# Patient Record
Sex: MALE | Race: WHITE | NOT HISPANIC OR LATINO | Employment: UNEMPLOYED | URBAN - NONMETROPOLITAN AREA
[De-identification: names, ages, dates, MRNs, and addresses within clinical notes are randomized per-mention and may not be internally consistent; named-entity substitution may affect disease eponyms.]

---

## 2018-11-09 ENCOUNTER — HOSPITAL ENCOUNTER (INPATIENT)
Facility: HOSPITAL | Age: 63
LOS: 3 days | Discharge: HOME | End: 2018-11-12
Attending: HOSPITALIST | Admitting: HOSPITALIST

## 2018-11-09 ENCOUNTER — PREP FOR SURGERY (OUTPATIENT)
Dept: OTHER | Facility: HOSPITAL | Age: 63
End: 2018-11-09

## 2018-11-09 PROBLEM — R00.1 SYMPTOMATIC BRADYCARDIA: Status: ACTIVE | Noted: 2018-11-09

## 2018-11-09 LAB
ALBUMIN SERPL-MCNC: 4.5 G/DL (ref 3.4–4.8)
ALBUMIN/GLOB SERPL: 1.6 G/DL (ref 1.5–2.5)
ALP SERPL-CCNC: 72 U/L (ref 40–129)
ALT SERPL W P-5'-P-CCNC: 27 U/L (ref 10–44)
ANION GAP SERPL CALCULATED.3IONS-SCNC: 4.1 MMOL/L (ref 3.6–11.2)
APTT PPP: 37.4 SECONDS (ref 23.8–36.1)
AST SERPL-CCNC: 23 U/L (ref 10–34)
BASOPHILS # BLD AUTO: 0.05 10*3/MM3 (ref 0–0.3)
BASOPHILS NFR BLD AUTO: 0.6 % (ref 0–2)
BILIRUB SERPL-MCNC: 0.4 MG/DL (ref 0.2–1.8)
BNP SERPL-MCNC: 89 PG/ML (ref 0–100)
BUN BLD-MCNC: 22 MG/DL (ref 7–21)
BUN/CREAT SERPL: 14.7 (ref 7–25)
CALCIUM SPEC-SCNC: 9.4 MG/DL (ref 7.7–10)
CHLORIDE SERPL-SCNC: 101 MMOL/L (ref 99–112)
CK MB SERPL-CCNC: 4.11 NG/ML (ref 0–5)
CK MB SERPL-CCNC: 4.11 NG/ML (ref 0–5)
CK MB SERPL-RTO: 3.8 % (ref 0–3)
CK SERPL-CCNC: 107 U/L (ref 24–204)
CO2 SERPL-SCNC: 25.9 MMOL/L (ref 24.3–31.9)
CREAT BLD-MCNC: 1.5 MG/DL (ref 0.43–1.29)
DEPRECATED RDW RBC AUTO: 45 FL (ref 37–54)
EOSINOPHIL # BLD AUTO: 0.2 10*3/MM3 (ref 0–0.7)
EOSINOPHIL NFR BLD AUTO: 2.4 % (ref 0–5)
ERYTHROCYTE [DISTWIDTH] IN BLOOD BY AUTOMATED COUNT: 13.8 % (ref 11.5–14.5)
GFR SERPL CREATININE-BSD FRML MDRD: 47 ML/MIN/1.73
GLOBULIN UR ELPH-MCNC: 2.8 GM/DL
GLUCOSE BLD-MCNC: 139 MG/DL (ref 70–110)
GLUCOSE BLDC GLUCOMTR-MCNC: 149 MG/DL (ref 70–130)
HBA1C MFR BLD: 7.7 % (ref 4.5–5.7)
HCT VFR BLD AUTO: 41.1 % (ref 42–52)
HGB BLD-MCNC: 13.3 G/DL (ref 14–18)
IMM GRANULOCYTES # BLD: 0.06 10*3/MM3 (ref 0–0.03)
IMM GRANULOCYTES NFR BLD: 0.7 % (ref 0–0.5)
INR PPP: 1.26 (ref 0.9–1.1)
LYMPHOCYTES # BLD AUTO: 2.48 10*3/MM3 (ref 1–3)
LYMPHOCYTES NFR BLD AUTO: 29.4 % (ref 21–51)
MAGNESIUM SERPL-MCNC: 2.7 MG/DL (ref 1.7–2.6)
MCH RBC QN AUTO: 29.4 PG (ref 27–33)
MCHC RBC AUTO-ENTMCNC: 32.4 G/DL (ref 33–37)
MCV RBC AUTO: 90.9 FL (ref 80–94)
MONOCYTES # BLD AUTO: 0.74 10*3/MM3 (ref 0.1–0.9)
MONOCYTES NFR BLD AUTO: 8.8 % (ref 0–10)
MYOGLOBIN SERPL-MCNC: 89 NG/ML (ref 0–109)
NEUTROPHILS # BLD AUTO: 4.91 10*3/MM3 (ref 1.4–6.5)
NEUTROPHILS NFR BLD AUTO: 58.1 % (ref 30–70)
OSMOLALITY SERPL CALC.SUM OF ELEC: 268.2 MOSM/KG (ref 273–305)
PHOSPHATE SERPL-MCNC: 4.3 MG/DL (ref 2.7–4.5)
PLATELET # BLD AUTO: 289 10*3/MM3 (ref 130–400)
PMV BLD AUTO: 9.2 FL (ref 6–10)
POTASSIUM BLD-SCNC: 4.9 MMOL/L (ref 3.5–5.3)
PROT SERPL-MCNC: 7.3 G/DL (ref 6–8)
PROTHROMBIN TIME: 16 SECONDS (ref 11–15.4)
RBC # BLD AUTO: 4.52 10*6/MM3 (ref 4.7–6.1)
SODIUM BLD-SCNC: 131 MMOL/L (ref 135–153)
TROPONIN I SERPL-MCNC: 0.01 NG/ML
TROPONIN I SERPL-MCNC: 0.01 NG/ML
TSH SERPL DL<=0.05 MIU/L-ACNC: 4.72 MIU/ML (ref 0.55–4.78)
WBC NRBC COR # BLD: 8.44 10*3/MM3 (ref 4.5–12.5)

## 2018-11-09 PROCEDURE — 82553 CREATINE MB FRACTION: CPT | Performed by: INTERNAL MEDICINE

## 2018-11-09 PROCEDURE — 82553 CREATINE MB FRACTION: CPT | Performed by: HOSPITALIST

## 2018-11-09 PROCEDURE — 99291 CRITICAL CARE FIRST HOUR: CPT | Performed by: HOSPITALIST

## 2018-11-09 PROCEDURE — 93010 ELECTROCARDIOGRAM REPORT: CPT | Performed by: INTERNAL MEDICINE

## 2018-11-09 PROCEDURE — 94799 UNLISTED PULMONARY SVC/PX: CPT

## 2018-11-09 PROCEDURE — 93005 ELECTROCARDIOGRAM TRACING: CPT | Performed by: INTERNAL MEDICINE

## 2018-11-09 PROCEDURE — 25010000002 MIDAZOLAM PER 1 MG

## 2018-11-09 PROCEDURE — 25010000002 DOPAMINE PER 40 MG: Performed by: HOSPITALIST

## 2018-11-09 PROCEDURE — 84484 ASSAY OF TROPONIN QUANT: CPT | Performed by: HOSPITALIST

## 2018-11-09 PROCEDURE — 83880 ASSAY OF NATRIURETIC PEPTIDE: CPT | Performed by: HOSPITALIST

## 2018-11-09 PROCEDURE — 83735 ASSAY OF MAGNESIUM: CPT | Performed by: HOSPITALIST

## 2018-11-09 PROCEDURE — 80053 COMPREHEN METABOLIC PANEL: CPT | Performed by: HOSPITALIST

## 2018-11-09 PROCEDURE — 85025 COMPLETE CBC W/AUTO DIFF WBC: CPT | Performed by: HOSPITALIST

## 2018-11-09 PROCEDURE — 84100 ASSAY OF PHOSPHORUS: CPT | Performed by: HOSPITALIST

## 2018-11-09 PROCEDURE — 85610 PROTHROMBIN TIME: CPT | Performed by: HOSPITALIST

## 2018-11-09 PROCEDURE — 99222 1ST HOSP IP/OBS MODERATE 55: CPT | Performed by: INTERNAL MEDICINE

## 2018-11-09 PROCEDURE — 94640 AIRWAY INHALATION TREATMENT: CPT

## 2018-11-09 PROCEDURE — 84443 ASSAY THYROID STIM HORMONE: CPT | Performed by: HOSPITALIST

## 2018-11-09 PROCEDURE — 82550 ASSAY OF CK (CPK): CPT | Performed by: HOSPITALIST

## 2018-11-09 PROCEDURE — 83036 HEMOGLOBIN GLYCOSYLATED A1C: CPT | Performed by: HOSPITALIST

## 2018-11-09 PROCEDURE — 82962 GLUCOSE BLOOD TEST: CPT

## 2018-11-09 PROCEDURE — 85730 THROMBOPLASTIN TIME PARTIAL: CPT | Performed by: HOSPITALIST

## 2018-11-09 PROCEDURE — 93005 ELECTROCARDIOGRAM TRACING: CPT | Performed by: HOSPITALIST

## 2018-11-09 PROCEDURE — 83874 ASSAY OF MYOGLOBIN: CPT | Performed by: INTERNAL MEDICINE

## 2018-11-09 RX ORDER — MIDAZOLAM HYDROCHLORIDE 1 MG/ML
INJECTION INTRAMUSCULAR; INTRAVENOUS
Status: COMPLETED
Start: 2018-11-09 | End: 2018-11-09

## 2018-11-09 RX ORDER — SODIUM CHLORIDE 0.9 % (FLUSH) 0.9 %
3-10 SYRINGE (ML) INJECTION AS NEEDED
Status: DISCONTINUED | OUTPATIENT
Start: 2018-11-09 | End: 2018-11-12 | Stop reason: HOSPADM

## 2018-11-09 RX ORDER — NICOTINE POLACRILEX 4 MG
15 LOZENGE BUCCAL
Status: DISCONTINUED | OUTPATIENT
Start: 2018-11-09 | End: 2018-11-12 | Stop reason: HOSPADM

## 2018-11-09 RX ORDER — SODIUM CHLORIDE 0.9 % (FLUSH) 0.9 %
3 SYRINGE (ML) INJECTION EVERY 12 HOURS SCHEDULED
Status: DISCONTINUED | OUTPATIENT
Start: 2018-11-09 | End: 2018-11-12 | Stop reason: HOSPADM

## 2018-11-09 RX ORDER — MIDAZOLAM HYDROCHLORIDE 1 MG/ML
1 INJECTION INTRAMUSCULAR; INTRAVENOUS ONCE
Status: COMPLETED | OUTPATIENT
Start: 2018-11-09 | End: 2018-11-09

## 2018-11-09 RX ORDER — SODIUM CHLORIDE 9 MG/ML
50 INJECTION, SOLUTION INTRAVENOUS CONTINUOUS
Status: DISCONTINUED | OUTPATIENT
Start: 2018-11-09 | End: 2018-11-11

## 2018-11-09 RX ORDER — DOPAMINE HYDROCHLORIDE 160 MG/100ML
INJECTION, SOLUTION INTRAVENOUS
Status: DISPENSED
Start: 2018-11-09 | End: 2018-11-10

## 2018-11-09 RX ORDER — HEPARIN SODIUM 5000 [USP'U]/ML
5000 INJECTION, SOLUTION INTRAVENOUS; SUBCUTANEOUS EVERY 8 HOURS SCHEDULED
Status: DISCONTINUED | OUTPATIENT
Start: 2018-11-09 | End: 2018-11-09

## 2018-11-09 RX ORDER — HEPARIN SODIUM 5000 [USP'U]/ML
5000 INJECTION, SOLUTION INTRAVENOUS; SUBCUTANEOUS EVERY 8 HOURS SCHEDULED
Status: CANCELLED | OUTPATIENT
Start: 2018-11-09

## 2018-11-09 RX ORDER — SODIUM CHLORIDE 0.9 % (FLUSH) 0.9 %
3 SYRINGE (ML) INJECTION EVERY 12 HOURS SCHEDULED
Status: CANCELLED | OUTPATIENT
Start: 2018-11-09

## 2018-11-09 RX ORDER — SODIUM CHLORIDE 0.9 % (FLUSH) 0.9 %
3-10 SYRINGE (ML) INJECTION AS NEEDED
Status: CANCELLED | OUTPATIENT
Start: 2018-11-09

## 2018-11-09 RX ORDER — HEPARIN SODIUM 5000 [USP'U]/ML
5000 INJECTION, SOLUTION INTRAVENOUS; SUBCUTANEOUS EVERY 8 HOURS SCHEDULED
Status: DISCONTINUED | OUTPATIENT
Start: 2018-11-10 | End: 2018-11-09

## 2018-11-09 RX ORDER — DOPAMINE HYDROCHLORIDE 160 MG/100ML
2-20 INJECTION, SOLUTION INTRAVENOUS
Status: DISCONTINUED | OUTPATIENT
Start: 2018-11-09 | End: 2018-11-11

## 2018-11-09 RX ORDER — SODIUM CHLORIDE 9 MG/ML
50 INJECTION, SOLUTION INTRAVENOUS CONTINUOUS
Status: CANCELLED | OUTPATIENT
Start: 2018-11-09

## 2018-11-09 RX ORDER — IPRATROPIUM BROMIDE AND ALBUTEROL SULFATE 2.5; .5 MG/3ML; MG/3ML
3 SOLUTION RESPIRATORY (INHALATION)
Status: DISCONTINUED | OUTPATIENT
Start: 2018-11-09 | End: 2018-11-12 | Stop reason: HOSPADM

## 2018-11-09 RX ORDER — DEXTROSE MONOHYDRATE 25 G/50ML
25 INJECTION, SOLUTION INTRAVENOUS
Status: DISCONTINUED | OUTPATIENT
Start: 2018-11-09 | End: 2018-11-12 | Stop reason: HOSPADM

## 2018-11-09 RX ADMIN — IPRATROPIUM BROMIDE AND ALBUTEROL SULFATE 3 ML: .5; 3 SOLUTION RESPIRATORY (INHALATION) at 19:18

## 2018-11-09 RX ADMIN — DOPAMINE HYDROCHLORIDE IN DEXTROSE 14 MCG/KG/MIN: 1.6 INJECTION, SOLUTION INTRAVENOUS at 23:46

## 2018-11-09 RX ADMIN — MIDAZOLAM HYDROCHLORIDE 1 MG: 1 INJECTION INTRAMUSCULAR; INTRAVENOUS at 18:43

## 2018-11-09 RX ADMIN — Medication 3 ML: at 21:18

## 2018-11-09 RX ADMIN — MIDAZOLAM HYDROCHLORIDE 1 MG: 1 INJECTION, SOLUTION INTRAMUSCULAR; INTRAVENOUS at 18:43

## 2018-11-09 RX ADMIN — SODIUM CHLORIDE 50 ML/HR: 9 INJECTION, SOLUTION INTRAVENOUS at 19:13

## 2018-11-09 RX ADMIN — DOPAMINE HYDROCHLORIDE IN DEXTROSE 10 MCG/KG/MIN: 1.6 INJECTION, SOLUTION INTRAVENOUS at 18:46

## 2018-11-09 NOTE — H&P
[facility] HOSPITALIST HISTORY AND PHYSICAL    Patient Identification:  Name:  Иван Miller  Age:  63 y.o.  Sex:  male  :  1955  MRN:  [id number]   Visit Number:  [id number]  Room number:  CC07/1C  Primary Care Physician:  Provider, No Known     Chief complaint:  Transfer from [facility] for severe symptomatic bradycardia.    History of presenting illness:    Mr. Miller is a 63-year-old gentleman with medical history significant for CAD status post PCI, 10 years ago, congestive heart failure, paroxysmal atrial fibrillation on amiodarone and anticoagulant with Eliquis status post cardioversion,  CKD stage 3, diabetes mellitus type 2, hypertension, dyslipidemia, COPD presented to [facility] with weakness.  Patient was noted to have symptomatic severe bradycardia with heart rate in 20s.  Patient was started on transcutaneous pacing with heart rate set at 60.  Initially patient was noted to be hypotensive with systolic in 80s but after improvement in heart red, hypotension resolved.  Patient did not receive any IV fluids and did receive aspirin 81 mg and Lovenox 100 mg subcutaneous 1 dose.  Patient felt better symptomatically after improvement in bradycardia.  Patient was transferred to [facility] for higher level of care.    Currently during my examination, patient is alert awake oriented and answering appropriately.  Transcutaneous pacing lead is not capturing.  Currently bradycardic and heart rate 40s to 50s.  Blood pressure is stable.  Patient has been experiencing fatigability, tiredness and dizziness for the last two weeks.  Patient did sustain fall twice 1 last night with trauma to head.  Currently denies any chest pain, shortness of breath, pedal edema, nausea, vomiting or abdominal pain.  Currently complaining of severe pain from the pacing pad.  Denies any recent fever.  Has chronic cough and shortness of breath from COPD without any recent worsening.  Denies any dysuria  hematuria.  At home patient takes amiodarone, clonidine and Coreg which can cause bradycardia.    Labs from the [facility]: WBC 8.5 hemoglobin 13.9 hematocrit 43 platelets 354 sodium 1:30 potassium 5.3 chloride 98 CO2 21 glucose 280 4B UN 23 creatinine 1.4 calcium 9 total bili 0.3 AST 16 AST 20 AKP 73  CK-MB 4.1 troponin less than 0.3  12-lead EKG with junctional rhythm and heart rate 26.    ---------------------------------------------------------------------------------------------------------------------   Review of Systems:  Constitution: No chills, no rigors, no unexplained weight loss or weight gain, fatigability +ve.  Eyes:  No diplopia, no blurred vision, no loss of vision, conjunctiva is pink and sclera is anicteric  ENT:  No tinnitus, no otorrhea, no epistaxis, no sore throat   Respiratory: chronic dry cough, no hemoptysis  Cardiovascular: No chest pain, sob positive, dizziness +, no palpitations.  Gastrointestinal: No abdominal pain, No nausea, no vomiting, no hematemesis, no diarrhea or constipation, no melena  Genitourinary: No frequency of dysuria no hematuria  Integument: No pruritis and  no skin rash  Hematologic / Lymphatic: No excessive bleeding, easy bruising, fatigue, lymphadenopathy and petechiae  Musculoskeletal: No joint pain, joint stiffness, joint swelling, muscle pain, muscle weakness and neck pain  Neurological:+ dizziness, no headaches, light headedness +, no seizures and vertigo. No blurring of vision.  Endocrine: No frequent urination and nocturia, temperature intolerance.   ---------------------------------------------------------------------------------------------------------------------   Past Medical History:  CAD status post PCI, 10 years ago, congestive heart failure, paroxysmal atrial fibrillation on amiodarone and anticoagulant with Eliquis status post cardioversion,  CKD stage 3, diabetes mellitus type 2, hypertension, dyslipidemia, COPD.    Past Surgical  History:  Hernia surgery    Family History:  Mother and father with H/O HTN. Sister decreased from the gastric cancer. No family history of DM or heart disease.     Social History:  No history of drinking and drug abuse. Former smoker.     Social History     Socioeconomic History   • Marital status:      Spouse name: Not on file   • Number of children: Not on file   • Years of education: Not on file   • Highest education level: Not on file     ---------------------------------------------------------------------------------------------------------------------   Allergies:  Codeine and Ciprofloxacin  ---------------------------------------------------------------------------------------------------------------------   Medications below are reported home medications pulling from within the system; at this time, these medications have not been reconciled unless otherwise specified and are in the verification process for further verifcation as current home medications.  Prior to Admission Medications     None        ---------------------------------------------------------------------------------------------------------------------   Vital Signs:  Temp:  [97.9 °F (36.6 °C)-98.7 °F (37.1 °C)] 98.7 °F (37.1 °C)  Heart Rate:  [46-65] 61  Resp:  [15-21] 15  BP: ()/(56-93) 128/93    Mean Arterial Pressure (Non-Invasive) for the past 24 hrs (Last 3 readings):   Noninvasive MAP (mmHg)   11/10/18 1005 107   11/10/18 0905 88   11/10/18 0805 104     SpO2:  [89 %-100 %] 98 %  on  Flow (L/min):  [2] 2;   Device (Oxygen Therapy): nasal cannula  Body mass index is 41.05 kg/m².    Wt Readings from Last 3 Encounters:   11/10/18 126 kg (278 lb)               ---------------------------------------------------------------------------------------------------------------------   Physical Exam:  General: Comfortable, Not in distress.  Well-developed and well-nourished. Obese  HENT:  Head:  Normocephalic and atraumatic.  Mouth:   Moist mucous membranes.    Eyes:  Conjunctivae and EOM are normal.  Pupils are equal, round, and reactive to light.  No scleral icterus.    Neck:  Neck supple.  No JVD present.    Cardiovascular:  bradycardia, regular rhythm with no murmur.  Pulmonary/Chest:  No respiratory distress, no wheezes, no crackles, with normal breath sounds and good air movement.  GI:  Soft.  Bowel sounds are normal.  No distension and no tenderness. No organomegaly. Midline scar noted.  Musculoskeletal:  No edema, no tenderness, and no deformity.  No red or swollen joints anywhere.    Neurological:  Alert and oriented to person, place, and time.  No cranial nerve deficit. No focal deficits. No facial droop.  No slurred speech.   Skin:  Skin is warm and dry. No rash noted. No pallor.   Peripheral vascular:  pulses in all 4 extremities with no clubbing, no cyanosis, no edema.  Genitourinary: no lopez  ---------------------------------------------------------------------------------------------------------------------  EKG:  Sinus Bradycardia with 1st degree AV block. No acute ST-T changes.    --------------------------------------------------------------------------------------------------------------------  Results from last 7 days   Lab Units  11/10/18   0538  11/09/18   2253  11/09/18   0991   CK TOTAL U/L   --    --   107   CKMB ng/mL   --    --   4.11  4.11   CK MB INDEX %   --    --   3.8*   TROPONIN I ng/mL  0.011  0.011  0.012   MYOGLOBIN ng/mL   --    --   89.0     Results from last 7 days   Lab Units  11/10/18   0538  11/09/18   1827   WBC 10*3/mm3  9.83  8.44   HEMOGLOBIN g/dL  14.5  13.3*   HEMATOCRIT %  43.9  41.1*   MCV fL  88.0  90.9   MCHC g/dL  33.0  32.4*   PLATELETS 10*3/mm3  312  289   INR    --   1.26*     Results from last 7 days   Lab Units  11/10/18   0538  11/09/18   1827   SODIUM mmol/L  131*  131*   POTASSIUM mmol/L  4.5  4.9   MAGNESIUM mg/dL   --   2.7*   CHLORIDE mmol/L  100  101   CO2 mmol/L  24.7  25.9   BUN  mg/dL  21  22*   CREATININE mg/dL  1.35*  1.50*   EGFR IF NONAFRICN AM mL/min/1.73  53*  47*   CALCIUM mg/dL  9.5  9.4   GLUCOSE mg/dL  212*  139*   ALBUMIN g/dL   --   4.50   BILIRUBIN mg/dL   --   0.4   ALK PHOS U/L   --   72   AST (SGOT) U/L   --   23   ALT (SGPT) U/L   --   27   Estimated Creatinine Clearance: 72.6 mL/min (A) (by C-G formula based on SCr of 1.35 mg/dL (H)).    Hemoglobin A1C   Date/Time Value Ref Range Status   11/09/2018 1827 7.70 (H) 4.50 - 5.70 % Final     Glucose   Date/Time Value Ref Range Status   11/10/2018 0904 255 (H) 70 - 130 mg/dL Final   11/10/2018 0643 219 (H) 70 - 130 mg/dL Final   11/09/2018 2055 149 (H) 70 - 130 mg/dL Final     No results found for: AMMONIA  Results from last 7 days   Lab Units  11/10/18   0538   CHOLESTEROL mg/dL  246*   TRIGLYCERIDES mg/dL  369*   HDL CHOL mg/dL  31*   LDL CHOL mg/dL  141*                 pH No results found for: PHART   pO2 No results found for: PO2ART   pCO2 No results found for: [id number]   HCO3 No results found for: FIP0NBQ     I have personally looked at the labs and they are summarized above.  ----------------------------------------------------------------------------------------------------------------------  Imaging Results (last 24 hours)     Procedure Component Value Units Date/Time    XR Chest 1 View [[id number]] Collected:  11/10/18 0834     Updated:  11/10/18 0836    Narrative:       EXAMINATION: XR CHEST 1 VW-      CLINICAL INDICATION:     BRADYCARDIA     TECHNIQUE:  XR CHEST 1 VW-      COMPARISON: NONE      FINDINGS:   The lungs remain aerated.  Heart and mediastinum contours are unremarkable.  No pleural effusion.  No pneumothorax.   Bony and soft tissue structures are unremarkable.       Impression:       No radiographic evidence of acute cardiac or pulmonary  disease.     This report was finalized on 11/10/2018 8:34 AM by Dr. Jonah Loving MD.       CT Head Without Contrast [[id number]] Collected:  11/10/18 0834     Updated:   11/10/18 0836    Narrative:          EXAMINATION: CT HEAD WO CONTRAST-      CLINICAL INDICATION:     FALL     TECHNIQUE: Contiguous axial CT images of the head were obtained without  contrast administration.      Radiation dose reduction techniques were utilized per ALARA protocol.  Automated exposure control was initiated through either or [employer] or  DoseRight software packages by  protocol.       1121.7 mGy.cm     COMPARISON:  None.       FINDINGS: Generalized cerebral atrophy is present. There is no mass  effect, midline displacement, or hydrocephalus. There are patchy areas  of decreased density within the periventricular white matter which  likely reflect chronic small vessel ischemic changes. There is no CT  evidence of acute infarct or hemorrhage. Bone windows reveal no osseous  abnormalities or fractures.        Impression:       Atrophy and chronic small vessel ischemic change, but there  are no acute intracranial abnormalities identified.         This report was finalized on 11/10/2018 8:34 AM by Dr. Jonah Loving MD.           I have personally reviewed the radiology images and read the final radiology report.  ----------------------------------------------------------------------------------------------------------------------  Assessment:   Severe Symptomatic sinus Bradycardia   Hypotension   Hyponatremia, mild asymptomatic   CAD, S/P PCI   Paroxysmal Atrial fibrillation   Anticoagulated with Eliquis   DM2 with A1C 7.7   CKD stage 3   HTN   Dyslipidemia   RIO   Morbid obesity  Status post fall    Plan:  Severe symptomatic sinus bradycardia with initiated heart rate in 20s with the initial hypotension now improved with heart rate in 40s with blood pressure stable.  Secondary to multiple AV jeff blocking agents including amiodarone, clonidine and Coreg.  Hold amiodarone, clonidine and Coreg.  Interventional Cardiology consulted.  We'll discontinue transcutaneous pacing.  Start on dopamine  "to support bradycardia.  Keep heart rate around 60.  Electrolytes within normal limit.  Follow-up TSH.  Currently to monitor closely.  Keep patient nothing by mouth.  Continue with IV fluids.    Hypotension resolved.  Continue with IV fluids.  Mild asymptomatic hyponatremia.  Continue with IVF and continue to monitor closely.    CAD status post PCI.  Currently stable.  Troponins negative and EKG was no acute changes.  Patient received aspirin and full dose Lovenox today at [facility].  Hold aspirin until CT head is done.  Had recent fall and patient is on Eliquis at home.    History of proximal A. fib and anticoagulated with Eliquis.  Currently bradycardic so hold amiodarone and Coreg.  Hold Eliquis currently.  Follow-up CT head.    Continue with Accu-Cheks and insulin sliding scale low-dose for history of diabetes.  Hold metformin.  CKD stage III currently stable.  Continue to monitor closely.  Hold Lasix.    COPD currently stable.  Continue with DuoNeb's.  History of obsessive sleep apnea and patient does not use CPAP at home.  Continue with O2 support.    Resume home medications once verified by pharmacy.  SCDs for DVT prophylaxis.  Hold\" since received full dose Lovenox this afternoon.  Activity bedrest  Diet nothing by mouth  CODE STATUS full code    No family available at bedside.  The management plan was discussed in detail with the patient.  All questions were answered.    Critical care time 60 minutes.    Patient is high risk because of: Severe symptomatic bradycardia    Dominique Jensen MD  11/10/18  10:39 AM  "

## 2018-11-09 NOTE — CONSULTS
Patient Name: Иван Miller  Age/Sex: 63 y.o. male  : 1955  MRN: [id number]    Date of Hospital Visit: 2018  Encounter Provider: Hugo Ribeiro MD  Referring Provider: Dominique Jensen MD         Subjective:       Chief Complaint: symptomatic bradycardic     History of Present Illness:  Иван Miller is a 63 y.o. male with syncope yesterday and weakness and dizzyness to [facility] with junctional HR of 26. Pt placed on TC pacing transferred here. Now sinus bradycardia HR 46 with normal BP. Pt mildly sedated due to versed given for pacing pain but reports DM, AFib, HTN, Ex smoker. Denies CP but endorses some SOB. Pt takes clonidine 0.3 mg BID, coreg and amiodarone and Eliquis.   Remote HX AMI and stenting .    ROS: pt hit his head yesterday .HCT pending. D/w DR. Jensen.    Hx of hernia repair with chicken wire       Past Medical History:  No past medical history on file.    No past surgical history on file.    Home Medications:    No prescriptions prior to admission.       Allergies:  Allergies   Allergen Reactions   • Codeine Nausea And Vomiting   • Ciprofloxacin Diarrhea       Past Social History:  Social History     Social History   • Marital status:      Social History Main Topics   • Drug use: Unknown     Other Topics Concern   • Not on file       Past Family History:  No family history on file.    Review of Systems:   Constitution: No chills, no rigors, no unexplained weight loss or weight gain  Eyes:  No diplopia, no blurred vision, no loss of vision, conjunctiva is pink and sclera is anicteric  ENT:  No tinnitus, no otorrhea, no epistaxis, no sore throat   Respiratory: No cough, no hemoptysis  Cardiovascular: see HPI  Gastrointestinal: No nausea, no vomiting, no hematemesis, no diarrhea or constipation, no melena  Genitourinary: No frequency of dysuria no hematuria  Integument: No pruritis and  no skin rash  Hematologic / Lymphatic: No excessive bleeding, easy bruising,  fatigue, lymphadenopathy and petechiae  Musculoskeletal: No joint pain, joint stiffness, joint swelling, muscle pain, muscle weakness and neck pain  Neurological: No dizziness, headaches, light headedness, seizures and vertigo  Endocrine: No frequent urination and nocturia, temperature intolerance, weight gain, unintended and weight loss, unintended      Objective:     Objective:  Vital Signs (last 24 hours)       11/08 0700  -  11/09 0659 11/09 0700  -  11/09 1851   Most Recent    Heart Rate     60     60    SpO2 (%)     (!)89     (!) 89          Body mass index is 41.35 kg/m².  Weight change:           PHYSICAL EXAM:    General: Alert and oriented, no acute distress  Head: Normocephalic, without obvious abnormality, atraumatic  Neck: Supple, trachea midline, no JVD  Lungs: Clear to auscultation,respirations regular, even and unlabored  Heart: Regular rate and rhythm, normal S1 and S2, no rub, murmur, or gallop,    Chest wall: No abnormalities observed  Abdomen:Normal bowel sounds,  non-distended  Extremities:Moves all extremities well, no edema, no cyanosis, no redness  Pulses: Pulses palpable and equal bilaterally  Skin: No bleeding, bruising or rash  Neurologic: A & O x 3     Access:    Lab Review:           Invalid input(s): LABALBU, PROT            Results from last 7 days  Lab Units 11/09/18 1827   WBC 10*3/mm3 8.44   HEMOGLOBIN g/dL 13.3*   HEMATOCRIT % 41.1*   PLATELETS 10*3/mm3 289       Results from last 7 days  Lab Units 11/09/18  1827   INR  1.26*   APTT seconds 37.4*                   Invalid input(s):  T4,  FREET4    EKG:   ECG/EMG Results (last 24 hours)     Procedure Component Value Units Date/Time    ECG 12 Lead [[id number]] Collected:  11/09/18 1753     Updated:  11/09/18 1829    Narrative:       Test Reason : PACED  Blood Pressure : **/** mmHG  Vent. Rate : 043 BPM     Atrial Rate : 043 BPM     P-R Int : 214 ms          QRS Dur : 110 ms      QT Int : 508 ms       P-R-T Axes : 081 050 084  degrees     QTc Int : 429 ms    Marked sinus bradycardia with 1st degree AV block  Cannot rule out Inferior infarct , age undetermined  Abnormal ECG  When compared with ECG of 09-NOV-2018 17:53, (Unconfirmed)  Previous ECG has undetermined rhythm, needs review    Referred By:  TACOS           Confirmed By:           Imaging:  Imaging Results (last 24 hours)     ** No results found for the last 24 hours. **          I personally viewed and interpreted the patient's EKG/Telemetry data.    Assessment:     Active Problems:    Symptomatic bradycardia          Plan:       Bradycardia: Agree to hold Amiodarone, Coreg and clonidine. If rhythm does not improve then consider PPM for SSS. DA for now to support HR poses little risk.    Afib: stop amiodarone. COntinue anticoagulation when HCT is cleared.    CAD: no chest pain and non acute EKG. A troponin for good measure may be considered. Cont ASA, statin.  Check echo to evaluate cardiac structure and function.      D/w Dr. eJnsen.          Hugo Ribeiro MD  11/09/18  6:51 PM

## 2018-11-10 ENCOUNTER — APPOINTMENT (OUTPATIENT)
Dept: CARDIOLOGY | Facility: HOSPITAL | Age: 63
End: 2018-11-10
Attending: HOSPITALIST

## 2018-11-10 ENCOUNTER — APPOINTMENT (OUTPATIENT)
Dept: CT IMAGING | Facility: HOSPITAL | Age: 63
End: 2018-11-10

## 2018-11-10 ENCOUNTER — APPOINTMENT (OUTPATIENT)
Dept: GENERAL RADIOLOGY | Facility: HOSPITAL | Age: 63
End: 2018-11-10

## 2018-11-10 LAB
ANION GAP SERPL CALCULATED.3IONS-SCNC: 6.3 MMOL/L (ref 3.6–11.2)
BASOPHILS # BLD AUTO: 0.06 10*3/MM3 (ref 0–0.3)
BASOPHILS NFR BLD AUTO: 0.6 % (ref 0–2)
BH CV ECHO MEAS - % IVS THICK: 55.8 %
BH CV ECHO MEAS - % LVPW THICK: 44.9 %
BH CV ECHO MEAS - ACS: 2.4 CM
BH CV ECHO MEAS - AO ROOT AREA (BSA CORRECTED): 1.6
BH CV ECHO MEAS - AO ROOT AREA: 11.2 CM^2
BH CV ECHO MEAS - AO ROOT DIAM: 3.8 CM
BH CV ECHO MEAS - BSA(HAYCOCK): 2.5 M^2
BH CV ECHO MEAS - BSA: 2.4 M^2
BH CV ECHO MEAS - BZI_BMI: 41.1 KILOGRAMS/M^2
BH CV ECHO MEAS - BZI_METRIC_HEIGHT: 175.3 CM
BH CV ECHO MEAS - BZI_METRIC_WEIGHT: 126.1 KG
BH CV ECHO MEAS - EDV(CUBED): 227.4 ML
BH CV ECHO MEAS - EDV(MOD-SP4): 71 ML
BH CV ECHO MEAS - EDV(TEICH): 187.2 ML
BH CV ECHO MEAS - EF(CUBED): 72.5 %
BH CV ECHO MEAS - EF(MOD-SP4): 62 %
BH CV ECHO MEAS - EF(TEICH): 63.3 %
BH CV ECHO MEAS - ESV(CUBED): 62.5 ML
BH CV ECHO MEAS - ESV(MOD-SP4): 27 ML
BH CV ECHO MEAS - ESV(TEICH): 68.7 ML
BH CV ECHO MEAS - FS: 35 %
BH CV ECHO MEAS - IVS/LVPW: 1.3
BH CV ECHO MEAS - IVSD: 1.7 CM
BH CV ECHO MEAS - IVSS: 2.7 CM
BH CV ECHO MEAS - LA DIMENSION: 6.4 CM
BH CV ECHO MEAS - LA/AO: 1.7
BH CV ECHO MEAS - LV DIASTOLIC VOL/BSA (35-75): 29.9 ML/M^2
BH CV ECHO MEAS - LV MASS(C)D: 450.5 GRAMS
BH CV ECHO MEAS - LV MASS(C)DI: 189.6 GRAMS/M^2
BH CV ECHO MEAS - LV MASS(C)S: 474.7 GRAMS
BH CV ECHO MEAS - LV MASS(C)SI: 199.8 GRAMS/M^2
BH CV ECHO MEAS - LV SYSTOLIC VOL/BSA (12-30): 11.4 ML/M^2
BH CV ECHO MEAS - LVIDD: 6.1 CM
BH CV ECHO MEAS - LVIDS: 4 CM
BH CV ECHO MEAS - LVLD AP4: 9.1 CM
BH CV ECHO MEAS - LVLS AP4: 8.4 CM
BH CV ECHO MEAS - LVOT AREA (M): 2.8 CM^2
BH CV ECHO MEAS - LVOT AREA: 3 CM^2
BH CV ECHO MEAS - LVOT DIAM: 1.9 CM
BH CV ECHO MEAS - LVPWD: 1.3 CM
BH CV ECHO MEAS - LVPWS: 1.9 CM
BH CV ECHO MEAS - MV A MAX VEL: 80.5 CM/SEC
BH CV ECHO MEAS - MV E MAX VEL: 68.1 CM/SEC
BH CV ECHO MEAS - MV E/A: 0.85
BH CV ECHO MEAS - PA ACC SLOPE: 1762 CM/SEC^2
BH CV ECHO MEAS - PA ACC TIME: 0.07 SEC
BH CV ECHO MEAS - PA PR(ACCEL): 47.3 MMHG
BH CV ECHO MEAS - RVDD: 2.4 CM
BH CV ECHO MEAS - SI(CUBED): 69.4 ML/M^2
BH CV ECHO MEAS - SI(MOD-SP4): 18.5 ML/M^2
BH CV ECHO MEAS - SI(TEICH): 49.9 ML/M^2
BH CV ECHO MEAS - SV(CUBED): 164.9 ML
BH CV ECHO MEAS - SV(MOD-SP4): 44 ML
BH CV ECHO MEAS - SV(TEICH): 118.5 ML
BUN BLD-MCNC: 21 MG/DL (ref 7–21)
BUN/CREAT SERPL: 15.6 (ref 7–25)
CALCIUM SPEC-SCNC: 9.5 MG/DL (ref 7.7–10)
CHLORIDE SERPL-SCNC: 100 MMOL/L (ref 99–112)
CHOLEST SERPL-MCNC: 246 MG/DL (ref 0–200)
CO2 SERPL-SCNC: 24.7 MMOL/L (ref 24.3–31.9)
CREAT BLD-MCNC: 1.35 MG/DL (ref 0.43–1.29)
DEPRECATED RDW RBC AUTO: 41.9 FL (ref 37–54)
EOSINOPHIL # BLD AUTO: 0.16 10*3/MM3 (ref 0–0.7)
EOSINOPHIL NFR BLD AUTO: 1.6 % (ref 0–5)
ERYTHROCYTE [DISTWIDTH] IN BLOOD BY AUTOMATED COUNT: 13.2 % (ref 11.5–14.5)
GFR SERPL CREATININE-BSD FRML MDRD: 53 ML/MIN/1.73
GLUCOSE BLD-MCNC: 212 MG/DL (ref 70–110)
GLUCOSE BLDC GLUCOMTR-MCNC: 189 MG/DL (ref 70–130)
GLUCOSE BLDC GLUCOMTR-MCNC: 195 MG/DL (ref 70–130)
GLUCOSE BLDC GLUCOMTR-MCNC: 219 MG/DL (ref 70–130)
GLUCOSE BLDC GLUCOMTR-MCNC: 244 MG/DL (ref 70–130)
GLUCOSE BLDC GLUCOMTR-MCNC: 255 MG/DL (ref 70–130)
HCT VFR BLD AUTO: 43.9 % (ref 42–52)
HDLC SERPL-MCNC: 31 MG/DL (ref 60–100)
HGB BLD-MCNC: 14.5 G/DL (ref 14–18)
IMM GRANULOCYTES # BLD: 0.04 10*3/MM3 (ref 0–0.03)
IMM GRANULOCYTES NFR BLD: 0.4 % (ref 0–0.5)
LDLC SERPL CALC-MCNC: 141 MG/DL (ref 0–100)
LDLC/HDLC SERPL: 4.55 {RATIO}
LYMPHOCYTES # BLD AUTO: 1.72 10*3/MM3 (ref 1–3)
LYMPHOCYTES NFR BLD AUTO: 17.5 % (ref 21–51)
MAXIMAL PREDICTED HEART RATE: 156 BPM
MCH RBC QN AUTO: 29.1 PG (ref 27–33)
MCHC RBC AUTO-ENTMCNC: 33 G/DL (ref 33–37)
MCV RBC AUTO: 88 FL (ref 80–94)
MONOCYTES # BLD AUTO: 1.31 10*3/MM3 (ref 0.1–0.9)
MONOCYTES NFR BLD AUTO: 13.3 % (ref 0–10)
NEUTROPHILS # BLD AUTO: 6.54 10*3/MM3 (ref 1.4–6.5)
NEUTROPHILS NFR BLD AUTO: 66.6 % (ref 30–70)
OSMOLALITY SERPL CALC.SUM OF ELEC: 271.9 MOSM/KG (ref 273–305)
PLATELET # BLD AUTO: 312 10*3/MM3 (ref 130–400)
PMV BLD AUTO: 9.3 FL (ref 6–10)
POTASSIUM BLD-SCNC: 4.5 MMOL/L (ref 3.5–5.3)
RBC # BLD AUTO: 4.99 10*6/MM3 (ref 4.7–6.1)
SODIUM BLD-SCNC: 131 MMOL/L (ref 135–153)
STRESS TARGET HR: 133 BPM
TRIGL SERPL-MCNC: 369 MG/DL (ref 0–150)
TROPONIN I SERPL-MCNC: 0.01 NG/ML
VLDLC SERPL-MCNC: 73.8 MG/DL
WBC NRBC COR # BLD: 9.83 10*3/MM3 (ref 4.5–12.5)

## 2018-11-10 PROCEDURE — 70450 CT HEAD/BRAIN W/O DYE: CPT | Performed by: RADIOLOGY

## 2018-11-10 PROCEDURE — 63710000001 INSULIN ASPART PER 5 UNITS: Performed by: HOSPITALIST

## 2018-11-10 PROCEDURE — 82962 GLUCOSE BLOOD TEST: CPT

## 2018-11-10 PROCEDURE — 93306 TTE W/DOPPLER COMPLETE: CPT | Performed by: INTERNAL MEDICINE

## 2018-11-10 PROCEDURE — 99232 SBSQ HOSP IP/OBS MODERATE 35: CPT | Performed by: INTERNAL MEDICINE

## 2018-11-10 PROCEDURE — 71045 X-RAY EXAM CHEST 1 VIEW: CPT

## 2018-11-10 PROCEDURE — 84484 ASSAY OF TROPONIN QUANT: CPT | Performed by: HOSPITALIST

## 2018-11-10 PROCEDURE — 25010000002 DOPAMINE PER 40 MG: Performed by: HOSPITALIST

## 2018-11-10 PROCEDURE — 94799 UNLISTED PULMONARY SVC/PX: CPT

## 2018-11-10 PROCEDURE — 85025 COMPLETE CBC W/AUTO DIFF WBC: CPT | Performed by: HOSPITALIST

## 2018-11-10 PROCEDURE — 63710000001 INSULIN DETEMIR PER 5 UNITS: Performed by: HOSPITALIST

## 2018-11-10 PROCEDURE — 80061 LIPID PANEL: CPT | Performed by: HOSPITALIST

## 2018-11-10 PROCEDURE — 71045 X-RAY EXAM CHEST 1 VIEW: CPT | Performed by: RADIOLOGY

## 2018-11-10 PROCEDURE — 70450 CT HEAD/BRAIN W/O DYE: CPT

## 2018-11-10 PROCEDURE — 80048 BASIC METABOLIC PNL TOTAL CA: CPT | Performed by: HOSPITALIST

## 2018-11-10 PROCEDURE — 93306 TTE W/DOPPLER COMPLETE: CPT

## 2018-11-10 PROCEDURE — 99232 SBSQ HOSP IP/OBS MODERATE 35: CPT | Performed by: HOSPITALIST

## 2018-11-10 RX ORDER — OMEPRAZOLE 40 MG/1
40 CAPSULE, DELAYED RELEASE ORAL DAILY
Status: ON HOLD | COMMUNITY
End: 2021-05-22

## 2018-11-10 RX ORDER — BUDESONIDE AND FORMOTEROL FUMARATE DIHYDRATE 160; 4.5 UG/1; UG/1
2 AEROSOL RESPIRATORY (INHALATION)
Status: ON HOLD | COMMUNITY
End: 2021-05-22

## 2018-11-10 RX ORDER — AMIODARONE HYDROCHLORIDE 200 MG/1
200 TABLET ORAL 2 TIMES DAILY
COMMUNITY
End: 2018-11-12 | Stop reason: HOSPADM

## 2018-11-10 RX ORDER — NICOTINE POLACRILEX 4 MG
15 LOZENGE BUCCAL
Status: DISCONTINUED | OUTPATIENT
Start: 2018-11-10 | End: 2018-11-12 | Stop reason: HOSPADM

## 2018-11-10 RX ORDER — CARVEDILOL 6.25 MG/1
6.25 TABLET ORAL 2 TIMES DAILY WITH MEALS
COMMUNITY
End: 2018-11-12 | Stop reason: HOSPADM

## 2018-11-10 RX ORDER — FLUTICASONE PROPIONATE 50 MCG
2 SPRAY, SUSPENSION (ML) NASAL DAILY
COMMUNITY

## 2018-11-10 RX ORDER — INSULIN ASPART 100 [IU]/ML
15 INJECTION, SUSPENSION SUBCUTANEOUS 3 TIMES DAILY
Status: CANCELLED | OUTPATIENT
Start: 2018-11-10

## 2018-11-10 RX ORDER — BUDESONIDE AND FORMOTEROL FUMARATE DIHYDRATE 160; 4.5 UG/1; UG/1
2 AEROSOL RESPIRATORY (INHALATION)
Status: CANCELLED | OUTPATIENT
Start: 2018-11-10

## 2018-11-10 RX ORDER — ATORVASTATIN CALCIUM 20 MG/1
20 TABLET, FILM COATED ORAL DAILY
COMMUNITY

## 2018-11-10 RX ORDER — PANTOPRAZOLE SODIUM 40 MG/1
40 TABLET, DELAYED RELEASE ORAL EVERY MORNING
Status: DISCONTINUED | OUTPATIENT
Start: 2018-11-10 | End: 2018-11-12 | Stop reason: HOSPADM

## 2018-11-10 RX ORDER — TAMSULOSIN HYDROCHLORIDE 0.4 MG/1
0.4 CAPSULE ORAL NIGHTLY
Status: CANCELLED | OUTPATIENT
Start: 2018-11-10

## 2018-11-10 RX ORDER — AMIODARONE HYDROCHLORIDE 200 MG/1
200 TABLET ORAL 2 TIMES DAILY
Status: CANCELLED | OUTPATIENT
Start: 2018-11-10

## 2018-11-10 RX ORDER — DEXTROSE MONOHYDRATE 25 G/50ML
25 INJECTION, SOLUTION INTRAVENOUS
Status: DISCONTINUED | OUTPATIENT
Start: 2018-11-10 | End: 2018-11-12 | Stop reason: HOSPADM

## 2018-11-10 RX ORDER — SPIRONOLACTONE 25 MG/1
25 TABLET ORAL DAILY
COMMUNITY
End: 2018-11-12 | Stop reason: HOSPADM

## 2018-11-10 RX ORDER — AMLODIPINE BESYLATE 10 MG/1
10 TABLET ORAL DAILY
Status: CANCELLED | OUTPATIENT
Start: 2018-11-10

## 2018-11-10 RX ORDER — BUDESONIDE AND FORMOTEROL FUMARATE DIHYDRATE 160; 4.5 UG/1; UG/1
2 AEROSOL RESPIRATORY (INHALATION)
Status: DISCONTINUED | OUTPATIENT
Start: 2018-11-10 | End: 2018-11-12 | Stop reason: HOSPADM

## 2018-11-10 RX ORDER — CARVEDILOL 6.25 MG/1
6.25 TABLET ORAL 2 TIMES DAILY WITH MEALS
Status: CANCELLED | OUTPATIENT
Start: 2018-11-10

## 2018-11-10 RX ORDER — CLONIDINE HYDROCHLORIDE 0.3 MG/1
0.3 TABLET ORAL EVERY 8 HOURS SCHEDULED
COMMUNITY
End: 2018-11-12 | Stop reason: HOSPADM

## 2018-11-10 RX ORDER — ASPIRIN 81 MG/1
81 TABLET ORAL DAILY
Status: DISCONTINUED | OUTPATIENT
Start: 2018-11-10 | End: 2018-11-12 | Stop reason: HOSPADM

## 2018-11-10 RX ORDER — SPIRONOLACTONE 25 MG/1
25 TABLET ORAL DAILY
Status: CANCELLED | OUTPATIENT
Start: 2018-11-10

## 2018-11-10 RX ORDER — AMLODIPINE BESYLATE 10 MG/1
10 TABLET ORAL DAILY
Status: ON HOLD | COMMUNITY
End: 2021-05-22

## 2018-11-10 RX ORDER — FLUTICASONE PROPIONATE 50 MCG
2 SPRAY, SUSPENSION (ML) NASAL DAILY
Status: DISCONTINUED | OUTPATIENT
Start: 2018-11-10 | End: 2018-11-12 | Stop reason: HOSPADM

## 2018-11-10 RX ORDER — CLONIDINE HYDROCHLORIDE 0.3 MG/1
0.3 TABLET ORAL EVERY 8 HOURS SCHEDULED
Status: CANCELLED | OUTPATIENT
Start: 2018-11-10

## 2018-11-10 RX ORDER — ATORVASTATIN CALCIUM 40 MG/1
80 TABLET, FILM COATED ORAL NIGHTLY
Status: DISCONTINUED | OUTPATIENT
Start: 2018-11-10 | End: 2018-11-12 | Stop reason: HOSPADM

## 2018-11-10 RX ORDER — HYDROCODONE BITARTRATE AND ACETAMINOPHEN 10; 325 MG/1; MG/1
1 TABLET ORAL EVERY 8 HOURS PRN
Status: DISCONTINUED | OUTPATIENT
Start: 2018-11-10 | End: 2018-11-12 | Stop reason: HOSPADM

## 2018-11-10 RX ORDER — ALBUTEROL SULFATE 90 UG/1
2 AEROSOL, METERED RESPIRATORY (INHALATION) EVERY 6 HOURS PRN
COMMUNITY

## 2018-11-10 RX ORDER — ASPIRIN 325 MG
325 TABLET ORAL DAILY
COMMUNITY
End: 2018-11-12 | Stop reason: HOSPADM

## 2018-11-10 RX ORDER — TAMSULOSIN HYDROCHLORIDE 0.4 MG/1
1 CAPSULE ORAL NIGHTLY
COMMUNITY

## 2018-11-10 RX ORDER — ATORVASTATIN CALCIUM 20 MG/1
20 TABLET, FILM COATED ORAL DAILY
Status: CANCELLED | OUTPATIENT
Start: 2018-11-10

## 2018-11-10 RX ORDER — FLUTICASONE PROPIONATE 50 MCG
2 SPRAY, SUSPENSION (ML) NASAL DAILY
Status: CANCELLED | OUTPATIENT
Start: 2018-11-10

## 2018-11-10 RX ORDER — FUROSEMIDE 40 MG/1
40 TABLET ORAL DAILY
Status: CANCELLED | OUTPATIENT
Start: 2018-11-10

## 2018-11-10 RX ORDER — PANTOPRAZOLE SODIUM 40 MG/1
40 TABLET, DELAYED RELEASE ORAL EVERY MORNING
Status: CANCELLED | OUTPATIENT
Start: 2018-11-11

## 2018-11-10 RX ORDER — FUROSEMIDE 40 MG/1
40 TABLET ORAL DAILY
COMMUNITY
End: 2018-11-12 | Stop reason: HOSPADM

## 2018-11-10 RX ORDER — ALBUTEROL SULFATE 2.5 MG/3ML
2.5 SOLUTION RESPIRATORY (INHALATION) EVERY 6 HOURS PRN
Status: CANCELLED | OUTPATIENT
Start: 2018-11-10

## 2018-11-10 RX ORDER — ASPIRIN 325 MG
325 TABLET ORAL DAILY
Status: CANCELLED | OUTPATIENT
Start: 2018-11-10

## 2018-11-10 RX ORDER — TAMSULOSIN HYDROCHLORIDE 0.4 MG/1
0.4 CAPSULE ORAL NIGHTLY
Status: DISCONTINUED | OUTPATIENT
Start: 2018-11-10 | End: 2018-11-12 | Stop reason: HOSPADM

## 2018-11-10 RX ADMIN — FLUTICASONE PROPIONATE 2 SPRAY: 50 SPRAY, METERED NASAL at 21:34

## 2018-11-10 RX ADMIN — INSULIN DETEMIR 15 UNITS: 100 INJECTION, SOLUTION SUBCUTANEOUS at 21:38

## 2018-11-10 RX ADMIN — Medication 3 ML: at 09:06

## 2018-11-10 RX ADMIN — ATORVASTATIN CALCIUM 80 MG: 40 TABLET, FILM COATED ORAL at 21:35

## 2018-11-10 RX ADMIN — INSULIN ASPART 2 UNITS: 100 INJECTION, SOLUTION INTRAVENOUS; SUBCUTANEOUS at 21:48

## 2018-11-10 RX ADMIN — Medication 3 ML: at 21:39

## 2018-11-10 RX ADMIN — SODIUM CHLORIDE 50 ML/HR: 9 INJECTION, SOLUTION INTRAVENOUS at 15:43

## 2018-11-10 RX ADMIN — IPRATROPIUM BROMIDE AND ALBUTEROL SULFATE 3 ML: .5; 3 SOLUTION RESPIRATORY (INHALATION) at 12:26

## 2018-11-10 RX ADMIN — TAMSULOSIN HYDROCHLORIDE 0.4 MG: 0.4 CAPSULE ORAL at 21:38

## 2018-11-10 RX ADMIN — BUDESONIDE AND FORMOTEROL FUMARATE DIHYDRATE 2 PUFF: 160; 4.5 AEROSOL RESPIRATORY (INHALATION) at 18:28

## 2018-11-10 RX ADMIN — HYDROCODONE BITARTRATE AND ACETAMINOPHEN 1 TABLET: 10; 325 TABLET ORAL at 17:10

## 2018-11-10 RX ADMIN — APIXABAN 5 MG: 5 TABLET, FILM COATED ORAL at 21:36

## 2018-11-10 RX ADMIN — INSULIN ASPART 2 UNITS: 100 INJECTION, SOLUTION INTRAVENOUS; SUBCUTANEOUS at 16:31

## 2018-11-10 RX ADMIN — IPRATROPIUM BROMIDE AND ALBUTEROL SULFATE 3 ML: .5; 3 SOLUTION RESPIRATORY (INHALATION) at 00:06

## 2018-11-10 RX ADMIN — IPRATROPIUM BROMIDE AND ALBUTEROL SULFATE 3 ML: .5; 3 SOLUTION RESPIRATORY (INHALATION) at 18:28

## 2018-11-10 RX ADMIN — INSULIN ASPART 4 UNITS: 100 INJECTION, SOLUTION INTRAVENOUS; SUBCUTANEOUS at 09:05

## 2018-11-10 RX ADMIN — ASPIRIN 81 MG: 81 TABLET ORAL at 13:29

## 2018-11-10 RX ADMIN — INSULIN ASPART 3 UNITS: 100 INJECTION, SOLUTION INTRAVENOUS; SUBCUTANEOUS at 10:55

## 2018-11-10 RX ADMIN — DOPAMINE HYDROCHLORIDE IN DEXTROSE 10 MCG/KG/MIN: 1.6 INJECTION, SOLUTION INTRAVENOUS at 04:41

## 2018-11-10 RX ADMIN — DOPAMINE HYDROCHLORIDE IN DEXTROSE 12 MCG/KG/MIN: 1.6 INJECTION, SOLUTION INTRAVENOUS at 10:25

## 2018-11-10 RX ADMIN — PANTOPRAZOLE SODIUM 40 MG: 40 TABLET, DELAYED RELEASE ORAL at 21:35

## 2018-11-10 RX ADMIN — IPRATROPIUM BROMIDE AND ALBUTEROL SULFATE 3 ML: .5; 3 SOLUTION RESPIRATORY (INHALATION) at 06:53

## 2018-11-10 RX ADMIN — DOPAMINE HYDROCHLORIDE IN DEXTROSE 8 MCG/KG/MIN: 1.6 INJECTION, SOLUTION INTRAVENOUS at 15:21

## 2018-11-10 NOTE — PLAN OF CARE
Problem: Fall Risk (Adult)  Goal: Identify Related Risk Factors and Signs and Symptoms  Outcome: Ongoing (interventions implemented as appropriate)    Goal: Absence of Fall  Outcome: Outcome(s) achieved Date Met: 11/09/18      Problem: Skin Injury Risk (Adult)  Goal: Identify Related Risk Factors and Signs and Symptoms  Outcome: Ongoing (interventions implemented as appropriate)    Goal: Skin Health and Integrity  Outcome: Ongoing (interventions implemented as appropriate)

## 2018-11-10 NOTE — PROGRESS NOTES
TODAY'S DATE   11/10/18      Reason for visit:    Routine followup of , Bradycardia    SUBJECTIVE:    PT feeling better . HR 70 on DA. TRoponin negative. Echo pending.    PHYSICAL EXAM:    General: Alert and oriented, no acute distress  Head: Normocephalic, without obvious abnormality, atraumatic  Neck: Supple, trachea midline, no JVD  ,    Chest wall: No abnormalities observed  Abdomen:Normal bowel sounds,  non-distended  Extremities:Moves all extremities well, no edema, no cyanosis, no redness  Pulses: Pulses palpable and equal bilaterally  Skin: No bleeding, bruising or rash  Neurologic: A & O x 3     Access:      Allergies   Allergen Reactions   • Codeine Nausea And Vomiting   • Ciprofloxacin Diarrhea       No past medical history on file.    No past surgical history on file.    Social History     Socioeconomic History   • Marital status:      Spouse name: Not on file   • Number of children: Not on file   • Years of education: Not on file   • Highest education level: Not on file   Social Needs   • Financial resource strain: Not on file   • Food insecurity - worry: Not on file   • Food insecurity - inability: Not on file   • Transportation needs - medical: Not on file   • Transportation needs - non-medical: Not on file   Occupational History   • Not on file   Tobacco Use   • Smoking status: Not on file   Substance and Sexual Activity   • Alcohol use: Not on file   • Drug use: Not on file   • Sexual activity: Not on file   Other Topics Concern   • Not on file   Social History Narrative   • Not on file       No family history on file.    Patient Active Problem List   Diagnosis   • Symptomatic bradycardia        Vital Signs (last 24 hours)       11/09 0700  -  11/10 0659 11/10 0700  -  11/10 1505   Most Recent    Temp (°F) 97.9 -  98.7      98.2     98.2 (36.8)    Heart Rate (!)46 -  65    54 -  80     72    Resp 15 -  21    15 -  18     18    BP (!)87/71 -  153/90    101/67 -  132/72     101/67    SpO2 (%)  (!)89 -  100    96 -  100     96          LABS:    Results from last 7 days   Lab Units  11/10/18   0538  11/09/18   1827   WBC 10*3/mm3  9.83  8.44   HEMOGLOBIN g/dL  14.5  13.3*   PLATELETS 10*3/mm3  312  289     Results from last 7 days   Lab Units  11/10/18   0538  11/09/18   1827   SODIUM mmol/L  131*  131*   POTASSIUM mmol/L  4.5  4.9   CHLORIDE mmol/L  100  101   CO2 mmol/L  24.7  25.9   BUN mg/dL  21  22*   CREATININE mg/dL  1.35*  1.50*   CALCIUM mg/dL  9.5  9.4   GLUCOSE mg/dL  212*  139*     Results from last 7 days   Lab Units  11/09/18   1827   BILIRUBIN mg/dL  0.4   ALK PHOS U/L  72   AST (SGOT) U/L  23   ALT (SGPT) U/L  27     Results from last 7 days   Lab Units  11/09/18   1827   MAGNESIUM mg/dL  2.7*     Results from last 7 days   Lab Units  11/09/18   1827   INR   1.26*     Results from last 7 days   Lab Units  11/10/18   0538  11/09/18   2253  11/09/18   1827   CK TOTAL U/L   --    --   107   TROPONIN I ng/mL  0.011  0.011  0.012   CKMB ng/mL   --    --   4.11  4.11   MYOGLOBIN ng/mL   --    --   89.0       Hemoglobin A1C   Date Value Ref Range Status   11/09/2018 7.70 (H) 4.50 - 5.70 % Final     Glucose   Date/Time Value Ref Range Status   11/10/2018 1055 244 (H) 70 - 130 mg/dL Final   11/10/2018 0904 255 (H) 70 - 130 mg/dL Final   11/10/2018 0643 219 (H) 70 - 130 mg/dL Final   11/09/2018 2055 149 (H) 70 - 130 mg/dL Final       Hospital Medications:      apixaban 5 mg Oral Q12H   aspirin 81 mg Oral Daily   atorvastatin 80 mg Oral Nightly   insulin aspart 0-7 Units Subcutaneous 4x Daily AC & at Bedtime   ipratropium-albuterol 3 mL Nebulization 4x Daily - RT   sodium chloride 3 mL Intravenous Q12H       DOPamine 2-20 mcg/kg/min Last Rate: 10 mcg/kg/min (11/10/18 1333)   sodium chloride 50 mL/hr Last Rate: 50 mL/hr (11/09/18 1913)           A/P:     Bradycardia: Agree to hold Amiodarone, Coreg and clonidine. If rhythm does not improve then consider PPM for SSS. DA for now to support HR poses  little risk but could conceivably be weaned today.     Afib: stop amiodarone. COntinue anticoagulation as HCT is cleared.     CAD: no chest pain and non acute EKG. A troponin for good measure is normal. No ACS.  Cont ASA, statin.  Check echo to evaluate cardiac structure and function.    Will see PRN. PLease call with questions or if pt remains bradycardic off DA.        D/w Dr. Jensen.                    Hugo Ribeiro MD

## 2018-11-10 NOTE — PLAN OF CARE
Problem: Fall Risk (Adult)  Goal: Identify Related Risk Factors and Signs and Symptoms  Outcome: Ongoing (interventions implemented as appropriate)      Problem: Skin Injury Risk (Adult)  Goal: Identify Related Risk Factors and Signs and Symptoms  Outcome: Ongoing (interventions implemented as appropriate)      Problem: Patient Care Overview  Goal: Plan of Care Review  Outcome: Ongoing (interventions implemented as appropriate)    Goal: Individualization and Mutuality  Outcome: Ongoing (interventions implemented as appropriate)    Goal: Discharge Needs Assessment  Outcome: Ongoing (interventions implemented as appropriate)    Goal: Interprofessional Rounds/Family Conf  Outcome: Ongoing (interventions implemented as appropriate)

## 2018-11-10 NOTE — PROGRESS NOTES
TH[facility] Physician - Brief Progress Note  PERMANENT  11/09/2018 19:24    [facility]  [facility], [place] ([facility])    ROMEO BLANCO    Date of Service 11/09/2018 19:24    HPI/Events of Note AICU Provider Assessment Note  64 yo M with reported h/o CAD with prior PCI, CHF, pAfib, CKD, DM, HTN, COPD who presented with complaints of weakness, subsequently found to be markedly bradycardic in the 20s as well as hypotensive.  Started on transcutaneous pacing with improvement in   hemodynamics and transferred to [facility].  Upon arrival HR 40-50s, patient awake, alert, responsive but did report two falls with possible injury to the head.    Outpatient regimen does include agents which may contribute including amiodarone, Coreg.  Assessment and Plan:  -  Transcutaneous pacing discontinued, dopamine if needed for HR/BP support.  -  Cardiology has evaluated, 2D Echo ordered. K, Mg acceptable. TSH normal.  -  Amiodarone, coreg, clonidine held.  -  CT head ordered and pending.  -  Glycemic control.  -  Prophylaxis.    __x___   Video Assessment performed  __x___   Most recent labs reviewed  __x___   Vital Signs reviewed  __x___   Best Practices addressed:                 VTE prophylaxis: Heparin                 SUP (when indicated):                 Glycemic control:   Please notify bedside physician when present or [facility] if glc > 180 X 2                 Sepsis guidelines: na                 Lung protective strategy: na                 Targeted Temperature Management: na    _____     Spoke with bedside RN  __n___     Orders written      Contact [facility] for any needs if bedside physician is not present.      Interventions Major-Arrhythmia - evaluation and management        Electronically Signed by: Cj Gilbert) on 11/09/2018 20:19

## 2018-11-10 NOTE — PROGRESS NOTES
[facility] HOSPITALIST PROGRESS NOTE     Patient Identification:  Name:  Иван Miller  Age:  63 y.o.  Sex:  male  :  1955  MRN:  [id number]  Visit Number:  [id number]  ROOM: [address]     Primary Care Provider:  Provider, No Known    Length of stay:  1    Subjective        Chief Compliant: Bradycardia    Patient seen and examined this morning.  States that he is feeling good.  Denies any chest pain, shortness of breath, nausea, vomiting, abdominal pain.  Heart rate initially in the morning noted to be in the 50s on dopamine drip.  Heart rate noted to be in 70s to 80s to dopamine drip dose is titrated to wean it off.  Blood pressure stable.  Afebrile and no events overnight.  CT head negative.      Objective     Current Hospital Meds:  apixaban 5 mg Oral Q12H   aspirin 81 mg Oral Daily   atorvastatin 80 mg Oral Nightly   insulin aspart 0-7 Units Subcutaneous 4x Daily AC & at Bedtime   ipratropium-albuterol 3 mL Nebulization 4x Daily - RT   sodium chloride 3 mL Intravenous Q12H     DOPamine 2-20 mcg/kg/min Last Rate: 6 mcg/kg/min (11/10/18 1632)   sodium chloride 50 mL/hr Last Rate: 50 mL/hr (11/10/18 1543)     ----------------------------------------------------------------------------------------------------------------------  Vital Signs:  Temp:  [97.9 °F (36.6 °C)-98.7 °F (37.1 °C)] 98.2 °F (36.8 °C)  Heart Rate:  [46-80] 68  Resp:  [15-21] 18  BP: ()/(56-93) 109/61  SpO2:  [89 %-100 %] 98 %  on  Flow (L/min):  [2] 2;   Device (Oxygen Therapy): nasal cannula  Body mass index is 41.05 kg/m².    Wt Readings from Last 3 Encounters:   11/10/18 126 kg (278 lb)       Intake/Output Summary (Last 24 hours) at 11/10/2018 1651  Last data filed at 11/10/2018 1543  Gross per 24 hour   Intake 1858.9 ml   Output 1550 ml   Net 308.9 ml     Diet Regular; Cardiac, Consistent  Carbohydrate  ----------------------------------------------------------------------------------------------------------------------  Physical exam:  General: Comfortable, Not in distress.  Well-developed and well-nourished. Obese  HENT:  Head:  Normocephalic and atraumatic.  Mouth:  Moist mucous membranes.    Eyes:  Conjunctivae and EOM are normal.  Pupils are equal, round, and reactive to light.  No scleral icterus.    Neck:  Neck supple.  No JVD present.    Cardiovascular:  bradycardia, regular rhythm with no murmur.  Pulmonary/Chest:  No respiratory distress, no wheezes, no crackles, with normal breath sounds and good air movement.  GI:  Soft.  Bowel sounds are normal.  No distension and no tenderness. No organomegaly. Midline scar noted.  Musculoskeletal:  No edema, no tenderness, and no deformity.  No red or swollen joints anywhere.    Neurological:  Alert and oriented to person, place, and time.  No cranial nerve deficit. No focal deficits. No facial droop.  No slurred speech.   Skin:  Skin is warm and dry. No rash noted. No pallor.   Peripheral vascular:  pulses in all 4 extremities with no clubbing, no cyanosis, no edema.  Genitourinary: no lopez    ----------------------------------------------------------------------------------------------------------------------  ----------------------------------------------------------------------------------------------------------------------Results from last 7 days   Lab Units  11/10/18   0538  11/09/18   1827   WBC 10*3/mm3  9.83  8.44   HEMOGLOBIN g/dL  14.5  13.3*   HEMATOCRIT %  43.9  41.1*   MCV fL  88.0  90.9   MCHC g/dL  33.0  32.4*   PLATELETS 10*3/mm3  312  289   INR    --   1.26*     Results from last 7 days   Lab Units  11/10/18   0538  11/09/18   1827   SODIUM mmol/L  131*  131*   POTASSIUM mmol/L  4.5  4.9   MAGNESIUM mg/dL   --   2.7*   CHLORIDE mmol/L  100  101   CO2 mmol/L  24.7  25.9   BUN mg/dL  21  22*   CREATININE mg/dL  1.35*  1.50*    PHOSPHORUS mg/dL   --   4.3   EGFR IF NONAFRICN AM mL/min/1.73  53*  47*   CALCIUM mg/dL  9.5  9.4   GLUCOSE mg/dL  212*  139*   ALBUMIN g/dL   --   4.50   BILIRUBIN mg/dL   --   0.4   ALK PHOS U/L   --   72   AST (SGOT) U/L   --   23   ALT (SGPT) U/L   --   27   Estimated Creatinine Clearance: 72.6 mL/min (A) (by C-G formula based on SCr of 1.35 mg/dL (H)).  Results from last 7 days   Lab Units  11/10/18   0538  11/09/18   2253  11/09/18   1827   CK TOTAL U/L   --    --   107   CKMB ng/mL   --    --   4.11  4.11   CK MB INDEX %   --    --   3.8*   TROPONIN I ng/mL  0.011  0.011  0.012   MYOGLOBIN ng/mL   --    --   89.0     Hemoglobin A1C   Date/Time Value Ref Range Status   11/09/2018 1827 7.70 (H) 4.50 - 5.70 % Final     Glucose   Date/Time Value Ref Range Status   11/10/2018 1629 189 (H) 70 - 130 mg/dL Final   11/10/2018 1055 244 (H) 70 - 130 mg/dL Final   11/10/2018 0904 255 (H) 70 - 130 mg/dL Final   11/10/2018 0643 219 (H) 70 - 130 mg/dL Final   11/09/2018 2055 149 (H) 70 - 130 mg/dL Final     No results found for: AMMONIA  Results from last 7 days   Lab Units  11/10/18   0538   CHOLESTEROL mg/dL  246*   TRIGLYCERIDES mg/dL  369*   HDL CHOL mg/dL  31*   LDL CHOL mg/dL  141*               I have personally looked at the labs and they are summarized above.  ----------------------------------------------------------------------------------------------------------------------  Imaging Results (last 24 hours)     Procedure Component Value Units Date/Time    XR Chest 1 View [[id number]] Collected:  11/10/18 0834     Updated:  11/10/18 0836    Narrative:       EXAMINATION: XR CHEST 1 VW-      CLINICAL INDICATION:     BRADYCARDIA     TECHNIQUE:  XR CHEST 1 VW-      COMPARISON: NONE      FINDINGS:   The lungs remain aerated.  Heart and mediastinum contours are unremarkable.  No pleural effusion.  No pneumothorax.   Bony and soft tissue structures are unremarkable.       Impression:       No radiographic evidence of  acute cardiac or pulmonary  disease.     This report was finalized on 11/10/2018 8:34 AM by Dr. Jonah Loving MD.       CT Head Without Contrast [[id number]] Collected:  11/10/18 0834     Updated:  11/10/18 0836    Narrative:          EXAMINATION: CT HEAD WO CONTRAST-      CLINICAL INDICATION:     FALL     TECHNIQUE: Contiguous axial CT images of the head were obtained without  contrast administration.      Radiation dose reduction techniques were utilized per ALARA protocol.  Automated exposure control was initiated through either or "[employer] or  [employer] software packages by  protocol.       1121.7 mGy.cm     COMPARISON:  None.       FINDINGS: Generalized cerebral atrophy is present. There is no mass  effect, midline displacement, or hydrocephalus. There are patchy areas  of decreased density within the periventricular white matter which  likely reflect chronic small vessel ischemic changes. There is no CT  evidence of acute infarct or hemorrhage. Bone windows reveal no osseous  abnormalities or fractures.        Impression:       Atrophy and chronic small vessel ischemic change, but there  are no acute intracranial abnormalities identified.         This report was finalized on 11/10/2018 8:34 AM by Dr. Jonah Loving MD.           I have personally reviewed the radiology images and read the final radiology report.    Assessment & Plan      Assessment:   Severe Symptomatic sinus Bradycardia   Hypotension   Hyponatremia, mild asymptomatic   CAD, S/P PCI   Dyslipidemia   Paroxysmal Atrial fibrillation   Anticoagulated with Eliquis   DM2 with A1C 7.7   CKD stage 3   HTN   Dyslipidemia   RIO   Morbid obesity  Status post fall     Plan:  Severe symptomatic sinus bradycardia  Secondary to multiple AV jeff blocking agents including amiodarone, clonidine and Coreg.  Hold amiodarone, clonidine and Coreg. On dopamine gtt. Wean off. Resolved. Currently HR in 70-80.  Interventional Cardiology signed off.  "Electrolytes and TSH within normal limit. Currently to monitor closely. 2D echo showed Left ventricular systolic function is normal. Estimated EF appears to be in the range of 61 - 65%. Normal left ventricular cavity size noted. All left ventricular wall segments contract normally. Left ventricular wall thickness is consistent with mild concentric hypertrophy     Mild asymptomatic hyponatremia. Stable. Continue with IVF and continue to monitor closely.     CAD status post PCI.  Currently stable.  Troponins negative and EKG was no acute changes.  Continue with aspirin and statin.  Increase dose of lipitor.    History of proximal A. fib and anticoagulated with Eliquis.  Currently bradycardic so hold amiodarone and Coreg.   resume Eliquis since CT head.     Continue with Accu-Cheks and insulin sliding scale medium dose and resume levemir for diabetes.  Hold metformin.    CKD stage III currently stable.  Continue to monitor closely.  Hold Lasix.     COPD currently stable.  Continue with DuoNeb's and symbicort.  History of obsessive sleep apnea and patient does not use CPAP at home.  Continue with O2 support.     Resume home medications.  SCDs/Eliquis for DVT prophylaxis.  Hold\" since received full dose Lovenox this afternoon.  DC bedrest. Activity as tolerated.   Start on diet  CODE STATUS full code     No family available at bedside.  The management plan was discussed in detail with the patient.  All questions were answered. Discussed with Dr. Ribeiro.       Patient is high risk because of: Severe symptomatic bradycardia      Dominique Jensen MD  11/10/18  4:51 PM  "

## 2018-11-11 LAB
ANION GAP SERPL CALCULATED.3IONS-SCNC: 8.1 MMOL/L (ref 3.6–11.2)
BUN BLD-MCNC: 17 MG/DL (ref 7–21)
BUN/CREAT SERPL: 15.9 (ref 7–25)
CALCIUM SPEC-SCNC: 9.6 MG/DL (ref 7.7–10)
CHLORIDE SERPL-SCNC: 102 MMOL/L (ref 99–112)
CO2 SERPL-SCNC: 24.9 MMOL/L (ref 24.3–31.9)
CREAT BLD-MCNC: 1.07 MG/DL (ref 0.43–1.29)
GFR SERPL CREATININE-BSD FRML MDRD: 70 ML/MIN/1.73
GLUCOSE BLD-MCNC: 169 MG/DL (ref 70–110)
GLUCOSE BLDC GLUCOMTR-MCNC: 167 MG/DL (ref 70–130)
GLUCOSE BLDC GLUCOMTR-MCNC: 184 MG/DL (ref 70–130)
GLUCOSE BLDC GLUCOMTR-MCNC: 226 MG/DL (ref 70–130)
GLUCOSE BLDC GLUCOMTR-MCNC: 229 MG/DL (ref 70–130)
MAGNESIUM SERPL-MCNC: 2.1 MG/DL (ref 1.7–2.6)
OSMOLALITY SERPL CALC.SUM OF ELEC: 275.6 MOSM/KG (ref 273–305)
POTASSIUM BLD-SCNC: 4.4 MMOL/L (ref 3.5–5.3)
SODIUM BLD-SCNC: 135 MMOL/L (ref 135–153)

## 2018-11-11 PROCEDURE — 82962 GLUCOSE BLOOD TEST: CPT

## 2018-11-11 PROCEDURE — 94799 UNLISTED PULMONARY SVC/PX: CPT

## 2018-11-11 PROCEDURE — 63710000001 INSULIN ASPART PER 5 UNITS: Performed by: HOSPITALIST

## 2018-11-11 PROCEDURE — 80048 BASIC METABOLIC PNL TOTAL CA: CPT | Performed by: HOSPITALIST

## 2018-11-11 PROCEDURE — 63710000001 INSULIN DETEMIR PER 5 UNITS: Performed by: HOSPITALIST

## 2018-11-11 PROCEDURE — 83735 ASSAY OF MAGNESIUM: CPT | Performed by: HOSPITALIST

## 2018-11-11 PROCEDURE — 25010000002 DOPAMINE PER 40 MG: Performed by: HOSPITALIST

## 2018-11-11 PROCEDURE — 99232 SBSQ HOSP IP/OBS MODERATE 35: CPT | Performed by: HOSPITALIST

## 2018-11-11 RX ORDER — AMLODIPINE BESYLATE 10 MG/1
10 TABLET ORAL
Status: DISCONTINUED | OUTPATIENT
Start: 2018-11-11 | End: 2018-11-12 | Stop reason: HOSPADM

## 2018-11-11 RX ORDER — CHOLECALCIFEROL (VITAMIN D3) 125 MCG
10 CAPSULE ORAL NIGHTLY PRN
Status: DISCONTINUED | OUTPATIENT
Start: 2018-11-11 | End: 2018-11-12 | Stop reason: HOSPADM

## 2018-11-11 RX ADMIN — HYDROCODONE BITARTRATE AND ACETAMINOPHEN 1 TABLET: 10; 325 TABLET ORAL at 16:12

## 2018-11-11 RX ADMIN — SODIUM CHLORIDE 50 ML/HR: 9 INJECTION, SOLUTION INTRAVENOUS at 06:33

## 2018-11-11 RX ADMIN — PANTOPRAZOLE SODIUM 40 MG: 40 TABLET, DELAYED RELEASE ORAL at 06:33

## 2018-11-11 RX ADMIN — INSULIN ASPART 4 UNITS: 100 INJECTION, SOLUTION INTRAVENOUS; SUBCUTANEOUS at 11:26

## 2018-11-11 RX ADMIN — INSULIN ASPART 2 UNITS: 100 INJECTION, SOLUTION INTRAVENOUS; SUBCUTANEOUS at 20:59

## 2018-11-11 RX ADMIN — BUDESONIDE AND FORMOTEROL FUMARATE DIHYDRATE 2 PUFF: 160; 4.5 AEROSOL RESPIRATORY (INHALATION) at 19:37

## 2018-11-11 RX ADMIN — ASPIRIN 81 MG: 81 TABLET ORAL at 08:52

## 2018-11-11 RX ADMIN — ATORVASTATIN CALCIUM 80 MG: 40 TABLET, FILM COATED ORAL at 20:59

## 2018-11-11 RX ADMIN — Medication 3 ML: at 20:59

## 2018-11-11 RX ADMIN — METFORMIN HYDROCHLORIDE 1000 MG: 500 TABLET ORAL at 17:34

## 2018-11-11 RX ADMIN — TAMSULOSIN HYDROCHLORIDE 0.4 MG: 0.4 CAPSULE ORAL at 20:59

## 2018-11-11 RX ADMIN — IPRATROPIUM BROMIDE AND ALBUTEROL SULFATE 3 ML: .5; 3 SOLUTION RESPIRATORY (INHALATION) at 00:02

## 2018-11-11 RX ADMIN — INSULIN DETEMIR 15 UNITS: 100 INJECTION, SOLUTION SUBCUTANEOUS at 21:00

## 2018-11-11 RX ADMIN — APIXABAN 5 MG: 5 TABLET, FILM COATED ORAL at 20:59

## 2018-11-11 RX ADMIN — IPRATROPIUM BROMIDE AND ALBUTEROL SULFATE 3 ML: .5; 3 SOLUTION RESPIRATORY (INHALATION) at 06:56

## 2018-11-11 RX ADMIN — BUDESONIDE AND FORMOTEROL FUMARATE DIHYDRATE 2 PUFF: 160; 4.5 AEROSOL RESPIRATORY (INHALATION) at 06:56

## 2018-11-11 RX ADMIN — DOPAMINE HYDROCHLORIDE IN DEXTROSE 4 MCG/KG/MIN: 1.6 INJECTION, SOLUTION INTRAVENOUS at 08:51

## 2018-11-11 RX ADMIN — INSULIN ASPART 2 UNITS: 100 INJECTION, SOLUTION INTRAVENOUS; SUBCUTANEOUS at 07:34

## 2018-11-11 RX ADMIN — FLUTICASONE PROPIONATE 2 SPRAY: 50 SPRAY, METERED NASAL at 08:52

## 2018-11-11 RX ADMIN — INSULIN ASPART 4 UNITS: 100 INJECTION, SOLUTION INTRAVENOUS; SUBCUTANEOUS at 17:34

## 2018-11-11 RX ADMIN — HYDROCODONE BITARTRATE AND ACETAMINOPHEN 1 TABLET: 10; 325 TABLET ORAL at 06:45

## 2018-11-11 RX ADMIN — APIXABAN 5 MG: 5 TABLET, FILM COATED ORAL at 08:52

## 2018-11-11 RX ADMIN — IPRATROPIUM BROMIDE AND ALBUTEROL SULFATE 3 ML: .5; 3 SOLUTION RESPIRATORY (INHALATION) at 19:37

## 2018-11-11 RX ADMIN — AMLODIPINE BESYLATE 10 MG: 10 TABLET ORAL at 16:59

## 2018-11-11 RX ADMIN — IPRATROPIUM BROMIDE AND ALBUTEROL SULFATE 3 ML: .5; 3 SOLUTION RESPIRATORY (INHALATION) at 12:44

## 2018-11-11 RX ADMIN — Medication 3 ML: at 08:53

## 2018-11-11 NOTE — PLAN OF CARE
Problem: Fall Risk (Adult)  Goal: Identify Related Risk Factors and Signs and Symptoms  Outcome: Ongoing (interventions implemented as appropriate)   11/11/18 0838   Fall Risk (Adult)   Related Risk Factors (Fall Risk) environment unfamiliar   Signs and Symptoms (Fall Risk) presence of risk factors       Problem: Skin Injury Risk (Adult)  Goal: Identify Related Risk Factors and Signs and Symptoms  Outcome: Ongoing (interventions implemented as appropriate)   11/11/18 0838   Skin Injury Risk (Adult)   Related Risk Factors (Skin Injury Risk) critical care admission     Goal: Skin Health and Integrity  Outcome: Ongoing (interventions implemented as appropriate)      Problem: Patient Care Overview  Goal: Plan of Care Review  Outcome: Ongoing (interventions implemented as appropriate)   11/11/18 0838   Coping/Psychosocial   Plan of Care Reviewed With patient

## 2018-11-11 NOTE — PLAN OF CARE
Problem: Fall Risk (Adult)  Goal: Identify Related Risk Factors and Signs and Symptoms  Outcome: Ongoing (interventions implemented as appropriate)   11/10/18 2014   Fall Risk (Adult)   Related Risk Factors (Fall Risk) environment unfamiliar   Signs and Symptoms (Fall Risk) presence of risk factors       Problem: Skin Injury Risk (Adult)  Goal: Identify Related Risk Factors and Signs and Symptoms  Outcome: Ongoing (interventions implemented as appropriate)   11/10/18 2014   Skin Injury Risk (Adult)   Related Risk Factors (Skin Injury Risk) critical care admission     Goal: Skin Health and Integrity  Outcome: Ongoing (interventions implemented as appropriate)   11/10/18 2014   Skin Injury Risk (Adult)   Skin Health and Integrity making progress toward outcome       Problem: Patient Care Overview  Goal: Plan of Care Review  Outcome: Ongoing (interventions implemented as appropriate)   11/10/18 2014   Coping/Psychosocial   Plan of Care Reviewed With patient   Plan of Care Review   Progress improving     Goal: Individualization and Mutuality  Outcome: Ongoing (interventions implemented as appropriate)    Goal: Discharge Needs Assessment  Outcome: Ongoing (interventions implemented as appropriate)   11/10/18 2014   Discharge Needs Assessment   Patient/Family Anticipates Transition to home   Patient/Family Anticipated Services at Transition respiratory services   Transportation Anticipated family or friend will provide   Disability   Equipment Currently Used at Home cane, straight;oxygen     Goal: Interprofessional Rounds/Family Conf  Outcome: Ongoing (interventions implemented as appropriate)   11/10/18 2014   Interdisciplinary Rounds/Family Conf   Participants nursing;patient      11/10/18 2014   Interdisciplinary Rounds/Family Conf   Participants nursing;patient

## 2018-11-11 NOTE — PROGRESS NOTES
[facility] HOSPITALIST PROGRESS NOTE     Patient Identification:  Name:  Иван Miller  Age:  63 y.o.  Sex:  male  :  1955  MRN:  [id number]  Visit Number:  [id number]  ROOM: [address]     Primary Care Provider:  Provider, No Known    Length of stay:  2    Subjective        Chief Compliant: Bradycardia    Patient seen and examined this morning.  States that he is feeling good.  Denies any chest pain, shortness of breath, nausea, vomiting, abdominal pain.  Heart rate in 70s and off of dopamine drip since morning.  Blood pressure stable.  Afebrile and no events overnight.  CT head negative.      Objective     Current Hospital Meds:    amLODIPine 10 mg Oral Q24H   apixaban 5 mg Oral Q12H   aspirin 81 mg Oral Daily   atorvastatin 80 mg Oral Nightly   budesonide-formoterol 2 puff Inhalation BID - RT   fluticasone 2 spray Nasal Daily   insulin aspart 0-9 Units Subcutaneous 4x Daily AC & at Bedtime   insulin detemir 15 Units Subcutaneous Nightly   ipratropium-albuterol 3 mL Nebulization 4x Daily - RT   pantoprazole 40 mg Oral QAM   sodium chloride 3 mL Intravenous Q12H   tamsulosin 0.4 mg Oral Nightly       DOPamine 2-20 mcg/kg/min Last Rate: Stopped (18 0950)     ----------------------------------------------------------------------------------------------------------------------  Vital Signs:  Temp:  [98.1 °F (36.7 °C)-98.7 °F (37.1 °C)] 98.7 °F (37.1 °C)  Heart Rate:  [58-81] 79  Resp:  [13-19] 16  BP: ()/() 136/78  SpO2:  [91 %-100 %] 95 %  on  Flow (L/min):  [2] 2;   Device (Oxygen Therapy): room air  Body mass index is 41.05 kg/m².    Wt Readings from Last 3 Encounters:   11/10/18 126 kg (278 lb)       Intake/Output Summary (Last 24 hours) at 2018 1424  Last data filed at 2018 1300  Gross per 24 hour   Intake 2798.24 ml   Output 5000 ml   Net -2201.76 ml     Diet Regular; Cardiac, Consistent  Carbohydrate  ----------------------------------------------------------------------------------------------------------------------  Physical exam:  General: Comfortable, Not in distress.  Well-developed and well-nourished. Obese  HENT:  Head:  Normocephalic and atraumatic.  Mouth:  Moist mucous membranes.    Eyes:  Conjunctivae and EOM are normal.  Pupils are equal, round, and reactive to light.  No scleral icterus.    Neck:  Neck supple.  No JVD present.    Cardiovascular:  normal rate, regular rhythm with no murmur.  Pulmonary/Chest:  No respiratory distress, no wheezes, no crackles, with normal breath sounds and good air movement.  GI:  Soft.  Bowel sounds are normal.  No distension and no tenderness. No organomegaly. Midline scar noted.  Musculoskeletal:  No edema, no tenderness, and no deformity.  No red or swollen joints anywhere.    Neurological:  Alert and oriented to person, place, and time.  No cranial nerve deficit. No focal deficits. No facial droop.  No slurred speech.   Skin:  Skin is warm and dry. No rash noted. No pallor.   Peripheral vascular:  pulses in all 4 extremities with no clubbing, no cyanosis, no edema.  Genitourinary: no lopez    ----------------------------------------------------------------------------------------------------------------------  ----------------------------------------------------------------------------------------------------------------------  Results from last 7 days   Lab Units  11/10/18   0538  11/09/18   1827   WBC 10*3/mm3  9.83  8.44   HEMOGLOBIN g/dL  14.5  13.3*   HEMATOCRIT %  43.9  41.1*   MCV fL  88.0  90.9   MCHC g/dL  33.0  32.4*   PLATELETS 10*3/mm3  312  289   INR    --   1.26*     Results from last 7 days   Lab Units  11/11/18   0322  11/10/18   0538  11/09/18   1827   SODIUM mmol/L  135  131*  131*   POTASSIUM mmol/L  4.4  4.5  4.9   MAGNESIUM mg/dL  2.1   --   2.7*   CHLORIDE mmol/L  102  100  101   CO2 mmol/L  24.9  24.7  25.9   BUN mg/dL  17  21   22*   CREATININE mg/dL  1.07  1.35*  1.50*   PHOSPHORUS mg/dL   --    --   4.3   EGFR IF NONAFRICN AM mL/min/1.73  70  53*  47*   CALCIUM mg/dL  9.6  9.5  9.4   GLUCOSE mg/dL  169*  212*  139*   ALBUMIN g/dL   --    --   4.50   BILIRUBIN mg/dL   --    --   0.4   ALK PHOS U/L   --    --   72   AST (SGOT) U/L   --    --   23   ALT (SGPT) U/L   --    --   27   Estimated Creatinine Clearance: 91.5 mL/min (by C-G formula based on SCr of 1.07 mg/dL).  Results from last 7 days   Lab Units  11/10/18   0538  11/09/18   2253  11/09/18   1827   CK TOTAL U/L   --    --   107   CKMB ng/mL   --    --   4.11  4.11   CK MB INDEX %   --    --   3.8*   TROPONIN I ng/mL  0.011  0.011  0.012   MYOGLOBIN ng/mL   --    --   89.0     Hemoglobin A1C   Date/Time Value Ref Range Status   11/09/2018 1827 7.70 (H) 4.50 - 5.70 % Final     Glucose   Date/Time Value Ref Range Status   11/11/2018 1102 226 (H) 70 - 130 mg/dL Final   11/11/2018 0638 167 (H) 70 - 130 mg/dL Final   11/10/2018 2144 195 (H) 70 - 130 mg/dL Final   11/10/2018 1629 189 (H) 70 - 130 mg/dL Final   11/10/2018 1055 244 (H) 70 - 130 mg/dL Final   11/10/2018 0904 255 (H) 70 - 130 mg/dL Final   11/10/2018 0643 219 (H) 70 - 130 mg/dL Final   11/09/2018 2055 149 (H) 70 - 130 mg/dL Final     No results found for: AMMONIA  Results from last 7 days   Lab Units  11/10/18   0538   CHOLESTEROL mg/dL  246*   TRIGLYCERIDES mg/dL  369*   HDL CHOL mg/dL  31*   LDL CHOL mg/dL  141*               I have personally looked at the labs and they are summarized above.  ----------------------------------------------------------------------------------------------------------------------  Imaging Results (last 24 hours)     ** No results found for the last 24 hours. **        I have personally reviewed the radiology images and read the final radiology report.    Assessment & Plan      Assessment:   Severe Symptomatic sinus Bradycardia   Hypotension   Hyponatremia, mild asymptomatic   NICKY   CAD, S/P  PCI   Dyslipidemia   Paroxysmal Atrial fibrillation   Anticoagulated with Eliquis   DM2 with A1C 7.7   HTN   Dyslipidemia   RIO   Morbid obesity  Status post fall     Plan:  Severe symptomatic sinus bradycardia, resolved. Secondary to multiple AV jeff blocking agents including amiodarone, clonidine and Coreg.  Hold amiodarone, clonidine and Coreg. Off of dopamine gtt. Currently HR in 70-80.  Interventional Cardiology signed off. Electrolytes and TSH within normal limit. Currently to monitor closely. 2D echo showed Left ventricular systolic function is normal. Estimated EF appears to be in the range of 61 - 65%. Normal left ventricular cavity size noted. All left ventricular wall segments contract normally. Left ventricular wall thickness is consistent with mild concentric hypertrophy.    Patient can f/u Dr. Christy and kevin decision reg amiodarone can be made. Do not resume amiodarone and clonidine at the time of discharge.     Mild asymptomatic hyponatremia. resolved. DC IVF.     CAD status post PCI.  Currently stable.  Troponins negative and EKG was no acute changes.  Continue with aspirin and statin.  Increase dose of lipitor.    History of proximal A. fib and anticoagulated with Eliquis. Continue to hold amio and coreg. Patient can f/u Dr. Christy and kevin decision reg amiodarone can be made.     Continue with Accu-Cheks and insulin sliding scale medium dose and levemir for diabetes. resume metformin.    NICKY resolved. Hold Lasix.     COPD currently stable.  Continue with DuoNeb's and symbicort.  History of obsessive sleep apnea and patient does not use CPAP at home.  Continue with O2 support.     For HTN, start on amlodipine. Hold coreg and lasix.   SCDs/Eliquis for DVT prophylaxis.   Activity as tolerated.   CODE STATUS full code  Transfer to tele.  DC to home in am if stable.    No family available at bedside.  The management plan was discussed in detail with the patient.  All questions were  answered.    Patient is high risk because of: Severe symptomatic bradycardia      Dominique Jensen MD  11/11/18  2:24 PM

## 2018-11-12 VITALS
OXYGEN SATURATION: 97 % | WEIGHT: 276.6 LBS | HEART RATE: 99 BPM | BODY MASS INDEX: 40.97 KG/M2 | RESPIRATION RATE: 18 BRPM | SYSTOLIC BLOOD PRESSURE: 152 MMHG | DIASTOLIC BLOOD PRESSURE: 76 MMHG | TEMPERATURE: 98.2 F | HEIGHT: 69 IN

## 2018-11-12 LAB
GLUCOSE BLDC GLUCOMTR-MCNC: 216 MG/DL (ref 70–130)
GLUCOSE BLDC GLUCOMTR-MCNC: 223 MG/DL (ref 70–130)

## 2018-11-12 PROCEDURE — 94799 UNLISTED PULMONARY SVC/PX: CPT

## 2018-11-12 PROCEDURE — 63710000001 INSULIN ASPART PER 5 UNITS: Performed by: HOSPITALIST

## 2018-11-12 PROCEDURE — 99239 HOSP IP/OBS DSCHRG MGMT >30: CPT | Performed by: INTERNAL MEDICINE

## 2018-11-12 PROCEDURE — 82962 GLUCOSE BLOOD TEST: CPT

## 2018-11-12 RX ORDER — ASPIRIN 81 MG/1
81 TABLET ORAL DAILY
Status: ON HOLD
Start: 2018-11-13 | End: 2019-10-04

## 2018-11-12 RX ADMIN — AMLODIPINE BESYLATE 10 MG: 10 TABLET ORAL at 08:30

## 2018-11-12 RX ADMIN — INSULIN ASPART 4 UNITS: 100 INJECTION, SOLUTION INTRAVENOUS; SUBCUTANEOUS at 12:00

## 2018-11-12 RX ADMIN — PANTOPRAZOLE SODIUM 40 MG: 40 TABLET, DELAYED RELEASE ORAL at 05:35

## 2018-11-12 RX ADMIN — INSULIN ASPART 4 UNITS: 100 INJECTION, SOLUTION INTRAVENOUS; SUBCUTANEOUS at 08:30

## 2018-11-12 RX ADMIN — IPRATROPIUM BROMIDE AND ALBUTEROL SULFATE 3 ML: .5; 3 SOLUTION RESPIRATORY (INHALATION) at 06:30

## 2018-11-12 RX ADMIN — IPRATROPIUM BROMIDE AND ALBUTEROL SULFATE 3 ML: .5; 3 SOLUTION RESPIRATORY (INHALATION) at 00:57

## 2018-11-12 RX ADMIN — METFORMIN HYDROCHLORIDE 1000 MG: 500 TABLET ORAL at 08:30

## 2018-11-12 RX ADMIN — ASPIRIN 81 MG: 81 TABLET ORAL at 08:30

## 2018-11-12 RX ADMIN — BUDESONIDE AND FORMOTEROL FUMARATE DIHYDRATE 2 PUFF: 160; 4.5 AEROSOL RESPIRATORY (INHALATION) at 06:30

## 2018-11-12 RX ADMIN — Medication 3 ML: at 08:30

## 2018-11-12 RX ADMIN — HYDROCODONE BITARTRATE AND ACETAMINOPHEN 1 TABLET: 10; 325 TABLET ORAL at 05:57

## 2018-11-12 RX ADMIN — FLUTICASONE PROPIONATE 2 SPRAY: 50 SPRAY, METERED NASAL at 08:30

## 2018-11-12 RX ADMIN — APIXABAN 5 MG: 5 TABLET, FILM COATED ORAL at 08:30

## 2018-11-12 NOTE — DISCHARGE SUMMARY
Date of admission: 11/9/18  Date of discharge: 11/12/18    Principal diagnosis: Severe bradycardia medication induced  Secondary diagnosis:  -Hypotension medication induced and relative secondary to the bradycardia  -Paroxysmal atrial fibrillation  -Diabetes type 2 insulin requiring  -Hypertension  -Normal ejection fraction by echocardiogram  -Acute kidney injury improved with holding diuretics  -Hyponatremia improved with holding diuretic  -Chronic anticoagulation for stroke prevention  -Coronary artery disease status post PCI in the past  -Chronic kidney disease stage III  -COPD, he has home oxygen but only wears it when necessary saturations good air  -Morbid obesity by BMI    Consultants:   cardiology    Procedures:  Initially external pacemaker  Echocardiogram    Admission diagnosis: See history and physical    Exam: Assisted byKarlie RN  Patient states he feels good and much better than admission.  He is been moving around the room, no dizziness, no chest pain breathing is doing okay.  He is tolerating his diet.  His monitor showing sinus rhythm in the 80s.  Vital signs: 98.2, 18, 152/76, (88 on monitor currently) lungs bilateral breath sounds are clear heart regular rate and rhythm without murmur or gallop.  Abdomen soft nontender no extremity edema strength is symmetric mood is good alert    Hospital course: Patient was admitted with profound bradycardia from [facility].  He was started on dopamine drip and all agents that would incite bradycardia were stopped.  This included amiodarone clonidine and Coreg.  He was started on dopamine drip.  With these agents being held in with a dopamine drip he was able to be taken off the external pacemaker and eventually off dopamine drip.  He is transferred to telemetry in his monitor has been improved.  He was initially hypotensive as well.  Patient was seen by cardiology and recommended just continuing anticoagulation with holding offending agents.   I discussed this patient with his primary cardiologist this morning.  I've informed him of how we treat the patient and he stated that he would see the patient in his office in one week.  This has been arranged.  Patient's condition discharge stable and improved.  Patient's hemoglobin A1c was acceptable at 7.7 although he is on somewhat of an irregular insulin regimen basal insulin 75/25.  I am going to leave this to primary care provider however recommend changing the 75/25 to just straight NovoLog with the basal insulin and metformin but I will leave this to primary care discretion considering overall A1c as above is close to acceptable.  Blood pressure can be further managed as an outpatient by cardiologist, cardiology Dr. Christy stated he would add back medications as needed at his follow-up.  Patient's head CT was unremarkable except for atrophy and chronic small vessel disease and his chest x-ray was negative as well.  Troponins were negative.  TSH was normal.  CBC is unremarkable.    Follow-up:   His primary care provider this week  Dr. Christy one week    Diet: Cardiac/constant carbohydrate    Activity: As tolerated    Medication:  Norvasc 10 mg daily  Aspirin 81 mg daily  Eliquis 5 mg twice a day  Albuterol 2 puffs every 4 hours as needed  Lipitor 20 mg daily  Symbicort 160 twice a day  Flonase home dose  Levemir 25 units under the skin daily  Lispro 75/20/5/15 units under the skin 3 times a day with meals  Metformin 1000 twice a day (renal function now acceptable for this)  Flomax 0.4 mg every night  Prilosec 40 mg daily    45 min discharge

## 2018-11-12 NOTE — CONSULTS
"Pt sitting in bed alert and oriented.  Pt cheerful and states he is feeling well.  Pt made aware of Hgb A1C of 7.7.  Pt states that he does great with his medicine regimen for his diabetes but he struggles with the diet stating, \"I like to eat Baconators from [employer] at least every other day\"  Couns pt on the importance of carb counting as this plays a major role in how well blood sugar is controlled.  Pt can identify what carbs are.  Encouraged pt to attend a Diabetes Smart class, handouts with my name and phone number given.  Will continue to monitor pt as needed  "

## 2018-11-12 NOTE — PLAN OF CARE
Problem: Fall Risk (Adult)  Goal: Identify Related Risk Factors and Signs and Symptoms  Outcome: Ongoing (interventions implemented as appropriate)      Problem: Skin Injury Risk (Adult)  Goal: Identify Related Risk Factors and Signs and Symptoms  Outcome: Ongoing (interventions implemented as appropriate)    Goal: Skin Health and Integrity  Outcome: Ongoing (interventions implemented as appropriate)      Problem: Patient Care Overview  Goal: Plan of Care Review  Outcome: Ongoing (interventions implemented as appropriate)    Goal: Individualization and Mutuality  Outcome: Ongoing (interventions implemented as appropriate)    Goal: Discharge Needs Assessment  Outcome: Ongoing (interventions implemented as appropriate)    Goal: Interprofessional Rounds/Family Conf  Outcome: Ongoing (interventions implemented as appropriate)

## 2018-11-12 NOTE — PLAN OF CARE
Problem: Fall Risk (Adult)  Goal: Identify Related Risk Factors and Signs and Symptoms   11/11/18 0838   Fall Risk (Adult)   Related Risk Factors (Fall Risk) environment unfamiliar   Signs and Symptoms (Fall Risk) presence of risk factors       Problem: Skin Injury Risk (Adult)  Goal: Identify Related Risk Factors and Signs and Symptoms   11/11/18 0838   Skin Injury Risk (Adult)   Related Risk Factors (Skin Injury Risk) critical care admission     Goal: Skin Health and Integrity   11/11/18 0838   Skin Injury Risk (Adult)   Skin Health and Integrity making progress toward outcome       Problem: Patient Care Overview  Goal: Plan of Care Review   11/10/18 2014 11/11/18 2050   Coping/Psychosocial   Plan of Care Reviewed With --  patient   Plan of Care Review   Progress improving --      Goal: Individualization and Mutuality  Outcome: Ongoing (interventions implemented as appropriate)    Goal: Discharge Needs Assessment   11/10/18 2014   Discharge Needs Assessment   Patient/Family Anticipates Transition to home   Patient/Family Anticipated Services at Transition respiratory services   Transportation Anticipated family or friend will provide   Disability   Equipment Currently Used at Home cane, straight;oxygen     Goal: Interprofessional Rounds/Family Conf   11/10/18 2014   Interdisciplinary Rounds/Family Conf   Participants nursing;patient

## 2018-11-12 NOTE — PHARMACY PATIENT ASSISTANCE
Pharmacy looked into pricing for home medication of Eliquis.  He has a $0 copay.    Thank You;  Merly Gilmore BERNADETTE  11/12/18  9:31 AM

## 2018-11-14 ENCOUNTER — NURSE TRIAGE (OUTPATIENT)
Dept: CALL CENTER | Facility: HOSPITAL | Age: 63
End: 2018-11-14

## 2018-11-14 NOTE — TELEPHONE ENCOUNTER
"Medications not called to the pharmacy.  Patient does not have routine home medications.  Norvasc 10 mg daily, Lipitor 20 mg daily, Humalog 75/25 insulin pen, Metformin 1000 mg BID.  He says he told the nurse and she told him they would be called in. Explained I would send email to case management but he should call PCP for renewal on home medications.    Reason for Disposition  • [1] Request for URGENT new prescription or refill of \"essential\" medication (i.e., likelihood of harm to patient if not taken) AND [2] triager unable to fill per unit policy    Additional Information  • Negative: Drug overdose and nurse unable to answer question  • Negative: Caller requesting information not related to medicine  • Negative: Caller requesting a prescription for Strep throat and has a positive culture result  • Negative: Rash while taking a medication or within 3 days of stopping it  • Negative: Immunization reaction suspected  • Negative: [1] Asthma and [2] having symptoms of asthma (cough, wheezing, etc)  • Negative: MORE THAN A DOUBLE DOSE of a prescription or over-the-counter (OTC) drug  • Negative: [1] DOUBLE DOSE (an extra dose or lesser amount) of over-the-counter (OTC) drug AND [2] any symptoms (e.g., dizziness, nausea, pain, sleepiness)  • Negative: [1] DOUBLE DOSE (an extra dose or lesser amount) of prescription drug AND [2] any symptoms (e.g., dizziness, nausea, pain, sleepiness)  • Negative: Took another person's prescription drug  • Negative: [1] DOUBLE DOSE (an extra dose or lesser amount) of prescription drug AND [2] NO symptoms (Exception: a double dose of antibiotics)  • Negative: Diabetes drug error or overdose (e.g., insulin or extra dose)    Answer Assessment - Initial Assessment Questions  1. SYMPTOMS: \"Do you have any symptoms?\"     no  2. SEVERITY: If symptoms are present, ask \"Are they mild, moderate or severe?\"      na    Protocols used: MEDICATION QUESTION CALL-ADULT-      "

## 2019-10-04 ENCOUNTER — HOSPITAL ENCOUNTER (INPATIENT)
Facility: HOSPITAL | Age: 64
LOS: 2 days | Discharge: HOME | End: 2019-10-06
Attending: EMERGENCY MEDICINE | Admitting: FAMILY MEDICINE

## 2019-10-04 ENCOUNTER — APPOINTMENT (OUTPATIENT)
Dept: GENERAL RADIOLOGY | Facility: HOSPITAL | Age: 64
End: 2019-10-04

## 2019-10-04 ENCOUNTER — APPOINTMENT (OUTPATIENT)
Dept: CARDIOLOGY | Facility: HOSPITAL | Age: 64
End: 2019-10-04

## 2019-10-04 ENCOUNTER — APPOINTMENT (OUTPATIENT)
Dept: CT IMAGING | Facility: HOSPITAL | Age: 64
End: 2019-10-04

## 2019-10-04 DIAGNOSIS — J96.01 ACUTE RESPIRATORY FAILURE WITH HYPOXEMIA (HCC): Primary | ICD-10-CM

## 2019-10-04 DIAGNOSIS — J44.1 ACUTE EXACERBATION OF CHRONIC OBSTRUCTIVE PULMONARY DISEASE (COPD) (HCC): ICD-10-CM

## 2019-10-04 DIAGNOSIS — N39.0 ACUTE UTI: ICD-10-CM

## 2019-10-04 DIAGNOSIS — F19.10 POLYSUBSTANCE ABUSE (HCC): ICD-10-CM

## 2019-10-04 LAB
6-ACETYL MORPHINE: NEGATIVE
A-A DO2: 107.1 MMHG (ref 0–300)
A-A DO2: 115.6 MMHG (ref 0–300)
A-A DO2: 41.3 MMHG (ref 0–300)
A-A DO2: 82 MMHG (ref 0–300)
ALBUMIN SERPL-MCNC: 4.37 G/DL (ref 3.5–5.2)
ALBUMIN/GLOB SERPL: 1.3 G/DL
ALP SERPL-CCNC: 85 U/L (ref 39–117)
ALT SERPL W P-5'-P-CCNC: 25 U/L (ref 1–41)
AMPHET+METHAMPHET UR QL: POSITIVE
ANION GAP SERPL CALCULATED.3IONS-SCNC: 12.9 MMOL/L (ref 5–15)
ARTERIAL PATENCY WRIST A: POSITIVE
AST SERPL-CCNC: 23 U/L (ref 1–40)
ATMOSPHERIC PRESS: 730 MMHG
ATMOSPHERIC PRESS: 730 MMHG
ATMOSPHERIC PRESS: 731 MMHG
ATMOSPHERIC PRESS: 733 MMHG
BACTERIA UR QL AUTO: ABNORMAL /HPF
BARBITURATES UR QL SCN: NEGATIVE
BASE EXCESS BLDA CALC-SCNC: 1.8 MMOL/L (ref 0–2)
BASE EXCESS BLDA CALC-SCNC: 1.8 MMOL/L (ref 0–2)
BASE EXCESS BLDA CALC-SCNC: 2 MMOL/L (ref 0–2)
BASE EXCESS BLDA CALC-SCNC: 2.8 MMOL/L (ref 0–2)
BASOPHILS # BLD AUTO: 0.06 10*3/MM3 (ref 0–0.2)
BASOPHILS NFR BLD AUTO: 0.5 % (ref 0–1.5)
BDY SITE: ABNORMAL
BENZODIAZ UR QL SCN: NEGATIVE
BILIRUB SERPL-MCNC: 0.4 MG/DL (ref 0.2–1.2)
BILIRUB UR QL STRIP: NEGATIVE
BODY TEMPERATURE: 0 C
BODY TEMPERATURE: 37 C
BUN BLD-MCNC: 15 MG/DL (ref 8–23)
BUN/CREAT SERPL: 14.4 (ref 7–25)
BUPRENORPHINE SERPL-MCNC: NEGATIVE NG/ML
CALCIUM SPEC-SCNC: 9.4 MG/DL (ref 8.6–10.5)
CANNABINOIDS SERPL QL: NEGATIVE
CHLORIDE SERPL-SCNC: 102 MMOL/L (ref 98–107)
CLARITY UR: CLEAR
CO2 BLDA-SCNC: 30.2 MMOL/L (ref 22–33)
CO2 BLDA-SCNC: 30.8 MMOL/L (ref 22–33)
CO2 BLDA-SCNC: 30.9 MMOL/L (ref 22–33)
CO2 BLDA-SCNC: 31.2 MMOL/L (ref 22–33)
CO2 SERPL-SCNC: 26.1 MMOL/L (ref 22–29)
COCAINE UR QL: NEGATIVE
COHGB MFR BLD: 1 % (ref 0–5)
COHGB MFR BLD: 1 % (ref 0–5)
COHGB MFR BLD: 1.1 % (ref 0–5)
COHGB MFR BLD: 1.4 % (ref 0–5)
COLOR UR: YELLOW
CREAT BLD-MCNC: 1.04 MG/DL (ref 0.76–1.27)
CRP SERPL-MCNC: 0.14 MG/DL (ref 0–0.5)
D-LACTATE SERPL-SCNC: 1.6 MMOL/L (ref 0.5–2)
D-LACTATE SERPL-SCNC: 1.7 MMOL/L (ref 0.5–2)
D-LACTATE SERPL-SCNC: 2.4 MMOL/L (ref 0.5–2)
DEPRECATED RDW RBC AUTO: 53.5 FL (ref 37–54)
EOSINOPHIL # BLD AUTO: 0.79 10*3/MM3 (ref 0–0.4)
EOSINOPHIL NFR BLD AUTO: 6.1 % (ref 0.3–6.2)
EPAP: 8
ERYTHROCYTE [DISTWIDTH] IN BLOOD BY AUTOMATED COUNT: 16.6 % (ref 12.3–15.4)
GFR SERPL CREATININE-BSD FRML MDRD: 72 ML/MIN/1.73
GLOBULIN UR ELPH-MCNC: 3.4 GM/DL
GLUCOSE BLD-MCNC: 267 MG/DL (ref 65–99)
GLUCOSE BLDC GLUCOMTR-MCNC: 212 MG/DL (ref 70–130)
GLUCOSE BLDC GLUCOMTR-MCNC: 277 MG/DL (ref 70–130)
GLUCOSE BLDC GLUCOMTR-MCNC: 316 MG/DL (ref 70–130)
GLUCOSE UR STRIP-MCNC: ABNORMAL MG/DL
HCO3 BLDA-SCNC: 28.6 MMOL/L (ref 20–26)
HCO3 BLDA-SCNC: 29.1 MMOL/L (ref 20–26)
HCO3 BLDA-SCNC: 29.3 MMOL/L (ref 20–26)
HCO3 BLDA-SCNC: 29.4 MMOL/L (ref 20–26)
HCT VFR BLD AUTO: 42.7 % (ref 37.5–51)
HCT VFR BLD CALC: 35.8 % (ref 38–51)
HCT VFR BLD CALC: 37.4 % (ref 38–51)
HCT VFR BLD CALC: 37.6 % (ref 38–51)
HCT VFR BLD CALC: 38.5 % (ref 38–51)
HGB BLD-MCNC: 12.5 G/DL (ref 13–17.7)
HGB BLDA-MCNC: 11.7 G/DL (ref 14–18)
HGB BLDA-MCNC: 12.2 G/DL (ref 14–18)
HGB BLDA-MCNC: 12.3 G/DL (ref 14–18)
HGB BLDA-MCNC: 12.6 G/DL (ref 14–18)
HGB UR QL STRIP.AUTO: NEGATIVE
HOLD SPECIMEN: NORMAL
HOROWITZ INDEX BLD+IHG-RTO: 21 %
HOROWITZ INDEX BLD+IHG-RTO: 35 %
HOROWITZ INDEX BLD+IHG-RTO: 40 %
HOROWITZ INDEX BLD+IHG-RTO: 50 %
HYALINE CASTS UR QL AUTO: ABNORMAL /LPF
IMM GRANULOCYTES # BLD AUTO: 0.03 10*3/MM3 (ref 0–0.05)
IMM GRANULOCYTES NFR BLD AUTO: 0.2 % (ref 0–0.5)
IPAP: 20
IPAP: 20
IPAP: 24
KETONES UR QL STRIP: NEGATIVE
LEUKOCYTE ESTERASE UR QL STRIP.AUTO: NEGATIVE
LYMPHOCYTES # BLD AUTO: 1.82 10*3/MM3 (ref 0.7–3.1)
LYMPHOCYTES NFR BLD AUTO: 14.1 % (ref 19.6–45.3)
Lab: 8
Lab: ABNORMAL
MCH RBC QN AUTO: 26.3 PG (ref 26.6–33)
MCHC RBC AUTO-ENTMCNC: 29.3 G/DL (ref 31.5–35.7)
MCV RBC AUTO: 89.9 FL (ref 79–97)
METHADONE UR QL SCN: NEGATIVE
METHGB BLD QL: 0.1 % (ref 0–3)
METHGB BLD QL: 0.5 % (ref 0–3)
METHGB BLD QL: 0.5 % (ref 0–3)
METHGB BLD QL: 0.6 % (ref 0–3)
MODALITY: ABNORMAL
MONOCYTES # BLD AUTO: 0.95 10*3/MM3 (ref 0.1–0.9)
MONOCYTES NFR BLD AUTO: 7.4 % (ref 5–12)
NEUTROPHILS # BLD AUTO: 9.23 10*3/MM3 (ref 1.7–7)
NEUTROPHILS NFR BLD AUTO: 71.7 % (ref 42.7–76)
NITRITE UR QL STRIP: NEGATIVE
NOTE: ABNORMAL
NOTIFIED BY: ABNORMAL
NOTIFIED WHO: ABNORMAL
NT-PROBNP SERPL-MCNC: 2527 PG/ML (ref 5–900)
OPIATES UR QL: POSITIVE
OXYCODONE UR QL SCN: NEGATIVE
OXYHGB MFR BLDV: 63.9 % (ref 94–99)
OXYHGB MFR BLDV: 95.9 % (ref 94–99)
OXYHGB MFR BLDV: 96 % (ref 94–99)
OXYHGB MFR BLDV: 98 % (ref 94–99)
PCO2 BLDA: 52.1 MM HG (ref 35–45)
PCO2 BLDA: 53.8 MM HG (ref 35–45)
PCO2 BLDA: 56.9 MM HG (ref 35–45)
PCO2 BLDA: 57.7 MM HG (ref 35–45)
PCO2 TEMP ADJ BLD: 57.7 MM HG (ref 35–48)
PCO2 TEMP ADJ BLD: ABNORMAL MM[HG]
PCP UR QL SCN: NEGATIVE
PH BLDA: 7.32 PH UNITS (ref 7.35–7.45)
PH BLDA: 7.32 PH UNITS (ref 7.35–7.45)
PH BLDA: 7.33 PH UNITS (ref 7.35–7.45)
PH BLDA: 7.36 PH UNITS (ref 7.35–7.45)
PH UR STRIP.AUTO: <=5 [PH] (ref 5–8)
PH, TEMP CORRECTED: 7.32 PH UNITS
PH, TEMP CORRECTED: ABNORMAL
PLATELET # BLD AUTO: 369 10*3/MM3 (ref 140–450)
PMV BLD AUTO: 9.7 FL (ref 6–12)
PO2 BLDA: 100 MM HG (ref 83–108)
PO2 BLDA: 104 MM HG (ref 83–108)
PO2 BLDA: 168 MM HG (ref 83–108)
PO2 BLDA: 37 MM HG (ref 83–108)
PO2 TEMP ADJ BLD: 37 MM HG (ref 83–108)
PO2 TEMP ADJ BLD: ABNORMAL MM[HG]
POTASSIUM BLD-SCNC: 4.1 MMOL/L (ref 3.5–5.2)
POTASSIUM BLD-SCNC: 4.3 MMOL/L (ref 3.5–5.2)
PROT SERPL-MCNC: 7.8 G/DL (ref 6–8.5)
PROT UR QL STRIP: ABNORMAL
RBC # BLD AUTO: 4.75 10*6/MM3 (ref 4.14–5.8)
RBC # UR: ABNORMAL /HPF
REF LAB TEST METHOD: ABNORMAL
SAO2 % BLDCOA: 64.9 % (ref 94–99)
SAO2 % BLDCOA: 97.5 % (ref 94–99)
SAO2 % BLDCOA: 97.6 % (ref 94–99)
SAO2 % BLDCOA: >99.2 % (ref 94–99)
SET MECH RESP RATE: 18
SET MECH RESP RATE: 22
SET MECH RESP RATE: 24
SODIUM BLD-SCNC: 141 MMOL/L (ref 136–145)
SP GR UR STRIP: >=1.03 (ref 1–1.03)
SQUAMOUS #/AREA URNS HPF: ABNORMAL /HPF
TROPONIN T SERPL-MCNC: 0.01 NG/ML (ref 0–0.03)
TROPONIN T SERPL-MCNC: <0.01 NG/ML (ref 0–0.03)
TROPONIN T SERPL-MCNC: <0.01 NG/ML (ref 0–0.03)
UROBILINOGEN UR QL STRIP: ABNORMAL
VENTILATOR MODE: ABNORMAL
WBC NRBC COR # BLD: 12.88 10*3/MM3 (ref 3.4–10.8)
WBC UR QL AUTO: ABNORMAL /HPF

## 2019-10-04 PROCEDURE — 83050 HGB METHEMOGLOBIN QUAN: CPT

## 2019-10-04 PROCEDURE — 83605 ASSAY OF LACTIC ACID: CPT | Performed by: INTERNAL MEDICINE

## 2019-10-04 PROCEDURE — 80307 DRUG TEST PRSMV CHEM ANLYZR: CPT | Performed by: PHYSICIAN ASSISTANT

## 2019-10-04 PROCEDURE — 82805 BLOOD GASES W/O2 SATURATION: CPT

## 2019-10-04 PROCEDURE — 94799 UNLISTED PULMONARY SVC/PX: CPT

## 2019-10-04 PROCEDURE — 25010000002 CEFTRIAXONE: Performed by: PHYSICIAN ASSISTANT

## 2019-10-04 PROCEDURE — 25010000002 HYDRALAZINE PER 20 MG: Performed by: PHYSICIAN ASSISTANT

## 2019-10-04 PROCEDURE — 99291 CRITICAL CARE FIRST HOUR: CPT | Performed by: INTERNAL MEDICINE

## 2019-10-04 PROCEDURE — 99285 EMERGENCY DEPT VISIT HI MDM: CPT

## 2019-10-04 PROCEDURE — 94660 CPAP INITIATION&MGMT: CPT

## 2019-10-04 PROCEDURE — 25010000002 FUROSEMIDE PER 20 MG: Performed by: INTERNAL MEDICINE

## 2019-10-04 PROCEDURE — 84484 ASSAY OF TROPONIN QUANT: CPT | Performed by: FAMILY MEDICINE

## 2019-10-04 PROCEDURE — 0 IOVERSOL 68 % SOLUTION: Performed by: EMERGENCY MEDICINE

## 2019-10-04 PROCEDURE — G0008 ADMIN INFLUENZA VIRUS VAC: HCPCS | Performed by: FAMILY MEDICINE

## 2019-10-04 PROCEDURE — 82375 ASSAY CARBOXYHB QUANT: CPT

## 2019-10-04 PROCEDURE — 80053 COMPREHEN METABOLIC PANEL: CPT | Performed by: PHYSICIAN ASSISTANT

## 2019-10-04 PROCEDURE — 71275 CT ANGIOGRAPHY CHEST: CPT | Performed by: RADIOLOGY

## 2019-10-04 PROCEDURE — 71045 X-RAY EXAM CHEST 1 VIEW: CPT

## 2019-10-04 PROCEDURE — 82962 GLUCOSE BLOOD TEST: CPT

## 2019-10-04 PROCEDURE — 25010000002 ENOXAPARIN PER 10 MG: Performed by: FAMILY MEDICINE

## 2019-10-04 PROCEDURE — 25010000002 INFLUENZA VAC SUBUNIT QUAD 0.5 ML SUSPENSION PREFILLED SYRINGE: Performed by: FAMILY MEDICINE

## 2019-10-04 PROCEDURE — 36600 WITHDRAWAL OF ARTERIAL BLOOD: CPT

## 2019-10-04 PROCEDURE — 84484 ASSAY OF TROPONIN QUANT: CPT | Performed by: PHYSICIAN ASSISTANT

## 2019-10-04 PROCEDURE — 83880 ASSAY OF NATRIURETIC PEPTIDE: CPT | Performed by: PHYSICIAN ASSISTANT

## 2019-10-04 PROCEDURE — 85025 COMPLETE CBC W/AUTO DIFF WBC: CPT | Performed by: PHYSICIAN ASSISTANT

## 2019-10-04 PROCEDURE — 63710000001 INSULIN ASPART PER 5 UNITS: Performed by: FAMILY MEDICINE

## 2019-10-04 PROCEDURE — 94640 AIRWAY INHALATION TREATMENT: CPT

## 2019-10-04 PROCEDURE — 25010000002 METHYLPREDNISOLONE PER 125 MG: Performed by: PHYSICIAN ASSISTANT

## 2019-10-04 PROCEDURE — 63710000001 INSULIN ASPART PER 5 UNITS: Performed by: HOSPITALIST

## 2019-10-04 PROCEDURE — 84132 ASSAY OF SERUM POTASSIUM: CPT | Performed by: INTERNAL MEDICINE

## 2019-10-04 PROCEDURE — 93306 TTE W/DOPPLER COMPLETE: CPT | Performed by: INTERNAL MEDICINE

## 2019-10-04 PROCEDURE — 86140 C-REACTIVE PROTEIN: CPT | Performed by: PHYSICIAN ASSISTANT

## 2019-10-04 PROCEDURE — 87040 BLOOD CULTURE FOR BACTERIA: CPT | Performed by: PHYSICIAN ASSISTANT

## 2019-10-04 PROCEDURE — 25010000002 LORAZEPAM PER 2 MG: Performed by: EMERGENCY MEDICINE

## 2019-10-04 PROCEDURE — P9612 CATHETERIZE FOR URINE SPEC: HCPCS

## 2019-10-04 PROCEDURE — 71275 CT ANGIOGRAPHY CHEST: CPT

## 2019-10-04 PROCEDURE — 81001 URINALYSIS AUTO W/SCOPE: CPT | Performed by: PHYSICIAN ASSISTANT

## 2019-10-04 PROCEDURE — 99233 SBSQ HOSP IP/OBS HIGH 50: CPT | Performed by: FAMILY MEDICINE

## 2019-10-04 PROCEDURE — 36415 COLL VENOUS BLD VENIPUNCTURE: CPT

## 2019-10-04 PROCEDURE — 90674 CCIIV4 VAC NO PRSV 0.5 ML IM: CPT | Performed by: FAMILY MEDICINE

## 2019-10-04 PROCEDURE — 83605 ASSAY OF LACTIC ACID: CPT | Performed by: PHYSICIAN ASSISTANT

## 2019-10-04 PROCEDURE — 93306 TTE W/DOPPLER COMPLETE: CPT

## 2019-10-04 PROCEDURE — 93005 ELECTROCARDIOGRAM TRACING: CPT | Performed by: PHYSICIAN ASSISTANT

## 2019-10-04 RX ORDER — CETIRIZINE HYDROCHLORIDE 10 MG/1
10 TABLET ORAL DAILY
Status: DISCONTINUED | OUTPATIENT
Start: 2019-10-04 | End: 2019-10-06 | Stop reason: HOSPADM

## 2019-10-04 RX ORDER — ACETAMINOPHEN 160 MG/5ML
650 SOLUTION ORAL EVERY 4 HOURS PRN
Status: DISCONTINUED | OUTPATIENT
Start: 2019-10-04 | End: 2019-10-06 | Stop reason: HOSPADM

## 2019-10-04 RX ORDER — DEXTROSE MONOHYDRATE 25 G/50ML
25 INJECTION, SOLUTION INTRAVENOUS
Status: DISCONTINUED | OUTPATIENT
Start: 2019-10-04 | End: 2019-10-06 | Stop reason: HOSPADM

## 2019-10-04 RX ORDER — IPRATROPIUM BROMIDE AND ALBUTEROL SULFATE 2.5; .5 MG/3ML; MG/3ML
3 SOLUTION RESPIRATORY (INHALATION)
Status: COMPLETED | OUTPATIENT
Start: 2019-10-04 | End: 2019-10-04

## 2019-10-04 RX ORDER — SODIUM CHLORIDE 0.9 % (FLUSH) 0.9 %
10 SYRINGE (ML) INJECTION EVERY 12 HOURS SCHEDULED
Status: DISCONTINUED | OUTPATIENT
Start: 2019-10-04 | End: 2019-10-06 | Stop reason: HOSPADM

## 2019-10-04 RX ORDER — SODIUM CHLORIDE 0.9 % (FLUSH) 0.9 %
10 SYRINGE (ML) INJECTION AS NEEDED
Status: DISCONTINUED | OUTPATIENT
Start: 2019-10-04 | End: 2019-10-06 | Stop reason: HOSPADM

## 2019-10-04 RX ORDER — LORAZEPAM 2 MG/ML
0.25 INJECTION INTRAMUSCULAR ONCE
Status: COMPLETED | OUTPATIENT
Start: 2019-10-04 | End: 2019-10-04

## 2019-10-04 RX ORDER — LISINOPRIL 10 MG/1
10 TABLET ORAL DAILY
COMMUNITY
End: 2019-11-25 | Stop reason: ALTCHOICE

## 2019-10-04 RX ORDER — IPRATROPIUM BROMIDE AND ALBUTEROL SULFATE 2.5; .5 MG/3ML; MG/3ML
3 SOLUTION RESPIRATORY (INHALATION) EVERY 4 HOURS PRN
COMMUNITY

## 2019-10-04 RX ORDER — PREGABALIN 75 MG/1
75 CAPSULE ORAL 3 TIMES DAILY
COMMUNITY
End: 2019-11-25 | Stop reason: ALTCHOICE

## 2019-10-04 RX ORDER — LORAZEPAM 2 MG/ML
0.5 INJECTION INTRAMUSCULAR ONCE
Status: DISCONTINUED | OUTPATIENT
Start: 2019-10-04 | End: 2019-10-04

## 2019-10-04 RX ORDER — PREGABALIN 75 MG/1
75 CAPSULE ORAL 3 TIMES DAILY
Status: CANCELLED | OUTPATIENT
Start: 2019-10-04

## 2019-10-04 RX ORDER — FUROSEMIDE 10 MG/ML
40 INJECTION INTRAMUSCULAR; INTRAVENOUS ONCE
Status: COMPLETED | OUTPATIENT
Start: 2019-10-04 | End: 2019-10-04

## 2019-10-04 RX ORDER — IPRATROPIUM BROMIDE AND ALBUTEROL SULFATE 2.5; .5 MG/3ML; MG/3ML
3 SOLUTION RESPIRATORY (INHALATION) EVERY 4 HOURS PRN
Status: DISCONTINUED | OUTPATIENT
Start: 2019-10-04 | End: 2019-10-06 | Stop reason: HOSPADM

## 2019-10-04 RX ORDER — HYDRALAZINE HYDROCHLORIDE 20 MG/ML
20 INJECTION INTRAMUSCULAR; INTRAVENOUS ONCE
Status: COMPLETED | OUTPATIENT
Start: 2019-10-04 | End: 2019-10-04

## 2019-10-04 RX ORDER — AMLODIPINE BESYLATE 10 MG/1
10 TABLET ORAL DAILY
Status: DISCONTINUED | OUTPATIENT
Start: 2019-10-05 | End: 2019-10-06 | Stop reason: HOSPADM

## 2019-10-04 RX ORDER — ATORVASTATIN CALCIUM 20 MG/1
20 TABLET, FILM COATED ORAL DAILY
Status: DISCONTINUED | OUTPATIENT
Start: 2019-10-04 | End: 2019-10-06 | Stop reason: HOSPADM

## 2019-10-04 RX ORDER — METHYLPREDNISOLONE SODIUM SUCCINATE 125 MG/2ML
125 INJECTION, POWDER, LYOPHILIZED, FOR SOLUTION INTRAMUSCULAR; INTRAVENOUS ONCE
Status: COMPLETED | OUTPATIENT
Start: 2019-10-04 | End: 2019-10-04

## 2019-10-04 RX ORDER — LORATADINE 10 MG/1
10 TABLET ORAL DAILY
Status: ON HOLD | COMMUNITY
End: 2021-05-22

## 2019-10-04 RX ORDER — AMLODIPINE BESYLATE 5 MG/1
10 TABLET ORAL ONCE
Status: COMPLETED | OUTPATIENT
Start: 2019-10-04 | End: 2019-10-04

## 2019-10-04 RX ORDER — TAMSULOSIN HYDROCHLORIDE 0.4 MG/1
0.4 CAPSULE ORAL NIGHTLY
Status: DISCONTINUED | OUTPATIENT
Start: 2019-10-04 | End: 2019-10-06 | Stop reason: HOSPADM

## 2019-10-04 RX ORDER — PANTOPRAZOLE SODIUM 40 MG/1
40 TABLET, DELAYED RELEASE ORAL EVERY MORNING
Status: DISCONTINUED | OUTPATIENT
Start: 2019-10-05 | End: 2019-10-06 | Stop reason: HOSPADM

## 2019-10-04 RX ORDER — LISINOPRIL 10 MG/1
10 TABLET ORAL DAILY
Status: DISCONTINUED | OUTPATIENT
Start: 2019-10-04 | End: 2019-10-06 | Stop reason: HOSPADM

## 2019-10-04 RX ORDER — ALBUTEROL SULFATE 2.5 MG/3ML
2.5 SOLUTION RESPIRATORY (INHALATION) EVERY 4 HOURS PRN
Status: DISCONTINUED | OUTPATIENT
Start: 2019-10-04 | End: 2019-10-06 | Stop reason: HOSPADM

## 2019-10-04 RX ORDER — BUDESONIDE AND FORMOTEROL FUMARATE DIHYDRATE 160; 4.5 UG/1; UG/1
2 AEROSOL RESPIRATORY (INHALATION)
Status: DISCONTINUED | OUTPATIENT
Start: 2019-10-04 | End: 2019-10-06 | Stop reason: HOSPADM

## 2019-10-04 RX ORDER — IPRATROPIUM BROMIDE AND ALBUTEROL SULFATE 2.5; .5 MG/3ML; MG/3ML
3 SOLUTION RESPIRATORY (INHALATION) ONCE
Status: COMPLETED | OUTPATIENT
Start: 2019-10-04 | End: 2019-10-04

## 2019-10-04 RX ORDER — ONDANSETRON 2 MG/ML
4 INJECTION INTRAMUSCULAR; INTRAVENOUS EVERY 6 HOURS PRN
Status: DISCONTINUED | OUTPATIENT
Start: 2019-10-04 | End: 2019-10-06 | Stop reason: HOSPADM

## 2019-10-04 RX ORDER — METOPROLOL TARTRATE 50 MG/1
50 TABLET, FILM COATED ORAL 2 TIMES DAILY
COMMUNITY
End: 2019-11-25 | Stop reason: ALTCHOICE

## 2019-10-04 RX ORDER — ACETAMINOPHEN 325 MG/1
650 TABLET ORAL EVERY 4 HOURS PRN
Status: DISCONTINUED | OUTPATIENT
Start: 2019-10-04 | End: 2019-10-06 | Stop reason: HOSPADM

## 2019-10-04 RX ORDER — NICOTINE POLACRILEX 4 MG
15 LOZENGE BUCCAL
Status: DISCONTINUED | OUTPATIENT
Start: 2019-10-04 | End: 2019-10-06 | Stop reason: HOSPADM

## 2019-10-04 RX ORDER — ACETAMINOPHEN 650 MG/1
650 SUPPOSITORY RECTAL EVERY 4 HOURS PRN
Status: DISCONTINUED | OUTPATIENT
Start: 2019-10-04 | End: 2019-10-06 | Stop reason: HOSPADM

## 2019-10-04 RX ORDER — L.ACID,PARA/B.BIFIDUM/S.THERM 8B CELL
1 CAPSULE ORAL DAILY
Status: DISCONTINUED | OUTPATIENT
Start: 2019-10-04 | End: 2019-10-06 | Stop reason: HOSPADM

## 2019-10-04 RX ORDER — SODIUM CHLORIDE 9 MG/ML
125 INJECTION, SOLUTION INTRAVENOUS CONTINUOUS
Status: DISCONTINUED | OUTPATIENT
Start: 2019-10-04 | End: 2019-10-04

## 2019-10-04 RX ORDER — FLUTICASONE PROPIONATE 50 MCG
2 SPRAY, SUSPENSION (ML) NASAL DAILY
Status: DISCONTINUED | OUTPATIENT
Start: 2019-10-04 | End: 2019-10-06 | Stop reason: HOSPADM

## 2019-10-04 RX ORDER — METOPROLOL TARTRATE 50 MG/1
50 TABLET, FILM COATED ORAL EVERY 12 HOURS SCHEDULED
Status: DISCONTINUED | OUTPATIENT
Start: 2019-10-04 | End: 2019-10-06 | Stop reason: HOSPADM

## 2019-10-04 RX ORDER — NALOXONE HYDROCHLORIDE 1 MG/ML
1 INJECTION INTRAMUSCULAR; INTRAVENOUS; SUBCUTANEOUS ONCE
Status: COMPLETED | OUTPATIENT
Start: 2019-10-04 | End: 2019-10-04

## 2019-10-04 RX ORDER — SILDENAFIL CITRATE 20 MG/1
20 TABLET ORAL DAILY PRN
Status: ON HOLD | COMMUNITY
End: 2021-05-22

## 2019-10-04 RX ORDER — SODIUM CHLORIDE 9 MG/ML
100 INJECTION, SOLUTION INTRAVENOUS CONTINUOUS
Status: DISCONTINUED | OUTPATIENT
Start: 2019-10-04 | End: 2019-10-04

## 2019-10-04 RX ADMIN — FUROSEMIDE 40 MG: 10 INJECTION, SOLUTION INTRAMUSCULAR; INTRAVENOUS at 10:16

## 2019-10-04 RX ADMIN — CEFTRIAXONE 2 G: 2 INJECTION, POWDER, FOR SOLUTION INTRAMUSCULAR; INTRAVENOUS at 05:31

## 2019-10-04 RX ADMIN — IPRATROPIUM BROMIDE AND ALBUTEROL SULFATE 3 ML: .5; 3 SOLUTION RESPIRATORY (INHALATION) at 18:49

## 2019-10-04 RX ADMIN — IOVERSOL 100 ML: 678 INJECTION INTRA-ARTERIAL; INTRAVENOUS at 07:59

## 2019-10-04 RX ADMIN — NALOXONE HYDROCHLORIDE 1 MG: 1 INJECTION PARENTERAL at 10:02

## 2019-10-04 RX ADMIN — INSULIN ASPART 4 UNITS: 100 INJECTION, SOLUTION INTRAVENOUS; SUBCUTANEOUS at 17:15

## 2019-10-04 RX ADMIN — SODIUM CHLORIDE, PRESERVATIVE FREE 10 ML: 5 INJECTION INTRAVENOUS at 20:51

## 2019-10-04 RX ADMIN — SODIUM CHLORIDE, PRESERVATIVE FREE 10 ML: 5 INJECTION INTRAVENOUS at 10:42

## 2019-10-04 RX ADMIN — HYDRALAZINE HYDROCHLORIDE 20 MG: 20 INJECTION INTRAMUSCULAR; INTRAVENOUS at 04:39

## 2019-10-04 RX ADMIN — INFLUENZA A VIRUS A/SINGAPORE/GP1908/2015 IVR-180 (H1N1) ANTIGEN (MDCK CELL DERIVED, PROPIOLACTONE INACTIVATED), INFLUENZA A VIRUS A/NORTH CAROLINA/04/2016 (H3N2) HEMAGGLUTININ ANTIGEN (MDCK CELL DERIVED, PROPIOLACTONE INACTIVATED), INFLUENZA B VIRUS B/IOWA/06/2017 HEMAGGLUTININ ANTIGEN (MDCK CELL DERIVED, PROPIOLACTONE INACTIVATED), INFLUENZA B VIRUS B/SINGAPORE/INFTT-16-0610/2016 HEMAGGLUTININ ANTIGEN (MDCK CELL DERIVED, PROPIOLACTONE INACTIVATED) 0.5 ML: 15; 15; 15; 15 INJECTION, SUSPENSION INTRAMUSCULAR at 17:15

## 2019-10-04 RX ADMIN — INSULIN ASPART 6 UNITS: 100 INJECTION, SOLUTION INTRAVENOUS; SUBCUTANEOUS at 10:40

## 2019-10-04 RX ADMIN — TAMSULOSIN HYDROCHLORIDE 0.4 MG: 0.4 CAPSULE ORAL at 21:53

## 2019-10-04 RX ADMIN — SODIUM CHLORIDE 125 ML/HR: 9 INJECTION, SOLUTION INTRAVENOUS at 06:33

## 2019-10-04 RX ADMIN — ATORVASTATIN CALCIUM 20 MG: 20 TABLET, FILM COATED ORAL at 17:03

## 2019-10-04 RX ADMIN — IPRATROPIUM BROMIDE AND ALBUTEROL SULFATE 3 ML: .5; 3 SOLUTION RESPIRATORY (INHALATION) at 03:45

## 2019-10-04 RX ADMIN — DOXYCYCLINE 100 MG: 100 INJECTION, POWDER, LYOPHILIZED, FOR SOLUTION INTRAVENOUS at 17:04

## 2019-10-04 RX ADMIN — INSULIN ASPART 10 UNITS: 100 INJECTION, SOLUTION INTRAVENOUS; SUBCUTANEOUS at 20:50

## 2019-10-04 RX ADMIN — HYDRALAZINE HYDROCHLORIDE 20 MG: 20 INJECTION INTRAMUSCULAR; INTRAVENOUS at 03:31

## 2019-10-04 RX ADMIN — LISINOPRIL 10 MG: 10 TABLET ORAL at 17:02

## 2019-10-04 RX ADMIN — ACETAMINOPHEN 650 MG: 325 TABLET ORAL at 20:50

## 2019-10-04 RX ADMIN — CETIRIZINE HYDROCHLORIDE 10 MG: 10 TABLET, FILM COATED ORAL at 17:03

## 2019-10-04 RX ADMIN — LORAZEPAM 0.25 MG: 2 INJECTION INTRAMUSCULAR; INTRAVENOUS at 03:38

## 2019-10-04 RX ADMIN — IPRATROPIUM BROMIDE AND ALBUTEROL SULFATE 3 ML: .5; 3 SOLUTION RESPIRATORY (INHALATION) at 03:27

## 2019-10-04 RX ADMIN — SODIUM CHLORIDE, PRESERVATIVE FREE 10 ML: 5 INJECTION INTRAVENOUS at 20:50

## 2019-10-04 RX ADMIN — IPRATROPIUM BROMIDE AND ALBUTEROL SULFATE 3 ML: .5; 3 SOLUTION RESPIRATORY (INHALATION) at 04:24

## 2019-10-04 RX ADMIN — DOXYCYCLINE 100 MG: 100 INJECTION, POWDER, LYOPHILIZED, FOR SOLUTION INTRAVENOUS at 06:03

## 2019-10-04 RX ADMIN — ENOXAPARIN SODIUM 40 MG: 40 INJECTION SUBCUTANEOUS at 11:39

## 2019-10-04 RX ADMIN — APIXABAN 5 MG: 5 TABLET, FILM COATED ORAL at 21:53

## 2019-10-04 RX ADMIN — FLUTICASONE PROPIONATE 2 SPRAY: 50 SPRAY, METERED NASAL at 17:03

## 2019-10-04 RX ADMIN — BUDESONIDE AND FORMOTEROL FUMARATE DIHYDRATE 2 PUFF: 160; 4.5 AEROSOL RESPIRATORY (INHALATION) at 22:09

## 2019-10-04 RX ADMIN — AMLODIPINE BESYLATE 10 MG: 5 TABLET ORAL at 07:14

## 2019-10-04 RX ADMIN — METOPROLOL TARTRATE 50 MG: 50 TABLET, FILM COATED ORAL at 21:53

## 2019-10-04 RX ADMIN — METHYLPREDNISOLONE SODIUM SUCCINATE 125 MG: 125 INJECTION, POWDER, FOR SOLUTION INTRAMUSCULAR; INTRAVENOUS at 03:34

## 2019-10-04 RX ADMIN — SODIUM CHLORIDE 500 ML: 9 INJECTION, SOLUTION INTRAVENOUS at 06:33

## 2019-10-04 NOTE — ED PROVIDER NOTES
Subjective   63-year-old male patient presents to the emergency room with complaints of shortness of breath, wheezing and chest pain.  Patient family is at bedside.  States he has been like this for couple days.  States that tonight it is worsening.  States he has had a productive cough.  Reports he does have a history of COPD.  Wears 2 L of oxygen nasal cannula at home as needed.        History provided by:  Patient   used: No        Review of Systems   Constitutional: Negative.    HENT: Negative.    Eyes: Negative.    Respiratory: Positive for cough, chest tightness, shortness of breath and wheezing.    Cardiovascular: Positive for chest pain.   Gastrointestinal: Negative.    Endocrine: Negative.    Genitourinary: Negative.    Musculoskeletal: Negative.    Skin: Negative.    Allergic/Immunologic: Negative.    Neurological: Negative.    Hematological: Negative.    Psychiatric/Behavioral: Negative.    All other systems reviewed and are negative.      No past medical history on file.    Allergies   Allergen Reactions   • Codeine Nausea And Vomiting   • Ciprofloxacin Diarrhea       No past surgical history on file.    No family history on file.    Social History     Socioeconomic History   • Marital status:      Spouse name: Not on file   • Number of children: Not on file   • Years of education: Not on file   • Highest education level: Not on file   Tobacco Use   • Smoking status: Former Smoker   • Smokeless tobacco: Never Used           Objective   Physical Exam   Constitutional: He is oriented to person, place, and time. He appears well-developed and well-nourished.   HENT:   Head: Normocephalic and atraumatic.   Mouth/Throat: Oropharynx is clear and moist.   Eyes: EOM are normal. Pupils are equal, round, and reactive to light.   Neck: Normal range of motion. Neck supple.   Cardiovascular: Normal rate, regular rhythm, normal heart sounds and intact distal pulses.   Pulmonary/Chest: He is  in respiratory distress. He has decreased breath sounds. He has wheezes.   Abdominal: Soft. Bowel sounds are normal.   Musculoskeletal: Normal range of motion.        Right lower leg: Normal.        Left lower leg: Normal.   Neurological: He is alert and oriented to person, place, and time.   Skin: Skin is warm and dry. Capillary refill takes less than 2 seconds.   Nursing note and vitals reviewed.      Procedures           ED Course  ED Course as of Oct 04 0739   Fri Oct 04, 2019   0321 Patient seen personally by me, agree with treatment plan.  [CM]   0322 Reviewed ABG.  Will start patient on BiPAP.  [ML]   0449 Caitie recommends another dose of Hydralazine for elevated BP   [ML]   0506 0330- Reviewed by Dr. Blood, rate 98, NSR, no ischemia  ECG 12 Lead [ML]   0532 BiPAP settings adjusted. Will get repeat gas and then admit to hospitalist   [ML]   0630 Reviewed ABG with Caitie - recommends 500 bolus and 125mL/hr.   [ML]   0637 Caitie requests increasing IPAP 24 and FiO2 to 35  [ML]   0724 Discussed UDS with Dr. Blood - will proceed with PE protocol   [ML]   0732 Discussed with Dr. Lyon - he will accept to the unit   [ML]      ED Course User Index  [CM] Casimiro Blood MD  [ML] Daphnie Salazar PA                  [facility]    Final diagnoses:   Acute respiratory failure with hypoxemia (CMS/HCC)   Polysubstance abuse (CMS/HCC)   Acute exacerbation of chronic obstructive pulmonary disease (COPD) (CMS/[facility])   Acute UTI              Daphnie Salazar PA  10/04/19 0754

## 2019-10-04 NOTE — H&P
[facility] Medicine Services  HISTORY AND PHYSICAL    Primary Care Physician: Provider, No Known    Subjective     Chief Complaint:    Chief Complaint   Patient presents with   • Shortness of Breath       History of Present Illness:   This is a 63 year old  gentleman with a past medical history of symptomatic sinus Bradycardia Hypotension Hyponatremia, CAD, S/P PCI, Paroxysmal Atrial fibrillation, Anticoagulated with Eliquis, DM2,  CKD stage 3 HTN Dyslipidemia  RIO,    Morbid obesity Status post fall and admitted here 11/10/18.  Dr Ribeiro is his cardiologist.      He presented to our ED complaining of shortness of breath and was evaluated with labs blood gases which found him to be in acute respiratory failure with hypoxemia.  ABG showed pO2 of 37 on initial presentation.  His pCO2 was mildly elevated at nearly 60 then he was placed on BIPAP 50% FiO2.  Oxygenation improved with subsequent pO2 of 164.      Review of Systems   1. Constitutional: Negative for fever. Negative for chills, diaphoresis, fatigue and unexpected weight change.   2. HENT: Negative for congestion and hearing loss.   3. Eyes: Negative for redness and visual disturbance.   4. Respiratory: Positive for shortness of breath. Negative for chest pain . Negative for cough and chest tightness.   5. Cardiovascular: Negative for chest pain and palpitations.   6. Gastrointestinal: Negative for abdominal distention, abdominal pain and blood in stool.   7. Endocrine: Negative for cold intolerance and heat intolerance.   8. Genitourinary: Negative for difficulty urinating, dysuria and frequency.   9. Musculoskeletal: Negative for arthralgias, back pain and myalgias.   10. Skin: Negative for color change, rash and wound.   11. Neurological: Negative for syncope, weakness and headaches.   12. Hematological: Negative for adenopathy. Does not bruise/bleed easily.   13. Psychiatric/Behavioral: Negative for confusion. The patient  "is not nervous/anxious.     Past Medical History: No past medical history on file.    Past Surgical History:No past surgical history on file.    Family History: family history is not on file.    Social History:  reports that he has quit smoking. He has never used smokeless tobacco.    Medications:    (Not in a hospital admission)    Allergies:  Allergies   Allergen Reactions   • Codeine Nausea And Vomiting   • Ciprofloxacin Diarrhea         Objective     Physical Exam:  Vital Signs: BP (!) 179/108   Pulse 100   Temp 98.7 °F (37.1 °C) (Axillary)   Resp 26   Ht 185.4 cm (73\")   Wt 127 kg (280 lb)   SpO2 97%   BMI 36.94 kg/m²      General Appearance: alert, oriented x 3, no acute distress.  Skin: warm and dry.  HEENT: pupils round and reactive to light, oral mucosa normal.  Neck: supple, no JVD, trachea midline.  Lungs: CTA, unlabored breathing effort.  Heart: RRR, normal S1 and S2, no S3, no rub.  Abdomen: soft, non-tender, no palpable bladder, present bowel sounds to auscultation.  Extremities: no clubbing, cyanosis or edema.  Neuro: normal speech and mental status.          Results Reviewed:    Lab Results (last 24 hours)     Procedure Component Value Units Date/Time    Urinalysis, Microscopic Only - Urine, Catheter In/Out [[id number]]  (Abnormal) Collected:  10/04/19 0646    Specimen:  Urine, Catheter In/Out Updated:  10/04/19 0730     RBC, UA 0-2 /HPF      WBC, UA 0-2 /HPF      Bacteria, UA Trace /HPF      Squamous Epithelial Cells, UA 0-2 /HPF      Hyaline Casts, UA None Seen /LPF      Methodology Manual Light Microscopy    Urine Drug Screen - Urine, Catheter In/Out [[id number]]  (Abnormal) Collected:  10/04/19 0646    Specimen:  Urine, Catheter In/Out Updated:  10/04/19 0715     Amphetamine Screen, Urine Positive     Barbiturates Screen, Urine Negative     Benzodiazepine Screen, Urine Negative     Cocaine Screen, Urine Negative     Methadone Screen, Urine Negative     Opiate Screen Positive     " Phencyclidine (PCP), Urine Negative     THC, Screen, Urine Negative     6-ACETYL MORPHINE Negative     Buprenorphine, Screen, Urine Negative     Oxycodone Screen, Urine Negative    Narrative:       Negative Thresholds For Drugs Screened:                  Amphetamines              1000 ng/ml               Barbiturates               200 ng/ml               Benzodiazepines            200 ng/ml              Cocaine                    300 ng/ml              Methadone                  300 ng/ml              Opiates                    300 ng/ml               Phencyclidine               25 ng/ml               THC                         50 ng/ml              6-Acetyl Morphine           10 ng/ml              Buprenorphine                5 ng/ml              Oxycodone                  300 ng/ml    The reference range for all drugs tested is negative. This report includes final unconfirmed qualitative results to be used for medical treatment purposes only. Unconfirmed results must not be used for non-medical purposes such as employment or legal testing. Clinical consideration should be applied to any drug of abuse test, especially when unconfirmed quantitative results are used.      Urinalysis With Culture If Indicated - Urine, Catheter In/Out [[id number]]  (Abnormal) Collected:  10/04/19 0646    Specimen:  Urine, Catheter In/Out Updated:  10/04/19 0707     Color, UA Yellow     Appearance, UA Clear     pH, UA <=5.0     Specific Gravity, UA >=1.030     Glucose,  mg/dL (1+)     Ketones, UA Negative     Bilirubin, UA Negative     Blood, UA Negative     Protein,  mg/dL (2+)     Leuk Esterase, UA Negative     Nitrite, UA Negative     Urobilinogen, UA 1.0 E.U./dL    Blood Gas, Arterial With Co-Ox [[id number]]  (Abnormal) Collected:  10/04/19 0621    Specimen:  Arterial Blood Updated:  10/04/19 0625     Site Right Radial     Jayjay's Test Positive     pH, Arterial 7.317 pH units      Comment: 84 Value below reference range         pCO2, Arterial 56.9 mm Hg      Comment: 83 Value above reference range        pO2, Arterial 104.0 mm Hg      Comment: 83 Value above reference range        HCO3, Arterial 29.1 mmol/L      Base Excess, Arterial 1.8 mmol/L      O2 Saturation, Arterial 97.5 %      Hemoglobin, Blood Gas 12.2 g/dL      Comment: 84 Value below reference range        Hematocrit, Blood Gas 37.4 %      Comment: 84 Value below reference range        Oxyhemoglobin 95.9 %      Methemoglobin 0.60 %      Carboxyhemoglobin 1.0 %      A-a Gradiant 107.1 mmHg      CO2 Content 30.8 mmol/L      Temperature 0.0 C      Barometric Pressure for Blood Gas 731 mmHg      Modality BiPap     FIO2 40 %      Ventilator Mode NA     Set [facility] Resp Rate 24.0     IPAP 20     Comment: Meter: [id number]     :  [id number]        EPAP 8     Note --     Notified Who DR DUNNE AND RN      Collected by sahra olson     pH, Temp Corrected --     pCO2, Temperature Corrected --     pO2, Temperature Corrected --    Blood Gas, Arterial With Co-Ox [[id number]]  (Abnormal) Collected:  10/04/19 0430    Specimen:  Arterial Blood Updated:  10/04/19 0432     Site Right Radial     Jayjay's Test Positive     pH, Arterial 7.334 pH units      Comment: 84 Value below reference range        pCO2, Arterial 53.8 mm Hg      Comment: 83 Value above reference range        pO2, Arterial 168.0 mm Hg      Comment: 83 Value above reference range        HCO3, Arterial 28.6 mmol/L      Base Excess, Arterial 1.8 mmol/L      O2 Saturation, Arterial >99.2 %      Hemoglobin, Blood Gas 11.7 g/dL      Comment: 84 Value below reference range        Hematocrit, Blood Gas 35.8 %      Comment: 84 Value below reference range        Oxyhemoglobin 98.0 %      Methemoglobin 0.50 %      Carboxyhemoglobin 1.0 %      A-a Gradiant 115.6 mmHg      CO2 Content 30.2 mmol/L      Temperature 0.0 C      Barometric Pressure for Blood Gas 730 mmHg      Modality BiPap     FIO2 50 %      Ventilator Mode NA     Set  [facility] Resp Rate 22.0     IPAP 20     Comment: Meter: [id number]     :  [id number]        EPAP 8     Note --     Collected by 8     pH, Temp Corrected --     pCO2, Temperature Corrected --     pO2, Temperature Corrected --    Comprehensive Metabolic Panel [[id number]]  (Abnormal) Collected:  10/04/19 0320    Specimen:  Blood from Arm, Right Updated:  10/04/19 0350     Glucose 267 mg/dL      BUN 15 mg/dL      Creatinine 1.04 mg/dL      Sodium 141 mmol/L      Potassium 4.3 mmol/L      Chloride 102 mmol/L      CO2 26.1 mmol/L      Calcium 9.4 mg/dL      Total Protein 7.8 g/dL      Albumin 4.37 g/dL      ALT (SGPT) 25 U/L      AST (SGOT) 23 U/L      Alkaline Phosphatase 85 U/L      Total Bilirubin 0.4 mg/dL      eGFR Non African Amer 72 mL/min/1.73      Globulin 3.4 gm/dL      A/G Ratio 1.3 g/dL      BUN/Creatinine Ratio 14.4     Anion Gap 12.9 mmol/L     Narrative:       GFR Normal >60  Chronic Kidney Disease <60  Kidney Failure <15    C-reactive Protein [[id number]]  (Normal) Collected:  10/04/19 0320    Specimen:  Blood from Arm, Right Updated:  10/04/19 0350     C-Reactive Protein 0.14 mg/dL     Troponin [[id number]]  (Normal) Collected:  10/04/19 0320    Specimen:  Blood from Arm, Right Updated:  10/04/19 0350     Troponin T 0.012 ng/mL     Narrative:       Troponin T Reference Range:  <= 0.03 ng/mL-   Negative for AMI  >0.03 ng/mL-     Abnormal for myocardial necrosis.  Clinicians would have to utilize clinical acumen, EKG, Troponin and serial changes to determine if it is an Acute Myocardial Infarction or myocardial injury due to an underlying chronic condition.     BNP [[id number]]  (Abnormal) Collected:  10/04/19 0320    Specimen:  Blood from Arm, Right Updated:  10/04/19 0349     proBNP 2,527.0 pg/mL     Narrative:       Among patients with dyspnea, NT-proBNP is highly sensitive for the detection of acute congestive heart failure. In addition NT-proBNP of <300 pg/ml effectively rules out acute congestive  heart failure with 99% negative predictive value.    Lactic Acid, Plasma [[id number]]  (Normal) Collected:  10/04/19 0320    Specimen:  Blood from Arm, Right Updated:  10/04/19 0348     Lactate 1.6 mmol/L     Blood Gas, Arterial With Co-Ox [[id number]]  (Abnormal) Collected:  10/04/19 0335    Specimen:  Arterial Blood Updated:  10/04/19 0335     Site Left Radial     Jayjay's Test Positive     pH, Arterial 7.316 pH units      Comment: 84 Value below reference range        pCO2, Arterial 57.7 mm Hg      Comment: 83 Value above reference range        pO2, Arterial 37.0 mm Hg      Comment: 85 Value below critical limit        HCO3, Arterial 29.4 mmol/L      Base Excess, Arterial 2.0 mmol/L      O2 Saturation, Arterial 64.9 %      Comment: 84 Value below reference range        Hemoglobin, Blood Gas 12.6 g/dL      Comment: 84 Value below reference range        Hematocrit, Blood Gas 38.5 %      Oxyhemoglobin 63.9 %      Comment: 84 Value below reference range        Methemoglobin 0.10 %      Carboxyhemoglobin 1.4 %      A-a Gradiant 41.3 mmHg      CO2 Content 31.2 mmol/L      Temperature 37.0 C      Barometric Pressure for Blood Gas 730 mmHg      Modality Room Air     FIO2 21 %      Ventilator Mode NA     Note Read back and acknowledge     Notified Who TRAVIS MEDRANO     Notified By [id number]     Notified Time 10/04/2019 03:36     Collected by [id number]     Comment: Meter: [id number]     :  [id number]        pH, Temp Corrected 7.316 pH Units      pCO2, Temperature Corrected 57.7 mm Hg      pO2, Temperature Corrected 37.0 mm Hg     CBC & Differential [[id number]] Collected:  10/04/19 0320    Specimen:  Blood Updated:  10/04/19 0330    Narrative:       The following orders were created for panel order CBC & Differential.  Procedure                               Abnormality         Status                     ---------                               -----------         ------                     CBC Auto Differential[[id number]]         Abnormal            Final result                 Please view results for these tests on the individual orders.    CBC Auto Differential [[id number]]  (Abnormal) Collected:  10/04/19 0320    Specimen:  Blood from Arm, Right Updated:  10/04/19 0330     WBC 12.88 10*3/mm3      RBC 4.75 10*6/mm3      Hemoglobin 12.5 g/dL      Hematocrit 42.7 %      MCV 89.9 fL      MCH 26.3 pg      MCHC 29.3 g/dL      RDW 16.6 %      RDW-SD 53.5 fl      MPV 9.7 fL      Platelets 369 10*3/mm3      Neutrophil % 71.7 %      Lymphocyte % 14.1 %      Monocyte % 7.4 %      Eosinophil % 6.1 %      Basophil % 0.5 %      Immature Grans % 0.2 %      Neutrophils, Absolute 9.23 10*3/mm3      Lymphocytes, Absolute 1.82 10*3/mm3      Monocytes, Absolute 0.95 10*3/mm3      Eosinophils, Absolute 0.79 10*3/mm3      Basophils, Absolute 0.06 10*3/mm3      Immature Grans, Absolute 0.03 10*3/mm3     Blood Culture - Blood, Arm, Right [[id number]] Collected:  10/04/19 0320    Specimen:  Blood from Arm, Right Updated:  10/04/19 0325          I have personally reviewed and interpreted available lab data, radiology studies and ECG obtained at time of admission.     Assessment / Plan     Assessment/Problem List:     Acute respiratory failure with hypoxemia (CMS/HCC)  Symptomatic sinus Bradycardia   Hypotension   Hyponatremia   NICKY   CAD, S/P PCI   Dyslipidemia   Paroxysmal Atrial fibrillation   Anticoagulated with Eliquis   DM2    HTN   Dyslipidemia   RIO   Morbid obesity  Status post fall 11/2018      Plan:    1)  Acute respiratory failure with hypoxemia:  Currently on BIPAP in the ER, adjusting settings to maximize oxygenation.  IPAP of 20  EPAP of 8  FiO2 50% then 40% rate 22-24  Presenting  ABG was 7.316/  58 /  37 / 29 / +2 at 0335  Second ABG at 0430 was 7.334/  54/  168/  29  +1.8  Third ABG at 0621 was 7.317/  57/  104/  29  +1.8  Stabilizing in ER then to CCU.  Get another ABG in two hours to check pCO2 level.  Consult pulmonology.      2)  UDS  positive for amphetamines and opiates:  Opiates are prescribed.  I don't see any Adderal or diet pills on his home medication list.      3) DM2: hyperglycemic in ER at 267:   Sliding scale insulin and await pharmacy reconciliation of home meds:  Get A1c  Get accuchecks achs.    4)  CAD: history of PCI, pacemaker.  Dr Ribeiro was cardiologist last admission here.  Continue home meds once available.\    5)  HTN: again when home meds available continue those meds.  Patient is currently in respiratory distress may use PRN hydralazine until more awake and oriented.     6)  Dysplipidemia:  Continue statin.  Get lipid panel in the morning.      7)  Paroxysmal atrial fibrillation:  OAC with eliquis.    8)  Leukocytosis:  Doxycycline and Rocephin IV continue from original ER orders.      9)  Flash pulmonary edema possibly 2/2 meth use causing respiratory failure:  Pulmonology recommends diuresis with IV lasix

## 2019-10-04 NOTE — PLAN OF CARE
Problem: Skin Injury Risk (Adult)  Goal: Identify Related Risk Factors and Signs and Symptoms  Outcome: Ongoing (interventions implemented as appropriate)      Problem: Fall Risk (Adult)  Goal: Identify Related Risk Factors and Signs and Symptoms  Outcome: Ongoing (interventions implemented as appropriate)      Problem: NPPV/CPAP (Adult)  Goal: Signs and Symptoms of Listed Potential Problems Will be Absent, Minimized or Managed (NPPV/CPAP)  Outcome: Ongoing (interventions implemented as appropriate)      Problem: Infection, Risk/Actual (Adult)  Goal: Identify Related Risk Factors and Signs and Symptoms  Outcome: Ongoing (interventions implemented as appropriate)      Problem: Pain, Chronic (Adult)  Goal: Identify Related Risk Factors and Signs and Symptoms  Outcome: Ongoing (interventions implemented as appropriate)

## 2019-10-04 NOTE — CONSULTS
Patient Care Team:  Provider, No Known as PCP - General    Chief complaint:  Shortness of breath    Subjective     History of Present Illness     Patient is a 63-year-old male with past medical history of diabetes, GERD, allergic rhinitis, hypertension, COPD, urinary artery disease, paroxysmal atrial fibrillation with Eliquis use, dyslipidemia.  He presented to the ED on 10/4/2019 due to shortness of breath progressively worsened over the past 2 to 3 days.   He reported that he wears 2 L/min supplemental oxygen at home as needed for history of COPD.  He also admitted to productive cough, chest tightness, wheezing.  ABG obtained on room air revealed significant hypoxia and hypercarbia with respiratory acidosis.  pH noted at 7.316, PCO2 57.7, PO2 37.0, HCO3 29.4.  He was started on BiPAP therapy which significantly improved hypoxia.  However he remained acidotic related to hypercarbia.  CMP only revealed hyperglycemia.  proBNP was elevated at 2527.  Initial troponin was minimally elevated, with secondary value nonelevated.  Lactate noted at 1.6.  CRP was nonelevated at 0.04.  CBC showed leukocytosis 12.8 with mild anemia.  Urinalysis showed trace bacteria.  Blood cultures were obtained.  Toxicology screen was positive for amphetamine and opiates.  Chest x-ray was suggestive of bilateral congestion and infiltrates.  CT with PE protocol was obtained.  This showed no PE.  Parenchymal nodules are noted of the lungs bilaterally and measured greater than 1 cm.  Findings of CHF also noted with diffuse groundglass appearance with mixed consolidation.  He was admitted on continuous BiPAP therapy for chronic hypoxic and hypercarbic respiratory failure, sepsis.  He was also later noted for significant hypertension.   Upon assessment this morning while on continuous BiPAP, patient remained drowsy but attempted to answer questions with nodding.  He nodded yes to known history of hypertension and yesterday missing doses of his  medication recently.  He noted no to known history of COPD as well as home BiPAP use.  Nursing staff reported that he admitted to home usage of opiates.  Otherwise, history is limited as patient remains drowsy and no family members are bedside.  Home medication list was positive for albuterol.       Review of Systems     Unable to obtain review of systems due to altered mental status and continuous BiPAP use.      Objective     Vital Signs  Temp:  [98.4 °F (36.9 °C)-98.7 °F (37.1 °C)] 98.4 °F (36.9 °C)  Heart Rate:  [] 98  Resp:  [18-33] 19  BP: (139-234)/() 152/101  Body mass index is 36.09 kg/m².    Intake/Output Summary (Last 24 hours) at 10/4/2019 1335  Last data filed at 10/4/2019 1242  Gross per 24 hour   Intake 1400 ml   Output 700 ml   Net 700 ml     I/O this shift:  In: 600 [IV Piggyback:600]  Out: 700 [Urine:700]      Physical Exam   Constitutional: He appears well-developed and well-nourished.   Moderate respiratory distress requiring continuous BiPAP.   HENT:   Head: Normocephalic and atraumatic.   Eyes: Pupils are equal, round, and reactive to light. No scleral icterus.   Neck: Normal range of motion. Neck supple. No thyromegaly present.   Cardiovascular: Normal rate, regular rhythm, normal heart sounds and intact distal pulses.   Pulmonary/Chest: He is in respiratory distress (requiring continuous BiPAP).   Bilateral air entry positive with bilateral crackles.  No wheezing or rhonchi.   Abdominal: Soft. Bowel sounds are normal. He exhibits no distension.   Musculoskeletal: He exhibits edema (1+ lower extremity edema bilaterally.). He exhibits no deformity.   Neurological:   Drowsy appearance.  Attempts to answer questions with nodding.   Skin: Skin is warm and dry. Capillary refill takes less than 2 seconds.   Psychiatric:   Unable to assess due to continued BiPAP requirements.  Patient appears calm and is cooperative.  Drowsy appearance.       Results Review:    I reviewed the patient's  new clinical results.     I have reviewed the ABG from initial admission.  I reviewed the ABG while on BiPAP.  Most recent ABG notable for respiratory acidosis with metabolic compensation of 7.317, 56.9, 104, 29.1 while on settings of rate 24, 20/8, 40%.     I have reviewed the CMP from today.  This showed hyperglycemia.  No electrolyte studies.  Creatinine is at 1.04.  Appears to be at baseline as previously noted 1 year ago at 1.07.  BUN is nonelevated.  EGFR also appears to be at baseline at 72 today.    I have reviewed the CBC from today.  This showed leukocytosis of 12.8.  Mild anemia noted with a platelet abnormality.    Urinalysis from 10/4 showed 1+ glucose, 2+ protein, trace bacteria.   Blood culture from 10/4 pending.  UDS on 10/4 showed positive for amphetamine and opiates.    I reviewed the chest x-ray from 10/4.  This was notable for bilateral diffuse infiltrates concerning for edema and pneumonia.    I reviewed the chest CT completed on 10/4/2019.  This was notable for no pulmonary embolism.  It was significant for diffuse groundglass and with mixed consolidative appearance.  Imaging was also notable for bilateral peripheral pulmonary nodules.  Parenchymal nodule of the left lung measuring approximately 1.66 cm.  Parenchymal nodule of the right lung measured approximately 1.4 cm.  No mediastinal lymphadenopathy noted.    EKG on 10/4 showed normal sinus rhythm with QTc interval of 457 ms.    I reviewed the echocardiogram from  11/10/2018.  This was notable for 61 to 65%.  Aortic valve not well visualized.  Sclerosis was noted.  No regurgitation or stenosis.  Mitral valve not well visualized.  No regurgitation or stenosis.  Tricuspid valve not well visualized.  Pulmonic valve was not well visualized.  No significant stenosis or regurgitation.  No pericardial effusion.    WBC WBC   Date Value Ref Range Status   10/04/2019 12.88 (H) 3.40 - 10.80 10*3/mm3 Final      HGB Hemoglobin   Date Value Ref Range  Status   10/04/2019 12.5 (L) 13.0 - 17.7 g/dL Final      HCT Hematocrit   Date Value Ref Range Status   10/04/2019 42.7 37.5 - 51.0 % Final      Platlets No results found for: LABPLAT     PT/INR:    No results found for: PROTIME/No results found for: INR    Sodium Sodium   Date Value Ref Range Status   10/04/2019 141 136 - 145 mmol/L Final      Potassium Potassium   Date Value Ref Range Status   10/04/2019 4.3 3.5 - 5.2 mmol/L Final      Chloride Chloride   Date Value Ref Range Status   10/04/2019 102 98 - 107 mmol/L Final      Bicarbonate No results found for: PLASMABICARB   BUN BUN   Date Value Ref Range Status   10/04/2019 15 8 - 23 mg/dL Final      Creatinine Creatinine   Date Value Ref Range Status   10/04/2019 1.04 0.76 - 1.27 mg/dL Final      Calcium Calcium   Date Value Ref Range Status   10/04/2019 9.4 8.6 - 10.5 mg/dL Final      Magnesium No results found for: MG     pH pH, Arterial   Date Value Ref Range Status   10/04/2019 7.358 7.350 - 7.450 pH units Final   10/04/2019 7.317 (L) 7.350 - 7.450 pH units Final     Comment:     84 Value below reference range   10/04/2019 7.334 (L) 7.350 - 7.450 pH units Final     Comment:     84 Value below reference range   10/04/2019 7.316 (L) 7.350 - 7.450 pH units Final     Comment:     84 Value below reference range      pO2 pO2, Arterial   Date Value Ref Range Status   10/04/2019 100.0 83.0 - 108.0 mm Hg Final     Comment:     83 Value above reference range   10/04/2019 104.0 83.0 - 108.0 mm Hg Final     Comment:     83 Value above reference range   10/04/2019 168.0 (H) 83.0 - 108.0 mm Hg Final     Comment:     83 Value above reference range   10/04/2019 37.0 (C) 83.0 - 108.0 mm Hg Final     Comment:     85 Value below critical limit      pCO2 pCO2, Arterial   Date Value Ref Range Status   10/04/2019 52.1 (H) 35.0 - 45.0 mm Hg Final     Comment:     83 Value above reference range   10/04/2019 56.9 (H) 35.0 - 45.0 mm Hg Final     Comment:     83 Value above reference  range   10/04/2019 53.8 (H) 35.0 - 45.0 mm Hg Final     Comment:     83 Value above reference range   10/04/2019 57.7 (H) 35.0 - 45.0 mm Hg Final     Comment:     83 Value above reference range      HCO3 HCO3, Arterial   Date Value Ref Range Status   10/04/2019 29.3 (H) 20.0 - 26.0 mmol/L Final     Comment:     83 Value above reference range   10/04/2019 29.1 (H) 20.0 - 26.0 mmol/L Final   10/04/2019 28.6 (H) 20.0 - 26.0 mmol/L Final   10/04/2019 29.4 (H) 20.0 - 26.0 mmol/L Final      Medications: I have reviewed the home and current medications list.     Scheduled Meds:  [START ON 10/5/2019] ceftriaxone 2 g Intravenous Q24H   doxycycline 100 mg Intravenous Q12H   enoxaparin 40 mg Subcutaneous Q24H   insulin aspart 0-9 Units Subcutaneous 4x Daily AC & at Bedtime   lactobacillus acidophilus 1 capsule Oral Daily   sodium chloride 10 mL Intravenous Q12H     Continuous Infusions:   PRN Meds:.•  acetaminophen **OR** acetaminophen **OR** acetaminophen  •  dextrose  •  dextrose  •  glucagon (human recombinant)  •  ondansetron  •  [COMPLETED] Insert peripheral IV **AND** sodium chloride  •  sodium chloride    Assessment/Plan     Patient Active Problem List   Diagnosis Code   • Symptomatic bradycardia R00.1   • Acute respiratory failure with hypoxemia (CMS/[facility]) J96.01         Assessment & Plan      Acute on chronic hypoxic and hypercarbic respiratory failure requiring continuous BiPAP  Likely related to flash pulmonary edema following hypertensive emergency  -I have reviewed the chest x-ray and CT imaging.  Chest x-ray was suggestive of bilateral patchy infiltrates.  CT imaging was suggestive of diffuse groundglass appearance with mixed consolidation.  Also noted for pulmonary nodules.  -Following for echocardiogram results  -I have discontinued the NaCl infusion  -Ordered for diuresis with Lasix 40 mg IV once  -Ordered potassium checks every 8 hours for 3 occurrences while on diuretics  -Ordered lactate level  -Recommend  using pressure medications only if systolic rises above 160 mmHg to avoid significant reduction in decreased cerebral perfusion in the setting of hypertensive emergency  - Change BiPAP settings to rate 18.  Continue with 24/8 with FiO2 35%.  - Ordered ABG for assessment changes in 30 minutes      Bilateral pneumonia, due to unspecified organism  -On IV ceftriaxone  -On IV doxycycline  -Following blood culture results      Altered mental status:  -Ordered CT of the head  -He was given Narcan and concern for opioid use.  No significant improvement noted  -Recommend avoiding sedatives, opioids      Bilateral pulmonary nodules:  - CT imaging was reviewed.  Notable for parenchymal nodule of the left lung on image 176 measuring 1.68 cm.  Right-sided parenchymal nodule also noted, measuring 1.4 cm on image 414.  -Recommend following up in the outpatient setting for repeat CT imaging in 6 months          Clinical rounding and physical assessment were completed in the presence of Pita ROBERTSON.       Gabrielle Chan PA-C  10/04/19  1:35 PM    Scribed for Dr. Diane by Gabrielle Chan PA-C    The clinical plan was discussed with .  The clinical plan was discussed extensively with the nurse, and respiratory therapist.   Critical Care time spent in direct patient care: 45 minutes (excluding procedure time).  Patient is critically ill and is at higher risk for further mortality/morbidity.     ICapo M.D. attest that the above note accurately reflects the work and decisions made by me. Patient was seen and evaluated by me, including history of present illness, physical exam, assessment, and treatment plan.  The above note was reviewed and edited by me.    Capo Diane M.D  Pulmonary and Critical Care Medicine

## 2019-10-04 NOTE — ED NOTES
Pt drowsy, awakens to verbal stimuli, skin pwd, pt remains on bipap at 35% oxygen, rr 24. 20 gauge in left ac remains patent with ns infusing at 125ml/hr and continued to floor. 18 gauge in right ac remains patent with no s/s of infiltration and continued to floor. Ns noted. Pt to cc on zoles monitoring, bipap, with rn, resp tech, and er tech on stretcher.      Avril Dowell RN  10/04/19 2766

## 2019-10-04 NOTE — PLAN OF CARE
Problem: Patient Care Overview  Goal: Plan of Care Review   10/04/19 1400   Coping/Psychosocial   Plan of Care Reviewed With patient     Goal: Individualization and Mutuality  Outcome: Ongoing (interventions implemented as appropriate)    Goal: Discharge Needs Assessment  Outcome: Ongoing (interventions implemented as appropriate)    Goal: Interprofessional Rounds/Family Conf  Outcome: Ongoing (interventions implemented as appropriate)   10/04/19 1601   Interdisciplinary Rounds/Family Conf   Participants dietitian/nutrition services;;occupational therapy;respiratory therapy       Problem: Skin Injury Risk (Adult)  Goal: Identify Related Risk Factors and Signs and Symptoms  Outcome: Ongoing (interventions implemented as appropriate)    Goal: Skin Health and Integrity  Outcome: Ongoing (interventions implemented as appropriate)   10/04/19 1601   Skin Injury Risk (Adult)   Skin Health and Integrity making progress toward outcome       Problem: Fall Risk (Adult)  Goal: Identify Related Risk Factors and Signs and Symptoms  Outcome: Ongoing (interventions implemented as appropriate)   10/04/19 1601   Fall Risk (Adult)   Related Risk Factors (Fall Risk) age-related changes;homeostatic imbalance     Goal: Absence of Fall  Outcome: Ongoing (interventions implemented as appropriate)   10/04/19 1601   Fall Risk (Adult)   Absence of Fall making progress toward outcome       Problem: NPPV/CPAP (Adult)  Goal: Signs and Symptoms of Listed Potential Problems Will be Absent, Minimized or Managed (NPPV/CPAP)  Outcome: Ongoing (interventions implemented as appropriate)      Problem: Infection, Risk/Actual (Adult)  Goal: Identify Related Risk Factors and Signs and Symptoms  Outcome: Ongoing (interventions implemented as appropriate)    Goal: Infection Prevention/Resolution  Outcome: Ongoing (interventions implemented as appropriate)

## 2019-10-05 LAB
ANION GAP SERPL CALCULATED.3IONS-SCNC: 11.9 MMOL/L (ref 5–15)
BASOPHILS # BLD AUTO: 0.01 10*3/MM3 (ref 0–0.2)
BASOPHILS NFR BLD AUTO: 0.1 % (ref 0–1.5)
BH CV ECHO MEAS - ACS: 1.7 CM
BH CV ECHO MEAS - AO MAX PG: 7.1 MMHG
BH CV ECHO MEAS - AO MEAN PG: 4 MMHG
BH CV ECHO MEAS - AO ROOT AREA (BSA CORRECTED): 1.5
BH CV ECHO MEAS - AO ROOT AREA: 10.9 CM^2
BH CV ECHO MEAS - AO ROOT DIAM: 3.7 CM
BH CV ECHO MEAS - AO V2 MAX: 133 CM/SEC
BH CV ECHO MEAS - AO V2 MEAN: 93.4 CM/SEC
BH CV ECHO MEAS - AO V2 VTI: 21 CM
BH CV ECHO MEAS - BSA(HAYCOCK): 2.6 M^2
BH CV ECHO MEAS - BSA: 2.4 M^2
BH CV ECHO MEAS - BZI_BMI: 37 KILOGRAMS/M^2
BH CV ECHO MEAS - BZI_METRIC_HEIGHT: 182.9 CM
BH CV ECHO MEAS - BZI_METRIC_WEIGHT: 123.8 KG
BH CV ECHO MEAS - EDV(CUBED): 117.5 ML
BH CV ECHO MEAS - EDV(MOD-SP4): 61 ML
BH CV ECHO MEAS - EDV(TEICH): 112.7 ML
BH CV ECHO MEAS - EF(CUBED): 13.4 %
BH CV ECHO MEAS - EF(MOD-SP4): 55.7 %
BH CV ECHO MEAS - EF(TEICH): 10.6 %
BH CV ECHO MEAS - ESV(CUBED): 101.7 ML
BH CV ECHO MEAS - ESV(MOD-SP4): 27 ML
BH CV ECHO MEAS - ESV(TEICH): 100.7 ML
BH CV ECHO MEAS - FS: 4.7 %
BH CV ECHO MEAS - IVS/LVPW: 1.3
BH CV ECHO MEAS - IVSD: 2.1 CM
BH CV ECHO MEAS - LA DIMENSION: 4.2 CM
BH CV ECHO MEAS - LA/AO: 1.1
BH CV ECHO MEAS - LV DIASTOLIC VOL/BSA (35-75): 25.1 ML/M^2
BH CV ECHO MEAS - LV MASS(C)D: 437 GRAMS
BH CV ECHO MEAS - LV MASS(C)DI: 179.7 GRAMS/M^2
BH CV ECHO MEAS - LV SYSTOLIC VOL/BSA (12-30): 11.1 ML/M^2
BH CV ECHO MEAS - LVIDD: 4.9 CM
BH CV ECHO MEAS - LVIDS: 4.7 CM
BH CV ECHO MEAS - LVLD AP4: 8.6 CM
BH CV ECHO MEAS - LVLS AP4: 8.1 CM
BH CV ECHO MEAS - LVOT AREA (M): 3.8 CM^2
BH CV ECHO MEAS - LVOT AREA: 3.7 CM^2
BH CV ECHO MEAS - LVOT DIAM: 2.2 CM
BH CV ECHO MEAS - LVPWD: 1.6 CM
BH CV ECHO MEAS - MV A MAX VEL: 78 CM/SEC
BH CV ECHO MEAS - MV E MAX VEL: 82.4 CM/SEC
BH CV ECHO MEAS - MV E/A: 1.1
BH CV ECHO MEAS - PA ACC SLOPE: 1356 CM/SEC^2
BH CV ECHO MEAS - PA ACC TIME: 0.1 SEC
BH CV ECHO MEAS - PA PR(ACCEL): 36.2 MMHG
BH CV ECHO MEAS - RAP SYSTOLE: 10 MMHG
BH CV ECHO MEAS - RVSP: 18.6 MMHG
BH CV ECHO MEAS - SI(AO): 94.6 ML/M^2
BH CV ECHO MEAS - SI(CUBED): 6.5 ML/M^2
BH CV ECHO MEAS - SI(MOD-SP4): 14 ML/M^2
BH CV ECHO MEAS - SI(TEICH): 4.9 ML/M^2
BH CV ECHO MEAS - SV(AO): 230.1 ML
BH CV ECHO MEAS - SV(CUBED): 15.8 ML
BH CV ECHO MEAS - SV(MOD-SP4): 34 ML
BH CV ECHO MEAS - SV(TEICH): 12 ML
BH CV ECHO MEAS - TR MAX VEL: 146.6 CM/SEC
BUN BLD-MCNC: 20 MG/DL (ref 8–23)
BUN/CREAT SERPL: 22.7 (ref 7–25)
CALCIUM SPEC-SCNC: 9.1 MG/DL (ref 8.6–10.5)
CHLORIDE SERPL-SCNC: 103 MMOL/L (ref 98–107)
CO2 SERPL-SCNC: 29.1 MMOL/L (ref 22–29)
CREAT BLD-MCNC: 0.88 MG/DL (ref 0.76–1.27)
D-LACTATE SERPL-SCNC: 1.2 MMOL/L (ref 0.5–2)
D-LACTATE SERPL-SCNC: 1.4 MMOL/L (ref 0.5–2)
D-LACTATE SERPL-SCNC: 2.3 MMOL/L (ref 0.5–2)
DEPRECATED RDW RBC AUTO: 53.3 FL (ref 37–54)
EOSINOPHIL # BLD AUTO: 0 10*3/MM3 (ref 0–0.4)
EOSINOPHIL NFR BLD AUTO: 0 % (ref 0.3–6.2)
ERYTHROCYTE [DISTWIDTH] IN BLOOD BY AUTOMATED COUNT: 16.6 % (ref 12.3–15.4)
GFR SERPL CREATININE-BSD FRML MDRD: 87 ML/MIN/1.73
GLUCOSE BLD-MCNC: 174 MG/DL (ref 65–99)
GLUCOSE BLDC GLUCOMTR-MCNC: 174 MG/DL (ref 70–130)
GLUCOSE BLDC GLUCOMTR-MCNC: 231 MG/DL (ref 70–130)
GLUCOSE BLDC GLUCOMTR-MCNC: 232 MG/DL (ref 70–130)
GLUCOSE BLDC GLUCOMTR-MCNC: 304 MG/DL (ref 70–130)
HCT VFR BLD AUTO: 36.3 % (ref 37.5–51)
HGB BLD-MCNC: 10.7 G/DL (ref 13–17.7)
IMM GRANULOCYTES # BLD AUTO: 0.02 10*3/MM3 (ref 0–0.05)
IMM GRANULOCYTES NFR BLD AUTO: 0.2 % (ref 0–0.5)
LYMPHOCYTES # BLD AUTO: 1.13 10*3/MM3 (ref 0.7–3.1)
LYMPHOCYTES NFR BLD AUTO: 13.4 % (ref 19.6–45.3)
MAXIMAL PREDICTED HEART RATE: 156 BPM
MCH RBC QN AUTO: 26 PG (ref 26.6–33)
MCHC RBC AUTO-ENTMCNC: 29.5 G/DL (ref 31.5–35.7)
MCV RBC AUTO: 88.1 FL (ref 79–97)
MONOCYTES # BLD AUTO: 1.13 10*3/MM3 (ref 0.1–0.9)
MONOCYTES NFR BLD AUTO: 13.4 % (ref 5–12)
NEUTROPHILS # BLD AUTO: 6.15 10*3/MM3 (ref 1.7–7)
NEUTROPHILS NFR BLD AUTO: 72.9 % (ref 42.7–76)
PLATELET # BLD AUTO: 347 10*3/MM3 (ref 140–450)
PMV BLD AUTO: 10.1 FL (ref 6–12)
POTASSIUM BLD-SCNC: 4 MMOL/L (ref 3.5–5.2)
RBC # BLD AUTO: 4.12 10*6/MM3 (ref 4.14–5.8)
SODIUM BLD-SCNC: 144 MMOL/L (ref 136–145)
STRESS TARGET HR: 133 BPM
WBC NRBC COR # BLD: 8.44 10*3/MM3 (ref 3.4–10.8)

## 2019-10-05 PROCEDURE — 94799 UNLISTED PULMONARY SVC/PX: CPT

## 2019-10-05 PROCEDURE — 25010000002 CEFTRIAXONE: Performed by: FAMILY MEDICINE

## 2019-10-05 PROCEDURE — 83605 ASSAY OF LACTIC ACID: CPT | Performed by: INTERNAL MEDICINE

## 2019-10-05 PROCEDURE — 63710000001 INSULIN ASPART PER 5 UNITS: Performed by: HOSPITALIST

## 2019-10-05 PROCEDURE — 84132 ASSAY OF SERUM POTASSIUM: CPT | Performed by: INTERNAL MEDICINE

## 2019-10-05 PROCEDURE — 85025 COMPLETE CBC W/AUTO DIFF WBC: CPT | Performed by: FAMILY MEDICINE

## 2019-10-05 PROCEDURE — 80048 BASIC METABOLIC PNL TOTAL CA: CPT | Performed by: FAMILY MEDICINE

## 2019-10-05 PROCEDURE — 99232 SBSQ HOSP IP/OBS MODERATE 35: CPT | Performed by: FAMILY MEDICINE

## 2019-10-05 PROCEDURE — 82962 GLUCOSE BLOOD TEST: CPT

## 2019-10-05 RX ADMIN — Medication 1 CAPSULE: at 08:17

## 2019-10-05 RX ADMIN — APIXABAN 5 MG: 5 TABLET, FILM COATED ORAL at 20:20

## 2019-10-05 RX ADMIN — SODIUM CHLORIDE, PRESERVATIVE FREE 10 ML: 5 INJECTION INTRAVENOUS at 08:17

## 2019-10-05 RX ADMIN — DOXYCYCLINE 100 MG: 100 INJECTION, POWDER, LYOPHILIZED, FOR SOLUTION INTRAVENOUS at 17:12

## 2019-10-05 RX ADMIN — BUDESONIDE AND FORMOTEROL FUMARATE DIHYDRATE 2 PUFF: 160; 4.5 AEROSOL RESPIRATORY (INHALATION) at 07:20

## 2019-10-05 RX ADMIN — APIXABAN 5 MG: 5 TABLET, FILM COATED ORAL at 08:17

## 2019-10-05 RX ADMIN — CETIRIZINE HYDROCHLORIDE 10 MG: 10 TABLET, FILM COATED ORAL at 08:17

## 2019-10-05 RX ADMIN — INSULIN ASPART 10 UNITS: 100 INJECTION, SOLUTION INTRAVENOUS; SUBCUTANEOUS at 12:27

## 2019-10-05 RX ADMIN — INSULIN ASPART 3 UNITS: 100 INJECTION, SOLUTION INTRAVENOUS; SUBCUTANEOUS at 08:18

## 2019-10-05 RX ADMIN — METOPROLOL TARTRATE 50 MG: 50 TABLET, FILM COATED ORAL at 20:20

## 2019-10-05 RX ADMIN — METOPROLOL TARTRATE 50 MG: 50 TABLET, FILM COATED ORAL at 08:17

## 2019-10-05 RX ADMIN — BUDESONIDE AND FORMOTEROL FUMARATE DIHYDRATE 2 PUFF: 160; 4.5 AEROSOL RESPIRATORY (INHALATION) at 19:13

## 2019-10-05 RX ADMIN — ACETAMINOPHEN 650 MG: 325 TABLET ORAL at 14:04

## 2019-10-05 RX ADMIN — TAMSULOSIN HYDROCHLORIDE 0.4 MG: 0.4 CAPSULE ORAL at 20:20

## 2019-10-05 RX ADMIN — PANTOPRAZOLE SODIUM 40 MG: 40 TABLET, DELAYED RELEASE ORAL at 05:47

## 2019-10-05 RX ADMIN — INSULIN ASPART 5 UNITS: 100 INJECTION, SOLUTION INTRAVENOUS; SUBCUTANEOUS at 20:20

## 2019-10-05 RX ADMIN — SODIUM CHLORIDE, PRESERVATIVE FREE 10 ML: 5 INJECTION INTRAVENOUS at 08:18

## 2019-10-05 RX ADMIN — ATORVASTATIN CALCIUM 20 MG: 20 TABLET, FILM COATED ORAL at 08:17

## 2019-10-05 RX ADMIN — ACETAMINOPHEN 650 MG: 325 TABLET ORAL at 20:20

## 2019-10-05 RX ADMIN — INSULIN ASPART 5 UNITS: 100 INJECTION, SOLUTION INTRAVENOUS; SUBCUTANEOUS at 17:12

## 2019-10-05 RX ADMIN — DOXYCYCLINE 100 MG: 100 INJECTION, POWDER, LYOPHILIZED, FOR SOLUTION INTRAVENOUS at 05:43

## 2019-10-05 RX ADMIN — CEFTRIAXONE 2 G: 2 INJECTION, POWDER, FOR SOLUTION INTRAMUSCULAR; INTRAVENOUS at 05:43

## 2019-10-05 RX ADMIN — IPRATROPIUM BROMIDE AND ALBUTEROL SULFATE 3 ML: .5; 3 SOLUTION RESPIRATORY (INHALATION) at 19:13

## 2019-10-05 RX ADMIN — LISINOPRIL 10 MG: 10 TABLET ORAL at 08:17

## 2019-10-05 RX ADMIN — AMLODIPINE BESYLATE 10 MG: 10 TABLET ORAL at 08:17

## 2019-10-05 RX ADMIN — FLUTICASONE PROPIONATE 2 SPRAY: 50 SPRAY, METERED NASAL at 08:20

## 2019-10-05 RX ADMIN — IPRATROPIUM BROMIDE AND ALBUTEROL SULFATE 3 ML: .5; 3 SOLUTION RESPIRATORY (INHALATION) at 07:20

## 2019-10-05 NOTE — PROGRESS NOTES
Discharge Planning Assessment   [place]     Patient Name: Иван Miller  MRN: [id number]  Today's Date: 10/5/2019    Admit Date: 10/4/2019    Discharge Needs Assessment     Row Name 10/05/19 1123       Living Environment    Lives With  friend(s) Pt states him and his wife have been seperated so he has jody staying with some friends.     Current Living Arrangements  home/apartment/condo    Primary Care Provided by  self    Provides Primary Care For  no one, unable/limited ability to care for self    Quality of Family Relationships  supportive;helpful    Able to Return to Prior Arrangements  yes       Transition Planning    Patient/Family Anticipates Transition to  home    Transportation Anticipated  family or friend will provide       Discharge Needs Assessment    Equipment Currently Used at Home  cane, straight;oxygen Pt states he wears his home oxygen at 2L when needed and states he no longer has portable tanks. Pt does not know where his DME is from.     Equipment Needed After Discharge  none    Outpatient/Agency/Support Group Needs  -- Pt does not utilize home health services.         Discharge Plan     Row Name 10/05/19 1126       Plan    Plan  Pt lives at home with some friends and states he will return at discharge. Pt has a spouse, but states they are seperated. Pt does not utilize home health services. Pt has a cane, a nebulizer and home oxygen he wears at 2L as needed. Pt does not have portable tanks and states he does not know where his DME comes from. Pt denies any needs at this time. Pt's PCP is Dr. Carson. Pt does not have a POA or advance directive. Pt utilizes [facility] for prescriptions. Pt's family to provide transportation at discharge. SS will follow and assist as needed with discharge planning.     Patient/Family in Agreement with Plan  yes     Demographic Summary     Row Name 10/05/19 1122       General Information    Admission Type  inpatient    Referral Source  nursing    Reason for  Consult  discharge planning    Preferred Language  English     Used During This Interaction  no     Libertad Kent MSW

## 2019-10-05 NOTE — PROGRESS NOTES
[facility] HOSPITALIST    PROGRESS NOTE    Name:  Иван Miller   Age:  63 y.o.  Sex:  male  :  1955  MRN:  [id number]   Visit Number:  [id number]  Admission Date:  10/4/2019  Date Of Service:  10/05/19  Primary Care Physician:  Provider, No Known     LOS: 1 day :  Patient Care Team:  Provider, No Known as PCP - General:    Chief Complaint:      Respiratory failure    Subjective / Interval History:     Patient seen and examined at bedside CCU room 10  No adverse events overnight  Patient is alert and oriented to person place time and situation  Ask for pain medicines this is always concerned about complains of right lower quadrant pain    Review of Systems:     General ROS: Patient denies any fevers, chills or loss of consciousness.  Respiratory ROS: Denies cough or shortness of breath.  Cardiovascular ROS: Denies chest pain or palpitations. No history of exertional chest pain.  Gastrointestinal ROS: Denies nausea and vomiting.  Endorses right lower quadrant abdominal pain. No diarrhea.  Neurological ROS: Denies any focal weakness. No loss of consciousness. Denies any numbness. Denies neck pain.  Dermatological ROS: Denies any redness or pruritis.    Vital Signs:    Temp:  [98.2 °F (36.8 °C)-98.8 °F (37.1 °C)] 98.2 °F (36.8 °C)  Heart Rate:  [] 98  Resp:  [17-25] 18  BP: (114-164)/() 138/91    Intake and output:    I/O last 3 completed shifts:  In: 1700 [IV Piggyback:1700]  Out: 3400 [Urine:3400]  No intake/output data recorded.    Physical Examination:    General Appearance:  Alert and cooperative, not in any acute distress.   Head:  Atraumatic and normocephalic, without obvious abnormality.   Eyes:          PERRLA, conjunctivae and sclerae normal, no Icterus. No pallor. Extra-occular movements are within normal limits.   Neck: Supple, trachea midline, no thyromegaly, no carotid bruit.   Lungs:   Chest shape is normal. Breath sounds heard bilaterally equally.  No crackles or  wheezing. No Pleural rub or bronchial breathing.   Heart:  Normal S1 and S2, no murmur, no gallop, no rub. No JVD   Abdomen:   obese    Extremities: Moves all extremities well, no edema, no cyanosis, no            clubbing.   Skin: No bleeding, bruising or rash.   Neurologic: Awake, alert and oriented times 3. Moves all 4 extremities equally.   Laboratory results:    Results from last 7 days   Lab Units 10/05/19  0745 10/05/19  0026 10/04/19  2337  10/04/19  0320   SODIUM mmol/L  --  144  --   --  141   POTASSIUM mmol/L 4.0 4.0 4.0   < > 4.3   CHLORIDE mmol/L  --  103  --   --  102   CO2 mmol/L  --  29.1*  --   --  26.1   BUN mg/dL  --  20  --   --  15   CREATININE mg/dL  --  0.88  --   --  1.04   CALCIUM mg/dL  --  9.1  --   --  9.4   BILIRUBIN mg/dL  --   --   --   --  0.4   ALK PHOS U/L  --   --   --   --  85   ALT (SGPT) U/L  --   --   --   --  25   AST (SGOT) U/L  --   --   --   --  23   GLUCOSE mg/dL  --  174*  --   --  267*    < > = values in this interval not displayed.     Results from last 7 days   Lab Units 10/05/19  0026 10/04/19  0320   WBC 10*3/mm3 8.44 12.88*   HEMOGLOBIN g/dL 10.7* 12.5*   HEMATOCRIT % 36.3* 42.7   PLATELETS 10*3/mm3 347 369         Results from last 7 days   Lab Units 10/04/19  1532 10/04/19  0938 10/04/19  0320   TROPONIN T ng/mL <0.010 <0.010 0.012     Results from last 7 days   Lab Units 10/04/19  0329 10/04/19  0320   BLOODCX  No growth at 24 hours No growth at 24 hours       I have reviewed the patient's laboratory results.    Radiology results:    Imaging Results (last 24 hours)     ** No results found for the last 24 hours. **          I have reviewed the patient's radiology reports.    Medication Review:     I have reviewed the patients active and prn medications.       Acute respiratory failure with hypoxemia (CMS/HCC)      Assessment:    Plan:    1)  Acute respiratory failure with hypoxemia:  Currently on BIPAP in the ER, adjusting settings to maximize oxygenation.  IPAP  of 20  EPAP of 8  FiO2 50% then 40% rate 22-24  Presenting  ABG was 7.316/  58 /  37 / 29 / +2 at 0335  Second ABG at 0430 was 7.334/  54/  168/  29  +1.8  Third ABG at 0621 was 7.317/  57/  104/  29  +1.8  Stabilizing in ER then to CCU.  Get another ABG in two hours to check pCO2 level.  Consult pulmonology.       2)  UDS positive for amphetamines and opiates:  Patient admits to meth use at home says it is the only option since he could not get any doctors to write pain medicines we discussed this rationale and he agrees that it is not an intelligent choice. Opiates are prescribed.        3) DM2: hyperglycemic in ER at 267:   Sliding scale insulin and await pharmacy reconciliation of home meds:  Get A1c  Get accuchecks achs.     4)  CAD: history of PCI, pacemaker.  Dr Ribeiro was cardiologist last admission here.  Continue home meds once available.\     5)  HTN: again when home meds available continue those meds.  Patient is currently in respiratory distress may use PRN hydralazine until more awake and oriented.      6)  Dysplipidemia:  Continue statin.  Get lipid panel in the morning.       7)  Paroxysmal atrial fibrillation:  OAC with eliquis.     8)  Leukocytosis:  Doxycycline and Rocephin IV continue from original ER orders.       9)  Flash pulmonary edema possibly 2/2 meth use causing respiratory failure:  Pulmonology recommends diuresis with IV lasix       Move out of CCU to telemetry today      Torres Valentino DO  10/05/19  10:07 AM

## 2019-10-05 NOTE — PLAN OF CARE
Problem: Patient Care Overview  Goal: Plan of Care Review  Outcome: Ongoing (interventions implemented as appropriate)    Goal: Individualization and Mutuality  Outcome: Ongoing (interventions implemented as appropriate)    Goal: Discharge Needs Assessment  Outcome: Ongoing (interventions implemented as appropriate)    Goal: Interprofessional Rounds/Family Conf  Outcome: Ongoing (interventions implemented as appropriate)      Problem: Skin Injury Risk (Adult)  Goal: Identify Related Risk Factors and Signs and Symptoms  Outcome: Ongoing (interventions implemented as appropriate)    Goal: Skin Health and Integrity  Outcome: Ongoing (interventions implemented as appropriate)      Problem: Fall Risk (Adult)  Goal: Identify Related Risk Factors and Signs and Symptoms  Outcome: Ongoing (interventions implemented as appropriate)    Goal: Absence of Fall  Outcome: Ongoing (interventions implemented as appropriate)      Problem: NPPV/CPAP (Adult)  Goal: Signs and Symptoms of Listed Potential Problems Will be Absent, Minimized or Managed (NPPV/CPAP)  Outcome: Ongoing (interventions implemented as appropriate)      Problem: Infection, Risk/Actual (Adult)  Goal: Identify Related Risk Factors and Signs and Symptoms  Outcome: Ongoing (interventions implemented as appropriate)    Goal: Infection Prevention/Resolution  Outcome: Ongoing (interventions implemented as appropriate)      Problem: Pain, Chronic (Adult)  Goal: Identify Related Risk Factors and Signs and Symptoms  Outcome: Ongoing (interventions implemented as appropriate)    Goal: Acceptable Pain/Comfort Level and Functional Ability  Outcome: Ongoing (interventions implemented as appropriate)

## 2019-10-05 NOTE — PAYOR COMM NOTE
"[facility]   KATIA VALADEZ  PHONE  [phone]  FAX  [phone]  NPI:  [id number]    ADMISSION NOTIFICATION   ICD:  J96.01    Иван Blanco (63 y.o. Male)     Date of Birth Social Security Number Address Home Phone MRN    1955  [address]   [place] [address] [phone] [id number]    Congregational Marital Status          None        Admission Date Admission Type Admitting Provider Attending Provider Department, Room/Bed    10/4/19 Emergency Torres Valentino, [facility] Vent[facility] DO [facility]    Discharge Date Discharge Disposition Discharge Destination                       Attending Provider:  Torres Valentino DO    Allergies:  Codeine, Ciprofloxacin    Isolation:  None   Infection:  None   Code Status:  CPR    Ht:  185.4 cm (72.99\")   Wt:  124 kg (273 lb)    Admission Cmt:  None   Principal Problem:  None                Active Insurance as of 10/4/2019     Primary Coverage     Payor Plan Insurance Group Employer/Plan Group    MEDICARE MEDICARE A & B      Payor Plan Address Payor Plan Phone Number Payor Plan Fax Number Effective Dates    [address] [phone]  10/1/1992 - None Entered    [facility] [id number]       Subscriber Name Subscriber Birth Date Member ID       ИВАН BLANCO 1955 [id number]           Secondary Coverage     Payor Plan Insurance Group Employer/Plan Group    [employer] [place] [employer] [place]ICA[place]      Payor Plan Address Payor Plan Phone Number Payor Plan Fax Number Effective Dates    [address] [address] [phone]  10/4/2019 - None Entered    [facility] [address]       Subscriber Name Subscriber Birth Date Member ID       ИВАН BLANCO 1955 [id number]                 Emergency Contacts      (Rel.) Home Phone Work Phone Mobile Phone    PaulLamar (Spouse) -- -- [phone]    ERICAGARCIA MOJICA (Daughter) -- -- [phone]              "

## 2019-10-05 NOTE — PHARMACY PATIENT ASSISTANCE
Pharmacy checked on the cost of home med Eliquis for this patient. Per his pharmacy, patient has a $0 copay and med was last filled 9/25.. No issues noted at this time.    Thank you,  Krystal Wu

## 2019-10-06 VITALS
SYSTOLIC BLOOD PRESSURE: 150 MMHG | TEMPERATURE: 98.2 F | WEIGHT: 273.7 LBS | OXYGEN SATURATION: 95 % | DIASTOLIC BLOOD PRESSURE: 86 MMHG | BODY MASS INDEX: 36.27 KG/M2 | RESPIRATION RATE: 22 BRPM | HEART RATE: 76 BPM | HEIGHT: 73 IN

## 2019-10-06 LAB
ANION GAP SERPL CALCULATED.3IONS-SCNC: 13 MMOL/L (ref 5–15)
BASOPHILS # BLD AUTO: 0.06 10*3/MM3 (ref 0–0.2)
BASOPHILS NFR BLD AUTO: 0.6 % (ref 0–1.5)
BUN BLD-MCNC: 24 MG/DL (ref 8–23)
BUN/CREAT SERPL: 29.3 (ref 7–25)
CALCIUM SPEC-SCNC: 9.1 MG/DL (ref 8.6–10.5)
CHLORIDE SERPL-SCNC: 103 MMOL/L (ref 98–107)
CO2 SERPL-SCNC: 25 MMOL/L (ref 22–29)
CREAT BLD-MCNC: 0.82 MG/DL (ref 0.76–1.27)
D-LACTATE SERPL-SCNC: 1.1 MMOL/L (ref 0.5–2)
DEPRECATED RDW RBC AUTO: 52.2 FL (ref 37–54)
EOSINOPHIL # BLD AUTO: 0.44 10*3/MM3 (ref 0–0.4)
EOSINOPHIL NFR BLD AUTO: 4.6 % (ref 0.3–6.2)
ERYTHROCYTE [DISTWIDTH] IN BLOOD BY AUTOMATED COUNT: 16.3 % (ref 12.3–15.4)
GFR SERPL CREATININE-BSD FRML MDRD: 95 ML/MIN/1.73
GLUCOSE BLD-MCNC: 186 MG/DL (ref 65–99)
GLUCOSE BLDC GLUCOMTR-MCNC: 173 MG/DL (ref 70–130)
GLUCOSE BLDC GLUCOMTR-MCNC: 223 MG/DL (ref 70–130)
GLUCOSE BLDC GLUCOMTR-MCNC: 252 MG/DL (ref 70–130)
HCT VFR BLD AUTO: 36.1 % (ref 37.5–51)
HGB BLD-MCNC: 10.9 G/DL (ref 13–17.7)
IMM GRANULOCYTES # BLD AUTO: 0.03 10*3/MM3 (ref 0–0.05)
IMM GRANULOCYTES NFR BLD AUTO: 0.3 % (ref 0–0.5)
LYMPHOCYTES # BLD AUTO: 2.18 10*3/MM3 (ref 0.7–3.1)
LYMPHOCYTES NFR BLD AUTO: 22.9 % (ref 19.6–45.3)
MCH RBC QN AUTO: 26.7 PG (ref 26.6–33)
MCHC RBC AUTO-ENTMCNC: 30.2 G/DL (ref 31.5–35.7)
MCV RBC AUTO: 88.3 FL (ref 79–97)
MONOCYTES # BLD AUTO: 0.83 10*3/MM3 (ref 0.1–0.9)
MONOCYTES NFR BLD AUTO: 8.7 % (ref 5–12)
NEUTROPHILS # BLD AUTO: 6 10*3/MM3 (ref 1.7–7)
NEUTROPHILS NFR BLD AUTO: 62.9 % (ref 42.7–76)
NRBC BLD AUTO-RTO: 0 /100 WBC (ref 0–0.2)
PLATELET # BLD AUTO: 314 10*3/MM3 (ref 140–450)
PMV BLD AUTO: 10.2 FL (ref 6–12)
POTASSIUM BLD-SCNC: 4.1 MMOL/L (ref 3.5–5.2)
RBC # BLD AUTO: 4.09 10*6/MM3 (ref 4.14–5.8)
SODIUM BLD-SCNC: 141 MMOL/L (ref 136–145)
WBC NRBC COR # BLD: 9.54 10*3/MM3 (ref 3.4–10.8)

## 2019-10-06 PROCEDURE — 25010000002 CEFTRIAXONE: Performed by: FAMILY MEDICINE

## 2019-10-06 PROCEDURE — 94799 UNLISTED PULMONARY SVC/PX: CPT

## 2019-10-06 PROCEDURE — 82962 GLUCOSE BLOOD TEST: CPT

## 2019-10-06 PROCEDURE — 80048 BASIC METABOLIC PNL TOTAL CA: CPT | Performed by: FAMILY MEDICINE

## 2019-10-06 PROCEDURE — 99239 HOSP IP/OBS DSCHRG MGMT >30: CPT | Performed by: FAMILY MEDICINE

## 2019-10-06 PROCEDURE — 85025 COMPLETE CBC W/AUTO DIFF WBC: CPT | Performed by: FAMILY MEDICINE

## 2019-10-06 PROCEDURE — 63710000001 INSULIN ASPART PER 5 UNITS: Performed by: HOSPITALIST

## 2019-10-06 PROCEDURE — 94660 CPAP INITIATION&MGMT: CPT

## 2019-10-06 PROCEDURE — 83605 ASSAY OF LACTIC ACID: CPT | Performed by: INTERNAL MEDICINE

## 2019-10-06 RX ORDER — PREDNISONE 10 MG/1
TABLET ORAL
Qty: 21 TABLET | Refills: 0 | Status: SHIPPED | OUTPATIENT
Start: 2019-10-06 | End: 2019-11-25 | Stop reason: ALTCHOICE

## 2019-10-06 RX ORDER — DOXYCYCLINE HYCLATE 100 MG/1
100 TABLET, DELAYED RELEASE ORAL 2 TIMES DAILY
Qty: 10 TABLET | Refills: 0 | Status: SHIPPED | OUTPATIENT
Start: 2019-10-06 | End: 2019-10-11

## 2019-10-06 RX ORDER — PREDNISONE 10 MG/1
TABLET ORAL
Qty: 21 TABLET | Refills: 0 | Status: SHIPPED | OUTPATIENT
Start: 2019-10-06 | End: 2019-10-06 | Stop reason: SDUPTHER

## 2019-10-06 RX ORDER — CEFDINIR 300 MG/1
300 CAPSULE ORAL 2 TIMES DAILY
Qty: 10 CAPSULE | Refills: 0 | Status: SHIPPED | OUTPATIENT
Start: 2019-10-06 | End: 2019-10-06 | Stop reason: SDUPTHER

## 2019-10-06 RX ORDER — DOXYCYCLINE HYCLATE 100 MG/1
100 TABLET, DELAYED RELEASE ORAL 2 TIMES DAILY
Qty: 10 TABLET | Refills: 0 | Status: SHIPPED | OUTPATIENT
Start: 2019-10-06 | End: 2019-10-06 | Stop reason: SDUPTHER

## 2019-10-06 RX ORDER — HYDROCODONE BITARTRATE AND ACETAMINOPHEN 10; 325 MG/1; MG/1
1 TABLET ORAL EVERY 6 HOURS PRN
Qty: 12 TABLET | Refills: 0 | Status: SHIPPED | OUTPATIENT
Start: 2019-10-06 | End: 2019-10-09

## 2019-10-06 RX ORDER — CEFDINIR 300 MG/1
300 CAPSULE ORAL 2 TIMES DAILY
Qty: 10 CAPSULE | Refills: 0 | Status: SHIPPED | OUTPATIENT
Start: 2019-10-06 | End: 2019-11-25

## 2019-10-06 RX ADMIN — Medication 1 CAPSULE: at 09:26

## 2019-10-06 RX ADMIN — ACETAMINOPHEN 650 MG: 325 TABLET ORAL at 09:25

## 2019-10-06 RX ADMIN — IPRATROPIUM BROMIDE AND ALBUTEROL SULFATE 3 ML: .5; 3 SOLUTION RESPIRATORY (INHALATION) at 06:52

## 2019-10-06 RX ADMIN — APIXABAN 5 MG: 5 TABLET, FILM COATED ORAL at 09:26

## 2019-10-06 RX ADMIN — PANTOPRAZOLE SODIUM 40 MG: 40 TABLET, DELAYED RELEASE ORAL at 05:24

## 2019-10-06 RX ADMIN — CETIRIZINE HYDROCHLORIDE 10 MG: 10 TABLET, FILM COATED ORAL at 09:25

## 2019-10-06 RX ADMIN — CEFTRIAXONE 2 G: 2 INJECTION, POWDER, FOR SOLUTION INTRAMUSCULAR; INTRAVENOUS at 05:25

## 2019-10-06 RX ADMIN — SODIUM CHLORIDE, PRESERVATIVE FREE 10 ML: 5 INJECTION INTRAVENOUS at 09:29

## 2019-10-06 RX ADMIN — BUDESONIDE AND FORMOTEROL FUMARATE DIHYDRATE 2 PUFF: 160; 4.5 AEROSOL RESPIRATORY (INHALATION) at 06:52

## 2019-10-06 RX ADMIN — FLUTICASONE PROPIONATE 2 SPRAY: 50 SPRAY, METERED NASAL at 09:33

## 2019-10-06 RX ADMIN — ATORVASTATIN CALCIUM 20 MG: 20 TABLET, FILM COATED ORAL at 09:26

## 2019-10-06 RX ADMIN — INSULIN ASPART 3 UNITS: 100 INJECTION, SOLUTION INTRAVENOUS; SUBCUTANEOUS at 09:33

## 2019-10-06 RX ADMIN — AMLODIPINE BESYLATE 10 MG: 10 TABLET ORAL at 09:26

## 2019-10-06 RX ADMIN — DOXYCYCLINE 100 MG: 100 INJECTION, POWDER, LYOPHILIZED, FOR SOLUTION INTRAVENOUS at 05:24

## 2019-10-06 RX ADMIN — METOPROLOL TARTRATE 50 MG: 50 TABLET, FILM COATED ORAL at 09:25

## 2019-10-06 RX ADMIN — INSULIN ASPART 8 UNITS: 100 INJECTION, SOLUTION INTRAVENOUS; SUBCUTANEOUS at 11:55

## 2019-10-06 RX ADMIN — LISINOPRIL 10 MG: 10 TABLET ORAL at 09:26

## 2019-10-06 NOTE — DISCHARGE SUMMARY
[facility] HOSPITALIST   DISCHARGE SUMMARY      Name:  Иван Miller   Age:  63 y.o.  Sex:  male  :  1955  MRN:  [id number]   Visit Number:  [id number]    Admission Date:  10/4/2019  Date of Discharge:  10/6/2019  Primary Care Physician:  Joseluis Carson MD    Discharge Diagnoses:       Acute methamphetamine use  Opioid dependence continuous      Acute respiratory failure with hypoxemia (CMS/HCC)      Presenting Problem:    Acute respiratory failure with hypoxemia (CMS/HCC) [J96.01]     Consults:     Consults     Date and Time Order Name Status Description    10/4/2019 0739 Inpatient Pulmonology Consult          Consulting Physician(s)     Provider Relationship Specialty    Capo Diane MD Consulting Physician Pulmonary Disease          Procedures Performed:             History of presenting illness:    This is a 63 year old  gentleman with a past medical history of symptomatic sinus Bradycardia Hypotension Hyponatremia, CAD, S/P PCI, Paroxysmal Atrial fibrillation, Anticoagulated with Eliquis, DM2,  CKD stage 3 HTN Dyslipidemia  RIO,    Morbid obesity Status post fall and admitted here 11/10/18.  Dr Ribeiro is his cardiologist.       He presented to our ED complaining of shortness of breath and was evaluated with labs blood gases which found him to be in acute respiratory failure with hypoxemia.  ABG showed pO2 of 37 on initial presentation.  His pCO2 was mildly elevated at nearly 60 then he was placed on BIPAP 50% FiO2.  Oxygenation improved with subsequent pO2 of 164.     Hospital Course:  Admitted to CCU for hypercapnic respiratory failure on BIPAP  Was very somnolent not explained by pCO2 level  Used narcan once, he awakened, was breathing better immediately  Verbally endorsed methamphetamine use for pain? and opioid use   Was moved out of CCU day 2 to MS tele  Complained of RUQ uncomfortableness wanted pain meds  Back to baseline pulmonary status with chronic COPD on home  O2      Vital Signs:    Temp:  [98.1 °F (36.7 °C)-98.8 °F (37.1 °C)] 98.1 °F (36.7 °C)  Heart Rate:  [71-80] 75  Resp:  [22] 22  BP: (131-162)/(67-94) 131/67    Physical Exam:    General Appearance:  Alert and cooperative, not in any acute distress.   Head:  Atraumatic and normocephalic, without obvious abnormality.   Eyes:          PERRLA, conjunctivae and sclerae normal, no Icterus. No pallor. Extra-occular movements are within normal limits.   Ears:  Ears appear intact with no abnormalities noted.   Throat: No oral lesions, no thrush, oral mucosa moist.   Neck: Supple, trachea midline, no thyromegaly, no carotid bruit.   Back:   No kyphoscoliosis present. No tenderness to palpation,   range of motion normal.   Lungs:   Symmetric chest rise on inspiration no audible wheezing   Heart:  Has some occasional SVT is on BB for chronicity.   Abdomen:   Obese   no masses, no organomegaly. Soft non-tender, non-distended, no guarding, no rebound tenderness.   Extremities: Moves all extremities well, no edema, no cyanosis, no clubbing.   Pulses: Pulses palpable and equal bilaterally.   Skin: No bleeding, bruising or rash.   Lymph nodes: No palpable adenopathy.   Neurologic: Alert and oriented x 3. Moves all four limbs equally. No tremors. No facial asymetry.       Pertinent Lab Results:     Results from last 7 days   Lab Units 10/06/19  0110 10/05/19  0745 10/05/19  0026  10/04/19  0320   SODIUM mmol/L 141  --  144  --  141   POTASSIUM mmol/L 4.1 4.0 4.0   < > 4.3   CHLORIDE mmol/L 103  --  103  --  102   CO2 mmol/L 25.0  --  29.1*  --  26.1   BUN mg/dL 24*  --  20  --  15   CREATININE mg/dL 0.82  --  0.88  --  1.04   CALCIUM mg/dL 9.1  --  9.1  --  9.4   BILIRUBIN mg/dL  --   --   --   --  0.4   ALK PHOS U/L  --   --   --   --  85   ALT (SGPT) U/L  --   --   --   --  25   AST (SGOT) U/L  --   --   --   --  23   GLUCOSE mg/dL 186*  --  174*  --  267*    < > = values in this interval not displayed.     Results from last 7  days   Lab Units 10/06/19  0110 10/05/19  0026 10/04/19  0320   WBC 10*3/mm3 9.54 8.44 12.88*   HEMOGLOBIN g/dL 10.9* 10.7* 12.5*   HEMATOCRIT % 36.1* 36.3* 42.7   PLATELETS 10*3/mm3 314 347 369         Results from last 7 days   Lab Units 10/04/19  1532 10/04/19  0938 10/04/19  0320   TROPONIN T ng/mL <0.010 <0.010 0.012     Results from last 7 days   Lab Units 10/04/19  0320   PROBNP pg/mL 2,527.0*             Results from last 7 days   Lab Units 10/04/19  1124   PH, ARTERIAL pH units 7.358   PO2 ART mm Hg 100.0   PCO2, ARTERIAL mm Hg 52.1*   HCO3 ART mmol/L 29.3*     Results from last 7 days   Lab Units 10/04/19  0646   COLOR UA  Yellow   GLUCOSE UA  250 mg/dL (1+)*   KETONES UA  Negative   LEUKOCYTES UA  Negative   PH, URINE  <=5.0   BILIRUBIN UA  Negative   UROBILINOGEN UA  1.0 E.U./dL     Pain Management Panel     Pain Management Panel Latest Ref Rng & Units 10/4/2019    AMPHETAMINES SCREEN, URINE Negative Positive(A)    BARBITURATES SCREEN Negative Negative    BENZODIAZEPINE SCREEN, URINE Negative Negative    BUPRENORPHINEUR Negative Negative    COCAINE SCREEN, URINE Negative Negative    METHADONE SCREEN, URINE Negative Negative        Results from last 7 days   Lab Units 10/04/19  0329 10/04/19  0320   BLOODCX  No growth at 2 days No growth at 2 days       Pertinent Radiology Results:    Imaging Results (all)     Procedure Component Value Units Date/Time    CT Chest Pulmonary Embolism With Contrast [[id number]] Collected:  10/04/19 0805     Updated:  10/04/19 0810    Narrative:       CT CHEST PULMONARY EMBOLISM W CONTRAST-     CLINICAL INDICATION: SOB; J96.01-Acute respiratory failure with hypoxia;  F19.10-Other psychoactive substance abuse, uncomplicated; J44.1-Chronic  obstructive pulmonary disease with (acute) exacerbation; N39.0-Urinary  tract infection, site not specified        COMPARISON: 10/04/2019.      PROCEDURE: Thin cut axial images were acquired through the pulmonary  vessels during the rapid  infusion of IV contrast.     Additional 3-D reformatted images obtained via post-processing for  improved diagnostic accuracy and procedural planning.     Radiation dose reduction techniques were utilized per ALARA protocol.  Automated exposure control was initiated through either or [employer] or  [employer] software packages by  protocol.           FINDINGS:     The study is degraded by patient motion. Today's study demonstrates  opacification of the central pulmonary vessels.   There are no filling defects.   There is no truncation.     No evidence of a pulmonary embolus.     There is a parenchymal nodule in the left lung on image 176 of the axial  series. It measures 1.68 cm.  There is a right-sided parenchymal nodule measuring 1.4 cm on image 414  of the axial series.  Mild groundglass changes present  Airspace disease in the right perihilar region.     There is no mediastinal lymph node enlargement     Cannot exclude cholelithiasis.       Impression:       1. Study markedly degraded by patient motion. This inhibits delineation  of the peripheral vessels. No central pulmonary embolus is seen.  2. Parenchymal nodules are seen in both lungs. These are both greater  than a centimeter. I would consider PET/CT.  3. CHF  4. Extensive coronary artery calcifications  5. Right-sided airspace disease concerning for pneumonia.     This report was finalized on 10/4/2019 8:08 AM by Dr. Noah Lujan MD.       XR Chest 1 View [[id number]] Collected:  10/04/19 0541     Updated:  10/04/19 0543    Narrative:       CR Chest 1 Vw    INDICATION:   Shortness of breath this morning     COMPARISON:    11/10/2018    FINDINGS:  Single portable AP view(s) of the chest.  Heart size normal. There is mild vascular congestion and perihilar interstitial prominence. No focal infiltrates identified      Impression:       Mild vascular congestion and interstitial prominence is present which may represent minimal CHF. There are no focal  infiltrates    Signer Name: Yousuf De Guzman MD   Signed: 10/4/2019 5:41 AM   Workstation Name: [facility]    [facility]          Condition on Discharge:      Stable.    Code status during the hospital stay:    Code Status and Medical Interventions:   Ordered at: 10/04/19 0739     Level Of Support Discussed With:    Patient     Code Status:    CPR     Medical Interventions (Level of Support Prior to Arrest):    Full       Discharge Disposition:    Home or Self Care    Discharge Medications:       Discharge Medications      New Medications      Instructions Start Date   cefdinir 300 MG capsule  Commonly known as:  OMNICEF   300 mg, Oral, 2 Times Daily      doxycycline 100 MG enteric coated tablet  Commonly known as:  DORYX   100 mg, Oral, 2 Times Daily      HYDROcodone-acetaminophen  MG per tablet  Commonly known as:  NORCO   1 tablet, Oral, Every 6 Hours PRN      predniSONE 10 MG (48) tablet pack  Commonly known as:  DELTASONE   Take as directed         Continue These Medications      Instructions Start Date   albuterol sulfate  (90 Base) MCG/ACT inhaler  Commonly known as:  PROVENTIL HFA;VENTOLIN HFA;PROAIR HFA   2 puffs, Inhalation, Every 4 Hours PRN      amLODIPine 10 MG tablet  Commonly known as:  NORVASC   10 mg, Oral, Daily      apixaban 5 MG tablet tablet  Commonly known as:  ELIQUIS   5 mg, Oral, 2 Times Daily      atorvastatin 20 MG tablet  Commonly known as:  LIPITOR   20 mg, Oral, Daily      budesonide-formoterol 160-4.5 MCG/ACT inhaler  Commonly known as:  SYMBICORT   2 puffs, Inhalation, 2 Times Daily - RT      fluticasone 50 MCG/ACT nasal spray  Commonly known as:  FLONASE   2 sprays, Nasal, Daily      insulin lispro 100 UNIT/ML injection  Commonly known as:  humaLOG   20 Units, Subcutaneous, 3 Times Daily Before Meals      ipratropium-albuterol 0.5-2.5 mg/3 ml nebulizer  Commonly known as:  DUO-NEB   3 mL, Nebulization, Every 4 Hours PRN      lisinopril 10 MG  tablet  Commonly known as:  PRINIVIL,ZESTRIL   10 mg, Oral, Daily      loratadine 10 MG tablet  Commonly known as:  CLARITIN   10 mg, Oral, Daily      metFORMIN 1000 MG tablet  Commonly known as:  GLUCOPHAGE   1,000 mg, Oral, 2 Times Daily With Meals      metoprolol tartrate 50 MG tablet  Commonly known as:  LOPRESSOR   50 mg, Oral, 2 Times Daily      omeprazole 40 MG capsule  Commonly known as:  priLOSEC   40 mg, Oral, Daily      pregabalin 75 MG capsule  Commonly known as:  LYRICA   75 mg, Oral, 3 Times Daily      sildenafil 20 MG tablet  Commonly known as:  REVATIO   20 mg, Oral, Daily PRN      tamsulosin 0.4 MG capsule 24 hr capsule  Commonly known as:  FLOMAX   1 capsule, Oral, Nightly             Discharge Diet:     cardiac    Activity at Discharge:   As tolerated      Follow-up Appointments:    Follow-up Information     Joseluis Carson MD .    Specialty:  Internal Medicine  Contact information:  [address] [place] [id number] [phone]                   No future appointments.        Test Results Pending at Discharge:     Order Current Status    Blood Culture - Blood, Arm, Left Preliminary result    Blood Culture - Blood, Arm, Right Preliminary result             Torres Valentino DO  10/06/19  10:37 AM    Time spent: 35 min

## 2019-10-07 ENCOUNTER — READMISSION MANAGEMENT (OUTPATIENT)
Dept: CALL CENTER | Facility: HOSPITAL | Age: 64
End: 2019-10-07

## 2019-10-07 NOTE — OUTREACH NOTE
Prep Survey      Responses   Facility patient discharged from?  [facility]   Is patient eligible?  No   What are the reasons patient is not eligible?  Other [ACute methamphentamin use, opioid dependence]   Does the patient have one of the following disease processes/diagnoses(primary or secondary)?  Other   Prep survey completed?  Yes          Tanja Almnote RN

## 2019-10-09 LAB
BACTERIA SPEC AEROBE CULT: NORMAL
BACTERIA SPEC AEROBE CULT: NORMAL

## 2019-10-28 ENCOUNTER — DOCUMENTATION (OUTPATIENT)
Dept: INTERNAL MEDICINE | Facility: HOSPITAL | Age: 64
End: 2019-10-28

## 2019-10-28 NOTE — PROGRESS NOTES
35 minutes of critical care time was spent in the initial care of this patient in the CCU. Should have been documented in the H & P

## 2019-11-25 ENCOUNTER — OFFICE VISIT (OUTPATIENT)
Dept: CARDIOLOGY | Facility: CLINIC | Age: 64
End: 2019-11-25

## 2019-11-25 VITALS
SYSTOLIC BLOOD PRESSURE: 146 MMHG | WEIGHT: 280 LBS | BODY MASS INDEX: 37.11 KG/M2 | HEART RATE: 91 BPM | HEIGHT: 73 IN | DIASTOLIC BLOOD PRESSURE: 76 MMHG | OXYGEN SATURATION: 98 %

## 2019-11-25 DIAGNOSIS — E78.5 HYPERLIPIDEMIA LDL GOAL <70: ICD-10-CM

## 2019-11-25 DIAGNOSIS — I42.8 NON-ISCHEMIC CARDIOMYOPATHY (HCC): ICD-10-CM

## 2019-11-25 DIAGNOSIS — I25.10 CORONARY ARTERY DISEASE INVOLVING NATIVE CORONARY ARTERY OF NATIVE HEART WITHOUT ANGINA PECTORIS: ICD-10-CM

## 2019-11-25 DIAGNOSIS — I10 ESSENTIAL HYPERTENSION: ICD-10-CM

## 2019-11-25 DIAGNOSIS — R00.2 PALPITATIONS: Primary | ICD-10-CM

## 2019-11-25 PROBLEM — IMO0001 INSULIN DEPENDENT DIABETES MELLITUS: Status: ACTIVE | Noted: 2019-11-25

## 2019-11-25 PROBLEM — I50.22 CHRONIC SYSTOLIC CONGESTIVE HEART FAILURE (HCC): Status: ACTIVE | Noted: 2019-11-25

## 2019-11-25 PROCEDURE — 99204 OFFICE O/P NEW MOD 45 MIN: CPT | Performed by: INTERNAL MEDICINE

## 2019-11-25 PROCEDURE — 93000 ELECTROCARDIOGRAM COMPLETE: CPT | Performed by: INTERNAL MEDICINE

## 2019-11-25 RX ORDER — CARVEDILOL 6.25 MG/1
6.25 TABLET ORAL 2 TIMES DAILY WITH MEALS
COMMUNITY
End: 2019-11-25 | Stop reason: SDUPTHER

## 2019-11-25 RX ORDER — CARVEDILOL 6.25 MG/1
12.5 TABLET ORAL 2 TIMES DAILY WITH MEALS
Qty: 60 TABLET | Refills: 2 | Status: SHIPPED | OUTPATIENT
Start: 2019-11-25 | End: 2020-04-14 | Stop reason: SDUPTHER

## 2019-11-25 RX ORDER — FUROSEMIDE 20 MG/1
20 TABLET ORAL DAILY
COMMUNITY
End: 2019-11-25 | Stop reason: SDUPTHER

## 2019-11-25 RX ORDER — GABAPENTIN 300 MG/1
300 CAPSULE ORAL 3 TIMES DAILY
Status: ON HOLD | COMMUNITY
End: 2021-05-22

## 2019-11-25 RX ORDER — FUROSEMIDE 40 MG/1
40 TABLET ORAL DAILY
Qty: 30 TABLET | Refills: 1 | Status: ON HOLD | OUTPATIENT
Start: 2019-11-25 | End: 2021-05-22

## 2019-11-25 NOTE — PROGRESS NOTES
Subjective   Chief Complaint   Patient presents with   • Establish Care     Abnormal Echo   • Congestive Heart Failure       History of Present Illness  Patient is 64 years old white male who was recently discharged from [facility] with congestive heart failure and respiratory failure  He was discharged home on Coreg 6.25 twice daily, Lasix 20 mg daily and Entresto 49/51 twice daily.  Patient was told that he has heart failure and LV ejection fraction is around 40 to 45% by echo.    He is known to have coronary artery disease according to him he had 5-6 coronary stents first 2 was done at [facility] by Dr. Jaimes in the last 4 were done at [facility].  All of that was more than 10 years ago.  Patient also has insulin-dependent diabetes mellitus and hyperlipidemia.  He has COPD and respiratory failure also.  Patient states there is very difficult for him to tell if his shortness of breath is from his heart or from his lungs.      He also carries a diagnosis of atrial fibrillation and has been taking Eliquis.  His EKG from the hospital shows sinus rhythm and EKG today in the office also shows sinus rhythm with occasional PVCs    Patient at this time denies any chest pain, is primarily short of breath.      Past Surgical History:   Procedure Laterality Date   • ABDOMINAL SURGERY     • APPENDECTOMY     • CARDIAC CATHETERIZATION     • CORONARY STENT PLACEMENT       Family History   Problem Relation Age of Onset   • Hypertension Mother    • Heart attack Mother    • Hypertension Father      Past Medical History:   Diagnosis Date   • Atrial fibrillation (CMS/HCC)    • COPD (chronic obstructive pulmonary disease) (CMS/HCC)    • Diabetes mellitus (CMS/HCC)    • GERD (gastroesophageal reflux disease)    • Hyperlipidemia    • Hypertension    • Myocardial infarction (CMS/HCC)    • Neuropathy        Patient Active Problem List   Diagnosis   • Symptomatic bradycardia   • Acute respiratory  failure with hypoxemia (CMS/[facility])   • Coronary artery disease status post multiple stents   • Non-ischemic cardiomyopathy (CMS/[facility] LV ejection fraction 40 to 45%   • Chronic systolic congestive heart failure (CMS/[facility])   • Essential hypertension   • Hyperlipidemia LDL goal <70   • Insulin dependent diabetes mellitus (CMS/[facility])         Social History     Tobacco Use   • Smoking status: Former Smoker     Packs/day: 2.00     Years: 30.00     Pack years: 60.00     Types: Cigarettes   • Smokeless tobacco: Current User     Types: Snuff   Substance Use Topics   • Alcohol use: No     Frequency: Never   • Drug use: No         The following portions of the patient's history were reviewed and updated as appropriate: allergies, current medications, past family history, past medical history, past social history, past surgical history and problem list.    Allergies   Allergen Reactions   • Codeine Nausea And Vomiting   • Ciprofloxacin Diarrhea         Current Outpatient Medications:   •  albuterol (PROVENTIL HFA;VENTOLIN HFA) 108 (90 Base) MCG/ACT inhaler, Inhale 2 puffs Every 4 (Four) Hours As Needed for Wheezing., Disp: , Rfl:   •  amLODIPine (NORVASC) 10 MG tablet, Take 10 mg by mouth Daily., Disp: , Rfl:   •  apixaban (ELIQUIS) 5 MG tablet tablet, Take 5 mg by mouth 2 (Two) Times a Day., Disp: , Rfl:   •  atorvastatin (LIPITOR) 20 MG tablet, Take 20 mg by mouth Daily., Disp: , Rfl:   •  budesonide-formoterol (SYMBICORT) 160-4.5 MCG/ACT inhaler, Inhale 2 puffs 2 (Two) Times a Day., Disp: , Rfl:   •  carvedilol (COREG) 6.25 MG tablet, Take 2 tablets by mouth 2 (Two) Times a Day With Meals., Disp: 60 tablet, Rfl: 2  •  fluticasone (FLONASE) 50 MCG/ACT nasal spray, 2 sprays into the nostril(s) as directed by provider Daily., Disp: , Rfl:   •  furosemide (LASIX) 40 MG tablet, Take 1 tablet by mouth Daily., Disp: 30 tablet, Rfl: 1  •  gabapentin (NEURONTIN) 300 MG capsule, Take 300 mg by mouth 3 (Three) Times a Day., Disp: , Rfl:   •   "insulin lispro (humaLOG) 100 UNIT/ML injection, Inject 20 Units under the skin into the appropriate area as directed 3 (Three) Times a Day Before Meals., Disp: , Rfl:   •  ipratropium-albuterol (DUO-NEB) 0.5-2.5 mg/3 ml nebulizer, Take 3 mL by nebulization Every 4 (Four) Hours As Needed for Wheezing., Disp: , Rfl:   •  loratadine (CLARITIN) 10 MG tablet, Take 10 mg by mouth Daily., Disp: , Rfl:   •  metFORMIN (GLUCOPHAGE) 1000 MG tablet, Take 1,000 mg by mouth 2 (Two) Times a Day With Meals., Disp: , Rfl:   •  omeprazole (priLOSEC) 40 MG capsule, Take 40 mg by mouth Daily., Disp: , Rfl:   •  sacubitril-valsartan (ENTRESTO) 49-51 MG tablet, Take 1 tablet by mouth 2 (Two) Times a Day., Disp: , Rfl:   •  sildenafil (REVATIO) 20 MG tablet, Take 20 mg by mouth Daily As Needed (sexual activity)., Disp: , Rfl:   •  tamsulosin (FLOMAX) 0.4 MG capsule 24 hr capsule, Take 1 capsule by mouth Every Night., Disp: , Rfl:     Review of Systems   Constitution: Positive for weakness and malaise/fatigue.   HENT: Negative.  Negative for congestion.    Eyes: Negative.    Cardiovascular: Positive for dyspnea on exertion and leg swelling. Negative for chest pain, cyanosis, irregular heartbeat, near-syncope, orthopnea, palpitations, paroxysmal nocturnal dyspnea and syncope.   Respiratory: Positive for shortness of breath and wheezing.    Hematologic/Lymphatic: Negative.    Skin: Negative.    Musculoskeletal: Positive for back pain, myalgias and stiffness.   Gastrointestinal: Negative.    Genitourinary: Negative.    Neurological: Negative for headaches.   Psychiatric/Behavioral: The patient is nervous/anxious.    Allergic/Immunologic: Negative.         Objective      /76 (BP Location: Left arm, Patient Position: Sitting, Cuff Size: Adult)   Pulse 91   Ht 185.4 cm (73\")   Wt 127 kg (280 lb)   SpO2 98%   BMI 36.94 kg/m²     Physical Exam   Constitutional: He appears well-developed and well-nourished. No distress.   HENT:   Head: " Normocephalic and atraumatic.   Mouth/Throat: Oropharynx is clear and moist. No oropharyngeal exudate.   Eyes: Conjunctivae and EOM are normal. Pupils are equal, round, and reactive to light. No scleral icterus.   Neck: Normal range of motion. Neck supple. No JVD present. No tracheal deviation present. No thyromegaly present.   Cardiovascular: Normal rate, regular rhythm and intact distal pulses. PMI is not displaced. Exam reveals no gallop, no friction rub and no decreased pulses.   Murmur heard.  Pulses:       Carotid pulses are 3+ on the right side, and 3+ on the left side.       Radial pulses are 3+ on the right side, and 3+ on the left side.   Pulmonary/Chest: Effort normal. No respiratory distress. He has wheezes. He has no rales. He exhibits no tenderness.   Abdominal: Soft. Bowel sounds are normal. He exhibits no distension, no abdominal bruit and no mass. There is no splenomegaly or hepatomegaly. There is no tenderness. There is no rebound and no guarding.   Musculoskeletal: Normal range of motion. He exhibits edema. He exhibits no tenderness or deformity.   Lymphadenopathy:     He has no cervical adenopathy.   Neurological: He is alert. He has normal reflexes. No cranial nerve deficit. He exhibits normal muscle tone. Coordination normal.   Skin: Skin is warm and dry. No rash noted. He is not diaphoretic. No erythema.   Psychiatric: He has a normal mood and affect. His behavior is normal. Judgment and thought content normal.       Lab Review:    Lab Results   Component Value Date     10/06/2019    K 4.1 10/06/2019     10/06/2019    BUN 24 (H) 10/06/2019    CREATININE 0.82 10/06/2019    GLUCOSE 186 (H) 10/06/2019    CALCIUM 9.1 10/06/2019    ALT 25 10/04/2019    ALKPHOS 85 10/04/2019     Lab Results   Component Value Date    CKTOTAL 107 11/09/2018     Lab Results   Component Value Date    WBC 9.54 10/06/2019    HGB 10.9 (L) 10/06/2019    HCT 36.1 (L) 10/06/2019     10/06/2019     Lab  Results   Component Value Date    INR 1.26 (H) 11/09/2018     Lab Results   Component Value Date    MG 2.1 11/11/2018     Lab Results   Component Value Date    CHOL 246 (H) 11/10/2018    TRIG 369 (H) 11/10/2018    HDL 31 (L) 11/10/2018    VLDL 73.8 11/10/2018    LDLHDL 4.55 11/10/2018     Lab Results   Component Value Date    BNP 89.0 11/09/2018         ECG 12 Lead  Date/Time: 11/25/2019 4:44 PM  Performed by: Linden Mccabe MD  Authorized by: Linden Mccabe MD   Comparison: compared with previous ECG from 10/4/2019  Similar to previous ECG  Comparison to previous ECG: PVCs are new  Rhythm: sinus rhythm  Ectopy: unifocal PVCs  Rate: normal  BPM: 88  QRS axis: normal  Other findings: non-specific ST-T wave changes    Clinical impression: abnormal EKG            Interpretation Summary October 4, 2019    · Left ventricular wall thickness is consistent with mild concentric hypertrophy.  · Left atrial cavity size is mildly dilated.  · Estimated EF appears to be in the range of 56 - 60%.  · Left ventricular diastolic function is normal.  · Normal right ventricular cavity size and systolic function noted.  · Estimated right ventricular systolic pressure from tricuspid regurgitation is normal (<35 mmHg).  · The quality of the study is limited due to poor acoustic windows related to patient body habitus.              I reviewed the patient's new clinical results.  I personally viewed and interpreted the patient's EKG/lab data        Assessment:   Diagnosis Plan   1. Palpitations  ECG 12 Lead   2. Coronary artery disease status post multiple stents     3. Essential hypertension     4. Hyperlipidemia LDL goal <70     5. Non-ischemic cardiomyopathy (CMS/HCC LV ejection fraction 40 to 45%            Plan:  Patient is here for cardiac evaluation.  He thinks that he has paroxysmal atrial fibrillation and he carries that diagnosis however there is no record of atrial fibrillation documentation in the record.  He  is taking Eliquis which was continued.  He also brought a record of echocardiogram which shows LV ejection fraction 40 to 45% with moderate to severe mitral regurgitation .    however an echocardiogram done in [facility] just a month ago showed only trace mitral regurgitation with normal LV ejection fraction of 56 to 60%.  Unfortunately the echocardiogram form [place] is not available for review.    There is also no BNP available.    There is no clear-cut documentation of atrial fibrillation and diagnosis of heart failure and mitral regurgitation is also not certain.  Patient was advised to continue Entresto  Lasix dose was increased to 40 mg daily for now  Coreg was also increased to 12.5 twice daily.  Heart rate will need to be monitored closely.  Follow-up in 1 month at that time we will check BNP.  We will also need to get records from [facility] for coronary angiography and stenting.      Thank you for giving me the oppertunity to participate in your patient's cardiac care.    Sincerely,    BETTYE Mccabe M.D. FACP [facility]     No Follow-up on file.

## 2020-04-14 RX ORDER — CARVEDILOL 12.5 MG/1
12.5 TABLET ORAL 2 TIMES DAILY WITH MEALS
Qty: 60 TABLET | Refills: 2 | Status: ON HOLD | OUTPATIENT
Start: 2020-04-14 | End: 2021-05-22

## 2021-02-22 DIAGNOSIS — Z23 IMMUNIZATION DUE: ICD-10-CM

## 2021-05-21 ENCOUNTER — APPOINTMENT (OUTPATIENT)
Dept: CT IMAGING | Facility: HOSPITAL | Age: 66
End: 2021-05-21

## 2021-05-21 ENCOUNTER — APPOINTMENT (OUTPATIENT)
Dept: GENERAL RADIOLOGY | Facility: HOSPITAL | Age: 66
End: 2021-05-21

## 2021-05-21 ENCOUNTER — HOSPITAL ENCOUNTER (INPATIENT)
Facility: HOSPITAL | Age: 66
LOS: 25 days | Discharge: NURSING, REHAB OR LONG-TERM CARE | End: 2021-06-16
Attending: EMERGENCY MEDICINE | Admitting: INTERNAL MEDICINE

## 2021-05-21 DIAGNOSIS — L08.9 FINGER INFECTION: ICD-10-CM

## 2021-05-21 DIAGNOSIS — J96.01 ACUTE RESPIRATORY FAILURE WITH HYPOXIA AND HYPERCAPNIA (HCC): Primary | ICD-10-CM

## 2021-05-21 DIAGNOSIS — M54.40 CHRONIC BILATERAL LOW BACK PAIN WITH SCIATICA, SCIATICA LATERALITY UNSPECIFIED: ICD-10-CM

## 2021-05-21 DIAGNOSIS — J44.1 COPD WITH ACUTE EXACERBATION (HCC): ICD-10-CM

## 2021-05-21 DIAGNOSIS — J96.02 ACUTE RESPIRATORY FAILURE WITH HYPOXIA AND HYPERCAPNIA (HCC): Primary | ICD-10-CM

## 2021-05-21 DIAGNOSIS — G89.29 OTHER CHRONIC PAIN: ICD-10-CM

## 2021-05-21 DIAGNOSIS — M86.60 CHRONIC OSTEOMYELITIS (HCC): ICD-10-CM

## 2021-05-21 DIAGNOSIS — G89.29 CHRONIC BILATERAL LOW BACK PAIN WITH SCIATICA, SCIATICA LATERALITY UNSPECIFIED: ICD-10-CM

## 2021-05-21 LAB
6-ACETYL MORPHINE: NEGATIVE
A-A DO2: 52.3 MMHG (ref 0–300)
ALBUMIN SERPL-MCNC: 3.82 G/DL (ref 3.5–5.2)
ALBUMIN/GLOB SERPL: 1.3 G/DL
ALP SERPL-CCNC: 62 U/L (ref 39–117)
ALT SERPL W P-5'-P-CCNC: 29 U/L (ref 1–41)
AMPHET+METHAMPHET UR QL: POSITIVE
ANION GAP SERPL CALCULATED.3IONS-SCNC: 7.9 MMOL/L (ref 5–15)
ANISOCYTOSIS BLD QL: NORMAL
APTT PPP: 32.7 SECONDS (ref 25.6–35.3)
ARTERIAL PATENCY WRIST A: POSITIVE
AST SERPL-CCNC: 26 U/L (ref 1–40)
ATMOSPHERIC PRESS: 737 MMHG
BARBITURATES UR QL SCN: NEGATIVE
BASE EXCESS BLDA CALC-SCNC: 9.3 MMOL/L (ref 0–2)
BASOPHILS # BLD AUTO: 0.05 10*3/MM3 (ref 0–0.2)
BASOPHILS NFR BLD AUTO: 0.6 % (ref 0–1.5)
BDY SITE: ABNORMAL
BENZODIAZ UR QL SCN: NEGATIVE
BILIRUB SERPL-MCNC: 0.3 MG/DL (ref 0–1.2)
BILIRUB UR QL STRIP: NEGATIVE
BODY TEMPERATURE: 0 C
BUN SERPL-MCNC: 20 MG/DL (ref 8–23)
BUN/CREAT SERPL: 21.7 (ref 7–25)
BUPRENORPHINE SERPL-MCNC: NEGATIVE NG/ML
CALCIUM SPEC-SCNC: 9.2 MG/DL (ref 8.6–10.5)
CANNABINOIDS SERPL QL: NEGATIVE
CHLORIDE SERPL-SCNC: 102 MMOL/L (ref 98–107)
CK SERPL-CCNC: 124 U/L (ref 20–200)
CLARITY UR: CLEAR
CO2 BLDA-SCNC: 40.1 MMOL/L (ref 22–33)
CO2 SERPL-SCNC: 34.1 MMOL/L (ref 22–29)
COCAINE UR QL: NEGATIVE
COHGB MFR BLD: 1.3 % (ref 0–5)
COLOR UR: YELLOW
CREAT SERPL-MCNC: 0.92 MG/DL (ref 0.76–1.27)
CRP SERPL-MCNC: 0.39 MG/DL (ref 0–0.5)
D-LACTATE SERPL-SCNC: 1 MMOL/L (ref 0.5–2)
DEPRECATED RDW RBC AUTO: 51.6 FL (ref 37–54)
EOSINOPHIL # BLD AUTO: 0.07 10*3/MM3 (ref 0–0.4)
EOSINOPHIL NFR BLD AUTO: 0.8 % (ref 0.3–6.2)
ERYTHROCYTE [DISTWIDTH] IN BLOOD BY AUTOMATED COUNT: 16.1 % (ref 12.3–15.4)
ERYTHROCYTE [SEDIMENTATION RATE] IN BLOOD: 9 MM/HR (ref 0–20)
ETHANOL BLD-MCNC: <10 MG/DL (ref 0–10)
ETHANOL UR QL: <0.01 %
FLUAV RNA RESP QL NAA+PROBE: NOT DETECTED
FLUBV RNA RESP QL NAA+PROBE: NOT DETECTED
GAS FLOW AIRWAY: 2 LPM
GFR SERPL CREATININE-BSD FRML MDRD: 82 ML/MIN/1.73
GLOBULIN UR ELPH-MCNC: 3 GM/DL
GLUCOSE SERPL-MCNC: 279 MG/DL (ref 65–99)
GLUCOSE UR STRIP-MCNC: ABNORMAL MG/DL
HCO3 BLDA-SCNC: 37.9 MMOL/L (ref 20–26)
HCT VFR BLD AUTO: 41.6 % (ref 37.5–51)
HCT VFR BLD CALC: 36.9 % (ref 38–51)
HGB BLD-MCNC: 11.6 G/DL (ref 13–17.7)
HGB BLDA-MCNC: 12 G/DL (ref 14–18)
HGB UR QL STRIP.AUTO: NEGATIVE
HOLD SPECIMEN: NORMAL
HOLD SPECIMEN: NORMAL
HYPOCHROMIA BLD QL: NORMAL
IMM GRANULOCYTES # BLD AUTO: 0.08 10*3/MM3 (ref 0–0.05)
IMM GRANULOCYTES NFR BLD AUTO: 0.9 % (ref 0–0.5)
INHALED O2 CONCENTRATION: 28 %
INR PPP: 1.05 (ref 0.9–1.1)
KETONES UR QL STRIP: NEGATIVE
LEUKOCYTE ESTERASE UR QL STRIP.AUTO: NEGATIVE
LIPASE SERPL-CCNC: 24 U/L (ref 13–60)
LYMPHOCYTES # BLD AUTO: 0.85 10*3/MM3 (ref 0.7–3.1)
LYMPHOCYTES NFR BLD AUTO: 9.4 % (ref 19.6–45.3)
Lab: ABNORMAL
Lab: ABNORMAL
MAGNESIUM SERPL-MCNC: 2.3 MG/DL (ref 1.6–2.4)
MCH RBC QN AUTO: 24.2 PG (ref 26.6–33)
MCHC RBC AUTO-ENTMCNC: 27.9 G/DL (ref 31.5–35.7)
MCV RBC AUTO: 86.8 FL (ref 79–97)
METHADONE UR QL SCN: NEGATIVE
METHGB BLD QL: 0.3 % (ref 0–3)
MODALITY: ABNORMAL
MONOCYTES # BLD AUTO: 0.74 10*3/MM3 (ref 0.1–0.9)
MONOCYTES NFR BLD AUTO: 8.2 % (ref 5–12)
NEUTROPHILS NFR BLD AUTO: 7.28 10*3/MM3 (ref 1.7–7)
NEUTROPHILS NFR BLD AUTO: 80.1 % (ref 42.7–76)
NITRITE UR QL STRIP: NEGATIVE
NOTE: ABNORMAL
NOTIFIED BY: ABNORMAL
NOTIFIED WHO: ABNORMAL
NRBC BLD AUTO-RTO: 0 /100 WBC (ref 0–0.2)
NT-PROBNP SERPL-MCNC: 799.5 PG/ML (ref 0–900)
OPIATES UR QL: NEGATIVE
OXYCODONE UR QL SCN: NEGATIVE
OXYHGB MFR BLDV: 87.9 % (ref 94–99)
PCO2 BLDA: 72.8 MM HG (ref 35–45)
PCO2 TEMP ADJ BLD: ABNORMAL MM[HG]
PCP UR QL SCN: NEGATIVE
PH BLDA: 7.33 PH UNITS (ref 7.35–7.45)
PH UR STRIP.AUTO: <=5 [PH] (ref 5–8)
PH, TEMP CORRECTED: ABNORMAL
PLAT MORPH BLD: NORMAL
PLATELET # BLD AUTO: 265 10*3/MM3 (ref 140–450)
PMV BLD AUTO: 9.3 FL (ref 6–12)
PO2 BLDA: 59.7 MM HG (ref 83–108)
PO2 TEMP ADJ BLD: ABNORMAL MM[HG]
POTASSIUM SERPL-SCNC: 4.5 MMOL/L (ref 3.5–5.2)
PROT SERPL-MCNC: 6.8 G/DL (ref 6–8.5)
PROT UR QL STRIP: NEGATIVE
PROTHROMBIN TIME: 13.5 SECONDS (ref 11.9–14.1)
QT INTERVAL: 352 MS
QTC INTERVAL: 447 MS
RBC # BLD AUTO: 4.79 10*6/MM3 (ref 4.14–5.8)
SAO2 % BLDCOA: 89.3 % (ref 94–99)
SARS-COV-2 RNA RESP QL NAA+PROBE: NOT DETECTED
SODIUM SERPL-SCNC: 144 MMOL/L (ref 136–145)
SP GR UR STRIP: 1.02 (ref 1–1.03)
T4 FREE SERPL-MCNC: 0.95 NG/DL (ref 0.93–1.7)
TSH SERPL DL<=0.05 MIU/L-ACNC: 2.1 UIU/ML (ref 0.27–4.2)
UROBILINOGEN UR QL STRIP: ABNORMAL
VENTILATOR MODE: ABNORMAL
WBC # BLD AUTO: 9.07 10*3/MM3 (ref 3.4–10.8)
WHOLE BLOOD HOLD SPECIMEN: NORMAL
WHOLE BLOOD HOLD SPECIMEN: NORMAL

## 2021-05-21 PROCEDURE — 83605 ASSAY OF LACTIC ACID: CPT | Performed by: EMERGENCY MEDICINE

## 2021-05-21 PROCEDURE — 85610 PROTHROMBIN TIME: CPT | Performed by: EMERGENCY MEDICINE

## 2021-05-21 PROCEDURE — 84145 PROCALCITONIN (PCT): CPT | Performed by: EMERGENCY MEDICINE

## 2021-05-21 PROCEDURE — 80307 DRUG TEST PRSMV CHEM ANLYZR: CPT | Performed by: EMERGENCY MEDICINE

## 2021-05-21 PROCEDURE — 94799 UNLISTED PULMONARY SVC/PX: CPT

## 2021-05-21 PROCEDURE — 36600 WITHDRAWAL OF ARTERIAL BLOOD: CPT

## 2021-05-21 PROCEDURE — 85652 RBC SED RATE AUTOMATED: CPT | Performed by: EMERGENCY MEDICINE

## 2021-05-21 PROCEDURE — 83036 HEMOGLOBIN GLYCOSYLATED A1C: CPT | Performed by: PHYSICIAN ASSISTANT

## 2021-05-21 PROCEDURE — 83050 HGB METHEMOGLOBIN QUAN: CPT

## 2021-05-21 PROCEDURE — 94660 CPAP INITIATION&MGMT: CPT

## 2021-05-21 PROCEDURE — 84443 ASSAY THYROID STIM HORMONE: CPT | Performed by: EMERGENCY MEDICINE

## 2021-05-21 PROCEDURE — 82375 ASSAY CARBOXYHB QUANT: CPT

## 2021-05-21 PROCEDURE — 87636 SARSCOV2 & INF A&B AMP PRB: CPT | Performed by: EMERGENCY MEDICINE

## 2021-05-21 PROCEDURE — 83690 ASSAY OF LIPASE: CPT | Performed by: EMERGENCY MEDICINE

## 2021-05-21 PROCEDURE — 82550 ASSAY OF CK (CPK): CPT | Performed by: EMERGENCY MEDICINE

## 2021-05-21 PROCEDURE — 86140 C-REACTIVE PROTEIN: CPT | Performed by: EMERGENCY MEDICINE

## 2021-05-21 PROCEDURE — 99284 EMERGENCY DEPT VISIT MOD MDM: CPT

## 2021-05-21 PROCEDURE — 85730 THROMBOPLASTIN TIME PARTIAL: CPT | Performed by: EMERGENCY MEDICINE

## 2021-05-21 PROCEDURE — 82805 BLOOD GASES W/O2 SATURATION: CPT

## 2021-05-21 PROCEDURE — 83735 ASSAY OF MAGNESIUM: CPT | Performed by: EMERGENCY MEDICINE

## 2021-05-21 PROCEDURE — 80053 COMPREHEN METABOLIC PANEL: CPT | Performed by: EMERGENCY MEDICINE

## 2021-05-21 PROCEDURE — 93005 ELECTROCARDIOGRAM TRACING: CPT | Performed by: EMERGENCY MEDICINE

## 2021-05-21 PROCEDURE — 94640 AIRWAY INHALATION TREATMENT: CPT

## 2021-05-21 PROCEDURE — 87040 BLOOD CULTURE FOR BACTERIA: CPT | Performed by: EMERGENCY MEDICINE

## 2021-05-21 PROCEDURE — 36415 COLL VENOUS BLD VENIPUNCTURE: CPT

## 2021-05-21 PROCEDURE — 85025 COMPLETE CBC W/AUTO DIFF WBC: CPT | Performed by: EMERGENCY MEDICINE

## 2021-05-21 PROCEDURE — 71250 CT THORAX DX C-: CPT

## 2021-05-21 PROCEDURE — 84439 ASSAY OF FREE THYROXINE: CPT | Performed by: EMERGENCY MEDICINE

## 2021-05-21 PROCEDURE — 81003 URINALYSIS AUTO W/O SCOPE: CPT | Performed by: EMERGENCY MEDICINE

## 2021-05-21 PROCEDURE — 93010 ELECTROCARDIOGRAM REPORT: CPT | Performed by: INTERNAL MEDICINE

## 2021-05-21 PROCEDURE — 25010000002 DEXAMETHASONE PER 1 MG: Performed by: EMERGENCY MEDICINE

## 2021-05-21 PROCEDURE — 82077 ASSAY SPEC XCP UR&BREATH IA: CPT | Performed by: EMERGENCY MEDICINE

## 2021-05-21 PROCEDURE — 85007 BL SMEAR W/DIFF WBC COUNT: CPT | Performed by: EMERGENCY MEDICINE

## 2021-05-21 PROCEDURE — 71045 X-RAY EXAM CHEST 1 VIEW: CPT

## 2021-05-21 PROCEDURE — 83880 ASSAY OF NATRIURETIC PEPTIDE: CPT | Performed by: EMERGENCY MEDICINE

## 2021-05-21 PROCEDURE — 25010000002 CEFTRIAXONE PER 250 MG: Performed by: EMERGENCY MEDICINE

## 2021-05-21 RX ORDER — SODIUM CHLORIDE 0.9 % (FLUSH) 0.9 %
10 SYRINGE (ML) INJECTION AS NEEDED
Status: DISCONTINUED | OUTPATIENT
Start: 2021-05-21 | End: 2021-06-17 | Stop reason: HOSPADM

## 2021-05-21 RX ORDER — IPRATROPIUM BROMIDE AND ALBUTEROL SULFATE 2.5; .5 MG/3ML; MG/3ML
3 SOLUTION RESPIRATORY (INHALATION) ONCE
Status: COMPLETED | OUTPATIENT
Start: 2021-05-21 | End: 2021-05-21

## 2021-05-21 RX ORDER — DEXAMETHASONE SODIUM PHOSPHATE 10 MG/ML
10 INJECTION INTRAMUSCULAR; INTRAVENOUS ONCE
Status: COMPLETED | OUTPATIENT
Start: 2021-05-21 | End: 2021-05-21

## 2021-05-21 RX ADMIN — DOXYCYCLINE 100 MG: 100 INJECTION, POWDER, LYOPHILIZED, FOR SOLUTION INTRAVENOUS at 21:07

## 2021-05-21 RX ADMIN — CEFTRIAXONE 1 G: 1 INJECTION, POWDER, FOR SOLUTION INTRAMUSCULAR; INTRAVENOUS at 21:06

## 2021-05-21 RX ADMIN — IPRATROPIUM BROMIDE AND ALBUTEROL SULFATE 3 ML: .5; 3 SOLUTION RESPIRATORY (INHALATION) at 22:59

## 2021-05-21 RX ADMIN — DEXAMETHASONE SODIUM PHOSPHATE 10 MG: 10 INJECTION INTRAMUSCULAR; INTRAVENOUS at 21:05

## 2021-05-22 ENCOUNTER — APPOINTMENT (OUTPATIENT)
Dept: ULTRASOUND IMAGING | Facility: HOSPITAL | Age: 66
End: 2021-05-22

## 2021-05-22 ENCOUNTER — APPOINTMENT (OUTPATIENT)
Dept: GENERAL RADIOLOGY | Facility: HOSPITAL | Age: 66
End: 2021-05-22

## 2021-05-22 PROBLEM — IMO0001 INSULIN DEPENDENT DIABETES MELLITUS: Chronic | Status: ACTIVE | Noted: 2019-11-25

## 2021-05-22 PROBLEM — I10 ESSENTIAL HYPERTENSION: Chronic | Status: ACTIVE | Noted: 2019-11-25

## 2021-05-22 PROBLEM — E78.5 HYPERLIPIDEMIA LDL GOAL <70: Chronic | Status: ACTIVE | Noted: 2019-11-25

## 2021-05-22 PROBLEM — J96.90 RESPIRATORY FAILURE (HCC): Status: ACTIVE | Noted: 2021-05-22

## 2021-05-22 LAB
A-A DO2: 41.1 MMHG (ref 0–300)
ALBUMIN SERPL-MCNC: 3.68 G/DL (ref 3.5–5.2)
ALBUMIN/GLOB SERPL: 1.2 G/DL
ALP SERPL-CCNC: 62 U/L (ref 39–117)
ALT SERPL W P-5'-P-CCNC: 27 U/L (ref 1–41)
ANION GAP SERPL CALCULATED.3IONS-SCNC: 7.3 MMOL/L (ref 5–15)
ANISOCYTOSIS BLD QL: NORMAL
ARTERIAL PATENCY WRIST A: POSITIVE
AST SERPL-CCNC: 15 U/L (ref 1–40)
ATMOSPHERIC PRESS: 738 MMHG
BASE EXCESS BLDA CALC-SCNC: 7.6 MMOL/L (ref 0–2)
BASOPHILS # BLD AUTO: 0.01 10*3/MM3 (ref 0–0.2)
BASOPHILS NFR BLD AUTO: 0.1 % (ref 0–1.5)
BDY SITE: ABNORMAL
BILIRUB SERPL-MCNC: 0.3 MG/DL (ref 0–1.2)
BODY TEMPERATURE: 0 C
BUN SERPL-MCNC: 21 MG/DL (ref 8–23)
BUN/CREAT SERPL: 26.9 (ref 7–25)
CALCIUM SPEC-SCNC: 9.3 MG/DL (ref 8.6–10.5)
CHLORIDE SERPL-SCNC: 105 MMOL/L (ref 98–107)
CO2 BLDA-SCNC: 38 MMOL/L (ref 22–33)
CO2 SERPL-SCNC: 30.7 MMOL/L (ref 22–29)
COHGB MFR BLD: 1.2 % (ref 0–5)
CREAT SERPL-MCNC: 0.78 MG/DL (ref 0.76–1.27)
DEPRECATED RDW RBC AUTO: 51.7 FL (ref 37–54)
EOSINOPHIL # BLD AUTO: 0 10*3/MM3 (ref 0–0.4)
EOSINOPHIL NFR BLD AUTO: 0 % (ref 0.3–6.2)
EPAP: 8
ERYTHROCYTE [DISTWIDTH] IN BLOOD BY AUTOMATED COUNT: 16.1 % (ref 12.3–15.4)
GFR SERPL CREATININE-BSD FRML MDRD: 100 ML/MIN/1.73
GLOBULIN UR ELPH-MCNC: 3.1 GM/DL
GLUCOSE BLDC GLUCOMTR-MCNC: 218 MG/DL (ref 70–130)
GLUCOSE BLDC GLUCOMTR-MCNC: 236 MG/DL (ref 70–130)
GLUCOSE BLDC GLUCOMTR-MCNC: 274 MG/DL (ref 70–130)
GLUCOSE BLDC GLUCOMTR-MCNC: 299 MG/DL (ref 70–130)
GLUCOSE SERPL-MCNC: 237 MG/DL (ref 65–99)
HBA1C MFR BLD: 8.8 % (ref 4.8–5.6)
HCO3 BLDA-SCNC: 35.9 MMOL/L (ref 20–26)
HCT VFR BLD AUTO: 43.9 % (ref 37.5–51)
HCT VFR BLD CALC: 38.9 % (ref 38–51)
HGB BLD-MCNC: 12.1 G/DL (ref 13–17.7)
HGB BLDA-MCNC: 12.7 G/DL (ref 14–18)
HOLD SPECIMEN: NORMAL
HYPOCHROMIA BLD QL: NORMAL
IMM GRANULOCYTES # BLD AUTO: 0.09 10*3/MM3 (ref 0–0.05)
IMM GRANULOCYTES NFR BLD AUTO: 1.2 % (ref 0–0.5)
INHALED O2 CONCENTRATION: 32 %
IPAP: 20
LYMPHOCYTES # BLD AUTO: 0.47 10*3/MM3 (ref 0.7–3.1)
LYMPHOCYTES NFR BLD AUTO: 6.4 % (ref 19.6–45.3)
Lab: ABNORMAL
Lab: ABNORMAL
MAGNESIUM SERPL-MCNC: 2.3 MG/DL (ref 1.6–2.4)
MCH RBC QN AUTO: 24.1 PG (ref 26.6–33)
MCHC RBC AUTO-ENTMCNC: 27.6 G/DL (ref 31.5–35.7)
MCV RBC AUTO: 87.3 FL (ref 79–97)
METHGB BLD QL: 0 % (ref 0–3)
MODALITY: ABNORMAL
MONOCYTES # BLD AUTO: 0.16 10*3/MM3 (ref 0.1–0.9)
MONOCYTES NFR BLD AUTO: 2.2 % (ref 5–12)
NEUTROPHILS NFR BLD AUTO: 6.61 10*3/MM3 (ref 1.7–7)
NEUTROPHILS NFR BLD AUTO: 90.1 % (ref 42.7–76)
NOTE: ABNORMAL
NOTIFIED BY: ABNORMAL
NOTIFIED WHO: ABNORMAL
NRBC BLD AUTO-RTO: 0 /100 WBC (ref 0–0.2)
OXYHGB MFR BLDV: 96.9 % (ref 94–99)
PCO2 BLDA: 68.4 MM HG (ref 35–45)
PCO2 TEMP ADJ BLD: ABNORMAL MM[HG]
PH BLDA: 7.33 PH UNITS (ref 7.35–7.45)
PH, TEMP CORRECTED: ABNORMAL
PHOSPHATE SERPL-MCNC: 3.9 MG/DL (ref 2.5–4.5)
PLAT MORPH BLD: NORMAL
PLATELET # BLD AUTO: 267 10*3/MM3 (ref 140–450)
PMV BLD AUTO: 9.2 FL (ref 6–12)
PO2 BLDA: 104 MM HG (ref 83–108)
PO2 TEMP ADJ BLD: ABNORMAL MM[HG]
POTASSIUM SERPL-SCNC: 4.7 MMOL/L (ref 3.5–5.2)
PROCALCITONIN SERPL-MCNC: 0.06 NG/ML (ref 0–0.25)
PROT SERPL-MCNC: 6.8 G/DL (ref 6–8.5)
RBC # BLD AUTO: 5.03 10*6/MM3 (ref 4.14–5.8)
SAO2 % BLDCOA: 98 % (ref 94–99)
SET MECH RESP RATE: 22
SODIUM SERPL-SCNC: 143 MMOL/L (ref 136–145)
VENTILATOR MODE: ABNORMAL
WBC # BLD AUTO: 7.34 10*3/MM3 (ref 3.4–10.8)

## 2021-05-22 PROCEDURE — 85025 COMPLETE CBC W/AUTO DIFF WBC: CPT | Performed by: INTERNAL MEDICINE

## 2021-05-22 PROCEDURE — 25010000002 CEFTRIAXONE PER 250 MG: Performed by: PHYSICIAN ASSISTANT

## 2021-05-22 PROCEDURE — 74018 RADEX ABDOMEN 1 VIEW: CPT

## 2021-05-22 PROCEDURE — 73610 X-RAY EXAM OF ANKLE: CPT | Performed by: RADIOLOGY

## 2021-05-22 PROCEDURE — 36600 WITHDRAWAL OF ARTERIAL BLOOD: CPT

## 2021-05-22 PROCEDURE — 94799 UNLISTED PULMONARY SVC/PX: CPT

## 2021-05-22 PROCEDURE — 25010000002 METHYLPREDNISOLONE PER 40 MG: Performed by: PHYSICIAN ASSISTANT

## 2021-05-22 PROCEDURE — 83735 ASSAY OF MAGNESIUM: CPT | Performed by: PHYSICIAN ASSISTANT

## 2021-05-22 PROCEDURE — 82962 GLUCOSE BLOOD TEST: CPT

## 2021-05-22 PROCEDURE — 84100 ASSAY OF PHOSPHORUS: CPT | Performed by: PHYSICIAN ASSISTANT

## 2021-05-22 PROCEDURE — 82375 ASSAY CARBOXYHB QUANT: CPT

## 2021-05-22 PROCEDURE — 82805 BLOOD GASES W/O2 SATURATION: CPT

## 2021-05-22 PROCEDURE — 74018 RADEX ABDOMEN 1 VIEW: CPT | Performed by: RADIOLOGY

## 2021-05-22 PROCEDURE — 25010000002 AZITHROMYCIN PER 500 MG: Performed by: PHYSICIAN ASSISTANT

## 2021-05-22 PROCEDURE — 85007 BL SMEAR W/DIFF WBC COUNT: CPT | Performed by: INTERNAL MEDICINE

## 2021-05-22 PROCEDURE — 83050 HGB METHEMOGLOBIN QUAN: CPT

## 2021-05-22 PROCEDURE — 63710000001 INSULIN ASPART PER 5 UNITS: Performed by: PHYSICIAN ASSISTANT

## 2021-05-22 PROCEDURE — 93971 EXTREMITY STUDY: CPT | Performed by: RADIOLOGY

## 2021-05-22 PROCEDURE — 99223 1ST HOSP IP/OBS HIGH 75: CPT | Performed by: INTERNAL MEDICINE

## 2021-05-22 PROCEDURE — 25010000002 HYDRALAZINE PER 20 MG: Performed by: PHYSICIAN ASSISTANT

## 2021-05-22 PROCEDURE — 80053 COMPREHEN METABOLIC PANEL: CPT | Performed by: INTERNAL MEDICINE

## 2021-05-22 PROCEDURE — 73610 X-RAY EXAM OF ANKLE: CPT

## 2021-05-22 PROCEDURE — 94660 CPAP INITIATION&MGMT: CPT

## 2021-05-22 PROCEDURE — 93971 EXTREMITY STUDY: CPT

## 2021-05-22 PROCEDURE — 25010000002 HEPARIN (PORCINE) PER 1000 UNITS: Performed by: INTERNAL MEDICINE

## 2021-05-22 RX ORDER — ACETAMINOPHEN 325 MG/1
650 TABLET ORAL EVERY 6 HOURS PRN
Status: DISCONTINUED | OUTPATIENT
Start: 2021-05-22 | End: 2021-06-12

## 2021-05-22 RX ORDER — CARVEDILOL 25 MG/1
25 TABLET ORAL 2 TIMES DAILY WITH MEALS
COMMUNITY
End: 2021-06-16 | Stop reason: HOSPADM

## 2021-05-22 RX ORDER — LIDOCAINE 50 MG/G
1 PATCH TOPICAL EVERY 24 HOURS
COMMUNITY

## 2021-05-22 RX ORDER — SODIUM CHLORIDE 0.9 % (FLUSH) 0.9 %
10 SYRINGE (ML) INJECTION AS NEEDED
Status: DISCONTINUED | OUTPATIENT
Start: 2021-05-22 | End: 2021-06-17 | Stop reason: HOSPADM

## 2021-05-22 RX ORDER — SILDENAFIL CITRATE 20 MG/1
20 TABLET ORAL AS NEEDED
COMMUNITY
End: 2021-06-16 | Stop reason: HOSPADM

## 2021-05-22 RX ORDER — INSULIN LISPRO 100 [IU]/ML
35 INJECTION, SOLUTION INTRAVENOUS; SUBCUTANEOUS 3 TIMES DAILY
COMMUNITY
End: 2021-06-16 | Stop reason: HOSPADM

## 2021-05-22 RX ORDER — GABAPENTIN 600 MG/1
600 TABLET ORAL 3 TIMES DAILY
COMMUNITY
End: 2021-06-16 | Stop reason: HOSPADM

## 2021-05-22 RX ORDER — IPRATROPIUM BROMIDE AND ALBUTEROL SULFATE 2.5; .5 MG/3ML; MG/3ML
3 SOLUTION RESPIRATORY (INHALATION) ONCE
Status: COMPLETED | OUTPATIENT
Start: 2021-05-22 | End: 2021-05-22

## 2021-05-22 RX ORDER — DAPAGLIFLOZIN 10 MG/1
10 TABLET, FILM COATED ORAL DAILY
COMMUNITY
End: 2021-08-12 | Stop reason: HOSPADM

## 2021-05-22 RX ORDER — POLYETHYLENE GLYCOL 3350 17 G/17G
17 POWDER, FOR SOLUTION ORAL DAILY
Status: DISCONTINUED | OUTPATIENT
Start: 2021-05-22 | End: 2021-06-17 | Stop reason: HOSPADM

## 2021-05-22 RX ORDER — AMOXICILLIN 250 MG
1 CAPSULE ORAL NIGHTLY PRN
Status: DISCONTINUED | OUTPATIENT
Start: 2021-05-22 | End: 2021-06-17 | Stop reason: HOSPADM

## 2021-05-22 RX ORDER — NICOTINE POLACRILEX 4 MG
15 LOZENGE BUCCAL
Status: DISCONTINUED | OUTPATIENT
Start: 2021-05-22 | End: 2021-06-17 | Stop reason: HOSPADM

## 2021-05-22 RX ORDER — CALCIUM CARBONATE 200(500)MG
2 TABLET,CHEWABLE ORAL 3 TIMES DAILY PRN
Status: DISCONTINUED | OUTPATIENT
Start: 2021-05-22 | End: 2021-06-17 | Stop reason: HOSPADM

## 2021-05-22 RX ORDER — CHOLECALCIFEROL (VITAMIN D3) 125 MCG
5 CAPSULE ORAL NIGHTLY PRN
Status: DISCONTINUED | OUTPATIENT
Start: 2021-05-22 | End: 2021-06-17 | Stop reason: HOSPADM

## 2021-05-22 RX ORDER — AMOXICILLIN AND CLAVULANATE POTASSIUM 875; 125 MG/1; MG/1
1 TABLET, FILM COATED ORAL EVERY 12 HOURS SCHEDULED
Status: CANCELLED | OUTPATIENT
Start: 2021-05-22 | End: 2021-05-30

## 2021-05-22 RX ORDER — AMOXICILLIN AND CLAVULANATE POTASSIUM 875; 125 MG/1; MG/1
1 TABLET, FILM COATED ORAL 2 TIMES DAILY
COMMUNITY
End: 2021-06-16 | Stop reason: HOSPADM

## 2021-05-22 RX ORDER — HYDROXYZINE HYDROCHLORIDE 25 MG/1
25 TABLET, FILM COATED ORAL 3 TIMES DAILY PRN
Status: DISCONTINUED | OUTPATIENT
Start: 2021-05-22 | End: 2021-06-17 | Stop reason: HOSPADM

## 2021-05-22 RX ORDER — GABAPENTIN 300 MG/1
600 CAPSULE ORAL 3 TIMES DAILY
Status: DISCONTINUED | OUTPATIENT
Start: 2021-05-22 | End: 2021-05-23 | Stop reason: SDUPTHER

## 2021-05-22 RX ORDER — MAGNESIUM OXIDE 400 MG/1
400 TABLET ORAL DAILY
Status: ON HOLD | COMMUNITY
End: 2021-08-01

## 2021-05-22 RX ORDER — SODIUM CHLORIDE 0.9 % (FLUSH) 0.9 %
10 SYRINGE (ML) INJECTION EVERY 12 HOURS SCHEDULED
Status: DISCONTINUED | OUTPATIENT
Start: 2021-05-22 | End: 2021-06-17 | Stop reason: HOSPADM

## 2021-05-22 RX ORDER — DEXTROSE MONOHYDRATE 25 G/50ML
25 INJECTION, SOLUTION INTRAVENOUS
Status: DISCONTINUED | OUTPATIENT
Start: 2021-05-22 | End: 2021-06-17 | Stop reason: HOSPADM

## 2021-05-22 RX ORDER — L.ACID,PARA/B.BIFIDUM/S.THERM 8B CELL
1 CAPSULE ORAL DAILY
Status: DISCONTINUED | OUTPATIENT
Start: 2021-05-22 | End: 2021-06-01

## 2021-05-22 RX ORDER — PANTOPRAZOLE SODIUM 40 MG/1
40 TABLET, DELAYED RELEASE ORAL DAILY
COMMUNITY

## 2021-05-22 RX ORDER — NITROGLYCERIN 0.4 MG/1
0.4 TABLET SUBLINGUAL
Status: DISCONTINUED | OUTPATIENT
Start: 2021-05-22 | End: 2021-06-17 | Stop reason: HOSPADM

## 2021-05-22 RX ORDER — HEPARIN SODIUM 5000 [USP'U]/ML
5000 INJECTION, SOLUTION INTRAVENOUS; SUBCUTANEOUS EVERY 12 HOURS SCHEDULED
Status: DISCONTINUED | OUTPATIENT
Start: 2021-05-22 | End: 2021-05-23

## 2021-05-22 RX ORDER — IBUPROFEN 800 MG/1
800 TABLET ORAL ONCE
Status: COMPLETED | OUTPATIENT
Start: 2021-05-22 | End: 2021-05-22

## 2021-05-22 RX ORDER — HYDRALAZINE HYDROCHLORIDE 20 MG/ML
10 INJECTION INTRAMUSCULAR; INTRAVENOUS EVERY 6 HOURS PRN
Status: DISCONTINUED | OUTPATIENT
Start: 2021-05-22 | End: 2021-06-17 | Stop reason: HOSPADM

## 2021-05-22 RX ORDER — FUROSEMIDE 40 MG/1
80 TABLET ORAL DAILY
COMMUNITY
End: 2021-06-16 | Stop reason: HOSPADM

## 2021-05-22 RX ORDER — PANTOPRAZOLE SODIUM 40 MG/1
40 TABLET, DELAYED RELEASE ORAL
Status: DISCONTINUED | OUTPATIENT
Start: 2021-05-22 | End: 2021-06-17 | Stop reason: HOSPADM

## 2021-05-22 RX ORDER — IPRATROPIUM BROMIDE AND ALBUTEROL SULFATE 2.5; .5 MG/3ML; MG/3ML
3 SOLUTION RESPIRATORY (INHALATION)
Status: DISCONTINUED | OUTPATIENT
Start: 2021-05-22 | End: 2021-05-27

## 2021-05-22 RX ORDER — LIDOCAINE 50 MG/G
2 PATCH TOPICAL
Status: DISCONTINUED | OUTPATIENT
Start: 2021-05-22 | End: 2021-06-17 | Stop reason: HOSPADM

## 2021-05-22 RX ORDER — METHYLPREDNISOLONE SODIUM SUCCINATE 40 MG/ML
40 INJECTION, POWDER, LYOPHILIZED, FOR SOLUTION INTRAMUSCULAR; INTRAVENOUS EVERY 12 HOURS
Status: DISCONTINUED | OUTPATIENT
Start: 2021-05-22 | End: 2021-05-24

## 2021-05-22 RX ADMIN — IPRATROPIUM BROMIDE AND ALBUTEROL SULFATE 3 ML: .5; 3 SOLUTION RESPIRATORY (INHALATION) at 18:27

## 2021-05-22 RX ADMIN — METHYLPREDNISOLONE SODIUM SUCCINATE 40 MG: 40 INJECTION, POWDER, FOR SOLUTION INTRAMUSCULAR; INTRAVENOUS at 04:48

## 2021-05-22 RX ADMIN — IBUPROFEN 800 MG: 800 TABLET, FILM COATED ORAL at 22:12

## 2021-05-22 RX ADMIN — IPRATROPIUM BROMIDE AND ALBUTEROL SULFATE 3 ML: .5; 3 SOLUTION RESPIRATORY (INHALATION) at 23:48

## 2021-05-22 RX ADMIN — CEFTRIAXONE 1 G: 1 INJECTION, POWDER, FOR SOLUTION INTRAMUSCULAR; INTRAVENOUS at 19:52

## 2021-05-22 RX ADMIN — GABAPENTIN 600 MG: 300 CAPSULE ORAL at 17:22

## 2021-05-22 RX ADMIN — PANTOPRAZOLE SODIUM 40 MG: 40 TABLET, DELAYED RELEASE ORAL at 06:19

## 2021-05-22 RX ADMIN — Medication 1 CAPSULE: at 09:32

## 2021-05-22 RX ADMIN — HYDROXYZINE HYDROCHLORIDE 25 MG: 25 TABLET, FILM COATED ORAL at 20:12

## 2021-05-22 RX ADMIN — AZITHROMYCIN MONOHYDRATE 500 MG: 500 INJECTION, POWDER, LYOPHILIZED, FOR SOLUTION INTRAVENOUS at 04:48

## 2021-05-22 RX ADMIN — GABAPENTIN 600 MG: 300 CAPSULE ORAL at 19:53

## 2021-05-22 RX ADMIN — IPRATROPIUM BROMIDE AND ALBUTEROL SULFATE 3 ML: .5; 3 SOLUTION RESPIRATORY (INHALATION) at 04:01

## 2021-05-22 RX ADMIN — INSULIN ASPART 6 UNITS: 100 INJECTION, SOLUTION INTRAVENOUS; SUBCUTANEOUS at 17:22

## 2021-05-22 RX ADMIN — LIDOCAINE 2 PATCH: 50 PATCH CUTANEOUS at 09:32

## 2021-05-22 RX ADMIN — GABAPENTIN 600 MG: 300 CAPSULE ORAL at 06:16

## 2021-05-22 RX ADMIN — IPRATROPIUM BROMIDE AND ALBUTEROL SULFATE 3 ML: .5; 3 SOLUTION RESPIRATORY (INHALATION) at 14:05

## 2021-05-22 RX ADMIN — SODIUM CHLORIDE, PRESERVATIVE FREE 10 ML: 5 INJECTION INTRAVENOUS at 19:53

## 2021-05-22 RX ADMIN — IPRATROPIUM BROMIDE AND ALBUTEROL SULFATE 3 ML: .5; 3 SOLUTION RESPIRATORY (INHALATION) at 07:19

## 2021-05-22 RX ADMIN — HEPARIN SODIUM 5000 UNITS: 5000 INJECTION INTRAVENOUS; SUBCUTANEOUS at 09:32

## 2021-05-22 RX ADMIN — ACETAMINOPHEN 650 MG: 325 TABLET ORAL at 06:18

## 2021-05-22 RX ADMIN — HYDRALAZINE HYDROCHLORIDE 10 MG: 20 INJECTION INTRAMUSCULAR; INTRAVENOUS at 04:49

## 2021-05-22 RX ADMIN — HEPARIN SODIUM 5000 UNITS: 5000 INJECTION INTRAVENOUS; SUBCUTANEOUS at 19:52

## 2021-05-22 RX ADMIN — METHYLPREDNISOLONE SODIUM SUCCINATE 40 MG: 40 INJECTION, POWDER, FOR SOLUTION INTRAMUSCULAR; INTRAVENOUS at 17:22

## 2021-05-22 RX ADMIN — POLYETHYLENE GLYCOL (3350) 17 G: 17 POWDER, FOR SOLUTION ORAL at 09:33

## 2021-05-22 RX ADMIN — Medication 5 MG: at 20:12

## 2021-05-22 RX ADMIN — INSULIN ASPART 4 UNITS: 100 INJECTION, SOLUTION INTRAVENOUS; SUBCUTANEOUS at 09:32

## 2021-05-22 RX ADMIN — SODIUM CHLORIDE, PRESERVATIVE FREE 10 ML: 5 INJECTION INTRAVENOUS at 09:33

## 2021-05-22 RX ADMIN — IPRATROPIUM BROMIDE AND ALBUTEROL SULFATE 3 ML: .5; 3 SOLUTION RESPIRATORY (INHALATION) at 10:56

## 2021-05-22 RX ADMIN — ACETAMINOPHEN 650 MG: 325 TABLET ORAL at 17:22

## 2021-05-22 RX ADMIN — IPRATROPIUM BROMIDE AND ALBUTEROL SULFATE 3 ML: .5; 3 SOLUTION RESPIRATORY (INHALATION) at 00:43

## 2021-05-22 RX ADMIN — INSULIN ASPART 4 UNITS: 100 INJECTION, SOLUTION INTRAVENOUS; SUBCUTANEOUS at 12:50

## 2021-05-23 ENCOUNTER — APPOINTMENT (OUTPATIENT)
Dept: CT IMAGING | Facility: HOSPITAL | Age: 66
End: 2021-05-23

## 2021-05-23 LAB
ANION GAP SERPL CALCULATED.3IONS-SCNC: 10.4 MMOL/L (ref 5–15)
BUN SERPL-MCNC: 29 MG/DL (ref 8–23)
BUN/CREAT SERPL: 31.5 (ref 7–25)
CALCIUM SPEC-SCNC: 9.4 MG/DL (ref 8.6–10.5)
CHLORIDE SERPL-SCNC: 100 MMOL/L (ref 98–107)
CO2 SERPL-SCNC: 27.6 MMOL/L (ref 22–29)
CREAT SERPL-MCNC: 0.92 MG/DL (ref 0.76–1.27)
DEPRECATED RDW RBC AUTO: 51.7 FL (ref 37–54)
ERYTHROCYTE [DISTWIDTH] IN BLOOD BY AUTOMATED COUNT: 16.3 % (ref 12.3–15.4)
GFR SERPL CREATININE-BSD FRML MDRD: 82 ML/MIN/1.73
GLUCOSE BLDC GLUCOMTR-MCNC: 148 MG/DL (ref 70–130)
GLUCOSE BLDC GLUCOMTR-MCNC: 195 MG/DL (ref 70–130)
GLUCOSE BLDC GLUCOMTR-MCNC: 210 MG/DL (ref 70–130)
GLUCOSE BLDC GLUCOMTR-MCNC: 274 MG/DL (ref 70–130)
GLUCOSE BLDC GLUCOMTR-MCNC: 352 MG/DL (ref 70–130)
GLUCOSE SERPL-MCNC: 351 MG/DL (ref 65–99)
HCT VFR BLD AUTO: 40.8 % (ref 37.5–51)
HGB BLD-MCNC: 11.3 G/DL (ref 13–17.7)
MCH RBC QN AUTO: 24.3 PG (ref 26.6–33)
MCHC RBC AUTO-ENTMCNC: 27.7 G/DL (ref 31.5–35.7)
MCV RBC AUTO: 87.7 FL (ref 79–97)
PLATELET # BLD AUTO: 297 10*3/MM3 (ref 140–450)
PMV BLD AUTO: 9.5 FL (ref 6–12)
POTASSIUM SERPL-SCNC: 4.9 MMOL/L (ref 3.5–5.2)
RBC # BLD AUTO: 4.65 10*6/MM3 (ref 4.14–5.8)
SODIUM SERPL-SCNC: 138 MMOL/L (ref 136–145)
WBC # BLD AUTO: 8.66 10*3/MM3 (ref 3.4–10.8)

## 2021-05-23 PROCEDURE — 25010000002 AZITHROMYCIN PER 500 MG: Performed by: PHYSICIAN ASSISTANT

## 2021-05-23 PROCEDURE — 80048 BASIC METABOLIC PNL TOTAL CA: CPT | Performed by: INTERNAL MEDICINE

## 2021-05-23 PROCEDURE — 94799 UNLISTED PULMONARY SVC/PX: CPT

## 2021-05-23 PROCEDURE — 63710000001 INSULIN ASPART PER 5 UNITS: Performed by: PHYSICIAN ASSISTANT

## 2021-05-23 PROCEDURE — 99232 SBSQ HOSP IP/OBS MODERATE 35: CPT | Performed by: INTERNAL MEDICINE

## 2021-05-23 PROCEDURE — 25010000002 METHYLPREDNISOLONE PER 40 MG: Performed by: PHYSICIAN ASSISTANT

## 2021-05-23 PROCEDURE — 72131 CT LUMBAR SPINE W/O DYE: CPT

## 2021-05-23 PROCEDURE — 63710000001 INSULIN ASPART PER 5 UNITS: Performed by: INTERNAL MEDICINE

## 2021-05-23 PROCEDURE — 85027 COMPLETE CBC AUTOMATED: CPT | Performed by: INTERNAL MEDICINE

## 2021-05-23 PROCEDURE — 25010000002 HEPARIN (PORCINE) PER 1000 UNITS: Performed by: INTERNAL MEDICINE

## 2021-05-23 PROCEDURE — 25010000002 CEFTRIAXONE PER 250 MG: Performed by: PHYSICIAN ASSISTANT

## 2021-05-23 PROCEDURE — 82962 GLUCOSE BLOOD TEST: CPT

## 2021-05-23 PROCEDURE — 72131 CT LUMBAR SPINE W/O DYE: CPT | Performed by: RADIOLOGY

## 2021-05-23 PROCEDURE — 94660 CPAP INITIATION&MGMT: CPT

## 2021-05-23 RX ORDER — IPRATROPIUM BROMIDE AND ALBUTEROL SULFATE 2.5; .5 MG/3ML; MG/3ML
3 SOLUTION RESPIRATORY (INHALATION) EVERY 4 HOURS PRN
Status: DISCONTINUED | OUTPATIENT
Start: 2021-05-23 | End: 2021-06-17 | Stop reason: HOSPADM

## 2021-05-23 RX ORDER — ALBUTEROL SULFATE 2.5 MG/3ML
2.5 SOLUTION RESPIRATORY (INHALATION) EVERY 6 HOURS PRN
Status: DISCONTINUED | OUTPATIENT
Start: 2021-05-23 | End: 2021-06-17 | Stop reason: HOSPADM

## 2021-05-23 RX ORDER — TAMSULOSIN HYDROCHLORIDE 0.4 MG/1
0.4 CAPSULE ORAL NIGHTLY
Status: DISCONTINUED | OUTPATIENT
Start: 2021-05-23 | End: 2021-06-17 | Stop reason: HOSPADM

## 2021-05-23 RX ORDER — BUDESONIDE AND FORMOTEROL FUMARATE DIHYDRATE 160; 4.5 UG/1; UG/1
2 AEROSOL RESPIRATORY (INHALATION)
Status: DISCONTINUED | OUTPATIENT
Start: 2021-05-23 | End: 2021-06-17 | Stop reason: HOSPADM

## 2021-05-23 RX ORDER — CARVEDILOL 25 MG/1
25 TABLET ORAL 2 TIMES DAILY WITH MEALS
Status: DISCONTINUED | OUTPATIENT
Start: 2021-05-23 | End: 2021-05-25

## 2021-05-23 RX ORDER — TRAMADOL HYDROCHLORIDE 50 MG/1
50 TABLET ORAL EVERY 6 HOURS PRN
Status: DISPENSED | OUTPATIENT
Start: 2021-05-23 | End: 2021-06-04

## 2021-05-23 RX ORDER — FUROSEMIDE 80 MG
80 TABLET ORAL DAILY
Status: DISCONTINUED | OUTPATIENT
Start: 2021-05-23 | End: 2021-05-25

## 2021-05-23 RX ORDER — ATORVASTATIN CALCIUM 20 MG/1
20 TABLET, FILM COATED ORAL DAILY
Status: DISCONTINUED | OUTPATIENT
Start: 2021-05-23 | End: 2021-06-17 | Stop reason: HOSPADM

## 2021-05-23 RX ORDER — GABAPENTIN 300 MG/1
600 CAPSULE ORAL EVERY 8 HOURS SCHEDULED
Status: DISCONTINUED | OUTPATIENT
Start: 2021-05-23 | End: 2021-06-10

## 2021-05-23 RX ORDER — BUDESONIDE AND FORMOTEROL FUMARATE DIHYDRATE 80; 4.5 UG/1; UG/1
2 AEROSOL RESPIRATORY (INHALATION)
Status: DISCONTINUED | OUTPATIENT
Start: 2021-05-23 | End: 2021-05-23

## 2021-05-23 RX ORDER — LIDOCAINE 50 MG/G
1 PATCH TOPICAL EVERY 24 HOURS
Status: DISCONTINUED | OUTPATIENT
Start: 2021-05-23 | End: 2021-05-23 | Stop reason: SDUPTHER

## 2021-05-23 RX ORDER — PANTOPRAZOLE SODIUM 40 MG/1
40 TABLET, DELAYED RELEASE ORAL DAILY
Status: DISCONTINUED | OUTPATIENT
Start: 2021-05-23 | End: 2021-05-23

## 2021-05-23 RX ORDER — FLUTICASONE PROPIONATE 50 MCG
2 SPRAY, SUSPENSION (ML) NASAL DAILY
Status: DISCONTINUED | OUTPATIENT
Start: 2021-05-23 | End: 2021-06-17 | Stop reason: HOSPADM

## 2021-05-23 RX ORDER — DOCUSATE SODIUM 100 MG/1
100 CAPSULE, LIQUID FILLED ORAL 2 TIMES DAILY
Status: DISCONTINUED | OUTPATIENT
Start: 2021-05-23 | End: 2021-06-13

## 2021-05-23 RX ADMIN — ATORVASTATIN CALCIUM 20 MG: 20 TABLET, FILM COATED ORAL at 12:17

## 2021-05-23 RX ADMIN — AZITHROMYCIN MONOHYDRATE 500 MG: 500 INJECTION, POWDER, LYOPHILIZED, FOR SOLUTION INTRAVENOUS at 03:54

## 2021-05-23 RX ADMIN — IPRATROPIUM BROMIDE AND ALBUTEROL SULFATE 3 ML: .5; 3 SOLUTION RESPIRATORY (INHALATION) at 23:45

## 2021-05-23 RX ADMIN — TRAMADOL HYDROCHLORIDE 50 MG: 50 TABLET, FILM COATED ORAL at 11:09

## 2021-05-23 RX ADMIN — BUDESONIDE AND FORMOTEROL FUMARATE DIHYDRATE 2 PUFF: 160; 4.5 AEROSOL RESPIRATORY (INHALATION) at 12:55

## 2021-05-23 RX ADMIN — IPRATROPIUM BROMIDE AND ALBUTEROL SULFATE 3 ML: .5; 3 SOLUTION RESPIRATORY (INHALATION) at 10:28

## 2021-05-23 RX ADMIN — POLYETHYLENE GLYCOL (3350) 17 G: 17 POWDER, FOR SOLUTION ORAL at 08:43

## 2021-05-23 RX ADMIN — INSULIN ASPART 4 UNITS: 100 INJECTION, SOLUTION INTRAVENOUS; SUBCUTANEOUS at 08:43

## 2021-05-23 RX ADMIN — LIDOCAINE 2 PATCH: 50 PATCH CUTANEOUS at 08:43

## 2021-05-23 RX ADMIN — INSULIN ASPART 35 UNITS: 100 INJECTION, SOLUTION INTRAVENOUS; SUBCUTANEOUS at 12:18

## 2021-05-23 RX ADMIN — METHYLPREDNISOLONE SODIUM SUCCINATE 40 MG: 40 INJECTION, POWDER, FOR SOLUTION INTRAMUSCULAR; INTRAVENOUS at 17:56

## 2021-05-23 RX ADMIN — CARVEDILOL 25 MG: 25 TABLET, FILM COATED ORAL at 17:56

## 2021-05-23 RX ADMIN — GABAPENTIN 600 MG: 300 CAPSULE ORAL at 21:11

## 2021-05-23 RX ADMIN — MAGNESIUM GLUCONATE 500 MG ORAL TABLET 400 MG: 500 TABLET ORAL at 12:18

## 2021-05-23 RX ADMIN — TRAMADOL HYDROCHLORIDE 50 MG: 50 TABLET, FILM COATED ORAL at 21:11

## 2021-05-23 RX ADMIN — GABAPENTIN 600 MG: 300 CAPSULE ORAL at 08:42

## 2021-05-23 RX ADMIN — INSULIN ASPART 8 UNITS: 100 INJECTION, SOLUTION INTRAVENOUS; SUBCUTANEOUS at 11:09

## 2021-05-23 RX ADMIN — CEFTRIAXONE 1 G: 1 INJECTION, POWDER, FOR SOLUTION INTRAMUSCULAR; INTRAVENOUS at 21:11

## 2021-05-23 RX ADMIN — APIXABAN 5 MG: 5 TABLET, FILM COATED ORAL at 21:11

## 2021-05-23 RX ADMIN — CARVEDILOL 25 MG: 25 TABLET, FILM COATED ORAL at 12:17

## 2021-05-23 RX ADMIN — IPRATROPIUM BROMIDE AND ALBUTEROL SULFATE 3 ML: .5; 3 SOLUTION RESPIRATORY (INHALATION) at 14:54

## 2021-05-23 RX ADMIN — FLUTICASONE PROPIONATE 2 SPRAY: 50 SPRAY, METERED NASAL at 12:18

## 2021-05-23 RX ADMIN — HEPARIN SODIUM 5000 UNITS: 5000 INJECTION INTRAVENOUS; SUBCUTANEOUS at 08:43

## 2021-05-23 RX ADMIN — SODIUM CHLORIDE, PRESERVATIVE FREE 10 ML: 5 INJECTION INTRAVENOUS at 08:46

## 2021-05-23 RX ADMIN — APIXABAN 5 MG: 5 TABLET, FILM COATED ORAL at 12:17

## 2021-05-23 RX ADMIN — FUROSEMIDE 80 MG: 80 TABLET ORAL at 12:17

## 2021-05-23 RX ADMIN — DOCUSATE SODIUM 100 MG: 100 CAPSULE, LIQUID FILLED ORAL at 11:09

## 2021-05-23 RX ADMIN — ACETAMINOPHEN 650 MG: 325 TABLET ORAL at 08:43

## 2021-05-23 RX ADMIN — GABAPENTIN 600 MG: 300 CAPSULE ORAL at 14:24

## 2021-05-23 RX ADMIN — SODIUM CHLORIDE, PRESERVATIVE FREE 10 ML: 5 INJECTION INTRAVENOUS at 21:11

## 2021-05-23 RX ADMIN — Medication 1 CAPSULE: at 08:43

## 2021-05-23 RX ADMIN — TAMSULOSIN HYDROCHLORIDE 0.4 MG: 0.4 CAPSULE ORAL at 21:11

## 2021-05-23 RX ADMIN — IPRATROPIUM BROMIDE AND ALBUTEROL SULFATE 3 ML: .5; 3 SOLUTION RESPIRATORY (INHALATION) at 02:20

## 2021-05-23 RX ADMIN — METHYLPREDNISOLONE SODIUM SUCCINATE 40 MG: 40 INJECTION, POWDER, FOR SOLUTION INTRAMUSCULAR; INTRAVENOUS at 03:54

## 2021-05-23 RX ADMIN — IPRATROPIUM BROMIDE AND ALBUTEROL SULFATE 3 ML: .5; 3 SOLUTION RESPIRATORY (INHALATION) at 06:28

## 2021-05-23 RX ADMIN — CANAGLIFLOZIN 300 MG: 300 TABLET, FILM COATED ORAL at 12:17

## 2021-05-23 RX ADMIN — PANTOPRAZOLE SODIUM 40 MG: 40 TABLET, DELAYED RELEASE ORAL at 03:54

## 2021-05-23 RX ADMIN — DOCUSATE SODIUM 100 MG: 100 CAPSULE, LIQUID FILLED ORAL at 21:11

## 2021-05-24 ENCOUNTER — APPOINTMENT (OUTPATIENT)
Dept: CT IMAGING | Facility: HOSPITAL | Age: 66
End: 2021-05-24

## 2021-05-24 ENCOUNTER — APPOINTMENT (OUTPATIENT)
Dept: GENERAL RADIOLOGY | Facility: HOSPITAL | Age: 66
End: 2021-05-24

## 2021-05-24 LAB
ANION GAP SERPL CALCULATED.3IONS-SCNC: 11 MMOL/L (ref 5–15)
BUN SERPL-MCNC: 35 MG/DL (ref 8–23)
BUN/CREAT SERPL: 39.3 (ref 7–25)
CALCIUM SPEC-SCNC: 9.2 MG/DL (ref 8.6–10.5)
CHLORIDE SERPL-SCNC: 98 MMOL/L (ref 98–107)
CO2 SERPL-SCNC: 29 MMOL/L (ref 22–29)
CREAT SERPL-MCNC: 0.89 MG/DL (ref 0.76–1.27)
DEPRECATED RDW RBC AUTO: 51.1 FL (ref 37–54)
ERYTHROCYTE [DISTWIDTH] IN BLOOD BY AUTOMATED COUNT: 16.2 % (ref 12.3–15.4)
GFR SERPL CREATININE-BSD FRML MDRD: 86 ML/MIN/1.73
GLUCOSE BLDC GLUCOMTR-MCNC: 143 MG/DL (ref 70–130)
GLUCOSE BLDC GLUCOMTR-MCNC: 181 MG/DL (ref 70–130)
GLUCOSE BLDC GLUCOMTR-MCNC: 319 MG/DL (ref 70–130)
GLUCOSE BLDC GLUCOMTR-MCNC: 96 MG/DL (ref 70–130)
GLUCOSE SERPL-MCNC: 227 MG/DL (ref 65–99)
HCT VFR BLD AUTO: 41.3 % (ref 37.5–51)
HGB BLD-MCNC: 12 G/DL (ref 13–17.7)
MCH RBC QN AUTO: 24.9 PG (ref 26.6–33)
MCHC RBC AUTO-ENTMCNC: 29.1 G/DL (ref 31.5–35.7)
MCV RBC AUTO: 85.9 FL (ref 79–97)
PLATELET # BLD AUTO: 274 10*3/MM3 (ref 140–450)
PMV BLD AUTO: 9.3 FL (ref 6–12)
POTASSIUM SERPL-SCNC: 4.7 MMOL/L (ref 3.5–5.2)
RBC # BLD AUTO: 4.81 10*6/MM3 (ref 4.14–5.8)
SODIUM SERPL-SCNC: 138 MMOL/L (ref 136–145)
WBC # BLD AUTO: 8.39 10*3/MM3 (ref 3.4–10.8)

## 2021-05-24 PROCEDURE — 63710000001 INSULIN ASPART PER 5 UNITS: Performed by: INTERNAL MEDICINE

## 2021-05-24 PROCEDURE — 94660 CPAP INITIATION&MGMT: CPT

## 2021-05-24 PROCEDURE — 85027 COMPLETE CBC AUTOMATED: CPT | Performed by: INTERNAL MEDICINE

## 2021-05-24 PROCEDURE — 25010000002 METHYLPREDNISOLONE PER 40 MG: Performed by: PHYSICIAN ASSISTANT

## 2021-05-24 PROCEDURE — 94640 AIRWAY INHALATION TREATMENT: CPT

## 2021-05-24 PROCEDURE — 82962 GLUCOSE BLOOD TEST: CPT

## 2021-05-24 PROCEDURE — 94799 UNLISTED PULMONARY SVC/PX: CPT

## 2021-05-24 PROCEDURE — 73120 X-RAY EXAM OF HAND: CPT

## 2021-05-24 PROCEDURE — 25010000002 AZITHROMYCIN PER 500 MG: Performed by: PHYSICIAN ASSISTANT

## 2021-05-24 PROCEDURE — 97162 PT EVAL MOD COMPLEX 30 MIN: CPT

## 2021-05-24 PROCEDURE — 72125 CT NECK SPINE W/O DYE: CPT | Performed by: RADIOLOGY

## 2021-05-24 PROCEDURE — 80048 BASIC METABOLIC PNL TOTAL CA: CPT | Performed by: INTERNAL MEDICINE

## 2021-05-24 PROCEDURE — 99232 SBSQ HOSP IP/OBS MODERATE 35: CPT | Performed by: INTERNAL MEDICINE

## 2021-05-24 PROCEDURE — 63710000001 INSULIN ASPART PER 5 UNITS: Performed by: PHYSICIAN ASSISTANT

## 2021-05-24 PROCEDURE — 72125 CT NECK SPINE W/O DYE: CPT

## 2021-05-24 PROCEDURE — 73120 X-RAY EXAM OF HAND: CPT | Performed by: RADIOLOGY

## 2021-05-24 RX ORDER — HYDROCODONE BITARTRATE AND ACETAMINOPHEN 5; 325 MG/1; MG/1
1 TABLET ORAL EVERY 8 HOURS PRN
Status: DISCONTINUED | OUTPATIENT
Start: 2021-05-24 | End: 2021-06-01

## 2021-05-24 RX ORDER — CEFDINIR 300 MG/1
300 CAPSULE ORAL EVERY 12 HOURS SCHEDULED
Status: DISPENSED | OUTPATIENT
Start: 2021-05-24 | End: 2021-05-28

## 2021-05-24 RX ADMIN — GABAPENTIN 600 MG: 300 CAPSULE ORAL at 05:53

## 2021-05-24 RX ADMIN — FUROSEMIDE 80 MG: 80 TABLET ORAL at 08:54

## 2021-05-24 RX ADMIN — POLYETHYLENE GLYCOL (3350) 17 G: 17 POWDER, FOR SOLUTION ORAL at 08:54

## 2021-05-24 RX ADMIN — HYDROCODONE BITARTRATE AND ACETAMINOPHEN 1 TABLET: 5; 325 TABLET ORAL at 11:24

## 2021-05-24 RX ADMIN — PANTOPRAZOLE SODIUM 40 MG: 40 TABLET, DELAYED RELEASE ORAL at 05:53

## 2021-05-24 RX ADMIN — INSULIN ASPART 7 UNITS: 100 INJECTION, SOLUTION INTRAVENOUS; SUBCUTANEOUS at 11:24

## 2021-05-24 RX ADMIN — AZITHROMYCIN MONOHYDRATE 500 MG: 500 INJECTION, POWDER, LYOPHILIZED, FOR SOLUTION INTRAVENOUS at 04:58

## 2021-05-24 RX ADMIN — Medication 1 CAPSULE: at 08:54

## 2021-05-24 RX ADMIN — APIXABAN 5 MG: 5 TABLET, FILM COATED ORAL at 08:54

## 2021-05-24 RX ADMIN — CARVEDILOL 25 MG: 25 TABLET, FILM COATED ORAL at 08:54

## 2021-05-24 RX ADMIN — CARVEDILOL 25 MG: 25 TABLET, FILM COATED ORAL at 17:35

## 2021-05-24 RX ADMIN — GABAPENTIN 600 MG: 300 CAPSULE ORAL at 21:22

## 2021-05-24 RX ADMIN — APIXABAN 5 MG: 5 TABLET, FILM COATED ORAL at 21:22

## 2021-05-24 RX ADMIN — DOCUSATE SODIUM 100 MG: 100 CAPSULE, LIQUID FILLED ORAL at 08:54

## 2021-05-24 RX ADMIN — IPRATROPIUM BROMIDE AND ALBUTEROL SULFATE 3 ML: .5; 3 SOLUTION RESPIRATORY (INHALATION) at 18:21

## 2021-05-24 RX ADMIN — BUDESONIDE AND FORMOTEROL FUMARATE DIHYDRATE 2 PUFF: 160; 4.5 AEROSOL RESPIRATORY (INHALATION) at 06:31

## 2021-05-24 RX ADMIN — HYDROCODONE BITARTRATE AND ACETAMINOPHEN 1 TABLET: 5; 325 TABLET ORAL at 21:22

## 2021-05-24 RX ADMIN — IPRATROPIUM BROMIDE AND ALBUTEROL SULFATE 3 ML: .5; 3 SOLUTION RESPIRATORY (INHALATION) at 10:02

## 2021-05-24 RX ADMIN — IPRATROPIUM BROMIDE AND ALBUTEROL SULFATE 3 ML: .5; 3 SOLUTION RESPIRATORY (INHALATION) at 23:07

## 2021-05-24 RX ADMIN — IPRATROPIUM BROMIDE AND ALBUTEROL SULFATE 3 ML: .5; 3 SOLUTION RESPIRATORY (INHALATION) at 14:23

## 2021-05-24 RX ADMIN — METHYLPREDNISOLONE SODIUM SUCCINATE 40 MG: 40 INJECTION, POWDER, FOR SOLUTION INTRAMUSCULAR; INTRAVENOUS at 04:58

## 2021-05-24 RX ADMIN — SODIUM CHLORIDE, PRESERVATIVE FREE 10 ML: 5 INJECTION INTRAVENOUS at 08:55

## 2021-05-24 RX ADMIN — IPRATROPIUM BROMIDE AND ALBUTEROL SULFATE 3 ML: .5; 3 SOLUTION RESPIRATORY (INHALATION) at 04:19

## 2021-05-24 RX ADMIN — INSULIN ASPART 35 UNITS: 100 INJECTION, SOLUTION INTRAVENOUS; SUBCUTANEOUS at 12:07

## 2021-05-24 RX ADMIN — DOCUSATE SODIUM 100 MG: 100 CAPSULE, LIQUID FILLED ORAL at 21:22

## 2021-05-24 RX ADMIN — CEFDINIR 300 MG: 300 CAPSULE ORAL at 21:22

## 2021-05-24 RX ADMIN — TAMSULOSIN HYDROCHLORIDE 0.4 MG: 0.4 CAPSULE ORAL at 21:22

## 2021-05-24 RX ADMIN — GABAPENTIN 600 MG: 300 CAPSULE ORAL at 14:24

## 2021-05-24 RX ADMIN — BUDESONIDE AND FORMOTEROL FUMARATE DIHYDRATE 2 PUFF: 160; 4.5 AEROSOL RESPIRATORY (INHALATION) at 21:31

## 2021-05-24 RX ADMIN — CANAGLIFLOZIN 300 MG: 300 TABLET, FILM COATED ORAL at 08:55

## 2021-05-24 RX ADMIN — SODIUM CHLORIDE, PRESERVATIVE FREE 10 ML: 5 INJECTION INTRAVENOUS at 21:22

## 2021-05-24 RX ADMIN — IPRATROPIUM BROMIDE AND ALBUTEROL SULFATE 3 ML: .5; 3 SOLUTION RESPIRATORY (INHALATION) at 06:31

## 2021-05-24 RX ADMIN — INSULIN ASPART 2 UNITS: 100 INJECTION, SOLUTION INTRAVENOUS; SUBCUTANEOUS at 08:55

## 2021-05-24 RX ADMIN — ATORVASTATIN CALCIUM 20 MG: 20 TABLET, FILM COATED ORAL at 08:54

## 2021-05-24 RX ADMIN — MAGNESIUM GLUCONATE 500 MG ORAL TABLET 400 MG: 500 TABLET ORAL at 08:54

## 2021-05-24 RX ADMIN — FLUTICASONE PROPIONATE 2 SPRAY: 50 SPRAY, METERED NASAL at 08:55

## 2021-05-24 RX ADMIN — INSULIN ASPART 35 UNITS: 100 INJECTION, SOLUTION INTRAVENOUS; SUBCUTANEOUS at 08:55

## 2021-05-24 RX ADMIN — LIDOCAINE 2 PATCH: 50 PATCH CUTANEOUS at 08:54

## 2021-05-25 ENCOUNTER — APPOINTMENT (OUTPATIENT)
Dept: CARDIOLOGY | Facility: HOSPITAL | Age: 66
End: 2021-05-25

## 2021-05-25 ENCOUNTER — APPOINTMENT (OUTPATIENT)
Dept: MRI IMAGING | Facility: HOSPITAL | Age: 66
End: 2021-05-25

## 2021-05-25 LAB
A-A DO2: 73.5 MMHG (ref 0–300)
ALBUMIN SERPL-MCNC: 3.35 G/DL (ref 3.5–5.2)
ALBUMIN/GLOB SERPL: 1.2 G/DL
ALP SERPL-CCNC: 53 U/L (ref 39–117)
ALT SERPL W P-5'-P-CCNC: 19 U/L (ref 1–41)
ANION GAP SERPL CALCULATED.3IONS-SCNC: 9.1 MMOL/L (ref 5–15)
ARTERIAL PATENCY WRIST A: ABNORMAL
AST SERPL-CCNC: 14 U/L (ref 1–40)
ATMOSPHERIC PRESS: 732 MMHG
BASE EXCESS BLDA CALC-SCNC: 10 MMOL/L (ref 0–2)
BASOPHILS # BLD AUTO: 0.02 10*3/MM3 (ref 0–0.2)
BASOPHILS NFR BLD AUTO: 0.2 % (ref 0–1.5)
BDY SITE: ABNORMAL
BH CV ECHO MEAS - % IVS THICK: 56.4 %
BH CV ECHO MEAS - % LVPW THICK: -1.7 %
BH CV ECHO MEAS - ACS: 2.3 CM
BH CV ECHO MEAS - AO ROOT AREA (BSA CORRECTED): 1.4
BH CV ECHO MEAS - AO ROOT AREA: 9.9 CM^2
BH CV ECHO MEAS - AO ROOT DIAM: 3.6 CM
BH CV ECHO MEAS - BSA(HAYCOCK): 2.7 M^2
BH CV ECHO MEAS - BSA: 2.5 M^2
BH CV ECHO MEAS - BZI_BMI: 47.3 KILOGRAMS/M^2
BH CV ECHO MEAS - BZI_METRIC_HEIGHT: 175.3 CM
BH CV ECHO MEAS - BZI_METRIC_WEIGHT: 145.2 KG
BH CV ECHO MEAS - EDV(CUBED): 264 ML
BH CV ECHO MEAS - EDV(MOD-SP4): 134 ML
BH CV ECHO MEAS - EDV(TEICH): 209.6 ML
BH CV ECHO MEAS - EF(CUBED): 43.1 %
BH CV ECHO MEAS - EF(MOD-SP4): 76.4 %
BH CV ECHO MEAS - EF(TEICH): 35 %
BH CV ECHO MEAS - ESV(CUBED): 150.1 ML
BH CV ECHO MEAS - ESV(MOD-SP4): 31.6 ML
BH CV ECHO MEAS - ESV(TEICH): 136.2 ML
BH CV ECHO MEAS - FS: 17.1 %
BH CV ECHO MEAS - IVS/LVPW: 0.91
BH CV ECHO MEAS - IVSD: 1.6 CM
BH CV ECHO MEAS - IVSS: 2.4 CM
BH CV ECHO MEAS - LA DIMENSION: 6.7 CM
BH CV ECHO MEAS - LA/AO: 1.9
BH CV ECHO MEAS - LV DIASTOLIC VOL/BSA (35-75): 53.1 ML/M^2
BH CV ECHO MEAS - LV MASS(C)D: 539.1 GRAMS
BH CV ECHO MEAS - LV MASS(C)DI: 213.7 GRAMS/M^2
BH CV ECHO MEAS - LV MASS(C)S: 576.7 GRAMS
BH CV ECHO MEAS - LV MASS(C)SI: 228.6 GRAMS/M^2
BH CV ECHO MEAS - LV SYSTOLIC VOL/BSA (12-30): 12.5 ML/M^2
BH CV ECHO MEAS - LVIDD: 6.4 CM
BH CV ECHO MEAS - LVIDS: 5.3 CM
BH CV ECHO MEAS - LVLD AP4: 8.7 CM
BH CV ECHO MEAS - LVLS AP4: 6.6 CM
BH CV ECHO MEAS - LVOT AREA (M): 3.8 CM^2
BH CV ECHO MEAS - LVOT AREA: 3.8 CM^2
BH CV ECHO MEAS - LVOT DIAM: 2.2 CM
BH CV ECHO MEAS - LVPWD: 1.7 CM
BH CV ECHO MEAS - LVPWS: 1.7 CM
BH CV ECHO MEAS - MV A MAX VEL: 89.7 CM/SEC
BH CV ECHO MEAS - MV E MAX VEL: 117 CM/SEC
BH CV ECHO MEAS - MV E/A: 1.3
BH CV ECHO MEAS - PA ACC TIME: 0.1 SEC
BH CV ECHO MEAS - PA PR(ACCEL): 34.5 MMHG
BH CV ECHO MEAS - RVDD: 3.1 CM
BH CV ECHO MEAS - SI(CUBED): 45.1 ML/M^2
BH CV ECHO MEAS - SI(MOD-SP4): 40.6 ML/M^2
BH CV ECHO MEAS - SI(TEICH): 29.1 ML/M^2
BH CV ECHO MEAS - SV(CUBED): 113.8 ML
BH CV ECHO MEAS - SV(MOD-SP4): 102.4 ML
BH CV ECHO MEAS - SV(TEICH): 73.4 ML
BILIRUB SERPL-MCNC: 0.2 MG/DL (ref 0–1.2)
BODY TEMPERATURE: 0 C
BUN SERPL-MCNC: 38 MG/DL (ref 8–23)
BUN/CREAT SERPL: 34.2 (ref 7–25)
CALCIUM SPEC-SCNC: 8.8 MG/DL (ref 8.6–10.5)
CHLORIDE SERPL-SCNC: 98 MMOL/L (ref 98–107)
CO2 BLDA-SCNC: 39.4 MMOL/L (ref 22–33)
CO2 SERPL-SCNC: 28.9 MMOL/L (ref 22–29)
COHGB MFR BLD: 1.4 % (ref 0–5)
CREAT SERPL-MCNC: 1.11 MG/DL (ref 0.76–1.27)
DEPRECATED RDW RBC AUTO: 49.1 FL (ref 37–54)
EOSINOPHIL # BLD AUTO: 0.01 10*3/MM3 (ref 0–0.4)
EOSINOPHIL NFR BLD AUTO: 0.1 % (ref 0.3–6.2)
ERYTHROCYTE [DISTWIDTH] IN BLOOD BY AUTOMATED COUNT: 16.2 % (ref 12.3–15.4)
GFR SERPL CREATININE-BSD FRML MDRD: 66 ML/MIN/1.73
GLOBULIN UR ELPH-MCNC: 2.8 GM/DL
GLUCOSE BLDC GLUCOMTR-MCNC: 115 MG/DL (ref 70–130)
GLUCOSE BLDC GLUCOMTR-MCNC: 152 MG/DL (ref 70–130)
GLUCOSE BLDC GLUCOMTR-MCNC: 193 MG/DL (ref 70–130)
GLUCOSE BLDC GLUCOMTR-MCNC: 377 MG/DL (ref 70–130)
GLUCOSE SERPL-MCNC: 227 MG/DL (ref 65–99)
HCO3 BLDA-SCNC: 37.5 MMOL/L (ref 20–26)
HCT VFR BLD AUTO: 38.3 % (ref 37.5–51)
HCT VFR BLD CALC: 37.3 % (ref 38–51)
HGB BLD-MCNC: 11.3 G/DL (ref 13–17.7)
HGB BLDA-MCNC: 12.2 G/DL (ref 14–18)
IMM GRANULOCYTES # BLD AUTO: 0.05 10*3/MM3 (ref 0–0.05)
IMM GRANULOCYTES NFR BLD AUTO: 0.6 % (ref 0–0.5)
INHALED O2 CONCENTRATION: 32 %
LYMPHOCYTES # BLD AUTO: 1.74 10*3/MM3 (ref 0.7–3.1)
LYMPHOCYTES NFR BLD AUTO: 21.5 % (ref 19.6–45.3)
Lab: ABNORMAL
MAGNESIUM SERPL-MCNC: 2.4 MG/DL (ref 1.6–2.4)
MAXIMAL PREDICTED HEART RATE: 154 BPM
MCH RBC QN AUTO: 24.6 PG (ref 26.6–33)
MCHC RBC AUTO-ENTMCNC: 29.5 G/DL (ref 31.5–35.7)
MCV RBC AUTO: 83.3 FL (ref 79–97)
METHGB BLD QL: <1 % (ref 0–3)
MODALITY: ABNORMAL
MONOCYTES # BLD AUTO: 0.87 10*3/MM3 (ref 0.1–0.9)
MONOCYTES NFR BLD AUTO: 10.7 % (ref 5–12)
NEUTROPHILS NFR BLD AUTO: 5.41 10*3/MM3 (ref 1.7–7)
NEUTROPHILS NFR BLD AUTO: 66.9 % (ref 42.7–76)
NOTE: ABNORMAL
NOTIFIED WHO: ABNORMAL
NRBC BLD AUTO-RTO: 0 /100 WBC (ref 0–0.2)
OXYHGB MFR BLDV: 93.3 % (ref 94–99)
PCO2 BLDA: 63.9 MM HG (ref 35–45)
PCO2 TEMP ADJ BLD: ABNORMAL MM[HG]
PH BLDA: 7.38 PH UNITS (ref 7.35–7.45)
PH, TEMP CORRECTED: ABNORMAL
PLATELET # BLD AUTO: 255 10*3/MM3 (ref 140–450)
PMV BLD AUTO: 9.1 FL (ref 6–12)
PO2 BLDA: 74.6 MM HG (ref 83–108)
PO2 TEMP ADJ BLD: ABNORMAL MM[HG]
POTASSIUM SERPL-SCNC: 4.2 MMOL/L (ref 3.5–5.2)
PROT SERPL-MCNC: 6.1 G/DL (ref 6–8.5)
RBC # BLD AUTO: 4.6 10*6/MM3 (ref 4.14–5.8)
SAO2 % BLDCOA: 94.7 % (ref 94–99)
SODIUM SERPL-SCNC: 136 MMOL/L (ref 136–145)
STRESS TARGET HR: 131 BPM
VENTILATOR MODE: ABNORMAL
WBC # BLD AUTO: 8.1 10*3/MM3 (ref 3.4–10.8)

## 2021-05-25 PROCEDURE — 85025 COMPLETE CBC W/AUTO DIFF WBC: CPT | Performed by: INTERNAL MEDICINE

## 2021-05-25 PROCEDURE — 99222 1ST HOSP IP/OBS MODERATE 55: CPT | Performed by: INTERNAL MEDICINE

## 2021-05-25 PROCEDURE — 72141 MRI NECK SPINE W/O DYE: CPT

## 2021-05-25 PROCEDURE — 63710000001 PREDNISONE PER 5 MG: Performed by: INTERNAL MEDICINE

## 2021-05-25 PROCEDURE — 94799 UNLISTED PULMONARY SVC/PX: CPT

## 2021-05-25 PROCEDURE — 36600 WITHDRAWAL OF ARTERIAL BLOOD: CPT

## 2021-05-25 PROCEDURE — 93306 TTE W/DOPPLER COMPLETE: CPT | Performed by: SPECIALIST

## 2021-05-25 PROCEDURE — 83050 HGB METHEMOGLOBIN QUAN: CPT

## 2021-05-25 PROCEDURE — 82805 BLOOD GASES W/O2 SATURATION: CPT

## 2021-05-25 PROCEDURE — 99232 SBSQ HOSP IP/OBS MODERATE 35: CPT | Performed by: INTERNAL MEDICINE

## 2021-05-25 PROCEDURE — 82375 ASSAY CARBOXYHB QUANT: CPT

## 2021-05-25 PROCEDURE — 80053 COMPREHEN METABOLIC PANEL: CPT | Performed by: INTERNAL MEDICINE

## 2021-05-25 PROCEDURE — 63710000001 INSULIN ASPART PER 5 UNITS: Performed by: INTERNAL MEDICINE

## 2021-05-25 PROCEDURE — 94640 AIRWAY INHALATION TREATMENT: CPT

## 2021-05-25 PROCEDURE — 82962 GLUCOSE BLOOD TEST: CPT

## 2021-05-25 PROCEDURE — 63710000001 PREDNISONE PER 1 MG: Performed by: INTERNAL MEDICINE

## 2021-05-25 PROCEDURE — 94660 CPAP INITIATION&MGMT: CPT

## 2021-05-25 PROCEDURE — 93306 TTE W/DOPPLER COMPLETE: CPT

## 2021-05-25 PROCEDURE — 25010000002 AZITHROMYCIN PER 500 MG: Performed by: PHYSICIAN ASSISTANT

## 2021-05-25 PROCEDURE — 63710000001 INSULIN DETEMIR PER 5 UNITS: Performed by: INTERNAL MEDICINE

## 2021-05-25 PROCEDURE — 97166 OT EVAL MOD COMPLEX 45 MIN: CPT

## 2021-05-25 PROCEDURE — 72141 MRI NECK SPINE W/O DYE: CPT | Performed by: RADIOLOGY

## 2021-05-25 PROCEDURE — 63710000001 INSULIN ASPART PER 5 UNITS: Performed by: PHYSICIAN ASSISTANT

## 2021-05-25 PROCEDURE — 83735 ASSAY OF MAGNESIUM: CPT | Performed by: INTERNAL MEDICINE

## 2021-05-25 RX ORDER — FUROSEMIDE 20 MG/1
20 TABLET ORAL DAILY
Status: DISCONTINUED | OUTPATIENT
Start: 2021-05-26 | End: 2021-05-28

## 2021-05-25 RX ORDER — METOPROLOL SUCCINATE 50 MG/1
50 TABLET, EXTENDED RELEASE ORAL
Status: DISCONTINUED | OUTPATIENT
Start: 2021-05-25 | End: 2021-05-27

## 2021-05-25 RX ORDER — PREDNISONE 20 MG/1
20 TABLET ORAL
Status: DISCONTINUED | OUTPATIENT
Start: 2021-05-26 | End: 2021-05-26

## 2021-05-25 RX ORDER — CLOPIDOGREL BISULFATE 75 MG/1
75 TABLET ORAL DAILY
Status: DISCONTINUED | OUTPATIENT
Start: 2021-05-25 | End: 2021-06-17 | Stop reason: HOSPADM

## 2021-05-25 RX ADMIN — DOCUSATE SODIUM 100 MG: 100 CAPSULE, LIQUID FILLED ORAL at 19:27

## 2021-05-25 RX ADMIN — GABAPENTIN 600 MG: 300 CAPSULE ORAL at 05:55

## 2021-05-25 RX ADMIN — GABAPENTIN 600 MG: 300 CAPSULE ORAL at 19:29

## 2021-05-25 RX ADMIN — INSULIN ASPART 35 UNITS: 100 INJECTION, SOLUTION INTRAVENOUS; SUBCUTANEOUS at 12:10

## 2021-05-25 RX ADMIN — Medication 1 CAPSULE: at 08:55

## 2021-05-25 RX ADMIN — CANAGLIFLOZIN 300 MG: 300 TABLET, FILM COATED ORAL at 08:55

## 2021-05-25 RX ADMIN — AZITHROMYCIN MONOHYDRATE 500 MG: 500 INJECTION, POWDER, LYOPHILIZED, FOR SOLUTION INTRAVENOUS at 04:51

## 2021-05-25 RX ADMIN — CEFDINIR 300 MG: 300 CAPSULE ORAL at 19:26

## 2021-05-25 RX ADMIN — INSULIN ASPART 2 UNITS: 100 INJECTION, SOLUTION INTRAVENOUS; SUBCUTANEOUS at 08:55

## 2021-05-25 RX ADMIN — BUDESONIDE AND FORMOTEROL FUMARATE DIHYDRATE 2 PUFF: 160; 4.5 AEROSOL RESPIRATORY (INHALATION) at 18:24

## 2021-05-25 RX ADMIN — HYDROCODONE BITARTRATE AND ACETAMINOPHEN 1 TABLET: 5; 325 TABLET ORAL at 19:26

## 2021-05-25 RX ADMIN — IPRATROPIUM BROMIDE AND ALBUTEROL SULFATE 3 ML: .5; 3 SOLUTION RESPIRATORY (INHALATION) at 10:12

## 2021-05-25 RX ADMIN — FLUTICASONE PROPIONATE 2 SPRAY: 50 SPRAY, METERED NASAL at 09:29

## 2021-05-25 RX ADMIN — HYDROCODONE BITARTRATE AND ACETAMINOPHEN 1 TABLET: 5; 325 TABLET ORAL at 09:52

## 2021-05-25 RX ADMIN — APIXABAN 5 MG: 5 TABLET, FILM COATED ORAL at 08:55

## 2021-05-25 RX ADMIN — CEFDINIR 300 MG: 300 CAPSULE ORAL at 08:55

## 2021-05-25 RX ADMIN — IPRATROPIUM BROMIDE AND ALBUTEROL SULFATE 3 ML: .5; 3 SOLUTION RESPIRATORY (INHALATION) at 18:24

## 2021-05-25 RX ADMIN — IPRATROPIUM BROMIDE AND ALBUTEROL SULFATE 3 ML: .5; 3 SOLUTION RESPIRATORY (INHALATION) at 22:35

## 2021-05-25 RX ADMIN — PREDNISONE 30 MG: 20 TABLET ORAL at 08:55

## 2021-05-25 RX ADMIN — POLYETHYLENE GLYCOL (3350) 17 G: 17 POWDER, FOR SOLUTION ORAL at 08:55

## 2021-05-25 RX ADMIN — ATORVASTATIN CALCIUM 20 MG: 20 TABLET, FILM COATED ORAL at 08:55

## 2021-05-25 RX ADMIN — IPRATROPIUM BROMIDE AND ALBUTEROL SULFATE 3 ML: .5; 3 SOLUTION RESPIRATORY (INHALATION) at 03:18

## 2021-05-25 RX ADMIN — CARVEDILOL 25 MG: 25 TABLET, FILM COATED ORAL at 08:56

## 2021-05-25 RX ADMIN — IPRATROPIUM BROMIDE AND ALBUTEROL SULFATE 3 ML: .5; 3 SOLUTION RESPIRATORY (INHALATION) at 14:10

## 2021-05-25 RX ADMIN — TAMSULOSIN HYDROCHLORIDE 0.4 MG: 0.4 CAPSULE ORAL at 19:26

## 2021-05-25 RX ADMIN — IPRATROPIUM BROMIDE AND ALBUTEROL SULFATE 3 ML: .5; 3 SOLUTION RESPIRATORY (INHALATION) at 06:26

## 2021-05-25 RX ADMIN — GABAPENTIN 600 MG: 300 CAPSULE ORAL at 14:50

## 2021-05-25 RX ADMIN — INSULIN ASPART 8 UNITS: 100 INJECTION, SOLUTION INTRAVENOUS; SUBCUTANEOUS at 11:30

## 2021-05-25 RX ADMIN — MAGNESIUM GLUCONATE 500 MG ORAL TABLET 400 MG: 500 TABLET ORAL at 08:55

## 2021-05-25 RX ADMIN — FUROSEMIDE 80 MG: 80 TABLET ORAL at 08:56

## 2021-05-25 RX ADMIN — HYDROXYZINE HYDROCHLORIDE 25 MG: 25 TABLET, FILM COATED ORAL at 19:26

## 2021-05-25 RX ADMIN — APIXABAN 5 MG: 5 TABLET, FILM COATED ORAL at 19:26

## 2021-05-25 RX ADMIN — LIDOCAINE 2 PATCH: 50 PATCH CUTANEOUS at 08:55

## 2021-05-25 RX ADMIN — INSULIN DETEMIR 15 UNITS: 100 INJECTION, SOLUTION SUBCUTANEOUS at 12:10

## 2021-05-25 RX ADMIN — METOPROLOL SUCCINATE 50 MG: 50 TABLET, EXTENDED RELEASE ORAL at 12:08

## 2021-05-25 RX ADMIN — BUDESONIDE AND FORMOTEROL FUMARATE DIHYDRATE 2 PUFF: 160; 4.5 AEROSOL RESPIRATORY (INHALATION) at 06:26

## 2021-05-25 RX ADMIN — CLOPIDOGREL 75 MG: 75 TABLET, FILM COATED ORAL at 12:06

## 2021-05-25 RX ADMIN — DOCUSATE SODIUM 100 MG: 100 CAPSULE, LIQUID FILLED ORAL at 08:55

## 2021-05-25 RX ADMIN — PANTOPRAZOLE SODIUM 40 MG: 40 TABLET, DELAYED RELEASE ORAL at 05:55

## 2021-05-26 PROBLEM — L08.9 FINGER INFECTION: Status: ACTIVE | Noted: 2021-05-21

## 2021-05-26 LAB
ALBUMIN SERPL-MCNC: 3.39 G/DL (ref 3.5–5.2)
ALBUMIN/GLOB SERPL: 1.4 G/DL
ALP SERPL-CCNC: 48 U/L (ref 39–117)
ALT SERPL W P-5'-P-CCNC: 20 U/L (ref 1–41)
ANION GAP SERPL CALCULATED.3IONS-SCNC: 3.7 MMOL/L (ref 5–15)
AST SERPL-CCNC: 14 U/L (ref 1–40)
BACTERIA SPEC AEROBE CULT: NORMAL
BACTERIA SPEC AEROBE CULT: NORMAL
BASOPHILS # BLD AUTO: 0.02 10*3/MM3 (ref 0–0.2)
BASOPHILS NFR BLD AUTO: 0.2 % (ref 0–1.5)
BILIRUB SERPL-MCNC: 0.3 MG/DL (ref 0–1.2)
BUN SERPL-MCNC: 31 MG/DL (ref 8–23)
BUN/CREAT SERPL: 32.3 (ref 7–25)
CALCIUM SPEC-SCNC: 9.1 MG/DL (ref 8.6–10.5)
CHLORIDE SERPL-SCNC: 96 MMOL/L (ref 98–107)
CHOLEST SERPL-MCNC: 153 MG/DL (ref 0–200)
CO2 SERPL-SCNC: 35.3 MMOL/L (ref 22–29)
CREAT SERPL-MCNC: 0.96 MG/DL (ref 0.76–1.27)
DEPRECATED RDW RBC AUTO: 48.6 FL (ref 37–54)
EOSINOPHIL # BLD AUTO: 0.03 10*3/MM3 (ref 0–0.4)
EOSINOPHIL NFR BLD AUTO: 0.3 % (ref 0.3–6.2)
ERYTHROCYTE [DISTWIDTH] IN BLOOD BY AUTOMATED COUNT: 16 % (ref 12.3–15.4)
GFR SERPL CREATININE-BSD FRML MDRD: 78 ML/MIN/1.73
GLOBULIN UR ELPH-MCNC: 2.5 GM/DL
GLUCOSE BLDC GLUCOMTR-MCNC: 164 MG/DL (ref 70–130)
GLUCOSE BLDC GLUCOMTR-MCNC: 180 MG/DL (ref 70–130)
GLUCOSE BLDC GLUCOMTR-MCNC: 203 MG/DL (ref 70–130)
GLUCOSE BLDC GLUCOMTR-MCNC: 302 MG/DL (ref 70–130)
GLUCOSE SERPL-MCNC: 233 MG/DL (ref 65–99)
HCT VFR BLD AUTO: 38.6 % (ref 37.5–51)
HDLC SERPL-MCNC: 30 MG/DL (ref 40–60)
HGB BLD-MCNC: 11.2 G/DL (ref 13–17.7)
IMM GRANULOCYTES # BLD AUTO: 0.06 10*3/MM3 (ref 0–0.05)
IMM GRANULOCYTES NFR BLD AUTO: 0.7 % (ref 0–0.5)
LDLC SERPL CALC-MCNC: 99 MG/DL (ref 0–100)
LDLC/HDLC SERPL: 3.23 {RATIO}
LYMPHOCYTES # BLD AUTO: 1.81 10*3/MM3 (ref 0.7–3.1)
LYMPHOCYTES NFR BLD AUTO: 20.3 % (ref 19.6–45.3)
MAGNESIUM SERPL-MCNC: 2.5 MG/DL (ref 1.6–2.4)
MCH RBC QN AUTO: 24 PG (ref 26.6–33)
MCHC RBC AUTO-ENTMCNC: 29 G/DL (ref 31.5–35.7)
MCV RBC AUTO: 82.8 FL (ref 79–97)
MONOCYTES # BLD AUTO: 0.96 10*3/MM3 (ref 0.1–0.9)
MONOCYTES NFR BLD AUTO: 10.8 % (ref 5–12)
NEUTROPHILS NFR BLD AUTO: 6.02 10*3/MM3 (ref 1.7–7)
NEUTROPHILS NFR BLD AUTO: 67.7 % (ref 42.7–76)
NRBC BLD AUTO-RTO: 0 /100 WBC (ref 0–0.2)
PLATELET # BLD AUTO: 271 10*3/MM3 (ref 140–450)
PMV BLD AUTO: 9.4 FL (ref 6–12)
POTASSIUM SERPL-SCNC: 4 MMOL/L (ref 3.5–5.2)
PROT SERPL-MCNC: 5.9 G/DL (ref 6–8.5)
RBC # BLD AUTO: 4.66 10*6/MM3 (ref 4.14–5.8)
SODIUM SERPL-SCNC: 135 MMOL/L (ref 136–145)
TRIGL SERPL-MCNC: 130 MG/DL (ref 0–150)
VLDLC SERPL-MCNC: 24 MG/DL (ref 5–40)
WBC # BLD AUTO: 8.9 10*3/MM3 (ref 3.4–10.8)

## 2021-05-26 PROCEDURE — 83735 ASSAY OF MAGNESIUM: CPT | Performed by: INTERNAL MEDICINE

## 2021-05-26 PROCEDURE — 94660 CPAP INITIATION&MGMT: CPT

## 2021-05-26 PROCEDURE — 94799 UNLISTED PULMONARY SVC/PX: CPT

## 2021-05-26 PROCEDURE — 63710000001 INSULIN ASPART PER 5 UNITS: Performed by: INTERNAL MEDICINE

## 2021-05-26 PROCEDURE — 82962 GLUCOSE BLOOD TEST: CPT

## 2021-05-26 PROCEDURE — 99232 SBSQ HOSP IP/OBS MODERATE 35: CPT | Performed by: SPECIALIST

## 2021-05-26 PROCEDURE — 99232 SBSQ HOSP IP/OBS MODERATE 35: CPT | Performed by: INTERNAL MEDICINE

## 2021-05-26 PROCEDURE — 63710000001 INSULIN DETEMIR PER 5 UNITS: Performed by: INTERNAL MEDICINE

## 2021-05-26 PROCEDURE — 25010000002 AZITHROMYCIN PER 500 MG: Performed by: PHYSICIAN ASSISTANT

## 2021-05-26 PROCEDURE — 63710000001 INSULIN ASPART PER 5 UNITS: Performed by: PHYSICIAN ASSISTANT

## 2021-05-26 PROCEDURE — 63710000001 PREDNISONE PER 1 MG: Performed by: INTERNAL MEDICINE

## 2021-05-26 PROCEDURE — 80061 LIPID PANEL: CPT | Performed by: INTERNAL MEDICINE

## 2021-05-26 PROCEDURE — 97535 SELF CARE MNGMENT TRAINING: CPT

## 2021-05-26 PROCEDURE — 85025 COMPLETE CBC W/AUTO DIFF WBC: CPT | Performed by: INTERNAL MEDICINE

## 2021-05-26 PROCEDURE — 80053 COMPREHEN METABOLIC PANEL: CPT | Performed by: INTERNAL MEDICINE

## 2021-05-26 RX ORDER — MAGNESIUM CARB/ALUMINUM HYDROX 105-160MG
150 TABLET,CHEWABLE ORAL ONCE
Status: COMPLETED | OUTPATIENT
Start: 2021-05-26 | End: 2021-05-26

## 2021-05-26 RX ORDER — SODIUM CHLORIDE, SODIUM LACTATE, POTASSIUM CHLORIDE, CALCIUM CHLORIDE 600; 310; 30; 20 MG/100ML; MG/100ML; MG/100ML; MG/100ML
125 INJECTION, SOLUTION INTRAVENOUS CONTINUOUS
Status: DISCONTINUED | OUTPATIENT
Start: 2021-05-26 | End: 2021-05-26

## 2021-05-26 RX ADMIN — IPRATROPIUM BROMIDE AND ALBUTEROL SULFATE 3 ML: .5; 3 SOLUTION RESPIRATORY (INHALATION) at 02:41

## 2021-05-26 RX ADMIN — IPRATROPIUM BROMIDE AND ALBUTEROL SULFATE 3 ML: .5; 3 SOLUTION RESPIRATORY (INHALATION) at 10:16

## 2021-05-26 RX ADMIN — DOCUSATE SODIUM 100 MG: 100 CAPSULE, LIQUID FILLED ORAL at 20:57

## 2021-05-26 RX ADMIN — SODIUM CHLORIDE, PRESERVATIVE FREE 10 ML: 5 INJECTION INTRAVENOUS at 08:59

## 2021-05-26 RX ADMIN — CEFDINIR 300 MG: 300 CAPSULE ORAL at 09:00

## 2021-05-26 RX ADMIN — CLOPIDOGREL 75 MG: 75 TABLET, FILM COATED ORAL at 09:00

## 2021-05-26 RX ADMIN — IPRATROPIUM BROMIDE AND ALBUTEROL SULFATE 3 ML: .5; 3 SOLUTION RESPIRATORY (INHALATION) at 18:23

## 2021-05-26 RX ADMIN — HYDROCODONE BITARTRATE AND ACETAMINOPHEN 1 TABLET: 5; 325 TABLET ORAL at 11:56

## 2021-05-26 RX ADMIN — HYDROCODONE BITARTRATE AND ACETAMINOPHEN 1 TABLET: 5; 325 TABLET ORAL at 05:11

## 2021-05-26 RX ADMIN — TAMSULOSIN HYDROCHLORIDE 0.4 MG: 0.4 CAPSULE ORAL at 20:57

## 2021-05-26 RX ADMIN — CEFDINIR 300 MG: 300 CAPSULE ORAL at 20:57

## 2021-05-26 RX ADMIN — FLUTICASONE PROPIONATE 2 SPRAY: 50 SPRAY, METERED NASAL at 09:01

## 2021-05-26 RX ADMIN — PREDNISONE 20 MG: 20 TABLET ORAL at 09:00

## 2021-05-26 RX ADMIN — BUDESONIDE AND FORMOTEROL FUMARATE DIHYDRATE 2 PUFF: 160; 4.5 AEROSOL RESPIRATORY (INHALATION) at 07:03

## 2021-05-26 RX ADMIN — GABAPENTIN 600 MG: 300 CAPSULE ORAL at 14:15

## 2021-05-26 RX ADMIN — CANAGLIFLOZIN 300 MG: 300 TABLET, FILM COATED ORAL at 09:00

## 2021-05-26 RX ADMIN — AZITHROMYCIN MONOHYDRATE 500 MG: 500 INJECTION, POWDER, LYOPHILIZED, FOR SOLUTION INTRAVENOUS at 05:11

## 2021-05-26 RX ADMIN — INSULIN ASPART 2 UNITS: 100 INJECTION, SOLUTION INTRAVENOUS; SUBCUTANEOUS at 17:43

## 2021-05-26 RX ADMIN — INSULIN ASPART 2 UNITS: 100 INJECTION, SOLUTION INTRAVENOUS; SUBCUTANEOUS at 08:58

## 2021-05-26 RX ADMIN — FUROSEMIDE 20 MG: 20 TABLET ORAL at 09:00

## 2021-05-26 RX ADMIN — INSULIN DETEMIR 15 UNITS: 100 INJECTION, SOLUTION SUBCUTANEOUS at 08:58

## 2021-05-26 RX ADMIN — IPRATROPIUM BROMIDE AND ALBUTEROL SULFATE 3 ML: .5; 3 SOLUTION RESPIRATORY (INHALATION) at 06:47

## 2021-05-26 RX ADMIN — Medication 1 CAPSULE: at 09:00

## 2021-05-26 RX ADMIN — LIDOCAINE 2 PATCH: 50 PATCH CUTANEOUS at 08:59

## 2021-05-26 RX ADMIN — IPRATROPIUM BROMIDE AND ALBUTEROL SULFATE 3 ML: .5; 3 SOLUTION RESPIRATORY (INHALATION) at 14:01

## 2021-05-26 RX ADMIN — BUDESONIDE AND FORMOTEROL FUMARATE DIHYDRATE 2 PUFF: 160; 4.5 AEROSOL RESPIRATORY (INHALATION) at 18:23

## 2021-05-26 RX ADMIN — PANTOPRAZOLE SODIUM 40 MG: 40 TABLET, DELAYED RELEASE ORAL at 05:11

## 2021-05-26 RX ADMIN — ATORVASTATIN CALCIUM 20 MG: 20 TABLET, FILM COATED ORAL at 09:00

## 2021-05-26 RX ADMIN — METOPROLOL SUCCINATE 50 MG: 50 TABLET, EXTENDED RELEASE ORAL at 09:00

## 2021-05-26 RX ADMIN — HYDROCODONE BITARTRATE AND ACETAMINOPHEN 1 TABLET: 5; 325 TABLET ORAL at 22:34

## 2021-05-26 RX ADMIN — INSULIN ASPART 35 UNITS: 100 INJECTION, SOLUTION INTRAVENOUS; SUBCUTANEOUS at 11:51

## 2021-05-26 RX ADMIN — GABAPENTIN 600 MG: 300 CAPSULE ORAL at 05:11

## 2021-05-26 RX ADMIN — DOCUSATE SODIUM 100 MG: 100 CAPSULE, LIQUID FILLED ORAL at 09:00

## 2021-05-26 RX ADMIN — IPRATROPIUM BROMIDE AND ALBUTEROL SULFATE 3 ML: .5; 3 SOLUTION RESPIRATORY (INHALATION) at 22:39

## 2021-05-26 RX ADMIN — GABAPENTIN 600 MG: 300 CAPSULE ORAL at 20:57

## 2021-05-26 RX ADMIN — TRAMADOL HYDROCHLORIDE 50 MG: 50 TABLET, FILM COATED ORAL at 09:13

## 2021-05-26 RX ADMIN — POLYETHYLENE GLYCOL (3350) 17 G: 17 POWDER, FOR SOLUTION ORAL at 08:59

## 2021-05-26 RX ADMIN — APIXABAN 5 MG: 5 TABLET, FILM COATED ORAL at 20:57

## 2021-05-26 RX ADMIN — CITROMA MAGNESIUM CITRATE 150 ML: 1.75 LIQUID ORAL at 11:51

## 2021-05-26 RX ADMIN — APIXABAN 5 MG: 5 TABLET, FILM COATED ORAL at 09:00

## 2021-05-27 ENCOUNTER — APPOINTMENT (OUTPATIENT)
Dept: CT IMAGING | Facility: HOSPITAL | Age: 66
End: 2021-05-27

## 2021-05-27 LAB
ALBUMIN SERPL-MCNC: 3.54 G/DL (ref 3.5–5.2)
ALBUMIN/GLOB SERPL: 1.3 G/DL
ALP SERPL-CCNC: 51 U/L (ref 39–117)
ALT SERPL W P-5'-P-CCNC: 21 U/L (ref 1–41)
ANION GAP SERPL CALCULATED.3IONS-SCNC: 4.6 MMOL/L (ref 5–15)
ANISOCYTOSIS BLD QL: NORMAL
AST SERPL-CCNC: 13 U/L (ref 1–40)
BASOPHILS # BLD AUTO: 0.03 10*3/MM3 (ref 0–0.2)
BASOPHILS NFR BLD AUTO: 0.4 % (ref 0–1.5)
BILIRUB SERPL-MCNC: 0.4 MG/DL (ref 0–1.2)
BUN SERPL-MCNC: 25 MG/DL (ref 8–23)
BUN/CREAT SERPL: 29.1 (ref 7–25)
CALCIUM SPEC-SCNC: 9.2 MG/DL (ref 8.6–10.5)
CHLORIDE SERPL-SCNC: 97 MMOL/L (ref 98–107)
CO2 SERPL-SCNC: 36.4 MMOL/L (ref 22–29)
CREAT SERPL-MCNC: 0.86 MG/DL (ref 0.76–1.27)
DEPRECATED RDW RBC AUTO: 48.4 FL (ref 37–54)
EOSINOPHIL # BLD AUTO: 0.13 10*3/MM3 (ref 0–0.4)
EOSINOPHIL NFR BLD AUTO: 1.6 % (ref 0.3–6.2)
ERYTHROCYTE [DISTWIDTH] IN BLOOD BY AUTOMATED COUNT: 16.2 % (ref 12.3–15.4)
GFR SERPL CREATININE-BSD FRML MDRD: 89 ML/MIN/1.73
GLOBULIN UR ELPH-MCNC: 2.7 GM/DL
GLUCOSE BLDC GLUCOMTR-MCNC: 121 MG/DL (ref 70–130)
GLUCOSE BLDC GLUCOMTR-MCNC: 173 MG/DL (ref 70–130)
GLUCOSE BLDC GLUCOMTR-MCNC: 206 MG/DL (ref 70–130)
GLUCOSE BLDC GLUCOMTR-MCNC: 229 MG/DL (ref 70–130)
GLUCOSE SERPL-MCNC: 164 MG/DL (ref 65–99)
HCT VFR BLD AUTO: 40.1 % (ref 37.5–51)
HGB BLD-MCNC: 11.6 G/DL (ref 13–17.7)
HYPOCHROMIA BLD QL: NORMAL
IMM GRANULOCYTES # BLD AUTO: 0.07 10*3/MM3 (ref 0–0.05)
IMM GRANULOCYTES NFR BLD AUTO: 0.8 % (ref 0–0.5)
LYMPHOCYTES # BLD AUTO: 2.18 10*3/MM3 (ref 0.7–3.1)
LYMPHOCYTES NFR BLD AUTO: 26.3 % (ref 19.6–45.3)
MAGNESIUM SERPL-MCNC: 2.7 MG/DL (ref 1.6–2.4)
MCH RBC QN AUTO: 23.9 PG (ref 26.6–33)
MCHC RBC AUTO-ENTMCNC: 28.9 G/DL (ref 31.5–35.7)
MCV RBC AUTO: 82.5 FL (ref 79–97)
MONOCYTES # BLD AUTO: 0.96 10*3/MM3 (ref 0.1–0.9)
MONOCYTES NFR BLD AUTO: 11.6 % (ref 5–12)
NEUTROPHILS NFR BLD AUTO: 4.91 10*3/MM3 (ref 1.7–7)
NEUTROPHILS NFR BLD AUTO: 59.3 % (ref 42.7–76)
NRBC BLD AUTO-RTO: 0 /100 WBC (ref 0–0.2)
PLAT MORPH BLD: NORMAL
PLATELET # BLD AUTO: 259 10*3/MM3 (ref 140–450)
PMV BLD AUTO: 9.3 FL (ref 6–12)
POTASSIUM SERPL-SCNC: 4.4 MMOL/L (ref 3.5–5.2)
PROT SERPL-MCNC: 6.2 G/DL (ref 6–8.5)
QT INTERVAL: 406 MS
QTC INTERVAL: 422 MS
RBC # BLD AUTO: 4.86 10*6/MM3 (ref 4.14–5.8)
SODIUM SERPL-SCNC: 138 MMOL/L (ref 136–145)
STOMATOCYTES BLD QL SMEAR: NORMAL
TROPONIN T SERPL-MCNC: <0.01 NG/ML (ref 0–0.03)
TROPONIN T SERPL-MCNC: <0.01 NG/ML (ref 0–0.03)
WBC # BLD AUTO: 8.28 10*3/MM3 (ref 3.4–10.8)

## 2021-05-27 PROCEDURE — 70450 CT HEAD/BRAIN W/O DYE: CPT

## 2021-05-27 PROCEDURE — 93005 ELECTROCARDIOGRAM TRACING: CPT | Performed by: INTERNAL MEDICINE

## 2021-05-27 PROCEDURE — 84484 ASSAY OF TROPONIN QUANT: CPT | Performed by: INTERNAL MEDICINE

## 2021-05-27 PROCEDURE — 94799 UNLISTED PULMONARY SVC/PX: CPT

## 2021-05-27 PROCEDURE — 97110 THERAPEUTIC EXERCISES: CPT

## 2021-05-27 PROCEDURE — 85007 BL SMEAR W/DIFF WBC COUNT: CPT | Performed by: INTERNAL MEDICINE

## 2021-05-27 PROCEDURE — 70450 CT HEAD/BRAIN W/O DYE: CPT | Performed by: RADIOLOGY

## 2021-05-27 PROCEDURE — 63710000001 INSULIN ASPART PER 5 UNITS: Performed by: PHYSICIAN ASSISTANT

## 2021-05-27 PROCEDURE — 94660 CPAP INITIATION&MGMT: CPT

## 2021-05-27 PROCEDURE — 99232 SBSQ HOSP IP/OBS MODERATE 35: CPT | Performed by: INTERNAL MEDICINE

## 2021-05-27 PROCEDURE — 97530 THERAPEUTIC ACTIVITIES: CPT

## 2021-05-27 PROCEDURE — 82962 GLUCOSE BLOOD TEST: CPT

## 2021-05-27 PROCEDURE — 63710000001 INSULIN ASPART PER 5 UNITS: Performed by: INTERNAL MEDICINE

## 2021-05-27 PROCEDURE — 63710000001 INSULIN DETEMIR PER 5 UNITS: Performed by: INTERNAL MEDICINE

## 2021-05-27 PROCEDURE — 25010000002 HYDRALAZINE PER 20 MG: Performed by: PHYSICIAN ASSISTANT

## 2021-05-27 PROCEDURE — 83735 ASSAY OF MAGNESIUM: CPT | Performed by: INTERNAL MEDICINE

## 2021-05-27 PROCEDURE — 85025 COMPLETE CBC W/AUTO DIFF WBC: CPT | Performed by: INTERNAL MEDICINE

## 2021-05-27 PROCEDURE — 80053 COMPREHEN METABOLIC PANEL: CPT | Performed by: INTERNAL MEDICINE

## 2021-05-27 RX ORDER — BISACODYL 10 MG
10 SUPPOSITORY, RECTAL RECTAL ONCE
Status: COMPLETED | OUTPATIENT
Start: 2021-05-27 | End: 2021-05-27

## 2021-05-27 RX ORDER — LACTULOSE 10 G/15ML
20 SOLUTION ORAL ONCE
Status: COMPLETED | OUTPATIENT
Start: 2021-05-27 | End: 2021-05-27

## 2021-05-27 RX ORDER — IPRATROPIUM BROMIDE AND ALBUTEROL SULFATE 2.5; .5 MG/3ML; MG/3ML
3 SOLUTION RESPIRATORY (INHALATION)
Status: DISCONTINUED | OUTPATIENT
Start: 2021-05-27 | End: 2021-06-17 | Stop reason: HOSPADM

## 2021-05-27 RX ADMIN — CANAGLIFLOZIN 300 MG: 300 TABLET, FILM COATED ORAL at 10:13

## 2021-05-27 RX ADMIN — APIXABAN 5 MG: 5 TABLET, FILM COATED ORAL at 20:22

## 2021-05-27 RX ADMIN — DOCUSATE SODIUM 100 MG: 100 CAPSULE, LIQUID FILLED ORAL at 10:14

## 2021-05-27 RX ADMIN — GABAPENTIN 600 MG: 300 CAPSULE ORAL at 20:22

## 2021-05-27 RX ADMIN — BISACODYL 10 MG: 10 SUPPOSITORY RECTAL at 18:00

## 2021-05-27 RX ADMIN — FLUTICASONE PROPIONATE 2 SPRAY: 50 SPRAY, METERED NASAL at 10:16

## 2021-05-27 RX ADMIN — HYDROCODONE BITARTRATE AND ACETAMINOPHEN 1 TABLET: 5; 325 TABLET ORAL at 20:22

## 2021-05-27 RX ADMIN — APIXABAN 5 MG: 5 TABLET, FILM COATED ORAL at 10:13

## 2021-05-27 RX ADMIN — CEFDINIR 300 MG: 300 CAPSULE ORAL at 20:22

## 2021-05-27 RX ADMIN — DOCUSATE SODIUM 100 MG: 100 CAPSULE, LIQUID FILLED ORAL at 20:22

## 2021-05-27 RX ADMIN — HYDROCODONE BITARTRATE AND ACETAMINOPHEN 1 TABLET: 5; 325 TABLET ORAL at 10:12

## 2021-05-27 RX ADMIN — TRAMADOL HYDROCHLORIDE 50 MG: 50 TABLET, FILM COATED ORAL at 22:55

## 2021-05-27 RX ADMIN — IPRATROPIUM BROMIDE AND ALBUTEROL SULFATE 3 ML: .5; 3 SOLUTION RESPIRATORY (INHALATION) at 12:03

## 2021-05-27 RX ADMIN — INSULIN ASPART 2 UNITS: 100 INJECTION, SOLUTION INTRAVENOUS; SUBCUTANEOUS at 10:11

## 2021-05-27 RX ADMIN — INSULIN ASPART 4 UNITS: 100 INJECTION, SOLUTION INTRAVENOUS; SUBCUTANEOUS at 12:10

## 2021-05-27 RX ADMIN — ATORVASTATIN CALCIUM 20 MG: 20 TABLET, FILM COATED ORAL at 10:13

## 2021-05-27 RX ADMIN — Medication 1 CAPSULE: at 10:13

## 2021-05-27 RX ADMIN — SODIUM CHLORIDE, PRESERVATIVE FREE 10 ML: 5 INJECTION INTRAVENOUS at 10:15

## 2021-05-27 RX ADMIN — GABAPENTIN 600 MG: 300 CAPSULE ORAL at 14:40

## 2021-05-27 RX ADMIN — LIDOCAINE 2 PATCH: 50 PATCH CUTANEOUS at 10:13

## 2021-05-27 RX ADMIN — SODIUM CHLORIDE, PRESERVATIVE FREE 10 ML: 5 INJECTION INTRAVENOUS at 20:23

## 2021-05-27 RX ADMIN — BUDESONIDE AND FORMOTEROL FUMARATE DIHYDRATE 2 PUFF: 160; 4.5 AEROSOL RESPIRATORY (INHALATION) at 06:38

## 2021-05-27 RX ADMIN — IPRATROPIUM BROMIDE AND ALBUTEROL SULFATE 3 ML: .5; 3 SOLUTION RESPIRATORY (INHALATION) at 06:26

## 2021-05-27 RX ADMIN — METOPROLOL SUCCINATE 75 MG: 25 TABLET, EXTENDED RELEASE ORAL at 12:09

## 2021-05-27 RX ADMIN — HYDRALAZINE HYDROCHLORIDE 10 MG: 20 INJECTION INTRAMUSCULAR; INTRAVENOUS at 22:57

## 2021-05-27 RX ADMIN — INSULIN DETEMIR 20 UNITS: 100 INJECTION, SOLUTION SUBCUTANEOUS at 10:11

## 2021-05-27 RX ADMIN — INSULIN ASPART 35 UNITS: 100 INJECTION, SOLUTION INTRAVENOUS; SUBCUTANEOUS at 12:09

## 2021-05-27 RX ADMIN — LACTULOSE 20 G: 10 SOLUTION ORAL at 18:00

## 2021-05-27 RX ADMIN — CLOPIDOGREL 75 MG: 75 TABLET, FILM COATED ORAL at 10:14

## 2021-05-27 RX ADMIN — PANTOPRAZOLE SODIUM 40 MG: 40 TABLET, DELAYED RELEASE ORAL at 05:28

## 2021-05-27 RX ADMIN — GABAPENTIN 600 MG: 300 CAPSULE ORAL at 05:28

## 2021-05-27 RX ADMIN — FUROSEMIDE 20 MG: 20 TABLET ORAL at 10:14

## 2021-05-27 RX ADMIN — HYDROXYZINE HYDROCHLORIDE 25 MG: 25 TABLET, FILM COATED ORAL at 22:55

## 2021-05-27 RX ADMIN — CEFDINIR 300 MG: 300 CAPSULE ORAL at 10:14

## 2021-05-27 RX ADMIN — TAMSULOSIN HYDROCHLORIDE 0.4 MG: 0.4 CAPSULE ORAL at 20:22

## 2021-05-27 RX ADMIN — IPRATROPIUM BROMIDE AND ALBUTEROL SULFATE 3 ML: .5; 3 SOLUTION RESPIRATORY (INHALATION) at 02:36

## 2021-05-28 ENCOUNTER — APPOINTMENT (OUTPATIENT)
Dept: MRI IMAGING | Facility: HOSPITAL | Age: 66
End: 2021-05-28

## 2021-05-28 ENCOUNTER — APPOINTMENT (OUTPATIENT)
Dept: CARDIOLOGY | Facility: HOSPITAL | Age: 66
End: 2021-05-28

## 2021-05-28 ENCOUNTER — APPOINTMENT (OUTPATIENT)
Dept: NUCLEAR MEDICINE | Facility: HOSPITAL | Age: 66
End: 2021-05-28

## 2021-05-28 LAB
ALBUMIN SERPL-MCNC: 3.49 G/DL (ref 3.5–5.2)
ALBUMIN/GLOB SERPL: 1.4 G/DL
ALP SERPL-CCNC: 56 U/L (ref 39–117)
ALT SERPL W P-5'-P-CCNC: 21 U/L (ref 1–41)
ANION GAP SERPL CALCULATED.3IONS-SCNC: 7.6 MMOL/L (ref 5–15)
ANISOCYTOSIS BLD QL: NORMAL
AST SERPL-CCNC: 17 U/L (ref 1–40)
BASOPHILS # BLD AUTO: 0.03 10*3/MM3 (ref 0–0.2)
BASOPHILS NFR BLD AUTO: 0.4 % (ref 0–1.5)
BH CV NUCLEAR PRIOR STUDY: 3
BH CV REST NUCLEAR ISOTOPE DOSE: 10.8 MCI
BH CV STRESS BP STAGE 1: NORMAL
BH CV STRESS BP STAGE 2: NORMAL
BH CV STRESS COMMENTS STAGE 1: NORMAL
BH CV STRESS COMMENTS STAGE 2: NORMAL
BH CV STRESS DOSE REGADENOSON STAGE 1: 0.4
BH CV STRESS DURATION MIN STAGE 1: 0
BH CV STRESS DURATION MIN STAGE 2: 4
BH CV STRESS DURATION SEC STAGE 1: 10
BH CV STRESS DURATION SEC STAGE 2: 0
BH CV STRESS HR STAGE 1: 79
BH CV STRESS HR STAGE 2: 86
BH CV STRESS NUCLEAR ISOTOPE DOSE: 31.3 MCI
BH CV STRESS PROTOCOL 1: NORMAL
BH CV STRESS RECOVERY BP: NORMAL MMHG
BH CV STRESS RECOVERY HR: 75 BPM
BH CV STRESS STAGE 1: 1
BH CV STRESS STAGE 2: 2
BILIRUB SERPL-MCNC: 0.3 MG/DL (ref 0–1.2)
BUN SERPL-MCNC: 21 MG/DL (ref 8–23)
BUN/CREAT SERPL: 23.1 (ref 7–25)
CALCIUM SPEC-SCNC: 9.1 MG/DL (ref 8.6–10.5)
CHLORIDE SERPL-SCNC: 99 MMOL/L (ref 98–107)
CO2 SERPL-SCNC: 30.4 MMOL/L (ref 22–29)
CREAT SERPL-MCNC: 0.91 MG/DL (ref 0.76–1.27)
CRP SERPL-MCNC: <0.3 MG/DL (ref 0–0.5)
DEPRECATED RDW RBC AUTO: 49.1 FL (ref 37–54)
EOSINOPHIL # BLD AUTO: 0.22 10*3/MM3 (ref 0–0.4)
EOSINOPHIL NFR BLD AUTO: 3 % (ref 0.3–6.2)
ERYTHROCYTE [DISTWIDTH] IN BLOOD BY AUTOMATED COUNT: 16.2 % (ref 12.3–15.4)
GFR SERPL CREATININE-BSD FRML MDRD: 83 ML/MIN/1.73
GLOBULIN UR ELPH-MCNC: 2.5 GM/DL
GLUCOSE BLDC GLUCOMTR-MCNC: 147 MG/DL (ref 70–130)
GLUCOSE BLDC GLUCOMTR-MCNC: 147 MG/DL (ref 70–130)
GLUCOSE BLDC GLUCOMTR-MCNC: 226 MG/DL (ref 70–130)
GLUCOSE BLDC GLUCOMTR-MCNC: 232 MG/DL (ref 70–130)
GLUCOSE SERPL-MCNC: 220 MG/DL (ref 65–99)
HCT VFR BLD AUTO: 39.8 % (ref 37.5–51)
HGB BLD-MCNC: 11.4 G/DL (ref 13–17.7)
HYPOCHROMIA BLD QL: NORMAL
IMM GRANULOCYTES # BLD AUTO: 0.07 10*3/MM3 (ref 0–0.05)
IMM GRANULOCYTES NFR BLD AUTO: 1 % (ref 0–0.5)
LV EF NUC BP: 41 %
LYMPHOCYTES # BLD AUTO: 1.15 10*3/MM3 (ref 0.7–3.1)
LYMPHOCYTES NFR BLD AUTO: 15.8 % (ref 19.6–45.3)
MAXIMAL PREDICTED HEART RATE: 154 BPM
MCH RBC QN AUTO: 23.8 PG (ref 26.6–33)
MCHC RBC AUTO-ENTMCNC: 28.6 G/DL (ref 31.5–35.7)
MCV RBC AUTO: 83.1 FL (ref 79–97)
MONOCYTES # BLD AUTO: 0.78 10*3/MM3 (ref 0.1–0.9)
MONOCYTES NFR BLD AUTO: 10.7 % (ref 5–12)
NEUTROPHILS NFR BLD AUTO: 5.05 10*3/MM3 (ref 1.7–7)
NEUTROPHILS NFR BLD AUTO: 69.1 % (ref 42.7–76)
NRBC BLD AUTO-RTO: 0 /100 WBC (ref 0–0.2)
PERCENT MAX PREDICTED HR: 55.84 %
PLAT MORPH BLD: NORMAL
PLATELET # BLD AUTO: 230 10*3/MM3 (ref 140–450)
PMV BLD AUTO: 9.5 FL (ref 6–12)
POTASSIUM SERPL-SCNC: 4.5 MMOL/L (ref 3.5–5.2)
PROT SERPL-MCNC: 6 G/DL (ref 6–8.5)
RBC # BLD AUTO: 4.79 10*6/MM3 (ref 4.14–5.8)
SODIUM SERPL-SCNC: 137 MMOL/L (ref 136–145)
STOMATOCYTES BLD QL SMEAR: NORMAL
STRESS BASELINE BP: NORMAL MMHG
STRESS BASELINE HR: 71 BPM
STRESS PERCENT HR: 66 %
STRESS POST PEAK BP: NORMAL MMHG
STRESS POST PEAK HR: 86 BPM
STRESS TARGET HR: 131 BPM
WBC # BLD AUTO: 7.3 10*3/MM3 (ref 3.4–10.8)

## 2021-05-28 PROCEDURE — 72146 MRI CHEST SPINE W/O DYE: CPT

## 2021-05-28 PROCEDURE — 63710000001 INSULIN ASPART PER 5 UNITS: Performed by: PHYSICIAN ASSISTANT

## 2021-05-28 PROCEDURE — 63710000001 INSULIN ASPART PER 5 UNITS: Performed by: INTERNAL MEDICINE

## 2021-05-28 PROCEDURE — 84145 PROCALCITONIN (PCT): CPT | Performed by: INTERNAL MEDICINE

## 2021-05-28 PROCEDURE — 99232 SBSQ HOSP IP/OBS MODERATE 35: CPT | Performed by: INTERNAL MEDICINE

## 2021-05-28 PROCEDURE — 97110 THERAPEUTIC EXERCISES: CPT

## 2021-05-28 PROCEDURE — 72157 MRI CHEST SPINE W/O & W/DYE: CPT

## 2021-05-28 PROCEDURE — 85007 BL SMEAR W/DIFF WBC COUNT: CPT | Performed by: INTERNAL MEDICINE

## 2021-05-28 PROCEDURE — 0 TECHNETIUM SESTAMIBI: Performed by: INTERNAL MEDICINE

## 2021-05-28 PROCEDURE — 78452 HT MUSCLE IMAGE SPECT MULT: CPT

## 2021-05-28 PROCEDURE — 72157 MRI CHEST SPINE W/O & W/DYE: CPT | Performed by: RADIOLOGY

## 2021-05-28 PROCEDURE — 94799 UNLISTED PULMONARY SVC/PX: CPT

## 2021-05-28 PROCEDURE — 78452 HT MUSCLE IMAGE SPECT MULT: CPT | Performed by: INTERNAL MEDICINE

## 2021-05-28 PROCEDURE — 80053 COMPREHEN METABOLIC PANEL: CPT | Performed by: INTERNAL MEDICINE

## 2021-05-28 PROCEDURE — 86140 C-REACTIVE PROTEIN: CPT | Performed by: INTERNAL MEDICINE

## 2021-05-28 PROCEDURE — 0 GADOBENATE DIMEGLUMINE 529 MG/ML SOLUTION: Performed by: INTERNAL MEDICINE

## 2021-05-28 PROCEDURE — A9500 TC99M SESTAMIBI: HCPCS | Performed by: INTERNAL MEDICINE

## 2021-05-28 PROCEDURE — 93018 CV STRESS TEST I&R ONLY: CPT | Performed by: INTERNAL MEDICINE

## 2021-05-28 PROCEDURE — 25010000002 REGADENOSON 0.4 MG/5ML SOLUTION: Performed by: INTERNAL MEDICINE

## 2021-05-28 PROCEDURE — A9577 INJ MULTIHANCE: HCPCS | Performed by: INTERNAL MEDICINE

## 2021-05-28 PROCEDURE — 93017 CV STRESS TEST TRACING ONLY: CPT

## 2021-05-28 PROCEDURE — 72146 MRI CHEST SPINE W/O DYE: CPT | Performed by: RADIOLOGY

## 2021-05-28 PROCEDURE — 72148 MRI LUMBAR SPINE W/O DYE: CPT

## 2021-05-28 PROCEDURE — 82962 GLUCOSE BLOOD TEST: CPT

## 2021-05-28 PROCEDURE — 72156 MRI NECK SPINE W/O & W/DYE: CPT

## 2021-05-28 PROCEDURE — 72156 MRI NECK SPINE W/O & W/DYE: CPT | Performed by: RADIOLOGY

## 2021-05-28 PROCEDURE — 72148 MRI LUMBAR SPINE W/O DYE: CPT | Performed by: RADIOLOGY

## 2021-05-28 PROCEDURE — 85025 COMPLETE CBC W/AUTO DIFF WBC: CPT | Performed by: INTERNAL MEDICINE

## 2021-05-28 RX ORDER — GUAIFENESIN 600 MG/1
600 TABLET, EXTENDED RELEASE ORAL ONCE
Status: COMPLETED | OUTPATIENT
Start: 2021-05-28 | End: 2021-05-28

## 2021-05-28 RX ORDER — LACTULOSE 10 G/15ML
20 SOLUTION ORAL ONCE
Status: COMPLETED | OUTPATIENT
Start: 2021-05-28 | End: 2021-05-28

## 2021-05-28 RX ORDER — BUMETANIDE 0.25 MG/ML
1 INJECTION INTRAMUSCULAR; INTRAVENOUS ONCE
Status: COMPLETED | OUTPATIENT
Start: 2021-05-28 | End: 2021-05-28

## 2021-05-28 RX ORDER — BISACODYL 10 MG
10 SUPPOSITORY, RECTAL RECTAL ONCE
Status: COMPLETED | OUTPATIENT
Start: 2021-05-28 | End: 2021-05-28

## 2021-05-28 RX ADMIN — LACTULOSE 20 G: 10 SOLUTION ORAL at 18:26

## 2021-05-28 RX ADMIN — PANTOPRAZOLE SODIUM 40 MG: 40 TABLET, DELAYED RELEASE ORAL at 05:29

## 2021-05-28 RX ADMIN — INSULIN ASPART 4 UNITS: 100 INJECTION, SOLUTION INTRAVENOUS; SUBCUTANEOUS at 18:26

## 2021-05-28 RX ADMIN — IPRATROPIUM BROMIDE AND ALBUTEROL SULFATE 3 ML: .5; 3 SOLUTION RESPIRATORY (INHALATION) at 06:48

## 2021-05-28 RX ADMIN — Medication 1 CAPSULE: at 13:00

## 2021-05-28 RX ADMIN — GABAPENTIN 600 MG: 300 CAPSULE ORAL at 20:46

## 2021-05-28 RX ADMIN — TECHNETIUM TC 99M SESTAMIBI 1 DOSE: 1 INJECTION INTRAVENOUS at 10:56

## 2021-05-28 RX ADMIN — BUDESONIDE AND FORMOTEROL FUMARATE DIHYDRATE 2 PUFF: 160; 4.5 AEROSOL RESPIRATORY (INHALATION) at 06:47

## 2021-05-28 RX ADMIN — GABAPENTIN 600 MG: 300 CAPSULE ORAL at 14:23

## 2021-05-28 RX ADMIN — REGADENOSON 0.4 MG: 0.08 INJECTION, SOLUTION INTRAVENOUS at 10:56

## 2021-05-28 RX ADMIN — TECHNETIUM TC 99M SESTAMIBI 1 DOSE: 1 INJECTION INTRAVENOUS at 09:50

## 2021-05-28 RX ADMIN — CANAGLIFLOZIN 300 MG: 300 TABLET, FILM COATED ORAL at 13:01

## 2021-05-28 RX ADMIN — GADOBENATE DIMEGLUMINE 20 ML: 529 INJECTION, SOLUTION INTRAVENOUS at 17:42

## 2021-05-28 RX ADMIN — GUAIFENESIN 600 MG: 600 TABLET, EXTENDED RELEASE ORAL at 02:17

## 2021-05-28 RX ADMIN — BUMETANIDE 1 MG: 0.25 INJECTION, SOLUTION INTRAMUSCULAR; INTRAVENOUS at 18:26

## 2021-05-28 RX ADMIN — APIXABAN 5 MG: 5 TABLET, FILM COATED ORAL at 20:46

## 2021-05-28 RX ADMIN — INSULIN ASPART 25 UNITS: 100 INJECTION, SOLUTION INTRAVENOUS; SUBCUTANEOUS at 12:43

## 2021-05-28 RX ADMIN — LIDOCAINE 2 PATCH: 50 PATCH CUTANEOUS at 13:01

## 2021-05-28 RX ADMIN — BUDESONIDE AND FORMOTEROL FUMARATE DIHYDRATE 2 PUFF: 160; 4.5 AEROSOL RESPIRATORY (INHALATION) at 19:10

## 2021-05-28 RX ADMIN — ACETAMINOPHEN 650 MG: 325 TABLET ORAL at 00:37

## 2021-05-28 RX ADMIN — DOCUSATE SODIUM 100 MG: 100 CAPSULE, LIQUID FILLED ORAL at 20:46

## 2021-05-28 RX ADMIN — TAMSULOSIN HYDROCHLORIDE 0.4 MG: 0.4 CAPSULE ORAL at 20:46

## 2021-05-28 RX ADMIN — CLOPIDOGREL 75 MG: 75 TABLET, FILM COATED ORAL at 13:00

## 2021-05-28 RX ADMIN — HYDROCODONE BITARTRATE AND ACETAMINOPHEN 1 TABLET: 5; 325 TABLET ORAL at 20:46

## 2021-05-28 RX ADMIN — INSULIN ASPART 4 UNITS: 100 INJECTION, SOLUTION INTRAVENOUS; SUBCUTANEOUS at 12:43

## 2021-05-28 RX ADMIN — ATORVASTATIN CALCIUM 20 MG: 20 TABLET, FILM COATED ORAL at 13:01

## 2021-05-28 RX ADMIN — HYDROCODONE BITARTRATE AND ACETAMINOPHEN 1 TABLET: 5; 325 TABLET ORAL at 12:42

## 2021-05-28 RX ADMIN — GABAPENTIN 600 MG: 300 CAPSULE ORAL at 05:29

## 2021-05-28 RX ADMIN — INSULIN ASPART 30 UNITS: 100 INJECTION, SOLUTION INTRAVENOUS; SUBCUTANEOUS at 18:26

## 2021-05-28 RX ADMIN — SODIUM CHLORIDE, PRESERVATIVE FREE 10 ML: 5 INJECTION INTRAVENOUS at 10:47

## 2021-05-28 RX ADMIN — BISACODYL 10 MG: 10 SUPPOSITORY RECTAL at 18:26

## 2021-05-28 RX ADMIN — IPRATROPIUM BROMIDE AND ALBUTEROL SULFATE 3 ML: .5; 3 SOLUTION RESPIRATORY (INHALATION) at 19:10

## 2021-05-28 RX ADMIN — SODIUM CHLORIDE, PRESERVATIVE FREE 10 ML: 5 INJECTION INTRAVENOUS at 20:47

## 2021-05-28 RX ADMIN — IPRATROPIUM BROMIDE AND ALBUTEROL SULFATE 3 ML: .5; 3 SOLUTION RESPIRATORY (INHALATION) at 00:42

## 2021-05-28 RX ADMIN — IPRATROPIUM BROMIDE AND ALBUTEROL SULFATE 3 ML: .5; 3 SOLUTION RESPIRATORY (INHALATION) at 12:35

## 2021-05-29 LAB
ALBUMIN SERPL-MCNC: 3.35 G/DL (ref 3.5–5.2)
ALBUMIN/GLOB SERPL: 1.2 G/DL
ALP SERPL-CCNC: 54 U/L (ref 39–117)
ALT SERPL W P-5'-P-CCNC: 20 U/L (ref 1–41)
ANION GAP SERPL CALCULATED.3IONS-SCNC: 6.4 MMOL/L (ref 5–15)
AST SERPL-CCNC: 15 U/L (ref 1–40)
BASOPHILS # BLD AUTO: 0.03 10*3/MM3 (ref 0–0.2)
BASOPHILS NFR BLD AUTO: 0.3 % (ref 0–1.5)
BILIRUB SERPL-MCNC: 0.4 MG/DL (ref 0–1.2)
BUN SERPL-MCNC: 19 MG/DL (ref 8–23)
BUN/CREAT SERPL: 21.8 (ref 7–25)
CALCIUM SPEC-SCNC: 9.2 MG/DL (ref 8.6–10.5)
CHLORIDE SERPL-SCNC: 99 MMOL/L (ref 98–107)
CO2 SERPL-SCNC: 33.6 MMOL/L (ref 22–29)
CREAT SERPL-MCNC: 0.87 MG/DL (ref 0.76–1.27)
CRP SERPL-MCNC: 0.34 MG/DL (ref 0–0.5)
DEPRECATED RDW RBC AUTO: 49.1 FL (ref 37–54)
EOSINOPHIL # BLD AUTO: 0.25 10*3/MM3 (ref 0–0.4)
EOSINOPHIL NFR BLD AUTO: 2.9 % (ref 0.3–6.2)
ERYTHROCYTE [DISTWIDTH] IN BLOOD BY AUTOMATED COUNT: 16.2 % (ref 12.3–15.4)
GFR SERPL CREATININE-BSD FRML MDRD: 88 ML/MIN/1.73
GLOBULIN UR ELPH-MCNC: 2.8 GM/DL
GLUCOSE BLDC GLUCOMTR-MCNC: 130 MG/DL (ref 70–130)
GLUCOSE BLDC GLUCOMTR-MCNC: 149 MG/DL (ref 70–130)
GLUCOSE BLDC GLUCOMTR-MCNC: 218 MG/DL (ref 70–130)
GLUCOSE BLDC GLUCOMTR-MCNC: 231 MG/DL (ref 70–130)
GLUCOSE SERPL-MCNC: 120 MG/DL (ref 65–99)
HCT VFR BLD AUTO: 39.8 % (ref 37.5–51)
HGB BLD-MCNC: 11.7 G/DL (ref 13–17.7)
IMM GRANULOCYTES # BLD AUTO: 0.08 10*3/MM3 (ref 0–0.05)
IMM GRANULOCYTES NFR BLD AUTO: 0.9 % (ref 0–0.5)
LYMPHOCYTES # BLD AUTO: 1.41 10*3/MM3 (ref 0.7–3.1)
LYMPHOCYTES NFR BLD AUTO: 16.1 % (ref 19.6–45.3)
MCH RBC QN AUTO: 24.6 PG (ref 26.6–33)
MCHC RBC AUTO-ENTMCNC: 29.4 G/DL (ref 31.5–35.7)
MCV RBC AUTO: 83.6 FL (ref 79–97)
MONOCYTES # BLD AUTO: 0.95 10*3/MM3 (ref 0.1–0.9)
MONOCYTES NFR BLD AUTO: 10.8 % (ref 5–12)
NEUTROPHILS NFR BLD AUTO: 6.05 10*3/MM3 (ref 1.7–7)
NEUTROPHILS NFR BLD AUTO: 69 % (ref 42.7–76)
NRBC BLD AUTO-RTO: 0 /100 WBC (ref 0–0.2)
PLATELET # BLD AUTO: 228 10*3/MM3 (ref 140–450)
PMV BLD AUTO: 9.4 FL (ref 6–12)
POTASSIUM SERPL-SCNC: 4.2 MMOL/L (ref 3.5–5.2)
PROCALCITONIN SERPL-MCNC: 0.09 NG/ML (ref 0–0.25)
PROT SERPL-MCNC: 6.1 G/DL (ref 6–8.5)
RBC # BLD AUTO: 4.76 10*6/MM3 (ref 4.14–5.8)
SODIUM SERPL-SCNC: 139 MMOL/L (ref 136–145)
WBC # BLD AUTO: 8.77 10*3/MM3 (ref 3.4–10.8)

## 2021-05-29 PROCEDURE — 94799 UNLISTED PULMONARY SVC/PX: CPT

## 2021-05-29 PROCEDURE — 63710000001 INSULIN ASPART PER 5 UNITS: Performed by: INTERNAL MEDICINE

## 2021-05-29 PROCEDURE — 63710000001 INSULIN ASPART PER 5 UNITS: Performed by: PHYSICIAN ASSISTANT

## 2021-05-29 PROCEDURE — 85025 COMPLETE CBC W/AUTO DIFF WBC: CPT | Performed by: INTERNAL MEDICINE

## 2021-05-29 PROCEDURE — 80053 COMPREHEN METABOLIC PANEL: CPT | Performed by: INTERNAL MEDICINE

## 2021-05-29 PROCEDURE — 99232 SBSQ HOSP IP/OBS MODERATE 35: CPT | Performed by: INTERNAL MEDICINE

## 2021-05-29 PROCEDURE — 63710000001 INSULIN DETEMIR PER 5 UNITS: Performed by: INTERNAL MEDICINE

## 2021-05-29 PROCEDURE — 82962 GLUCOSE BLOOD TEST: CPT

## 2021-05-29 PROCEDURE — 86140 C-REACTIVE PROTEIN: CPT | Performed by: INTERNAL MEDICINE

## 2021-05-29 PROCEDURE — 94660 CPAP INITIATION&MGMT: CPT

## 2021-05-29 RX ORDER — BUMETANIDE 0.25 MG/ML
1 INJECTION INTRAMUSCULAR; INTRAVENOUS ONCE
Status: COMPLETED | OUTPATIENT
Start: 2021-05-29 | End: 2021-05-29

## 2021-05-29 RX ORDER — LISINOPRIL 2.5 MG/1
5 TABLET ORAL
Status: DISCONTINUED | OUTPATIENT
Start: 2021-05-29 | End: 2021-06-02

## 2021-05-29 RX ADMIN — GABAPENTIN 600 MG: 300 CAPSULE ORAL at 05:24

## 2021-05-29 RX ADMIN — TRAMADOL HYDROCHLORIDE 50 MG: 50 TABLET, FILM COATED ORAL at 12:07

## 2021-05-29 RX ADMIN — TRAMADOL HYDROCHLORIDE 50 MG: 50 TABLET, FILM COATED ORAL at 00:28

## 2021-05-29 RX ADMIN — GABAPENTIN 600 MG: 300 CAPSULE ORAL at 20:47

## 2021-05-29 RX ADMIN — INSULIN ASPART 30 UNITS: 100 INJECTION, SOLUTION INTRAVENOUS; SUBCUTANEOUS at 18:09

## 2021-05-29 RX ADMIN — APIXABAN 5 MG: 5 TABLET, FILM COATED ORAL at 08:36

## 2021-05-29 RX ADMIN — SODIUM CHLORIDE, PRESERVATIVE FREE 10 ML: 5 INJECTION INTRAVENOUS at 20:50

## 2021-05-29 RX ADMIN — CANAGLIFLOZIN 300 MG: 300 TABLET, FILM COATED ORAL at 08:36

## 2021-05-29 RX ADMIN — HYDROCODONE BITARTRATE AND ACETAMINOPHEN 1 TABLET: 5; 325 TABLET ORAL at 21:30

## 2021-05-29 RX ADMIN — PANTOPRAZOLE SODIUM 40 MG: 40 TABLET, DELAYED RELEASE ORAL at 05:24

## 2021-05-29 RX ADMIN — SODIUM CHLORIDE, PRESERVATIVE FREE 10 ML: 5 INJECTION INTRAVENOUS at 08:36

## 2021-05-29 RX ADMIN — METOPROLOL SUCCINATE 75 MG: 25 TABLET, EXTENDED RELEASE ORAL at 08:36

## 2021-05-29 RX ADMIN — GABAPENTIN 600 MG: 300 CAPSULE ORAL at 13:16

## 2021-05-29 RX ADMIN — APIXABAN 5 MG: 5 TABLET, FILM COATED ORAL at 20:47

## 2021-05-29 RX ADMIN — DOCUSATE SODIUM 100 MG: 100 CAPSULE, LIQUID FILLED ORAL at 08:36

## 2021-05-29 RX ADMIN — INSULIN DETEMIR 30 UNITS: 100 INJECTION, SOLUTION SUBCUTANEOUS at 09:08

## 2021-05-29 RX ADMIN — POLYETHYLENE GLYCOL (3350) 17 G: 17 POWDER, FOR SOLUTION ORAL at 08:35

## 2021-05-29 RX ADMIN — INSULIN ASPART 4 UNITS: 100 INJECTION, SOLUTION INTRAVENOUS; SUBCUTANEOUS at 11:12

## 2021-05-29 RX ADMIN — TAMSULOSIN HYDROCHLORIDE 0.4 MG: 0.4 CAPSULE ORAL at 20:47

## 2021-05-29 RX ADMIN — DOCUSATE SODIUM 100 MG: 100 CAPSULE, LIQUID FILLED ORAL at 20:47

## 2021-05-29 RX ADMIN — IPRATROPIUM BROMIDE AND ALBUTEROL SULFATE 3 ML: .5; 3 SOLUTION RESPIRATORY (INHALATION) at 06:50

## 2021-05-29 RX ADMIN — INSULIN ASPART 30 UNITS: 100 INJECTION, SOLUTION INTRAVENOUS; SUBCUTANEOUS at 11:13

## 2021-05-29 RX ADMIN — HYDROXYZINE HYDROCHLORIDE 25 MG: 25 TABLET, FILM COATED ORAL at 13:19

## 2021-05-29 RX ADMIN — LIDOCAINE 2 PATCH: 50 PATCH CUTANEOUS at 08:35

## 2021-05-29 RX ADMIN — Medication 1 CAPSULE: at 08:36

## 2021-05-29 RX ADMIN — LISINOPRIL 5 MG: 2.5 TABLET ORAL at 13:16

## 2021-05-29 RX ADMIN — CLOPIDOGREL 75 MG: 75 TABLET, FILM COATED ORAL at 08:35

## 2021-05-29 RX ADMIN — IPRATROPIUM BROMIDE AND ALBUTEROL SULFATE 3 ML: .5; 3 SOLUTION RESPIRATORY (INHALATION) at 13:09

## 2021-05-29 RX ADMIN — ATORVASTATIN CALCIUM 20 MG: 20 TABLET, FILM COATED ORAL at 08:36

## 2021-05-29 RX ADMIN — FLUTICASONE PROPIONATE 2 SPRAY: 50 SPRAY, METERED NASAL at 08:36

## 2021-05-29 RX ADMIN — BUMETANIDE 1 MG: 0.25 INJECTION, SOLUTION INTRAMUSCULAR; INTRAVENOUS at 14:39

## 2021-05-29 RX ADMIN — HYDROCODONE BITARTRATE AND ACETAMINOPHEN 1 TABLET: 5; 325 TABLET ORAL at 09:08

## 2021-05-29 RX ADMIN — IPRATROPIUM BROMIDE AND ALBUTEROL SULFATE 3 ML: .5; 3 SOLUTION RESPIRATORY (INHALATION) at 00:34

## 2021-05-29 RX ADMIN — BUDESONIDE AND FORMOTEROL FUMARATE DIHYDRATE 2 PUFF: 160; 4.5 AEROSOL RESPIRATORY (INHALATION) at 06:50

## 2021-05-30 ENCOUNTER — APPOINTMENT (OUTPATIENT)
Dept: GENERAL RADIOLOGY | Facility: HOSPITAL | Age: 66
End: 2021-05-30

## 2021-05-30 LAB
ANION GAP SERPL CALCULATED.3IONS-SCNC: 7.7 MMOL/L (ref 5–15)
ANISOCYTOSIS BLD QL: NORMAL
BASOPHILS # BLD AUTO: 0.05 10*3/MM3 (ref 0–0.2)
BASOPHILS NFR BLD AUTO: 0.6 % (ref 0–1.5)
BUN SERPL-MCNC: 19 MG/DL (ref 8–23)
BUN/CREAT SERPL: 20 (ref 7–25)
CALCIUM SPEC-SCNC: 9.3 MG/DL (ref 8.6–10.5)
CHLORIDE SERPL-SCNC: 97 MMOL/L (ref 98–107)
CO2 SERPL-SCNC: 33.3 MMOL/L (ref 22–29)
CREAT SERPL-MCNC: 0.95 MG/DL (ref 0.76–1.27)
DEPRECATED RDW RBC AUTO: 51 FL (ref 37–54)
EOSINOPHIL # BLD AUTO: 0.3 10*3/MM3 (ref 0–0.4)
EOSINOPHIL NFR BLD AUTO: 3.6 % (ref 0.3–6.2)
ERYTHROCYTE [DISTWIDTH] IN BLOOD BY AUTOMATED COUNT: 16.4 % (ref 12.3–15.4)
GFR SERPL CREATININE-BSD FRML MDRD: 79 ML/MIN/1.73
GLUCOSE BLDC GLUCOMTR-MCNC: 147 MG/DL (ref 70–130)
GLUCOSE BLDC GLUCOMTR-MCNC: 159 MG/DL (ref 70–130)
GLUCOSE BLDC GLUCOMTR-MCNC: 214 MG/DL (ref 70–130)
GLUCOSE BLDC GLUCOMTR-MCNC: 220 MG/DL (ref 70–130)
GLUCOSE BLDC GLUCOMTR-MCNC: 253 MG/DL (ref 70–130)
GLUCOSE SERPL-MCNC: 156 MG/DL (ref 65–99)
HCT VFR BLD AUTO: 42.1 % (ref 37.5–51)
HGB BLD-MCNC: 12.1 G/DL (ref 13–17.7)
HYPOCHROMIA BLD QL: NORMAL
IMM GRANULOCYTES # BLD AUTO: 0.04 10*3/MM3 (ref 0–0.05)
IMM GRANULOCYTES NFR BLD AUTO: 0.5 % (ref 0–0.5)
LYMPHOCYTES # BLD AUTO: 1.45 10*3/MM3 (ref 0.7–3.1)
LYMPHOCYTES NFR BLD AUTO: 17.3 % (ref 19.6–45.3)
MAGNESIUM SERPL-MCNC: 2.4 MG/DL (ref 1.6–2.4)
MCH RBC QN AUTO: 24.6 PG (ref 26.6–33)
MCHC RBC AUTO-ENTMCNC: 28.7 G/DL (ref 31.5–35.7)
MCV RBC AUTO: 85.7 FL (ref 79–97)
MONOCYTES # BLD AUTO: 0.91 10*3/MM3 (ref 0.1–0.9)
MONOCYTES NFR BLD AUTO: 10.9 % (ref 5–12)
NEUTROPHILS NFR BLD AUTO: 5.63 10*3/MM3 (ref 1.7–7)
NEUTROPHILS NFR BLD AUTO: 67.1 % (ref 42.7–76)
NRBC BLD AUTO-RTO: 0 /100 WBC (ref 0–0.2)
NT-PROBNP SERPL-MCNC: 190.6 PG/ML (ref 0–900)
PLAT MORPH BLD: NORMAL
PLATELET # BLD AUTO: 217 10*3/MM3 (ref 140–450)
PMV BLD AUTO: 9.2 FL (ref 6–12)
POTASSIUM SERPL-SCNC: 4 MMOL/L (ref 3.5–5.2)
RBC # BLD AUTO: 4.91 10*6/MM3 (ref 4.14–5.8)
SODIUM SERPL-SCNC: 138 MMOL/L (ref 136–145)
WBC # BLD AUTO: 8.38 10*3/MM3 (ref 3.4–10.8)

## 2021-05-30 PROCEDURE — 83735 ASSAY OF MAGNESIUM: CPT | Performed by: INTERNAL MEDICINE

## 2021-05-30 PROCEDURE — 71045 X-RAY EXAM CHEST 1 VIEW: CPT

## 2021-05-30 PROCEDURE — 99232 SBSQ HOSP IP/OBS MODERATE 35: CPT | Performed by: INTERNAL MEDICINE

## 2021-05-30 PROCEDURE — 80048 BASIC METABOLIC PNL TOTAL CA: CPT | Performed by: INTERNAL MEDICINE

## 2021-05-30 PROCEDURE — 83880 ASSAY OF NATRIURETIC PEPTIDE: CPT | Performed by: INTERNAL MEDICINE

## 2021-05-30 PROCEDURE — 63710000001 INSULIN DETEMIR PER 5 UNITS: Performed by: INTERNAL MEDICINE

## 2021-05-30 PROCEDURE — 82962 GLUCOSE BLOOD TEST: CPT

## 2021-05-30 PROCEDURE — 94799 UNLISTED PULMONARY SVC/PX: CPT

## 2021-05-30 PROCEDURE — 63710000001 INSULIN ASPART PER 5 UNITS: Performed by: PHYSICIAN ASSISTANT

## 2021-05-30 PROCEDURE — 85025 COMPLETE CBC W/AUTO DIFF WBC: CPT | Performed by: INTERNAL MEDICINE

## 2021-05-30 PROCEDURE — 71045 X-RAY EXAM CHEST 1 VIEW: CPT | Performed by: RADIOLOGY

## 2021-05-30 PROCEDURE — 63710000001 INSULIN ASPART PER 5 UNITS: Performed by: INTERNAL MEDICINE

## 2021-05-30 PROCEDURE — 85007 BL SMEAR W/DIFF WBC COUNT: CPT | Performed by: INTERNAL MEDICINE

## 2021-05-30 RX ORDER — BUMETANIDE 0.25 MG/ML
0.5 INJECTION INTRAMUSCULAR; INTRAVENOUS ONCE
Status: COMPLETED | OUTPATIENT
Start: 2021-05-30 | End: 2021-05-30

## 2021-05-30 RX ADMIN — CANAGLIFLOZIN 300 MG: 300 TABLET, FILM COATED ORAL at 07:37

## 2021-05-30 RX ADMIN — INSULIN ASPART 30 UNITS: 100 INJECTION, SOLUTION INTRAVENOUS; SUBCUTANEOUS at 17:18

## 2021-05-30 RX ADMIN — HYDROXYZINE HYDROCHLORIDE 25 MG: 25 TABLET, FILM COATED ORAL at 21:30

## 2021-05-30 RX ADMIN — INSULIN ASPART 30 UNITS: 100 INJECTION, SOLUTION INTRAVENOUS; SUBCUTANEOUS at 07:39

## 2021-05-30 RX ADMIN — INSULIN ASPART 30 UNITS: 100 INJECTION, SOLUTION INTRAVENOUS; SUBCUTANEOUS at 10:36

## 2021-05-30 RX ADMIN — APIXABAN 5 MG: 5 TABLET, FILM COATED ORAL at 21:27

## 2021-05-30 RX ADMIN — SODIUM CHLORIDE, PRESERVATIVE FREE 10 ML: 5 INJECTION INTRAVENOUS at 07:39

## 2021-05-30 RX ADMIN — DOCUSATE SODIUM 100 MG: 100 CAPSULE, LIQUID FILLED ORAL at 21:27

## 2021-05-30 RX ADMIN — HYDROCODONE BITARTRATE AND ACETAMINOPHEN 1 TABLET: 5; 325 TABLET ORAL at 16:07

## 2021-05-30 RX ADMIN — BUDESONIDE AND FORMOTEROL FUMARATE DIHYDRATE 2 PUFF: 160; 4.5 AEROSOL RESPIRATORY (INHALATION) at 18:56

## 2021-05-30 RX ADMIN — LISINOPRIL 5 MG: 2.5 TABLET ORAL at 07:38

## 2021-05-30 RX ADMIN — DOCUSATE SODIUM 100 MG: 100 CAPSULE, LIQUID FILLED ORAL at 07:37

## 2021-05-30 RX ADMIN — ATORVASTATIN CALCIUM 20 MG: 20 TABLET, FILM COATED ORAL at 07:39

## 2021-05-30 RX ADMIN — HYDROCODONE BITARTRATE AND ACETAMINOPHEN 1 TABLET: 5; 325 TABLET ORAL at 07:38

## 2021-05-30 RX ADMIN — BUDESONIDE AND FORMOTEROL FUMARATE DIHYDRATE 2 PUFF: 160; 4.5 AEROSOL RESPIRATORY (INHALATION) at 06:47

## 2021-05-30 RX ADMIN — METOPROLOL SUCCINATE 75 MG: 25 TABLET, EXTENDED RELEASE ORAL at 07:38

## 2021-05-30 RX ADMIN — TRAMADOL HYDROCHLORIDE 50 MG: 50 TABLET, FILM COATED ORAL at 10:36

## 2021-05-30 RX ADMIN — IPRATROPIUM BROMIDE AND ALBUTEROL SULFATE 3 ML: .5; 3 SOLUTION RESPIRATORY (INHALATION) at 18:57

## 2021-05-30 RX ADMIN — IPRATROPIUM BROMIDE AND ALBUTEROL SULFATE 3 ML: .5; 3 SOLUTION RESPIRATORY (INHALATION) at 13:22

## 2021-05-30 RX ADMIN — APIXABAN 5 MG: 5 TABLET, FILM COATED ORAL at 07:39

## 2021-05-30 RX ADMIN — PANTOPRAZOLE SODIUM 40 MG: 40 TABLET, DELAYED RELEASE ORAL at 05:12

## 2021-05-30 RX ADMIN — INSULIN DETEMIR 30 UNITS: 100 INJECTION, SOLUTION SUBCUTANEOUS at 08:14

## 2021-05-30 RX ADMIN — IPRATROPIUM BROMIDE AND ALBUTEROL SULFATE 3 ML: .5; 3 SOLUTION RESPIRATORY (INHALATION) at 00:53

## 2021-05-30 RX ADMIN — BUMETANIDE 0.5 MG: 0.25 INJECTION, SOLUTION INTRAMUSCULAR; INTRAVENOUS at 15:22

## 2021-05-30 RX ADMIN — IPRATROPIUM BROMIDE AND ALBUTEROL SULFATE 3 ML: .5; 3 SOLUTION RESPIRATORY (INHALATION) at 06:47

## 2021-05-30 RX ADMIN — TAMSULOSIN HYDROCHLORIDE 0.4 MG: 0.4 CAPSULE ORAL at 21:27

## 2021-05-30 RX ADMIN — GABAPENTIN 600 MG: 300 CAPSULE ORAL at 15:22

## 2021-05-30 RX ADMIN — POLYETHYLENE GLYCOL (3350) 17 G: 17 POWDER, FOR SOLUTION ORAL at 07:38

## 2021-05-30 RX ADMIN — INSULIN ASPART 6 UNITS: 100 INJECTION, SOLUTION INTRAVENOUS; SUBCUTANEOUS at 17:18

## 2021-05-30 RX ADMIN — FLUTICASONE PROPIONATE 2 SPRAY: 50 SPRAY, METERED NASAL at 07:40

## 2021-05-30 RX ADMIN — GABAPENTIN 600 MG: 300 CAPSULE ORAL at 05:11

## 2021-05-30 RX ADMIN — Medication 1 CAPSULE: at 07:38

## 2021-05-30 RX ADMIN — CLOPIDOGREL 75 MG: 75 TABLET, FILM COATED ORAL at 07:38

## 2021-05-30 RX ADMIN — GABAPENTIN 600 MG: 300 CAPSULE ORAL at 21:26

## 2021-05-30 RX ADMIN — LIDOCAINE 2 PATCH: 50 PATCH CUTANEOUS at 07:37

## 2021-05-31 LAB
ANION GAP SERPL CALCULATED.3IONS-SCNC: 5.6 MMOL/L (ref 5–15)
ANISOCYTOSIS BLD QL: NORMAL
BASOPHILS # BLD AUTO: 0.07 10*3/MM3 (ref 0–0.2)
BASOPHILS NFR BLD AUTO: 0.8 % (ref 0–1.5)
BUN SERPL-MCNC: 20 MG/DL (ref 8–23)
BUN/CREAT SERPL: 16.1 (ref 7–25)
CALCIUM SPEC-SCNC: 9.5 MG/DL (ref 8.6–10.5)
CHLORIDE SERPL-SCNC: 96 MMOL/L (ref 98–107)
CO2 SERPL-SCNC: 35.4 MMOL/L (ref 22–29)
CREAT SERPL-MCNC: 1.24 MG/DL (ref 0.76–1.27)
CRP SERPL-MCNC: 0.87 MG/DL (ref 0–0.5)
DEPRECATED RDW RBC AUTO: 50.7 FL (ref 37–54)
EOSINOPHIL # BLD AUTO: 0.3 10*3/MM3 (ref 0–0.4)
EOSINOPHIL NFR BLD AUTO: 3.5 % (ref 0.3–6.2)
ERYTHROCYTE [DISTWIDTH] IN BLOOD BY AUTOMATED COUNT: 16.6 % (ref 12.3–15.4)
GFR SERPL CREATININE-BSD FRML MDRD: 58 ML/MIN/1.73
GLUCOSE BLDC GLUCOMTR-MCNC: 135 MG/DL (ref 70–130)
GLUCOSE BLDC GLUCOMTR-MCNC: 199 MG/DL (ref 70–130)
GLUCOSE BLDC GLUCOMTR-MCNC: 222 MG/DL (ref 70–130)
GLUCOSE BLDC GLUCOMTR-MCNC: 280 MG/DL (ref 70–130)
GLUCOSE SERPL-MCNC: 164 MG/DL (ref 65–99)
HCT VFR BLD AUTO: 43 % (ref 37.5–51)
HGB BLD-MCNC: 12.3 G/DL (ref 13–17.7)
HYPOCHROMIA BLD QL: NORMAL
IMM GRANULOCYTES # BLD AUTO: 0.06 10*3/MM3 (ref 0–0.05)
IMM GRANULOCYTES NFR BLD AUTO: 0.7 % (ref 0–0.5)
LYMPHOCYTES # BLD AUTO: 1.51 10*3/MM3 (ref 0.7–3.1)
LYMPHOCYTES NFR BLD AUTO: 17.8 % (ref 19.6–45.3)
MCH RBC QN AUTO: 24.2 PG (ref 26.6–33)
MCHC RBC AUTO-ENTMCNC: 28.6 G/DL (ref 31.5–35.7)
MCV RBC AUTO: 84.6 FL (ref 79–97)
MONOCYTES # BLD AUTO: 0.86 10*3/MM3 (ref 0.1–0.9)
MONOCYTES NFR BLD AUTO: 10.1 % (ref 5–12)
NEUTROPHILS NFR BLD AUTO: 5.69 10*3/MM3 (ref 1.7–7)
NEUTROPHILS NFR BLD AUTO: 67.1 % (ref 42.7–76)
NRBC BLD AUTO-RTO: 0 /100 WBC (ref 0–0.2)
PLAT MORPH BLD: NORMAL
PLATELET # BLD AUTO: 234 10*3/MM3 (ref 140–450)
PMV BLD AUTO: 9.4 FL (ref 6–12)
POTASSIUM SERPL-SCNC: 4.5 MMOL/L (ref 3.5–5.2)
RBC # BLD AUTO: 5.08 10*6/MM3 (ref 4.14–5.8)
SODIUM SERPL-SCNC: 137 MMOL/L (ref 136–145)
STOMATOCYTES BLD QL SMEAR: NORMAL
WBC # BLD AUTO: 8.49 10*3/MM3 (ref 3.4–10.8)

## 2021-05-31 PROCEDURE — 82962 GLUCOSE BLOOD TEST: CPT

## 2021-05-31 PROCEDURE — 94799 UNLISTED PULMONARY SVC/PX: CPT

## 2021-05-31 PROCEDURE — 63710000001 INSULIN ASPART PER 5 UNITS: Performed by: INTERNAL MEDICINE

## 2021-05-31 PROCEDURE — 94660 CPAP INITIATION&MGMT: CPT

## 2021-05-31 PROCEDURE — 80048 BASIC METABOLIC PNL TOTAL CA: CPT | Performed by: INTERNAL MEDICINE

## 2021-05-31 PROCEDURE — 85025 COMPLETE CBC W/AUTO DIFF WBC: CPT | Performed by: INTERNAL MEDICINE

## 2021-05-31 PROCEDURE — 97110 THERAPEUTIC EXERCISES: CPT

## 2021-05-31 PROCEDURE — 63710000001 INSULIN ASPART PER 5 UNITS: Performed by: PHYSICIAN ASSISTANT

## 2021-05-31 PROCEDURE — 99232 SBSQ HOSP IP/OBS MODERATE 35: CPT | Performed by: INTERNAL MEDICINE

## 2021-05-31 PROCEDURE — 85007 BL SMEAR W/DIFF WBC COUNT: CPT | Performed by: INTERNAL MEDICINE

## 2021-05-31 PROCEDURE — 63710000001 INSULIN DETEMIR PER 5 UNITS: Performed by: INTERNAL MEDICINE

## 2021-05-31 PROCEDURE — 97116 GAIT TRAINING THERAPY: CPT

## 2021-05-31 PROCEDURE — 99231 SBSQ HOSP IP/OBS SF/LOW 25: CPT | Performed by: INTERNAL MEDICINE

## 2021-05-31 PROCEDURE — 86140 C-REACTIVE PROTEIN: CPT | Performed by: PHYSICIAN ASSISTANT

## 2021-05-31 RX ADMIN — HYDROCODONE BITARTRATE AND ACETAMINOPHEN 1 TABLET: 5; 325 TABLET ORAL at 18:43

## 2021-05-31 RX ADMIN — Medication 1 CAPSULE: at 08:57

## 2021-05-31 RX ADMIN — PANTOPRAZOLE SODIUM 40 MG: 40 TABLET, DELAYED RELEASE ORAL at 05:27

## 2021-05-31 RX ADMIN — INSULIN ASPART 6 UNITS: 100 INJECTION, SOLUTION INTRAVENOUS; SUBCUTANEOUS at 09:04

## 2021-05-31 RX ADMIN — POLYETHYLENE GLYCOL (3350) 17 G: 17 POWDER, FOR SOLUTION ORAL at 08:57

## 2021-05-31 RX ADMIN — HYDROCODONE BITARTRATE AND ACETAMINOPHEN 1 TABLET: 5; 325 TABLET ORAL at 10:53

## 2021-05-31 RX ADMIN — TRAMADOL HYDROCHLORIDE 50 MG: 50 TABLET, FILM COATED ORAL at 21:06

## 2021-05-31 RX ADMIN — TRAMADOL HYDROCHLORIDE 50 MG: 50 TABLET, FILM COATED ORAL at 08:57

## 2021-05-31 RX ADMIN — CLOPIDOGREL 75 MG: 75 TABLET, FILM COATED ORAL at 08:57

## 2021-05-31 RX ADMIN — APIXABAN 5 MG: 5 TABLET, FILM COATED ORAL at 20:56

## 2021-05-31 RX ADMIN — DOCUSATE SODIUM 100 MG: 100 CAPSULE, LIQUID FILLED ORAL at 08:57

## 2021-05-31 RX ADMIN — LISINOPRIL 5 MG: 2.5 TABLET ORAL at 08:57

## 2021-05-31 RX ADMIN — TAMSULOSIN HYDROCHLORIDE 0.4 MG: 0.4 CAPSULE ORAL at 20:56

## 2021-05-31 RX ADMIN — BUDESONIDE AND FORMOTEROL FUMARATE DIHYDRATE 2 PUFF: 160; 4.5 AEROSOL RESPIRATORY (INHALATION) at 18:36

## 2021-05-31 RX ADMIN — INSULIN DETEMIR 40 UNITS: 100 INJECTION, SOLUTION SUBCUTANEOUS at 08:57

## 2021-05-31 RX ADMIN — GABAPENTIN 600 MG: 300 CAPSULE ORAL at 20:56

## 2021-05-31 RX ADMIN — IPRATROPIUM BROMIDE AND ALBUTEROL SULFATE 3 ML: .5; 3 SOLUTION RESPIRATORY (INHALATION) at 00:27

## 2021-05-31 RX ADMIN — IPRATROPIUM BROMIDE AND ALBUTEROL SULFATE 3 ML: .5; 3 SOLUTION RESPIRATORY (INHALATION) at 18:36

## 2021-05-31 RX ADMIN — ATORVASTATIN CALCIUM 20 MG: 20 TABLET, FILM COATED ORAL at 08:57

## 2021-05-31 RX ADMIN — IPRATROPIUM BROMIDE AND ALBUTEROL SULFATE 3 ML: .5; 3 SOLUTION RESPIRATORY (INHALATION) at 13:32

## 2021-05-31 RX ADMIN — HYDROXYZINE HYDROCHLORIDE 25 MG: 25 TABLET, FILM COATED ORAL at 21:06

## 2021-05-31 RX ADMIN — CANAGLIFLOZIN 300 MG: 300 TABLET, FILM COATED ORAL at 08:57

## 2021-05-31 RX ADMIN — IPRATROPIUM BROMIDE AND ALBUTEROL SULFATE 3 ML: .5; 3 SOLUTION RESPIRATORY (INHALATION) at 06:48

## 2021-05-31 RX ADMIN — HYDROCODONE BITARTRATE AND ACETAMINOPHEN 1 TABLET: 5; 325 TABLET ORAL at 02:20

## 2021-05-31 RX ADMIN — BUDESONIDE AND FORMOTEROL FUMARATE DIHYDRATE 2 PUFF: 160; 4.5 AEROSOL RESPIRATORY (INHALATION) at 06:48

## 2021-05-31 RX ADMIN — INSULIN ASPART 30 UNITS: 100 INJECTION, SOLUTION INTRAVENOUS; SUBCUTANEOUS at 09:04

## 2021-05-31 RX ADMIN — METOPROLOL SUCCINATE 75 MG: 25 TABLET, EXTENDED RELEASE ORAL at 08:57

## 2021-05-31 RX ADMIN — APIXABAN 5 MG: 5 TABLET, FILM COATED ORAL at 08:57

## 2021-05-31 RX ADMIN — LIDOCAINE 2 PATCH: 50 PATCH CUTANEOUS at 08:58

## 2021-05-31 RX ADMIN — GABAPENTIN 600 MG: 300 CAPSULE ORAL at 05:27

## 2021-05-31 RX ADMIN — DOCUSATE SODIUM 100 MG: 100 CAPSULE, LIQUID FILLED ORAL at 20:56

## 2021-05-31 RX ADMIN — INSULIN ASPART 2 UNITS: 100 INJECTION, SOLUTION INTRAVENOUS; SUBCUTANEOUS at 10:53

## 2021-05-31 RX ADMIN — FLUTICASONE PROPIONATE 2 SPRAY: 50 SPRAY, METERED NASAL at 08:57

## 2021-06-01 LAB
GLUCOSE BLDC GLUCOMTR-MCNC: 181 MG/DL (ref 70–130)
GLUCOSE BLDC GLUCOMTR-MCNC: 192 MG/DL (ref 70–130)
GLUCOSE BLDC GLUCOMTR-MCNC: 240 MG/DL (ref 70–130)
GLUCOSE BLDC GLUCOMTR-MCNC: 251 MG/DL (ref 70–130)

## 2021-06-01 PROCEDURE — 63710000001 INSULIN ASPART PER 5 UNITS: Performed by: INTERNAL MEDICINE

## 2021-06-01 PROCEDURE — 94799 UNLISTED PULMONARY SVC/PX: CPT

## 2021-06-01 PROCEDURE — 99232 SBSQ HOSP IP/OBS MODERATE 35: CPT | Performed by: INTERNAL MEDICINE

## 2021-06-01 PROCEDURE — 63710000001 INSULIN DETEMIR PER 5 UNITS: Performed by: INTERNAL MEDICINE

## 2021-06-01 PROCEDURE — 82962 GLUCOSE BLOOD TEST: CPT

## 2021-06-01 PROCEDURE — 94660 CPAP INITIATION&MGMT: CPT

## 2021-06-01 PROCEDURE — 63710000001 INSULIN ASPART PER 5 UNITS: Performed by: PHYSICIAN ASSISTANT

## 2021-06-01 PROCEDURE — 97110 THERAPEUTIC EXERCISES: CPT

## 2021-06-01 RX ORDER — HYDROCODONE BITARTRATE AND ACETAMINOPHEN 5; 325 MG/1; MG/1
1 TABLET ORAL EVERY 8 HOURS PRN
Status: DISCONTINUED | OUTPATIENT
Start: 2021-06-01 | End: 2021-06-07

## 2021-06-01 RX ADMIN — HYDROCODONE BITARTRATE AND ACETAMINOPHEN 1 TABLET: 5; 325 TABLET ORAL at 22:36

## 2021-06-01 RX ADMIN — INSULIN ASPART 2 UNITS: 100 INJECTION, SOLUTION INTRAVENOUS; SUBCUTANEOUS at 13:01

## 2021-06-01 RX ADMIN — CLOPIDOGREL 75 MG: 75 TABLET, FILM COATED ORAL at 07:57

## 2021-06-01 RX ADMIN — HYDROCODONE BITARTRATE AND ACETAMINOPHEN 1 TABLET: 5; 325 TABLET ORAL at 13:01

## 2021-06-01 RX ADMIN — LISINOPRIL 5 MG: 2.5 TABLET ORAL at 07:56

## 2021-06-01 RX ADMIN — FLUTICASONE PROPIONATE 2 SPRAY: 50 SPRAY, METERED NASAL at 07:56

## 2021-06-01 RX ADMIN — SODIUM CHLORIDE, PRESERVATIVE FREE 10 ML: 5 INJECTION INTRAVENOUS at 07:56

## 2021-06-01 RX ADMIN — IPRATROPIUM BROMIDE AND ALBUTEROL SULFATE 3 ML: .5; 3 SOLUTION RESPIRATORY (INHALATION) at 06:30

## 2021-06-01 RX ADMIN — GABAPENTIN 600 MG: 300 CAPSULE ORAL at 21:27

## 2021-06-01 RX ADMIN — GABAPENTIN 600 MG: 300 CAPSULE ORAL at 13:01

## 2021-06-01 RX ADMIN — ATORVASTATIN CALCIUM 20 MG: 20 TABLET, FILM COATED ORAL at 07:57

## 2021-06-01 RX ADMIN — INSULIN ASPART 30 UNITS: 100 INJECTION, SOLUTION INTRAVENOUS; SUBCUTANEOUS at 07:55

## 2021-06-01 RX ADMIN — CANAGLIFLOZIN 300 MG: 300 TABLET, FILM COATED ORAL at 07:56

## 2021-06-01 RX ADMIN — INSULIN ASPART 4 UNITS: 100 INJECTION, SOLUTION INTRAVENOUS; SUBCUTANEOUS at 07:59

## 2021-06-01 RX ADMIN — APIXABAN 5 MG: 5 TABLET, FILM COATED ORAL at 07:57

## 2021-06-01 RX ADMIN — GABAPENTIN 600 MG: 300 CAPSULE ORAL at 05:13

## 2021-06-01 RX ADMIN — HYDROXYZINE HYDROCHLORIDE 25 MG: 25 TABLET, FILM COATED ORAL at 07:58

## 2021-06-01 RX ADMIN — DOCUSATE SODIUM 100 MG: 100 CAPSULE, LIQUID FILLED ORAL at 07:57

## 2021-06-01 RX ADMIN — PANTOPRAZOLE SODIUM 40 MG: 40 TABLET, DELAYED RELEASE ORAL at 05:13

## 2021-06-01 RX ADMIN — HYDROCODONE BITARTRATE AND ACETAMINOPHEN 1 TABLET: 5; 325 TABLET ORAL at 05:40

## 2021-06-01 RX ADMIN — METOPROLOL SUCCINATE 75 MG: 25 TABLET, EXTENDED RELEASE ORAL at 07:57

## 2021-06-01 RX ADMIN — INSULIN ASPART 30 UNITS: 100 INJECTION, SOLUTION INTRAVENOUS; SUBCUTANEOUS at 17:26

## 2021-06-01 RX ADMIN — IPRATROPIUM BROMIDE AND ALBUTEROL SULFATE 3 ML: .5; 3 SOLUTION RESPIRATORY (INHALATION) at 13:03

## 2021-06-01 RX ADMIN — TRAMADOL HYDROCHLORIDE 50 MG: 50 TABLET, FILM COATED ORAL at 07:56

## 2021-06-01 RX ADMIN — LIDOCAINE 2 PATCH: 50 PATCH CUTANEOUS at 07:58

## 2021-06-01 RX ADMIN — BUDESONIDE AND FORMOTEROL FUMARATE DIHYDRATE 2 PUFF: 160; 4.5 AEROSOL RESPIRATORY (INHALATION) at 18:30

## 2021-06-01 RX ADMIN — INSULIN DETEMIR 40 UNITS: 100 INJECTION, SOLUTION SUBCUTANEOUS at 07:56

## 2021-06-01 RX ADMIN — DOCUSATE SODIUM 100 MG: 100 CAPSULE, LIQUID FILLED ORAL at 21:27

## 2021-06-01 RX ADMIN — APIXABAN 5 MG: 5 TABLET, FILM COATED ORAL at 21:27

## 2021-06-01 RX ADMIN — HYDROXYZINE HYDROCHLORIDE 25 MG: 25 TABLET, FILM COATED ORAL at 21:27

## 2021-06-01 RX ADMIN — Medication 1 CAPSULE: at 07:58

## 2021-06-01 RX ADMIN — IPRATROPIUM BROMIDE AND ALBUTEROL SULFATE 3 ML: .5; 3 SOLUTION RESPIRATORY (INHALATION) at 00:42

## 2021-06-01 RX ADMIN — INSULIN ASPART 2 UNITS: 100 INJECTION, SOLUTION INTRAVENOUS; SUBCUTANEOUS at 17:26

## 2021-06-01 RX ADMIN — INSULIN ASPART 30 UNITS: 100 INJECTION, SOLUTION INTRAVENOUS; SUBCUTANEOUS at 13:01

## 2021-06-01 RX ADMIN — TAMSULOSIN HYDROCHLORIDE 0.4 MG: 0.4 CAPSULE ORAL at 21:27

## 2021-06-01 RX ADMIN — POLYETHYLENE GLYCOL (3350) 17 G: 17 POWDER, FOR SOLUTION ORAL at 07:58

## 2021-06-01 RX ADMIN — IPRATROPIUM BROMIDE AND ALBUTEROL SULFATE 3 ML: .5; 3 SOLUTION RESPIRATORY (INHALATION) at 18:30

## 2021-06-01 RX ADMIN — BUDESONIDE AND FORMOTEROL FUMARATE DIHYDRATE 2 PUFF: 160; 4.5 AEROSOL RESPIRATORY (INHALATION) at 06:30

## 2021-06-01 NOTE — NURSING NOTE
Received denial from patients insurance. Number for fast appeal [phone]. Fax number for fast appeal [phone]. [facility]  [phone].  Thank you Barbra

## 2021-06-01 NOTE — PLAN OF CARE
Goal Outcome Evaluation:         Patient has been resting quietly in bed throughout the evening. Will continue to monitor. Continue plan of care.

## 2021-06-01 NOTE — PROGRESS NOTES
[facility] HOSPITALIST PROGRESS NOTE     Patient Identification:  Name:  Иван Miller  Age:  65 y.o.  Sex:  male  :  1955  MRN:  [id number]  Visit Number:  [id number]  Primary Care Provider:  Rosi Thomas APRN    Length of stay:  10    Chief complaint: Back pain    Subjective:    Patient continues to report chronic back pain.  He does suggest increasing his pain medication.  However, I did caution patient in regards to this and did encourage increased mobilization and working with physical therapy.  ----------------------------------------------------------------------------------------------------------------------  Current Hospital Meds:  apixaban, 5 mg, Oral, Q12H  atorvastatin, 20 mg, Oral, Daily  budesonide-formoterol, 2 puff, Inhalation, BID - RT  Canagliflozin, 300 mg, Oral, Daily  clopidogrel, 75 mg, Oral, Daily  docusate sodium, 100 mg, Oral, BID  fluticasone, 2 spray, Nasal, Daily  gabapentin, 600 mg, Oral, Q8H  insulin aspart, 0-9 Units, Subcutaneous, TID AC  insulin aspart, 30 Units, Subcutaneous, TID With Meals  insulin detemir, 40 Units, Subcutaneous, Daily  ipratropium-albuterol, 3 mL, Nebulization, Q6H - RT  lactobacillus acidophilus, 1 capsule, Oral, Daily  lidocaine, 2 patch, Transdermal, Q24H  lisinopril, 5 mg, Oral, Q24H  metoprolol succinate XL, 75 mg, Oral, Q24H  pantoprazole, 40 mg, Oral, Q AM  polyethylene glycol, 17 g, Oral, Daily  sodium chloride, 10 mL, Intravenous, Q12H  tamsulosin, 0.4 mg, Oral, Nightly         ----------------------------------------------------------------------------------------------------------------------  Vital Signs:  Temp:  [97 °F (36.1 °C)-98.2 °F (36.8 °C)] 97.3 °F (36.3 °C)  Heart Rate:  [60-82] 66  Resp:  [15-30] 20  BP: (120-167)/(64-88) 148/87      21  0445 21  0500 21  0509   Weight: (!) 144 kg (316 lb 12.8 oz) (!) 146 kg (321 lb 9.6 oz) (Lead Nurse Aura and RN Chris willams) (!) 146 kg (320 lb 14.4 oz)      Body mass index is 47.39 kg/m².    Intake/Output Summary (Last 24 hours) at 6/1/2021 1231  Last data filed at 6/1/2021 1200  Gross per 24 hour   Intake 2200 ml   Output 2950 ml   Net -750 ml     Diet Regular; Cardiac, Consistent Carbohydrate  ----------------------------------------------------------------------------------------------------------------------  Physical exam:  Constitutional: Morbidly obese  male in no apparent distress.     HENT:  Head:  Normocephalic and atraumatic.  Mouth:  Moist mucous membranes.    Eyes:  Conjunctivae and EOM are normal.  Pupils are equal, round, and reactive to light.  No scleral icterus.    Neck:  Neck supple. No thyromegaly.  No JVD present.    Cardiovascular:  Regular rate and rhythm with no murmurs, rubs, clicks or gallops appreciated.  Pulmonary/Chest:  Clear to auscultation bilaterally with no crackles, wheezes or rhonchi appreciated.  Abdominal:  Soft. Nontender. Nondistended  Bowel sounds are normal in all four quadrants. No organomegally appreciated.   Neurological:  Cranial nerves II-XII intact with no focal deficits.  No facial droop.  No slurred speech.   Skin:  Warm and dry to palpation with no rashes or lesions appreciated.  Peripheral vascular:  2+ radial and pedal pulses in bilateral upper and lower extremities.  Psychiatric:  Alert and oriented x3, demonstrates appropriate judgement and insight.    ----------------------------------------------------------------------------------------------------------------------  Tele:    ----------------------------------------------------------------------------------------------------------------------  Results from last 7 days   Lab Units 05/27/21  2204 05/27/21  1548   TROPONIN T ng/mL <0.010 <0.010     Results from last 7 days   Lab Units 05/31/21  0117 05/30/21  0433 05/29/21  0306 05/28/21  1834   CRP mg/dL 0.87*  --  0.34 <0.30   WBC 10*3/mm3 8.49 8.38 8.77  --    HEMOGLOBIN g/dL 12.3* 12.1* 11.7*  --     HEMATOCRIT % 43.0 42.1 39.8  --    MCV fL 84.6 85.7 83.6  --    MCHC g/dL 28.6* 28.7* 29.4*  --    PLATELETS 10*3/mm3 234 217 228  --          Results from last 7 days   Lab Units 05/31/21  0117 05/30/21  0433 05/29/21  0306 05/28/21  0059 05/27/21  0324 05/26/21  0128   SODIUM mmol/L 137 138 139 137 138 135*   POTASSIUM mmol/L 4.5 4.0 4.2 4.5 4.4 4.0   MAGNESIUM mg/dL  --  2.4  --   --  2.7* 2.5*   CHLORIDE mmol/L 96* 97* 99 99 97* 96*   CO2 mmol/L 35.4* 33.3* 33.6* 30.4* 36.4* 35.3*   BUN mg/dL 20 19 19 21 25* 31*   CREATININE mg/dL 1.24 0.95 0.87 0.91 0.86 0.96   EGFR IF NONAFRICN AM mL/min/1.73 58* 79 88 83 89 78   CALCIUM mg/dL 9.5 9.3 9.2 9.1 9.2 9.1   GLUCOSE mg/dL 164* 156* 120* 220* 164* 233*   ALBUMIN g/dL  --   --  3.35* 3.49* 3.54 3.39*   BILIRUBIN mg/dL  --   --  0.4 0.3 0.4 0.3   ALK PHOS U/L  --   --  54 56 51 48   AST (SGOT) U/L  --   --  15 17 13 14   ALT (SGPT) U/L  --   --  20 21 21 20   Estimated Creatinine Clearance: 83.7 mL/min (by C-G formula based on SCr of 1.24 mg/dL).    No results found for: AMMONIA  Results from last 7 days   Lab Units 05/26/21  0128   CHOLESTEROL mg/dL 153   TRIGLYCERIDES mg/dL 130   HDL CHOL mg/dL 30*   LDL CHOL mg/dL 99     No results found for: BLOODCX  No results found for: URINECX  No results found for: WOUNDCX  No results found for: STOOLCX    I have personally looked at the labs and they are summarized above.  ----------------------------------------------------------------------------------------------------------------------  Imaging Results (Last 24 Hours)     ** No results found for the last 24 hours. **        ----------------------------------------------------------------------------------------------------------------------  Assessment and Plan:    1.  Acute on chronic hypoxic/hypercapnic respiratory failure -acute phase resolved, continue 2 L nasal cannula which is patient's baseline.    2.  COPD exacerbation -resolved    3.  Generalized weakness  -patient was denied inpatient rehab.  Currently awaiting skilled nursing facility placement.    4.  Paroxysmal A. Fib - currently in NSR, continue Eliquis    5.  Essential hypertension  - well controlled, continue to monitor closely and make medication adjustments as necessary    6.  Insulin-dependent type 2 diabetes mellitus -continue sliding scale insulin with Accu-Cheks before every meal and nightly    Disposition currently pending SNF placement        Joo Jimenez,   06/01/21  12:31 EDT

## 2021-06-01 NOTE — DISCHARGE PLACEMENT REQUEST
"Иван Blanco (65 y.o. Male)     Date of Birth Social Security Number Address Home Phone MRN    1955  [address]  [place] [address] [phone] [id number]    [employer] Marital Status          None        Admission Date Admission Type Admitting Provider Attending Provider Department, Room/Bed    5/21/21 Emergency Vivienne Flores DO Mullins, Thomas Anthony, DO [address], 3303/2S    Discharge Date Discharge Disposition Discharge Destination                       Attending Provider: Joo Jimenez DO    Allergies: Codeine, Ciprofloxacin    Isolation: None   Infection: None   Code Status: CPR    Ht: 175.3 cm (69\")   Wt: 146 kg (320 lb 14.4 oz)    Admission Cmt: None   Principal Problem: Respiratory failure (CMS/HCC) [J96.90]                 Active Insurance as of 5/21/2021     Primary Coverage     Payor Plan Insurance Group Employer/Plan Group    [facility] MEDICARE REPLACEMENT [facility] DUAL COMPLETE MEDICARE REPLACEMENT [id number]     Payor Plan Address Payor Plan Phone Number Payor Plan Fax Number Effective Dates    [address] [phone]  1/1/2021 - None Entered    [facility] [id number]       Subscriber Name Subscriber Birth Date Member ID       ИВАН BLANCO 1955 [id number]           Secondary Coverage     Payor Plan Insurance Group Employer/Plan Group    [facility] MEDICAID      Payor Plan Address Payor Plan Phone Number Payor Plan Fax Number Effective Dates    [address] [address] [phone]  10/4/2019 - None Entered    [facility] [id number]       Subscriber Name Subscriber Birth Date Member ID       ИВАН BLANCO 1955 [id number]                 Emergency Contacts      (Rel.) Home Phone Work Phone Mobile Phone    Lamar Blanco (Spouse) [phone] -- [phone]    GARCIA MALDONADO (Daughter) [phone] -- [phone]            Emergency Contact Information     Name Relation Home Work Mobile    " Lamar Miller Spouse [phone]    GARCIA MALDONADO Daughter [phone]          Insurance Information                [facility] MEDICARE REPLACEMENT/[facility] DUAL COMPLETE MEDICARE REPLACEMENT Phone: [phone]    Subscriber: Иван Miller Subscriber#: [id number]    Group#: [id number] Precert#:         [facility]/[employer] MEDICAID Phone: [phone]    Subscriber: Иван Miller Subscriber#: [id number]    Group#:  Precert#:           Treatment Team  Chat With All Active Members    Provider Relationship Specialty Contact    Joo Jimenez DO  Attending --  [phone]    Keily Godoy, RN  Registered Nurse --     Keely Agosto, PT  Physical Therapist Physical Therapy     Tian Ness DO  Consulting Physician Orthopedic Surgery  [phone]    Aura An, PCT  Patient Care Technician --     Roby Corbin MD  Consulting Physician, Surgeon Orthopedic Surgery  [phone]    Staci Cohen, RRT  Respiratory Therapist --  [phone]          Problem List         Codes Noted - Resolved       Hospital    * (Principal) Respiratory failure (CMS/HCC) ICD-10-CM: J96.90  ICD-9-CM: 518.81 5/22/2021 - Present    Finger infection ICD-10-CM: L08.9  ICD-9-CM: 686.9 5/21/2021 - Present    Essential hypertension (Chronic) ICD-10-CM: I10  ICD-9-CM: 401.9 11/25/2019 - Present    Hyperlipidemia LDL goal <70 (Chronic) ICD-10-CM: E78.5  ICD-9-CM: 272.4 11/25/2019 - Present       Non-Hospital    Coronary artery disease status post multiple stents ICD-10-CM: I25.10  ICD-9-CM: 414.01 11/25/2019 - Present    Non-ischemic cardiomyopathy (CMS/HCC LV ejection fraction 40 to 45% ICD-10-CM: I42.8  ICD-9-CM: 425.4 11/25/2019 - Present    Chronic systolic congestive heart failure (CMS/HCC) ICD-10-CM: I50.22  ICD-9-CM: 428.22, 428.0 11/25/2019 - Present    Insulin dependent diabetes mellitus ICD-10-CM: WEP0090  ICD-9-CM: 250.00, V58.67 11/25/2019 -  Present    Acute respiratory failure with hypoxemia (CMS/[facility]) ICD-10-CM: J96.01  ICD-9-CM: 518.81 10/4/2019 - Present    Symptomatic bradycardia ICD-10-CM: R00.1  ICD-9-CM: 427.89 2018 - Present             History & Physical      Nora Gomez PA-C at 21 0142     Attestation signed by Vivienne Flores DO at 21 [phone]    I have read the documentation below and agree.  Patient also seen and examined independently.  He remains on BiPAP.  Patient is easily arousable and is oriented x4.    Lungs are diminished but clear.  Patient is in no respiratory distress.  Heart is regular rate and rhythm without obvious murmur/gallop/rub.  Patient with decreased range of motion, swelling, tenderness and bruising in the right ankle and foot.  Right lower extremity appears to be more edematous with an increased circumference in comparison to the left calf area.    Agree with a respiratory culture if able to produce.  Continue with Rocephin and a dose of amoxicillin.  Agree with steroids and scheduled nebulized inhalants.  Plan to wean off BiPAP and placed on nasal cannula.      Obtain an x-ray of the right foot pain and a Doppler ultrasound of the right lower extremity to rule out DVT in the setting of recent surgery and decreased ambulation over the past few days.                       [facility]  HISTORY & PHYSICAL    Patient Identification:  Name:  Иван Miller  Age:  65 y.o.  Sex:  male  :  1955  MRN:  [id number]   Visit Number:  [id number]  Admit Date: 2021   Primary Care Physician:  Rosi Thomas APRN     Subjective     Chief complaint:   Chief Complaint   Patient presents with   • Shortness of Breath     History of presenting illness:   Patient is a 65 y.o. male with past medical history significant for paroxysmal atrial fibrillation, CAD status post stenting in the past, known ischemic cardiomyopathy, COPD, insulin-dependent type 2 diabetes  mellitus, GERD, essential hypertension, hyperlipidemia, that presented to the [facility] emergency department for evaluation of shortness of breath.    It should be of note that the patient was very lethargic at bedside.  He would arouse briefly with tactile stimulation but will quickly fall back asleep.  He was also somewhat difficult to understand as he currently has BiPAP in place and was slurring his speech.  The patient states that he has been feeling short of breath and having a productive cough with yellow sputum and wheezing for around 15 days.  He reports he was given an antibiotic but was unable to tell me who gave it to him.  He also admits to a subjective fever, periumbilical abdominal pain, and fatigue.  He denies any diaphoresis, chills, chest pain, leg edema, diarrhea, nausea, vomiting, dysuria.  He states that he wears 2 L of oxygen at home and CPAP at night.  He further admits to amphetamine use and states that he smokes it.  Additionally, he admits to a fall 2 nights ago and has been experiencing severe right ankle pain.  He has had difficulty walking secondary to pain and has bruising located on the lateral aspect of the right ankle.  He denies hitting his head or loss of consciousness.    Upon arrival to the ED, vitals were temperature 98.0 °F, pulse 97, respirations 22, /93, SPO2 96% on 6 L nasal cannula.  ABG with pH 7.325, PCO2 72.8, PO2 59.7, HCO3 37.9, O2 saturation 89.3 on 2 L nasal cannula.  CMP with glucose 279, CO2 34.1.  CBC with hemoglobin 11.6, MCH 24.2, MCHC 27.9.  UA with 3+ glucose, blood cultures pending x2.  Blood ethanol negative.  UDS positive for amphetamines.  Chest x-ray shows limited study due to inadequate positioning, cardiac silhouette appears to be enlarged, underpenetration of the left hemithorax, no gross dense consolidation.  Noncontrast CT of the chest shows mild right base atelectasis, pleural thickening along the major fissure on the left,  three-vessel CAD, no acute lung infiltrates.  EKG with sinus rhythm with occasional PVCs, nonspecific ST abnormality, heart rate 97, QTc 447 MS.  COVID-19 negative.    In the emergency department the patient received IV Rocephin 1 g, IV Decadron 10 mg, IV doxycycline, DuoNeb treatment x2.    Patient has been admitted to the progressive care unit as telemetry overflow for further evaluation and treatment  ---------------------------------------------------------------------------------------------------------------------   Review of Systems   Constitutional: Positive for fever (Undocumented). Negative for chills and diaphoresis.   HENT: Negative for congestion and sore throat.    Eyes: Negative for discharge and visual disturbance.   Respiratory: Positive for cough (Productive with yellow sputum), shortness of breath and wheezing.    Cardiovascular: Negative for chest pain, palpitations and leg swelling.   Gastrointestinal: Positive for abdominal pain (Periumbilical) and constipation. Negative for diarrhea, nausea and vomiting.   Genitourinary: Negative for dysuria and frequency.   Musculoskeletal: Positive for back pain (Chronic) and gait problem (Fall 2 nights ago). Negative for myalgias.   Skin: Negative for rash and wound.        Bruising of right ankle   Neurological: Negative for dizziness, syncope and light-headedness.   Psychiatric/Behavioral: Negative for confusion. The patient is not nervous/anxious.       ---------------------------------------------------------------------------------------------------------------------   Past Medical History:   Diagnosis Date   • Atrial fibrillation (CMS/HCC)    • COPD (chronic obstructive pulmonary disease) (CMS/HCC)    • Diabetes mellitus (CMS/HCC)    • GERD (gastroesophageal reflux disease)    • Hyperlipidemia    • Hypertension    • Myocardial infarction (CMS/HCC)    • Neuropathy      Past Surgical History:   Procedure Laterality Date   • ABDOMINAL SURGERY     •  APPENDECTOMY     • CARDIAC CATHETERIZATION     • CORONARY STENT PLACEMENT       Family History   Problem Relation Age of Onset   • Hypertension Mother    • Heart attack Mother    • Hypertension Father      Social History     Socioeconomic History   • Marital status:      Spouse name: Not on file   • Number of children: Not on file   • Years of education: Not on file   • Highest education level: Not on file   Tobacco Use   • Smoking status: Former Smoker     Packs/day: 2.00     Years: 30.00     Pack years: 60.00     Types: Cigarettes   • Smokeless tobacco: Current User     Types: Snuff   Substance and Sexual Activity   • Alcohol use: No   • Drug use: No   • Sexual activity: Defer     ---------------------------------------------------------------------------------------------------------------------   Allergies:  Codeine and Ciprofloxacin  ---------------------------------------------------------------------------------------------------------------------   Medications below are reported home medications pulling from within the system; at this time, these medications have not been reconciled unless otherwise specified and are in the verification process for further verifcation as current home medications.    Prior to Admission Medications     Prescriptions Last Dose Informant Patient Reported? Taking?    albuterol (PROVENTIL HFA;VENTOLIN HFA) 108 (90 Base) MCG/ACT inhaler  Pharmacy Yes No    Inhale 2 puffs Every 4 (Four) Hours As Needed for Wheezing.    amLODIPine (NORVASC) 10 MG tablet  Pharmacy Yes No    Take 10 mg by mouth Daily.    apixaban (ELIQUIS) 5 MG tablet tablet  Pharmacy Yes No    Take 5 mg by mouth 2 (Two) Times a Day.    atorvastatin (LIPITOR) 20 MG tablet  Pharmacy Yes No    Take 20 mg by mouth Daily.    budesonide-formoterol (SYMBICORT) 160-4.5 MCG/ACT inhaler  Pharmacy Yes No    Inhale 2 puffs 2 (Two) Times a Day.    carvedilol (COREG) 12.5 MG tablet   No No    Take 1 tablet by mouth 2 (Two)  Times a Day With Meals.    fluticasone (FLONASE) 50 MCG/ACT nasal spray  Pharmacy Yes No    2 sprays into the nostril(s) as directed by provider Daily.    furosemide (LASIX) 40 MG tablet   No No    Take 1 tablet by mouth Daily.    gabapentin (NEURONTIN) 300 MG capsule   Yes No    Take 300 mg by mouth 3 (Three) Times a Day.    insulin lispro (humaLOG) 100 UNIT/ML injection  Pharmacy Yes No    Inject 20 Units under the skin into the appropriate area as directed 3 (Three) Times a Day Before Meals.    ipratropium-albuterol (DUO-NEB) 0.5-2.5 mg/3 ml nebulizer  Pharmacy Yes No    Take 3 mL by nebulization Every 4 (Four) Hours As Needed for Wheezing.    loratadine (CLARITIN) 10 MG tablet  Pharmacy Yes No    Take 10 mg by mouth Daily.    metFORMIN (GLUCOPHAGE) 1000 MG tablet  Pharmacy Yes No    Take 1,000 mg by mouth 2 (Two) Times a Day With Meals.    omeprazole (priLOSEC) 40 MG capsule  Pharmacy Yes No    Take 40 mg by mouth Daily.    sacubitril-valsartan (ENTRESTO) 49-51 MG tablet   Yes No    Take 1 tablet by mouth 2 (Two) Times a Day.    sildenafil (REVATIO) 20 MG tablet  Pharmacy Yes No    Take 20 mg by mouth Daily As Needed (sexual activity).    tamsulosin (FLOMAX) 0.4 MG capsule 24 hr capsule  Pharmacy Yes No    Take 1 capsule by mouth Every Night.        ---------------------------------------------------------------------------------------------------------------------    Objective     Hospital Scheduled Meds:          Current listed hospital scheduled medications may not yet reflect those currently placed in orders that are signed and held, awaiting patient's arrival to floor/unit.    ---------------------------------------------------------------------------------------------------------------------   Vital Signs:  Temp:  [98 °F (36.7 °C)] 98 °F (36.7 °C)  Heart Rate:  [] 98  Resp:  [20-32] 20  BP: (141-161)/(86-99) 151/88  Mean Arterial Pressure (Non-Invasive) for the past 24 hrs (Last 3 readings):    Noninvasive MAP (mmHg)   05/22/21 0033 112   05/21/21 2233 95   05/21/21 2118 119     SpO2 Percentage    05/21/21 2343 05/22/21 0033 05/22/21 0043   SpO2: 96% 95% 98%     SpO2:  [93 %-100 %] 98 %  on  Flow (L/min):  [6] 6;   Device (Oxygen Therapy): NPPV/NIV    Body mass index is 46.52 kg/m².  Wt Readings from Last 3 Encounters:   05/21/21 (!) 143 kg (315 lb)   11/25/19 127 kg (280 lb)   10/06/19 124 kg (273 lb 11.2 oz)     ---------------------------------------------------------------------------------------------------------------------   Physical Exam:  Physical Exam  Constitutional:       General: He is awake.      Appearance: Normal appearance. He is well-developed. He is morbidly obese.      Comments: BiPAP in place   HENT:      Head: Normocephalic and atraumatic.   Eyes:      General: Lids are normal.         Right eye: No discharge.         Left eye: No discharge.   Cardiovascular:      Rate and Rhythm: Normal rate and regular rhythm.      Pulses: Normal pulses. No decreased pulses.           Dorsalis pedis pulses are 2+ on the right side and 2+ on the left side.        Posterior tibial pulses are 2+ on the right side and 2+ on the left side.      Heart sounds: Normal heart sounds. No murmur heard.   No friction rub. No gallop.    Pulmonary:      Effort: Pulmonary effort is normal. No tachypnea or bradypnea.      Breath sounds: Decreased breath sounds (Diffuse) present. No wheezing, rhonchi or rales.   Abdominal:      General: Bowel sounds are normal.      Palpations: Abdomen is soft.      Tenderness: There is abdominal tenderness in the epigastric area and periumbilical area.   Musculoskeletal:      Right lower leg: No edema.      Left lower leg: No edema.      Right foot: Decreased range of motion. Swelling and tenderness present.      Left foot: Normal range of motion. No tenderness.        Feet:    Skin:     Findings: No abrasion, ecchymosis or erythema.   Neurological:      Mental Status: He is  oriented to person, place, and time and easily aroused. He is lethargic.   Psychiatric:         Speech: Speech is slurred.         Behavior: Behavior is cooperative.       ---------------------------------------------------------------------------------------------------------------------  EKG:    Per cardiology read by Dr. Gorman, sinus rhythm with occasional PVCs, nonspecific ST abnormality, heart rate 97, QTc 447 MS; when compared to EKG from 10/2019 PVCs are now present.        Telemetry:    Normal sinus rhythm, heart rate 87 bpm, SPO2 95% on BiPAP    I have personally reviewed the EKG/Telemetry strip  ---------------------------------------------------------------------------------------------------------------------   Results from last 7 days   Lab Units 05/21/21 2045   CK TOTAL U/L 124     Results from last 7 days   Lab Units 05/21/21 2045   PROBNP pg/mL 799.5       Results from last 7 days   Lab Units 05/22/21  0054   PH, ARTERIAL pH units 7.328*   PO2 ART mm Hg 104.0   PCO2, ARTERIAL mm Hg 68.4*   HCO3 ART mmol/L 35.9*     Results from last 7 days   Lab Units 05/21/21 2045   CRP mg/dL 0.39   LACTATE mmol/L 1.0   WBC 10*3/mm3 9.07   HEMOGLOBIN g/dL 11.6*   HEMATOCRIT % 41.6   MCV fL 86.8   MCHC g/dL 27.9*   PLATELETS 10*3/mm3 265   INR  1.05     Results from last 7 days   Lab Units 05/21/21  2045   SODIUM mmol/L 144   POTASSIUM mmol/L 4.5   MAGNESIUM mg/dL 2.3   CHLORIDE mmol/L 102   CO2 mmol/L 34.1*   BUN mg/dL 20   CREATININE mg/dL 0.92   EGFR IF NONAFRICN AM mL/min/1.73 82   CALCIUM mg/dL 9.2   GLUCOSE mg/dL 279*   ALBUMIN g/dL 3.82   BILIRUBIN mg/dL 0.3   ALK PHOS U/L 62   AST (SGOT) U/L 26   ALT (SGPT) U/L 29   Estimated Creatinine Clearance: 111.3 mL/min (by C-G formula based on SCr of 0.92 mg/dL).  No results found for: AMMONIA    No results found for: HGBA1C, POCGLU  Lab Results   Component Value Date    HGBA1C 7.70 (H) 11/09/2018     Lab Results   Component Value Date    TSH 2.100 05/21/2021     FREET4 0.95 05/21/2021     Pain Management Panel     Pain Management Panel Latest Ref Rng & Units 5/21/2021 10/4/2019    AMPHETAMINES SCREEN, URINE Negative Positive(A) Positive(A)    BARBITURATES SCREEN Negative Negative Negative    BENZODIAZEPINE SCREEN, URINE Negative Negative Negative    BUPRENORPHINEUR Negative Negative Negative    COCAINE SCREEN, URINE Negative Negative Negative    METHADONE SCREEN, URINE Negative Negative Negative        I have personally reviewed the above laboratory results.   ---------------------------------------------------------------------------------------------------------------------  Imaging Results (Last 7 Days)     Procedure Component Value Units Date/Time    CT Chest Without Contrast Diagnostic [[id number]] Collected: 05/22/21 0018     Updated: 05/22/21 0020    Narrative:      CT Chest WO    INDICATION:   Chest pain with shortness of breath for the past several days    TECHNIQUE:   CT of the thorax without IV contrast. Coronal and sagittal reconstructions were obtained.  Radiation dose reduction techniques included automated exposure control or exposure modulation based on body size. Count of known CT and cardiac nuc med studies  performed in previous 12 months: 0.     COMPARISON:   10/4/2019    FINDINGS:  Chest images at mediastinal window show no adenopathy or effusions. Coronary artery calcifications are seen in all 3 coronary distributions quite extensively.    Chest images at lung window show mild atelectasis at the right lung base posteriorly. There is nonspecific thickening along the major fissure on the left. It most likely reflects chronic pleural fibrosis.      Impression:      Mild right base atelectasis. Pleural thickening along the major fissure on the left. Three-vessel coronary artery disease. No acute lung infiltrates are noted.    Signer Name: Ruiz Zamudio MD   Signed: 5/22/2021 12:18 AM   Workstation Name: [email or web address]    [facility]     XR Chest 1 View [[id number]] Collected: 05/21/21 2140     Updated: 05/21/21 2142    Narrative:      PROCEDURE: CR Chest 1 Vw    COMPARISON: October 2019    INDICATIONS: sob  Relevant clinical info: Sob... unable to get patient to lay on back     TECHNIQUE: Single AP  view of the chest    FINDINGS AND IMPRESSION COMBINED :        Examination is limited as patient is not adequately positioned and is in respiratory distress. Cardiac silhouette appears enlarged patient is rotated to the right. There is underpenetration of the left hemithorax. No gross dense consolidation seen.  Osseous structures are intact.             Signer Name: Lianna Chan MD   Signed: 5/21/2021 9:40 PM   Workstation Name: [facility]    [facility] have personally reviewed the above radiology results.     Last Echocardiogram:  Results for orders placed during the hospital encounter of 10/04/19    Transthoracic Echo Complete With Contrast if Necessary Per Protocol    Interpretation Summary  · Left ventricular wall thickness is consistent with mild concentric hypertrophy.  · Left atrial cavity size is mildly dilated.  · Estimated EF appears to be in the range of 56 - 60%.  · Left ventricular diastolic function is normal.  · Normal right ventricular cavity size and systolic function noted.  · Estimated right ventricular systolic pressure from tricuspid regurgitation is normal (<35 mmHg).  · The quality of the study is limited due to poor acoustic windows related to patient body habitus.    ---------------------------------------------------------------------------------------------------------------------    Assessment & Plan      Active Hospital Problems    Diagnosis  POA   • **Respiratory failure (CMS/[facility]) [J96.90]  Yes   • Hyperlipidemia LDL goal <70 [E78.5]  Yes   • Essential hypertension [I10]  Yes     #Acute on chronic hypoxic and hypercarbic respiratory failure (2L NC at home)   #Sepsis criteria POA (pulse  100, RR 32) suspect 2/2 to acute COPD exacerbation   #UDS positive for amphetamines  -Chest x-ray and noncontrast CT of chest shows no acute infiltrates  -Initial ABG with pH 7.325, PCO2 72.8, PO2 59.7 on 2 L nasal cannula  -Currently on BiPAP; ABG with pH 7.328, PCO2 68.4, PO2 104.0, O2 saturation 98.0 on IPAP 20, EPAP 8  -Suspect acute hypoxic and hypercarbic respiratory failure secondary to acute COPD exacerbation  -Patient was given IV Rocephin and IV doxycycline in the emergency department; we will continue with IV Rocephin and switch to IV azithromycin for anti-inflammatory properties  -Lactobacillus for GI protection  -Obtain respiratory culture, respiratory panel, MRSA swab  -Adjust antibiotic therapy as necessary  -Blood cultures pending x2  -IV Solu-Medrol 40 mg every 12 hours  -DuoNeb treatments ordered  -Incentive spirometry given, encourage use  -Continue supplemental oxygen as needed, titrate to maintain SPO2 greater than or equal to 90%  -Attempt to wean off BiPAP later this morning    #Periumbilical abdominal pain  -Obtain KUB  -Patient states he has been constipated; MiraLAX and Esperanza-Colace ordered    #Right ankle edema and ecchymosis secondary to recent fall  -X-ray of right ankle ordered  -Elevate lower extremity  -Tylenol as needed for pain    #Insulin-dependent type 2 diabetes mellitus  #Glucosuria  #Diabetic neuropathy  -Hemoglobin A1c ordered  -Hold home oral hypoglycemics to prevent hypoglycemia  -Will add SSI for now. Accu-cheks qAC and qhs; Titrate insulin therapy as necessary.    -Continue home Neurontin once reconciled per pharmacy    #Chronic normocytic anemia  -H&H stable; repeat a.m. CBC monitor H&H closely  -If hemoglobin less than 7 or platelets less than 50, will transfuse    #Reported history nonischemic cardiomyopathy  #Reported history of paroxysmal atrial fibrillation; anticoagulated on Eliquis  -Follows with Dr. Mccabe, cardiology as an outpatient; at his most recent visit in  November 2019, cardiology noted that there is no clear-cut documentation of atrial fibrillation or diagnosis of heart failure however, he was advised to continue Entresto and Eliquis  -Echo from 10/2019 reveals an EF of 56 to 60% with normal left ventricular diastolic function and normal right ventricular cavity size and systolic function  -Patient is currently in normal sinus rhythm  -Continue home Entresto, Lasix, and Eliquis once reconciled per pharmacy  -Monitor for signs of volume overload with daily weights, strict I's and O's    #CAD status post multiple stents in the past  -Currently chest pain-free; EKG shows no acute ischemic changes  -Continue home Lipitor and Coreg  -Monitor closely on telemetry    #Essential hypertension  -Continue home antihypertensive agents with holding parameters once reconciled per pharmacy  -We will make IV hydralazine available for SBP greater than 170  -Continue to monitor VS per hospital protocol    #Hyperlipidemia  -Continue home statin    #GERD  -Protonix    #Obstructive sleep apnea  -CPAP ordered    #Chronic back pain  -Patient is requesting pain medication, however, told him we will hold at this time given respiratory status  -Tylenol available as needed  -Lidoderm patches ordered    #Morbid obesity  -BMI 46.52 kg/m²  -Complicates all aspects of patient care  -Patient may have some underlying obesity hypoventilation syndrome as well      F/E/N: No IV fluids.  Replace electrolytes as necessary.  NPO  ---------------------------------------------------  DVT Prophylaxis: Eliquis  GI Prophylaxis: Protonix  Activity: Up with assistance, fall precaution    The patient is considered to be a high risk patient due to: Acute hypoxic and hypercarbic respiratory failure; sepsis secondary to acute COPD exacerbation    INPATIENT status due to the need for care which can only be reasonably provided in an hospital setting such as aggressive/expedited ancillary services and/or consultation  services, the necessity for IV medications, close physician monitoring and/or the possible need for procedures.  In such, I feel patient’s risk for adverse outcomes and need for care warrant INPATIENT evaluation and predict the patient’s care encounter to likely last beyond 2 midnights.    Code Status: FULL CODE     I have discussed the patient's assessment and plan with the patient, nursing staff, and attending physician      Nora Gomez PA-C  Hospitalist Service -- [facility]       05/22/21  01:42 EDT    Attending Physician: Vivienne Flores DO       Electronically signed by Vivienne Flores DO at 05/22/21 0747       Lines, Drains & Airways    Active LDAs     Name:   Placement date:   Placement time:   Site:   Days:    Peripheral IV 05/30/21 1837 Anterior;Right Wrist   05/30/21 1837    Wrist   1                  Current Facility-Administered Medications   Medication Dose Route Frequency Provider Last Rate Last Admin   • acetaminophen (TYLENOL) tablet 650 mg  650 mg Oral Q6H PRN Nora Gomez PA-C   650 mg at 05/28/21 0037   • albuterol (PROVENTIL) nebulizer solution 0.083% 2.5 mg/3mL  2.5 mg Nebulization Q6H PRN Joo Jimenez DO       • apixaban (ELIQUIS) tablet 5 mg  5 mg Oral Q12H Joo Jimenez DO   5 mg at 06/01/21 0757   • atorvastatin (LIPITOR) tablet 20 mg  20 mg Oral Daily Joo Jimenez DO   20 mg at 06/01/21 0757   • budesonide-formoterol (SYMBICORT) 160-4.5 MCG/ACT inhaler 2 puff  2 puff Inhalation BID - RT Joo Jimenez DO   2 puff at 06/01/21 0630   • calcium carbonate (TUMS) chewable tablet 500 mg (200 mg elemental)  2 tablet Oral TID PRN Nora Gomez PA-C       • Canagliflozin (INVOKANA) 300 MG tablet tablet 300 mg  300 mg Oral Daily Joo Jimenez DO   300 mg at 06/01/21 0756   • clopidogrel (PLAVIX) tablet 75 mg  75 mg Oral Daily Ryan Gorman MD   75 mg at 06/01/21 0757   • dextrose (D50W) 25 g/ 50mL  Intravenous Solution 25 g  25 g Intravenous Q15 Min PRN Vivienne Flores, DO       • dextrose (D50W) 25 g/ 50mL Intravenous Solution 25 g  25 g Intravenous Q15 Min PRN Nora Gomez PA-C       • dextrose (GLUTOSE) oral gel 15 g  15 g Oral Q15 Min PRN Vivienne Flores, DO       • dextrose (GLUTOSE) oral gel 15 g  15 g Oral Q15 Min PRN Nora Gomez PA-C       • docusate sodium (COLACE) capsule 100 mg  100 mg Oral BID Joo Jimenez DO   100 mg at 06/01/21 0757   • fluticasone (FLONASE) 50 MCG/ACT nasal spray 2 spray  2 spray Nasal Daily Joo Jimenez DO   2 spray at 06/01/21 0756   • gabapentin (NEURONTIN) capsule 600 mg  600 mg Oral Q8H Joo Jimenez DO   600 mg at 06/01/21 1301   • glucagon (human recombinant) (GLUCAGEN DIAGNOSTIC) injection 1 mg  1 mg Subcutaneous Q15 Min PRN Vivienne Flores, DO       • glucagon (human recombinant) (GLUCAGEN DIAGNOSTIC) injection 1 mg  1 mg Subcutaneous Q15 Min PRN Nora Gomez PA-C       • hydrALAZINE (APRESOLINE) injection 10 mg  10 mg Intravenous Q6H PRN Nora Gomez PA-C   10 mg at 05/27/21 2257   • HYDROcodone-acetaminophen (NORCO) 5-325 MG per tablet 1 tablet  1 tablet Oral Q8H PRN Joo Jimenez DO   1 tablet at 06/01/21 1301   • hydrOXYzine (ATARAX) tablet 25 mg  25 mg Oral TID PRN Nora Gomez PA-C   25 mg at 06/01/21 0758   • insulin aspart (novoLOG) injection 0-9 Units  0-9 Units Subcutaneous TID AC Nora Gomez PA-C   2 Units at 06/01/21 1301   • insulin aspart (novoLOG) injection 30 Units  30 Units Subcutaneous TID With Meals Dequan Connelly MD   30 Units at 06/01/21 1301   • insulin detemir (LEVEMIR) injection 40 Units  40 Units Subcutaneous Daily Heinss, Dequan F, MD   40 Units at 06/01/21 0756   • ipratropium (ATROVENT) nebulizer solution 0.5 mg  0.5 mg Nebulization Q6H PRN Joo Jimenez, DO       • ipratropium-albuterol (DUO-NEB) nebulizer solution 3  mL  3 mL Nebulization Q4H PRN Joo Jimenez, DO       • ipratropium-albuterol (DUO-NEB) nebulizer solution 3 mL  3 mL Nebulization Q6H - RT Nora Gomez PA-C   3 mL at 06/01/21 1303   • lactobacillus acidophilus (RISAQUAD) capsule 1 capsule  1 capsule Oral Daily Nora Gomez PA-C   1 capsule at 06/01/21 0758   • lidocaine (LIDODERM) 5 % 2 patch  2 patch Transdermal Q24H Nora Gomez PA-C   2 patch at 06/01/21 0758   • lisinopril (PRINIVIL,ZESTRIL) tablet 5 mg  5 mg Oral Q24H Dequan Connelly MD   5 mg at 06/01/21 0756   • melatonin tablet 5 mg  5 mg Oral Nightly PRN Nora Gomez PA-C   5 mg at 05/22/21 2012   • metoprolol succinate XL (TOPROL-XL) 24 hr tablet 75 mg  75 mg Oral Q24H Ryan Gorman MD   75 mg at 06/01/21 0757   • nitroglycerin (NITROSTAT) SL tablet 0.4 mg  0.4 mg Sublingual Q5 Min PRN Vivienne Flores DO       • pantoprazole (PROTONIX) EC tablet 40 mg  40 mg Oral Q AM Nora Gomez PA-C   40 mg at 06/01/21 0513   • polyethylene glycol (MIRALAX) packet 17 g  17 g Oral Daily Nora Gomez PA-C   17 g at 06/01/21 0758   • sennosides-docusate (PERICOLACE) 8.6-50 MG per tablet 1 tablet  1 tablet Oral Nightly PRN Nora Gomez PA-C       • sodium chloride 0.9 % flush 10 mL  10 mL Intravenous PRN Vivienne Flores, DO       • sodium chloride 0.9 % flush 10 mL  10 mL Intravenous Q12H Vivienne Flores DO   10 mL at 06/01/21 0756   • sodium chloride 0.9 % flush 10 mL  10 mL Intravenous PRN Vivienne Flores DO       • tamsulosin (FLOMAX) 24 hr capsule 0.4 mg  0.4 mg Oral Nightly Joo Jimenez DO   0.4 mg at 05/31/21 2056   • traMADol (ULTRAM) tablet 50 mg  50 mg Oral Q6H PRN Dequan Connelly MD   50 mg at 06/01/21 4123       Lab Results (last 24 hours)     Procedure Component Value Units Date/Time    POC Glucose Once [[id number]]  (Abnormal) Collected: 06/01/21 1046    Specimen: Blood Updated: 06/01/21 1101      Glucose 192 mg/dL     POC Glucose Once [[id number]]  (Abnormal) Collected: 06/01/21 0702    Specimen: Blood Updated: 06/01/21 0708     Glucose 240 mg/dL     POC Glucose Once [[id number]]  (Abnormal) Collected: 05/31/21 1827    Specimen: Blood Updated: 05/31/21 1833     Glucose 222 mg/dL         Orders (last 24 hrs)      Start     Ordered    06/01/21 1235  HYDROcodone-acetaminophen (NORCO) 5-325 MG per tablet 1 tablet  Every 8 Hours PRN      06/01/21 1235    06/01/21 1102  POC Glucose Once  Once      06/01/21 1046    06/01/21 0709  POC Glucose Once  Once      06/01/21 0702    06/01/21 0600  CBC (No Diff)  Morning Draw      05/31/21 1241    06/01/21 0600  Basic Metabolic Panel  Morning Draw      05/31/21 1241    05/31/21 1834  POC Glucose Once  Once      05/31/21 1827    05/31/21 1613  POC Glucose Once  Once      05/31/21 1606    05/31/21 0900  insulin detemir (LEVEMIR) injection 40 Units  Daily      05/30/21 1453    05/31/21 0703  C-reactive Protein  Daily      05/31/21 0702    05/29/21 1400  lisinopril (PRINIVIL,ZESTRIL) tablet 5 mg  Every 24 Hours Scheduled      05/29/21 1224    05/28/21 1800  insulin aspart (novoLOG) injection 30 Units  3 Times Daily With Meals      05/28/21 1645    05/27/21 1300  ipratropium-albuterol (DUO-NEB) nebulizer solution 3 mL  Every 6 Hours - RT      05/27/21 0854    05/27/21 1100  metoprolol succinate XL (TOPROL-XL) 24 hr tablet 75 mg  Every 24 Hours Scheduled      05/27/21 0932    05/25/21 1145  clopidogrel (PLAVIX) tablet 75 mg  Daily      05/25/21 1134    05/24/21 1036  HYDROcodone-acetaminophen (NORCO) 5-325 MG per tablet 1 tablet  Every 8 Hours PRN,   Status:  Discontinued      05/24/21 1037    05/23/21 2100  tamsulosin (FLOMAX) 24 hr capsule 0.4 mg  Nightly      05/23/21 1031 05/23/21 1400  gabapentin (NEURONTIN) capsule 600 mg  Every 8 Hours Scheduled      05/23/21 1031    05/23/21 1300  apixaban (ELIQUIS) tablet 5 mg  Every 12 Hours Scheduled      05/23/21 1031 05/23/21 1300   fluticasone (FLONASE) 50 MCG/ACT nasal spray 2 spray  Daily      05/23/21 1031    05/23/21 1300  atorvastatin (LIPITOR) tablet 20 mg  Daily      05/23/21 1031    05/23/21 1300  Canagliflozin (INVOKANA) 300 MG tablet tablet 300 mg  Daily      05/23/21 1031    05/23/21 1230  budesonide-formoterol (SYMBICORT) 160-4.5 MCG/ACT inhaler 2 puff  2 Times Daily - RT      05/23/21 1057    05/23/21 1126  ipratropium (ATROVENT) nebulizer solution 0.5 mg  Every 6 Hours PRN      05/23/21 1127    05/23/21 1059  traMADol (ULTRAM) tablet 50 mg  Every 6 Hours PRN      05/23/21 1059    05/23/21 1030  albuterol (PROVENTIL) nebulizer solution 0.083% 2.5 mg/3mL  Every 6 Hours PRN      05/23/21 1031    05/23/21 1030  ipratropium-albuterol (DUO-NEB) nebulizer solution 3 mL  Every 4 Hours PRN      05/23/21 1031    05/23/21 1000  docusate sodium (COLACE) capsule 100 mg  2 Times Daily      05/23/21 0839    05/22/21 0900  sodium chloride 0.9 % flush 10 mL  Every 12 Hours Scheduled      05/22/21 0214    05/22/21 0900  lactobacillus acidophilus (RISAQUAD) capsule 1 capsule  Daily      05/22/21 0347    05/22/21 0900  polyethylene glycol (MIRALAX) packet 17 g  Daily      05/22/21 0347    05/22/21 0900  lidocaine (LIDODERM) 5 % 2 patch  Every 24 Hours Scheduled      05/22/21 0348    05/22/21 0730  insulin aspart (novoLOG) injection 0-9 Units  3 Times Daily Before Meals      05/22/21 0348    05/22/21 0700  POC Glucose 4x Daily AC & at Bedtime  4 Times Daily Before Meals & at Bedtime     Comments: If bedtime blood glucose is greater than 350 mg/dl, call MD.      05/22/21 0214    05/22/21 0700  POC Glucose 4x Daily AC & at Bedtime  4 Times Daily Before Meals & at Bedtime     Comments: If bedtime blood glucose is greater than 350 mg/dl, call MD.      05/22/21 0348 05/22/21 0600  Incentive Spirometry  Every 4 Hours While Awake      05/22/21 0347 05/22/21 0600  pantoprazole (PROTONIX) EC tablet 40 mg  Every Early Morning      05/22/21 0348     05/22/21 0400  Vital Signs  Every 4 Hours     Comments: Per per hospital policy    05/22/21 0214    05/22/21 0400  Strict Intake & Output  Every 4 Hours      05/22/21 0348    05/22/21 0349  Daily Weights  Daily      05/22/21 0348    05/22/21 0347  melatonin tablet 5 mg  Nightly PRN      05/22/21 0348    05/22/21 0347  hydrOXYzine (ATARAX) tablet 25 mg  3 Times Daily PRN      05/22/21 0348    05/22/21 0347  calcium carbonate (TUMS) chewable tablet 500 mg (200 mg elemental)  3 Times Daily PRN      05/22/21 0348    05/22/21 0346  hydrALAZINE (APRESOLINE) injection 10 mg  Every 6 Hours PRN      05/22/21 0348    05/22/21 0346  dextrose (GLUTOSE) oral gel 15 g  Every 15 Minutes PRN      05/22/21 0347    05/22/21 0346  dextrose (D50W) 25 g/ 50mL Intravenous Solution 25 g  Every 15 Minutes PRN      05/22/21 0348    05/22/21 0346  glucagon (human recombinant) (GLUCAGEN DIAGNOSTIC) injection 1 mg  Every 15 Minutes PRN      05/22/21 0348    05/22/21 0345  sennosides-docusate (PERICOLACE) 8.6-50 MG per tablet 1 tablet  Nightly PRN      05/22/21 0347    05/22/21 0345  acetaminophen (TYLENOL) tablet 650 mg  Every 6 Hours PRN      05/22/21 0347    05/22/21 0215  Intake & Output  Every Shift      05/22/21 0214 05/22/21 0214  sodium chloride 0.9 % flush 10 mL  As Needed      05/22/21 0214    05/22/21 0214  dextrose (GLUTOSE) oral gel 15 g  Every 15 Minutes PRN      05/22/21 0214    05/22/21 0214  dextrose (D50W) 25 g/ 50mL Intravenous Solution 25 g  Every 15 Minutes PRN      05/22/21 0214    05/22/21 0214  glucagon (human recombinant) (GLUCAGEN DIAGNOSTIC) injection 1 mg  Every 15 Minutes PRN      05/22/21 0214    05/22/21 0214  nitroglycerin (NITROSTAT) SL tablet 0.4 mg  Every 5 Minutes PRN      05/22/21 0214    05/21/21 2046  sodium chloride 0.9 % flush 10 mL  As Needed      05/21/21 2049    Unscheduled  Telemetry - Pulse Oximetry  Continuous PRN     Comments: If Patient Develops Unresponsiveness, Acute Dyspnea, Cyanosis or  Suspected Hypoxemia Start Continuous Pulse Ox Monitoring, Apply Oxygen & Notify Provider    05/22/21 0214    Unscheduled  Oxygen Therapy- Nasal Cannula; Titrate for SPO2: 90% - 95%  Continuous PRN     Comments: If Patient Develops Unresponsiveness, Acute Dyspnea, Cyanosis or Suspected Hypoxemia Start Continuous Pulse Ox Monitoring, Apply Oxygen & Notify Provider    05/22/21 0214    Unscheduled  ECG 12 Lead  As Needed     Comments: Nurse to Release if Patient Expericences Acute Chest Pain or Dysrhythmias    05/22/21 0214    Unscheduled  Potassium  As Needed     Comments: For Ventricular Arrhythmias      05/22/21 0214    Unscheduled  Magnesium  As Needed     Comments: For Ventricular Arrhythmias      05/22/21 0214    Unscheduled  Troponin  As Needed     Comments: For Chest Pain      05/22/21 0214    Unscheduled  Digoxin Level  As Needed     Comments: For Atrial Arrhythmias      05/22/21 0214    Unscheduled  Blood Gas, Arterial -With Co-Ox Panel: Yes  As Needed     Comments: Per O2 PolicyNotify Physician      05/22/21 0214    Unscheduled  Up With Assistance  As Needed      05/22/21 0214    --  amoxicillin-clavulanate (AUGMENTIN) 875-125 MG per tablet  2 Times Daily      05/22/21 1731    --  Budeson-Glycopyrrol-Formoterol (BREZTRI) 160-9-4.8 MCG/ACT aerosol inhaler  2 Times Daily      05/22/21 1732    --  carvedilol (COREG) 25 MG tablet  2 Times Daily With Meals      05/22/21 1732    --  Dapagliflozin Propanediol (Farxiga) 10 MG tablet  Daily      05/22/21 1732    --  furosemide (LASIX) 40 MG tablet  Daily      05/22/21 1732    --  gabapentin (NEURONTIN) 600 MG tablet  3 Times Daily      05/22/21 1732    --  Insulin Lispro, 1 Unit Dial, (HumaLOG KwikPen) 100 UNIT/ML solution pen-injector  3 Times Daily      05/22/21 1732    --  lidocaine (LIDODERM) 5 %  Every 24 Hours      05/22/21 1801    --  magnesium oxide (MAG-OX) 400 MG tablet  Daily      05/22/21 1801    --  pantoprazole (PROTONIX) 40 MG EC tablet  Daily       21 180    --  sildenafil (REVATIO) 20 MG tablet  As Needed      21 183    --  SCANNED - TELEMETRY        21 0000    --  SCANNED - TELEMETRY        21 0000    --  SCANNED - TELEMETRY        21 0000    --  SCANNED - TELEMETRY        21 0000    --  SCANNED - TELEMETRY        21 0000    --  SCANNED - TELEMETRY        21 0000    --  SCANNED - TELEMETRY        21 0000    --  SCANNED - TELEMETRY        21 0000    --  SCANNED - TELEMETRY        21 0000    --  SCANNED - TELEMETRY        21 0000    --  SCANNED - TELEMETRY        21 0000    --  SCANNED - TELEMETRY        21 0000    --  SCANNED - TELEMETRY        21 0000    --  SCANNED - TELEMETRY        21 0000                   Physician Progress Notes (last 24 hours) (Notes from 21 1324 through 21 1324)      Joo Jimenez DO at 21 1231              [facility]     Patient Identification:  Name:  Иван Miller  Age:  65 y.o.  Sex:  male  :  1955  MRN:  [id number]  Visit Number:  [id number]  Primary Care Provider:  Rosi Thomas APRN    Length of stay:  10    Chief complaint: Back pain    Subjective:    Patient continues to report chronic back pain.  He does suggest increasing his pain medication.  However, I did caution patient in regards to this and did encourage increased mobilization and working with physical therapy.  ----------------------------------------------------------------------------------------------------------------------  Current Hospital Meds:  apixaban, 5 mg, Oral, Q12H  atorvastatin, 20 mg, Oral, Daily  budesonide-formoterol, 2 puff, Inhalation, BID - RT  Canagliflozin, 300 mg, Oral, Daily  clopidogrel, 75 mg, Oral, Daily  docusate sodium, 100 mg, Oral, BID  fluticasone, 2 spray, Nasal, Daily  gabapentin, 600 mg, Oral, Q8H  insulin aspart, 0-9 Units, Subcutaneous, TID AC  insulin aspart,  30 Units, Subcutaneous, TID With Meals  insulin detemir, 40 Units, Subcutaneous, Daily  ipratropium-albuterol, 3 mL, Nebulization, Q6H - RT  lactobacillus acidophilus, 1 capsule, Oral, Daily  lidocaine, 2 patch, Transdermal, Q24H  lisinopril, 5 mg, Oral, Q24H  metoprolol succinate XL, 75 mg, Oral, Q24H  pantoprazole, 40 mg, Oral, Q AM  polyethylene glycol, 17 g, Oral, Daily  sodium chloride, 10 mL, Intravenous, Q12H  tamsulosin, 0.4 mg, Oral, Nightly         ----------------------------------------------------------------------------------------------------------------------  Vital Signs:  Temp:  [97 °F (36.1 °C)-98.2 °F (36.8 °C)] 97.3 °F (36.3 °C)  Heart Rate:  [60-82] 66  Resp:  [15-30] 20  BP: (120-167)/(64-88) 148/87      05/30/21  0445 05/31/21  0500 06/01/21  0509   Weight: (!) 144 kg (316 lb 12.8 oz) (!) 146 kg (321 lb 9.6 oz) (Lead Nurse Aura and RN Chris willams) (!) 146 kg (320 lb 14.4 oz)     Body mass index is 47.39 kg/m².    Intake/Output Summary (Last 24 hours) at 6/1/2021 1231  Last data filed at 6/1/2021 1200  Gross per 24 hour   Intake 2200 ml   Output 2950 ml   Net -750 ml     Diet Regular; Cardiac, Consistent Carbohydrate  ----------------------------------------------------------------------------------------------------------------------  Physical exam:  Constitutional: Morbidly obese  male in no apparent distress.     HENT:  Head:  Normocephalic and atraumatic.  Mouth:  Moist mucous membranes.    Eyes:  Conjunctivae and EOM are normal.  Pupils are equal, round, and reactive to light.  No scleral icterus.    Neck:  Neck supple. No thyromegaly.  No JVD present.    Cardiovascular:  Regular rate and rhythm with no murmurs, rubs, clicks or gallops appreciated.  Pulmonary/Chest:  Clear to auscultation bilaterally with no crackles, wheezes or rhonchi appreciated.  Abdominal:  Soft. Nontender. Nondistended  Bowel sounds are normal in all four quadrants. No organomegally appreciated.      Neurological:  Cranial nerves II-XII intact with no focal deficits.  No facial droop.  No slurred speech.   Skin:  Warm and dry to palpation with no rashes or lesions appreciated.  Peripheral vascular:  2+ radial and pedal pulses in bilateral upper and lower extremities.  Psychiatric:  Alert and oriented x3, demonstrates appropriate judgement and insight.    ----------------------------------------------------------------------------------------------------------------------  Tele:    ----------------------------------------------------------------------------------------------------------------------  Results from last 7 days   Lab Units 05/27/21  2204 05/27/21  1548   TROPONIN T ng/mL <0.010 <0.010     Results from last 7 days   Lab Units 05/31/21 0117 05/30/21 0433 05/29/21  0306 05/28/21  1834   CRP mg/dL 0.87*  --  0.34 <0.30   WBC 10*3/mm3 8.49 8.38 8.77  --    HEMOGLOBIN g/dL 12.3* 12.1* 11.7*  --    HEMATOCRIT % 43.0 42.1 39.8  --    MCV fL 84.6 85.7 83.6  --    MCHC g/dL 28.6* 28.7* 29.4*  --    PLATELETS 10*3/mm3 234 217 228  --          Results from last 7 days   Lab Units 05/31/21 0117 05/30/21 0433 05/29/21  0306 05/28/21  0059 05/27/21  0324 05/26/21  0128   SODIUM mmol/L 137 138 139 137 138 135*   POTASSIUM mmol/L 4.5 4.0 4.2 4.5 4.4 4.0   MAGNESIUM mg/dL  --  2.4  --   --  2.7* 2.5*   CHLORIDE mmol/L 96* 97* 99 99 97* 96*   CO2 mmol/L 35.4* 33.3* 33.6* 30.4* 36.4* 35.3*   BUN mg/dL 20 19 19 21 25* 31*   CREATININE mg/dL 1.24 0.95 0.87 0.91 0.86 0.96   EGFR IF NONAFRICN AM mL/min/1.73 58* 79 88 83 89 78   CALCIUM mg/dL 9.5 9.3 9.2 9.1 9.2 9.1   GLUCOSE mg/dL 164* 156* 120* 220* 164* 233*   ALBUMIN g/dL  --   --  3.35* 3.49* 3.54 3.39*   BILIRUBIN mg/dL  --   --  0.4 0.3 0.4 0.3   ALK PHOS U/L  --   --  54 56 51 48   AST (SGOT) U/L  --   --  15 17 13 14   ALT (SGPT) U/L  --   --  20 21 21 20   Estimated Creatinine Clearance: 83.7 mL/min (by C-G formula based on SCr of 1.24 mg/dL).    No results  found for: AMMONIA  Results from last 7 days   Lab Units 21  0128   CHOLESTEROL mg/dL 153   TRIGLYCERIDES mg/dL 130   HDL CHOL mg/dL 30*   LDL CHOL mg/dL 99     No results found for: BLOODCX  No results found for: URINECX  No results found for: WOUNDCX  No results found for: STOOLCX    I have personally looked at the labs and they are summarized above.  ----------------------------------------------------------------------------------------------------------------------  Imaging Results (Last 24 Hours)     ** No results found for the last 24 hours. **        ----------------------------------------------------------------------------------------------------------------------  Assessment and Plan:    1.  Acute on chronic hypoxic/hypercapnic respiratory failure -acute phase resolved, continue 2 L nasal cannula which is patient's baseline.    2.  COPD exacerbation -resolved    3.  Generalized weakness -patient was denied inpatient rehab.  Currently awaiting skilled nursing facility placement.    4.  Paroxysmal A. Fib - currently in NSR, continue Eliquis    5.  Essential hypertension  - well controlled, continue to monitor closely and make medication adjustments as necessary    6.  Insulin-dependent type 2 diabetes mellitus -continue sliding scale insulin with Accu-Cheks before every meal and nightly    Disposition currently pending SNF placement        Joo Jimenez DO  21  12:31 EDT    Electronically signed by Joo Jimenez DO at 21 1233       Consult Notes (last 24 hours) (Notes from 21 1324 through 21 1324)    No notes of this type exist for this encounter.            Physical Therapy Notes (last 24 hours) (Notes from 21 1324 through 21 1324)      Keely Agosto, PT at 21 1422  Version 1 of 1         Acute Care - Physical Therapy Treatment Note  BAKARI Vazquez     Patient Name: Иван Miller  : 1955  MRN: [id number]  Today's Date:  5/31/2021   Onset of Illness/Injury or Date of Surgery: 05/21/21       PT Assessment (last 12 hours)      PT Evaluation and Treatment     Row Name 05/31/21 1300          Physical Therapy Time and Intention    Subjective Information  no complaints  -BC     Document Type  therapy note (daily note)  -BC     Mode of Treatment  physical therapy  -BC     Patient Effort  fair  -BC     Symptoms Noted During/After Treatment  none  -BC     Row Name 05/31/21 1300          General Information    Patient Profile Reviewed  yes  -BC     Onset of Illness/Injury or Date of Surgery  05/21/21  -BC     Referring Physician  Dr Jimenez  -BC     Patient Observations  alert;cooperative;agree to therapy  -BC     Prior Level of Function  mod assist:  -BC     Risks Reviewed  patient:;LOB;nausea/vomiting;dizziness;increased discomfort;change in vital signs;increased drainage;lines disloged  -BC     Benefits Reviewed  patient:;increase independence;improve function;increase strength;increase balance;decrease pain;decrease risk of DVT;improve skin integrity;increase knowledge  -BC     Row Name 05/31/21 1300          Previous Level of Function/Home Environm    BADLs, Premorbid Functional Level  needs assistive device for safe performance;needs adaptive equipment for safe performance  -BC     Row Name 05/31/21 1300          Living Environment    Current Living Arrangements  home/apartment/condo  -BC     Lives With  child(ara), adult;spouse  -BC     Row Name 05/31/21 1300          Cognition    Affect/Mental Status (Cognitive)  WFL  -BC     Orientation Status (Cognition)  oriented x 3  -BC     Follows Commands (Cognition)  WFL  -BC     Cognitive Function (Cognitive)  safety deficit  -BC     Safety Deficit (Cognitive)  moderate deficit  -BC     Row Name 05/31/21 1300          Range of Motion (ROM)    Range of Motion  ROM is WFL  -BC     Row Name 05/31/21 1300          Range of Motion Comprehensive    General Range of Motion  bilateral lower extremity  ROM WFL  -BC     Comment, General Range of Motion  bilateral lower extremity weak  -BC     Row Name 05/31/21 1300          Mobility    Extremity Weight-bearing Status  left lower extremity;right lower extremity  -BC     Left Lower Extremity (Weight-bearing Status)  full weight-bearing (FWB)  -BC     Right Lower Extremity (Weight-bearing Status)  full weight-bearing (FWB)  -BC     Row Name 05/31/21 1300          Bed Mobility    Bed Mobility  bed mobility (all) activities  -BC     Supine-Sit [place] (Bed Mobility)  moderate assist (50% patient effort)  -BC     Sit-Supine [place] (Bed Mobility)  moderate assist (50% patient effort)  -BC     Bed Mobility, Safety Issues  decreased use of legs for bridging/pushing  -BC     Assistive Device (Bed Mobility)  bed rails  -BC     Row Name 05/31/21 1300          Transfers    Transfers  sit-stand transfer;stand-sit transfer  -BC     Maintains Weight-bearing Status (Transfers)  physical assist to maintain  -BC     Sit-Stand [place] (Transfers)  moderate assist (50% patient effort)  -BC     Stand-Sit [place] (Transfers)  moderate assist (50% patient effort)  -BC     Row Name 05/31/21 1300          Sit-Stand Transfer    Assistive Device (Sit-Stand Transfers)  walker, front-wheeled  -BC     Row Name 05/31/21 1300          Stand-Sit Transfer    Assistive Device (Stand-Sit Transfers)  walker, front-wheeled  -BC     Row Name 05/31/21 1300          Stand Pivot/Stand Step Transfer    Stand Pivot/Stand Step [place] (Transfers)  moderate assist (50% patient effort)  -BC     Assistive Device (Stand Pivot Stand Step Transfer)  walker, front-wheeled  -BC     Row Name 05/31/21 1300          Gait/Stairs (Locomotion)    Comment (Gait/Stairs)  pt unable to ambulate per pt  -BC     Row Name 05/31/21 1300          Coping    Observed Emotional State  calm;cooperative  -BC     Verbalized Emotional State  acceptance  -BC     Family/Support Persons  family  -BC     Involvement  in Care  not present at bedside  -BC     Diversional Activities  television  -BC     Row Name 05/31/21 1300          Plan of Care Review    Plan of Care Reviewed With  patient  -BC     Row Name 05/31/21 1300          Physical Therapy Goals    Bed Mobility Goal Selection (PT)  bed mobility, PT goal 1  -BC     Transfer Goal Selection (PT)  transfer, PT goal 1  -BC     Gait Training Goal Selection (PT)  gait training, PT goal 1  -BC     Row Name 05/31/21 1300          Bed Mobility Goal 1 (PT)    Activity/Assistive Device (Bed Mobility Goal 1, PT)  bed mobility activities, all  -BC     [place] Level/Cues Needed (Bed Mobility Goal 1, PT)  minimum assist (75% or more patient effort);2 person assist  -BC     Time Frame (Bed Mobility Goal 1, PT)  by discharge  -BC     Row Name 05/31/21 1300          Transfer Goal 1 (PT)    Activity/Assistive Device (Transfer Goal 1, PT)  transfers, all  -BC     [place] Level/Cues Needed (Transfer Goal 1, PT)  minimum assist (75% or more patient effort)  -BC     Time Frame (Transfer Goal 1, PT)  by discharge  -BC     Row Name 05/31/21 1300          Gait Training Goal 1 (PT)    Activity/Assistive Device (Gait Training Goal 1, PT)  gait (walking locomotion)  -BC     [place] Level (Gait Training Goal 1, PT)  minimum assist (75% or more patient effort)  -BC     Distance (Gait Training Goal 1, PT)  20 ft  -BC     Time Frame (Gait Training Goal 1, PT)  by discharge  -BC     Row Name 05/31/21 1300          Positioning and Restraints    Pre-Treatment Position  in bed  -BC     Post Treatment Position  bed  -BC     In Bed  notified nsg;supine;call light within reach;encouraged to call for assist  -BC     Row Name 05/31/21 1300          PT Evaluation Complexity    Examination of Body Systems (PT Eval Complexity)  1-2 elements  -BC     Clinical Presentation (PT Evaluation Complexity)  unstable  -BC       User Key  (r) = Recorded By, (t) = Taken By, (c) = Cosigned By    Initials Name  Provider Type    Keely Sargent PT Physical Therapist        Physical Therapy Education                 Title: PT OT SLP Therapies (Done)     Topic: Physical Therapy (Done)     Point: Mobility training (Done)     Learning Progress Summary           Patient Acceptance, E, VU by NIR at 5/31/2021 0924    Acceptance, E, VU by SM at 5/31/2021 0035    Acceptance, E,TB, VU by MM at 5/28/2021 1802    Acceptance, E,TB, VU by MM at 5/27/2021 1143    Acceptance, E, VU by SM at 5/26/2021 2344    Acceptance, E,TB, VU by MM at 5/26/2021 0929    Acceptance, E,TB,D, VU,NR by AG at 5/24/2021 1249                   Point: Home exercise program (Done)     Learning Progress Summary           Patient Acceptance, E, VU by NIR at 5/31/2021 0924    Acceptance, E, VU by ISIDRO at 5/31/2021 0035    Acceptance, E,TB, VU by MM at 5/28/2021 1802    Acceptance, E,TB, VU by MM at 5/27/2021 1143    Acceptance, E, VU by SM at 5/26/2021 2344    Acceptance, E,TB, VU by MM at 5/26/2021 0929    Acceptance, E,TB,D, VU,NR by AG at 5/24/2021 1249                   Point: Body mechanics (Done)     Learning Progress Summary           Patient Acceptance, E, VU by NIR at 5/31/2021 0924    Acceptance, E, VU by ISIDRO at 5/31/2021 0035    Acceptance, E,TB, VU by MM at 5/28/2021 1802    Acceptance, E,TB, VU by MM at 5/27/2021 1143    Acceptance, E, VU by SM at 5/26/2021 2344    Acceptance, E,TB, VU by MM at 5/26/2021 0929    Acceptance, E,TB,D, VU,NR by AG at 5/24/2021 1249                   Point: Precautions (Done)     Learning Progress Summary           Patient Acceptance, E, VU by NIR at 5/31/2021 0924    Acceptance, E, VU by SM at 5/31/2021 0035    Acceptance, E,TB, VU by MM at 5/28/2021 1802    Acceptance, E,TB, VU by MM at 5/27/2021 1143    Acceptance, E, VU by SM at 5/26/2021 2344    Acceptance, E,TB, VU by MM at 5/26/2021 0929    Acceptance, E,TB,D, VU,NR by CATALINA at 5/24/2021 1249                               User Key     Initials Effective Dates Name  Provider Type Discipline    AG 04/03/18 -  Elvia Rousseau, PT Physical Therapist PT     07/19/18 -  Cruz Umanzor, RN Registered Nurse Nurse    MM 07/08/19 -  Esha Lee, RN Registered Nurse Nurse     09/12/18 -  Chris Das, RN Registered Nurse Nurse              PT Recommendation and Plan     Plan of Care Reviewed With: patient       Time Calculation:   PT Charges     Row Name 05/31/21 1421             Time Calculation    PT Received On  05/31/21  -BC         Time Calculation- PT    Total Timed Code Minutes- PT  45 minute(s)  -BC         Timed Charges    10304 - PT Therapeutic Exercise Minutes  30  -BC      92689 - Gait Training Minutes   15  -BC         Total Minutes    Timed Charges Total Minutes  45  -BC       Total Minutes  45  -BC        User Key  (r) = Recorded By, (t) = Taken By, (c) = Cosigned By    Initials Name Provider Type    BC Keely Agosto, PT Physical Therapist        Therapy Charges for Today     Code Description Service Date Service Provider Modifiers Qty    65560062495 HC PT THER PROC EA 15 MIN 5/31/2021 Keely Agosto PT GP 2    24055310550 HC GAIT TRAINING EA 15 MIN 5/31/2021 Keely Agosto PT GP 1               Keely Agosto PT  5/31/2021      Electronically signed by Keely Agosto PT at 05/31/21 1423       Occupational Therapy Notes (last 24 hours) (Notes from 05/31/21 1324 through 06/01/21 1324)    No notes exist for this encounter.         ADL Documentation (last day)     Date/Time Transferring Toileting Bathing Dressing Eating Communication Swallowing    06/01/21 0737  3 - assistive equipment and person  3 - assistive equipment and person  2 - assistive person  2 - assistive person  0 - independent  0 - understands/communicates without difficulty  0 - swallows foods/liquids without difficulty    05/31/21 2056  3 - assistive equipment and person  3 - assistive equipment and person  2 - assistive person  2 - assistive person  0 - independent  0 -  understands/communicates without difficulty  0 - swallows foods/liquids without difficulty    05/31/21 0715  3 - assistive equipment and person  3 - assistive equipment and person  2 - assistive person  2 - assistive person  0 - independent  0 - understands/communicates without difficulty  0 - swallows foods/liquids without difficulty

## 2021-06-01 NOTE — THERAPY TREATMENT NOTE
Acute Care - Physical Therapy Treatment Note   [place]     Patient Name: Иван Miller  : 1955  MRN: [id number]  Today's Date: 2021   Onset of Illness/Injury or Date of Surgery: 21       PT Assessment (last 12 hours)      PT Evaluation and Treatment     Row Name 21 1500          Physical Therapy Time and Intention    Subjective Information  no complaints  -BC     Document Type  therapy note (daily note)  -BC     Mode of Treatment  physical therapy  -BC     Patient Effort  good  -BC     Row Name 21 1500          General Information    Patient Profile Reviewed  yes  -BC     Onset of Illness/Injury or Date of Surgery  21  -BC     Referring Physician  Dr. Jimenez  -BC     Patient Observations  alert;cooperative;agree to therapy  -BC     Risks Reviewed  patient:;LOB;dizziness;nausea/vomiting;increased discomfort;change in vital signs;lines disloged;increased drainage  -BC     Benefits Reviewed  patient:;improve function;increase independence;increase strength;increase balance;decrease pain;decrease risk of DVT;improve skin integrity;increase knowledge  -BC     Row Name 21 1500          Cognition    Affect/Mental Status (Cognitive)  WFL  -BC     Orientation Status (Cognition)  oriented x 3  -BC     Follows Commands (Cognition)  follows one-step commands  -BC     Cognitive Function (Cognitive)  safety deficit  -BC     Row Name 21 1500          Range of Motion Comprehensive    General Range of Motion  no range of motion deficits identified  -BC     Row Name 21 1500          Mobility    Extremity Weight-bearing Status  left lower extremity;right lower extremity  -BC     Left Lower Extremity (Weight-bearing Status)  full weight-bearing (FWB)  -BC     Right Lower Extremity (Weight-bearing Status)  full weight-bearing (FWB)  -BC     Row Name 21 1500          Bed Mobility    Bed Mobility  bed mobility (all) activities  -BC     All Activities, [place] (Bed Mobility)   minimum assist (75% patient effort)  -BC     Supine-Sit [place] (Bed Mobility)  moderate assist (50% patient effort)  -BC     Sit-Supine [place] (Bed Mobility)  moderate assist (50% patient effort)  -BC     Bed Mobility, Safety Issues  decreased use of legs for bridging/pushing;impaired trunk control for bed mobility  -BC     Assistive Device (Bed Mobility)  bed rails  -BC     Row Name 06/01/21 1500          Transfers    Transfers  sit-stand transfer;stand-sit transfer  -BC     Maintains Weight-bearing Status (Transfers)  able to maintain  -BC     Sit-Stand [place] (Transfers)  moderate assist (50% patient effort)  -BC     Stand-Sit [place] (Transfers)  minimum assist (75% patient effort)  -BC     Row Name 06/01/21 1500          Sit-Stand Transfer    Assistive Device (Sit-Stand Transfers)  walker, front-wheeled  -BC     Row Name 06/01/21 1500          Stand-Sit Transfer    Assistive Device (Stand-Sit Transfers)  walker, front-wheeled  -BC     Row Name 06/01/21 1500          Stand Pivot/Stand Step Transfer    Stand Pivot/Stand Step [place] (Transfers)  minimum assist (75% patient effort)  -BC     Assistive Device (Stand Pivot Stand Step Transfer)  walker, front-wheeled  -BC     Row Name 06/01/21 1500          Gait/Stairs (Locomotion)    Comment (Gait/Stairs)  pt unable to ambulate  -BC     Row Name 06/01/21 1500          Coping    Observed Emotional State  calm;cooperative  -BC     Verbalized Emotional State  acceptance  -BC     Trust Relationship/Rapport  care explained;choices provided  -BC     Family/Support Persons  daughter;spouse  -BC     Involvement in Care  not present at bedside  -BC     Diversional Activities  television  -BC     Row Name 06/01/21 1500          Plan of Care Review    Plan of Care Reviewed With  patient  -BC     Progress  improving  -BC     Row Name 06/01/21 1500          Positioning and Restraints    Pre-Treatment Position  in bed  -BC     Post Treatment Position   bed  -BC       User Key  (r) = Recorded By, (t) = Taken By, (c) = Cosigned By    Initials Name Provider Type    Keely Sargent PT Physical Therapist        Physical Therapy Education                 Title: PT OT SLP Therapies (Done)     Topic: Physical Therapy (Done)     Point: Mobility training (Done)     Learning Progress Summary           Patient Acceptance, E, VU by EB at 6/1/2021 0739    Acceptance, E, VU by SM at 5/31/2021 2242    Acceptance, E, VU by NIR at 5/31/2021 0924    Acceptance, E, VU by SM at 5/31/2021 0035    Acceptance, E,TB, VU by MM at 5/28/2021 1802    Acceptance, E,TB, VU by MM at 5/27/2021 1143    Acceptance, E, VU by SM at 5/26/2021 2344    Acceptance, E,TB, VU by MM at 5/26/2021 0929    Acceptance, E,TB,D, VU,NR by AG at 5/24/2021 1249                   Point: Home exercise program (Done)     Learning Progress Summary           Patient Acceptance, E, VU by EB at 6/1/2021 0739    Acceptance, E, VU by SM at 5/31/2021 2242    Acceptance, E, VU by NIR at 5/31/2021 0924    Acceptance, E, VU by SM at 5/31/2021 0035    Acceptance, E,TB, VU by MM at 5/28/2021 1802    Acceptance, E,TB, VU by MM at 5/27/2021 1143    Acceptance, E, VU by SM at 5/26/2021 2344    Acceptance, E,TB, VU by MM at 5/26/2021 0929    Acceptance, E,TB,D, VU,NR by AG at 5/24/2021 1249                   Point: Body mechanics (Done)     Learning Progress Summary           Patient Acceptance, E, VU by EB at 6/1/2021 0739    Acceptance, E, VU by SM at 5/31/2021 2242    Acceptance, E, VU by JM at 5/31/2021 0924    Acceptance, E, VU by SM at 5/31/2021 0035    Acceptance, E,TB, VU by MM at 5/28/2021 1802    Acceptance, E,TB, VU by MM at 5/27/2021 1143    Acceptance, E, VU by SM at 5/26/2021 2344    Acceptance, E,TB, VU by MM at 5/26/2021 0929    Acceptance, E,TB,D, VU,NR by AG at 5/24/2021 1249                   Point: Precautions (Done)     Learning Progress Summary           Patient Acceptance, E, VU by EB at 6/1/2021 0778     Acceptance, E, VU by  at 5/31/2021 2242    Acceptance, E, VU by  at 5/31/2021 0924    Acceptance, E, VU by  at 5/31/2021 0035    Acceptance, E,TB, VU by  at 5/28/2021 1802    Acceptance, E,TB, VU by  at 5/27/2021 1143    Acceptance, E, VU by  at 5/26/2021 2344    Acceptance, E,TB, VU by  at 5/26/2021 0929    Acceptance, E,TB,D, VU,NR by  at 5/24/2021 1249                               User Key     Initials Effective Dates Name Provider Type Discipline    EB 06/16/16 -  Keily Godoy, RN Registered Nurse Nurse     04/03/18 -  Elvia Rousseau, PT Physical Therapist PT     07/19/18 -  Cruz Umanzor, RN Registered Nurse Nurse     07/08/19 -  Esha Lee, RN Registered Nurse Nurse     09/12/18 -  Chris Das RN Registered Nurse Nurse              PT Recommendation and Plan     Plan of Care Reviewed With: patient  Progress: improving       Time Calculation:   PT Charges     Row Name 06/01/21 1557             Time Calculation    PT Received On  06/01/21  -BC         Time Calculation- PT    Total Timed Code Minutes- PT  30 minute(s)  -BC         Timed Charges    99488 - PT Therapeutic Exercise Minutes  30  -BC         Total Minutes    Timed Charges Total Minutes  30  -BC       Total Minutes  30  -BC        User Key  (r) = Recorded By, (t) = Taken By, (c) = Cosigned By    Initials Name Provider Type    BC Keely Agosto, PT Physical Therapist        Therapy Charges for Today     Code Description Service Date Service Provider Modifiers Qty    93025905233 HC PT THER PROC EA 15 MIN 5/31/2021 Keely Agosto PT GP 2    66955157879 HC GAIT TRAINING EA 15 MIN 5/31/2021 Keely Agosto, PT GP 1    87366820991 HC PT THER PROC EA 15 MIN 6/1/2021 Keely Agosto PT GP 2               Keely Agosto PT  6/1/2021

## 2021-06-01 NOTE — DISCHARGE PLACEMENT REQUEST
"Иван Blanco (65 y.o. Male)     Date of Birth Social Security Number Address Home Phone MRN    1955  [address]  [place] [address] [phone] [id number]    Jain Marital Status          None        Admission Date Admission Type Admitting Provider Attending Provider Department, Room/Bed    5/21/21 Emergency Vivienne Flores DO Mullins, Thomas Anthony, DO [address], 3303/2S    Discharge Date Discharge Disposition Discharge Destination                       Attending Provider: Joo Jimenez DO    Allergies: Codeine, Ciprofloxacin    Isolation: None   Infection: None   Code Status: CPR    Ht: 175.3 cm (69\")   Wt: 146 kg (320 lb 14.4 oz)    Admission Cmt: None   Principal Problem: Respiratory failure (CMS/HCC) [J96.90]                 Active Insurance as of 5/21/2021     Primary Coverage     Payor Plan Insurance Group Employer/Plan Group    [facility] MEDICARE REPLACEMENT [facility] DUAL COMPLETE MEDICARE REPLACEMENT [id number]     Payor Plan Address Payor Plan Phone Number Payor Plan Fax Number Effective Dates    [address] [phone]  1/1/2021 - None Entered    [facility] [address]       Subscriber Name Subscriber Birth Date Member ID       ИВАН BLANCO 1955 [id number]           Secondary Coverage     Payor Plan Insurance Group Employer/Plan Group    [facility] MEDICAID      Payor Plan Address Payor Plan Phone Number Payor Plan Fax Number Effective Dates    [address] [address] [phone]  10/4/2019 - None Entered    [facility] [id number]       Subscriber Name Subscriber Birth Date Member ID       ИВАН BLANCO 1955 [id number]                 Emergency Contacts      (Rel.) Home Phone Work Phone Mobile Phone    Lamar Blanco (Spouse) [phone] -- [phone]    GARCIA MALDONADO (Daughter) [phone] -- [phone]            Emergency Contact Information     Name Relation Home Work Mobile    " Lamar Miller Spouse [phone]    GARCIA MALDONADO Daughter [phone]          Insurance Information                [facility] MEDICARE REPLACEMENT/[facility] DUAL COMPLETE MEDICARE REPLACEMENT Phone: [phone]    Subscriber: Иван Miller Subscriber#: [id number]    Group#: [id number] Precert#:         [facility]/[employer] MEDICA[employer] Phone: [phone]    Subscriber: Иван Miller Subscriber#: [id number]    Group#:  Precert#:           Treatment Team  Chat With All Active Members    Provider Relationship Specialty Contact    Joo Jimenez DO  Attending --  [phone]    Keily Godoy, RN  Registered Nurse --     Keely Agosto, PT  Physical Therapist Physical Therapy     Tian Ness DO  Consulting Physician Orthopedic Surgery  [phone]    Aura An, PCT  Patient Care Technician --     Roby Corbin MD  Consulting Physician, Surgeon Orthopedic Surgery  [phone]    Staci Cohen, RRT  Respiratory Therapist --  [phone]          Problem List         Codes Noted - Resolved       Hospital    * (Principal) Respiratory failure (CMS/HCC) ICD-10-CM: J96.90  ICD-9-CM: 518.81 5/22/2021 - Present    Finger infection ICD-10-CM: L08.9  ICD-9-CM: 686.9 5/21/2021 - Present    Essential hypertension (Chronic) ICD-10-CM: I10  ICD-9-CM: 401.9 11/25/2019 - Present    Hyperlipidemia LDL goal <70 (Chronic) ICD-10-CM: E78.5  ICD-9-CM: 272.4 11/25/2019 - Present       Non-Hospital    Coronary artery disease status post multiple stents ICD-10-CM: I25.10  ICD-9-CM: 414.01 11/25/2019 - Present    Non-ischemic cardiomyopathy (CMS/HCC LV ejection fraction 40 to 45% ICD-10-CM: I42.8  ICD-9-CM: 425.4 11/25/2019 - Present    Chronic systolic congestive heart failure (CMS/HCC) ICD-10-CM: I50.22  ICD-9-CM: 428.22, 428.0 11/25/2019 - Present    Insulin dependent diabetes mellitus ICD-10-CM: ERE8810  ICD-9-CM: 250.00, V58.67 11/25/2019 -  Present    Acute respiratory failure with hypoxemia (CMS/[facility]) ICD-10-CM: J96.01  ICD-9-CM: 518.81 10/4/2019 - Present    Symptomatic bradycardia ICD-10-CM: R00.1  ICD-9-CM: 427.89 2018 - Present             History & Physical      Nora Gomez PA-C at 21 0142     Attestation signed by Vivienne Flores DO at 21 [phone]    I have read the documentation below and agree.  Patient also seen and examined independently.  He remains on BiPAP.  Patient is easily arousable and is oriented x4.    Lungs are diminished but clear.  Patient is in no respiratory distress.  Heart is regular rate and rhythm without obvious murmur/gallop/rub.  Patient with decreased range of motion, swelling, tenderness and bruising in the right ankle and foot.  Right lower extremity appears to be more edematous with an increased circumference in comparison to the left calf area.    Agree with a respiratory culture if able to produce.  Continue with Rocephin and a dose of amoxicillin.  Agree with steroids and scheduled nebulized inhalants.  Plan to wean off BiPAP and placed on nasal cannula.      Obtain an x-ray of the right foot pain and a Doppler ultrasound of the right lower extremity to rule out DVT in the setting of recent surgery and decreased ambulation over the past few days.                       [facility] Medicine Services  HISTORY & PHYSICAL    Patient Identification:  Name:  Иван Miller  Age:  65 y.o.  Sex:  male  :  1955  MRN:  [id number]   Visit Number:  [id number]  Admit Date: 2021   Primary Care Physician:  Rosi Thomas APRN     Subjective     Chief complaint:   Chief Complaint   Patient presents with   • Shortness of Breath     History of presenting illness:   Patient is a 65 y.o. male with past medical history significant for paroxysmal atrial fibrillation, CAD status post stenting in the past, known ischemic cardiomyopathy, COPD, insulin-dependent type 2 diabetes  mellitus, GERD, essential hypertension, hyperlipidemia, that presented to the [facility] emergency department for evaluation of shortness of breath.    It should be of note that the patient was very lethargic at bedside.  He would arouse briefly with tactile stimulation but will quickly fall back asleep.  He was also somewhat difficult to understand as he currently has BiPAP in place and was slurring his speech.  The patient states that he has been feeling short of breath and having a productive cough with yellow sputum and wheezing for around 15 days.  He reports he was given an antibiotic but was unable to tell me who gave it to him.  He also admits to a subjective fever, periumbilical abdominal pain, and fatigue.  He denies any diaphoresis, chills, chest pain, leg edema, diarrhea, nausea, vomiting, dysuria.  He states that he wears 2 L of oxygen at home and CPAP at night.  He further admits to amphetamine use and states that he smokes it.  Additionally, he admits to a fall 2 nights ago and has been experiencing severe right ankle pain.  He has had difficulty walking secondary to pain and has bruising located on the lateral aspect of the right ankle.  He denies hitting his head or loss of consciousness.    Upon arrival to the ED, vitals were temperature 98.0 °F, pulse 97, respirations 22, /93, SPO2 96% on 6 L nasal cannula.  ABG with pH 7.325, PCO2 72.8, PO2 59.7, HCO3 37.9, O2 saturation 89.3 on 2 L nasal cannula.  CMP with glucose 279, CO2 34.1.  CBC with hemoglobin 11.6, MCH 24.2, MCHC 27.9.  UA with 3+ glucose, blood cultures pending x2.  Blood ethanol negative.  UDS positive for amphetamines.  Chest x-ray shows limited study due to inadequate positioning, cardiac silhouette appears to be enlarged, underpenetration of the left hemithorax, no gross dense consolidation.  Noncontrast CT of the chest shows mild right base atelectasis, pleural thickening along the major fissure on the left,  three-vessel CAD, no acute lung infiltrates.  EKG with sinus rhythm with occasional PVCs, nonspecific ST abnormality, heart rate 97, QTc 447 MS.  COVID-19 negative.    In the emergency department the patient received IV Rocephin 1 g, IV Decadron 10 mg, IV doxycycline, DuoNeb treatment x2.    Patient has been admitted to the progressive care unit as telemetry overflow for further evaluation and treatment  ---------------------------------------------------------------------------------------------------------------------   Review of Systems   Constitutional: Positive for fever (Undocumented). Negative for chills and diaphoresis.   HENT: Negative for congestion and sore throat.    Eyes: Negative for discharge and visual disturbance.   Respiratory: Positive for cough (Productive with yellow sputum), shortness of breath and wheezing.    Cardiovascular: Negative for chest pain, palpitations and leg swelling.   Gastrointestinal: Positive for abdominal pain (Periumbilical) and constipation. Negative for diarrhea, nausea and vomiting.   Genitourinary: Negative for dysuria and frequency.   Musculoskeletal: Positive for back pain (Chronic) and gait problem (Fall 2 nights ago). Negative for myalgias.   Skin: Negative for rash and wound.        Bruising of right ankle   Neurological: Negative for dizziness, syncope and light-headedness.   Psychiatric/Behavioral: Negative for confusion. The patient is not nervous/anxious.       ---------------------------------------------------------------------------------------------------------------------   Past Medical History:   Diagnosis Date   • Atrial fibrillation (CMS/HCC)    • COPD (chronic obstructive pulmonary disease) (CMS/HCC)    • Diabetes mellitus (CMS/HCC)    • GERD (gastroesophageal reflux disease)    • Hyperlipidemia    • Hypertension    • Myocardial infarction (CMS/HCC)    • Neuropathy      Past Surgical History:   Procedure Laterality Date   • ABDOMINAL SURGERY     •  APPENDECTOMY     • CARDIAC CATHETERIZATION     • CORONARY STENT PLACEMENT       Family History   Problem Relation Age of Onset   • Hypertension Mother    • Heart attack Mother    • Hypertension Father      Social History     Socioeconomic History   • Marital status:      Spouse name: Not on file   • Number of children: Not on file   • Years of education: Not on file   • Highest education level: Not on file   Tobacco Use   • Smoking status: Former Smoker     Packs/day: 2.00     Years: 30.00     Pack years: 60.00     Types: Cigarettes   • Smokeless tobacco: Current User     Types: Snuff   Substance and Sexual Activity   • Alcohol use: No   • Drug use: No   • Sexual activity: Defer     ---------------------------------------------------------------------------------------------------------------------   Allergies:  Codeine and Ciprofloxacin  ---------------------------------------------------------------------------------------------------------------------   Medications below are reported home medications pulling from within the system; at this time, these medications have not been reconciled unless otherwise specified and are in the verification process for further verifcation as current home medications.    Prior to Admission Medications     Prescriptions Last Dose Informant Patient Reported? Taking?    albuterol (PROVENTIL HFA;VENTOLIN HFA) 108 (90 Base) MCG/ACT inhaler  Pharmacy Yes No    Inhale 2 puffs Every 4 (Four) Hours As Needed for Wheezing.    amLODIPine (NORVASC) 10 MG tablet  Pharmacy Yes No    Take 10 mg by mouth Daily.    apixaban (ELIQUIS) 5 MG tablet tablet  Pharmacy Yes No    Take 5 mg by mouth 2 (Two) Times a Day.    atorvastatin (LIPITOR) 20 MG tablet  Pharmacy Yes No    Take 20 mg by mouth Daily.    budesonide-formoterol (SYMBICORT) 160-4.5 MCG/ACT inhaler  Pharmacy Yes No    Inhale 2 puffs 2 (Two) Times a Day.    carvedilol (COREG) 12.5 MG tablet   No No    Take 1 tablet by mouth 2 (Two)  Times a Day With Meals.    fluticasone (FLONASE) 50 MCG/ACT nasal spray  Pharmacy Yes No    2 sprays into the nostril(s) as directed by provider Daily.    furosemide (LASIX) 40 MG tablet   No No    Take 1 tablet by mouth Daily.    gabapentin (NEURONTIN) 300 MG capsule   Yes No    Take 300 mg by mouth 3 (Three) Times a Day.    insulin lispro (humaLOG) 100 UNIT/ML injection  Pharmacy Yes No    Inject 20 Units under the skin into the appropriate area as directed 3 (Three) Times a Day Before Meals.    ipratropium-albuterol (DUO-NEB) 0.5-2.5 mg/3 ml nebulizer  Pharmacy Yes No    Take 3 mL by nebulization Every 4 (Four) Hours As Needed for Wheezing.    loratadine (CLARITIN) 10 MG tablet  Pharmacy Yes No    Take 10 mg by mouth Daily.    metFORMIN (GLUCOPHAGE) 1000 MG tablet  Pharmacy Yes No    Take 1,000 mg by mouth 2 (Two) Times a Day With Meals.    omeprazole (priLOSEC) 40 MG capsule  Pharmacy Yes No    Take 40 mg by mouth Daily.    sacubitril-valsartan (ENTRESTO) 49-51 MG tablet   Yes No    Take 1 tablet by mouth 2 (Two) Times a Day.    sildenafil (REVATIO) 20 MG tablet  Pharmacy Yes No    Take 20 mg by mouth Daily As Needed (sexual activity).    tamsulosin (FLOMAX) 0.4 MG capsule 24 hr capsule  Pharmacy Yes No    Take 1 capsule by mouth Every Night.        ---------------------------------------------------------------------------------------------------------------------    Objective     Hospital Scheduled Meds:          Current listed hospital scheduled medications may not yet reflect those currently placed in orders that are signed and held, awaiting patient's arrival to floor/unit.    ---------------------------------------------------------------------------------------------------------------------   Vital Signs:  Temp:  [98 °F (36.7 °C)] 98 °F (36.7 °C)  Heart Rate:  [] 98  Resp:  [20-32] 20  BP: (141-161)/(86-99) 151/88  Mean Arterial Pressure (Non-Invasive) for the past 24 hrs (Last 3 readings):    Noninvasive MAP (mmHg)   05/22/21 0033 112   05/21/21 2233 95   05/21/21 2118 119     SpO2 Percentage    05/21/21 2343 05/22/21 0033 05/22/21 0043   SpO2: 96% 95% 98%     SpO2:  [93 %-100 %] 98 %  on  Flow (L/min):  [6] 6;   Device (Oxygen Therapy): NPPV/NIV    Body mass index is 46.52 kg/m².  Wt Readings from Last 3 Encounters:   05/21/21 (!) 143 kg (315 lb)   11/25/19 127 kg (280 lb)   10/06/19 124 kg (273 lb 11.2 oz)     ---------------------------------------------------------------------------------------------------------------------   Physical Exam:  Physical Exam  Constitutional:       General: He is awake.      Appearance: Normal appearance. He is well-developed. He is morbidly obese.      Comments: BiPAP in place   HENT:      Head: Normocephalic and atraumatic.   Eyes:      General: Lids are normal.         Right eye: No discharge.         Left eye: No discharge.   Cardiovascular:      Rate and Rhythm: Normal rate and regular rhythm.      Pulses: Normal pulses. No decreased pulses.           Dorsalis pedis pulses are 2+ on the right side and 2+ on the left side.        Posterior tibial pulses are 2+ on the right side and 2+ on the left side.      Heart sounds: Normal heart sounds. No murmur heard.   No friction rub. No gallop.    Pulmonary:      Effort: Pulmonary effort is normal. No tachypnea or bradypnea.      Breath sounds: Decreased breath sounds (Diffuse) present. No wheezing, rhonchi or rales.   Abdominal:      General: Bowel sounds are normal.      Palpations: Abdomen is soft.      Tenderness: There is abdominal tenderness in the epigastric area and periumbilical area.   Musculoskeletal:      Right lower leg: No edema.      Left lower leg: No edema.      Right foot: Decreased range of motion. Swelling and tenderness present.      Left foot: Normal range of motion. No tenderness.        Feet:    Skin:     Findings: No abrasion, ecchymosis or erythema.   Neurological:      Mental Status: He is  oriented to person, place, and time and easily aroused. He is lethargic.   Psychiatric:         Speech: Speech is slurred.         Behavior: Behavior is cooperative.       ---------------------------------------------------------------------------------------------------------------------  EKG:    Per cardiology read by Dr. Gorman, sinus rhythm with occasional PVCs, nonspecific ST abnormality, heart rate 97, QTc 447 MS; when compared to EKG from 10/2019 PVCs are now present.        Telemetry:    Normal sinus rhythm, heart rate 87 bpm, SPO2 95% on BiPAP    I have personally reviewed the EKG/Telemetry strip  ---------------------------------------------------------------------------------------------------------------------   Results from last 7 days   Lab Units 05/21/21 2045   CK TOTAL U/L 124     Results from last 7 days   Lab Units 05/21/21 2045   PROBNP pg/mL 799.5       Results from last 7 days   Lab Units 05/22/21  0054   PH, ARTERIAL pH units 7.328*   PO2 ART mm Hg 104.0   PCO2, ARTERIAL mm Hg 68.4*   HCO3 ART mmol/L 35.9*     Results from last 7 days   Lab Units 05/21/21 2045   CRP mg/dL 0.39   LACTATE mmol/L 1.0   WBC 10*3/mm3 9.07   HEMOGLOBIN g/dL 11.6*   HEMATOCRIT % 41.6   MCV fL 86.8   MCHC g/dL 27.9*   PLATELETS 10*3/mm3 265   INR  1.05     Results from last 7 days   Lab Units 05/21/21  2045   SODIUM mmol/L 144   POTASSIUM mmol/L 4.5   MAGNESIUM mg/dL 2.3   CHLORIDE mmol/L 102   CO2 mmol/L 34.1*   BUN mg/dL 20   CREATININE mg/dL 0.92   EGFR IF NONAFRICN AM mL/min/1.73 82   CALCIUM mg/dL 9.2   GLUCOSE mg/dL 279*   ALBUMIN g/dL 3.82   BILIRUBIN mg/dL 0.3   ALK PHOS U/L 62   AST (SGOT) U/L 26   ALT (SGPT) U/L 29   Estimated Creatinine Clearance: 111.3 mL/min (by C-G formula based on SCr of 0.92 mg/dL).  No results found for: AMMONIA    No results found for: HGBA1C, POCGLU  Lab Results   Component Value Date    HGBA1C 7.70 (H) 11/09/2018     Lab Results   Component Value Date    TSH 2.100 05/21/2021     FREET4 0.95 05/21/2021     Pain Management Panel     Pain Management Panel Latest Ref Rng & Units 5/21/2021 10/4/2019    AMPHETAMINES SCREEN, URINE Negative Positive(A) Positive(A)    BARBITURATES SCREEN Negative Negative Negative    BENZODIAZEPINE SCREEN, URINE Negative Negative Negative    BUPRENORPHINEUR Negative Negative Negative    COCAINE SCREEN, URINE Negative Negative Negative    METHADONE SCREEN, URINE Negative Negative Negative        I have personally reviewed the above laboratory results.   ---------------------------------------------------------------------------------------------------------------------  Imaging Results (Last 7 Days)     Procedure Component Value Units Date/Time    CT Chest Without Contrast Diagnostic [[id number]] Collected: 05/22/21 0018     Updated: 05/22/21 0020    Narrative:      CT Chest WO    INDICATION:   Chest pain with shortness of breath for the past several days    TECHNIQUE:   CT of the thorax without IV contrast. Coronal and sagittal reconstructions were obtained.  Radiation dose reduction techniques included automated exposure control or exposure modulation based on body size. Count of known CT and cardiac nuc med studies  performed in previous 12 months: 0.     COMPARISON:   10/4/2019    FINDINGS:  Chest images at mediastinal window show no adenopathy or effusions. Coronary artery calcifications are seen in all 3 coronary distributions quite extensively.    Chest images at lung window show mild atelectasis at the right lung base posteriorly. There is nonspecific thickening along the major fissure on the left. It most likely reflects chronic pleural fibrosis.      Impression:      Mild right base atelectasis. Pleural thickening along the major fissure on the left. Three-vessel coronary artery disease. No acute lung infiltrates are noted.    Signer Name: Ruiz Zamudio MD   Signed: 5/22/2021 12:18 AM   Workstation Name: [email or web address]    [facility]     XR Chest 1 View [[id number]] Collected: 05/21/21 2140     Updated: 05/21/21 2142    Narrative:      PROCEDURE: CR Chest 1 Vw    COMPARISON: October 2019    INDICATIONS: sob  Relevant clinical info: Sob... unable to get patient to lay on back     TECHNIQUE: Single AP  view of the chest    FINDINGS AND IMPRESSION COMBINED :        Examination is limited as patient is not adequately positioned and is in respiratory distress. Cardiac silhouette appears enlarged patient is rotated to the right. There is underpenetration of the left hemithorax. No gross dense consolidation seen.  Osseous structures are intact.             Signer Name: Lianna Chan MD   Signed: 5/21/2021 9:40 PM   Workstation Name: [facility]    [facility] have personally reviewed the above radiology results.     Last Echocardiogram:  Results for orders placed during the hospital encounter of 10/04/19    Transthoracic Echo Complete With Contrast if Necessary Per Protocol    Interpretation Summary  · Left ventricular wall thickness is consistent with mild concentric hypertrophy.  · Left atrial cavity size is mildly dilated.  · Estimated EF appears to be in the range of 56 - 60%.  · Left ventricular diastolic function is normal.  · Normal right ventricular cavity size and systolic function noted.  · Estimated right ventricular systolic pressure from tricuspid regurgitation is normal (<35 mmHg).  · The quality of the study is limited due to poor acoustic windows related to patient body habitus.    ---------------------------------------------------------------------------------------------------------------------    Assessment & Plan      Active Hospital Problems    Diagnosis  POA   • **Respiratory failure (CMS/[facility]) [J96.90]  Yes   • Hyperlipidemia LDL goal <70 [E78.5]  Yes   • Essential hypertension [I10]  Yes     #Acute on chronic hypoxic and hypercarbic respiratory failure (2L NC at home)   #Sepsis criteria POA (pulse  100, RR 32) suspect 2/2 to acute COPD exacerbation   #UDS positive for amphetamines  -Chest x-ray and noncontrast CT of chest shows no acute infiltrates  -Initial ABG with pH 7.325, PCO2 72.8, PO2 59.7 on 2 L nasal cannula  -Currently on BiPAP; ABG with pH 7.328, PCO2 68.4, PO2 104.0, O2 saturation 98.0 on IPAP 20, EPAP 8  -Suspect acute hypoxic and hypercarbic respiratory failure secondary to acute COPD exacerbation  -Patient was given IV Rocephin and IV doxycycline in the emergency department; we will continue with IV Rocephin and switch to IV azithromycin for anti-inflammatory properties  -Lactobacillus for GI protection  -Obtain respiratory culture, respiratory panel, MRSA swab  -Adjust antibiotic therapy as necessary  -Blood cultures pending x2  -IV Solu-Medrol 40 mg every 12 hours  -DuoNeb treatments ordered  -Incentive spirometry given, encourage use  -Continue supplemental oxygen as needed, titrate to maintain SPO2 greater than or equal to 90%  -Attempt to wean off BiPAP later this morning    #Periumbilical abdominal pain  -Obtain KUB  -Patient states he has been constipated; MiraLAX and Esperanza-Colace ordered    #Right ankle edema and ecchymosis secondary to recent fall  -X-ray of right ankle ordered  -Elevate lower extremity  -Tylenol as needed for pain    #Insulin-dependent type 2 diabetes mellitus  #Glucosuria  #Diabetic neuropathy  -Hemoglobin A1c ordered  -Hold home oral hypoglycemics to prevent hypoglycemia  -Will add SSI for now. Accu-cheks qAC and qhs; Titrate insulin therapy as necessary.    -Continue home Neurontin once reconciled per pharmacy    #Chronic normocytic anemia  -H&H stable; repeat a.m. CBC monitor H&H closely  -If hemoglobin less than 7 or platelets less than 50, will transfuse    #Reported history nonischemic cardiomyopathy  #Reported history of paroxysmal atrial fibrillation; anticoagulated on Eliquis  -Follows with Dr. Mccabe, cardiology as an outpatient; at his most recent visit in  November 2019, cardiology noted that there is no clear-cut documentation of atrial fibrillation or diagnosis of heart failure however, he was advised to continue Entresto and Eliquis  -Echo from 10/2019 reveals an EF of 56 to 60% with normal left ventricular diastolic function and normal right ventricular cavity size and systolic function  -Patient is currently in normal sinus rhythm  -Continue home Entresto, Lasix, and Eliquis once reconciled per pharmacy  -Monitor for signs of volume overload with daily weights, strict I's and O's    #CAD status post multiple stents in the past  -Currently chest pain-free; EKG shows no acute ischemic changes  -Continue home Lipitor and Coreg  -Monitor closely on telemetry    #Essential hypertension  -Continue home antihypertensive agents with holding parameters once reconciled per pharmacy  -We will make IV hydralazine available for SBP greater than 170  -Continue to monitor VS per hospital protocol    #Hyperlipidemia  -Continue home statin    #GERD  -Protonix    #Obstructive sleep apnea  -CPAP ordered    #Chronic back pain  -Patient is requesting pain medication, however, told him we will hold at this time given respiratory status  -Tylenol available as needed  -Lidoderm patches ordered    #Morbid obesity  -BMI 46.52 kg/m²  -Complicates all aspects of patient care  -Patient may have some underlying obesity hypoventilation syndrome as well      F/E/N: No IV fluids.  Replace electrolytes as necessary.  NPO  ---------------------------------------------------  DVT Prophylaxis: Eliquis  GI Prophylaxis: Protonix  Activity: Up with assistance, fall precaution    The patient is considered to be a high risk patient due to: Acute hypoxic and hypercarbic respiratory failure; sepsis secondary to acute COPD exacerbation    INPATIENT status due to the need for care which can only be reasonably provided in an hospital setting such as aggressive/expedited ancillary services and/or consultation  services, the necessity for IV medications, close physician monitoring and/or the possible need for procedures.  In such, I feel patient’s risk for adverse outcomes and need for care warrant INPATIENT evaluation and predict the patient’s care encounter to likely last beyond 2 midnights.    Code Status: FULL CODE     I have discussed the patient's assessment and plan with the patient, nursing staff, and attending physician      Nora Gomez PA-C  Hospitalist Service -- [facility]       05/22/21  01:42 EDT    Attending Physician: Vivienne Flores DO       Electronically signed by Vivienne Flores DO at 05/22/21 0772       Vital Signs (last day)     Date/Time   Temp   Temp src   Pulse   Resp   BP   Patient Position   SpO2    06/01/21 1423   97.5 (36.4)   Oral   57   20   116/66   Lying   96    06/01/21 1313   --   --   59   18   --   --   99    06/01/21 1303   --   --   62   20   --   --   99    06/01/21 1042   97.3 (36.3)   Oral   66   20   148/87   Lying   96    06/01/21 0656   97.4 (36.3)   Oral   75   (!) 30   167/83   Sitting   92    06/01/21 0639   --   --   77   22   --   --   96    06/01/21 0630   --   --   82   22   --   --   95    06/01/21 0539   --   --   81   15   163/88   Sitting   --    06/01/21 0236   97 (36.1)   Axillary   69   18   153/71   Lying   99    06/01/21 0052   --   --   77   18   --   --   --    06/01/21 0042   --   --   76   18   --   --   97    05/31/21 2104   --   --   60   17   121/64   Lying   --    05/31/21 1846   --   --   78   18   --   --   --    05/31/21 1836   --   --   77   18   --   --   97    05/31/21 1825   97.7 (36.5)   Oral   76   18   152/79   Sitting   97    05/31/21 1419   98.2 (36.8)   Oral   60   18   120/64   Lying   97    05/31/21 1342   --   --   65   20   --   --   --    05/31/21 1332   --   --   66 21   --   --   94    05/31/21 1032   98.3 (36.8)   Oral   70   20   130/67   Lying   95    05/31/21 0655   --   --   66 20   --   --   99     05/31/21 0648   --   --   72   20   --   --   95    05/31/21 0633   98.5 (36.9)   Axillary   65   21   126/73   Lying   98    05/31/21 0430   --   --   --   --   --   --   97    05/31/21 0219   98.6 (37)   Oral   82   15   145/91   Sitting   96    05/31/21 0027   --   --   67   25   --   --   97              Lines, Drains & Airways    Active LDAs     Name:   Placement date:   Placement time:   Site:   Days:    Peripheral IV 05/30/21 1837 Anterior;Right Wrist   05/30/21 1837    Wrist   1                  Current Facility-Administered Medications   Medication Dose Route Frequency Provider Last Rate Last Admin   • acetaminophen (TYLENOL) tablet 650 mg  650 mg Oral Q6H PRN Nora Gomez PA-C   650 mg at 05/28/21 0037   • albuterol (PROVENTIL) nebulizer solution 0.083% 2.5 mg/3mL  2.5 mg Nebulization Q6H PRN Joo Jimenez DO       • apixaban (ELIQUIS) tablet 5 mg  5 mg Oral Q12H Joo Jimenez DO   5 mg at 06/01/21 0757   • atorvastatin (LIPITOR) tablet 20 mg  20 mg Oral Daily Joo Jimenez DO   20 mg at 06/01/21 0757   • budesonide-formoterol (SYMBICORT) 160-4.5 MCG/ACT inhaler 2 puff  2 puff Inhalation BID - RT Joo Jimenez DO   2 puff at 06/01/21 0630   • calcium carbonate (TUMS) chewable tablet 500 mg (200 mg elemental)  2 tablet Oral TID PRN Nora Gomez PA-C       • Canagliflozin (INVOKANA) 300 MG tablet tablet 300 mg  300 mg Oral Daily Joo Jimenez DO   300 mg at 06/01/21 0756   • clopidogrel (PLAVIX) tablet 75 mg  75 mg Oral Daily Ryan Gorman MD   75 mg at 06/01/21 0757   • dextrose (D50W) 25 g/ 50mL Intravenous Solution 25 g  25 g Intravenous Q15 Min PRN Vivienne Flores DO       • dextrose (D50W) 25 g/ 50mL Intravenous Solution 25 g  25 g Intravenous Q15 Min PRN Nora Gomez PA-C       • dextrose (GLUTOSE) oral gel 15 g  15 g Oral Q15 Min PRN Vivienne Flores DO       • dextrose (GLUTOSE) oral gel 15 g  15 g Oral  Q15 Min PRN Nora Gomez PA-C       • docusate sodium (COLACE) capsule 100 mg  100 mg Oral BID Joo Jimenez DO   100 mg at 06/01/21 0757   • fluticasone (FLONASE) 50 MCG/ACT nasal spray 2 spray  2 spray Nasal Daily Joo Jimenez DO   2 spray at 06/01/21 0756   • gabapentin (NEURONTIN) capsule 600 mg  600 mg Oral Q8H Joo Jimenez DO   600 mg at 06/01/21 1301   • glucagon (human recombinant) (GLUCAGEN DIAGNOSTIC) injection 1 mg  1 mg Subcutaneous Q15 Min PRN Vivienne Flores DO       • glucagon (human recombinant) (GLUCAGEN DIAGNOSTIC) injection 1 mg  1 mg Subcutaneous Q15 Min PRN Nora Gomez PA-C       • hydrALAZINE (APRESOLINE) injection 10 mg  10 mg Intravenous Q6H PRN Nora Gomez PA-C   10 mg at 05/27/21 2257   • HYDROcodone-acetaminophen (NORCO) 5-325 MG per tablet 1 tablet  1 tablet Oral Q8H PRN Joo Jimenez DO   1 tablet at 06/01/21 1301   • hydrOXYzine (ATARAX) tablet 25 mg  25 mg Oral TID PRN Nora Gomez PA-C   25 mg at 06/01/21 0758   • insulin aspart (novoLOG) injection 0-9 Units  0-9 Units Subcutaneous TID AC Nora Gomez PA-C   2 Units at 06/01/21 1301   • insulin aspart (novoLOG) injection 30 Units  30 Units Subcutaneous TID With Meals Dequan Connelly MD   30 Units at 06/01/21 1301   • insulin detemir (LEVEMIR) injection 40 Units  40 Units Subcutaneous Daily Dequan Connelly MD   40 Units at 06/01/21 0756   • ipratropium (ATROVENT) nebulizer solution 0.5 mg  0.5 mg Nebulization Q6H PRN Joo Jimenez DO       • ipratropium-albuterol (DUO-NEB) nebulizer solution 3 mL  3 mL Nebulization Q4H PRN Joo Jimenez DO       • ipratropium-albuterol (DUO-NEB) nebulizer solution 3 mL  3 mL Nebulization Q6H - RT Nora Gomez PA-C   3 mL at 06/01/21 1303   • lidocaine (LIDODERM) 5 % 2 patch  2 patch Transdermal Q24H Nora Gomez PA-C   2 patch at 06/01/21 0758   • lisinopril  (PRINIVIL,ZESTRIL) tablet 5 mg  5 mg Oral Q24H Dequan Connelly MD   5 mg at 06/01/21 0756   • melatonin tablet 5 mg  5 mg Oral Nightly PRN Nora Gomez PA-C   5 mg at 05/22/21 2012   • metoprolol succinate XL (TOPROL-XL) 24 hr tablet 75 mg  75 mg Oral Q24H Ryan Gorman MD   75 mg at 06/01/21 0757   • nitroglycerin (NITROSTAT) SL tablet 0.4 mg  0.4 mg Sublingual Q5 Min PRN Vivienne Flores DO       • pantoprazole (PROTONIX) EC tablet 40 mg  40 mg Oral Q AM Nora Gomez PA-C   40 mg at 06/01/21 0513   • polyethylene glycol (MIRALAX) packet 17 g  17 g Oral Daily Nora Gomez PA-C   17 g at 06/01/21 0758   • sennosides-docusate (PERICOLACE) 8.6-50 MG per tablet 1 tablet  1 tablet Oral Nightly PRN Nora Gomez PA-C       • sodium chloride 0.9 % flush 10 mL  10 mL Intravenous PRN Vivienne Flores DO       • sodium chloride 0.9 % flush 10 mL  10 mL Intravenous Q12H Vivienne Flores DO   10 mL at 06/01/21 0756   • sodium chloride 0.9 % flush 10 mL  10 mL Intravenous PRN Vivienne Flores DO       • tamsulosin (FLOMAX) 24 hr capsule 0.4 mg  0.4 mg Oral Nightly Joo Jimenez DO   0.4 mg at 05/31/21 2056   • traMADol (ULTRAM) tablet 50 mg  50 mg Oral Q6H PRN Dequan Connelly MD   50 mg at 06/01/21 0756       Lab Results (last 24 hours)     Procedure Component Value Units Date/Time    POC Glucose Once [[id number]]  (Abnormal) Collected: 06/01/21 1046    Specimen: Blood Updated: 06/01/21 1101     Glucose 192 mg/dL     POC Glucose Once [[id number]]  (Abnormal) Collected: 06/01/21 0702    Specimen: Blood Updated: 06/01/21 0708     Glucose 240 mg/dL         Orders (last 24 hrs)      Start     Ordered    06/01/21 1235  HYDROcodone-acetaminophen (NORCO) 5-325 MG per tablet 1 tablet  Every 8 Hours PRN      06/01/21 1235    06/01/21 1102  POC Glucose Once  Once      06/01/21 1046    06/01/21 0709  POC Glucose Once  Once      06/01/21 0702    06/01/21 0600  CBC (No Diff)   Morning Draw      05/31/21 1241    06/01/21 0600  Basic Metabolic Panel  Morning Draw      05/31/21 1241    05/31/21 1834  POC Glucose Once  Once      05/31/21 1827    05/31/21 1613  POC Glucose Once  Once      05/31/21 1606    05/31/21 0900  insulin detemir (LEVEMIR) injection 40 Units  Daily      05/30/21 1453    05/31/21 0703  C-reactive Protein  Daily      05/31/21 0702    05/29/21 1400  lisinopril (PRINIVIL,ZESTRIL) tablet 5 mg  Every 24 Hours Scheduled      05/29/21 1224    05/28/21 1800  insulin aspart (novoLOG) injection 30 Units  3 Times Daily With Meals      05/28/21 1645    05/27/21 1300  ipratropium-albuterol (DUO-NEB) nebulizer solution 3 mL  Every 6 Hours - RT      05/27/21 0854    05/27/21 1100  metoprolol succinate XL (TOPROL-XL) 24 hr tablet 75 mg  Every 24 Hours Scheduled      05/27/21 0932    05/25/21 1145  clopidogrel (PLAVIX) tablet 75 mg  Daily      05/25/21 1134    05/24/21 1036  HYDROcodone-acetaminophen (NORCO) 5-325 MG per tablet 1 tablet  Every 8 Hours PRN,   Status:  Discontinued      05/24/21 1037    05/23/21 2100  tamsulosin (FLOMAX) 24 hr capsule 0.4 mg  Nightly      05/23/21 1031    05/23/21 1400  gabapentin (NEURONTIN) capsule 600 mg  Every 8 Hours Scheduled      05/23/21 1031    05/23/21 1300  apixaban (ELIQUIS) tablet 5 mg  Every 12 Hours Scheduled      05/23/21 1031    05/23/21 1300  fluticasone (FLONASE) 50 MCG/ACT nasal spray 2 spray  Daily      05/23/21 1031    05/23/21 1300  atorvastatin (LIPITOR) tablet 20 mg  Daily      05/23/21 1031    05/23/21 1300  Canagliflozin (INVOKANA) 300 MG tablet tablet 300 mg  Daily      05/23/21 1031    05/23/21 1230  budesonide-formoterol (SYMBICORT) 160-4.5 MCG/ACT inhaler 2 puff  2 Times Daily - RT      05/23/21 1057    05/23/21 1126  ipratropium (ATROVENT) nebulizer solution 0.5 mg  Every 6 Hours PRN      05/23/21 1127    05/23/21 1059  traMADol (ULTRAM) tablet 50 mg  Every 6 Hours PRN      05/23/21 1059    05/23/21 1030  albuterol  (PROVENTIL) nebulizer solution 0.083% 2.5 mg/3mL  Every 6 Hours PRN      05/23/21 1031    05/23/21 1030  ipratropium-albuterol (DUO-NEB) nebulizer solution 3 mL  Every 4 Hours PRN      05/23/21 1031    05/23/21 1000  docusate sodium (COLACE) capsule 100 mg  2 Times Daily      05/23/21 0839    05/22/21 0900  sodium chloride 0.9 % flush 10 mL  Every 12 Hours Scheduled      05/22/21 0214    05/22/21 0900  lactobacillus acidophilus (RISAQUAD) capsule 1 capsule  Daily,   Status:  Discontinued      05/22/21 0347 05/22/21 0900  polyethylene glycol (MIRALAX) packet 17 g  Daily      05/22/21 0347    05/22/21 0900  lidocaine (LIDODERM) 5 % 2 patch  Every 24 Hours Scheduled      05/22/21 0348    05/22/21 0730  insulin aspart (novoLOG) injection 0-9 Units  3 Times Daily Before Meals      05/22/21 0348    05/22/21 0700  POC Glucose 4x Daily AC & at Bedtime  4 Times Daily Before Meals & at Bedtime     Comments: If bedtime blood glucose is greater than 350 mg/dl, call MD.      05/22/21 0214 05/22/21 0700  POC Glucose 4x Daily AC & at Bedtime  4 Times Daily Before Meals & at Bedtime     Comments: If bedtime blood glucose is greater than 350 mg/dl, call MD.      05/22/21 0348    05/22/21 0600  Incentive Spirometry  Every 4 Hours While Awake      05/22/21 0347    05/22/21 0600  pantoprazole (PROTONIX) EC tablet 40 mg  Every Early Morning      05/22/21 0348    05/22/21 0400  Vital Signs  Every 4 Hours     Comments: Per per hospital policy    05/22/21 0214    05/22/21 0400  Strict Intake & Output  Every 4 Hours      05/22/21 0348    05/22/21 0349  Daily Weights  Daily      05/22/21 0348    05/22/21 0347  melatonin tablet 5 mg  Nightly PRN      05/22/21 0348    05/22/21 0347  hydrOXYzine (ATARAX) tablet 25 mg  3 Times Daily PRN      05/22/21 0348    05/22/21 0347  calcium carbonate (TUMS) chewable tablet 500 mg (200 mg elemental)  3 Times Daily PRN      05/22/21 0348    05/22/21 0346  hydrALAZINE (APRESOLINE) injection 10 mg   Every 6 Hours PRN      05/22/21 0348    05/22/21 0346  dextrose (GLUTOSE) oral gel 15 g  Every 15 Minutes PRN      05/22/21 0347    05/22/21 0346  dextrose (D50W) 25 g/ 50mL Intravenous Solution 25 g  Every 15 Minutes PRN      05/22/21 0348    05/22/21 0346  glucagon (human recombinant) (GLUCAGEN DIAGNOSTIC) injection 1 mg  Every 15 Minutes PRN      05/22/21 0348    05/22/21 0345  sennosides-docusate (PERICOLACE) 8.6-50 MG per tablet 1 tablet  Nightly PRN      05/22/21 0347    05/22/21 0345  acetaminophen (TYLENOL) tablet 650 mg  Every 6 Hours PRN      05/22/21 0347    05/22/21 0215  Intake & Output  Every Shift      05/22/21 0214 05/22/21 0214  sodium chloride 0.9 % flush 10 mL  As Needed      05/22/21 0214 05/22/21 0214  dextrose (GLUTOSE) oral gel 15 g  Every 15 Minutes PRN      05/22/21 0214    05/22/21 0214  dextrose (D50W) 25 g/ 50mL Intravenous Solution 25 g  Every 15 Minutes PRN      05/22/21 0214    05/22/21 0214  glucagon (human recombinant) (GLUCAGEN DIAGNOSTIC) injection 1 mg  Every 15 Minutes PRN      05/22/21 0214    05/22/21 0214  nitroglycerin (NITROSTAT) SL tablet 0.4 mg  Every 5 Minutes PRN      05/22/21 0214    05/21/21 2046  sodium chloride 0.9 % flush 10 mL  As Needed      05/21/21 2049    Unscheduled  Telemetry - Pulse Oximetry  Continuous PRN     Comments: If Patient Develops Unresponsiveness, Acute Dyspnea, Cyanosis or Suspected Hypoxemia Start Continuous Pulse Ox Monitoring, Apply Oxygen & Notify Provider    05/22/21 0214    Unscheduled  Oxygen Therapy- Nasal Cannula; Titrate for SPO2: 90% - 95%  Continuous PRN     Comments: If Patient Develops Unresponsiveness, Acute Dyspnea, Cyanosis or Suspected Hypoxemia Start Continuous Pulse Ox Monitoring, Apply Oxygen & Notify Provider    05/22/21 0214    Unscheduled  ECG 12 Lead  As Needed     Comments: Nurse to Release if Patient Expericences Acute Chest Pain or Dysrhythmias    05/22/21 0214    Unscheduled  Potassium  As Needed     Comments:  For Ventricular Arrhythmias      05/22/21 0214    Unscheduled  Magnesium  As Needed     Comments: For Ventricular Arrhythmias      05/22/21 0214    Unscheduled  Troponin  As Needed     Comments: For Chest Pain      05/22/21 0214    Unscheduled  Digoxin Level  As Needed     Comments: For Atrial Arrhythmias      05/22/21 0214    Unscheduled  Blood Gas, Arterial -With Co-Ox Panel: Yes  As Needed     Comments: Per O2 PolicyNotify Physician      05/22/21 0214    Unscheduled  Up With Assistance  As Needed      05/22/21 0214    --  amoxicillin-clavulanate (AUGMENTIN) 875-125 MG per tablet  2 Times Daily      05/22/21 1731    --  Budeson-Glycopyrrol-Formoterol (BREZTRI) 160-9-4.8 MCG/ACT aerosol inhaler  2 Times Daily      05/22/21 1732    --  carvedilol (COREG) 25 MG tablet  2 Times Daily With Meals      05/22/21 1732    --  Dapagliflozin Propanediol (Farxiga) 10 MG tablet  Daily      05/22/21 1732    --  furosemide (LASIX) 40 MG tablet  Daily      05/22/21 1732    --  gabapentin (NEURONTIN) 600 MG tablet  3 Times Daily      05/22/21 1732    --  Insulin Lispro, 1 Unit Dial, (HumaLOG KwikPen) 100 UNIT/ML solution pen-injector  3 Times Daily      05/22/21 1732    --  lidocaine (LIDODERM) 5 %  Every 24 Hours      05/22/21 1801    --  magnesium oxide (MAG-OX) 400 MG tablet  Daily      05/22/21 1801    --  pantoprazole (PROTONIX) 40 MG EC tablet  Daily      05/22/21 1801    --  sildenafil (REVATIO) 20 MG tablet  As Needed      05/22/21 1832    --  SCANNED - TELEMETRY        05/21/21 0000    --  SCANNED - TELEMETRY        05/21/21 0000    --  SCANNED - TELEMETRY        05/21/21 0000    --  SCANNED - TELEMETRY        05/21/21 0000    --  SCANNED - TELEMETRY        05/21/21 0000    --  SCANNED - TELEMETRY        05/21/21 0000    --  SCANNED - TELEMETRY        05/21/21 0000    --  SCANNED - TELEMETRY        05/21/21 0000    --  SCANNED - TELEMETRY        05/21/21 0000    --  SCANNED - TELEMETRY        05/21/21 0000    --  SCANNED -  TELEMETRY        21 0000    --  SCANNED - TELEMETRY        21 0000    --  SCANNED - TELEMETRY        21 0000    --  SCANNED - TELEMETRY        21 0000    --  SCANNED - TELEMETRY        21 0000                Operative/Procedure Notes (last 24 hours) (Notes from 21 1602 through 21 1602)    No notes of this type exist for this encounter.            Physician Progress Notes (most recent note)      Joo Jimenez DO at 21 1231              [facility]     Patient Identification:  Name:  Иван Miller  Age:  65 y.o.  Sex:  male  :  1955  MRN:  [id number]  Visit Number:  [id number]  Primary Care Provider:  Rosi Thomas APRN    Length of stay:  10    Chief complaint: Back pain    Subjective:    Patient continues to report chronic back pain.  He does suggest increasing his pain medication.  However, I did caution patient in regards to this and did encourage increased mobilization and working with physical therapy.  ----------------------------------------------------------------------------------------------------------------------  Current Hospital Meds:  apixaban, 5 mg, Oral, Q12H  atorvastatin, 20 mg, Oral, Daily  budesonide-formoterol, 2 puff, Inhalation, BID - RT  Canagliflozin, 300 mg, Oral, Daily  clopidogrel, 75 mg, Oral, Daily  docusate sodium, 100 mg, Oral, BID  fluticasone, 2 spray, Nasal, Daily  gabapentin, 600 mg, Oral, Q8H  insulin aspart, 0-9 Units, Subcutaneous, TID AC  insulin aspart, 30 Units, Subcutaneous, TID With Meals  insulin detemir, 40 Units, Subcutaneous, Daily  ipratropium-albuterol, 3 mL, Nebulization, Q6H - RT  lactobacillus acidophilus, 1 capsule, Oral, Daily  lidocaine, 2 patch, Transdermal, Q24H  lisinopril, 5 mg, Oral, Q24H  metoprolol succinate XL, 75 mg, Oral, Q24H  pantoprazole, 40 mg, Oral, Q AM  polyethylene glycol, 17 g, Oral, Daily  sodium chloride, 10 mL, Intravenous,  Q12H  tamsulosin, 0.4 mg, Oral, Nightly         ----------------------------------------------------------------------------------------------------------------------  Vital Signs:  Temp:  [97 °F (36.1 °C)-98.2 °F (36.8 °C)] 97.3 °F (36.3 °C)  Heart Rate:  [60-82] 66  Resp:  [15-30] 20  BP: (120-167)/(64-88) 148/87      05/30/21  0445 05/31/21  0500 06/01/21  0509   Weight: (!) 144 kg (316 lb 12.8 oz) (!) 146 kg (321 lb 9.6 oz) (Lead Nurse Aura and RN Chris willams) (!) 146 kg (320 lb 14.4 oz)     Body mass index is 47.39 kg/m².    Intake/Output Summary (Last 24 hours) at 6/1/2021 1231  Last data filed at 6/1/2021 1200  Gross per 24 hour   Intake 2200 ml   Output 2950 ml   Net -750 ml     Diet Regular; Cardiac, Consistent Carbohydrate  ----------------------------------------------------------------------------------------------------------------------  Physical exam:  Constitutional: Morbidly obese  male in no apparent distress.     HENT:  Head:  Normocephalic and atraumatic.  Mouth:  Moist mucous membranes.    Eyes:  Conjunctivae and EOM are normal.  Pupils are equal, round, and reactive to light.  No scleral icterus.    Neck:  Neck supple. No thyromegaly.  No JVD present.    Cardiovascular:  Regular rate and rhythm with no murmurs, rubs, clicks or gallops appreciated.  Pulmonary/Chest:  Clear to auscultation bilaterally with no crackles, wheezes or rhonchi appreciated.  Abdominal:  Soft. Nontender. Nondistended  Bowel sounds are normal in all four quadrants. No organomegally appreciated.   Neurological:  Cranial nerves II-XII intact with no focal deficits.  No facial droop.  No slurred speech.   Skin:  Warm and dry to palpation with no rashes or lesions appreciated.  Peripheral vascular:  2+ radial and pedal pulses in bilateral upper and lower extremities.  Psychiatric:  Alert and oriented x3, demonstrates appropriate judgement and  insight.    ----------------------------------------------------------------------------------------------------------------------  Tele:    ----------------------------------------------------------------------------------------------------------------------  Results from last 7 days   Lab Units 05/27/21 2204 05/27/21  1548   TROPONIN T ng/mL <0.010 <0.010     Results from last 7 days   Lab Units 05/31/21 0117 05/30/21 0433 05/29/21 0306 05/28/21  1834   CRP mg/dL 0.87*  --  0.34 <0.30   WBC 10*3/mm3 8.49 8.38 8.77  --    HEMOGLOBIN g/dL 12.3* 12.1* 11.7*  --    HEMATOCRIT % 43.0 42.1 39.8  --    MCV fL 84.6 85.7 83.6  --    MCHC g/dL 28.6* 28.7* 29.4*  --    PLATELETS 10*3/mm3 234 217 228  --          Results from last 7 days   Lab Units 05/31/21 0117 05/30/21 0433 05/29/21 0306 05/28/21  0059 05/27/21  0324 05/26/21  0128   SODIUM mmol/L 137 138 139 137 138 135*   POTASSIUM mmol/L 4.5 4.0 4.2 4.5 4.4 4.0   MAGNESIUM mg/dL  --  2.4  --   --  2.7* 2.5*   CHLORIDE mmol/L 96* 97* 99 99 97* 96*   CO2 mmol/L 35.4* 33.3* 33.6* 30.4* 36.4* 35.3*   BUN mg/dL 20 19 19 21 25* 31*   CREATININE mg/dL 1.24 0.95 0.87 0.91 0.86 0.96   EGFR IF NONAFRICN AM mL/min/1.73 58* 79 88 83 89 78   CALCIUM mg/dL 9.5 9.3 9.2 9.1 9.2 9.1   GLUCOSE mg/dL 164* 156* 120* 220* 164* 233*   ALBUMIN g/dL  --   --  3.35* 3.49* 3.54 3.39*   BILIRUBIN mg/dL  --   --  0.4 0.3 0.4 0.3   ALK PHOS U/L  --   --  54 56 51 48   AST (SGOT) U/L  --   --  15 17 13 14   ALT (SGPT) U/L  --   --  20 21 21 20   Estimated Creatinine Clearance: 83.7 mL/min (by C-G formula based on SCr of 1.24 mg/dL).    No results found for: AMMONIA  Results from last 7 days   Lab Units 05/26/21  0128   CHOLESTEROL mg/dL 153   TRIGLYCERIDES mg/dL 130   HDL CHOL mg/dL 30*   LDL CHOL mg/dL 99     No results found for: BLOODCX  No results found for: URINECX  No results found for: WOUNDCX  No results found for: STOOLCX    I have personally looked at the labs and they are  summarized above.  ----------------------------------------------------------------------------------------------------------------------  Imaging Results (Last 24 Hours)     ** No results found for the last 24 hours. **        ----------------------------------------------------------------------------------------------------------------------  Assessment and Plan:    1.  Acute on chronic hypoxic/hypercapnic respiratory failure -acute phase resolved, continue 2 L nasal cannula which is patient's baseline.    2.  COPD exacerbation -resolved    3.  Generalized weakness -patient was denied inpatient rehab.  Currently awaiting skilled nursing facility placement.    4.  Paroxysmal A. Fib - currently in NSR, continue Eliquis    5.  Essential hypertension  - well controlled, continue to monitor closely and make medication adjustments as necessary    6.  Insulin-dependent type 2 diabetes mellitus -continue sliding scale insulin with Accu-Cheks before every meal and nightly    Disposition currently pending SNF placement        Joo Jimenez DO  21  12:31 EDT    Electronically signed by Joo Jimenez DO at 21 1233          Consult Notes (most recent note)      Viviane Smith MD at 21 1034      Consult Orders    1. Inpatient Infectious Diseases Consult [[id number]] ordered by Dequan Connelly MD at 21 1309                       INFECTIOUS DISEASE CONSULTATION REPORT        Patient Identification:  Name:  Иван Miller  Age:  65 y.o.  Sex:  male  :  1955  MRN:  [id number]   Visit Number:  [id number]  Primary Care Physician:  Rosi Thomas APRN       LOS: 9 days        Subjective       Subjective     History of present illness:      Thank you Dr. Jimenez for allowing us to participate in the care of your patient.  As you well know, Mr. Иван Miller is a 65 y.o. male with past medical history significant for paroxysmal atrial fibrillation, CAD, ischemic cardiomyopathy,  COPD, insulin-dependent type 2 diabetes mellitus, hypertension, and hyperlipidemia, who presented to [facility] on 5/21/2021 for shortness of breath.  Since that time, patient has been treated for COPD exacerbation.  After complaining of weakness, Dr. Connelly reached out to Dr. Villaseñor of neurosurgery at [facility] and although the patient has spinal stenosis, he believes decompression would make the patient worse.  He also recommends to see the patient outpatient for concerns of osteomyelitis with a normal procalcitonin and CRP.  He recommended that primary team ordered an MRI of the cervical and thoracic spine to rule out osteomyelitis.  After MRI results finalized, results were reviewed with Dr. Villaseñor who believes there could be burned-out osteomyelitis, as procalcitonin and CRP are normal.  Neurosurgery plans to follow the patient outpatient, but recommended ID involvement.    Today, the patient is sitting up in bed in no apparent distress.  CNA is at bedside.  Patient complains of chronic back pain as well as bilateral upper and lower extremity weakness, worse on the left.  Afebrile, no diarrhea.  CRP is only slightly elevated at 0.87.  WBC remains normal.  Procalcitonin on 5/28/2021 was normal.  Chest x-ray on 5/30/2021 showed no evidence of active or acute cardiopulmonary disease.  MRI of the thoracic spine with and without contrast on 5/28/2021 showed postcontrast imaging demonstrating enhancement along the inferior endplate of T7 and superior endplate of T8 as well as some enhancement posteriorly adjacent to the cord but no enhancing mass is seen.  Again, as above there certainly could be associated with at least a mild degree of discitis.  Moderate central canal narrowing at T7-T8 and T8-T9.  MRI of the thoracic spine without contrast on 5/28/2021 showed decreased height of T7 and T8 with extensive endplate changes.  Most of this does appear to be chronic.  I  cannot exclude at least a mild degree of discitis.  There are spurs and disc bulges at T7-T8 and T8-T9 which do cause moderate central canal narrowing.  MRI of the cervical spine with and without contrast on 5/28/2021 showed the study is markedly degraded by patient motion.  No destructive bony lesion.  No contrast-enhancing abnormality.  MRI of the lumbar spine without contrast on 5/28/2021 showed facet arthropathy.  Disc bulges most notable at L3-L4 and L4-L5.  COVID-19 and flu A/B PCR on 5/21/2021 was negative.  Blood cultures on 5/21/2021 finalized as no growth.    Infectious Disease consultation was requested for antimicrobial management.    ---------------------------------------------------------------------------------------------------------------------     Review Of Systems:    Constitutional: no fever, chills and night sweats. No appetite change or unexpected weight change. No fatigue.  Generalized weakness.  Eyes: no eye drainage, itching or redness.  HEENT: no mouth sores, dysphagia or nose bleed.  Respiratory: no for shortness of breath, cough or production of sputum.  Cardiovascular: no chest pain, no palpitations, no orthopnea.  Gastrointestinal: no nausea, vomiting or diarrhea. No abdominal pain, hematemesis or rectal bleeding.  Genitourinary: no dysuria or polyuria.  Hematologic/lymphatic: no lymph node abnormalities, no easy bruising or easy bleeding.  Musculoskeletal: Back pain.  Skin: No rash and no itching.  Neurological: no loss of consciousness, no seizure, no headache.  Bilateral upper and lower extremity weakness, worse on the left.  Unsteady gait.  Behavioral/Psych: no depression or suicidal ideation.  Endocrine: no hot flashes.  Immunologic: negative.    ---------------------------------------------------------------------------------------------------------------------     Past Medical History    Past Medical History:   Diagnosis Date   • Atrial fibrillation (CMS/HCC)    • COPD (chronic  obstructive pulmonary disease) (CMS/[facility])    • Diabetes mellitus (CMS/[facility])    • GERD (gastroesophageal reflux disease)    • Hyperlipidemia    • Hypertension    • Myocardial infarction (CMS/[facility])    • Neuropathy        Past Surgical History    Past Surgical History:   Procedure Laterality Date   • ABDOMINAL SURGERY     • APPENDECTOMY     • CARDIAC CATHETERIZATION     • CORONARY STENT PLACEMENT         Family History    Family History   Problem Relation Age of Onset   • Hypertension Mother    • Heart attack Mother    • Hypertension Father        Social History    Social History     Tobacco Use   • Smoking status: Former Smoker     Packs/day: 2.00     Years: 30.00     Pack years: 60.00     Types: Cigarettes   • Smokeless tobacco: Current User     Types: Snuff   Substance Use Topics   • Alcohol use: No   • Drug use: No       Allergies    Codeine and Ciprofloxacin  ---------------------------------------------------------------------------------------------------------------------     Home Medications:    Prior to Admission Medications       Prescriptions Last Dose Informant Patient Reported? Taking?    albuterol (PROVENTIL HFA;VENTOLIN HFA) 108 (90 Base) MCG/ACT inhaler 5/20/2021 Pharmacy Yes Yes    Inhale 2 puffs Every 6 (Six) Hours As Needed for Wheezing.    amoxicillin-clavulanate (AUGMENTIN) 875-125 MG per tablet 5/20/2021 Pharmacy Yes Yes    Take 1 tablet by mouth 2 (Two) Times a Day. Prior to [facility] Admission, Patient was on: 10 day supply starting 5/18 - patient has taken 4 doses    Budeson-Glycopyrrol-Formoterol (BREZTRI) 160-9-4.8 MCG/ACT aerosol inhaler 5/20/2021 Pharmacy Yes Yes    Inhale 2 puffs 2 (Two) Times a Day.    carvedilol (COREG) 25 MG tablet 5/20/2021 Pharmacy Yes Yes    Take 25 mg by mouth 2 (Two) Times a Day With Meals.    Dapagliflozin Propanediol (Farxiga) 10 MG tablet 5/20/2021 Pharmacy Yes Yes    Take 10 mg by mouth Daily.    furosemide (LASIX) 40 MG tablet 5/20/2021 Pharmacy Yes Yes    Take 80  mg by mouth Daily.    gabapentin (NEURONTIN) 600 MG tablet 5/20/2021 Pharmacy Yes Yes    Take 600 mg by mouth 3 (Three) Times a Day.    Insulin Lispro, 1 Unit Dial, (HumaLOG KwikPen) 100 UNIT/ML solution pen-injector 5/20/2021 Pharmacy Yes Yes    Inject 35 Units under the skin into the appropriate area as directed 3 (Three) Times a Day.    lidocaine (LIDODERM) 5 % 5/20/2021 Pharmacy Yes Yes    Place 1 patch on the skin as directed by provider Daily. Prior to [facility] Admission, Patient was on:  Place on Shoulder or back and Remove & Discard patch within 12 hours or as directed by MD    magnesium oxide (MAG-OX) 400 MG tablet 5/20/2021 Pharmacy Yes Yes    Take 400 mg by mouth Daily.    pantoprazole (PROTONIX) 40 MG EC tablet 5/20/2021 Pharmacy Yes Yes    Take 40 mg by mouth Daily.    apixaban (ELIQUIS) 5 MG tablet tablet 5/20/2021 Pharmacy Yes No    Take 5 mg by mouth 2 (Two) Times a Day.    atorvastatin (LIPITOR) 20 MG tablet 5/20/2021 Pharmacy Yes No    Take 20 mg by mouth Daily.    fluticasone (FLONASE) 50 MCG/ACT nasal spray 5/20/2021 Pharmacy Yes No    2 sprays into the nostril(s) as directed by provider Daily.    ipratropium-albuterol (DUO-NEB) 0.5-2.5 mg/3 ml nebulizer 5/20/2021 Pharmacy Yes No    Take 3 mL by nebulization Every 4 (Four) Hours As Needed for Wheezing.    metFORMIN (GLUCOPHAGE) 1000 MG tablet 5/20/2021 Pharmacy Yes No    Take 1,000 mg by mouth 2 (Two) Times a Day With Meals.    sildenafil (REVATIO) 20 MG tablet Unknown Self Yes No    Take 20 mg by mouth As Needed.    tamsulosin (FLOMAX) 0.4 MG capsule 24 hr capsule 5/20/2021 Pharmacy Yes No    Take 1 capsule by mouth Every Night.          ---------------------------------------------------------------------------------------------------------------------    Objective       Objective     Hospital Scheduled Meds:  apixaban, 5 mg, Oral, Q12H  atorvastatin, 20 mg, Oral, Daily  budesonide-formoterol, 2 puff, Inhalation, BID - RT  Canagliflozin, 300 mg,  Oral, Daily  clopidogrel, 75 mg, Oral, Daily  docusate sodium, 100 mg, Oral, BID  fluticasone, 2 spray, Nasal, Daily  gabapentin, 600 mg, Oral, Q8H  insulin aspart, 0-9 Units, Subcutaneous, TID AC  insulin aspart, 30 Units, Subcutaneous, TID With Meals  insulin detemir, 40 Units, Subcutaneous, Daily  ipratropium-albuterol, 3 mL, Nebulization, Q6H - RT  lactobacillus acidophilus, 1 capsule, Oral, Daily  lidocaine, 2 patch, Transdermal, Q24H  lisinopril, 5 mg, Oral, Q24H  metoprolol succinate XL, 75 mg, Oral, Q24H  pantoprazole, 40 mg, Oral, Q AM  polyethylene glycol, 17 g, Oral, Daily  sodium chloride, 10 mL, Intravenous, Q12H  tamsulosin, 0.4 mg, Oral, Nightly         ---------------------------------------------------------------------------------------------------------------------   Vital Signs:  Temp:  [97 °F (36.1 °C)-98.6 °F (37 °C)] 98.3 °F (36.8 °C)  Heart Rate:  [58-82] 70  Resp:  [15-25] 20  BP: (119-145)/(67-91) 130/67  Mean Arterial Pressure (Non-Invasive) for the past 24 hrs (Last 3 readings):   Noninvasive MAP (mmHg)   05/31/21 1032 92   05/31/21 0633 90   05/30/21 1835 87     SpO2 Percentage    05/31/21 0648 05/31/21 0655 05/31/21 1032   SpO2: 95% 99% 95%     SpO2:  [95 %-99 %] 95 %  on  Flow (L/min):  [2] 2;   Device (Oxygen Therapy): nasal cannula    Body mass index is 47.49 kg/m².  Wt Readings from Last 3 Encounters:   05/31/21 (!) 146 kg (321 lb 9.6 oz)   11/25/19 127 kg (280 lb)   10/06/19 124 kg (273 lb 11.2 oz)     ---------------------------------------------------------------------------------------------------------------------     Physical Exam:    Constitutional: Morbidly obese  male is sitting off the side of the bed in no apparent distress.  HENT:  Head: Normocephalic and atraumatic.  Mouth:  Moist mucous membranes.    Eyes:  Conjunctivae and EOM are normal.  No scleral icterus.  Neck:  Neck supple.  No JVD present.    Cardiovascular:  Normal rate, regular rhythm and normal  heart sounds with no murmur. No edema.  Pulmonary/Chest:  No respiratory distress, no wheezes, no crackles, with normal breath sounds and good air movement.  Abdominal:  Soft.  Bowel sounds are normal.  No distension and no tenderness.   Musculoskeletal:  No edema, no tenderness, and no deformity.  No swelling or redness of joints.  Some tenderness to palpation along the spine, especially at the thoracic spine.  Neurological:  Alert and oriented to person, place, and time.  No facial droop.  No slurred speech.   Skin:  Skin is warm and dry.  No rash noted.  No pallor.   Psychiatric:  Normal mood and affect.  Behavior is normal.    ---------------------------------------------------------------------------------------------------------------------    Results from last 7 days   Lab Units 05/27/21  2204 05/27/21  1548   TROPONIN T ng/mL <0.010 <0.010     Results from last 7 days   Lab Units 05/30/21  0433   PROBNP pg/mL 190.6     Results from last 7 days   Lab Units 05/26/21  0128   CHOLESTEROL mg/dL 153   TRIGLYCERIDES mg/dL 130   HDL CHOL mg/dL 30*   LDL CHOL mg/dL 99     Results from last 7 days   Lab Units 05/25/21  1117   PH, ARTERIAL pH units 7.376   PO2 ART mm Hg 74.6*   PCO2, ARTERIAL mm Hg 63.9*   HCO3 ART mmol/L 37.5*     Results from last 7 days   Lab Units 05/31/21  0117 05/30/21  0433 05/29/21  0306 05/28/21  1834   CRP mg/dL 0.87*  --  0.34 <0.30   WBC 10*3/mm3 8.49 8.38 8.77  --    HEMOGLOBIN g/dL 12.3* 12.1* 11.7*  --    HEMATOCRIT % 43.0 42.1 39.8  --    MCV fL 84.6 85.7 83.6  --    MCHC g/dL 28.6* 28.7* 29.4*  --    PLATELETS 10*3/mm3 234 217 228  --      Results from last 7 days   Lab Units 05/31/21  0117 05/30/21  0433 05/29/21  0306 05/28/21  0059 05/27/21  0324 05/26/21  0128   SODIUM mmol/L 137 138 139 137 138 135*   POTASSIUM mmol/L 4.5 4.0 4.2 4.5 4.4 4.0   MAGNESIUM mg/dL  --  2.4  --   --  2.7* 2.5*   CHLORIDE mmol/L 96* 97* 99 99 97* 96*   CO2 mmol/L 35.4* 33.3* 33.6* 30.4* 36.4* 35.3*   BUN  mg/dL 20 19 19 21 25* 31*   CREATININE mg/dL 1.24 0.95 0.87 0.91 0.86 0.96   EGFR IF NONAFRICN AM mL/min/1.73 58* 79 88 83 89 78   CALCIUM mg/dL 9.5 9.3 9.2 9.1 9.2 9.1   GLUCOSE mg/dL 164* 156* 120* 220* 164* 233*   ALBUMIN g/dL  --   --  3.35* 3.49* 3.54 3.39*   BILIRUBIN mg/dL  --   --  0.4 0.3 0.4 0.3   ALK PHOS U/L  --   --  54 56 51 48   AST (SGOT) U/L  --   --  15 17 13 14   ALT (SGPT) U/L  --   --  20 21 21 20   Estimated Creatinine Clearance: 83.7 mL/min (by C-G formula based on SCr of 1.24 mg/dL).  No results found for: AMMONIA    Glucose   Date/Time Value Ref Range Status   05/31/2021 0632 280 (H) 70 - 130 mg/dL Final   05/30/2021 2150 159 (H) 70 - 130 mg/dL Final   05/30/2021 1907 220 (H) 70 - 130 mg/dL Final   05/30/2021 1621 253 (H) 70 - 130 mg/dL Final   05/30/2021 1023 214 (H) 70 - 130 mg/dL Final   05/30/2021 0706 147 (H) 70 - 130 mg/dL Final   05/29/2021 1825 218 (H) 70 - 130 mg/dL Final   05/29/2021 1638 149 (H) 70 - 130 mg/dL Final     Lab Results   Component Value Date    HGBA1C 8.80 (H) 05/21/2021     Lab Results   Component Value Date    TSH 2.100 05/21/2021    FREET4 0.95 05/21/2021       No results found for: BLOODCX  No results found for: URINECX  No results found for: WOUNDCX  No results found for: STOOLCX  No results found for: RESPCX  Pain Management Panel       Pain Management Panel Latest Ref Rng & Units 5/21/2021 10/4/2019    AMPHETAMINES SCREEN, URINE Negative Positive(A) Positive(A)    BARBITURATES SCREEN Negative Negative Negative    BENZODIAZEPINE SCREEN, URINE Negative Negative Negative    BUPRENORPHINEUR Negative Negative Negative    COCAINE SCREEN, URINE Negative Negative Negative    METHADONE SCREEN, URINE Negative Negative Negative          I have personally reviewed the above laboratory results.   ---------------------------------------------------------------------------------------------------------------------  Imaging Results (Last 7 Days)       Procedure Component Value  Units Date/Time    XR Chest AP [[id number]] Collected: 05/30/21 1101     Updated: 05/30/21 1103    Narrative:      XR CHEST AP-     CLINICAL INDICATION: SOA; J96.01-Acute respiratory failure with hypoxia;  J96.02-Acute respiratory failure with hypercapnia; J44.1-Chronic  obstructive pulmonary disease with (acute) exacerbation; L08.9-Local  infection of the skin and subcutaneous tissue, unspecified        COMPARISON: 05/21/2021      TECHNIQUE: Single frontal view of the chest.     FINDINGS:     Slightly underpenetrated  The cardiac silhouette is normal. The pulmonary vasculature is  unremarkable.  There is no evidence of an acute osseous abnormality.   There are no suspicious-appearing parenchymal soft tissue nodules.          Impression:      No evidence of active or acute cardiopulmonary disease on today's chest  radiograph.     This report was finalized on 5/30/2021 11:01 AM by Dr. Noah Lujan MD.       MRI Thoracic Spine With & Without Contrast [[id number]] Collected: 05/28/21 1805     Updated: 05/28/21 1816    Narrative:      EXAMINATION: MRI THORACIC SPINE W WO CONTRAST-      CLINICAL INDICATION: Abnormal MRI without contrast; J96.01-Acute  respiratory failure with hypoxia; J96.02-Acute respiratory failure with  hypercapnia; J44.1-Chronic obstructive pulmonary disease with (acute)  exacerbation; L08.9-Local infection of the skin and subcutaneous tissue,  unspecified        COMPARISON: Unenhanced study performed earlier the same day.     PROCEDURE: Multiple planar images of the thoracic spine were acquired in  a high field strength magnet. Several pulse sequences were used to  acquire the study. The images were acquired before and after gadolinium.     FINDINGS: Today's exam does show some enhancement in around the abnormal  levels of T7 and T8. I do not see an enhancing mass, but the appearance  could certainly represent at least some degree of discitis. Trauma could  also have a similar appearance. There is  moderate compromise of the cord  at T7-T8 and T8-T9.  Again, however, the study, particularly the axially acquired images are  markedly standard by patient motion.     There is no abnormal cord mass identified.       Impression:      1. Postcontrast imaging demonstrating enhancement along the inferior  endplate of T7 and superior endplate of T8 as well as some enhancement  posteriorly adjacent to the cord but no enhancing mass is seen. Again,  as above this certainly could be associated with least a mild degree of  discitis.  2. Moderate central canal narrowing at T7-T8 and T8-T9.     Study is degraded by patient motion, particularly the axial images      This report was finalized on 5/28/2021 6:14 PM by Dr. Noah Lujan MD.       MRI Cervical Spine With & Without Contrast [[id number]] Collected: 05/28/21 1803     Updated: 05/28/21 1806    Narrative:      MRI CERVICAL SPINE W WO CONTRAST-     CLINICAL INDICATION: eval abnormal density around C6 noted by  Neurosurgeon; J96.01-Acute respiratory failure with hypoxia;  J96.02-Acute respiratory failure with hypercapnia; J44.1-Chronic  obstructive pulmonary disease with (acute) exacerbation; L08.9-Local  infection of the skin and subcutaneous tissue, unspecified        COMPARISON: 05/25/2021     TECHNIQUE: Sagittal spin echo scans were obtained from the posterior  fossa through the upper thoracic spine and axial scans were performed  through selected cervical disc space levels, utilizing both spin echo  and gradient echo scans technique without contrast administration.  Images were then acquired after contrast     FINDINGS:  The study is markedly degraded by patient motion  Straightening of the cervical spine  The vertebral body heights are adequately maintained.  The signal characteristics of the vertebral bodies are as expected on  the presented pulse sequences.     The disc space heights are preserved.  No paraspinal contrast-enhancing masses are seen        There is  no abnormal cord signal.             Impression:         1. The study is markedly degraded by patient motion  2. No destructive bony lesion  3. No contrast-enhancing abnormalities        This report was finalized on 5/28/2021 6:04 PM by Dr. Noah Lujan MD.       MRI Thoracic Spine Without Contrast [[id number]] Collected: 05/28/21 1118     Updated: 05/28/21 1448    Narrative:      EXAMINATION: MRI THORACIC SPINE WO CONTRAST-      CLINICAL INDICATION: weakness; J96.01-Acute respiratory failure with  hypoxia; J96.02-Acute respiratory failure with hypercapnia;  J44.1-Chronic obstructive pulmonary disease with (acute) exacerbation;  L08.9-Local infection of the skin and subcutaneous tissue, unspecified        COMPARISON: None immediately available.     PROCEDURE: Multiple planar images of the thoracic spine were acquired in  a high field strength magnet. Several pulse sequences were used to  acquire the study. Contrast was not administered.     FINDINGS: There are extensive endplate changes at T7-T8. Posterior  spurring and disc bulge at T7-T8 as well as T8-T9. These do cause  significant narrowing of the spinal canal. No walled off fluid  collection is seen. All of this may represent chronic change. The T2  images do not show significant amount of edema.     The other vertebral body heights are maintained.     Signal characteristics otherwise are as expected.     No abnormal cord signal is seen.       Impression:      Decreased height of T7 and T8 with extensive endplate  changes. Most of this does appear to be chronic. I cannot exclude at  least a mild degree of discitis. There are spurs and disc bulges at  T7-T8 and at T8-T9 which do cause moderate central canal narrowing.      This report was finalized on 5/28/2021 2:46 PM by Dr. Noah Lujan MD.       MRI Lumbar Spine Without Contrast [[id number]] Collected: 05/28/21 1006     Updated: 05/28/21 1030    Narrative:      EXAMINATION: MRI LUMBAR SPINE WO CONTRAST-         CLINICAL INDICATION: leg weakness; J96.01-Acute respiratory failure with  hypoxia; J96.02-Acute respiratory failure with hypercapnia;  J44.1-Chronic obstructive pulmonary disease with (acute) exacerbation;  L08.9-Local infection of the skin and subcutaneous tissue, unspecified     COMPARISON: None      TECHNIQUE: Multiplanar, multisequence MR imaging performed through the  lumbar spine WITHOUT contrast.     FINDINGS:      HARDWARE: No MRI evidence of hardware.     NUMBERING/ALIGNMENT:?   5 lumbar vertebral body segments.   Intact vertebral body alignment.     SPINAL CORD:?   Conus terminates at the?L1-2 level.      BONES:   No fracture. No marrow signal abnormality.      POSTERIOR ELEMENTS:  Posterior elements are intact.     SOFT TISSUES:   The visualized paraspinal soft tissues are unremarkable.       T12-L1:    No disk bulge or protrusion.  No central canal or neuroforaminal  stenosis.     L1/2:  No disk bulge or protrusion.  No central canal or neuroforaminal  stenosis.     L2/3:  No disk bulge or protrusion.  No central canal or neuroforaminal  stenosis.     L3/4:  Broad annular disc bulge with mild central and mild bilateral foraminal  narrowing     L4/5:  Central disc bulge causing mild-to-moderate central canal narrowing.     L5/S1:  No disk bulge or protrusion.  No central canal or neuroforaminal  stenosis.     OTHER:   No additional remarkable findings.        Impression:         1. Facet arthropathy.  2. Disc bulges most notable at L3-4 and L4-5.     This report was finalized on 5/28/2021 10:27 AM by Dr. Noah Lujan MD.       CT Head Without Contrast [[id number]] Collected: 05/27/21 1003     Updated: 05/27/21 1007    Narrative:      CT HEAD WO CONTRAST-     CLINICAL INDICATION: Evaluate left arm weakness and paresthesias;  J96.01-Acute respiratory failure with hypoxia; J96.02-Acute respiratory  failure with hypercapnia; J44.1-Chronic obstructive pulmonary disease  with (acute) exacerbation; L08.9-Local  infection of the skin and  subcutaneous tissue, unspecified        COMPARISON: 11/10/2018      TECHNIQUE: Axial images of the brain were obtained with out intravenous  contrast.  Reformatted images were created in the sagittal and coronal  planes.     DOSE:     Radiation dose reduction techniques were utilized per ALARA protocol.  Automated exposure control was initiated through either or CareDodeltamethod or  DoseRight software packages by  protocol.           FINDINGS:   Today's study shows no mass, hemorrhage, or midline shift.   The ventricles, cisterns, and sulci are unremarkable. There is no  hydrocephalus.   There is no evidence of acute ischemia.  I do not see epidural or subdural hematoma.  The gray-white differentiation is appropriate.   The bone window setting images show no destructive calvarial lesion or  acute calvarial fracture.   The posterior fossa is unremarkable.          Impression:         1. No acute ischemic event  2. No parenchymal mass, hemorrhage, or midline shift     This report was finalized on 5/27/2021 10:05 AM by Dr. Noah Lujan MD.       MRI Cervical Spine Without Contrast [[id number]] Collected: 05/25/21 1730     Updated: 05/25/21 1824    Narrative:      MRI CERVICAL SPINE WO CONTRAST-     CLINICAL INDICATION: cervical spine stenosis; J96.01-Acute respiratory  failure with hypoxia; J96.02-Acute respiratory failure with hypercapnia;  J44.1-Chronic obstructive pulmonary disease with (acute) exacerbation        COMPARISON: None immediately available     TECHNIQUE: Sagittal spin echo scans were obtained from the posterior  fossa through the upper thoracic spine and axial scans were performed  through selected cervical disc space levels, utilizing both spin echo  and gradient echo scans technique without contrast administration.      FINDINGS:   Straightening of the cervical spine  The vertebral body heights are adequately maintained.  The signal characteristics of the vertebral  bodies are as expected on  the presented pulse sequences.     The disc space heights are preserved.  Broad annular disc bulge at C2-3 which does cause mild central canal  narrowing.  Broad annular disc bulge at C3-4 causing moderate central and moderate  bilateral foraminal narrowing.  Broad annular disc bulge at C4-5 causing mild central and  mild-to-moderate bilateral foraminal narrowing.  Broad annular disc bulge at C5-6 causing moderate central and moderate  bilateral foraminal narrowing.      There is no abnormal cord signal.             Impression:         1. Straightening of THE cervical spine.  2. Multiple disc bulges causing central canal narrowing most notable at  C5-6.  3. The study is degraded by patient motion.         This report was finalized on 5/25/2021 6:22 PM by Dr. Noah Lujan MD.       CT Cervical Spine Without Contrast [[id number]] Collected: 05/24/21 1857     Updated: 05/24/21 1901    Narrative:      CT CERVICAL SPINE WO CONTRAST-     CLINICAL INDICATION: left radicular nerve pain        COMPARISON: None available      TECHNIQUE: Axial images of the cervical spine were acquired with out any  intravenous contrast. Reformatted images were then created in the  sagittal and coronal planes.     DOSE:      Radiation dose reduction techniques were utilized per ALARA protocol.  Automated exposure control was initiated through either or CareDose or  DoseRigeVestment software packages by  protocol.           FINDINGS:   The provided study demonstrates preservation of the vertebral body  heights in the sagittally reconstructed images.     There is no prevertebral soft tissue swelling.     Straightening of cervical spine     There is diffuse disc space narrowing     I see no acute cervical spine fracture.       Impression:         1. Straightening of cervical spine     2. Diffuse arthritic change  3. No acute bony abnormality.     This report was finalized on 5/24/2021 6:59 PM by Dr. Noah Lujan  MD.       XR Hand 2 View Left [[id number]] Collected: 05/24/21 1513     Updated: 05/24/21 1557    Narrative:      EXAMINATION: XR HAND 2 VW LEFT-      CLINICAL INDICATION: edema/ pain left hand post fall        COMPARISON: None available.     FINDINGS: 3 views of the left hand show fragment off of the tuft of the  distal phalanx of the 1st digit. This appears corticated.     No other evidence of fracture.       Impression:      1. Corticated fragment off the tuft of the distal phalanx of the 1st  digit. Radiographic appearance suggestive of subacute injury  2. Arthritic change.       This report was finalized on 5/24/2021 3:52 PM by Dr. Noah Lujan MD.             I have personally reviewed the above radiology results.   ---------------------------------------------------------------------------------------------------------------------      Assessment & Plan        Assessment/Plan       ASSESSMENT:    1.  Burned-out osteomyelitis    PLAN:    The patient presented to [facility] Emergency Department on 5/21/2021 for shortness of breath.  Since that time, patient has been treated for COPD exacerbation.  After complaining of weakness, Dr. Connelly reached out to Dr. Villaseñor of neurosurgery at [facility] and although the patient has spinal stenosis, he believes decompression would make the patient worse.  He also recommends to see the patient outpatient for concerns of osteomyelitis with a normal procalcitonin and CRP.  He recommended that primary team ordered an MRI of the cervical and thoracic spine to rule out osteomyelitis.  After MRI results finalized, results were reviewed with Dr. Villaseñor who believes there could be burned-out osteomyelitis, as procalcitonin and CRP are normal.  Neurosurgery plans to follow the patient outpatient, but recommended ID involvement.    Today, the patient is sitting up in bed in no apparent distress.  CNA is at bedside.  Patient complains of chronic back pain as  well as bilateral upper and lower extremity weakness, worse on the left.  Afebrile, no diarrhea.  CRP is only slightly elevated at 0.87.  WBC remains normal.  Procalcitonin on 5/28/2021 was normal.  Chest x-ray on 5/30/2021 showed no evidence of active or acute cardiopulmonary disease.  MRI of the thoracic spine with and without contrast on 5/28/2021 showed postcontrast imaging demonstrating enhancement along the inferior endplate of T7 and superior endplate of T8 as well as some enhancement posteriorly adjacent to the cord but no enhancing mass is seen.  Again, as above there certainly could be associated with at least a mild degree of discitis.  Moderate central canal narrowing at T7-T8 and T8-T9.  MRI of the thoracic spine without contrast on 5/28/2021 showed decreased height of T7 and T8 with extensive endplate changes.  Most of this does appear to be chronic.  I cannot exclude at least a mild degree of discitis.  There are spurs and disc bulges at T7-T8 and T8-T9 which do cause moderate central canal narrowing.  MRI of the cervical spine with and without contrast on 5/28/2021 showed the study is markedly degraded by patient motion.  No destructive bony lesion.  No contrast-enhancing abnormality.  MRI of the lumbar spine without contrast on 5/28/2021 showed facet arthropathy.  Disc bulges most notable at L3-L4 and L4-L5.  COVID-19 and flu A/B PCR on 5/21/2021 was negative.  Blood cultures on 5/21/2021 finalized as no growth.    As inflammatory markers remain very low and patient has no clinical evidence of sepsis at this time, we do not believe patient has an active infection and recommend follow-up with neurosurgeon, Dr. Villaseñor, as an outpatient.  ID will sign off at this time.  Please call back for any questions.    Again, thank you Dr. Jimenez for allowing us to participate in the care of your patient and please feel free to call for any questions you may have.    Code Status:   Code Status and Medical  Interventions:   Ordered at: 21 0057     Code Status:    CPR     Medical Interventions (Level of Support Prior to Arrest):    Full     TRAVIS Lord  21  10:35 EDT      Electronically signed by Viviane Smith MD at 21 1019          Physical Therapy Notes (most recent note)      Keely Agosto, PT at 21 1559  Version 1 of 1         Acute Care - Physical Therapy Treatment Note   George     Patient Name: Иван Miller  : 1955  MRN: [id number]  Today's Date: 2021   Onset of Illness/Injury or Date of Surgery: 21       PT Assessment (last 12 hours)      PT Evaluation and Treatment     Row Name 21 1500          Physical Therapy Time and Intention    Subjective Information  no complaints  -BC     Document Type  therapy note (daily note)  -BC     Mode of Treatment  physical therapy  -BC     Patient Effort  good  -BC     Row Name 21 1500          General Information    Patient Profile Reviewed  yes  -BC     Onset of Illness/Injury or Date of Surgery  21  -BC     Referring Physician  Dr. Jimenez  -BC     Patient Observations  alert;cooperative;agree to therapy  -BC     Risks Reviewed  patient:;LOB;dizziness;nausea/vomiting;increased discomfort;change in vital signs;lines disloged;increased drainage  -BC     Benefits Reviewed  patient:;improve function;increase independence;increase strength;increase balance;decrease pain;decrease risk of DVT;improve skin integrity;increase knowledge  -BC     Row Name 21 1500          Cognition    Affect/Mental Status (Cognitive)  WFL  -BC     Orientation Status (Cognition)  oriented x 3  -BC     Follows Commands (Cognition)  follows one-step commands  -BC     Cognitive Function (Cognitive)  safety deficit  -BC     Row Name 21 1500          Range of Motion Comprehensive    General Range of Motion  no range of motion deficits identified  -BC     Row Name 21 1500          Mobility    Extremity  Weight-bearing Status  left lower extremity;right lower extremity  -BC     Left Lower Extremity (Weight-bearing Status)  full weight-bearing (FWB)  -BC     Right Lower Extremity (Weight-bearing Status)  full weight-bearing (FWB)  -BC     Row Name 06/01/21 1500          Bed Mobility    Bed Mobility  bed mobility (all) activities  -BC     All Activities, [place] (Bed Mobility)  minimum assist (75% patient effort)  -BC     Supine-Sit [place] (Bed Mobility)  moderate assist (50% patient effort)  -BC     Sit-Supine [place] (Bed Mobility)  moderate assist (50% patient effort)  -BC     Bed Mobility, Safety Issues  decreased use of legs for bridging/pushing;impaired trunk control for bed mobility  -BC     Assistive Device (Bed Mobility)  bed rails  -BC     Row Name 06/01/21 1500          Transfers    Transfers  sit-stand transfer;stand-sit transfer  -BC     Maintains Weight-bearing Status (Transfers)  able to maintain  -BC     Sit-Stand [place] (Transfers)  moderate assist (50% patient effort)  -BC     Stand-Sit [place] (Transfers)  minimum assist (75% patient effort)  -BC     Row Name 06/01/21 1500          Sit-Stand Transfer    Assistive Device (Sit-Stand Transfers)  walker, front-wheeled  -BC     Row Name 06/01/21 1500          Stand-Sit Transfer    Assistive Device (Stand-Sit Transfers)  walker, front-wheeled  -BC     Row Name 06/01/21 1500          Stand Pivot/Stand Step Transfer    Stand Pivot/Stand Step [place] (Transfers)  minimum assist (75% patient effort)  -BC     Assistive Device (Stand Pivot Stand Step Transfer)  walker, front-wheeled  -BC     Row Name 06/01/21 1500          Gait/Stairs (Locomotion)    Comment (Gait/Stairs)  pt unable to ambulate  -BC     Row Name 06/01/21 1500          Coping    Observed Emotional State  calm;cooperative  -BC     Verbalized Emotional State  acceptance  -BC     Trust Relationship/Rapport  care explained;choices provided  -BC     Family/Support  Persons  daughter;spouse  -BC     Involvement in Care  not present at bedside  -BC     Diversional Activities  television  -BC     Row Name 06/01/21 1500          Plan of Care Review    Plan of Care Reviewed With  patient  -BC     Progress  improving  -BC     Row Name 06/01/21 1500          Positioning and Restraints    Pre-Treatment Position  in bed  -BC     Post Treatment Position  bed  -BC       User Key  (r) = Recorded By, (t) = Taken By, (c) = Cosigned By    Initials Name Provider Type    BC Keely Agosto PT Physical Therapist        Physical Therapy Education                 Title: PT OT SLP Therapies (Done)     Topic: Physical Therapy (Done)     Point: Mobility training (Done)     Learning Progress Summary           Patient Acceptance, E, VU by EB at 6/1/2021 0739    Acceptance, E, VU by SM at 5/31/2021 2242    Acceptance, E, VU by NIR at 5/31/2021 0924    Acceptance, E, VU by SM at 5/31/2021 0035    Acceptance, E,TB, VU by MM at 5/28/2021 1802    Acceptance, E,TB, VU by MM at 5/27/2021 1143    Acceptance, E, VU by SM at 5/26/2021 2344    Acceptance, E,TB, VU by MM at 5/26/2021 0929    Acceptance, E,TB,D, VU,NR by AG at 5/24/2021 1249                   Point: Home exercise program (Done)     Learning Progress Summary           Patient Acceptance, E, VU by EB at 6/1/2021 0739    Acceptance, E, VU by SM at 5/31/2021 2242    Acceptance, E, VU by NIR at 5/31/2021 0924    Acceptance, E, VU by SM at 5/31/2021 0035    Acceptance, E,TB, VU by MM at 5/28/2021 1802    Acceptance, E,TB, VU by MM at 5/27/2021 1143    Acceptance, E, VU by SM at 5/26/2021 2344    Acceptance, E,TB, VU by MM at 5/26/2021 0929    Acceptance, E,TB,D, VU,NR by AG at 5/24/2021 1249                   Point: Body mechanics (Done)     Learning Progress Summary           Patient Acceptance, E, VU by EB at 6/1/2021 0739    Acceptance, E, VU by SM at 5/31/2021 2242    Acceptance, E, VU by NIR at 5/31/2021 0924    Acceptance, E, VU by ISIDRO at 5/31/2021  0035    Acceptance, E,TB, VU by MM at 5/28/2021 1802    Acceptance, E,TB, VU by MM at 5/27/2021 1143    Acceptance, E, VU by  at 5/26/2021 2344    Acceptance, E,TB, VU by MM at 5/26/2021 0929    Acceptance, E,TB,D, VU,NR by  at 5/24/2021 1249                   Point: Precautions (Done)     Learning Progress Summary           Patient Acceptance, E, VU by EB at 6/1/2021 0739    Acceptance, E, VU by  at 5/31/2021 2242    Acceptance, E, VU by  at 5/31/2021 0924    Acceptance, E, VU by  at 5/31/2021 0035    Acceptance, E,TB, VU by MM at 5/28/2021 1802    Acceptance, E,TB, VU by MM at 5/27/2021 1143    Acceptance, E, VU by  at 5/26/2021 2344    Acceptance, E,TB, VU by MM at 5/26/2021 0929    Acceptance, E,TB,D, VU,NR by  at 5/24/2021 1249                               User Key     Initials Effective Dates Name Provider Type Discipline     06/16/16 -  Keily Godoy, RN Registered Nurse Nurse     04/03/18 -  Elvia Rousseau, PT Physical Therapist PT     07/19/18 -  Cruz Umanzor, RN Registered Nurse Nurse     07/08/19 -  Esha Lee, RN Registered Nurse Nurse     09/12/18 -  Chris Das, MARCELO Registered Nurse Nurse              PT Recommendation and Plan     Plan of Care Reviewed With: patient  Progress: improving       Time Calculation:   PT Charges     Row Name 06/01/21 1557             Time Calculation    PT Received On  06/01/21  -BC         Time Calculation- PT    Total Timed Code Minutes- PT  30 minute(s)  -BC         Timed Charges    81681 - PT Therapeutic Exercise Minutes  30  -BC         Total Minutes    Timed Charges Total Minutes  30  -BC       Total Minutes  30  -BC        User Key  (r) = Recorded By, (t) = Taken By, (c) = Cosigned By    Initials Name Provider Type    BC Keely Agosto, PT Physical Therapist        Therapy Charges for Today     Code Description Service Date Service Provider Modifiers Qty    51990703568 HC PT THER PROC EA 15 MIN 5/31/2021 Keely Agosto  JITENDRA, PT GP 2    68886713209 HC GAIT TRAINING EA 15 MIN 2021 Keely Agosto, PT GP 1    34885904910 HC PT THER PROC EA 15 MIN 2021 Keely Agosto, PT GP 2               Keely Agosto, PT  2021      Electronically signed by Keely Agosto, PT at 21 1559          Occupational Therapy Notes (most recent note)      Roby Stern, OT at 21 1633          Acute Care - Occupational Therapy Treatment Note  BAKRAI Vazquez     Patient Name: Иван Miller  : 1955  MRN: [id number]  Today's Date: 2021  Onset of Illness/Injury or Date of Surgery: 21     Referring Physician: Dr. Jimenez    Admit Date: 2021       ICD-10-CM ICD-9-CM   1. Acute respiratory failure with hypoxia and hypercapnia (CMS/HCC)  J96.01 518.81    J96.02    2. COPD with acute exacerbation (CMS/HCC)  J44.1 491.21   3. Finger infection  L08.9 686.9     Patient Active Problem List   Diagnosis   • Symptomatic bradycardia   • Acute respiratory failure with hypoxemia (CMS/HCC)   • Coronary artery disease status post multiple stents   • Non-ischemic cardiomyopathy (CMS/HCC LV ejection fraction 40 to 45%   • Chronic systolic congestive heart failure (CMS/HCC)   • Essential hypertension   • Hyperlipidemia LDL goal <70   • Insulin dependent diabetes mellitus   • Respiratory failure (CMS/HCC)   • Finger infection     Past Medical History:   Diagnosis Date   • Atrial fibrillation (CMS/HCC)    • COPD (chronic obstructive pulmonary disease) (CMS/HCC)    • Diabetes mellitus (CMS/HCC)    • GERD (gastroesophageal reflux disease)    • Hyperlipidemia    • Hypertension    • Myocardial infarction (CMS/HCC)    • Neuropathy      Past Surgical History:   Procedure Laterality Date   • ABDOMINAL SURGERY     • APPENDECTOMY     • CARDIAC CATHETERIZATION     • CORONARY STENT PLACEMENT              OT ASSESSMENT FLOWSHEET (last 12 hours)      OT Evaluation and Treatment     Row Name 21 1627                   OT Time and Intention     Document Type  therapy note (daily note)  -KR        Mode of Treatment  occupational therapy  -KR        Patient Effort  good  -KR        Comment  Pt. remains motivated to particpate in rehabilitation to increase safety/independence needed in home environment  -KR           General Information    General Observations of Patient  alert/cooperative  -KR           Motor Skills    Coordination  fine motor deficit;left;upper extremity  -KR        Therapeutic Exercise  hand  -KR           Hand (Therapeutic Exercise)    Hand (Therapeutic Exercise)  strengthening exercise FMC for fxl feeding tasks from sitting in bed, bilateral UE  -KR        Hand Strengthening (Therapeutic Exercise)  left; strengthening;finger flexion  -KR           Self-Feeding Assessment/Training    [place] Level (Feeding)  feeding skills;liquids to mouth;standby assist  -KR           Plan of Care Review    Plan of Care Reviewed With  patient  -KR        Progress  improving  -KR           Positioning and Restraints    Pre-Treatment Position  in bed  -KR        Post Treatment Position  bed  -KR        In Bed  call light within reach;encouraged to call for assist  -KR           Therapy Assessment/Plan (OT)    Rehab Potential (OT)  good, to achieve stated therapy goals  -KR        Planned Therapy Interventions (OT)  neuromuscular control/coordination retraining;activity tolerance training;ROM/therapeutic exercise;strengthening exercise  -KR           Therapy Plan Review/Discharge Plan (OT)    Anticipated Discharge Disposition (OT)  inpatient rehabilitation facility;home  -KR          User Key  (r) = Recorded By, (t) = Taken By, (c) = Cosigned By    Initials Name Effective Dates    KR Roby Stern, OT 04/03/18 -                OT Recommendation and Plan  Planned Therapy Interventions (OT): neuromuscular control/coordination retraining, activity tolerance training, ROM/therapeutic exercise, strengthening exercise  Plan of Care Review  Plan of Care  Reviewed With: patient  Progress: improving  Plan of Care Reviewed With: patient        Time Calculation:     Therapy Charges for Today     Code Description Service Date Service Provider Modifiers Qty    19286233213 HC OT EVAL MOD COMPLEXITY 4 5/25/2021 Roby Stern OT GO 1    [id number]  OT SELF CARE/MGMT/TRAIN EA 15 MIN 5/26/2021 Roby Stern OT GO 1               Roby Stern OT  5/26/2021    Electronically signed by Roby Stern OT at 05/26/21 1633       ADL Documentation (last day)     Date/Time Transferring Toileting Bathing Dressing Eating Communication Swallowing    06/01/21 0737  3 - assistive equipment and person  3 - assistive equipment and person  2 - assistive person  2 - assistive person  0 - independent  0 - understands/communicates without difficulty  0 - swallows foods/liquids without difficulty    05/31/21 2056  3 - assistive equipment and person  3 - assistive equipment and person  2 - assistive person  2 - assistive person  0 - independent  0 - understands/communicates without difficulty  0 - swallows foods/liquids without difficulty    05/31/21 0715  3 - assistive equipment and person  3 - assistive equipment and person  2 - assistive person  2 - assistive person  0 - independent  0 - understands/communicates without difficulty  0 - swallows foods/liquids without difficulty

## 2021-06-02 LAB
ANION GAP SERPL CALCULATED.3IONS-SCNC: 10.3 MMOL/L (ref 5–15)
BUN SERPL-MCNC: 21 MG/DL (ref 8–23)
BUN/CREAT SERPL: 18.8 (ref 7–25)
CALCIUM SPEC-SCNC: 9.3 MG/DL (ref 8.6–10.5)
CHLORIDE SERPL-SCNC: 99 MMOL/L (ref 98–107)
CO2 SERPL-SCNC: 26.7 MMOL/L (ref 22–29)
CREAT SERPL-MCNC: 1.12 MG/DL (ref 0.76–1.27)
CRP SERPL-MCNC: 0.98 MG/DL (ref 0–0.5)
DEPRECATED RDW RBC AUTO: 52.5 FL (ref 37–54)
ERYTHROCYTE [DISTWIDTH] IN BLOOD BY AUTOMATED COUNT: 16.9 % (ref 12.3–15.4)
GFR SERPL CREATININE-BSD FRML MDRD: 66 ML/MIN/1.73
GLUCOSE BLDC GLUCOMTR-MCNC: 136 MG/DL (ref 70–130)
GLUCOSE BLDC GLUCOMTR-MCNC: 152 MG/DL (ref 70–130)
GLUCOSE BLDC GLUCOMTR-MCNC: 172 MG/DL (ref 70–130)
GLUCOSE BLDC GLUCOMTR-MCNC: 208 MG/DL (ref 70–130)
GLUCOSE SERPL-MCNC: 274 MG/DL (ref 65–99)
HCT VFR BLD AUTO: 38.7 % (ref 37.5–51)
HGB BLD-MCNC: 11.2 G/DL (ref 13–17.7)
MAGNESIUM SERPL-MCNC: 2 MG/DL (ref 1.6–2.4)
MCH RBC QN AUTO: 24.9 PG (ref 26.6–33)
MCHC RBC AUTO-ENTMCNC: 28.9 G/DL (ref 31.5–35.7)
MCV RBC AUTO: 86 FL (ref 79–97)
PLATELET # BLD AUTO: 194 10*3/MM3 (ref 140–450)
PMV BLD AUTO: 8.9 FL (ref 6–12)
POTASSIUM SERPL-SCNC: 4.6 MMOL/L (ref 3.5–5.2)
POTASSIUM SERPL-SCNC: 4.7 MMOL/L (ref 3.5–5.2)
QT INTERVAL: 356 MS
QTC INTERVAL: 415 MS
RBC # BLD AUTO: 4.5 10*6/MM3 (ref 4.14–5.8)
SODIUM SERPL-SCNC: 136 MMOL/L (ref 136–145)
WBC # BLD AUTO: 7.59 10*3/MM3 (ref 3.4–10.8)

## 2021-06-02 PROCEDURE — 93010 ELECTROCARDIOGRAM REPORT: CPT | Performed by: INTERNAL MEDICINE

## 2021-06-02 PROCEDURE — 63710000001 INSULIN DETEMIR PER 5 UNITS: Performed by: INTERNAL MEDICINE

## 2021-06-02 PROCEDURE — 63710000001 INSULIN ASPART PER 5 UNITS: Performed by: PHYSICIAN ASSISTANT

## 2021-06-02 PROCEDURE — 94799 UNLISTED PULMONARY SVC/PX: CPT

## 2021-06-02 PROCEDURE — 80048 BASIC METABOLIC PNL TOTAL CA: CPT | Performed by: INTERNAL MEDICINE

## 2021-06-02 PROCEDURE — 83735 ASSAY OF MAGNESIUM: CPT | Performed by: INTERNAL MEDICINE

## 2021-06-02 PROCEDURE — 97110 THERAPEUTIC EXERCISES: CPT

## 2021-06-02 PROCEDURE — 63710000001 INSULIN ASPART PER 5 UNITS: Performed by: INTERNAL MEDICINE

## 2021-06-02 PROCEDURE — 94660 CPAP INITIATION&MGMT: CPT

## 2021-06-02 PROCEDURE — 85027 COMPLETE CBC AUTOMATED: CPT | Performed by: INTERNAL MEDICINE

## 2021-06-02 PROCEDURE — 99232 SBSQ HOSP IP/OBS MODERATE 35: CPT | Performed by: INTERNAL MEDICINE

## 2021-06-02 PROCEDURE — 97116 GAIT TRAINING THERAPY: CPT

## 2021-06-02 PROCEDURE — 86140 C-REACTIVE PROTEIN: CPT | Performed by: PHYSICIAN ASSISTANT

## 2021-06-02 PROCEDURE — 82962 GLUCOSE BLOOD TEST: CPT

## 2021-06-02 PROCEDURE — 93005 ELECTROCARDIOGRAM TRACING: CPT | Performed by: INTERNAL MEDICINE

## 2021-06-02 PROCEDURE — 84132 ASSAY OF SERUM POTASSIUM: CPT | Performed by: INTERNAL MEDICINE

## 2021-06-02 RX ORDER — LISINOPRIL 10 MG/1
20 TABLET ORAL
Status: DISCONTINUED | OUTPATIENT
Start: 2021-06-03 | End: 2021-06-04

## 2021-06-02 RX ADMIN — IPRATROPIUM BROMIDE AND ALBUTEROL SULFATE 3 ML: .5; 3 SOLUTION RESPIRATORY (INHALATION) at 12:23

## 2021-06-02 RX ADMIN — LIDOCAINE 2 PATCH: 50 PATCH CUTANEOUS at 09:04

## 2021-06-02 RX ADMIN — LISINOPRIL 5 MG: 2.5 TABLET ORAL at 09:05

## 2021-06-02 RX ADMIN — ATORVASTATIN CALCIUM 20 MG: 20 TABLET, FILM COATED ORAL at 09:05

## 2021-06-02 RX ADMIN — IPRATROPIUM BROMIDE AND ALBUTEROL SULFATE 3 ML: .5; 3 SOLUTION RESPIRATORY (INHALATION) at 06:26

## 2021-06-02 RX ADMIN — IPRATROPIUM BROMIDE AND ALBUTEROL SULFATE 3 ML: .5; 3 SOLUTION RESPIRATORY (INHALATION) at 18:38

## 2021-06-02 RX ADMIN — METOPROLOL SUCCINATE 75 MG: 25 TABLET, EXTENDED RELEASE ORAL at 09:05

## 2021-06-02 RX ADMIN — GABAPENTIN 600 MG: 300 CAPSULE ORAL at 05:36

## 2021-06-02 RX ADMIN — INSULIN ASPART 4 UNITS: 100 INJECTION, SOLUTION INTRAVENOUS; SUBCUTANEOUS at 11:44

## 2021-06-02 RX ADMIN — FLUTICASONE PROPIONATE 2 SPRAY: 50 SPRAY, METERED NASAL at 09:06

## 2021-06-02 RX ADMIN — INSULIN ASPART 2 UNITS: 100 INJECTION, SOLUTION INTRAVENOUS; SUBCUTANEOUS at 09:05

## 2021-06-02 RX ADMIN — IPRATROPIUM BROMIDE AND ALBUTEROL SULFATE 3 ML: .5; 3 SOLUTION RESPIRATORY (INHALATION) at 00:16

## 2021-06-02 RX ADMIN — TRAMADOL HYDROCHLORIDE 50 MG: 50 TABLET, FILM COATED ORAL at 23:25

## 2021-06-02 RX ADMIN — HYDROXYZINE HYDROCHLORIDE 25 MG: 25 TABLET, FILM COATED ORAL at 17:08

## 2021-06-02 RX ADMIN — HYDROXYZINE HYDROCHLORIDE 25 MG: 25 TABLET, FILM COATED ORAL at 09:05

## 2021-06-02 RX ADMIN — HYDROCODONE BITARTRATE AND ACETAMINOPHEN 1 TABLET: 5; 325 TABLET ORAL at 09:04

## 2021-06-02 RX ADMIN — PANTOPRAZOLE SODIUM 40 MG: 40 TABLET, DELAYED RELEASE ORAL at 05:36

## 2021-06-02 RX ADMIN — SODIUM CHLORIDE, PRESERVATIVE FREE 10 ML: 5 INJECTION INTRAVENOUS at 09:04

## 2021-06-02 RX ADMIN — GABAPENTIN 600 MG: 300 CAPSULE ORAL at 13:41

## 2021-06-02 RX ADMIN — SODIUM CHLORIDE, PRESERVATIVE FREE 10 ML: 5 INJECTION INTRAVENOUS at 21:01

## 2021-06-02 RX ADMIN — POLYETHYLENE GLYCOL (3350) 17 G: 17 POWDER, FOR SOLUTION ORAL at 09:04

## 2021-06-02 RX ADMIN — BUDESONIDE AND FORMOTEROL FUMARATE DIHYDRATE 2 PUFF: 160; 4.5 AEROSOL RESPIRATORY (INHALATION) at 06:26

## 2021-06-02 RX ADMIN — INSULIN ASPART 30 UNITS: 100 INJECTION, SOLUTION INTRAVENOUS; SUBCUTANEOUS at 11:44

## 2021-06-02 RX ADMIN — APIXABAN 5 MG: 5 TABLET, FILM COATED ORAL at 09:05

## 2021-06-02 RX ADMIN — HYDROCODONE BITARTRATE AND ACETAMINOPHEN 1 TABLET: 5; 325 TABLET ORAL at 17:08

## 2021-06-02 RX ADMIN — INSULIN ASPART 30 UNITS: 100 INJECTION, SOLUTION INTRAVENOUS; SUBCUTANEOUS at 17:09

## 2021-06-02 RX ADMIN — BUDESONIDE AND FORMOTEROL FUMARATE DIHYDRATE 2 PUFF: 160; 4.5 AEROSOL RESPIRATORY (INHALATION) at 18:39

## 2021-06-02 RX ADMIN — APIXABAN 5 MG: 5 TABLET, FILM COATED ORAL at 21:00

## 2021-06-02 RX ADMIN — DOCUSATE SODIUM 100 MG: 100 CAPSULE, LIQUID FILLED ORAL at 21:00

## 2021-06-02 RX ADMIN — GABAPENTIN 600 MG: 300 CAPSULE ORAL at 21:00

## 2021-06-02 RX ADMIN — CANAGLIFLOZIN 300 MG: 300 TABLET, FILM COATED ORAL at 09:05

## 2021-06-02 RX ADMIN — TAMSULOSIN HYDROCHLORIDE 0.4 MG: 0.4 CAPSULE ORAL at 21:00

## 2021-06-02 RX ADMIN — CLOPIDOGREL 75 MG: 75 TABLET, FILM COATED ORAL at 09:05

## 2021-06-02 RX ADMIN — TRAMADOL HYDROCHLORIDE 50 MG: 50 TABLET, FILM COATED ORAL at 00:15

## 2021-06-02 RX ADMIN — INSULIN DETEMIR 40 UNITS: 100 INJECTION, SOLUTION SUBCUTANEOUS at 09:04

## 2021-06-02 RX ADMIN — INSULIN ASPART 30 UNITS: 100 INJECTION, SOLUTION INTRAVENOUS; SUBCUTANEOUS at 09:05

## 2021-06-02 RX ADMIN — DOCUSATE SODIUM 100 MG: 100 CAPSULE, LIQUID FILLED ORAL at 09:04

## 2021-06-02 NOTE — DISCHARGE PLACEMENT REQUEST
"Иван Blanco (65 y.o. Male)     Date of Birth Social Security Number Address Home Phone MRN    1955  [address] [place] [address]  [place] [address] [phone] [id number]    M[employer]onism Marital Status          None        Admission Date Admission Type Admitting Provider Attending Provider Department, Room/Bed    5/21/21 Emergency Vivienne Flores DO Mullins, Thomas Anthony, DO [address], 3303/2S    Discharge Date Discharge Disposition Discharge Destination                       Attending Provider: Joo Jimenez DO    Allergies: Codeine, Ciprofloxacin    Isolation: None   Infection: None   Code Status: CPR    Ht: 175.3 cm (69\")   Wt: 148 kg (326 lb 9.6 oz)    Admission Cmt: None   Principal Problem: Respiratory failure (CMS/HCC) [J96.90]                 Active Insurance as of 5/21/2021     Primary Coverage     Payor Plan Insurance Group Employer/Plan Group    [facility] MEDICARE REPLACEMENT [facility] DUAL COMPLETE MEDICARE REPLACEMENT [id number]     Payor Plan Address Payor Plan Phone Number Payor Plan Fax Number Effective Dates    [address] [phone]  1/1/2021 - None Entered    [facility] [id number]       Subscriber Name Subscriber Birth Date Member ID       ИВАН BLANCO 1955 [id number]           Secondary Coverage     Payor Plan Insurance Group Employer/Plan Group    [facility] MEDICAID      Payor Plan Address Payor Plan Phone Number Payor Plan Fax Number Effective Dates    [address] [address] [phone]  10/4/2019 - None Entered    [facility] [address]       Subscriber Name Subscriber Birth Date Member ID       ИВАН BLANCO 1955 [id number]                 Emergency Contacts      (Rel.) Home Phone Work Phone Mobile Phone    PaulLamar (Spouse) [phone] -- [phone]    GARCIA MALDONADO (Daughter) [phone] -- [phone]            Emergency Contact Information     Name Relation Home Work Mobile    Lamar Blanco " Spouse [phone]    GARCIA MALDONADO Daughter [phone]          Insurance Information                [facility] MEDICARE REPLACEMENT/[facility] DUAL COMPLETE MEDICARE REPLACEMENT Phone: [phone]    Subscriber: Иван Miller Subscriber#: [id number]    Group#: [id number] Precert#:         [facility]/[employer] MEDICAID Phone: [phone]    Subscriber: Иван Miller Subscriber#: [id number]    Group#:  Precert#:           Treatment Team  Chat With All Active Members    Provider Relationship Specialty Contact    Joo Jimenez DO  Attending --  [phone]    Keily Godoy, RN  Registered Nurse --     Keely Agosto, PT  Physical Therapist Physical Therapy     Tian Ness DO  Consulting Physician Orthopedic Surgery  [phone]    Roby Corbin MD  Consulting Physician, Surgeon Orthopedic Surgery  [phone]    Pao Valle, RRT  Respiratory Therapist --  [phone]    Radha Ha, PCT  Patient Care Technician --           Problem List         Codes Noted - Resolved       Hospital    * (Principal) Respiratory failure (CMS/HCC) ICD-10-CM: J96.90  ICD-9-CM: 518.81 5/22/2021 - Present    Finger infection ICD-10-CM: L08.9  ICD-9-CM: 686.9 5/21/2021 - Present    Essential hypertension (Chronic) ICD-10-CM: I10  ICD-9-CM: 401.9 11/25/2019 - Present    Hyperlipidemia LDL goal <70 (Chronic) ICD-10-CM: E78.5  ICD-9-CM: 272.4 11/25/2019 - Present       Non-Hospital    Coronary artery disease status post multiple stents ICD-10-CM: I25.10  ICD-9-CM: 414.01 11/25/2019 - Present    Non-ischemic cardiomyopathy (CMS/HCC LV ejection fraction 40 to 45% ICD-10-CM: I42.8  ICD-9-CM: 425.4 11/25/2019 - Present    Chronic systolic congestive heart failure (CMS/HCC) ICD-10-CM: I50.22  ICD-9-CM: 428.22, 428.0 11/25/2019 - Present    Insulin dependent diabetes mellitus ICD-10-CM: MLJ4135  ICD-9-CM: 250.00, V58.67 11/25/2019 - Present     Acute respiratory failure with hypoxemia (CMS/[facility]) ICD-10-CM: J96.01  ICD-9-CM: 518.81 10/4/2019 - Present    Symptomatic bradycardia ICD-10-CM: R00.1  ICD-9-CM: 427.89 2018 - Present             History & Physical      Nora Gomez PA-C at 21 0142     Attestation signed by Vivienne Flores DO at 21 [phone]    I have read the documentation below and agree.  Patient also seen and examined independently.  He remains on BiPAP.  Patient is easily arousable and is oriented x4.    Lungs are diminished but clear.  Patient is in no respiratory distress.  Heart is regular rate and rhythm without obvious murmur/gallop/rub.  Patient with decreased range of motion, swelling, tenderness and bruising in the right ankle and foot.  Right lower extremity appears to be more edematous with an increased circumference in comparison to the left calf area.    Agree with a respiratory culture if able to produce.  Continue with Rocephin and a dose of amoxicillin.  Agree with steroids and scheduled nebulized inhalants.  Plan to wean off BiPAP and placed on nasal cannula.      Obtain an x-ray of the right foot pain and a Doppler ultrasound of the right lower extremity to rule out DVT in the setting of recent surgery and decreased ambulation over the past few days.                       [facility] Medicine Services  HISTORY & PHYSICAL    Patient Identification:  Name:  Иван Miller  Age:  65 y.o.  Sex:  male  :  1955  MRN:  [id number]   Visit Number:  [id number]  Admit Date: 2021   Primary Care Physician:  Rosi Thomas APRN     Subjective     Chief complaint:   Chief Complaint   Patient presents with   • Shortness of Breath     History of presenting illness:   Patient is a 65 y.o. male with past medical history significant for paroxysmal atrial fibrillation, CAD status post stenting in the past, known ischemic cardiomyopathy, COPD, insulin-dependent type 2 diabetes mellitus, GERD,  essential hypertension, hyperlipidemia, that presented to the [facility] emergency department for evaluation of shortness of breath.    It should be of note that the patient was very lethargic at bedside.  He would arouse briefly with tactile stimulation but will quickly fall back asleep.  He was also somewhat difficult to understand as he currently has BiPAP in place and was slurring his speech.  The patient states that he has been feeling short of breath and having a productive cough with yellow sputum and wheezing for around 15 days.  He reports he was given an antibiotic but was unable to tell me who gave it to him.  He also admits to a subjective fever, periumbilical abdominal pain, and fatigue.  He denies any diaphoresis, chills, chest pain, leg edema, diarrhea, nausea, vomiting, dysuria.  He states that he wears 2 L of oxygen at home and CPAP at night.  He further admits to amphetamine use and states that he smokes it.  Additionally, he admits to a fall 2 nights ago and has been experiencing severe right ankle pain.  He has had difficulty walking secondary to pain and has bruising located on the lateral aspect of the right ankle.  He denies hitting his head or loss of consciousness.    Upon arrival to the ED, vitals were temperature 98.0 °F, pulse 97, respirations 22, /93, SPO2 96% on 6 L nasal cannula.  ABG with pH 7.325, PCO2 72.8, PO2 59.7, HCO3 37.9, O2 saturation 89.3 on 2 L nasal cannula.  CMP with glucose 279, CO2 34.1.  CBC with hemoglobin 11.6, MCH 24.2, MCHC 27.9.  UA with 3+ glucose, blood cultures pending x2.  Blood ethanol negative.  UDS positive for amphetamines.  Chest x-ray shows limited study due to inadequate positioning, cardiac silhouette appears to be enlarged, underpenetration of the left hemithorax, no gross dense consolidation.  Noncontrast CT of the chest shows mild right base atelectasis, pleural thickening along the major fissure on the left, three-vessel CAD, no  acute lung infiltrates.  EKG with sinus rhythm with occasional PVCs, nonspecific ST abnormality, heart rate 97, QTc 447 MS.  COVID-19 negative.    In the emergency department the patient received IV Rocephin 1 g, IV Decadron 10 mg, IV doxycycline, DuoNeb treatment x2.    Patient has been admitted to the progressive care unit as telemetry overflow for further evaluation and treatment  ---------------------------------------------------------------------------------------------------------------------   Review of Systems   Constitutional: Positive for fever (Undocumented). Negative for chills and diaphoresis.   HENT: Negative for congestion and sore throat.    Eyes: Negative for discharge and visual disturbance.   Respiratory: Positive for cough (Productive with yellow sputum), shortness of breath and wheezing.    Cardiovascular: Negative for chest pain, palpitations and leg swelling.   Gastrointestinal: Positive for abdominal pain (Periumbilical) and constipation. Negative for diarrhea, nausea and vomiting.   Genitourinary: Negative for dysuria and frequency.   Musculoskeletal: Positive for back pain (Chronic) and gait problem (Fall 2 nights ago). Negative for myalgias.   Skin: Negative for rash and wound.        Bruising of right ankle   Neurological: Negative for dizziness, syncope and light-headedness.   Psychiatric/Behavioral: Negative for confusion. The patient is not nervous/anxious.       ---------------------------------------------------------------------------------------------------------------------   Past Medical History:   Diagnosis Date   • Atrial fibrillation (CMS/HCC)    • COPD (chronic obstructive pulmonary disease) (CMS/HCC)    • Diabetes mellitus (CMS/HCC)    • GERD (gastroesophageal reflux disease)    • Hyperlipidemia    • Hypertension    • Myocardial infarction (CMS/HCC)    • Neuropathy      Past Surgical History:   Procedure Laterality Date   • ABDOMINAL SURGERY     • APPENDECTOMY     • CARDIAC  CATHETERIZATION     • CORONARY STENT PLACEMENT       Family History   Problem Relation Age of Onset   • Hypertension Mother    • Heart attack Mother    • Hypertension Father      Social History     Socioeconomic History   • Marital status:      Spouse name: Not on file   • Number of children: Not on file   • Years of education: Not on file   • Highest education level: Not on file   Tobacco Use   • Smoking status: Former Smoker     Packs/day: 2.00     Years: 30.00     Pack years: 60.00     Types: Cigarettes   • Smokeless tobacco: Current User     Types: Snuff   Substance and Sexual Activity   • Alcohol use: No   • Drug use: No   • Sexual activity: Defer     ---------------------------------------------------------------------------------------------------------------------   Allergies:  Codeine and Ciprofloxacin  ---------------------------------------------------------------------------------------------------------------------   Medications below are reported home medications pulling from within the system; at this time, these medications have not been reconciled unless otherwise specified and are in the verification process for further verifcation as current home medications.    Prior to Admission Medications     Prescriptions Last Dose Informant Patient Reported? Taking?    albuterol (PROVENTIL HFA;VENTOLIN HFA) 108 (90 Base) MCG/ACT inhaler  Pharmacy Yes No    Inhale 2 puffs Every 4 (Four) Hours As Needed for Wheezing.    amLODIPine (NORVASC) 10 MG tablet  Pharmacy Yes No    Take 10 mg by mouth Daily.    apixaban (ELIQUIS) 5 MG tablet tablet  Pharmacy Yes No    Take 5 mg by mouth 2 (Two) Times a Day.    atorvastatin (LIPITOR) 20 MG tablet  Pharmacy Yes No    Take 20 mg by mouth Daily.    budesonide-formoterol (SYMBICORT) 160-4.5 MCG/ACT inhaler  Pharmacy Yes No    Inhale 2 puffs 2 (Two) Times a Day.    carvedilol (COREG) 12.5 MG tablet   No No    Take 1 tablet by mouth 2 (Two) Times a Day With Meals.     fluticasone (FLONASE) 50 MCG/ACT nasal spray  Pharmacy Yes No    2 sprays into the nostril(s) as directed by provider Daily.    furosemide (LASIX) 40 MG tablet   No No    Take 1 tablet by mouth Daily.    gabapentin (NEURONTIN) 300 MG capsule   Yes No    Take 300 mg by mouth 3 (Three) Times a Day.    insulin lispro (humaLOG) 100 UNIT/ML injection  Pharmacy Yes No    Inject 20 Units under the skin into the appropriate area as directed 3 (Three) Times a Day Before Meals.    ipratropium-albuterol (DUO-NEB) 0.5-2.5 mg/3 ml nebulizer  Pharmacy Yes No    Take 3 mL by nebulization Every 4 (Four) Hours As Needed for Wheezing.    loratadine (CLARITIN) 10 MG tablet  Pharmacy Yes No    Take 10 mg by mouth Daily.    metFORMIN (GLUCOPHAGE) 1000 MG tablet  Pharmacy Yes No    Take 1,000 mg by mouth 2 (Two) Times a Day With Meals.    omeprazole (priLOSEC) 40 MG capsule  Pharmacy Yes No    Take 40 mg by mouth Daily.    sacubitril-valsartan (ENTRESTO) 49-51 MG tablet   Yes No    Take 1 tablet by mouth 2 (Two) Times a Day.    sildenafil (REVATIO) 20 MG tablet  Pharmacy Yes No    Take 20 mg by mouth Daily As Needed (sexual activity).    tamsulosin (FLOMAX) 0.4 MG capsule 24 hr capsule  Pharmacy Yes No    Take 1 capsule by mouth Every Night.        ---------------------------------------------------------------------------------------------------------------------    Objective     Hospital Scheduled Meds:          Current listed hospital scheduled medications may not yet reflect those currently placed in orders that are signed and held, awaiting patient's arrival to floor/unit.    ---------------------------------------------------------------------------------------------------------------------   Vital Signs:  Temp:  [98 °F (36.7 °C)] 98 °F (36.7 °C)  Heart Rate:  [] 98  Resp:  [20-32] 20  BP: (141-161)/(86-99) 151/88  Mean Arterial Pressure (Non-Invasive) for the past 24 hrs (Last 3 readings):   Noninvasive MAP (mmHg)   05/22/21  0033 112   05/21/21 2233 95   05/21/21 2118 119     SpO2 Percentage    05/21/21 2343 05/22/21 0033 05/22/21 0043   SpO2: 96% 95% 98%     SpO2:  [93 %-100 %] 98 %  on  Flow (L/min):  [6] 6;   Device (Oxygen Therapy): NPPV/NIV    Body mass index is 46.52 kg/m².  Wt Readings from Last 3 Encounters:   05/21/21 (!) 143 kg (315 lb)   11/25/19 127 kg (280 lb)   10/06/19 124 kg (273 lb 11.2 oz)     ---------------------------------------------------------------------------------------------------------------------   Physical Exam:  Physical Exam  Constitutional:       General: He is awake.      Appearance: Normal appearance. He is well-developed. He is morbidly obese.      Comments: BiPAP in place   HENT:      Head: Normocephalic and atraumatic.   Eyes:      General: Lids are normal.         Right eye: No discharge.         Left eye: No discharge.   Cardiovascular:      Rate and Rhythm: Normal rate and regular rhythm.      Pulses: Normal pulses. No decreased pulses.           Dorsalis pedis pulses are 2+ on the right side and 2+ on the left side.        Posterior tibial pulses are 2+ on the right side and 2+ on the left side.      Heart sounds: Normal heart sounds. No murmur heard.   No friction rub. No gallop.    Pulmonary:      Effort: Pulmonary effort is normal. No tachypnea or bradypnea.      Breath sounds: Decreased breath sounds (Diffuse) present. No wheezing, rhonchi or rales.   Abdominal:      General: Bowel sounds are normal.      Palpations: Abdomen is soft.      Tenderness: There is abdominal tenderness in the epigastric area and periumbilical area.   Musculoskeletal:      Right lower leg: No edema.      Left lower leg: No edema.      Right foot: Decreased range of motion. Swelling and tenderness present.      Left foot: Normal range of motion. No tenderness.        Feet:    Skin:     Findings: No abrasion, ecchymosis or erythema.   Neurological:      Mental Status: He is oriented to person, place, and time and  easily aroused. He is lethargic.   Psychiatric:         Speech: Speech is slurred.         Behavior: Behavior is cooperative.       ---------------------------------------------------------------------------------------------------------------------  EKG:    Per cardiology read by Dr. Gorman, sinus rhythm with occasional PVCs, nonspecific ST abnormality, heart rate 97, QTc 447 MS; when compared to EKG from 10/2019 PVCs are now present.        Telemetry:    Normal sinus rhythm, heart rate 87 bpm, SPO2 95% on BiPAP    I have personally reviewed the EKG/Telemetry strip  ---------------------------------------------------------------------------------------------------------------------   Results from last 7 days   Lab Units 05/21/21 2045   CK TOTAL U/L 124     Results from last 7 days   Lab Units 05/21/21 2045   PROBNP pg/mL 799.5       Results from last 7 days   Lab Units 05/22/21  0054   PH, ARTERIAL pH units 7.328*   PO2 ART mm Hg 104.0   PCO2, ARTERIAL mm Hg 68.4*   HCO3 ART mmol/L 35.9*     Results from last 7 days   Lab Units 05/21/21 2045   CRP mg/dL 0.39   LACTATE mmol/L 1.0   WBC 10*3/mm3 9.07   HEMOGLOBIN g/dL 11.6*   HEMATOCRIT % 41.6   MCV fL 86.8   MCHC g/dL 27.9*   PLATELETS 10*3/mm3 265   INR  1.05     Results from last 7 days   Lab Units 05/21/21 2045   SODIUM mmol/L 144   POTASSIUM mmol/L 4.5   MAGNESIUM mg/dL 2.3   CHLORIDE mmol/L 102   CO2 mmol/L 34.1*   BUN mg/dL 20   CREATININE mg/dL 0.92   EGFR IF NONAFRICN AM mL/min/1.73 82   CALCIUM mg/dL 9.2   GLUCOSE mg/dL 279*   ALBUMIN g/dL 3.82   BILIRUBIN mg/dL 0.3   ALK PHOS U/L 62   AST (SGOT) U/L 26   ALT (SGPT) U/L 29   Estimated Creatinine Clearance: 111.3 mL/min (by C-G formula based on SCr of 0.92 mg/dL).  No results found for: AMMONIA    No results found for: HGBA1C, POCGLU  Lab Results   Component Value Date    HGBA1C 7.70 (H) 11/09/2018     Lab Results   Component Value Date    TSH 2.100 05/21/2021    FREET4 0.95 05/21/2021     Pain Management  Panel     Pain Management Panel Latest Ref Rng & Units 5/21/2021 10/4/2019    AMPHETAMINES SCREEN, URINE Negative Positive(A) Positive(A)    BARBITURATES SCREEN Negative Negative Negative    BENZODIAZEPINE SCREEN, URINE Negative Negative Negative    BUPRENORPHINEUR Negative Negative Negative    COCAINE SCREEN, URINE Negative Negative Negative    METHADONE SCREEN, URINE Negative Negative Negative        I have personally reviewed the above laboratory results.   ---------------------------------------------------------------------------------------------------------------------  Imaging Results (Last 7 Days)     Procedure Component Value Units Date/Time    CT Chest Without Contrast Diagnostic [[id number]] Collected: 05/22/21 0018     Updated: 05/22/21 0020    Narrative:      CT Chest WO    INDICATION:   Chest pain with shortness of breath for the past several days    TECHNIQUE:   CT of the thorax without IV contrast. Coronal and sagittal reconstructions were obtained.  Radiation dose reduction techniques included automated exposure control or exposure modulation based on body size. Count of known CT and cardiac nuc med studies  performed in previous 12 months: 0.     COMPARISON:   10/4/2019    FINDINGS:  Chest images at mediastinal window show no adenopathy or effusions. Coronary artery calcifications are seen in all 3 coronary distributions quite extensively.    Chest images at lung window show mild atelectasis at the right lung base posteriorly. There is nonspecific thickening along the major fissure on the left. It most likely reflects chronic pleural fibrosis.      Impression:      Mild right base atelectasis. Pleural thickening along the major fissure on the left. Three-vessel coronary artery disease. No acute lung infiltrates are noted.    Signer Name: Ruiz Zamudio MD   Signed: 5/22/2021 12:18 AM   Workstation Name: [email or web address]    [facility] Chest 1 View [[id number]] Collected:  05/21/21 2140     Updated: 05/21/21 2142    Narrative:      PROCEDURE: CR Chest 1 Vw    COMPARISON: October 2019    INDICATIONS: sob  Relevant clinical info: Sob... unable to get patient to lay on back     TECHNIQUE: Single AP  view of the chest    FINDINGS AND IMPRESSION COMBINED :        Examination is limited as patient is not adequately positioned and is in respiratory distress. Cardiac silhouette appears enlarged patient is rotated to the right. There is underpenetration of the left hemithorax. No gross dense consolidation seen.  Osseous structures are intact.             Signer Name: Lianna Chan MD   Signed: 5/21/2021 9:40 PM   Workstation Name: [email or web address]    [facility] have personally reviewed the above radiology results.     Last Echocardiogram:  Results for orders placed during the hospital encounter of 10/04/19    Transthoracic Echo Complete With Contrast if Necessary Per Protocol    Interpretation Summary  · Left ventricular wall thickness is consistent with mild concentric hypertrophy.  · Left atrial cavity size is mildly dilated.  · Estimated EF appears to be in the range of 56 - 60%.  · Left ventricular diastolic function is normal.  · Normal right ventricular cavity size and systolic function noted.  · Estimated right ventricular systolic pressure from tricuspid regurgitation is normal (<35 mmHg).  · The quality of the study is limited due to poor acoustic windows related to patient body habitus.    ---------------------------------------------------------------------------------------------------------------------    Assessment & Plan      Active Hospital Problems    Diagnosis  POA   • **Respiratory failure (CMS/HCC) [J96.90]  Yes   • Hyperlipidemia LDL goal <70 [E78.5]  Yes   • Essential hypertension [I10]  Yes     #Acute on chronic hypoxic and hypercarbic respiratory failure (2L NC at home)   #Sepsis criteria POA (pulse 100, RR 32) suspect 2/2 to acute COPD  exacerbation   #UDS positive for amphetamines  -Chest x-ray and noncontrast CT of chest shows no acute infiltrates  -Initial ABG with pH 7.325, PCO2 72.8, PO2 59.7 on 2 L nasal cannula  -Currently on BiPAP; ABG with pH 7.328, PCO2 68.4, PO2 104.0, O2 saturation 98.0 on IPAP 20, EPAP 8  -Suspect acute hypoxic and hypercarbic respiratory failure secondary to acute COPD exacerbation  -Patient was given IV Rocephin and IV doxycycline in the emergency department; we will continue with IV Rocephin and switch to IV azithromycin for anti-inflammatory properties  -Lactobacillus for GI protection  -Obtain respiratory culture, respiratory panel, MRSA swab  -Adjust antibiotic therapy as necessary  -Blood cultures pending x2  -IV Solu-Medrol 40 mg every 12 hours  -DuoNeb treatments ordered  -Incentive spirometry given, encourage use  -Continue supplemental oxygen as needed, titrate to maintain SPO2 greater than or equal to 90%  -Attempt to wean off BiPAP later this morning    #Periumbilical abdominal pain  -Obtain KUB  -Patient states he has been constipated; MiraLAX and Esperanza-Colace ordered    #Right ankle edema and ecchymosis secondary to recent fall  -X-ray of right ankle ordered  -Elevate lower extremity  -Tylenol as needed for pain    #Insulin-dependent type 2 diabetes mellitus  #Glucosuria  #Diabetic neuropathy  -Hemoglobin A1c ordered  -Hold home oral hypoglycemics to prevent hypoglycemia  -Will add SSI for now. Accu-cheks qAC and qhs; Titrate insulin therapy as necessary.    -Continue home Neurontin once reconciled per pharmacy    #Chronic normocytic anemia  -H&H stable; repeat a.m. CBC monitor H&H closely  -If hemoglobin less than 7 or platelets less than 50, will transfuse    #Reported history nonischemic cardiomyopathy  #Reported history of paroxysmal atrial fibrillation; anticoagulated on Eliquis  -Follows with Dr. Mccabe, cardiology as an outpatient; at his most recent visit in November 2019, cardiology noted that  there is no clear-cut documentation of atrial fibrillation or diagnosis of heart failure however, he was advised to continue Entresto and Eliquis  -Echo from 10/2019 reveals an EF of 56 to 60% with normal left ventricular diastolic function and normal right ventricular cavity size and systolic function  -Patient is currently in normal sinus rhythm  -Continue home Entresto, Lasix, and Eliquis once reconciled per pharmacy  -Monitor for signs of volume overload with daily weights, strict I's and O's    #CAD status post multiple stents in the past  -Currently chest pain-free; EKG shows no acute ischemic changes  -Continue home Lipitor and Coreg  -Monitor closely on telemetry    #Essential hypertension  -Continue home antihypertensive agents with holding parameters once reconciled per pharmacy  -We will make IV hydralazine available for SBP greater than 170  -Continue to monitor VS per hospital protocol    #Hyperlipidemia  -Continue home statin    #GERD  -Protonix    #Obstructive sleep apnea  -CPAP ordered    #Chronic back pain  -Patient is requesting pain medication, however, told him we will hold at this time given respiratory status  -Tylenol available as needed  -Lidoderm patches ordered    #Morbid obesity  -BMI 46.52 kg/m²  -Complicates all aspects of patient care  -Patient may have some underlying obesity hypoventilation syndrome as well      F/E/N: No IV fluids.  Replace electrolytes as necessary.  NPO  ---------------------------------------------------  DVT Prophylaxis: Eliquis  GI Prophylaxis: Protonix  Activity: Up with assistance, fall precaution    The patient is considered to be a high risk patient due to: Acute hypoxic and hypercarbic respiratory failure; sepsis secondary to acute COPD exacerbation    INPATIENT status due to the need for care which can only be reasonably provided in an hospital setting such as aggressive/expedited ancillary services and/or consultation services, the necessity for IV  medications, close physician monitoring and/or the possible need for procedures.  In such, I feel patient’s risk for adverse outcomes and need for care warrant INPATIENT evaluation and predict the patient’s care encounter to likely last beyond 2 midnights.    Code Status: FULL CODE     I have discussed the patient's assessment and plan with the patient, nursing staff, and attending physician      Nora Gomez PA-C  Hospitalist Service -- [facility]       05/22/21  01:42 EDT    Attending Physician: Vivienne Flores DO       Electronically signed by Vivienne Flores DO at 05/22/21 0747       Vital Signs (last day)     Date/Time   Temp   Temp src   Pulse   Resp   BP   Patient Position   SpO2    06/02/21 1436   98 (36.7)   Oral   67   18   150/83   Lying   97    06/02/21 1223   --   --   --   --   --   --   95    06/02/21 1100   97.6 (36.4)   Oral   86   20   159/92   Lying   94    06/02/21 0636   --   --   --   --   --   --   97    06/02/21 0626   --   --   80   22   --   --   97    06/02/21 0621   98.2 (36.8)   Oral   79   18   151/83   Lying   93    06/02/21 0241   97.6 (36.4)   Oral   79   16   143/92   Lying   96    06/02/21 0040   --   --   84   15   174/86   Sitting   --    06/02/21 0016   --   --   81   18   --   --   94    06/01/21 2236   --   --   68   14   169/68   Lying   --    06/01/21 1830   --   --   67   18   --   --   92    06/01/21 1818   97.9 (36.6)   Oral   63   20   110/60   Lying   96    06/01/21 1423   97.5 (36.4)   Oral   57   20   116/66   Lying   96    06/01/21 1313   --   --   59   18   --   --   99    06/01/21 1303   --   --   62   20   --   --   99    06/01/21 1042   97.3 (36.3)   Oral   66   20   148/87   Lying   96    06/01/21 0656   97.4 (36.3)   Oral   75   (!) 30   167/83   Sitting   92    06/01/21 0639   --   --   77   22   --   --   96    06/01/21 0630   --   --   82   22   --   --   95    06/01/21 0539   --   --   81   15   163/88   Sitting   --    06/01/21 0236    97 (36.1)   Axillary   69   18   153/71   Lying   99    06/01/21 0052   --   --   77   18   --   --   --    06/01/21 0042   --   --   76   18   --   --   97              Lines, Drains & Airways    Active LDAs     Name:   Placement date:   Placement time:   Site:   Days:    Peripheral IV 05/30/21 1837 Anterior;Right Wrist   05/30/21 1837    Wrist   2                  Current Facility-Administered Medications   Medication Dose Route Frequency Provider Last Rate Last Admin   • acetaminophen (TYLENOL) tablet 650 mg  650 mg Oral Q6H PRN Nora Gomez PA-C   650 mg at 05/28/21 0037   • albuterol (PROVENTIL) nebulizer solution 0.083% 2.5 mg/3mL  2.5 mg Nebulization Q6H PRN Joo Jimenez DO       • apixaban (ELIQUIS) tablet 5 mg  5 mg Oral Q12H Joo Jimenez DO   5 mg at 06/02/21 0905   • atorvastatin (LIPITOR) tablet 20 mg  20 mg Oral Daily Joo Jimenez DO   20 mg at 06/02/21 0905   • budesonide-formoterol (SYMBICORT) 160-4.5 MCG/ACT inhaler 2 puff  2 puff Inhalation BID - RT Joo Jimenez DO   2 puff at 06/02/21 0626   • calcium carbonate (TUMS) chewable tablet 500 mg (200 mg elemental)  2 tablet Oral TID PRN Nora Gomez PA-C       • Canagliflozin (INVOKANA) 300 MG tablet tablet 300 mg  300 mg Oral Daily Joo Jimenez DO   300 mg at 06/02/21 0905   • clopidogrel (PLAVIX) tablet 75 mg  75 mg Oral Daily Ryan Gorman MD   75 mg at 06/02/21 0905   • dextrose (D50W) 25 g/ 50mL Intravenous Solution 25 g  25 g Intravenous Q15 Min PRN Vivienne Flores DO       • dextrose (D50W) 25 g/ 50mL Intravenous Solution 25 g  25 g Intravenous Q15 Min PRN Nora Gomez PA-C       • dextrose (GLUTOSE) oral gel 15 g  15 g Oral Q15 Min PRN Sandra, Vivienne Branden, DO       • dextrose (GLUTOSE) oral gel 15 g  15 g Oral Q15 Min PRN Nora Gomez PA-C       • docusate sodium (COLACE) capsule 100 mg  100 mg Oral BID Joo iJmenez, DO   100  mg at 06/02/21 0904   • fluticasone (FLONASE) 50 MCG/ACT nasal spray 2 spray  2 spray Nasal Daily Joo Jimenez DO   2 spray at 06/02/21 0906   • gabapentin (NEURONTIN) capsule 600 mg  600 mg Oral Q8H Joo Jimenez DO   600 mg at 06/02/21 1341   • glucagon (human recombinant) (GLUCAGEN DIAGNOSTIC) injection 1 mg  1 mg Subcutaneous Q15 Min PRN Vivienne Flores DO       • glucagon (human recombinant) (GLUCAGEN DIAGNOSTIC) injection 1 mg  1 mg Subcutaneous Q15 Min PRN Nora Gomez PA-C       • hydrALAZINE (APRESOLINE) injection 10 mg  10 mg Intravenous Q6H PRN Nora Gomez PA-C   10 mg at 05/27/21 2257   • HYDROcodone-acetaminophen (NORCO) 5-325 MG per tablet 1 tablet  1 tablet Oral Q8H PRN Joo Jimenez DO   1 tablet at 06/02/21 1708   • hydrOXYzine (ATARAX) tablet 25 mg  25 mg Oral TID PRN Nora Gomez PA-C   25 mg at 06/02/21 1708   • insulin aspart (novoLOG) injection 0-9 Units  0-9 Units Subcutaneous TID AC Nora Gomez PA-C   4 Units at 06/02/21 1144   • insulin aspart (novoLOG) injection 30 Units  30 Units Subcutaneous TID With Meals Dequan Connelly MD   30 Units at 06/02/21 1709   • insulin detemir (LEVEMIR) injection 40 Units  40 Units Subcutaneous Daily Dequan Connelly MD   40 Units at 06/02/21 0904   • ipratropium (ATROVENT) nebulizer solution 0.5 mg  0.5 mg Nebulization Q6H PRN Joo Jimenez DO       • ipratropium-albuterol (DUO-NEB) nebulizer solution 3 mL  3 mL Nebulization Q4H PRN Joo Jimenez DO       • ipratropium-albuterol (DUO-NEB) nebulizer solution 3 mL  3 mL Nebulization Q6H - RT Nora Gomez PA-C   3 mL at 06/02/21 1223   • lidocaine (LIDODERM) 5 % 2 patch  2 patch Transdermal Q24H Nora Gomez PA-C   2 patch at 06/02/21 0904   • [START ON 6/3/2021] lisinopril (PRINIVIL,ZESTRIL) tablet 20 mg  20 mg Oral Q24H Joo Jimenez,        • melatonin tablet 5 mg  5 mg Oral Nightly  PRN Nora Gomez PA-C   5 mg at 05/22/21 2012   • metoprolol succinate XL (TOPROL-XL) 24 hr tablet 75 mg  75 mg Oral Q24H Ryan Gorman MD   75 mg at 06/02/21 0905   • nitroglycerin (NITROSTAT) SL tablet 0.4 mg  0.4 mg Sublingual Q5 Min PRN Vivienne Flores DO       • pantoprazole (PROTONIX) EC tablet 40 mg  40 mg Oral Q AM Nora Gomez PA-C   40 mg at 06/02/21 0536   • polyethylene glycol (MIRALAX) packet 17 g  17 g Oral Daily Nora Gomez PA-C   17 g at 06/02/21 0904   • sennosides-docusate (PERICOLACE) 8.6-50 MG per tablet 1 tablet  1 tablet Oral Nightly PRN Nora Gomez PA-C       • sodium chloride 0.9 % flush 10 mL  10 mL Intravenous PRN Viivenne Flores DO       • sodium chloride 0.9 % flush 10 mL  10 mL Intravenous Q12H Vivienne Flores DO   10 mL at 06/02/21 0904   • sodium chloride 0.9 % flush 10 mL  10 mL Intravenous PRN Vivienne Flores DO       • tamsulosin (FLOMAX) 24 hr capsule 0.4 mg  0.4 mg Oral Nightly Joo Jimenez DO   0.4 mg at 06/01/21 2127   • traMADol (ULTRAM) tablet 50 mg  50 mg Oral Q6H PRN Dequan Connelly MD   50 mg at 06/02/21 0015       Lab Results (last 24 hours)     Procedure Component Value Units Date/Time    POC Glucose Once [[id number]]  (Abnormal) Collected: 06/02/21 1609    Specimen: Blood Updated: 06/02/21 1623     Glucose 136 mg/dL     POC Glucose Once [[id number]]  (Abnormal) Collected: 06/02/21 1024    Specimen: Blood Updated: 06/02/21 1047     Glucose 208 mg/dL     POC Glucose Once [[id number]]  (Abnormal) Collected: 06/02/21 0635    Specimen: Blood Updated: 06/02/21 0641     Glucose 172 mg/dL     Basic Metabolic Panel [[id number]]  (Abnormal) Collected: 06/02/21 0111    Specimen: Blood Updated: 06/02/21 0142     Glucose 274 mg/dL      BUN 21 mg/dL      Creatinine 1.12 mg/dL      Sodium 136 mmol/L      Potassium 4.6 mmol/L      Comment: Slight hemolysis detected by analyzer. Results may be affected.         Chloride 99 mmol/L      CO2 26.7 mmol/L      Calcium 9.3 mg/dL      eGFR Non African Amer 66 mL/min/1.73      BUN/Creatinine Ratio 18.8     Anion Gap 10.3 mmol/L     Narrative:      GFR Normal >60  Chronic Kidney Disease <60  Kidney Failure <15      Potassium [[id number]]  (Normal) Collected: 06/02/21 0108    Specimen: Blood Updated: 06/02/21 0141     Potassium 4.7 mmol/L      Comment: Slight hemolysis detected by analyzer. Results may be affected.       C-reactive Protein [[id number]]  (Abnormal) Collected: 06/02/21 0108    Specimen: Blood Updated: 06/02/21 0141     C-Reactive Protein 0.98 mg/dL     Magnesium [[id number]]  (Normal) Collected: 06/02/21 0108    Specimen: Blood Updated: 06/02/21 0141     Magnesium 2.0 mg/dL     CBC (No Diff) [[id number]]  (Abnormal) Collected: 06/02/21 0111    Specimen: Blood Updated: 06/02/21 0121     WBC 7.59 10*3/mm3      RBC 4.50 10*6/mm3      Hemoglobin 11.2 g/dL      Hematocrit 38.7 %      MCV 86.0 fL      MCH 24.9 pg      MCHC 28.9 g/dL      RDW 16.9 %      RDW-SD 52.5 fl      MPV 8.9 fL      Platelets 194 10*3/mm3         Orders (last 24 hrs)      Start     Ordered    06/03/21 0900  lisinopril (PRINIVIL,ZESTRIL) tablet 20 mg  Every 24 Hours Scheduled      06/02/21 1428    06/03/21 0600  CBC (No Diff)  Morning Draw      06/02/21 1427    06/03/21 0600  Basic Metabolic Panel  Morning Draw      06/02/21 1427    06/02/21 1624  POC Glucose Once  Once      06/02/21 1609    06/02/21 1048  POC Glucose Once  Once      06/02/21 1024    06/02/21 0642  POC Glucose Once  Once      06/02/21 0635    06/02/21 0034  Magnesium  STAT      06/02/21 0033    06/02/21 0034  Potassium  STAT      06/02/21 0033    06/02/21 0034  ECG 12 Lead  STAT      06/02/21 0033    06/01/21 1835  POC Glucose Once  Once      06/01/21 1828    06/01/21 1235  HYDROcodone-acetaminophen (NORCO) 5-325 MG per tablet 1 tablet  Every 8 Hours PRN      06/01/21 1235    05/31/21 0900  insulin detemir (LEVEMIR) injection 40 Units   Daily      05/30/21 1453    05/31/21 0703  C-reactive Protein  Daily      05/31/21 0702    05/29/21 1400  lisinopril (PRINIVIL,ZESTRIL) tablet 5 mg  Every 24 Hours Scheduled,   Status:  Discontinued      05/29/21 1224    05/28/21 1800  insulin aspart (novoLOG) injection 30 Units  3 Times Daily With Meals      05/28/21 1645    05/27/21 1300  ipratropium-albuterol (DUO-NEB) nebulizer solution 3 mL  Every 6 Hours - RT      05/27/21 0854    05/27/21 1100  metoprolol succinate XL (TOPROL-XL) 24 hr tablet 75 mg  Every 24 Hours Scheduled      05/27/21 0932    05/25/21 1145  clopidogrel (PLAVIX) tablet 75 mg  Daily      05/25/21 1134    05/23/21 2100  tamsulosin (FLOMAX) 24 hr capsule 0.4 mg  Nightly      05/23/21 1031    05/23/21 1400  gabapentin (NEURONTIN) capsule 600 mg  Every 8 Hours Scheduled      05/23/21 1031    05/23/21 1300  apixaban (ELIQUIS) tablet 5 mg  Every 12 Hours Scheduled      05/23/21 1031    05/23/21 1300  fluticasone (FLONASE) 50 MCG/ACT nasal spray 2 spray  Daily      05/23/21 1031    05/23/21 1300  atorvastatin (LIPITOR) tablet 20 mg  Daily      05/23/21 1031    05/23/21 1300  Canagliflozin (INVOKANA) 300 MG tablet tablet 300 mg  Daily      05/23/21 1031    05/23/21 1230  budesonide-formoterol (SYMBICORT) 160-4.5 MCG/ACT inhaler 2 puff  2 Times Daily - RT      05/23/21 1057    05/23/21 1126  ipratropium (ATROVENT) nebulizer solution 0.5 mg  Every 6 Hours PRN      05/23/21 1127    05/23/21 1059  traMADol (ULTRAM) tablet 50 mg  Every 6 Hours PRN      05/23/21 1059    05/23/21 1030  albuterol (PROVENTIL) nebulizer solution 0.083% 2.5 mg/3mL  Every 6 Hours PRN      05/23/21 1031    05/23/21 1030  ipratropium-albuterol (DUO-NEB) nebulizer solution 3 mL  Every 4 Hours PRN      05/23/21 1031    05/23/21 1000  docusate sodium (COLACE) capsule 100 mg  2 Times Daily      05/23/21 0839    05/22/21 0900  sodium chloride 0.9 % flush 10 mL  Every 12 Hours Scheduled      05/22/21 0214    05/22/21 0900  polyethylene  glycol (MIRALAX) packet 17 g  Daily      05/22/21 0347    05/22/21 0900  lidocaine (LIDODERM) 5 % 2 patch  Every 24 Hours Scheduled      05/22/21 0348    05/22/21 0730  insulin aspart (novoLOG) injection 0-9 Units  3 Times Daily Before Meals      05/22/21 0348    05/22/21 0700  POC Glucose 4x Daily AC & at Bedtime  4 Times Daily Before Meals & at Bedtime     Comments: If bedtime blood glucose is greater than 350 mg/dl, call MD.      05/22/21 0214    05/22/21 0700  POC Glucose 4x Daily AC & at Bedtime  4 Times Daily Before Meals & at Bedtime     Comments: If bedtime blood glucose is greater than 350 mg/dl, call MD.      05/22/21 0348 05/22/21 0600  Incentive Spirometry  Every 4 Hours While Awake      05/22/21 0347    05/22/21 0600  pantoprazole (PROTONIX) EC tablet 40 mg  Every Early Morning      05/22/21 0348    05/22/21 0400  Vital Signs  Every 4 Hours     Comments: Per per hospital policy    05/22/21 0214    05/22/21 0400  Strict Intake & Output  Every 4 Hours      05/22/21 0348    05/22/21 0349  Daily Weights  Daily      05/22/21 0348    05/22/21 0347  melatonin tablet 5 mg  Nightly PRN      05/22/21 0348    05/22/21 0347  hydrOXYzine (ATARAX) tablet 25 mg  3 Times Daily PRN      05/22/21 0348    05/22/21 0347  calcium carbonate (TUMS) chewable tablet 500 mg (200 mg elemental)  3 Times Daily PRN      05/22/21 0348    05/22/21 0346  hydrALAZINE (APRESOLINE) injection 10 mg  Every 6 Hours PRN      05/22/21 0348    05/22/21 0346  dextrose (GLUTOSE) oral gel 15 g  Every 15 Minutes PRN      05/22/21 0347    05/22/21 0346  dextrose (D50W) 25 g/ 50mL Intravenous Solution 25 g  Every 15 Minutes PRN      05/22/21 0348    05/22/21 0346  glucagon (human recombinant) (GLUCAGEN DIAGNOSTIC) injection 1 mg  Every 15 Minutes PRN      05/22/21 0348    05/22/21 0345  sennosides-docusate (PERICOLACE) 8.6-50 MG per tablet 1 tablet  Nightly PRN      05/22/21 0347    05/22/21 0345  acetaminophen (TYLENOL) tablet 650 mg  Every 6  Hours PRN      05/22/21 0347    05/22/21 0215  Intake & Output  Every Shift      05/22/21 0214    05/22/21 0214  sodium chloride 0.9 % flush 10 mL  As Needed      05/22/21 0214 05/22/21 0214  dextrose (GLUTOSE) oral gel 15 g  Every 15 Minutes PRN      05/22/21 0214    05/22/21 0214  dextrose (D50W) 25 g/ 50mL Intravenous Solution 25 g  Every 15 Minutes PRN      05/22/21 0214    05/22/21 0214  glucagon (human recombinant) (GLUCAGEN DIAGNOSTIC) injection 1 mg  Every 15 Minutes PRN      05/22/21 0214    05/22/21 0214  nitroglycerin (NITROSTAT) SL tablet 0.4 mg  Every 5 Minutes PRN      05/22/21 0214 05/21/21 2046  sodium chloride 0.9 % flush 10 mL  As Needed      05/21/21 2049    Unscheduled  Telemetry - Pulse Oximetry  Continuous PRN     Comments: If Patient Develops Unresponsiveness, Acute Dyspnea, Cyanosis or Suspected Hypoxemia Start Continuous Pulse Ox Monitoring, Apply Oxygen & Notify Provider    05/22/21 0214    Unscheduled  Oxygen Therapy- Nasal Cannula; Titrate for SPO2: 90% - 95%  Continuous PRN     Comments: If Patient Develops Unresponsiveness, Acute Dyspnea, Cyanosis or Suspected Hypoxemia Start Continuous Pulse Ox Monitoring, Apply Oxygen & Notify Provider    05/22/21 0214    Unscheduled  ECG 12 Lead  As Needed     Comments: Nurse to Release if Patient Expericences Acute Chest Pain or Dysrhythmias    05/22/21 0214    Unscheduled  Potassium  As Needed     Comments: For Ventricular Arrhythmias      05/22/21 0214    Unscheduled  Magnesium  As Needed     Comments: For Ventricular Arrhythmias      05/22/21 0214    Unscheduled  Troponin  As Needed     Comments: For Chest Pain      05/22/21 0214    Unscheduled  Digoxin Level  As Needed     Comments: For Atrial Arrhythmias      05/22/21 0214    Unscheduled  Blood Gas, Arterial -With Co-Ox Panel: Yes  As Needed     Comments: Per O2 PolicyNotify Physician      05/22/21 0214    Unscheduled  Up With Assistance  As Needed      05/22/21 0214    --   amoxicillin-clavulanate (AUGMENTIN) 875-125 MG per tablet  2 Times Daily      05/22/21 1731    --  Budeson-Glycopyrrol-Formoterol (BREZTRI) 160-9-4.8 MCG/ACT aerosol inhaler  2 Times Daily      05/22/21 1732    --  carvedilol (COREG) 25 MG tablet  2 Times Daily With Meals      05/22/21 1732    --  Dapagliflozin Propanediol (Farxiga) 10 MG tablet  Daily      05/22/21 1732    --  furosemide (LASIX) 40 MG tablet  Daily      05/22/21 1732    --  gabapentin (NEURONTIN) 600 MG tablet  3 Times Daily      05/22/21 1732    --  Insulin Lispro, 1 Unit Dial, (HumaLOG KwikPen) 100 UNIT/ML solution pen-injector  3 Times Daily      05/22/21 1732    --  lidocaine (LIDODERM) 5 %  Every 24 Hours      05/22/21 1801    --  magnesium oxide (MAG-OX) 400 MG tablet  Daily      05/22/21 1801    --  pantoprazole (PROTONIX) 40 MG EC tablet  Daily      05/22/21 1801    --  sildenafil (REVATIO) 20 MG tablet  As Needed      05/22/21 1832    --  SCANNED - TELEMETRY        05/21/21 0000    --  SCANNED - TELEMETRY        05/21/21 0000    --  SCANNED - TELEMETRY        05/21/21 0000    --  SCANNED - TELEMETRY        05/21/21 0000    --  SCANNED - TELEMETRY        05/21/21 0000    --  SCANNED - TELEMETRY        05/21/21 0000    --  SCANNED - TELEMETRY        05/21/21 0000    --  SCANNED - TELEMETRY        05/21/21 0000    --  SCANNED - TELEMETRY        05/21/21 0000    --  SCANNED - TELEMETRY        05/21/21 0000    --  SCANNED - TELEMETRY        05/21/21 0000    --  SCANNED - TELEMETRY        05/21/21 0000    --  SCANNED - TELEMETRY        05/21/21 0000    --  SCANNED - TELEMETRY        05/21/21 0000    --  SCANNED - TELEMETRY        05/21/21 0000    --  SCANNED - TELEMETRY        05/21/21 0000                Operative/Procedure Notes (last 24 hours) (Notes from 06/01/21 1724 through 06/02/21 1724)    No notes of this type exist for this encounter.            Physician Progress Notes (last 24 hours) (Notes from 06/01/21 1721 through 06/02/21 1727)       Joo Jimenez DO at 21 1425              [facility] HOSPITALIST PROGRESS NOTE     Patient Identification:  Name:  Иван Miller  Age:  65 y.o.  Sex:  male  :  1955  MRN:  [id number]  Visit Number:  [id number]  Primary Care Provider:  Rosi Thomas APRN    Length of stay:  11    Chief complaint: Back pain    Subjective:    Patient states his breathing is doing well today but continues to report of back pain.  Patient states this is chronic back pain and relatively unchanged compared to his normal back pain.  However, he is requesting an increase in opioid analgesics.  I informed patient this would not occur and he did get upset with me.  There appears to be no clinical exam changes compared to any other exam ever performed during this hospital stay and there is no clear evidence/reason to increase current pain medication regimen.  ----------------------------------------------------------------------------------------------------------------------  Current Hospital Meds:  apixaban, 5 mg, Oral, Q12H  atorvastatin, 20 mg, Oral, Daily  budesonide-formoterol, 2 puff, Inhalation, BID - RT  Canagliflozin, 300 mg, Oral, Daily  clopidogrel, 75 mg, Oral, Daily  docusate sodium, 100 mg, Oral, BID  fluticasone, 2 spray, Nasal, Daily  gabapentin, 600 mg, Oral, Q8H  insulin aspart, 0-9 Units, Subcutaneous, TID AC  insulin aspart, 30 Units, Subcutaneous, TID With Meals  insulin detemir, 40 Units, Subcutaneous, Daily  ipratropium-albuterol, 3 mL, Nebulization, Q6H - RT  lidocaine, 2 patch, Transdermal, Q24H  lisinopril, 5 mg, Oral, Q24H  metoprolol succinate XL, 75 mg, Oral, Q24H  pantoprazole, 40 mg, Oral, Q AM  polyethylene glycol, 17 g, Oral, Daily  sodium chloride, 10 mL, Intravenous, Q12H  tamsulosin, 0.4 mg, Oral, Nightly         ----------------------------------------------------------------------------------------------------------------------  Vital Signs:  Temp:  [97.6 °F (36.4  °C)-98.2 °F (36.8 °C)] 97.6 °F (36.4 °C)  Heart Rate:  [63-86] 86  Resp:  [14-22] 20  BP: (110-174)/(60-92) 159/92      05/31/21  0500 06/01/21  0509 06/02/21  0504   Weight: (!) 146 kg (321 lb 9.6 oz) (Lead Nurse Aura and RN Chris willams) (!) 146 kg (320 lb 14.4 oz) (!) 148 kg (326 lb 9.6 oz)     Body mass index is 48.23 kg/m².    Intake/Output Summary (Last 24 hours) at 6/2/2021 1425  Last data filed at 6/2/2021 1147  Gross per 24 hour   Intake 540 ml   Output 2500 ml   Net -1960 ml     Diet Regular; Cardiac, Consistent Carbohydrate  ----------------------------------------------------------------------------------------------------------------------  Physical exam:  Constitutional: Morbidly obese  male in no apparent distress.     HENT:  Head:  Normocephalic and atraumatic.  Mouth:  Moist mucous membranes.    Eyes:  Conjunctivae and EOM are normal.  Pupils are equal, round, and reactive to light.  No scleral icterus.    Neck:  Neck supple. No thyromegaly.  No JVD present.    Cardiovascular:  Regular rate and rhythm with no murmurs, rubs, clicks or gallops appreciated.  Pulmonary/Chest:  Clear to auscultation bilaterally with no crackles, wheezes or rhonchi appreciated.  Abdominal:  Soft. Nontender. Nondistended  Bowel sounds are normal in all four quadrants. No organomegally appreciated.   Neurological:  Cranial nerves II-XII intact with no focal deficits.  No facial droop.  No slurred speech.   Skin:  Warm and dry to palpation with no rashes or lesions appreciated.  Peripheral vascular:  2+ radial and pedal pulses in bilateral upper and lower extremities.  Psychiatric:  Alert and oriented x3, demonstrates appropriate judgement and insight.    ----------------------------------------------------------------------------------------------------------------------  Tele:    ----------------------------------------------------------------------------------------------------------------------  Results  from last 7 days   Lab Units 05/27/21 2204 05/27/21  1548   TROPONIN T ng/mL <0.010 <0.010     Results from last 7 days   Lab Units 06/02/21  0111 06/02/21 0108 05/31/21 0117 05/30/21 0433 05/29/21  0306   CRP mg/dL  --  0.98* 0.87*  --  0.34   WBC 10*3/mm3 7.59  --  8.49 8.38 8.77   HEMOGLOBIN g/dL 11.2*  --  12.3* 12.1* 11.7*   HEMATOCRIT % 38.7  --  43.0 42.1 39.8   MCV fL 86.0  --  84.6 85.7 83.6   MCHC g/dL 28.9*  --  28.6* 28.7* 29.4*   PLATELETS 10*3/mm3 194  --  234 217 228         Results from last 7 days   Lab Units 06/02/21  0111 06/02/21 0108 05/31/21 0117 05/30/21 0433 05/29/21  0306 05/28/21  0059 05/27/21  0324   SODIUM mmol/L 136  --  137 138 139 137 138   POTASSIUM mmol/L 4.6 4.7 4.5 4.0 4.2 4.5 4.4   MAGNESIUM mg/dL  --  2.0  --  2.4  --   --  2.7*   CHLORIDE mmol/L 99  --  96* 97* 99 99 97*   CO2 mmol/L 26.7  --  35.4* 33.3* 33.6* 30.4* 36.4*   BUN mg/dL 21  --  20 19 19 21 25*   CREATININE mg/dL 1.12  --  1.24 0.95 0.87 0.91 0.86   EGFR IF NONAFRICN AM mL/min/1.73 66  --  58* 79 88 83 89   CALCIUM mg/dL 9.3  --  9.5 9.3 9.2 9.1 9.2   GLUCOSE mg/dL 274*  --  164* 156* 120* 220* 164*   ALBUMIN g/dL  --   --   --   --  3.35* 3.49* 3.54   BILIRUBIN mg/dL  --   --   --   --  0.4 0.3 0.4   ALK PHOS U/L  --   --   --   --  54 56 51   AST (SGOT) U/L  --   --   --   --  15 17 13   ALT (SGPT) U/L  --   --   --   --  20 21 21   Estimated Creatinine Clearance: 93.6 mL/min (by C-G formula based on SCr of 1.12 mg/dL).    No results found for: AMMONIA      No results found for: BLOODCX  No results found for: URINECX  No results found for: WOUNDCX  No results found for: STOOLCX    I have personally looked at the labs and they are summarized above.  ----------------------------------------------------------------------------------------------------------------------  Imaging Results (Last 24 Hours)     ** No results found for the last 24 hours. **         ----------------------------------------------------------------------------------------------------------------------  Assessment and Plan:    1.  Acute on chronic hypoxic/hypercapnic respiratory failure -acute phase resolved, continue 2 L nasal cannula which is patient's baseline.    2.  COPD exacerbation -resolved    3.  Generalized weakness -patient was denied inpatient rehab.  Currently awaiting skilled nursing facility placement.    4.  Paroxysmal A. Fib - currently in NSR, continue Eliquis    5.  Essential hypertension  -patient with intermittent elevation in blood pressure.  Will increase lisinopril to 20 mg p.o. daily.    6.  Insulin-dependent type 2 diabetes mellitus -continue sliding scale insulin with Accu-Cheks before every meal and nightly    Disposition currently pending SNF placement        Joo Jimenez DO  21  14:25 EDT    Electronically signed by Joo Jimenez DO at 21 1427       Consult Notes (last 24 hours) (Notes from 21 1724 through 21 1724)    No notes of this type exist for this encounter.            Physical Therapy Notes (last 24 hours) (Notes from 21 1724 through 21 1724)      Keely Agosto, PT at 21 1405  Version 1 of 1         Acute Care - Physical Therapy Treatment Note   [facility]     Patient Name: Иван Miller  : 1955  MRN: [id number]  Today's Date: 2021   Onset of Illness/Injury or Date of Surgery: 21       PT Assessment (last 12 hours)      PT Evaluation and Treatment     Row Name 21 1300          Physical Therapy Time and Intention    Subjective Information  no complaints  -BC     Document Type  therapy note (daily note)  -BC     Mode of Treatment  physical therapy  -BC     Patient Effort  good  -BC     Row Name 21 1300          General Information    Patient Profile Reviewed  yes  -BC     Onset of Illness/Injury or Date of Surgery  21  -BC     Referring Physician     Jimenez  -BC     Patient Observations  alert;cooperative;agree to therapy  -BC     Prior Level of Function  mod assist:  -BC     Risks Reviewed  patient:;LOB;nausea/vomiting;dizziness;increased discomfort;change in vital signs;increased drainage;lines disloged  -BC     Benefits Reviewed  patient:;improve function;increase independence;increase strength;increase balance;decrease pain;decrease risk of DVT;improve skin integrity;increase knowledge  -BC     Row Name 06/02/21 1300          Previous Level of Function/Home Environm    BADLs, Premorbid Functional Level  needs assistive device for safe performance  -BC     IADLs, Premorbid Functional Level  needs adaptive equipment for safe performance  -BC     Row Name 06/02/21 1300          Living Environment    Current Living Arrangements  home/apartment/condo  -BC     Lives With  child(ara), adult  -BC     Row Name 06/02/21 1300          Cognition    Affect/Mental Status (Cognitive)  WFL  -BC     Orientation Status (Cognition)  oriented to;person;place;situation  -BC     Follows Commands (Cognition)  follows two-step commands  -BC     Row Name 06/02/21 1300          Range of Motion Comprehensive    General Range of Motion  bilateral upper extremity ROM WNL  -BC     Row Name 06/02/21 1300          Mobility    Extremity Weight-bearing Status  left lower extremity;right lower extremity  -BC     Left Lower Extremity (Weight-bearing Status)  full weight-bearing (FWB)  -BC     Right Lower Extremity (Weight-bearing Status)  full weight-bearing (FWB)  -BC     Row Name 06/02/21 1300          Bed Mobility    Bed Mobility  bed mobility (all) activities  -BC     All Activities, [place] (Bed Mobility)  moderate assist (50% patient effort)  -BC     Supine-Sit [place] (Bed Mobility)  moderate assist (50% patient effort)  -BC     Sit-Supine [place] (Bed Mobility)  moderate assist (50% patient effort)  -BC     Bed Mobility, Safety Issues  decreased use of legs for  bridging/pushing  -BC     Assistive Device (Bed Mobility)  bed rails  -BC     Row Name 06/02/21 1300          Transfers    Transfers  sit-stand transfer;stand-sit transfer  -BC     Maintains Weight-bearing Status (Transfers)  able to maintain  -BC     Sit-Stand [place] (Transfers)  minimum assist (75% patient effort)  -BC     Stand-Sit [place] (Transfers)  minimum assist (75% patient effort)  -BC     Row Name 06/02/21 1300          Sit-Stand Transfer    Assistive Device (Sit-Stand Transfers)  walker, front-wheeled  -BC     Row Name 06/02/21 1300          Stand-Sit Transfer    Assistive Device (Stand-Sit Transfers)  walker, front-wheeled  -BC     Row Name 06/02/21 1300          Gait/Stairs (Locomotion)    Gait/Stairs Locomotion  gait/ambulation independence  -BC     [place] Level (Gait)  minimum assist (75% patient effort)  -BC     Assistive Device (Gait)  walker, front-wheeled  -BC     Distance in Feet (Gait)  3 ft  -BC     Row Name 06/02/21 1300          Coping    Observed Emotional State  calm;cooperative  -BC     Verbalized Emotional State  acceptance  -BC     Family/Support Persons  family  -BC     Involvement in Care  not present at bedside  -BC     Family/Support System Care  self-care encouraged;support provided  -BC     Diversional Activities  television  -BC     Row Name 06/02/21 1300          Plan of Care Review    Plan of Care Reviewed With  patient  -BC     Progress  improving  -BC     Row Name 06/02/21 1300          Positioning and Restraints    Pre-Treatment Position  in bed  -BC     Post Treatment Position  bed  -BC     In Bed  notified nsg;supine;encouraged to call for assist;call light within reach;side rails up x3  -BC     In Chair  notified nsg;sitting;call light within reach;encouraged to call for assist  -BC       User Key  (r) = Recorded By, (t) = Taken By, (c) = Cosigned By    Initials Name Provider Type    BC Keely Agosto PT Physical Therapist        Physical Therapy  Education                 Title: PT OT SLP Therapies (Done)     Topic: Physical Therapy (Done)     Point: Mobility training (Done)     Learning Progress Summary           Patient Acceptance, E, VU by EB at 6/2/2021 0713    Acceptance, E, VU by SM at 6/2/2021 0030    Acceptance, E, VU by EB at 6/1/2021 0739    Acceptance, E, VU by SM at 5/31/2021 2242    Acceptance, E, VU by NIR at 5/31/2021 0924    Acceptance, E, VU by SM at 5/31/2021 0035    Acceptance, E,TB, VU by MM at 5/28/2021 1802    Acceptance, E,TB, VU by MM at 5/27/2021 1143    Acceptance, E, VU by SM at 5/26/2021 2344    Acceptance, E,TB, VU by MM at 5/26/2021 0929    Acceptance, E,TB,D, VU,NR by AG at 5/24/2021 1249                   Point: Home exercise program (Done)     Learning Progress Summary           Patient Acceptance, E, VU by EB at 6/2/2021 0713    Acceptance, E, VU by SM at 6/2/2021 0030    Acceptance, E, VU by EB at 6/1/2021 0739    Acceptance, E, VU by SM at 5/31/2021 2242    Acceptance, E, VU by NIR at 5/31/2021 0924    Acceptance, E, VU by SM at 5/31/2021 0035    Acceptance, E,TB, VU by MM at 5/28/2021 1802    Acceptance, E,TB, VU by MM at 5/27/2021 1143    Acceptance, E, VU by SM at 5/26/2021 2344    Acceptance, E,TB, VU by MM at 5/26/2021 0929    Acceptance, E,TB,D, VU,NR by AG at 5/24/2021 1249                   Point: Body mechanics (Done)     Learning Progress Summary           Patient Acceptance, E, VU by EB at 6/2/2021 0713    Acceptance, E, VU by SM at 6/2/2021 0030    Acceptance, E, VU by EB at 6/1/2021 0739    Acceptance, E, VU by SM at 5/31/2021 2242    Acceptance, E, VU by NIR at 5/31/2021 0924    Acceptance, E, VU by SM at 5/31/2021 0035    Acceptance, E,TB, VU by MM at 5/28/2021 1802    Acceptance, E,TB, VU by MM at 5/27/2021 1143    Acceptance, E, VU by SM at 5/26/2021 2344    Acceptance, E,TB, VU by MM at 5/26/2021 0929    Acceptance, E,TB,D, VU,NR by AG at 5/24/2021 1249                   Point: Precautions (Done)      Learning Progress Summary           Patient Acceptance, E, VU by EB at 6/2/2021 0713    Acceptance, E, VU by  at 6/2/2021 0030    Acceptance, E, VU by EB at 6/1/2021 0739    Acceptance, E, VU by  at 5/31/2021 2242    Acceptance, E, VU by  at 5/31/2021 0924    Acceptance, E, VU by  at 5/31/2021 0035    Acceptance, E,TB, VU by MM at 5/28/2021 1802    Acceptance, E,TB, VU by MM at 5/27/2021 1143    Acceptance, E, VU by  at 5/26/2021 2344    Acceptance, E,TB, VU by MM at 5/26/2021 0929    Acceptance, E,TB,D, VU,NR by  at 5/24/2021 1249                               User Key     Initials Effective Dates Name Provider Type Discipline    EB 06/16/16 -  Keily Godoy, RN Registered Nurse Nurse     04/03/18 -  Elvia Rousseau, PT Physical Therapist PT     07/19/18 -  Cruz Umanzor, RN Registered Nurse Nurse     07/08/19 -  Esha Lee, RN Registered Nurse Nurse     09/12/18 -  Chris Das, MARCELO Registered Nurse Nurse              PT Recommendation and Plan     Plan of Care Reviewed With: patient  Progress: improving       Time Calculation:   PT Charges     Row Name 06/02/21 1403             Time Calculation    PT Received On  06/02/21  -BC         Time Calculation- PT    Total Timed Code Minutes- PT  45 minute(s)  -BC         Timed Charges    46793 - PT Therapeutic Exercise Minutes  30  -BC      12328 - Gait Training Minutes   15  -BC         Total Minutes    Timed Charges Total Minutes  45  -BC       Total Minutes  45  -BC        User Key  (r) = Recorded By, (t) = Taken By, (c) = Cosigned By    Initials Name Provider Type    BC Keely Agosto, PT Physical Therapist        Therapy Charges for Today     Code Description Service Date Service Provider Modifiers Qty    95786618880 HC PT THER PROC EA 15 MIN 6/1/2021 Keely Agosto, PT GP 2    19153135346 HC PT THER PROC EA 15 MIN 6/2/2021 Keely Agosto, PT GP 2    13809386987 HC GAIT TRAINING EA 15 MIN 6/2/2021 Keely Agosto PT  GP 1               Keely Agosto, PT  6/2/2021      Electronically signed by Keely Agosto, PT at 06/02/21 1405       Occupational Therapy Notes (last 24 hours) (Notes from 06/01/21 1724 through 06/02/21 1724)    No notes exist for this encounter.         ADL Documentation (last day)     Date/Time Transferring Toileting Bathing Dressing Eating Communication Swallowing    06/02/21 0711  3 - assistive equipment and person  3 - assistive equipment and person  2 - assistive person  2 - assistive person  0 - independent  0 - understands/communicates without difficulty  0 - swallows foods/liquids without difficulty    06/01/21 2127  3 - assistive equipment and person  3 - assistive equipment and person  2 - assistive person  2 - assistive person  0 - independent  0 - understands/communicates without difficulty  0 - swallows foods/liquids without difficulty    06/01/21 0737  3 - assistive equipment and person  3 - assistive equipment and person  2 - assistive person  2 - assistive person  0 - independent  0 - understands/communicates without difficulty  0 - swallows foods/liquids without difficulty

## 2021-06-02 NOTE — PLAN OF CARE
Goal Outcome Evaluation:         Patient has been resting in bed tonight. Patient c/o pain in his neck and would like his pain medicine increased. Otherwise no new issues noted. Will continue to monitor.

## 2021-06-02 NOTE — THERAPY TREATMENT NOTE
Acute Care - Physical Therapy Treatment Note   [facility]     Patient Name: Иван Miller  : 1955  MRN: [id number]  Today's Date: 2021   Onset of Illness/Injury or Date of Surgery: 21       PT Assessment (last 12 hours)      PT Evaluation and Treatment     Row Name 21 1300          Physical Therapy Time and Intention    Subjective Information  no complaints  -BC     Document Type  therapy note (daily note)  -BC     Mode of Treatment  physical therapy  -BC     Patient Effort  good  -BC     Row Name 21 1300          General Information    Patient Profile Reviewed  yes  -BC     Onset of Illness/Injury or Date of Surgery  21  -BC     Referring Physician  DR. Jimenez  -BC     Patient Observations  alert;cooperative;agree to therapy  -BC     Prior Level of Function  mod assist:  -BC     Risks Reviewed  patient:;LOB;nausea/vomiting;dizziness;increased discomfort;change in vital signs;increased drainage;lines disloged  -BC     Benefits Reviewed  patient:;improve function;increase independence;increase strength;increase balance;decrease pain;decrease risk of DVT;improve skin integrity;increase knowledge  -BC     Row Name 21 1300          Previous Level of Function/Home Environm    BADLs, Premorbid Functional Level  needs assistive device for safe performance  -BC     IADLs, Premorbid Functional Level  needs adaptive equipment for safe performance  -BC     Row Name 21 1300          Living Environment    Current Living Arrangements  home/apartment/condo  -BC     Lives With  child(ara), adult  -BC     Row Name 21 1300          Cognition    Affect/Mental Status (Cognitive)  WFL  -BC     Orientation Status (Cognition)  oriented to;person;place;situation  -BC     Follows Commands (Cognition)  follows two-step commands  -BC     Row Name 21 1300          Range of Motion Comprehensive    General Range of Motion  bilateral upper extremity ROM WNL  -BC     Row Name 21 1300           Mobility    Extremity Weight-bearing Status  left lower extremity;right lower extremity  -BC     Left Lower Extremity (Weight-bearing Status)  full weight-bearing (FWB)  -BC     Right Lower Extremity (Weight-bearing Status)  full weight-bearing (FWB)  -BC     Row Name 06/02/21 1300          Bed Mobility    Bed Mobility  bed mobility (all) activities  -BC     All Activities, [place] (Bed Mobility)  moderate assist (50% patient effort)  -BC     Supine-Sit [place] (Bed Mobility)  moderate assist (50% patient effort)  -BC     Sit-Supine [place] (Bed Mobility)  moderate assist (50% patient effort)  -BC     Bed Mobility, Safety Issues  decreased use of legs for bridging/pushing  -BC     Assistive Device (Bed Mobility)  bed rails  -BC     Row Name 06/02/21 1300          Transfers    Transfers  sit-stand transfer;stand-sit transfer  -BC     Maintains Weight-bearing Status (Transfers)  able to maintain  -BC     Sit-Stand [place] (Transfers)  minimum assist (75% patient effort)  -BC     Stand-Sit [place] (Transfers)  minimum assist (75% patient effort)  -BC     Row Name 06/02/21 1300          Sit-Stand Transfer    Assistive Device (Sit-Stand Transfers)  walker, front-wheeled  -BC     Row Name 06/02/21 1300          Stand-Sit Transfer    Assistive Device (Stand-Sit Transfers)  walker, front-wheeled  -BC     Row Name 06/02/21 1300          Gait/Stairs (Locomotion)    Gait/Stairs Locomotion  gait/ambulation independence  -BC     [place] Level (Gait)  minimum assist (75% patient effort)  -BC     Assistive Device (Gait)  walker, front-wheeled  -BC     Distance in Feet (Gait)  3 ft  -BC     Row Name 06/02/21 1300          Coping    Observed Emotional State  calm;cooperative  -BC     Verbalized Emotional State  acceptance  -BC     Family/Support Persons  family  -BC     Involvement in Care  not present at bedside  -BC     Family/Support System Care  self-care encouraged;support provided  -BC      Diversional Activities  television  -BC     Row Name 06/02/21 1300          Plan of Care Review    Plan of Care Reviewed With  patient  -BC     Progress  improving  -BC     Row Name 06/02/21 1300          Positioning and Restraints    Pre-Treatment Position  in bed  -BC     Post Treatment Position  bed  -BC     In Bed  notified nsg;supine;encouraged to call for assist;call light within reach;side rails up x3  -BC     In Chair  notified nsg;sitting;call light within reach;encouraged to call for assist  -BC       User Key  (r) = Recorded By, (t) = Taken By, (c) = Cosigned By    Initials Name Provider Type    BC Keely Agosto PT Physical Therapist        Physical Therapy Education                 Title: PT OT SLP Therapies (Done)     Topic: Physical Therapy (Done)     Point: Mobility training (Done)     Learning Progress Summary           Patient Acceptance, E, VU by EB at 6/2/2021 0713    Acceptance, E, VU by SM at 6/2/2021 0030    Acceptance, E, VU by EB at 6/1/2021 0739    Acceptance, E, VU by SM at 5/31/2021 2242    Acceptance, E, VU by NIR at 5/31/2021 0924    Acceptance, E, VU by SM at 5/31/2021 0035    Acceptance, E,TB, VU by MM at 5/28/2021 1802    Acceptance, E,TB, VU by MM at 5/27/2021 1143    Acceptance, E, VU by SM at 5/26/2021 2344    Acceptance, E,TB, VU by MM at 5/26/2021 0929    Acceptance, E,TB,D, VU,NR by AG at 5/24/2021 1249                   Point: Home exercise program (Done)     Learning Progress Summary           Patient Acceptance, E, VU by EB at 6/2/2021 0713    Acceptance, E, VU by SM at 6/2/2021 0030    Acceptance, E, VU by EB at 6/1/2021 0739    Acceptance, E, VU by SM at 5/31/2021 2242    Acceptance, E, VU by NIR at 5/31/2021 0924    Acceptance, E, VU by SM at 5/31/2021 0035    Acceptance, E,TB, VU by MM at 5/28/2021 1802    Acceptance, E,TB, VU by MM at 5/27/2021 1143    Acceptance, E, VU by SM at 5/26/2021 2344    Acceptance, E,TB, VU by MM at 5/26/2021 0929    Acceptance, E,TB,D,  VU,NR by AG at 5/24/2021 1249                   Point: Body mechanics (Done)     Learning Progress Summary           Patient Acceptance, E, VU by EB at 6/2/2021 0713    Acceptance, E, VU by SM at 6/2/2021 0030    Acceptance, E, VU by EB at 6/1/2021 0739    Acceptance, E, VU by SM at 5/31/2021 2242    Acceptance, E, VU by JM at 5/31/2021 0924    Acceptance, E, VU by SM at 5/31/2021 0035    Acceptance, E,TB, VU by MM at 5/28/2021 1802    Acceptance, E,TB, VU by MM at 5/27/2021 1143    Acceptance, E, VU by SM at 5/26/2021 2344    Acceptance, E,TB, VU by MM at 5/26/2021 0929    Acceptance, E,TB,D, VU,NR by AG at 5/24/2021 1249                   Point: Precautions (Done)     Learning Progress Summary           Patient Acceptance, E, VU by EB at 6/2/2021 0713    Acceptance, E, VU by SM at 6/2/2021 0030    Acceptance, E, VU by EB at 6/1/2021 0739    Acceptance, E, VU by SM at 5/31/2021 2242    Acceptance, E, VU by  at 5/31/2021 0924    Acceptance, E, VU by SM at 5/31/2021 0035    Acceptance, E,TB, VU by MM at 5/28/2021 1802    Acceptance, E,TB, VU by MM at 5/27/2021 1143    Acceptance, E, VU by SM at 5/26/2021 2344    Acceptance, E,TB, VU by MM at 5/26/2021 0929    Acceptance, E,TB,D, VU,NR by AG at 5/24/2021 1249                               User Key     Initials Effective Dates Name Provider Type Discipline    EB 06/16/16 -  Keily Godoy, RN Registered Nurse Nurse     04/03/18 -  Elvia Rousseau, PT Physical Therapist PT    NIR 07/19/18 -  Cruz Umanzor RN Registered Nurse Nurse     07/08/19 -  Esha Lee, RN Registered Nurse Nurse     09/12/18 -  Pippa, Chris, RN Registered Nurse Nurse              PT Recommendation and Plan     Plan of Care Reviewed With: patient  Progress: improving       Time Calculation:   PT Charges     Row Name 06/02/21 1403             Time Calculation    PT Received On  06/02/21  -BC         Time Calculation- PT    Total Timed Code Minutes- PT  45 minute(s)  -BC          Timed Charges    05285 - PT Therapeutic Exercise Minutes  30  -BC      35973 - Gait Training Minutes   15  -BC         Total Minutes    Timed Charges Total Minutes  45  -BC       Total Minutes  45  -BC        User Key  (r) = Recorded By, (t) = Taken By, (c) = Cosigned By    Initials Name Provider Type    BC Keely Agosto PT Physical Therapist        Therapy Charges for Today     Code Description Service Date Service Provider Modifiers Qty    25106436463 HC PT THER PROC EA 15 MIN 6/1/2021 Keely Agosto, PT GP 2    35899182938 HC PT THER PROC EA 15 MIN 6/2/2021 Keely Agosto, PT GP 2    71959447617 HC GAIT TRAINING EA 15 MIN 6/2/2021 Keely Agosto, PT GP 1               Keely Agosto PT  6/2/2021

## 2021-06-02 NOTE — DISCHARGE PLACEMENT REQUEST
"Иван Blanco (65 y.o. Male)     Date of Birth Social Security Number Address Home Phone MRN    1955  [address]  [place] [address] [phone] [id number]    Sikhism Marital Status          None        Admission Date Admission Type Admitting Provider Attending Provider Department, Room/Bed    5/21/21 Emergency Vivienne Flores DO Mullins, Thomas Anthony, DO [address], 3303/2S    Discharge Date Discharge Disposition Discharge Destination                       Attending Provider: Joo Jimenez DO    Allergies: Codeine, Ciprofloxacin    Isolation: None   Infection: None   Code Status: CPR    Ht: 175.3 cm (69\")   Wt: 148 kg (326 lb 9.6 oz)    Admission Cmt: None   Principal Problem: Respiratory failure (CMS/HCC) [J96.90]                 Active Insurance as of 5/21/2021     Primary Coverage     Payor Plan Insurance Group Employer/Plan Group    [facility] MEDICARE REPLACEMENT [facility] DUAL COMPLETE MEDICARE REPLACEMENT [id number]     Payor Plan Address Payor Plan Phone Number Payor Plan Fax Number Effective Dates    [address] [phone]  1/1/2021 - None Entered    [facility] [address]       Subscriber Name Subscriber Birth Date Member ID       ИВАН BLANCO 1955 [id number]           Secondary Coverage     Payor Plan Insurance Group Employer/Plan Group    [facility] MEDICAID      Payor Plan Address Payor Plan Phone Number Payor Plan Fax Number Effective Dates    [address] [address] [phone]  10/4/2019 - None Entered    [facility] [id number]       Subscriber Name Subscriber Birth Date Member ID       ИВАН BLANCO 1955 [id number]                 Emergency Contacts      (Rel.) Home Phone Work Phone Mobile Phone    PaulLamar (Spouse) [phone] -- [phone]    GARCIA MALDONADO (Daughter) [phone] -- [phone]            Emergency Contact Information     Name Relation Home Work Mobile    Lamar Blanco " Spouse [phone]    GARCIA MALDONADO Daughter [phone]          Insurance Information                [facility] MEDICARE REPLACEMENT/[facility] DUAL COMPLETE MEDICARE REPLACEMENT Phone: [phone]    Subscriber: Иван Miller Subscriber#: [id number]    Group#: [id number] Precert#:         [facility]/[employer] MEDICAID Phone: [phone]    Subscriber: Иван Miller Subscriber#: [id number]    Group#:  Precert#:           Treatment Team  Chat With All Active Members    Provider Relationship Specialty Contact    Joo Jimenez DO  Attending --  [phone]    Keily Godoy, RN  Registered Nurse --     Keely Agosto, PT  Physical Therapist Physical Therapy     Tian Ness DO  Consulting Physician Orthopedic Surgery  [phone]    Roby Corbin MD  Consulting Physician, Surgeon Orthopedic Surgery  [phone]    Pao Valle, RRT  Respiratory Therapist --  [phone]    Radha Ha, PCT  Patient Care Technician --           Problem List         Codes Noted - Resolved       Hospital    * (Principal) Respiratory failure (CMS/HCC) ICD-10-CM: J96.90  ICD-9-CM: 518.81 5/22/2021 - Present    Finger infection ICD-10-CM: L08.9  ICD-9-CM: 686.9 5/21/2021 - Present    Essential hypertension (Chronic) ICD-10-CM: I10  ICD-9-CM: 401.9 11/25/2019 - Present    Hyperlipidemia LDL goal <70 (Chronic) ICD-10-CM: E78.5  ICD-9-CM: 272.4 11/25/2019 - Present       Non-Hospital    Coronary artery disease status post multiple stents ICD-10-CM: I25.10  ICD-9-CM: 414.01 11/25/2019 - Present    Non-ischemic cardiomyopathy (CMS/HCC LV ejection fraction 40 to 45% ICD-10-CM: I42.8  ICD-9-CM: 425.4 11/25/2019 - Present    Chronic systolic congestive heart failure (CMS/HCC) ICD-10-CM: I50.22  ICD-9-CM: 428.22, 428.0 11/25/2019 - Present    Insulin dependent diabetes mellitus ICD-10-CM: NVP6555  ICD-9-CM: 250.00, V58.67 11/25/2019 - Present     Acute respiratory failure with hypoxemia (CMS/[facility]) ICD-10-CM: J96.01  ICD-9-CM: 518.81 10/4/2019 - Present    Symptomatic bradycardia ICD-10-CM: R00.1  ICD-9-CM: 427.89 2018 - Present             History & Physical      Nora Gomez PA-C at 21 0142     Attestation signed by Vivienne Flores DO at 21 [phone]    I have read the documentation below and agree.  Patient also seen and examined independently.  He remains on BiPAP.  Patient is easily arousable and is oriented x4.    Lungs are diminished but clear.  Patient is in no respiratory distress.  Heart is regular rate and rhythm without obvious murmur/gallop/rub.  Patient with decreased range of motion, swelling, tenderness and bruising in the right ankle and foot.  Right lower extremity appears to be more edematous with an increased circumference in comparison to the left calf area.    Agree with a respiratory culture if able to produce.  Continue with Rocephin and a dose of amoxicillin.  Agree with steroids and scheduled nebulized inhalants.  Plan to wean off BiPAP and placed on nasal cannula.      Obtain an x-ray of the right foot pain and a Doppler ultrasound of the right lower extremity to rule out DVT in the setting of recent surgery and decreased ambulation over the past few days.                       [facility]  HISTORY & PHYSICAL    Patient Identification:  Name:  Иван Miller  Age:  65 y.o.  Sex:  male  :  1955  MRN:  [id number]   Visit Number:  [id number]  Admit Date: 2021   Primary Care Physician:  Rosi Thomas APRN     Subjective     Chief complaint:   Chief Complaint   Patient presents with   • Shortness of Breath     History of presenting illness:   Patient is a 65 y.o. male with past medical history significant for paroxysmal atrial fibrillation, CAD status post stenting in the past, known ischemic cardiomyopathy, COPD, insulin-dependent type 2 diabetes mellitus, GERD,  essential hypertension, hyperlipidemia, that presented to the [facility] emergency department for evaluation of shortness of breath.    It should be of note that the patient was very lethargic at bedside.  He would arouse briefly with tactile stimulation but will quickly fall back asleep.  He was also somewhat difficult to understand as he currently has BiPAP in place and was slurring his speech.  The patient states that he has been feeling short of breath and having a productive cough with yellow sputum and wheezing for around 15 days.  He reports he was given an antibiotic but was unable to tell me who gave it to him.  He also admits to a subjective fever, periumbilical abdominal pain, and fatigue.  He denies any diaphoresis, chills, chest pain, leg edema, diarrhea, nausea, vomiting, dysuria.  He states that he wears 2 L of oxygen at home and CPAP at night.  He further admits to amphetamine use and states that he smokes it.  Additionally, he admits to a fall 2 nights ago and has been experiencing severe right ankle pain.  He has had difficulty walking secondary to pain and has bruising located on the lateral aspect of the right ankle.  He denies hitting his head or loss of consciousness.    Upon arrival to the ED, vitals were temperature 98.0 °F, pulse 97, respirations 22, /93, SPO2 96% on 6 L nasal cannula.  ABG with pH 7.325, PCO2 72.8, PO2 59.7, HCO3 37.9, O2 saturation 89.3 on 2 L nasal cannula.  CMP with glucose 279, CO2 34.1.  CBC with hemoglobin 11.6, MCH 24.2, MCHC 27.9.  UA with 3+ glucose, blood cultures pending x2.  Blood ethanol negative.  UDS positive for amphetamines.  Chest x-ray shows limited study due to inadequate positioning, cardiac silhouette appears to be enlarged, underpenetration of the left hemithorax, no gross dense consolidation.  Noncontrast CT of the chest shows mild right base atelectasis, pleural thickening along the major fissure on the left, three-vessel CAD, no  acute lung infiltrates.  EKG with sinus rhythm with occasional PVCs, nonspecific ST abnormality, heart rate 97, QTc 447 MS.  COVID-19 negative.    In the emergency department the patient received IV Rocephin 1 g, IV Decadron 10 mg, IV doxycycline, DuoNeb treatment x2.    Patient has been admitted to the progressive care unit as telemetry overflow for further evaluation and treatment  ---------------------------------------------------------------------------------------------------------------------   Review of Systems   Constitutional: Positive for fever (Undocumented). Negative for chills and diaphoresis.   HENT: Negative for congestion and sore throat.    Eyes: Negative for discharge and visual disturbance.   Respiratory: Positive for cough (Productive with yellow sputum), shortness of breath and wheezing.    Cardiovascular: Negative for chest pain, palpitations and leg swelling.   Gastrointestinal: Positive for abdominal pain (Periumbilical) and constipation. Negative for diarrhea, nausea and vomiting.   Genitourinary: Negative for dysuria and frequency.   Musculoskeletal: Positive for back pain (Chronic) and gait problem (Fall 2 nights ago). Negative for myalgias.   Skin: Negative for rash and wound.        Bruising of right ankle   Neurological: Negative for dizziness, syncope and light-headedness.   Psychiatric/Behavioral: Negative for confusion. The patient is not nervous/anxious.       ---------------------------------------------------------------------------------------------------------------------   Past Medical History:   Diagnosis Date   • Atrial fibrillation (CMS/HCC)    • COPD (chronic obstructive pulmonary disease) (CMS/HCC)    • Diabetes mellitus (CMS/HCC)    • GERD (gastroesophageal reflux disease)    • Hyperlipidemia    • Hypertension    • Myocardial infarction (CMS/HCC)    • Neuropathy      Past Surgical History:   Procedure Laterality Date   • ABDOMINAL SURGERY     • APPENDECTOMY     • CARDIAC  CATHETERIZATION     • CORONARY STENT PLACEMENT       Family History   Problem Relation Age of Onset   • Hypertension Mother    • Heart attack Mother    • Hypertension Father      Social History     Socioeconomic History   • Marital status:      Spouse name: Not on file   • Number of children: Not on file   • Years of education: Not on file   • Highest education level: Not on file   Tobacco Use   • Smoking status: Former Smoker     Packs/day: 2.00     Years: 30.00     Pack years: 60.00     Types: Cigarettes   • Smokeless tobacco: Current User     Types: Snuff   Substance and Sexual Activity   • Alcohol use: No   • Drug use: No   • Sexual activity: Defer     ---------------------------------------------------------------------------------------------------------------------   Allergies:  Codeine and Ciprofloxacin  ---------------------------------------------------------------------------------------------------------------------   Medications below are reported home medications pulling from within the system; at this time, these medications have not been reconciled unless otherwise specified and are in the verification process for further verifcation as current home medications.    Prior to Admission Medications     Prescriptions Last Dose Informant Patient Reported? Taking?    albuterol (PROVENTIL HFA;VENTOLIN HFA) 108 (90 Base) MCG/ACT inhaler  Pharmacy Yes No    Inhale 2 puffs Every 4 (Four) Hours As Needed for Wheezing.    amLODIPine (NORVASC) 10 MG tablet  Pharmacy Yes No    Take 10 mg by mouth Daily.    apixaban (ELIQUIS) 5 MG tablet tablet  Pharmacy Yes No    Take 5 mg by mouth 2 (Two) Times a Day.    atorvastatin (LIPITOR) 20 MG tablet  Pharmacy Yes No    Take 20 mg by mouth Daily.    budesonide-formoterol (SYMBICORT) 160-4.5 MCG/ACT inhaler  Pharmacy Yes No    Inhale 2 puffs 2 (Two) Times a Day.    carvedilol (COREG) 12.5 MG tablet   No No    Take 1 tablet by mouth 2 (Two) Times a Day With Meals.     fluticasone (FLONASE) 50 MCG/ACT nasal spray  Pharmacy Yes No    2 sprays into the nostril(s) as directed by provider Daily.    furosemide (LASIX) 40 MG tablet   No No    Take 1 tablet by mouth Daily.    gabapentin (NEURONTIN) 300 MG capsule   Yes No    Take 300 mg by mouth 3 (Three) Times a Day.    insulin lispro (humaLOG) 100 UNIT/ML injection  Pharmacy Yes No    Inject 20 Units under the skin into the appropriate area as directed 3 (Three) Times a Day Before Meals.    ipratropium-albuterol (DUO-NEB) 0.5-2.5 mg/3 ml nebulizer  Pharmacy Yes No    Take 3 mL by nebulization Every 4 (Four) Hours As Needed for Wheezing.    loratadine (CLARITIN) 10 MG tablet  Pharmacy Yes No    Take 10 mg by mouth Daily.    metFORMIN (GLUCOPHAGE) 1000 MG tablet  Pharmacy Yes No    Take 1,000 mg by mouth 2 (Two) Times a Day With Meals.    omeprazole (priLOSEC) 40 MG capsule  Pharmacy Yes No    Take 40 mg by mouth Daily.    sacubitril-valsartan (ENTRESTO) 49-51 MG tablet   Yes No    Take 1 tablet by mouth 2 (Two) Times a Day.    sildenafil (REVATIO) 20 MG tablet  Pharmacy Yes No    Take 20 mg by mouth Daily As Needed (sexual activity).    tamsulosin (FLOMAX) 0.4 MG capsule 24 hr capsule  Pharmacy Yes No    Take 1 capsule by mouth Every Night.        ---------------------------------------------------------------------------------------------------------------------    Objective     Hospital Scheduled Meds:          Current listed hospital scheduled medications may not yet reflect those currently placed in orders that are signed and held, awaiting patient's arrival to floor/unit.    ---------------------------------------------------------------------------------------------------------------------   Vital Signs:  Temp:  [98 °F (36.7 °C)] 98 °F (36.7 °C)  Heart Rate:  [] 98  Resp:  [20-32] 20  BP: (141-161)/(86-99) 151/88  Mean Arterial Pressure (Non-Invasive) for the past 24 hrs (Last 3 readings):   Noninvasive MAP (mmHg)   05/22/21  0033 112   05/21/21 2233 95   05/21/21 2118 119     SpO2 Percentage    05/21/21 2343 05/22/21 0033 05/22/21 0043   SpO2: 96% 95% 98%     SpO2:  [93 %-100 %] 98 %  on  Flow (L/min):  [6] 6;   Device (Oxygen Therapy): NPPV/NIV    Body mass index is 46.52 kg/m².  Wt Readings from Last 3 Encounters:   05/21/21 (!) 143 kg (315 lb)   11/25/19 127 kg (280 lb)   10/06/19 124 kg (273 lb 11.2 oz)     ---------------------------------------------------------------------------------------------------------------------   Physical Exam:  Physical Exam  Constitutional:       General: He is awake.      Appearance: Normal appearance. He is well-developed. He is morbidly obese.      Comments: BiPAP in place   HENT:      Head: Normocephalic and atraumatic.   Eyes:      General: Lids are normal.         Right eye: No discharge.         Left eye: No discharge.   Cardiovascular:      Rate and Rhythm: Normal rate and regular rhythm.      Pulses: Normal pulses. No decreased pulses.           Dorsalis pedis pulses are 2+ on the right side and 2+ on the left side.        Posterior tibial pulses are 2+ on the right side and 2+ on the left side.      Heart sounds: Normal heart sounds. No murmur heard.   No friction rub. No gallop.    Pulmonary:      Effort: Pulmonary effort is normal. No tachypnea or bradypnea.      Breath sounds: Decreased breath sounds (Diffuse) present. No wheezing, rhonchi or rales.   Abdominal:      General: Bowel sounds are normal.      Palpations: Abdomen is soft.      Tenderness: There is abdominal tenderness in the epigastric area and periumbilical area.   Musculoskeletal:      Right lower leg: No edema.      Left lower leg: No edema.      Right foot: Decreased range of motion. Swelling and tenderness present.      Left foot: Normal range of motion. No tenderness.        Feet:    Skin:     Findings: No abrasion, ecchymosis or erythema.   Neurological:      Mental Status: He is oriented to person, place, and time and  easily aroused. He is lethargic.   Psychiatric:         Speech: Speech is slurred.         Behavior: Behavior is cooperative.       ---------------------------------------------------------------------------------------------------------------------  EKG:    Per cardiology read by Dr. Gorman, sinus rhythm with occasional PVCs, nonspecific ST abnormality, heart rate 97, QTc 447 MS; when compared to EKG from 10/2019 PVCs are now present.        Telemetry:    Normal sinus rhythm, heart rate 87 bpm, SPO2 95% on BiPAP    I have personally reviewed the EKG/Telemetry strip  ---------------------------------------------------------------------------------------------------------------------   Results from last 7 days   Lab Units 05/21/21 2045   CK TOTAL U/L 124     Results from last 7 days   Lab Units 05/21/21 2045   PROBNP pg/mL 799.5       Results from last 7 days   Lab Units 05/22/21  0054   PH, ARTERIAL pH units 7.328*   PO2 ART mm Hg 104.0   PCO2, ARTERIAL mm Hg 68.4*   HCO3 ART mmol/L 35.9*     Results from last 7 days   Lab Units 05/21/21 2045   CRP mg/dL 0.39   LACTATE mmol/L 1.0   WBC 10*3/mm3 9.07   HEMOGLOBIN g/dL 11.6*   HEMATOCRIT % 41.6   MCV fL 86.8   MCHC g/dL 27.9*   PLATELETS 10*3/mm3 265   INR  1.05     Results from last 7 days   Lab Units 05/21/21 2045   SODIUM mmol/L 144   POTASSIUM mmol/L 4.5   MAGNESIUM mg/dL 2.3   CHLORIDE mmol/L 102   CO2 mmol/L 34.1*   BUN mg/dL 20   CREATININE mg/dL 0.92   EGFR IF NONAFRICN AM mL/min/1.73 82   CALCIUM mg/dL 9.2   GLUCOSE mg/dL 279*   ALBUMIN g/dL 3.82   BILIRUBIN mg/dL 0.3   ALK PHOS U/L 62   AST (SGOT) U/L 26   ALT (SGPT) U/L 29   Estimated Creatinine Clearance: 111.3 mL/min (by C-G formula based on SCr of 0.92 mg/dL).  No results found for: AMMONIA    No results found for: HGBA1C, POCGLU  Lab Results   Component Value Date    HGBA1C 7.70 (H) 11/09/2018     Lab Results   Component Value Date    TSH 2.100 05/21/2021    FREET4 0.95 05/21/2021     Pain Management  Panel     Pain Management Panel Latest Ref Rng & Units 5/21/2021 10/4/2019    AMPHETAMINES SCREEN, URINE Negative Positive(A) Positive(A)    BARBITURATES SCREEN Negative Negative Negative    BENZODIAZEPINE SCREEN, URINE Negative Negative Negative    BUPRENORPHINEUR Negative Negative Negative    COCAINE SCREEN, URINE Negative Negative Negative    METHADONE SCREEN, URINE Negative Negative Negative        I have personally reviewed the above laboratory results.   ---------------------------------------------------------------------------------------------------------------------  Imaging Results (Last 7 Days)     Procedure Component Value Units Date/Time    CT Chest Without Contrast Diagnostic [[id number]] Collected: 05/22/21 0018     Updated: 05/22/21 0020    Narrative:      CT Chest WO    INDICATION:   Chest pain with shortness of breath for the past several days    TECHNIQUE:   CT of the thorax without IV contrast. Coronal and sagittal reconstructions were obtained.  Radiation dose reduction techniques included automated exposure control or exposure modulation based on body size. Count of known CT and cardiac nuc med studies  performed in previous 12 months: 0.     COMPARISON:   10/4/2019    FINDINGS:  Chest images at mediastinal window show no adenopathy or effusions. Coronary artery calcifications are seen in all 3 coronary distributions quite extensively.    Chest images at lung window show mild atelectasis at the right lung base posteriorly. There is nonspecific thickening along the major fissure on the left. It most likely reflects chronic pleural fibrosis.      Impression:      Mild right base atelectasis. Pleural thickening along the major fissure on the left. Three-vessel coronary artery disease. No acute lung infiltrates are noted.    Signer Name: Ruiz Zamudio MD   Signed: 5/22/2021 12:18 AM   Workstation Name: [email or web address]    [facility] Chest 1 View [[id number]] Collected:  05/21/21 2140     Updated: 05/21/21 2142    Narrative:      PROCEDURE: CR Chest 1 Vw    COMPARISON: October 2019    INDICATIONS: sob  Relevant clinical info: Sob... unable to get patient to lay on back     TECHNIQUE: Single AP  view of the chest    FINDINGS AND IMPRESSION COMBINED :        Examination is limited as patient is not adequately positioned and is in respiratory distress. Cardiac silhouette appears enlarged patient is rotated to the right. There is underpenetration of the left hemithorax. No gross dense consolidation seen.  Osseous structures are intact.             Signer Name: Lianna Chan MD   Signed: 5/21/2021 9:40 PM   Workstation Name: [id number]    [facility] have personally reviewed the above radiology results.     Last Echocardiogram:  Results for orders placed during the hospital encounter of 10/04/19    Transthoracic Echo Complete With Contrast if Necessary Per Protocol    Interpretation Summary  · Left ventricular wall thickness is consistent with mild concentric hypertrophy.  · Left atrial cavity size is mildly dilated.  · Estimated EF appears to be in the range of 56 - 60%.  · Left ventricular diastolic function is normal.  · Normal right ventricular cavity size and systolic function noted.  · Estimated right ventricular systolic pressure from tricuspid regurgitation is normal (<35 mmHg).  · The quality of the study is limited due to poor acoustic windows related to patient body habitus.    ---------------------------------------------------------------------------------------------------------------------    Assessment & Plan      Active Hospital Problems    Diagnosis  POA   • **Respiratory failure (CMS/HCC) [J96.90]  Yes   • Hyperlipidemia LDL goal <70 [E78.5]  Yes   • Essential hypertension [I10]  Yes     #Acute on chronic hypoxic and hypercarbic respiratory failure (2L NC at home)   #Sepsis criteria POA (pulse 100, RR 32) suspect 2/2 to acute COPD  exacerbation   #UDS positive for amphetamines  -Chest x-ray and noncontrast CT of chest shows no acute infiltrates  -Initial ABG with pH 7.325, PCO2 72.8, PO2 59.7 on 2 L nasal cannula  -Currently on BiPAP; ABG with pH 7.328, PCO2 68.4, PO2 104.0, O2 saturation 98.0 on IPAP 20, EPAP 8  -Suspect acute hypoxic and hypercarbic respiratory failure secondary to acute COPD exacerbation  -Patient was given IV Rocephin and IV doxycycline in the emergency department; we will continue with IV Rocephin and switch to IV azithromycin for anti-inflammatory properties  -Lactobacillus for GI protection  -Obtain respiratory culture, respiratory panel, MRSA swab  -Adjust antibiotic therapy as necessary  -Blood cultures pending x2  -IV Solu-Medrol 40 mg every 12 hours  -DuoNeb treatments ordered  -Incentive spirometry given, encourage use  -Continue supplemental oxygen as needed, titrate to maintain SPO2 greater than or equal to 90%  -Attempt to wean off BiPAP later this morning    #Periumbilical abdominal pain  -Obtain KUB  -Patient states he has been constipated; MiraLAX and Esperanza-Colace ordered    #Right ankle edema and ecchymosis secondary to recent fall  -X-ray of right ankle ordered  -Elevate lower extremity  -Tylenol as needed for pain    #Insulin-dependent type 2 diabetes mellitus  #Glucosuria  #Diabetic neuropathy  -Hemoglobin A1c ordered  -Hold home oral hypoglycemics to prevent hypoglycemia  -Will add SSI for now. Accu-cheks qAC and qhs; Titrate insulin therapy as necessary.    -Continue home Neurontin once reconciled per pharmacy    #Chronic normocytic anemia  -H&H stable; repeat a.m. CBC monitor H&H closely  -If hemoglobin less than 7 or platelets less than 50, will transfuse    #Reported history nonischemic cardiomyopathy  #Reported history of paroxysmal atrial fibrillation; anticoagulated on Eliquis  -Follows with Dr. Mccabe, cardiology as an outpatient; at his most recent visit in November 2019, cardiology noted that  there is no clear-cut documentation of atrial fibrillation or diagnosis of heart failure however, he was advised to continue Entresto and Eliquis  -Echo from 10/2019 reveals an EF of 56 to 60% with normal left ventricular diastolic function and normal right ventricular cavity size and systolic function  -Patient is currently in normal sinus rhythm  -Continue home Entresto, Lasix, and Eliquis once reconciled per pharmacy  -Monitor for signs of volume overload with daily weights, strict I's and O's    #CAD status post multiple stents in the past  -Currently chest pain-free; EKG shows no acute ischemic changes  -Continue home Lipitor and Coreg  -Monitor closely on telemetry    #Essential hypertension  -Continue home antihypertensive agents with holding parameters once reconciled per pharmacy  -We will make IV hydralazine available for SBP greater than 170  -Continue to monitor VS per hospital protocol    #Hyperlipidemia  -Continue home statin    #GERD  -Protonix    #Obstructive sleep apnea  -CPAP ordered    #Chronic back pain  -Patient is requesting pain medication, however, told him we will hold at this time given respiratory status  -Tylenol available as needed  -Lidoderm patches ordered    #Morbid obesity  -BMI 46.52 kg/m²  -Complicates all aspects of patient care  -Patient may have some underlying obesity hypoventilation syndrome as well      F/E/N: No IV fluids.  Replace electrolytes as necessary.  NPO  ---------------------------------------------------  DVT Prophylaxis: Eliquis  GI Prophylaxis: Protonix  Activity: Up with assistance, fall precaution    The patient is considered to be a high risk patient due to: Acute hypoxic and hypercarbic respiratory failure; sepsis secondary to acute COPD exacerbation    INPATIENT status due to the need for care which can only be reasonably provided in an hospital setting such as aggressive/expedited ancillary services and/or consultation services, the necessity for IV  medications, close physician monitoring and/or the possible need for procedures.  In such, I feel patient’s risk for adverse outcomes and need for care warrant INPATIENT evaluation and predict the patient’s care encounter to likely last beyond 2 midnights.    Code Status: FULL CODE     I have discussed the patient's assessment and plan with the patient, nursing staff, and attending physician      Nora Gomez PA-C  Hospitalist Service -- [facility]       05/22/21  01:42 EDT    Attending Physician: Vivienne Flores DO       Electronically signed by Vivienne Flores DO at 05/22/21 0747       Vital Signs (last day)     Date/Time   Temp   Temp src   Pulse   Resp   BP   Patient Position   SpO2    06/02/21 1436   98 (36.7)   Oral   67   18   150/83   Lying   97    06/02/21 1223   --   --   --   --   --   --   95    06/02/21 1100   97.6 (36.4)   Oral   86   20   159/92   Lying   94    06/02/21 0636   --   --   --   --   --   --   97    06/02/21 0626   --   --   80   22   --   --   97    06/02/21 0621   98.2 (36.8)   Oral   79   18   151/83   Lying   93    06/02/21 0241   97.6 (36.4)   Oral   79   16   143/92   Lying   96    06/02/21 0040   --   --   84   15   174/86   Sitting   --    06/02/21 0016   --   --   81   18   --   --   94    06/01/21 2236   --   --   68   14   169/68   Lying   --    06/01/21 1830   --   --   67   18   --   --   92    06/01/21 1818   97.9 (36.6)   Oral   63   20   110/60   Lying   96    06/01/21 1423   97.5 (36.4)   Oral   57   20   116/66   Lying   96    06/01/21 1313   --   --   59   18   --   --   99    06/01/21 1303   --   --   62   20   --   --   99    06/01/21 1042   97.3 (36.3)   Oral   66   20   148/87   Lying   96    06/01/21 0656   97.4 (36.3)   Oral   75   (!) 30   167/83   Sitting   92    06/01/21 0639   --   --   77   22   --   --   96    06/01/21 0630   --   --   82   22   --   --   95    06/01/21 0539   --   --   81   15   163/88   Sitting   --    06/01/21 0236    97 (36.1)   Axillary   69   18   153/71   Lying   99    06/01/21 0052   --   --   77   18   --   --   --    06/01/21 0042   --   --   76   18   --   --   97              Lines, Drains & Airways    Active LDAs     Name:   Placement date:   Placement time:   Site:   Days:    Peripheral IV 05/30/21 1837 Anterior;Right Wrist   05/30/21 1837    Wrist   2                  Current Facility-Administered Medications   Medication Dose Route Frequency Provider Last Rate Last Admin   • acetaminophen (TYLENOL) tablet 650 mg  650 mg Oral Q6H PRN Nora Gomez PA-C   650 mg at 05/28/21 0037   • albuterol (PROVENTIL) nebulizer solution 0.083% 2.5 mg/3mL  2.5 mg Nebulization Q6H PRN Joo Jimenez DO       • apixaban (ELIQUIS) tablet 5 mg  5 mg Oral Q12H Joo Jimenez DO   5 mg at 06/02/21 0905   • atorvastatin (LIPITOR) tablet 20 mg  20 mg Oral Daily Joo Jimenez DO   20 mg at 06/02/21 0905   • budesonide-formoterol (SYMBICORT) 160-4.5 MCG/ACT inhaler 2 puff  2 puff Inhalation BID - RT Joo Jimenez DO   2 puff at 06/02/21 0626   • calcium carbonate (TUMS) chewable tablet 500 mg (200 mg elemental)  2 tablet Oral TID PRN Nora Gomez PA-C       • Canagliflozin (INVOKANA) 300 MG tablet tablet 300 mg  300 mg Oral Daily Joo Jimenez DO   300 mg at 06/02/21 0905   • clopidogrel (PLAVIX) tablet 75 mg  75 mg Oral Daily Ryan Gorman MD   75 mg at 06/02/21 0905   • dextrose (D50W) 25 g/ 50mL Intravenous Solution 25 g  25 g Intravenous Q15 Min PRN Vivienne Flores DO       • dextrose (D50W) 25 g/ 50mL Intravenous Solution 25 g  25 g Intravenous Q15 Min PRN Nora Gomez PA-C       • dextrose (GLUTOSE) oral gel 15 g  15 g Oral Q15 Min PRN Sandra, Vivienne Branden, DO       • dextrose (GLUTOSE) oral gel 15 g  15 g Oral Q15 Min PRN Nora Gomez PA-C       • docusate sodium (COLACE) capsule 100 mg  100 mg Oral BID Joo Jimenez, DO   100  mg at 06/02/21 0904   • fluticasone (FLONASE) 50 MCG/ACT nasal spray 2 spray  2 spray Nasal Daily Joo Jimenez DO   2 spray at 06/02/21 0906   • gabapentin (NEURONTIN) capsule 600 mg  600 mg Oral Q8H Joo Jimenez DO   600 mg at 06/02/21 1341   • glucagon (human recombinant) (GLUCAGEN DIAGNOSTIC) injection 1 mg  1 mg Subcutaneous Q15 Min PRN Vivienne Flores DO       • glucagon (human recombinant) (GLUCAGEN DIAGNOSTIC) injection 1 mg  1 mg Subcutaneous Q15 Min PRN Nora Gomez PA-C       • hydrALAZINE (APRESOLINE) injection 10 mg  10 mg Intravenous Q6H PRN Nora Gomez PA-C   10 mg at 05/27/21 2257   • HYDROcodone-acetaminophen (NORCO) 5-325 MG per tablet 1 tablet  1 tablet Oral Q8H PRN Joo Jimenez DO   1 tablet at 06/02/21 1708   • hydrOXYzine (ATARAX) tablet 25 mg  25 mg Oral TID PRN Nora Gomez PA-C   25 mg at 06/02/21 1708   • insulin aspart (novoLOG) injection 0-9 Units  0-9 Units Subcutaneous TID AC Nora Gomez PA-C   4 Units at 06/02/21 1144   • insulin aspart (novoLOG) injection 30 Units  30 Units Subcutaneous TID With Meals Dequan Connelly MD   30 Units at 06/02/21 1709   • insulin detemir (LEVEMIR) injection 40 Units  40 Units Subcutaneous Daily Dequan Connelly MD   40 Units at 06/02/21 0904   • ipratropium (ATROVENT) nebulizer solution 0.5 mg  0.5 mg Nebulization Q6H PRN Joo Jimenez DO       • ipratropium-albuterol (DUO-NEB) nebulizer solution 3 mL  3 mL Nebulization Q4H PRN Joo Jimenez DO       • ipratropium-albuterol (DUO-NEB) nebulizer solution 3 mL  3 mL Nebulization Q6H - RT Nora Gomez PA-C   3 mL at 06/02/21 1223   • lidocaine (LIDODERM) 5 % 2 patch  2 patch Transdermal Q24H Nora Gomez PA-C   2 patch at 06/02/21 0904   • [START ON 6/3/2021] lisinopril (PRINIVIL,ZESTRIL) tablet 20 mg  20 mg Oral Q24H Joo Jimenez,        • melatonin tablet 5 mg  5 mg Oral Nightly  PRN Nora Gomez PA-C   5 mg at 05/22/21 2012   • metoprolol succinate XL (TOPROL-XL) 24 hr tablet 75 mg  75 mg Oral Q24H Ryan Gorman MD   75 mg at 06/02/21 0905   • nitroglycerin (NITROSTAT) SL tablet 0.4 mg  0.4 mg Sublingual Q5 Min PRN Vivienne Flores DO       • pantoprazole (PROTONIX) EC tablet 40 mg  40 mg Oral Q AM Nora Gomez PA-C   40 mg at 06/02/21 0536   • polyethylene glycol (MIRALAX) packet 17 g  17 g Oral Daily Nora Gomez PA-C   17 g at 06/02/21 0904   • sennosides-docusate (PERICOLACE) 8.6-50 MG per tablet 1 tablet  1 tablet Oral Nightly PRN Nora Gomez PA-C       • sodium chloride 0.9 % flush 10 mL  10 mL Intravenous PRN Vivienne Flores DO       • sodium chloride 0.9 % flush 10 mL  10 mL Intravenous Q12H Vivienne Flores DO   10 mL at 06/02/21 0904   • sodium chloride 0.9 % flush 10 mL  10 mL Intravenous PRN Vivienne Flores DO       • tamsulosin (FLOMAX) 24 hr capsule 0.4 mg  0.4 mg Oral Nightly Joo Jimenez DO   0.4 mg at 06/01/21 2127   • traMADol (ULTRAM) tablet 50 mg  50 mg Oral Q6H PRN Dequan Connelly MD   50 mg at 06/02/21 0015       Lab Results (last 24 hours)     Procedure Component Value Units Date/Time    POC Glucose Once [[id number]]  (Abnormal) Collected: 06/02/21 1609    Specimen: Blood Updated: 06/02/21 1623     Glucose 136 mg/dL     POC Glucose Once [[id number]]  (Abnormal) Collected: 06/02/21 1024    Specimen: Blood Updated: 06/02/21 1047     Glucose 208 mg/dL     POC Glucose Once [[id number]]  (Abnormal) Collected: 06/02/21 0635    Specimen: Blood Updated: 06/02/21 0641     Glucose 172 mg/dL     Basic Metabolic Panel [[id number]]  (Abnormal) Collected: 06/02/21 0111    Specimen: Blood Updated: 06/02/21 0142     Glucose 274 mg/dL      BUN 21 mg/dL      Creatinine 1.12 mg/dL      Sodium 136 mmol/L      Potassium 4.6 mmol/L      Comment: Slight hemolysis detected by analyzer. Results may be affected.         Chloride 99 mmol/L      CO2 26.7 mmol/L      Calcium 9.3 mg/dL      eGFR Non African Amer 66 mL/min/1.73      BUN/Creatinine Ratio 18.8     Anion Gap 10.3 mmol/L     Narrative:      GFR Normal >60  Chronic Kidney Disease <60  Kidney Failure <15      Potassium [[id number]]  (Normal) Collected: 06/02/21 0108    Specimen: Blood Updated: 06/02/21 0141     Potassium 4.7 mmol/L      Comment: Slight hemolysis detected by analyzer. Results may be affected.       C-reactive Protein [[id number]]  (Abnormal) Collected: 06/02/21 0108    Specimen: Blood Updated: 06/02/21 0141     C-Reactive Protein 0.98 mg/dL     Magnesium [[id number]]  (Normal) Collected: 06/02/21 0108    Specimen: Blood Updated: 06/02/21 0141     Magnesium 2.0 mg/dL     CBC (No Diff) [[id number]]  (Abnormal) Collected: 06/02/21 0111    Specimen: Blood Updated: 06/02/21 0121     WBC 7.59 10*3/mm3      RBC 4.50 10*6/mm3      Hemoglobin 11.2 g/dL      Hematocrit 38.7 %      MCV 86.0 fL      MCH 24.9 pg      MCHC 28.9 g/dL      RDW 16.9 %      RDW-SD 52.5 fl      MPV 8.9 fL      Platelets 194 10*3/mm3         Orders (last 24 hrs)      Start     Ordered    06/03/21 0900  lisinopril (PRINIVIL,ZESTRIL) tablet 20 mg  Every 24 Hours Scheduled      06/02/21 1428    06/03/21 0600  CBC (No Diff)  Morning Draw      06/02/21 1427    06/03/21 0600  Basic Metabolic Panel  Morning Draw      06/02/21 1427    06/02/21 1624  POC Glucose Once  Once      06/02/21 1609    06/02/21 1048  POC Glucose Once  Once      06/02/21 1024    06/02/21 0642  POC Glucose Once  Once      06/02/21 0635    06/02/21 0034  Magnesium  STAT      06/02/21 0033    06/02/21 0034  Potassium  STAT      06/02/21 0033    06/02/21 0034  ECG 12 Lead  STAT      06/02/21 0033    06/01/21 1835  POC Glucose Once  Once      06/01/21 1828    06/01/21 1235  HYDROcodone-acetaminophen (NORCO) 5-325 MG per tablet 1 tablet  Every 8 Hours PRN      06/01/21 1235    05/31/21 0900  insulin detemir (LEVEMIR) injection 40 Units   Daily      05/30/21 1453    05/31/21 0703  C-reactive Protein  Daily      05/31/21 0702    05/29/21 1400  lisinopril (PRINIVIL,ZESTRIL) tablet 5 mg  Every 24 Hours Scheduled,   Status:  Discontinued      05/29/21 1224    05/28/21 1800  insulin aspart (novoLOG) injection 30 Units  3 Times Daily With Meals      05/28/21 1645    05/27/21 1300  ipratropium-albuterol (DUO-NEB) nebulizer solution 3 mL  Every 6 Hours - RT      05/27/21 0854    05/27/21 1100  metoprolol succinate XL (TOPROL-XL) 24 hr tablet 75 mg  Every 24 Hours Scheduled      05/27/21 0932    05/25/21 1145  clopidogrel (PLAVIX) tablet 75 mg  Daily      05/25/21 1134    05/23/21 2100  tamsulosin (FLOMAX) 24 hr capsule 0.4 mg  Nightly      05/23/21 1031    05/23/21 1400  gabapentin (NEURONTIN) capsule 600 mg  Every 8 Hours Scheduled      05/23/21 1031    05/23/21 1300  apixaban (ELIQUIS) tablet 5 mg  Every 12 Hours Scheduled      05/23/21 1031    05/23/21 1300  fluticasone (FLONASE) 50 MCG/ACT nasal spray 2 spray  Daily      05/23/21 1031    05/23/21 1300  atorvastatin (LIPITOR) tablet 20 mg  Daily      05/23/21 1031    05/23/21 1300  Canagliflozin (INVOKANA) 300 MG tablet tablet 300 mg  Daily      05/23/21 1031    05/23/21 1230  budesonide-formoterol (SYMBICORT) 160-4.5 MCG/ACT inhaler 2 puff  2 Times Daily - RT      05/23/21 1057    05/23/21 1126  ipratropium (ATROVENT) nebulizer solution 0.5 mg  Every 6 Hours PRN      05/23/21 1127    05/23/21 1059  traMADol (ULTRAM) tablet 50 mg  Every 6 Hours PRN      05/23/21 1059    05/23/21 1030  albuterol (PROVENTIL) nebulizer solution 0.083% 2.5 mg/3mL  Every 6 Hours PRN      05/23/21 1031    05/23/21 1030  ipratropium-albuterol (DUO-NEB) nebulizer solution 3 mL  Every 4 Hours PRN      05/23/21 1031    05/23/21 1000  docusate sodium (COLACE) capsule 100 mg  2 Times Daily      05/23/21 0839    05/22/21 0900  sodium chloride 0.9 % flush 10 mL  Every 12 Hours Scheduled      05/22/21 0214    05/22/21 0900  polyethylene  glycol (MIRALAX) packet 17 g  Daily      05/22/21 0347    05/22/21 0900  lidocaine (LIDODERM) 5 % 2 patch  Every 24 Hours Scheduled      05/22/21 0348    05/22/21 0730  insulin aspart (novoLOG) injection 0-9 Units  3 Times Daily Before Meals      05/22/21 0348    05/22/21 0700  POC Glucose 4x Daily AC & at Bedtime  4 Times Daily Before Meals & at Bedtime     Comments: If bedtime blood glucose is greater than 350 mg/dl, call MD.      05/22/21 0214    05/22/21 0700  POC Glucose 4x Daily AC & at Bedtime  4 Times Daily Before Meals & at Bedtime     Comments: If bedtime blood glucose is greater than 350 mg/dl, call MD.      05/22/21 0348 05/22/21 0600  Incentive Spirometry  Every 4 Hours While Awake      05/22/21 0347    05/22/21 0600  pantoprazole (PROTONIX) EC tablet 40 mg  Every Early Morning      05/22/21 0348    05/22/21 0400  Vital Signs  Every 4 Hours     Comments: Per per hospital policy    05/22/21 0214    05/22/21 0400  Strict Intake & Output  Every 4 Hours      05/22/21 0348    05/22/21 0349  Daily Weights  Daily      05/22/21 0348    05/22/21 0347  melatonin tablet 5 mg  Nightly PRN      05/22/21 0348    05/22/21 0347  hydrOXYzine (ATARAX) tablet 25 mg  3 Times Daily PRN      05/22/21 0348    05/22/21 0347  calcium carbonate (TUMS) chewable tablet 500 mg (200 mg elemental)  3 Times Daily PRN      05/22/21 0348    05/22/21 0346  hydrALAZINE (APRESOLINE) injection 10 mg  Every 6 Hours PRN      05/22/21 0348    05/22/21 0346  dextrose (GLUTOSE) oral gel 15 g  Every 15 Minutes PRN      05/22/21 0347    05/22/21 0346  dextrose (D50W) 25 g/ 50mL Intravenous Solution 25 g  Every 15 Minutes PRN      05/22/21 0348    05/22/21 0346  glucagon (human recombinant) (GLUCAGEN DIAGNOSTIC) injection 1 mg  Every 15 Minutes PRN      05/22/21 0348    05/22/21 0345  sennosides-docusate (PERICOLACE) 8.6-50 MG per tablet 1 tablet  Nightly PRN      05/22/21 0347    05/22/21 0345  acetaminophen (TYLENOL) tablet 650 mg  Every 6  Hours PRN      05/22/21 0347    05/22/21 0215  Intake & Output  Every Shift      05/22/21 0214    05/22/21 0214  sodium chloride 0.9 % flush 10 mL  As Needed      05/22/21 0214 05/22/21 0214  dextrose (GLUTOSE) oral gel 15 g  Every 15 Minutes PRN      05/22/21 0214    05/22/21 0214  dextrose (D50W) 25 g/ 50mL Intravenous Solution 25 g  Every 15 Minutes PRN      05/22/21 0214    05/22/21 0214  glucagon (human recombinant) (GLUCAGEN DIAGNOSTIC) injection 1 mg  Every 15 Minutes PRN      05/22/21 0214    05/22/21 0214  nitroglycerin (NITROSTAT) SL tablet 0.4 mg  Every 5 Minutes PRN      05/22/21 0214 05/21/21 2046  sodium chloride 0.9 % flush 10 mL  As Needed      05/21/21 2049    Unscheduled  Telemetry - Pulse Oximetry  Continuous PRN     Comments: If Patient Develops Unresponsiveness, Acute Dyspnea, Cyanosis or Suspected Hypoxemia Start Continuous Pulse Ox Monitoring, Apply Oxygen & Notify Provider    05/22/21 0214    Unscheduled  Oxygen Therapy- Nasal Cannula; Titrate for SPO2: 90% - 95%  Continuous PRN     Comments: If Patient Develops Unresponsiveness, Acute Dyspnea, Cyanosis or Suspected Hypoxemia Start Continuous Pulse Ox Monitoring, Apply Oxygen & Notify Provider    05/22/21 0214    Unscheduled  ECG 12 Lead  As Needed     Comments: Nurse to Release if Patient Expericences Acute Chest Pain or Dysrhythmias    05/22/21 0214    Unscheduled  Potassium  As Needed     Comments: For Ventricular Arrhythmias      05/22/21 0214    Unscheduled  Magnesium  As Needed     Comments: For Ventricular Arrhythmias      05/22/21 0214    Unscheduled  Troponin  As Needed     Comments: For Chest Pain      05/22/21 0214    Unscheduled  Digoxin Level  As Needed     Comments: For Atrial Arrhythmias      05/22/21 0214    Unscheduled  Blood Gas, Arterial -With Co-Ox Panel: Yes  As Needed     Comments: Per O2 PolicyNotify Physician      05/22/21 0214    Unscheduled  Up With Assistance  As Needed      05/22/21 0214    --   amoxicillin-clavulanate (AUGMENTIN) 875-125 MG per tablet  2 Times Daily      05/22/21 1731    --  Budeson-Glycopyrrol-Formoterol (BREZTRI) 160-9-4.8 MCG/ACT aerosol inhaler  2 Times Daily      05/22/21 1732    --  carvedilol (COREG) 25 MG tablet  2 Times Daily With Meals      05/22/21 1732    --  Dapagliflozin Propanediol (Farxiga) 10 MG tablet  Daily      05/22/21 1732    --  furosemide (LASIX) 40 MG tablet  Daily      05/22/21 1732    --  gabapentin (NEURONTIN) 600 MG tablet  3 Times Daily      05/22/21 1732    --  Insulin Lispro, 1 Unit Dial, (HumaLOG KwikPen) 100 UNIT/ML solution pen-injector  3 Times Daily      05/22/21 1732    --  lidocaine (LIDODERM) 5 %  Every 24 Hours      05/22/21 1801    --  magnesium oxide (MAG-OX) 400 MG tablet  Daily      05/22/21 1801    --  pantoprazole (PROTONIX) 40 MG EC tablet  Daily      05/22/21 1801    --  sildenafil (REVATIO) 20 MG tablet  As Needed      05/22/21 1832    --  SCANNED - TELEMETRY        05/21/21 0000    --  SCANNED - TELEMETRY        05/21/21 0000    --  SCANNED - TELEMETRY        05/21/21 0000    --  SCANNED - TELEMETRY        05/21/21 0000    --  SCANNED - TELEMETRY        05/21/21 0000    --  SCANNED - TELEMETRY        05/21/21 0000    --  SCANNED - TELEMETRY        05/21/21 0000    --  SCANNED - TELEMETRY        05/21/21 0000    --  SCANNED - TELEMETRY        05/21/21 0000    --  SCANNED - TELEMETRY        05/21/21 0000    --  SCANNED - TELEMETRY        05/21/21 0000    --  SCANNED - TELEMETRY        05/21/21 0000    --  SCANNED - TELEMETRY        05/21/21 0000    --  SCANNED - TELEMETRY        05/21/21 0000    --  SCANNED - TELEMETRY        05/21/21 0000    --  SCANNED - TELEMETRY        05/21/21 0000                Operative/Procedure Notes (last 24 hours) (Notes from 06/01/21 1723 through 06/02/21 1723)    No notes of this type exist for this encounter.            Physician Progress Notes (last 24 hours) (Notes from 06/01/21 1723 through 06/02/21 1723)       Joo Jimenez DO at 21 1425              [facility] HOSPITALIST PROGRESS NOTE     Patient Identification:  Name:  Иван Miller  Age:  65 y.o.  Sex:  male  :  1955  MRN:  [id number]  Visit Number:  [id number]  Primary Care Provider:  Rosi Thomas APRN    Length of stay:  11    Chief complaint: Back pain    Subjective:    Patient states his breathing is doing well today but continues to report of back pain.  Patient states this is chronic back pain and relatively unchanged compared to his normal back pain.  However, he is requesting an increase in opioid analgesics.  I informed patient this would not occur and he did get upset with me.  There appears to be no clinical exam changes compared to any other exam ever performed during this hospital stay and there is no clear evidence/reason to increase current pain medication regimen.  ----------------------------------------------------------------------------------------------------------------------  Current Hospital Meds:  apixaban, 5 mg, Oral, Q12H  atorvastatin, 20 mg, Oral, Daily  budesonide-formoterol, 2 puff, Inhalation, BID - RT  Canagliflozin, 300 mg, Oral, Daily  clopidogrel, 75 mg, Oral, Daily  docusate sodium, 100 mg, Oral, BID  fluticasone, 2 spray, Nasal, Daily  gabapentin, 600 mg, Oral, Q8H  insulin aspart, 0-9 Units, Subcutaneous, TID AC  insulin aspart, 30 Units, Subcutaneous, TID With Meals  insulin detemir, 40 Units, Subcutaneous, Daily  ipratropium-albuterol, 3 mL, Nebulization, Q6H - RT  lidocaine, 2 patch, Transdermal, Q24H  lisinopril, 5 mg, Oral, Q24H  metoprolol succinate XL, 75 mg, Oral, Q24H  pantoprazole, 40 mg, Oral, Q AM  polyethylene glycol, 17 g, Oral, Daily  sodium chloride, 10 mL, Intravenous, Q12H  tamsulosin, 0.4 mg, Oral, Nightly         ----------------------------------------------------------------------------------------------------------------------  Vital Signs:  Temp:  [97.6 °F (36.4  °C)-98.2 °F (36.8 °C)] 97.6 °F (36.4 °C)  Heart Rate:  [63-86] 86  Resp:  [14-22] 20  BP: (110-174)/(60-92) 159/92      05/31/21  0500 06/01/21  0509 06/02/21  0504   Weight: (!) 146 kg (321 lb 9.6 oz) (Lead Nurse Aura and RN Chris willams) (!) 146 kg (320 lb 14.4 oz) (!) 148 kg (326 lb 9.6 oz)     Body mass index is 48.23 kg/m².    Intake/Output Summary (Last 24 hours) at 6/2/2021 1425  Last data filed at 6/2/2021 1147  Gross per 24 hour   Intake 540 ml   Output 2500 ml   Net -1960 ml     Diet Regular; Cardiac, Consistent Carbohydrate  ----------------------------------------------------------------------------------------------------------------------  Physical exam:  Constitutional: Morbidly obese  male in no apparent distress.     HENT:  Head:  Normocephalic and atraumatic.  Mouth:  Moist mucous membranes.    Eyes:  Conjunctivae and EOM are normal.  Pupils are equal, round, and reactive to light.  No scleral icterus.    Neck:  Neck supple. No thyromegaly.  No JVD present.    Cardiovascular:  Regular rate and rhythm with no murmurs, rubs, clicks or gallops appreciated.  Pulmonary/Chest:  Clear to auscultation bilaterally with no crackles, wheezes or rhonchi appreciated.  Abdominal:  Soft. Nontender. Nondistended  Bowel sounds are normal in all four quadrants. No organomegally appreciated.   Neurological:  Cranial nerves II-XII intact with no focal deficits.  No facial droop.  No slurred speech.   Skin:  Warm and dry to palpation with no rashes or lesions appreciated.  Peripheral vascular:  2+ radial and pedal pulses in bilateral upper and lower extremities.  Psychiatric:  Alert and oriented x3, demonstrates appropriate judgement and insight.    ----------------------------------------------------------------------------------------------------------------------  Tele:    ----------------------------------------------------------------------------------------------------------------------  Results  from last 7 days   Lab Units 05/27/21 2204 05/27/21  1548   TROPONIN T ng/mL <0.010 <0.010     Results from last 7 days   Lab Units 06/02/21  0111 06/02/21 0108 05/31/21 0117 05/30/21 0433 05/29/21  0306   CRP mg/dL  --  0.98* 0.87*  --  0.34   WBC 10*3/mm3 7.59  --  8.49 8.38 8.77   HEMOGLOBIN g/dL 11.2*  --  12.3* 12.1* 11.7*   HEMATOCRIT % 38.7  --  43.0 42.1 39.8   MCV fL 86.0  --  84.6 85.7 83.6   MCHC g/dL 28.9*  --  28.6* 28.7* 29.4*   PLATELETS 10*3/mm3 194  --  234 217 228         Results from last 7 days   Lab Units 06/02/21  0111 06/02/21 0108 05/31/21 0117 05/30/21 0433 05/29/21  0306 05/28/21  0059 05/27/21  0324   SODIUM mmol/L 136  --  137 138 139 137 138   POTASSIUM mmol/L 4.6 4.7 4.5 4.0 4.2 4.5 4.4   MAGNESIUM mg/dL  --  2.0  --  2.4  --   --  2.7*   CHLORIDE mmol/L 99  --  96* 97* 99 99 97*   CO2 mmol/L 26.7  --  35.4* 33.3* 33.6* 30.4* 36.4*   BUN mg/dL 21  --  20 19 19 21 25*   CREATININE mg/dL 1.12  --  1.24 0.95 0.87 0.91 0.86   EGFR IF NONAFRICN AM mL/min/1.73 66  --  58* 79 88 83 89   CALCIUM mg/dL 9.3  --  9.5 9.3 9.2 9.1 9.2   GLUCOSE mg/dL 274*  --  164* 156* 120* 220* 164*   ALBUMIN g/dL  --   --   --   --  3.35* 3.49* 3.54   BILIRUBIN mg/dL  --   --   --   --  0.4 0.3 0.4   ALK PHOS U/L  --   --   --   --  54 56 51   AST (SGOT) U/L  --   --   --   --  15 17 13   ALT (SGPT) U/L  --   --   --   --  20 21 21   Estimated Creatinine Clearance: 93.6 mL/min (by C-G formula based on SCr of 1.12 mg/dL).    No results found for: AMMONIA      No results found for: BLOODCX  No results found for: URINECX  No results found for: WOUNDCX  No results found for: STOOLCX    I have personally looked at the labs and they are summarized above.  ----------------------------------------------------------------------------------------------------------------------  Imaging Results (Last 24 Hours)     ** No results found for the last 24 hours. **         ----------------------------------------------------------------------------------------------------------------------  Assessment and Plan:    1.  Acute on chronic hypoxic/hypercapnic respiratory failure -acute phase resolved, continue 2 L nasal cannula which is patient's baseline.    2.  COPD exacerbation -resolved    3.  Generalized weakness -patient was denied inpatient rehab.  Currently awaiting skilled nursing facility placement.    4.  Paroxysmal A. Fib - currently in NSR, continue Eliquis    5.  Essential hypertension  -patient with intermittent elevation in blood pressure.  Will increase lisinopril to 20 mg p.o. daily.    6.  Insulin-dependent type 2 diabetes mellitus -continue sliding scale insulin with Accu-Cheks before every meal and nightly    Disposition currently pending SNF placement        Joo Jimenez DO  21  14:25 EDT    Electronically signed by Joo Jimenez DO at 21 1427       Consult Notes (last 24 hours) (Notes from 21 1723 through 21 1723)    No notes of this type exist for this encounter.            Physical Therapy Notes (last 24 hours) (Notes from 21 1723 through 21 1723)      Keely Agosto, PT at 21 1405  Version 1 of 1         Acute Care - Physical Therapy Treatment Note   [facility]     Patient Name: Иван Miller  : 1955  MRN: [id number]  Today's Date: 2021   Onset of Illness/Injury or Date of Surgery: 21       PT Assessment (last 12 hours)      PT Evaluation and Treatment     Row Name 21 1300          Physical Therapy Time and Intention    Subjective Information  no complaints  -BC     Document Type  therapy note (daily note)  -BC     Mode of Treatment  physical therapy  -BC     Patient Effort  good  -BC     Row Name 21 1300          General Information    Patient Profile Reviewed  yes  -BC     Onset of Illness/Injury or Date of Surgery  21  -BC     Referring Physician     Jimenez  -BC     Patient Observations  alert;cooperative;agree to therapy  -BC     Prior Level of Function  mod assist:  -BC     Risks Reviewed  patient:;LOB;nausea/vomiting;dizziness;increased discomfort;change in vital signs;increased drainage;lines disloged  -BC     Benefits Reviewed  patient:;improve function;increase independence;increase strength;increase balance;decrease pain;decrease risk of DVT;improve skin integrity;increase knowledge  -BC     Row Name 06/02/21 1300          Previous Level of Function/Home Environm    BADLs, Premorbid Functional Level  needs assistive device for safe performance  -BC     IADLs, Premorbid Functional Level  needs adaptive equipment for safe performance  -BC     Row Name 06/02/21 1300          Living Environment    Current Living Arrangements  home/apartment/condo  -BC     Lives With  child(ara), adult  -BC     Row Name 06/02/21 1300          Cognition    Affect/Mental Status (Cognitive)  WFL  -BC     Orientation Status (Cognition)  oriented to;person;place;situation  -BC     Follows Commands (Cognition)  follows two-step commands  -BC     Row Name 06/02/21 1300          Range of Motion Comprehensive    General Range of Motion  bilateral upper extremity ROM WNL  -BC     Row Name 06/02/21 1300          Mobility    Extremity Weight-bearing Status  left lower extremity;right lower extremity  -BC     Left Lower Extremity (Weight-bearing Status)  full weight-bearing (FWB)  -BC     Right Lower Extremity (Weight-bearing Status)  full weight-bearing (FWB)  -BC     Row Name 06/02/21 1300          Bed Mobility    Bed Mobility  bed mobility (all) activities  -BC     All Activities, [place] (Bed Mobility)  moderate assist (50% patient effort)  -BC     Supine-Sit [place] (Bed Mobility)  moderate assist (50% patient effort)  -BC     Sit-Supine [place] (Bed Mobility)  moderate assist (50% patient effort)  -BC     Bed Mobility, Safety Issues  decreased use of legs for  bridging/pushing  -BC     Assistive Device (Bed Mobility)  bed rails  -BC     Row Name 06/02/21 1300          Transfers    Transfers  sit-stand transfer;stand-sit transfer  -BC     Maintains Weight-bearing Status (Transfers)  able to maintain  -BC     Sit-Stand [place] (Transfers)  minimum assist (75% patient effort)  -BC     Stand-Sit [place] (Transfers)  minimum assist (75% patient effort)  -BC     Row Name 06/02/21 1300          Sit-Stand Transfer    Assistive Device (Sit-Stand Transfers)  walker, front-wheeled  -BC     Row Name 06/02/21 1300          Stand-Sit Transfer    Assistive Device (Stand-Sit Transfers)  walker, front-wheeled  -BC     Row Name 06/02/21 1300          Gait/Stairs (Locomotion)    Gait/Stairs Locomotion  gait/ambulation independence  -BC     [place] Level (Gait)  minimum assist (75% patient effort)  -BC     Assistive Device (Gait)  walker, front-wheeled  -BC     Distance in Feet (Gait)  3 ft  -BC     Row Name 06/02/21 1300          Coping    Observed Emotional State  calm;cooperative  -BC     Verbalized Emotional State  acceptance  -BC     Family/Support Persons  family  -BC     Involvement in Care  not present at bedside  -BC     Family/Support System Care  self-care encouraged;support provided  -BC     Diversional Activities  television  -BC     Row Name 06/02/21 1300          Plan of Care Review    Plan of Care Reviewed With  patient  -BC     Progress  improving  -BC     Row Name 06/02/21 1300          Positioning and Restraints    Pre-Treatment Position  in bed  -BC     Post Treatment Position  bed  -BC     In Bed  notified nsg;supine;encouraged to call for assist;call light within reach;side rails up x3  -BC     In Chair  notified nsg;sitting;call light within reach;encouraged to call for assist  -BC       User Key  (r) = Recorded By, (t) = Taken By, (c) = Cosigned By    Initials Name Provider Type    BC Keely Agosto PT Physical Therapist        Physical Therapy  Education                 Title: PT OT SLP Therapies (Done)     Topic: Physical Therapy (Done)     Point: Mobility training (Done)     Learning Progress Summary           Patient Acceptance, E, VU by EB at 6/2/2021 0713    Acceptance, E, VU by SM at 6/2/2021 0030    Acceptance, E, VU by EB at 6/1/2021 0739    Acceptance, E, VU by SM at 5/31/2021 2242    Acceptance, E, VU by NIR at 5/31/2021 0924    Acceptance, E, VU by SM at 5/31/2021 0035    Acceptance, E,TB, VU by MM at 5/28/2021 1802    Acceptance, E,TB, VU by MM at 5/27/2021 1143    Acceptance, E, VU by SM at 5/26/2021 2344    Acceptance, E,TB, VU by MM at 5/26/2021 0929    Acceptance, E,TB,D, VU,NR by AG at 5/24/2021 1249                   Point: Home exercise program (Done)     Learning Progress Summary           Patient Acceptance, E, VU by EB at 6/2/2021 0713    Acceptance, E, VU by SM at 6/2/2021 0030    Acceptance, E, VU by EB at 6/1/2021 0739    Acceptance, E, VU by SM at 5/31/2021 2242    Acceptance, E, VU by NIR at 5/31/2021 0924    Acceptance, E, VU by SM at 5/31/2021 0035    Acceptance, E,TB, VU by MM at 5/28/2021 1802    Acceptance, E,TB, VU by MM at 5/27/2021 1143    Acceptance, E, VU by SM at 5/26/2021 2344    Acceptance, E,TB, VU by MM at 5/26/2021 0929    Acceptance, E,TB,D, VU,NR by AG at 5/24/2021 1249                   Point: Body mechanics (Done)     Learning Progress Summary           Patient Acceptance, E, VU by EB at 6/2/2021 0713    Acceptance, E, VU by SM at 6/2/2021 0030    Acceptance, E, VU by EB at 6/1/2021 0739    Acceptance, E, VU by SM at 5/31/2021 2242    Acceptance, E, VU by NIR at 5/31/2021 0924    Acceptance, E, VU by SM at 5/31/2021 0035    Acceptance, E,TB, VU by MM at 5/28/2021 1802    Acceptance, E,TB, VU by MM at 5/27/2021 1143    Acceptance, E, VU by SM at 5/26/2021 2344    Acceptance, E,TB, VU by MM at 5/26/2021 0929    Acceptance, E,TB,D, VU,NR by AG at 5/24/2021 1249                   Point: Precautions (Done)      Learning Progress Summary           Patient Acceptance, E, VU by EB at 6/2/2021 0713    Acceptance, E, VU by  at 6/2/2021 0030    Acceptance, E, VU by EB at 6/1/2021 0739    Acceptance, E, VU by  at 5/31/2021 2242    Acceptance, E, VU by  at 5/31/2021 0924    Acceptance, E, VU by  at 5/31/2021 0035    Acceptance, E,TB, VU by MM at 5/28/2021 1802    Acceptance, E,TB, VU by MM at 5/27/2021 1143    Acceptance, E, VU by  at 5/26/2021 2344    Acceptance, E,TB, VU by MM at 5/26/2021 0929    Acceptance, E,TB,D, VU,NR by  at 5/24/2021 1249                               User Key     Initials Effective Dates Name Provider Type Discipline    EB 06/16/16 -  Keily Godoy, RN Registered Nurse Nurse     04/03/18 -  Elvia Rousseau, PT Physical Therapist PT     07/19/18 -  Cruz Umanzor, RN Registered Nurse Nurse     07/08/19 -  Esha Lee, RN Registered Nurse Nurse     09/12/18 -  Chris Das, MARCELO Registered Nurse Nurse              PT Recommendation and Plan     Plan of Care Reviewed With: patient  Progress: improving       Time Calculation:   PT Charges     Row Name 06/02/21 1403             Time Calculation    PT Received On  06/02/21  -BC         Time Calculation- PT    Total Timed Code Minutes- PT  45 minute(s)  -BC         Timed Charges    60492 - PT Therapeutic Exercise Minutes  30  -BC      54496 - Gait Training Minutes   15  -BC         Total Minutes    Timed Charges Total Minutes  45  -BC       Total Minutes  45  -BC        User Key  (r) = Recorded By, (t) = Taken By, (c) = Cosigned By    Initials Name Provider Type    BC Keely Agosto, PT Physical Therapist        Therapy Charges for Today     Code Description Service Date Service Provider Modifiers Qty    64554535142 HC PT THER PROC EA 15 MIN 6/1/2021 Keely Agosto, PT GP 2    71324970959 HC PT THER PROC EA 15 MIN 6/2/2021 Keely Agosto, PT GP 2    86074743027 HC GAIT TRAINING EA 15 MIN 6/2/2021 Keely Agosto PT  GP 1               Keely Agosto, PT  6/2/2021      Electronically signed by Keely Agosto, PT at 06/02/21 1405       Occupational Therapy Notes (last 24 hours) (Notes from 06/01/21 1723 through 06/02/21 1723)    No notes exist for this encounter.         ADL Documentation (last day)     Date/Time Transferring Toileting Bathing Dressing Eating Communication Swallowing    06/02/21 0711  3 - assistive equipment and person  3 - assistive equipment and person  2 - assistive person  2 - assistive person  0 - independent  0 - understands/communicates without difficulty  0 - swallows foods/liquids without difficulty    06/01/21 2127  3 - assistive equipment and person  3 - assistive equipment and person  2 - assistive person  2 - assistive person  0 - independent  0 - understands/communicates without difficulty  0 - swallows foods/liquids without difficulty    06/01/21 0737  3 - assistive equipment and person  3 - assistive equipment and person  2 - assistive person  2 - assistive person  0 - independent  0 - understands/communicates without difficulty  0 - swallows foods/liquids without difficulty

## 2021-06-02 NOTE — PROGRESS NOTES
[facility] HOSPITALIST PROGRESS NOTE     Patient Identification:  Name:  Иван Miller  Age:  65 y.o.  Sex:  male  :  1955  MRN:  [id number]  Visit Number:  [id number]  Primary Care Provider:  Rosi Thomas APRN    Length of stay:  11    Chief complaint: Back pain    Subjective:    Patient states his breathing is doing well today but continues to report of back pain.  Patient states this is chronic back pain and relatively unchanged compared to his normal back pain.  However, he is requesting an increase in opioid analgesics.  I informed patient this would not occur and he did get upset with me.  There appears to be no clinical exam changes compared to any other exam ever performed during this hospital stay and there is no clear evidence/reason to increase current pain medication regimen.  ----------------------------------------------------------------------------------------------------------------------  Current Hospital Meds:  apixaban, 5 mg, Oral, Q12H  atorvastatin, 20 mg, Oral, Daily  budesonide-formoterol, 2 puff, Inhalation, BID - RT  Canagliflozin, 300 mg, Oral, Daily  clopidogrel, 75 mg, Oral, Daily  docusate sodium, 100 mg, Oral, BID  fluticasone, 2 spray, Nasal, Daily  gabapentin, 600 mg, Oral, Q8H  insulin aspart, 0-9 Units, Subcutaneous, TID AC  insulin aspart, 30 Units, Subcutaneous, TID With Meals  insulin detemir, 40 Units, Subcutaneous, Daily  ipratropium-albuterol, 3 mL, Nebulization, Q6H - RT  lidocaine, 2 patch, Transdermal, Q24H  lisinopril, 5 mg, Oral, Q24H  metoprolol succinate XL, 75 mg, Oral, Q24H  pantoprazole, 40 mg, Oral, Q AM  polyethylene glycol, 17 g, Oral, Daily  sodium chloride, 10 mL, Intravenous, Q12H  tamsulosin, 0.4 mg, Oral, Nightly         ----------------------------------------------------------------------------------------------------------------------  Vital Signs:  Temp:  [97.6 °F (36.4 °C)-98.2 °F (36.8 °C)] 97.6 °F (36.4 °C)  Heart Rate:   [63-86] 86  Resp:  [14-22] 20  BP: (110-174)/(60-92) 159/92      05/31/21  0500 06/01/21  0509 06/02/21  0504   Weight: (!) 146 kg (321 lb 9.6 oz) (Lead Nurse Aura and RN Chris jenkins.) (!) 146 kg (320 lb 14.4 oz) (!) 148 kg (326 lb 9.6 oz)     Body mass index is 48.23 kg/m².    Intake/Output Summary (Last 24 hours) at 6/2/2021 1425  Last data filed at 6/2/2021 1147  Gross per 24 hour   Intake 540 ml   Output 2500 ml   Net -1960 ml     Diet Regular; Cardiac, Consistent Carbohydrate  ----------------------------------------------------------------------------------------------------------------------  Physical exam:  Constitutional: Morbidly obese  male in no apparent distress.     HENT:  Head:  Normocephalic and atraumatic.  Mouth:  Moist mucous membranes.    Eyes:  Conjunctivae and EOM are normal.  Pupils are equal, round, and reactive to light.  No scleral icterus.    Neck:  Neck supple. No thyromegaly.  No JVD present.    Cardiovascular:  Regular rate and rhythm with no murmurs, rubs, clicks or gallops appreciated.  Pulmonary/Chest:  Clear to auscultation bilaterally with no crackles, wheezes or rhonchi appreciated.  Abdominal:  Soft. Nontender. Nondistended  Bowel sounds are normal in all four quadrants. No organomegally appreciated.   Neurological:  Cranial nerves II-XII intact with no focal deficits.  No facial droop.  No slurred speech.   Skin:  Warm and dry to palpation with no rashes or lesions appreciated.  Peripheral vascular:  2+ radial and pedal pulses in bilateral upper and lower extremities.  Psychiatric:  Alert and oriented x3, demonstrates appropriate judgement and insight.    ----------------------------------------------------------------------------------------------------------------------  Tele:    ----------------------------------------------------------------------------------------------------------------------  Results from last 7 days   Lab Units 05/27/21  6807  05/27/21  1548   TROPONIN T ng/mL <0.010 <0.010     Results from last 7 days   Lab Units 06/02/21  0111 06/02/21 0108 05/31/21 0117 05/30/21 0433 05/29/21  0306   CRP mg/dL  --  0.98* 0.87*  --  0.34   WBC 10*3/mm3 7.59  --  8.49 8.38 8.77   HEMOGLOBIN g/dL 11.2*  --  12.3* 12.1* 11.7*   HEMATOCRIT % 38.7  --  43.0 42.1 39.8   MCV fL 86.0  --  84.6 85.7 83.6   MCHC g/dL 28.9*  --  28.6* 28.7* 29.4*   PLATELETS 10*3/mm3 194  --  234 217 228         Results from last 7 days   Lab Units 06/02/21 0111 06/02/21 0108 05/31/21 0117 05/30/21 0433 05/29/21  0306 05/28/21  0059 05/27/21  0324   SODIUM mmol/L 136  --  137 138 139 137 138   POTASSIUM mmol/L 4.6 4.7 4.5 4.0 4.2 4.5 4.4   MAGNESIUM mg/dL  --  2.0  --  2.4  --   --  2.7*   CHLORIDE mmol/L 99  --  96* 97* 99 99 97*   CO2 mmol/L 26.7  --  35.4* 33.3* 33.6* 30.4* 36.4*   BUN mg/dL 21  --  20 19 19 21 25*   CREATININE mg/dL 1.12  --  1.24 0.95 0.87 0.91 0.86   EGFR IF NONAFRICN AM mL/min/1.73 66  --  58* 79 88 83 89   CALCIUM mg/dL 9.3  --  9.5 9.3 9.2 9.1 9.2   GLUCOSE mg/dL 274*  --  164* 156* 120* 220* 164*   ALBUMIN g/dL  --   --   --   --  3.35* 3.49* 3.54   BILIRUBIN mg/dL  --   --   --   --  0.4 0.3 0.4   ALK PHOS U/L  --   --   --   --  54 56 51   AST (SGOT) U/L  --   --   --   --  15 17 13   ALT (SGPT) U/L  --   --   --   --  20 21 21   Estimated Creatinine Clearance: 93.6 mL/min (by C-G formula based on SCr of 1.12 mg/dL).    No results found for: AMMONIA      No results found for: BLOODCX  No results found for: URINECX  No results found for: WOUNDCX  No results found for: STOOLCX    I have personally looked at the labs and they are summarized above.  ----------------------------------------------------------------------------------------------------------------------  Imaging Results (Last 24 Hours)     ** No results found for the last 24 hours. **         ----------------------------------------------------------------------------------------------------------------------  Assessment and Plan:    1.  Acute on chronic hypoxic/hypercapnic respiratory failure -acute phase resolved, continue 2 L nasal cannula which is patient's baseline.    2.  COPD exacerbation -resolved    3.  Generalized weakness -patient was denied inpatient rehab.  Currently awaiting skilled nursing facility placement.    4.  Paroxysmal A. Fib - currently in NSR, continue Eliquis    5.  Essential hypertension  -patient with intermittent elevation in blood pressure.  Will increase lisinopril to 20 mg p.o. daily.    6.  Insulin-dependent type 2 diabetes mellitus -continue sliding scale insulin with Accu-Cheks before every meal and nightly    Disposition currently pending SNF placement        Joo Jimenez,   06/02/21  14:25 EDT

## 2021-06-03 LAB
ANION GAP SERPL CALCULATED.3IONS-SCNC: 9.3 MMOL/L (ref 5–15)
BUN SERPL-MCNC: 21 MG/DL (ref 8–23)
BUN/CREAT SERPL: 21.9 (ref 7–25)
CALCIUM SPEC-SCNC: 9.1 MG/DL (ref 8.6–10.5)
CHLORIDE SERPL-SCNC: 102 MMOL/L (ref 98–107)
CO2 SERPL-SCNC: 28.7 MMOL/L (ref 22–29)
CREAT SERPL-MCNC: 0.96 MG/DL (ref 0.76–1.27)
CRP SERPL-MCNC: 0.78 MG/DL (ref 0–0.5)
DEPRECATED RDW RBC AUTO: 52.3 FL (ref 37–54)
ERYTHROCYTE [DISTWIDTH] IN BLOOD BY AUTOMATED COUNT: 16.9 % (ref 12.3–15.4)
GFR SERPL CREATININE-BSD FRML MDRD: 78 ML/MIN/1.73
GLUCOSE BLDC GLUCOMTR-MCNC: 195 MG/DL (ref 70–130)
GLUCOSE BLDC GLUCOMTR-MCNC: 220 MG/DL (ref 70–130)
GLUCOSE BLDC GLUCOMTR-MCNC: 264 MG/DL (ref 70–130)
GLUCOSE BLDC GLUCOMTR-MCNC: 273 MG/DL (ref 70–130)
GLUCOSE SERPL-MCNC: 228 MG/DL (ref 65–99)
HCT VFR BLD AUTO: 37.1 % (ref 37.5–51)
HGB BLD-MCNC: 10.8 G/DL (ref 13–17.7)
MCH RBC QN AUTO: 24.8 PG (ref 26.6–33)
MCHC RBC AUTO-ENTMCNC: 29.1 G/DL (ref 31.5–35.7)
MCV RBC AUTO: 85.1 FL (ref 79–97)
PLATELET # BLD AUTO: 206 10*3/MM3 (ref 140–450)
PMV BLD AUTO: 9.2 FL (ref 6–12)
POTASSIUM SERPL-SCNC: 4.4 MMOL/L (ref 3.5–5.2)
RBC # BLD AUTO: 4.36 10*6/MM3 (ref 4.14–5.8)
SODIUM SERPL-SCNC: 140 MMOL/L (ref 136–145)
WBC # BLD AUTO: 7.23 10*3/MM3 (ref 3.4–10.8)

## 2021-06-03 PROCEDURE — 63710000001 INSULIN ASPART PER 5 UNITS: Performed by: INTERNAL MEDICINE

## 2021-06-03 PROCEDURE — 63710000001 INSULIN ASPART PER 5 UNITS: Performed by: PHYSICIAN ASSISTANT

## 2021-06-03 PROCEDURE — 85027 COMPLETE CBC AUTOMATED: CPT | Performed by: INTERNAL MEDICINE

## 2021-06-03 PROCEDURE — 94660 CPAP INITIATION&MGMT: CPT

## 2021-06-03 PROCEDURE — 63710000001 INSULIN DETEMIR PER 5 UNITS: Performed by: INTERNAL MEDICINE

## 2021-06-03 PROCEDURE — 80048 BASIC METABOLIC PNL TOTAL CA: CPT | Performed by: INTERNAL MEDICINE

## 2021-06-03 PROCEDURE — 82962 GLUCOSE BLOOD TEST: CPT

## 2021-06-03 PROCEDURE — 94799 UNLISTED PULMONARY SVC/PX: CPT

## 2021-06-03 PROCEDURE — 99232 SBSQ HOSP IP/OBS MODERATE 35: CPT | Performed by: INTERNAL MEDICINE

## 2021-06-03 PROCEDURE — 86140 C-REACTIVE PROTEIN: CPT | Performed by: PHYSICIAN ASSISTANT

## 2021-06-03 RX ADMIN — IPRATROPIUM BROMIDE AND ALBUTEROL SULFATE 3 ML: .5; 3 SOLUTION RESPIRATORY (INHALATION) at 18:16

## 2021-06-03 RX ADMIN — GABAPENTIN 600 MG: 300 CAPSULE ORAL at 15:08

## 2021-06-03 RX ADMIN — METOPROLOL SUCCINATE 75 MG: 25 TABLET, EXTENDED RELEASE ORAL at 09:50

## 2021-06-03 RX ADMIN — TAMSULOSIN HYDROCHLORIDE 0.4 MG: 0.4 CAPSULE ORAL at 20:49

## 2021-06-03 RX ADMIN — IPRATROPIUM BROMIDE AND ALBUTEROL SULFATE 3 ML: .5; 3 SOLUTION RESPIRATORY (INHALATION) at 00:46

## 2021-06-03 RX ADMIN — FLUTICASONE PROPIONATE 2 SPRAY: 50 SPRAY, METERED NASAL at 09:14

## 2021-06-03 RX ADMIN — BUDESONIDE AND FORMOTEROL FUMARATE DIHYDRATE 2 PUFF: 160; 4.5 AEROSOL RESPIRATORY (INHALATION) at 06:35

## 2021-06-03 RX ADMIN — INSULIN ASPART 2 UNITS: 100 INJECTION, SOLUTION INTRAVENOUS; SUBCUTANEOUS at 17:30

## 2021-06-03 RX ADMIN — CANAGLIFLOZIN 300 MG: 300 TABLET, FILM COATED ORAL at 09:10

## 2021-06-03 RX ADMIN — GABAPENTIN 600 MG: 300 CAPSULE ORAL at 05:20

## 2021-06-03 RX ADMIN — IPRATROPIUM BROMIDE AND ALBUTEROL SULFATE 3 ML: .5; 3 SOLUTION RESPIRATORY (INHALATION) at 12:34

## 2021-06-03 RX ADMIN — LISINOPRIL 20 MG: 10 TABLET ORAL at 09:10

## 2021-06-03 RX ADMIN — INSULIN ASPART 30 UNITS: 100 INJECTION, SOLUTION INTRAVENOUS; SUBCUTANEOUS at 09:13

## 2021-06-03 RX ADMIN — INSULIN ASPART 30 UNITS: 100 INJECTION, SOLUTION INTRAVENOUS; SUBCUTANEOUS at 17:30

## 2021-06-03 RX ADMIN — HYDROCODONE BITARTRATE AND ACETAMINOPHEN 1 TABLET: 5; 325 TABLET ORAL at 09:09

## 2021-06-03 RX ADMIN — DOCUSATE SODIUM 100 MG: 100 CAPSULE, LIQUID FILLED ORAL at 09:10

## 2021-06-03 RX ADMIN — APIXABAN 5 MG: 5 TABLET, FILM COATED ORAL at 20:49

## 2021-06-03 RX ADMIN — BUDESONIDE AND FORMOTEROL FUMARATE DIHYDRATE 2 PUFF: 160; 4.5 AEROSOL RESPIRATORY (INHALATION) at 18:16

## 2021-06-03 RX ADMIN — GABAPENTIN 600 MG: 300 CAPSULE ORAL at 20:49

## 2021-06-03 RX ADMIN — TRAMADOL HYDROCHLORIDE 50 MG: 50 TABLET, FILM COATED ORAL at 23:52

## 2021-06-03 RX ADMIN — IPRATROPIUM BROMIDE AND ALBUTEROL SULFATE 3 ML: .5; 3 SOLUTION RESPIRATORY (INHALATION) at 06:35

## 2021-06-03 RX ADMIN — INSULIN DETEMIR 40 UNITS: 100 INJECTION, SOLUTION SUBCUTANEOUS at 09:12

## 2021-06-03 RX ADMIN — SODIUM CHLORIDE, PRESERVATIVE FREE 10 ML: 5 INJECTION INTRAVENOUS at 20:50

## 2021-06-03 RX ADMIN — PANTOPRAZOLE SODIUM 40 MG: 40 TABLET, DELAYED RELEASE ORAL at 05:20

## 2021-06-03 RX ADMIN — ATORVASTATIN CALCIUM 20 MG: 20 TABLET, FILM COATED ORAL at 09:10

## 2021-06-03 RX ADMIN — SODIUM CHLORIDE, PRESERVATIVE FREE 10 ML: 5 INJECTION INTRAVENOUS at 09:13

## 2021-06-03 RX ADMIN — LIDOCAINE 2 PATCH: 50 PATCH CUTANEOUS at 09:11

## 2021-06-03 RX ADMIN — HYDROCODONE BITARTRATE AND ACETAMINOPHEN 1 TABLET: 5; 325 TABLET ORAL at 21:48

## 2021-06-03 RX ADMIN — DOCUSATE SODIUM 100 MG: 100 CAPSULE, LIQUID FILLED ORAL at 20:49

## 2021-06-03 RX ADMIN — INSULIN ASPART 6 UNITS: 100 INJECTION, SOLUTION INTRAVENOUS; SUBCUTANEOUS at 12:26

## 2021-06-03 RX ADMIN — POLYETHYLENE GLYCOL (3350) 17 G: 17 POWDER, FOR SOLUTION ORAL at 09:10

## 2021-06-03 RX ADMIN — APIXABAN 5 MG: 5 TABLET, FILM COATED ORAL at 09:10

## 2021-06-03 RX ADMIN — INSULIN ASPART 3 UNITS: 100 INJECTION, SOLUTION INTRAVENOUS; SUBCUTANEOUS at 09:11

## 2021-06-03 RX ADMIN — CLOPIDOGREL 75 MG: 75 TABLET, FILM COATED ORAL at 09:10

## 2021-06-03 RX ADMIN — INSULIN ASPART 30 UNITS: 100 INJECTION, SOLUTION INTRAVENOUS; SUBCUTANEOUS at 12:27

## 2021-06-03 RX ADMIN — HYDROXYZINE HYDROCHLORIDE 25 MG: 25 TABLET, FILM COATED ORAL at 17:35

## 2021-06-03 NOTE — DISCHARGE PLACEMENT REQUEST
"Иван Blanco (65 y.o. Male)     Date of Birth Social Security Number Address Home Phone MRN    1955  [address]  [place] [address] [phone] [id number]    Druze Marital Status          None        Admission Date Admission Type Admitting Provider Attending Provider Department, Room/Bed    5/21/21 Emergency Vivienne Flores DO Mullins, Thomas Anthony, DO [address], 3303/2S    Discharge Date Discharge Disposition Discharge Destination                       Attending Provider: Joo Jimenez DO    Allergies: Codeine, Ciprofloxacin    Isolation: None   Infection: None   Code Status: CPR    Ht: 175.3 cm (69\")   Wt: 149 kg (327 lb 9.6 oz)    Admission Cmt: None   Principal Problem: Respiratory failure (CMS/HCC) [J96.90]                 Active Insurance as of 5/21/2021     Primary Coverage     Payor Plan Insurance Group Employer/Plan Group    [facility] MEDICARE REPLACEMENT [facility] DUAL COMPLETE MEDICARE REPLACEMENT [id number]     Payor Plan Address Payor Plan Phone Number Payor Plan Fax Number Effective Dates    [address] [phone]  1/1/2021 - None Entered    [facility] [address]       Subscriber Name Subscriber Birth Date Member ID       ИВАН BLANCO 1955 [id number]           Secondary Coverage     Payor Plan Insurance Group Employer/Plan Group    [facility] MEDICAID      Payor Plan Address Payor Plan Phone Number Payor Plan Fax Number Effective Dates    [address] [address] [phone]  10/4/2019 - None Entered    [facility] [address]       Subscriber Name Subscriber Birth Date Member ID       ИВАН BLANCO 1955 [id number]                 Emergency Contacts      (Rel.) Home Phone Work Phone Mobile Phone    PaulLamar (Spouse) [phone] -- [phone]    GARCIA MALDONADO (Daughter) [phone] -- [phone]            Emergency Contact Information     Name Relation Home Work Mobile    Lamar Blanco " Spouse [phone]    GARCIA MALDONADO Daughter [phone]          Insurance Information                [facility] MEDICARE REPLACEMENT/[facility] DUAL COMPLETE MEDICARE REPLACEMENT Phone: [phone]    Subscriber: Иван Miller Subscriber#: [id number]    Group#: [id number] Precert#:         [facility]/[employer] MEDICAID Phone: [phone]    Subscriber: Иван Miller Subscriber#: [id number]    Group#:  Precert#:              History & Physical      Nora Gomez PA-C at 21 0142     Attestation signed by Vivienne Flores DO at 21 0798    I have read the documentation below and agree.  Patient also seen and examined independently.  He remains on BiPAP.  Patient is easily arousable and is oriented x4.    Lungs are diminished but clear.  Patient is in no respiratory distress.  Heart is regular rate and rhythm without obvious murmur/gallop/rub.  Patient with decreased range of motion, swelling, tenderness and bruising in the right ankle and foot.  Right lower extremity appears to be more edematous with an increased circumference in comparison to the left calf area.    Agree with a respiratory culture if able to produce.  Continue with Rocephin and a dose of amoxicillin.  Agree with steroids and scheduled nebulized inhalants.  Plan to wean off BiPAP and placed on nasal cannula.      Obtain an x-ray of the right foot pain and a Doppler ultrasound of the right lower extremity to rule out DVT in the setting of recent surgery and decreased ambulation over the past few days.                       [facility] Medicine Services  HISTORY & PHYSICAL    Patient Identification:  Name:  Иван Miller  Age:  65 y.o.  Sex:  male  :  1955  MRN:  [id number]   Visit Number:  [id number]  Admit Date: 2021   Primary Care Physician:  Rosi Thomas APRN     Subjective     Chief complaint:   Chief Complaint   Patient presents with   •  Shortness of Breath     History of presenting illness:   Patient is a 65 y.o. male with past medical history significant for paroxysmal atrial fibrillation, CAD status post stenting in the past, known ischemic cardiomyopathy, COPD, insulin-dependent type 2 diabetes mellitus, GERD, essential hypertension, hyperlipidemia, that presented to the [facility] emergency department for evaluation of shortness of breath.    It should be of note that the patient was very lethargic at bedside.  He would arouse briefly with tactile stimulation but will quickly fall back asleep.  He was also somewhat difficult to understand as he currently has BiPAP in place and was slurring his speech.  The patient states that he has been feeling short of breath and having a productive cough with yellow sputum and wheezing for around 15 days.  He reports he was given an antibiotic but was unable to tell me who gave it to him.  He also admits to a subjective fever, periumbilical abdominal pain, and fatigue.  He denies any diaphoresis, chills, chest pain, leg edema, diarrhea, nausea, vomiting, dysuria.  He states that he wears 2 L of oxygen at home and CPAP at night.  He further admits to amphetamine use and states that he smokes it.  Additionally, he admits to a fall 2 nights ago and has been experiencing severe right ankle pain.  He has had difficulty walking secondary to pain and has bruising located on the lateral aspect of the right ankle.  He denies hitting his head or loss of consciousness.    Upon arrival to the ED, vitals were temperature 98.0 °F, pulse 97, respirations 22, /93, SPO2 96% on 6 L nasal cannula.  ABG with pH 7.325, PCO2 72.8, PO2 59.7, HCO3 37.9, O2 saturation 89.3 on 2 L nasal cannula.  CMP with glucose 279, CO2 34.1.  CBC with hemoglobin 11.6, MCH 24.2, MCHC 27.9.  UA with 3+ glucose, blood cultures pending x2.  Blood ethanol negative.  UDS positive for amphetamines.  Chest x-ray shows limited study due to  inadequate positioning, cardiac silhouette appears to be enlarged, underpenetration of the left hemithorax, no gross dense consolidation.  Noncontrast CT of the chest shows mild right base atelectasis, pleural thickening along the major fissure on the left, three-vessel CAD, no acute lung infiltrates.  EKG with sinus rhythm with occasional PVCs, nonspecific ST abnormality, heart rate 97, QTc 447 MS.  COVID-19 negative.    In the emergency department the patient received IV Rocephin 1 g, IV Decadron 10 mg, IV doxycycline, DuoNeb treatment x2.    Patient has been admitted to the progressive care unit as telemetry overflow for further evaluation and treatment  ---------------------------------------------------------------------------------------------------------------------   Review of Systems   Constitutional: Positive for fever (Undocumented). Negative for chills and diaphoresis.   HENT: Negative for congestion and sore throat.    Eyes: Negative for discharge and visual disturbance.   Respiratory: Positive for cough (Productive with yellow sputum), shortness of breath and wheezing.    Cardiovascular: Negative for chest pain, palpitations and leg swelling.   Gastrointestinal: Positive for abdominal pain (Periumbilical) and constipation. Negative for diarrhea, nausea and vomiting.   Genitourinary: Negative for dysuria and frequency.   Musculoskeletal: Positive for back pain (Chronic) and gait problem (Fall 2 nights ago). Negative for myalgias.   Skin: Negative for rash and wound.        Bruising of right ankle   Neurological: Negative for dizziness, syncope and light-headedness.   Psychiatric/Behavioral: Negative for confusion. The patient is not nervous/anxious.       ---------------------------------------------------------------------------------------------------------------------   Past Medical History:   Diagnosis Date   • Atrial fibrillation (CMS/HCC)    • COPD (chronic obstructive pulmonary disease) (CMS/HCC)     • Diabetes mellitus (CMS/HCC)    • GERD (gastroesophageal reflux disease)    • Hyperlipidemia    • Hypertension    • Myocardial infarction (CMS/HCC)    • Neuropathy      Past Surgical History:   Procedure Laterality Date   • ABDOMINAL SURGERY     • APPENDECTOMY     • CARDIAC CATHETERIZATION     • CORONARY STENT PLACEMENT       Family History   Problem Relation Age of Onset   • Hypertension Mother    • Heart attack Mother    • Hypertension Father      Social History     Socioeconomic History   • Marital status:      Spouse name: Not on file   • Number of children: Not on file   • Years of education: Not on file   • Highest education level: Not on file   Tobacco Use   • Smoking status: Former Smoker     Packs/day: 2.00     Years: 30.00     Pack years: 60.00     Types: Cigarettes   • Smokeless tobacco: Current User     Types: Snuff   Substance and Sexual Activity   • Alcohol use: No   • Drug use: No   • Sexual activity: Defer     ---------------------------------------------------------------------------------------------------------------------   Allergies:  Codeine and Ciprofloxacin  ---------------------------------------------------------------------------------------------------------------------   Medications below are reported home medications pulling from within the system; at this time, these medications have not been reconciled unless otherwise specified and are in the verification process for further verifcation as current home medications.    Prior to Admission Medications     Prescriptions Last Dose Informant Patient Reported? Taking?    albuterol (PROVENTIL HFA;VENTOLIN HFA) 108 (90 Base) MCG/ACT inhaler  Pharmacy Yes No    Inhale 2 puffs Every 4 (Four) Hours As Needed for Wheezing.    amLODIPine (NORVASC) 10 MG tablet  Pharmacy Yes No    Take 10 mg by mouth Daily.    apixaban (ELIQUIS) 5 MG tablet tablet  Pharmacy Yes No    Take 5 mg by mouth 2 (Two) Times a Day.    atorvastatin (LIPITOR) 20 MG  tablet  Pharmacy Yes No    Take 20 mg by mouth Daily.    budesonide-formoterol (SYMBICORT) 160-4.5 MCG/ACT inhaler  Pharmacy Yes No    Inhale 2 puffs 2 (Two) Times a Day.    carvedilol (COREG) 12.5 MG tablet   No No    Take 1 tablet by mouth 2 (Two) Times a Day With Meals.    fluticasone (FLONASE) 50 MCG/ACT nasal spray  Pharmacy Yes No    2 sprays into the nostril(s) as directed by provider Daily.    furosemide (LASIX) 40 MG tablet   No No    Take 1 tablet by mouth Daily.    gabapentin (NEURONTIN) 300 MG capsule   Yes No    Take 300 mg by mouth 3 (Three) Times a Day.    insulin lispro (humaLOG) 100 UNIT/ML injection  Pharmacy Yes No    Inject 20 Units under the skin into the appropriate area as directed 3 (Three) Times a Day Before Meals.    ipratropium-albuterol (DUO-NEB) 0.5-2.5 mg/3 ml nebulizer  Pharmacy Yes No    Take 3 mL by nebulization Every 4 (Four) Hours As Needed for Wheezing.    loratadine (CLARITIN) 10 MG tablet  Pharmacy Yes No    Take 10 mg by mouth Daily.    metFORMIN (GLUCOPHAGE) 1000 MG tablet  Pharmacy Yes No    Take 1,000 mg by mouth 2 (Two) Times a Day With Meals.    omeprazole (priLOSEC) 40 MG capsule  Pharmacy Yes No    Take 40 mg by mouth Daily.    sacubitril-valsartan (ENTRESTO) 49-51 MG tablet   Yes No    Take 1 tablet by mouth 2 (Two) Times a Day.    sildenafil (REVATIO) 20 MG tablet  Pharmacy Yes No    Take 20 mg by mouth Daily As Needed (sexual activity).    tamsulosin (FLOMAX) 0.4 MG capsule 24 hr capsule  Pharmacy Yes No    Take 1 capsule by mouth Every Night.        ---------------------------------------------------------------------------------------------------------------------    [facility] Scheduled Meds:          Current listed hospital scheduled medications may not yet reflect those currently placed in orders that are signed and held, awaiting patient's arrival to floor/unit.       ---------------------------------------------------------------------------------------------------------------------   Vital Signs:  Temp:  [98 °F (36.7 °C)] 98 °F (36.7 °C)  Heart Rate:  [] 98  Resp:  [20-32] 20  BP: (141-161)/(86-99) 151/88  Mean Arterial Pressure (Non-Invasive) for the past 24 hrs (Last 3 readings):   Noninvasive MAP (mmHg)   05/22/21 0033 112   05/21/21 2233 95   05/21/21 2118 119     SpO2 Percentage    05/21/21 2343 05/22/21 0033 05/22/21 0043   SpO2: 96% 95% 98%     SpO2:  [93 %-100 %] 98 %  on  Flow (L/min):  [6] 6;   Device (Oxygen Therapy): NPPV/NIV    Body mass index is 46.52 kg/m².  Wt Readings from Last 3 Encounters:   05/21/21 (!) 143 kg (315 lb)   11/25/19 127 kg (280 lb)   10/06/19 124 kg (273 lb 11.2 oz)     ---------------------------------------------------------------------------------------------------------------------   Physical Exam:  Physical Exam  Constitutional:       General: He is awake.      Appearance: Normal appearance. He is well-developed. He is morbidly obese.      Comments: BiPAP in place   HENT:      Head: Normocephalic and atraumatic.   Eyes:      General: Lids are normal.         Right eye: No discharge.         Left eye: No discharge.   Cardiovascular:      Rate and Rhythm: Normal rate and regular rhythm.      Pulses: Normal pulses. No decreased pulses.           Dorsalis pedis pulses are 2+ on the right side and 2+ on the left side.        Posterior tibial pulses are 2+ on the right side and 2+ on the left side.      Heart sounds: Normal heart sounds. No murmur heard.   No friction rub. No gallop.    Pulmonary:      Effort: Pulmonary effort is normal. No tachypnea or bradypnea.      Breath sounds: Decreased breath sounds (Diffuse) present. No wheezing, rhonchi or rales.   Abdominal:      General: Bowel sounds are normal.      Palpations: Abdomen is soft.      Tenderness: There is abdominal tenderness in the epigastric area and periumbilical area.    Musculoskeletal:      Right lower leg: No edema.      Left lower leg: No edema.      Right foot: Decreased range of motion. Swelling and tenderness present.      Left foot: Normal range of motion. No tenderness.        Feet:    Skin:     Findings: No abrasion, ecchymosis or erythema.   Neurological:      Mental Status: He is oriented to person, place, and time and easily aroused. He is lethargic.   Psychiatric:         Speech: Speech is slurred.         Behavior: Behavior is cooperative.       ---------------------------------------------------------------------------------------------------------------------  EKG:    Per cardiology read by Dr. Gorman, sinus rhythm with occasional PVCs, nonspecific ST abnormality, heart rate 97, QTc 447 MS; when compared to EKG from 10/2019 PVCs are now present.        Telemetry:    Normal sinus rhythm, heart rate 87 bpm, SPO2 95% on BiPAP    I have personally reviewed the EKG/Telemetry strip  ---------------------------------------------------------------------------------------------------------------------   Results from last 7 days   Lab Units 05/21/21 2045   CK TOTAL U/L 124     Results from last 7 days   Lab Units 05/21/21 2045   PROBNP pg/mL 799.5       Results from last 7 days   Lab Units 05/22/21  0054   PH, ARTERIAL pH units 7.328*   PO2 ART mm Hg 104.0   PCO2, ARTERIAL mm Hg 68.4*   HCO3 ART mmol/L 35.9*     Results from last 7 days   Lab Units 05/21/21 2045   CRP mg/dL 0.39   LACTATE mmol/L 1.0   WBC 10*3/mm3 9.07   HEMOGLOBIN g/dL 11.6*   HEMATOCRIT % 41.6   MCV fL 86.8   MCHC g/dL 27.9*   PLATELETS 10*3/mm3 265   INR  1.05     Results from last 7 days   Lab Units 05/21/21 2045   SODIUM mmol/L 144   POTASSIUM mmol/L 4.5   MAGNESIUM mg/dL 2.3   CHLORIDE mmol/L 102   CO2 mmol/L 34.1*   BUN mg/dL 20   CREATININE mg/dL 0.92   EGFR IF NONAFRICN AM mL/min/1.73 82   CALCIUM mg/dL 9.2   GLUCOSE mg/dL 279*   ALBUMIN g/dL 3.82   BILIRUBIN mg/dL 0.3   ALK PHOS U/L 62   AST  (SGOT) U/L 26   ALT (SGPT) U/L 29   Estimated Creatinine Clearance: 111.3 mL/min (by C-G formula based on SCr of 0.92 mg/dL).  No results found for: AMMONIA    No results found for: HGBA1C, POCGLU  Lab Results   Component Value Date    HGBA1C 7.70 (H) 11/09/2018     Lab Results   Component Value Date    TSH 2.100 05/21/2021    FREET4 0.95 05/21/2021     Pain Management Panel     Pain Management Panel Latest Ref Rng & Units 5/21/2021 10/4/2019    AMPHETAMINES SCREEN, URINE Negative Positive(A) Positive(A)    BARBITURATES SCREEN Negative Negative Negative    BENZODIAZEPINE SCREEN, URINE Negative Negative Negative    BUPRENORPHINEUR Negative Negative Negative    COCAINE SCREEN, URINE Negative Negative Negative    METHADONE SCREEN, URINE Negative Negative Negative        I have personally reviewed the above laboratory results.   ---------------------------------------------------------------------------------------------------------------------  Imaging Results (Last 7 Days)     Procedure Component Value Units Date/Time    CT Chest Without Contrast Diagnostic [[id number]] Collected: 05/22/21 0018     Updated: 05/22/21 0020    Narrative:      CT Chest WO    INDICATION:   Chest pain with shortness of breath for the past several days    TECHNIQUE:   CT of the thorax without IV contrast. Coronal and sagittal reconstructions were obtained.  Radiation dose reduction techniques included automated exposure control or exposure modulation based on body size. Count of known CT and cardiac nuc med studies  performed in previous 12 months: 0.     COMPARISON:   10/4/2019    FINDINGS:  Chest images at mediastinal window show no adenopathy or effusions. Coronary artery calcifications are seen in all 3 coronary distributions quite extensively.    Chest images at lung window show mild atelectasis at the right lung base posteriorly. There is nonspecific thickening along the major fissure on the left. It most likely reflects chronic pleural  fibrosis.      Impression:      Mild right base atelectasis. Pleural thickening along the major fissure on the left. Three-vessel coronary artery disease. No acute lung infiltrates are noted.    Signer Name: Ruiz Zamudio MD   Signed: 5/22/2021 12:18 AM   Workstation Name: [id number]    [facility]    XR Chest 1 View [[id number]] Collected: 05/21/21 2140     Updated: 05/21/21 2142    Narrative:      PROCEDURE: CR Chest 1 Vw    COMPARISON: October 2019    INDICATIONS: sob  Relevant clinical info: Sob... unable to get patient to lay on back     TECHNIQUE: Single AP  view of the chest    FINDINGS AND IMPRESSION COMBINED :        Examination is limited as patient is not adequately positioned and is in respiratory distress. Cardiac silhouette appears enlarged patient is rotated to the right. There is underpenetration of the left hemithorax. No gross dense consolidation seen.  Osseous structures are intact.             Signer Name: Lianna Chan MD   Signed: 5/21/2021 9:40 PM   Workstation Name: [email or web address]    [facility]        I have personally reviewed the above radiology results.     Last Echocardiogram:  Results for orders placed during the hospital encounter of 10/04/19    Transthoracic Echo Complete With Contrast if Necessary Per Protocol    Interpretation Summary  · Left ventricular wall thickness is consistent with mild concentric hypertrophy.  · Left atrial cavity size is mildly dilated.  · Estimated EF appears to be in the range of 56 - 60%.  · Left ventricular diastolic function is normal.  · Normal right ventricular cavity size and systolic function noted.  · Estimated right ventricular systolic pressure from tricuspid regurgitation is normal (<35 mmHg).  · The quality of the study is limited due to poor acoustic windows related to patient body  habitus.    ---------------------------------------------------------------------------------------------------------------------    Assessment & Plan      Active Hospital Problems    Diagnosis  POA   • **Respiratory failure (CMS/[facility]) [J96.90]  Yes   • Hyperlipidemia LDL goal <70 [E78.5]  Yes   • Essential hypertension [I10]  Yes     #Acute on chronic hypoxic and hypercarbic respiratory failure (2L NC at home)   #Sepsis criteria POA (pulse 100, RR 32) suspect 2/2 to acute COPD exacerbation   #UDS positive for amphetamines  -Chest x-ray and noncontrast CT of chest shows no acute infiltrates  -Initial ABG with pH 7.325, PCO2 72.8, PO2 59.7 on 2 L nasal cannula  -Currently on BiPAP; ABG with pH 7.328, PCO2 68.4, PO2 104.0, O2 saturation 98.0 on IPAP 20, EPAP 8  -Suspect acute hypoxic and hypercarbic respiratory failure secondary to acute COPD exacerbation  -Patient was given IV Rocephin and IV doxycycline in the emergency department; we will continue with IV Rocephin and switch to IV azithromycin for anti-inflammatory properties  -Lactobacillus for GI protection  -Obtain respiratory culture, respiratory panel, MRSA swab  -Adjust antibiotic therapy as necessary  -Blood cultures pending x2  -IV Solu-Medrol 40 mg every 12 hours  -DuoNeb treatments ordered  -Incentive spirometry given, encourage use  -Continue supplemental oxygen as needed, titrate to maintain SPO2 greater than or equal to 90%  -Attempt to wean off BiPAP later this morning    #Periumbilical abdominal pain  -Obtain KUB  -Patient states he has been constipated; MiraLAX and Esperanza-Colace ordered    #Right ankle edema and ecchymosis secondary to recent fall  -X-ray of right ankle ordered  -Elevate lower extremity  -Tylenol as needed for pain    #Insulin-dependent type 2 diabetes mellitus  #Glucosuria  #Diabetic neuropathy  -Hemoglobin A1c ordered  -Hold home oral hypoglycemics to prevent hypoglycemia  -Will add SSI for now. Accu-cheks qAC and qhs; Titrate  insulin therapy as necessary.    -Continue home Neurontin once reconciled per pharmacy    #Chronic normocytic anemia  -H&H stable; repeat a.m. CBC monitor H&H closely  -If hemoglobin less than 7 or platelets less than 50, will transfuse    #Reported history nonischemic cardiomyopathy  #Reported history of paroxysmal atrial fibrillation; anticoagulated on Eliquis  -Follows with Dr. Mccabe, cardiology as an outpatient; at his most recent visit in November 2019, cardiology noted that there is no clear-cut documentation of atrial fibrillation or diagnosis of heart failure however, he was advised to continue Entresto and Eliquis  -Echo from 10/2019 reveals an EF of 56 to 60% with normal left ventricular diastolic function and normal right ventricular cavity size and systolic function  -Patient is currently in normal sinus rhythm  -Continue home Entresto, Lasix, and Eliquis once reconciled per pharmacy  -Monitor for signs of volume overload with daily weights, strict I's and O's    #CAD status post multiple stents in the past  -Currently chest pain-free; EKG shows no acute ischemic changes  -Continue home Lipitor and Coreg  -Monitor closely on telemetry    #Essential hypertension  -Continue home antihypertensive agents with holding parameters once reconciled per pharmacy  -We will make IV hydralazine available for SBP greater than 170  -Continue to monitor VS per hospital protocol    #Hyperlipidemia  -Continue home statin    #GERD  -Protonix    #Obstructive sleep apnea  -CPAP ordered    #Chronic back pain  -Patient is requesting pain medication, however, told him we will hold at this time given respiratory status  -Tylenol available as needed  -Lidoderm patches ordered    #Morbid obesity  -BMI 46.52 kg/m²  -Complicates all aspects of patient care  -Patient may have some underlying obesity hypoventilation syndrome as well      F/E/N: No IV fluids.  Replace electrolytes as necessary.   NPO  ---------------------------------------------------  DVT Prophylaxis: Eliquis  GI Prophylaxis: Protonix  Activity: Up with assistance, fall precaution    The patient is considered to be a high risk patient due to: Acute hypoxic and hypercarbic respiratory failure; sepsis secondary to acute COPD exacerbation    INPATIENT status due to the need for care which can only be reasonably provided in an hospital setting such as aggressive/expedited ancillary services and/or consultation services, the necessity for IV medications, close physician monitoring and/or the possible need for procedures.  In such, I feel patient’s risk for adverse outcomes and need for care warrant INPATIENT evaluation and predict the patient’s care encounter to likely last beyond 2 midnights.    Code Status: FULL CODE     I have discussed the patient's assessment and plan with the patient, nursing staff, and attending physician      Nora Gomez PA-C  Hospitalist Service -- [facility]       05/22/21  01:42 EDT    Attending Physician: Vivienne Flores DO       Electronically signed by Vivienne Flores DO at 05/22/21 0747       Vital Signs (last day)     Date/Time   Temp   Temp src   Pulse   Resp   BP   Patient Position   SpO2    06/03/21 1037   97.6 (36.4)   Oral   88   18   180/92   Lying   97    06/03/21 0659   97.6 (36.4)   Oral   78   18   169/96   Lying   96    06/03/21 0646   --   --   75   16   --   --   99    06/03/21 0635   --   --   84   24   --   --   97    06/03/21 0355   --   --   --   --   --   --   98    06/03/21 0256   98.6 (37)   Axillary   67   20   158/89   Lying   98    06/03/21 0058   --   --   85   22   --   --   99    06/03/21 0046   --   --   79   23   --   --   96    06/03/21 0036   --   --   --   --   156/83   Lying   --    06/02/21 2351   --   --   --   --   --   --   97    06/02/21 1850   --   --   65   20   --   --   98    06/02/21 1838   --   --   68   20   --   --   96    06/02/21 1817   98.3  (36.8)   Oral   64   18   160/80   Lying   98    06/02/21 1436   98 (36.7)   Oral   67   18   150/83   Lying   97    06/02/21 1223   --   --   --   --   --   --   95    06/02/21 1100   97.6 (36.4)   Oral   86   20   159/92   Lying   94    06/02/21 0636   --   --   --   --   --   --   97    06/02/21 0626   --   --   80   22   --   --   97    06/02/21 0621   98.2 (36.8)   Oral   79   18   151/83   Lying   93    06/02/21 0241   97.6 (36.4)   Oral   79   16   143/92   Lying   96    06/02/21 0040   --   --   84   15   174/86   Sitting   --    06/02/21 0016   --   --   81   18   --   --   94                Current Facility-Administered Medications   Medication Dose Route Frequency Provider Last Rate Last Admin   • acetaminophen (TYLENOL) tablet 650 mg  650 mg Oral Q6H PRN Nora Gomez PA-C   650 mg at 05/28/21 0037   • albuterol (PROVENTIL) nebulizer solution 0.083% 2.5 mg/3mL  2.5 mg Nebulization Q6H PRN Joo Jimenez DO       • apixaban (ELIQUIS) tablet 5 mg  5 mg Oral Q12H Joo Jimenez DO   5 mg at 06/03/21 0910   • atorvastatin (LIPITOR) tablet 20 mg  20 mg Oral Daily Joo Jimenez DO   20 mg at 06/03/21 0910   • budesonide-formoterol (SYMBICORT) 160-4.5 MCG/ACT inhaler 2 puff  2 puff Inhalation BID - RT Joo Jimenez DO   2 puff at 06/03/21 0635   • calcium carbonate (TUMS) chewable tablet 500 mg (200 mg elemental)  2 tablet Oral TID PRN Nora Gomez PA-C       • Canagliflozin (INVOKANA) 300 MG tablet tablet 300 mg  300 mg Oral Daily Joo Jimenez DO   300 mg at 06/03/21 0910   • clopidogrel (PLAVIX) tablet 75 mg  75 mg Oral Daily Ryan Gorman MD   75 mg at 06/03/21 0910   • dextrose (D50W) 25 g/ 50mL Intravenous Solution 25 g  25 g Intravenous Q15 Min PRN Vivienne Flores DO       • dextrose (D50W) 25 g/ 50mL Intravenous Solution 25 g  25 g Intravenous Q15 Min PRN Nora Gomez PA-C       • dextrose (GLUTOSE) oral gel 15 g  15  g Oral Q15 Min PRN Vivienne Flores, DO       • dextrose (GLUTOSE) oral gel 15 g  15 g Oral Q15 Min PRN Nora Gomez PA-C       • docusate sodium (COLACE) capsule 100 mg  100 mg Oral BID Joo Jimenez DO   100 mg at 06/03/21 0910   • fluticasone (FLONASE) 50 MCG/ACT nasal spray 2 spray  2 spray Nasal Daily Joo Jimenez DO   2 spray at 06/03/21 0914   • gabapentin (NEURONTIN) capsule 600 mg  600 mg Oral Q8H Joo Jimenez DO   600 mg at 06/03/21 0520   • glucagon (human recombinant) (GLUCAGEN DIAGNOSTIC) injection 1 mg  1 mg Subcutaneous Q15 Min PRN Vivienne Flores DO       • glucagon (human recombinant) (GLUCAGEN DIAGNOSTIC) injection 1 mg  1 mg Subcutaneous Q15 Min PRN Nora Gomez PA-C       • hydrALAZINE (APRESOLINE) injection 10 mg  10 mg Intravenous Q6H PRN Nora Gomez PA-C   10 mg at 05/27/21 2257   • HYDROcodone-acetaminophen (NORCO) 5-325 MG per tablet 1 tablet  1 tablet Oral Q8H PRN Joo Jimenez DO   1 tablet at 06/03/21 0909   • hydrOXYzine (ATARAX) tablet 25 mg  25 mg Oral TID PRN Nora Gomez PA-C   25 mg at 06/02/21 1708   • insulin aspart (novoLOG) injection 0-9 Units  0-9 Units Subcutaneous TID AC Nora Gomez PA-C   3 Units at 06/03/21 0911   • insulin aspart (novoLOG) injection 30 Units  30 Units Subcutaneous TID With Meals Dequan Connelly MD   30 Units at 06/03/21 0913   • insulin detemir (LEVEMIR) injection 40 Units  40 Units Subcutaneous Daily Dequan Connelly MD   40 Units at 06/03/21 0912   • ipratropium (ATROVENT) nebulizer solution 0.5 mg  0.5 mg Nebulization Q6H PRN Joo Jimenez DO       • ipratropium-albuterol (DUO-NEB) nebulizer solution 3 mL  3 mL Nebulization Q4H PRN Joo Jimenez, DO       • ipratropium-albuterol (DUO-NEB) nebulizer solution 3 mL  3 mL Nebulization Q6H - RT Nora Gomez PA-C   3 mL at 06/03/21 0635   • lidocaine (LIDODERM) 5 % 2 patch  2  patch Transdermal Q24H Nora Gomez PA-C   2 patch at 21 0911   • lisinopril (PRINIVIL,ZESTRIL) tablet 20 mg  20 mg Oral Q24H Joo Jimenez DO   20 mg at 21 0910   • melatonin tablet 5 mg  5 mg Oral Nightly PRN Nora Gomez PA-C   5 mg at 21   • metoprolol succinate XL (TOPROL-XL) 24 hr tablet 75 mg  75 mg Oral Q24H Ryan Gorman MD   75 mg at 21 0950   • nitroglycerin (NITROSTAT) SL tablet 0.4 mg  0.4 mg Sublingual Q5 Min PRN Vivienne Flores DO       • pantoprazole (PROTONIX) EC tablet 40 mg  40 mg Oral Q AM Nora Gomez PA-C   40 mg at 21 0520   • polyethylene glycol (MIRALAX) packet 17 g  17 g Oral Daily Nora Gomez PA-C   17 g at 21 0910   • sennosides-docusate (PERICOLACE) 8.6-50 MG per tablet 1 tablet  1 tablet Oral Nightly PRN Nora Gomez PA-C       • sodium chloride 0.9 % flush 10 mL  10 mL Intravenous PRN Vivienne Flores, DO       • sodium chloride 0.9 % flush 10 mL  10 mL Intravenous Q12H Vivienne Flores DO   10 mL at 21 0913   • sodium chloride 0.9 % flush 10 mL  10 mL Intravenous PRN Vivienne Flores DO       • tamsulosin (FLOMAX) 24 hr capsule 0.4 mg  0.4 mg Oral Nightly Joo Jimenez DO   0.4 mg at 21 2100   • traMADol (ULTRAM) tablet 50 mg  50 mg Oral Q6H PRN Dequan Connelly MD   50 mg at 21 2325     Operative/Procedure Notes (most recent note)    No notes of this type exist for this encounter.            Physician Progress Notes (most recent note)      Joo Jimenez DO at 21 1050              [facility]     Patient Identification:  Name:  Иван Miller  Age:  65 y.o.  Sex:  male  :  1955  MRN:  [id number]  Visit Number:  [id number]  Primary Care Provider:  Rosi Thomas APRN    Length of stay:  12    Chief complaint: Back pain    Subjective:    Patient continues to report feeling well  except for neck pain and upper back pain.  He denies any fevers, chills, sweats or rigors.  Denies any other new symptoms overnight.  ----------------------------------------------------------------------------------------------------------------------  Current Hospital Meds:  apixaban, 5 mg, Oral, Q12H  atorvastatin, 20 mg, Oral, Daily  budesonide-formoterol, 2 puff, Inhalation, BID - RT  Canagliflozin, 300 mg, Oral, Daily  clopidogrel, 75 mg, Oral, Daily  docusate sodium, 100 mg, Oral, BID  fluticasone, 2 spray, Nasal, Daily  gabapentin, 600 mg, Oral, Q8H  insulin aspart, 0-9 Units, Subcutaneous, TID AC  insulin aspart, 30 Units, Subcutaneous, TID With Meals  insulin detemir, 40 Units, Subcutaneous, Daily  ipratropium-albuterol, 3 mL, Nebulization, Q6H - RT  lidocaine, 2 patch, Transdermal, Q24H  lisinopril, 20 mg, Oral, Q24H  metoprolol succinate XL, 75 mg, Oral, Q24H  pantoprazole, 40 mg, Oral, Q AM  polyethylene glycol, 17 g, Oral, Daily  sodium chloride, 10 mL, Intravenous, Q12H  tamsulosin, 0.4 mg, Oral, Nightly         ----------------------------------------------------------------------------------------------------------------------  Vital Signs:  Temp:  [97.6 °F (36.4 °C)-98.6 °F (37 °C)] 97.6 °F (36.4 °C)  Heart Rate:  [64-88] 88  Resp:  [16-24] 18  BP: (150-180)/(80-96) 180/92      06/01/21  0509 06/02/21  0504 06/03/21  0445   Weight: (!) 146 kg (320 lb 14.4 oz) (!) 148 kg (326 lb 9.6 oz) (!) 149 kg (327 lb 9.6 oz)     Body mass index is 48.38 kg/m².    Intake/Output Summary (Last 24 hours) at 6/3/2021 1050  Last data filed at 6/3/2021 1037  Gross per 24 hour   Intake 1920 ml   Output 3875 ml   Net -1955 ml     Diet Regular; Cardiac, Consistent Carbohydrate  ----------------------------------------------------------------------------------------------------------------------  Physical exam:  Constitutional: Morbidly obese  male in no apparent distress.     HENT:  Head:  Normocephalic and  atraumatic.  Mouth:  Moist mucous membranes.    Eyes:  Conjunctivae and EOM are normal.  Pupils are equal, round, and reactive to light.  No scleral icterus.    Neck:  Neck supple. No thyromegaly.  No JVD present.    Cardiovascular:  Regular rate and rhythm with no murmurs, rubs, clicks or gallops appreciated.  Pulmonary/Chest:  Clear to auscultation bilaterally with no crackles, wheezes or rhonchi appreciated.  Abdominal:  Soft. Nontender. Nondistended  Bowel sounds are normal in all four quadrants. No organomegally appreciated.   Neurological:  Cranial nerves II-XII intact with no focal deficits.  No facial droop.  No slurred speech.   Skin:  Warm and dry to palpation with no rashes or lesions appreciated.  Peripheral vascular:  2+ radial and pedal pulses in bilateral upper and lower extremities.  Psychiatric:  Alert and oriented x3, demonstrates appropriate judgement and insight.    ----------------------------------------------------------------------------------------------------------------------  Tele:    ----------------------------------------------------------------------------------------------------------------------  Results from last 7 days   Lab Units 05/27/21 2204 05/27/21  1548   TROPONIN T ng/mL <0.010 <0.010     Results from last 7 days   Lab Units 06/03/21  0251 06/02/21  0111 06/02/21  0108 05/31/21  0117   CRP mg/dL 0.78*  --  0.98* 0.87*   WBC 10*3/mm3 7.23 7.59  --  8.49   HEMOGLOBIN g/dL 10.8* 11.2*  --  12.3*   HEMATOCRIT % 37.1* 38.7  --  43.0   MCV fL 85.1 86.0  --  84.6   MCHC g/dL 29.1* 28.9*  --  28.6*   PLATELETS 10*3/mm3 206 194  --  234         Results from last 7 days   Lab Units 06/03/21  0251 06/02/21  0111 06/02/21  0108 05/31/21  0117 05/30/21  0433 05/29/21  0306 05/28/21  0059   SODIUM mmol/L 140 136  --  137 138 139 137   POTASSIUM mmol/L 4.4 4.6 4.7 4.5 4.0 4.2 4.5   MAGNESIUM mg/dL  --   --  2.0  --  2.4  --   --    CHLORIDE mmol/L 102 99  --  96* 97* 99 99   CO2 mmol/L  28.7 26.7  --  35.4* 33.3* 33.6* 30.4*   BUN mg/dL 21 21  --  20 19 19 21   CREATININE mg/dL 0.96 1.12  --  1.24 0.95 0.87 0.91   EGFR IF NONAFRICN AM mL/min/1.73 78 66  --  58* 79 88 83   CALCIUM mg/dL 9.1 9.3  --  9.5 9.3 9.2 9.1   GLUCOSE mg/dL 228* 274*  --  164* 156* 120* 220*   ALBUMIN g/dL  --   --   --   --   --  3.35* 3.49*   BILIRUBIN mg/dL  --   --   --   --   --  0.4 0.3   ALK PHOS U/L  --   --   --   --   --  54 56   AST (SGOT) U/L  --   --   --   --   --  15 17   ALT (SGPT) U/L  --   --   --   --   --  20 21   Estimated Creatinine Clearance: 109.2 mL/min (by C-G formula based on SCr of 0.96 mg/dL).    No results found for: AMMONIA      No results found for: BLOODCX  No results found for: URINECX  No results found for: WOUNDCX  No results found for: STOOLCX    I have personally looked at the labs and they are summarized above.  ----------------------------------------------------------------------------------------------------------------------  Imaging Results (Last 24 Hours)     ** No results found for the last 24 hours. **        ----------------------------------------------------------------------------------------------------------------------  Assessment and Plan:    1.  Acute on chronic hypoxic/hypercapnic respiratory failure - acute phase resolved, continue 2 L nasal cannula which is patient's baseline.    2.  COPD exacerbation -resolved    3.  Generalized weakness -patient was denied inpatient rehab.  Currently awaiting skilled nursing facility placement.    4.  Paroxysmal A. Fib - currently in NSR, continue Eliquis    5.  Essential hypertension  -patient with intermittent elevation in blood pressure.  Will increase lisinopril to 20 mg p.o. daily.    6.  Insulin-dependent type 2 diabetes mellitus -continue sliding scale insulin with Accu-Cheks before every meal and nightly    Disposition currently pending SNF placement        Joo Jimenez,   06/03/21  10:50 EDT    Electronically  signed by Joo Jimenez DO at 21 1052          Consult Notes (most recent note)      Viviane Smith MD at 21 1034      Consult Orders    1. Inpatient Infectious Diseases Consult [[id number]] ordered by Dequan Connelly MD at 21 1446                       INFECTIOUS DISEASE CONSULTATION REPORT        Patient Identification:  Name:  Иван Miller  Age:  65 y.o.  Sex:  male  :  1955  MRN:  [id number]   Visit Number:  [id number]  Primary Care Physician:  Rosi Thomas APRN       LOS: 9 days        Subjective       Subjective     History of present illness:      Thank you Dr. Jimenez for allowing us to participate in the care of your patient.  As you well know, Mr. Иван Miller is a 65 y.o. male with past medical history significant for paroxysmal atrial fibrillation, CAD, ischemic cardiomyopathy, COPD, insulin-dependent type 2 diabetes mellitus, hypertension, and hyperlipidemia, who presented to [facility] Emergency Department on 2021 for shortness of breath.  Since that time, patient has been treated for COPD exacerbation.  After complaining of weakness, Dr. Connelly reached out to Dr. Villaseñor of neurosurgery at [facility] and although the patient has spinal stenosis, he believes decompression would make the patient worse.  He also recommends to see the patient outpatient for concerns of osteomyelitis with a normal procalcitonin and CRP.  He recommended that primary team ordered an MRI of the cervical and thoracic spine to rule out osteomyelitis.  After MRI results finalized, results were reviewed with Dr. Villaseñor who believes there could be burned-out osteomyelitis, as procalcitonin and CRP are normal.  Neurosurgery plans to follow the patient outpatient, but recommended ID involvement.    Today, the patient is sitting up in bed in no apparent distress.  CNA is at bedside.  Patient complains of chronic back pain as well as bilateral upper and lower  extremity weakness, worse on the left.  Afebrile, no diarrhea.  CRP is only slightly elevated at 0.87.  WBC remains normal.  Procalcitonin on 5/28/2021 was normal.  Chest x-ray on 5/30/2021 showed no evidence of active or acute cardiopulmonary disease.  MRI of the thoracic spine with and without contrast on 5/28/2021 showed postcontrast imaging demonstrating enhancement along the inferior endplate of T7 and superior endplate of T8 as well as some enhancement posteriorly adjacent to the cord but no enhancing mass is seen.  Again, as above there certainly could be associated with at least a mild degree of discitis.  Moderate central canal narrowing at T7-T8 and T8-T9.  MRI of the thoracic spine without contrast on 5/28/2021 showed decreased height of T7 and T8 with extensive endplate changes.  Most of this does appear to be chronic.  I cannot exclude at least a mild degree of discitis.  There are spurs and disc bulges at T7-T8 and T8-T9 which do cause moderate central canal narrowing.  MRI of the cervical spine with and without contrast on 5/28/2021 showed the study is markedly degraded by patient motion.  No destructive bony lesion.  No contrast-enhancing abnormality.  MRI of the lumbar spine without contrast on 5/28/2021 showed facet arthropathy.  Disc bulges most notable at L3-L4 and L4-L5.  COVID-19 and flu A/B PCR on 5/21/2021 was negative.  Blood cultures on 5/21/2021 finalized as no growth.    Infectious Disease consultation was requested for antimicrobial management.    ---------------------------------------------------------------------------------------------------------------------     Review Of Systems:    Constitutional: no fever, chills and night sweats. No appetite change or unexpected weight change. No fatigue.  Generalized weakness.  Eyes: no eye drainage, itching or redness.  HEENT: no mouth sores, dysphagia or nose bleed.  Respiratory: no for shortness of breath, cough or production of  sputum.  Cardiovascular: no chest pain, no palpitations, no orthopnea.  Gastrointestinal: no nausea, vomiting or diarrhea. No abdominal pain, hematemesis or rectal bleeding.  Genitourinary: no dysuria or polyuria.  Hematologic/lymphatic: no lymph node abnormalities, no easy bruising or easy bleeding.  Musculoskeletal: Back pain.  Skin: No rash and no itching.  Neurological: no loss of consciousness, no seizure, no headache.  Bilateral upper and lower extremity weakness, worse on the left.  Unsteady gait.  Behavioral/Psych: no depression or suicidal ideation.  Endocrine: no hot flashes.  Immunologic: negative.    ---------------------------------------------------------------------------------------------------------------------     Past Medical History    Past Medical History:   Diagnosis Date   • Atrial fibrillation (CMS/[facility])    • COPD (chronic obstructive pulmonary disease) (CMS/[facility])    • Diabetes mellitus (CMS/[facility])    • GERD (gastroesophageal reflux disease)    • Hyperlipidemia    • Hypertension    • Myocardial infarction (CMS/[facility])    • Neuropathy        Past Surgical History    Past Surgical History:   Procedure Laterality Date   • ABDOMINAL SURGERY     • APPENDECTOMY     • CARDIAC CATHETERIZATION     • CORONARY STENT PLACEMENT         Family History    Family History   Problem Relation Age of Onset   • Hypertension Mother    • Heart attack Mother    • Hypertension Father        Social History    Social History     Tobacco Use   • Smoking status: Former Smoker     Packs/day: 2.00     Years: 30.00     Pack years: 60.00     Types: Cigarettes   • Smokeless tobacco: Current User     Types: Snuff   Substance Use Topics   • Alcohol use: No   • Drug use: No       Allergies    Codeine and Ciprofloxacin  ---------------------------------------------------------------------------------------------------------------------     Home Medications:    Prior to Admission Medications       Prescriptions Last Dose Informant  Patient Reported? Taking?    albuterol (PROVENTIL HFA;VENTOLIN HFA) 108 (90 Base) MCG/ACT inhaler 5/20/2021 Pharmacy Yes Yes    Inhale 2 puffs Every 6 (Six) Hours As Needed for Wheezing.    amoxicillin-clavulanate (AUGMENTIN) 875-125 MG per tablet 5/20/2021 Pharmacy Yes Yes    Take 1 tablet by mouth 2 (Two) Times a Day. Prior to [facility] Admission, Patient was on: 10 day supply starting 5/18 - patient has taken 4 doses    Budeson-Glycopyrrol-Formoterol (BREZTRI) 160-9-4.8 MCG/ACT aerosol inhaler 5/20/2021 Pharmacy Yes Yes    Inhale 2 puffs 2 (Two) Times a Day.    carvedilol (COREG) 25 MG tablet 5/20/2021 Pharmacy Yes Yes    Take 25 mg by mouth 2 (Two) Times a Day With Meals.    Dapagliflozin Propanediol (Farxiga) 10 MG tablet 5/20/2021 Pharmacy Yes Yes    Take 10 mg by mouth Daily.    furosemide (LASIX) 40 MG tablet 5/20/2021 Pharmacy Yes Yes    Take 80 mg by mouth Daily.    gabapentin (NEURONTIN) 600 MG tablet 5/20/2021 Pharmacy Yes Yes    Take 600 mg by mouth 3 (Three) Times a Day.    Insulin Lispro, 1 Unit Dial, (HumaLOG KwikPen) 100 UNIT/ML solution pen-injector 5/20/2021 Pharmacy Yes Yes    Inject 35 Units under the skin into the appropriate area as directed 3 (Three) Times a Day.    lidocaine (LIDODERM) 5 % 5/20/2021 Pharmacy Yes Yes    Place 1 patch on the skin as directed by provider Daily. Prior to [facility] Admission, Patient was on:  Place on Shoulder or back and Remove & Discard patch within 12 hours or as directed by MD    magnesium oxide (MAG-OX) 400 MG tablet 5/20/2021 Pharmacy Yes Yes    Take 400 mg by mouth Daily.    pantoprazole (PROTONIX) 40 MG EC tablet 5/20/2021 Pharmacy Yes Yes    Take 40 mg by mouth Daily.    apixaban (ELIQUIS) 5 MG tablet tablet 5/20/2021 Pharmacy Yes No    Take 5 mg by mouth 2 (Two) Times a Day.    atorvastatin (LIPITOR) 20 MG tablet 5/20/2021 Pharmacy Yes No    Take 20 mg by mouth Daily.    fluticasone (FLONASE) 50 MCG/ACT nasal spray 5/20/2021 Pharmacy Yes No    2 sprays into  the nostril(s) as directed by provider Daily.    ipratropium-albuterol (DUO-NEB) 0.5-2.5 mg/3 ml nebulizer 5/20/2021 Pharmacy Yes No    Take 3 mL by nebulization Every 4 (Four) Hours As Needed for Wheezing.    metFORMIN (GLUCOPHAGE) 1000 MG tablet 5/20/2021 Pharmacy Yes No    Take 1,000 mg by mouth 2 (Two) Times a Day With Meals.    sildenafil (REVATIO) 20 MG tablet Unknown Self Yes No    Take 20 mg by mouth As Needed.    tamsulosin (FLOMAX) 0.4 MG capsule 24 hr capsule 5/20/2021 Pharmacy Yes No    Take 1 capsule by mouth Every Night.          ---------------------------------------------------------------------------------------------------------------------    Objective       Objective     Hospital Scheduled Meds:  apixaban, 5 mg, Oral, Q12H  atorvastatin, 20 mg, Oral, Daily  budesonide-formoterol, 2 puff, Inhalation, BID - RT  Canagliflozin, 300 mg, Oral, Daily  clopidogrel, 75 mg, Oral, Daily  docusate sodium, 100 mg, Oral, BID  fluticasone, 2 spray, Nasal, Daily  gabapentin, 600 mg, Oral, Q8H  insulin aspart, 0-9 Units, Subcutaneous, TID AC  insulin aspart, 30 Units, Subcutaneous, TID With Meals  insulin detemir, 40 Units, Subcutaneous, Daily  ipratropium-albuterol, 3 mL, Nebulization, Q6H - RT  lactobacillus acidophilus, 1 capsule, Oral, Daily  lidocaine, 2 patch, Transdermal, Q24H  lisinopril, 5 mg, Oral, Q24H  metoprolol succinate XL, 75 mg, Oral, Q24H  pantoprazole, 40 mg, Oral, Q AM  polyethylene glycol, 17 g, Oral, Daily  sodium chloride, 10 mL, Intravenous, Q12H  tamsulosin, 0.4 mg, Oral, Nightly         ---------------------------------------------------------------------------------------------------------------------   Vital Signs:  Temp:  [97 °F (36.1 °C)-98.6 °F (37 °C)] 98.3 °F (36.8 °C)  Heart Rate:  [58-82] 70  Resp:  [15-25] 20  BP: (119-145)/(67-91) 130/67  Mean Arterial Pressure (Non-Invasive) for the past 24 hrs (Last 3 readings):   Noninvasive MAP (mmHg)   05/31/21 1032 92   05/31/21 0633 90    05/30/21 1835 87     SpO2 Percentage    05/31/21 0648 05/31/21 0655 05/31/21 1032   SpO2: 95% 99% 95%     SpO2:  [95 %-99 %] 95 %  on  Flow (L/min):  [2] 2;   Device (Oxygen Therapy): nasal cannula    Body mass index is 47.49 kg/m².  Wt Readings from Last 3 Encounters:   05/31/21 (!) 146 kg (321 lb 9.6 oz)   11/25/19 127 kg (280 lb)   10/06/19 124 kg (273 lb 11.2 oz)     ---------------------------------------------------------------------------------------------------------------------     Physical Exam:    Constitutional: Morbidly obese  male is sitting off the side of the bed in no apparent distress.  HENT:  Head: Normocephalic and atraumatic.  Mouth:  Moist mucous membranes.    Eyes:  Conjunctivae and EOM are normal.  No scleral icterus.  Neck:  Neck supple.  No JVD present.    Cardiovascular:  Normal rate, regular rhythm and normal heart sounds with no murmur. No edema.  Pulmonary/Chest:  No respiratory distress, no wheezes, no crackles, with normal breath sounds and good air movement.  Abdominal:  Soft.  Bowel sounds are normal.  No distension and no tenderness.   Musculoskeletal:  No edema, no tenderness, and no deformity.  No swelling or redness of joints.  Some tenderness to palpation along the spine, especially at the thoracic spine.  Neurological:  Alert and oriented to person, place, and time.  No facial droop.  No slurred speech.   Skin:  Skin is warm and dry.  No rash noted.  No pallor.   Psychiatric:  Normal mood and affect.  Behavior is normal.    ---------------------------------------------------------------------------------------------------------------------    Results from last 7 days   Lab Units 05/27/21  2204 05/27/21  1548   TROPONIN T ng/mL <0.010 <0.010     Results from last 7 days   Lab Units 05/30/21  0433   PROBNP pg/mL 190.6     Results from last 7 days   Lab Units 05/26/21  0128   CHOLESTEROL mg/dL 153   TRIGLYCERIDES mg/dL 130   HDL CHOL mg/dL 30*   LDL CHOL mg/dL 99      Results from last 7 days   Lab Units 05/25/21  1117   PH, ARTERIAL pH units 7.376   PO2 ART mm Hg 74.6*   PCO2, ARTERIAL mm Hg 63.9*   HCO3 ART mmol/L 37.5*     Results from last 7 days   Lab Units 05/31/21  0117 05/30/21  0433 05/29/21  0306 05/28/21  1834   CRP mg/dL 0.87*  --  0.34 <0.30   WBC 10*3/mm3 8.49 8.38 8.77  --    HEMOGLOBIN g/dL 12.3* 12.1* 11.7*  --    HEMATOCRIT % 43.0 42.1 39.8  --    MCV fL 84.6 85.7 83.6  --    MCHC g/dL 28.6* 28.7* 29.4*  --    PLATELETS 10*3/mm3 234 217 228  --      Results from last 7 days   Lab Units 05/31/21  0117 05/30/21  0433 05/29/21  0306 05/28/21  0059 05/27/21  0324 05/26/21  0128   SODIUM mmol/L 137 138 139 137 138 135*   POTASSIUM mmol/L 4.5 4.0 4.2 4.5 4.4 4.0   MAGNESIUM mg/dL  --  2.4  --   --  2.7* 2.5*   CHLORIDE mmol/L 96* 97* 99 99 97* 96*   CO2 mmol/L 35.4* 33.3* 33.6* 30.4* 36.4* 35.3*   BUN mg/dL 20 19 19 21 25* 31*   CREATININE mg/dL 1.24 0.95 0.87 0.91 0.86 0.96   EGFR IF NONAFRICN AM mL/min/1.73 58* 79 88 83 89 78   CALCIUM mg/dL 9.5 9.3 9.2 9.1 9.2 9.1   GLUCOSE mg/dL 164* 156* 120* 220* 164* 233*   ALBUMIN g/dL  --   --  3.35* 3.49* 3.54 3.39*   BILIRUBIN mg/dL  --   --  0.4 0.3 0.4 0.3   ALK PHOS U/L  --   --  54 56 51 48   AST (SGOT) U/L  --   --  15 17 13 14   ALT (SGPT) U/L  --   --  20 21 21 20   Estimated Creatinine Clearance: 83.7 mL/min (by C-G formula based on SCr of 1.24 mg/dL).  No results found for: AMMONIA    Glucose   Date/Time Value Ref Range Status   05/31/2021 0632 280 (H) 70 - 130 mg/dL Final   05/30/2021 2150 159 (H) 70 - 130 mg/dL Final   05/30/2021 1907 220 (H) 70 - 130 mg/dL Final   05/30/2021 1621 253 (H) 70 - 130 mg/dL Final   05/30/2021 1023 214 (H) 70 - 130 mg/dL Final   05/30/2021 0706 147 (H) 70 - 130 mg/dL Final   05/29/2021 1825 218 (H) 70 - 130 mg/dL Final   05/29/2021 1638 149 (H) 70 - 130 mg/dL Final     Lab Results   Component Value Date    HGBA1C 8.80 (H) 05/21/2021     Lab Results   Component Value Date    TSH  2.100 05/21/2021    FREET4 0.95 05/21/2021       No results found for: BLOODCX  No results found for: URINECX  No results found for: WOUNDCX  No results found for: STOOLCX  No results found for: RESPCX  Pain Management Panel       Pain Management Panel Latest Ref Rng & Units 5/21/2021 10/4/2019    AMPHETAMINES SCREEN, URINE Negative Positive(A) Positive(A)    BARBITURATES SCREEN Negative Negative Negative    BENZODIAZEPINE SCREEN, URINE Negative Negative Negative    BUPRENORPHINEUR Negative Negative Negative    COCAINE SCREEN, URINE Negative Negative Negative    METHADONE SCREEN, URINE Negative Negative Negative          I have personally reviewed the above laboratory results.   ---------------------------------------------------------------------------------------------------------------------  Imaging Results (Last 7 Days)       Procedure Component Value Units Date/Time    XR Chest AP [[id number]] Collected: 05/30/21 1101     Updated: 05/30/21 1103    Narrative:      XR CHEST AP-     CLINICAL INDICATION: SOA; J96.01-Acute respiratory failure with hypoxia;  J96.02-Acute respiratory failure with hypercapnia; J44.1-Chronic  obstructive pulmonary disease with (acute) exacerbation; L08.9-Local  infection of the skin and subcutaneous tissue, unspecified        COMPARISON: 05/21/2021      TECHNIQUE: Single frontal view of the chest.     FINDINGS:     Slightly underpenetrated  The cardiac silhouette is normal. The pulmonary vasculature is  unremarkable.  There is no evidence of an acute osseous abnormality.   There are no suspicious-appearing parenchymal soft tissue nodules.          Impression:      No evidence of active or acute cardiopulmonary disease on today's chest  radiograph.     This report was finalized on 5/30/2021 11:01 AM by Dr. Noah Lujan MD.       MRI Thoracic Spine With & Without Contrast [[id number]] Collected: 05/28/21 1805     Updated: 05/28/21 1816    Narrative:      EXAMINATION: MRI THORACIC SPINE W  WO CONTRAST-      CLINICAL INDICATION: Abnormal MRI without contrast; J96.01-Acute  respiratory failure with hypoxia; J96.02-Acute respiratory failure with  hypercapnia; J44.1-Chronic obstructive pulmonary disease with (acute)  exacerbation; L08.9-Local infection of the skin and subcutaneous tissue,  unspecified        COMPARISON: Unenhanced study performed earlier the same day.     PROCEDURE: Multiple planar images of the thoracic spine were acquired in  a high field strength magnet. Several pulse sequences were used to  acquire the study. The images were acquired before and after gadolinium.     FINDINGS: Today's exam does show some enhancement in around the abnormal  levels of T7 and T8. I do not see an enhancing mass, but the appearance  could certainly represent at least some degree of discitis. Trauma could  also have a similar appearance. There is moderate compromise of the cord  at T7-T8 and T8-T9.  Again, however, the study, particularly the axially acquired images are  markedly standard by patient motion.     There is no abnormal cord mass identified.       Impression:      1. Postcontrast imaging demonstrating enhancement along the inferior  endplate of T7 and superior endplate of T8 as well as some enhancement  posteriorly adjacent to the cord but no enhancing mass is seen. Again,  as above this certainly could be associated with least a mild degree of  discitis.  2. Moderate central canal narrowing at T7-T8 and T8-T9.     Study is degraded by patient motion, particularly the axial images      This report was finalized on 5/28/2021 6:14 PM by Dr. Noah Lujan MD.       MRI Cervical Spine With & Without Contrast [[id number]] Collected: 05/28/21 1803     Updated: 05/28/21 1806    Narrative:      MRI CERVICAL SPINE W WO CONTRAST-     CLINICAL INDICATION: eval abnormal density around C6 noted by  Neurosurgeon; J96.01-Acute respiratory failure with hypoxia;  J96.02-Acute respiratory failure with hypercapnia;  J44.1-Chronic  obstructive pulmonary disease with (acute) exacerbation; L08.9-Local  infection of the skin and subcutaneous tissue, unspecified        COMPARISON: 05/25/2021     TECHNIQUE: Sagittal spin echo scans were obtained from the posterior  fossa through the upper thoracic spine and axial scans were performed  through selected cervical disc space levels, utilizing both spin echo  and gradient echo scans technique without contrast administration.  Images were then acquired after contrast     FINDINGS:  The study is markedly degraded by patient motion  Straightening of the cervical spine  The vertebral body heights are adequately maintained.  The signal characteristics of the vertebral bodies are as expected on  the presented pulse sequences.     The disc space heights are preserved.  No paraspinal contrast-enhancing masses are seen        There is no abnormal cord signal.             Impression:         1. The study is markedly degraded by patient motion  2. No destructive bony lesion  3. No contrast-enhancing abnormalities        This report was finalized on 5/28/2021 6:04 PM by Dr. Noah Lujan MD.       MRI Thoracic Spine Without Contrast [[id number]] Collected: 05/28/21 1118     Updated: 05/28/21 1448    Narrative:      EXAMINATION: MRI THORACIC SPINE WO CONTRAST-      CLINICAL INDICATION: weakness; J96.01-Acute respiratory failure with  hypoxia; J96.02-Acute respiratory failure with hypercapnia;  J44.1-Chronic obstructive pulmonary disease with (acute) exacerbation;  L08.9-Local infection of the skin and subcutaneous tissue, unspecified        COMPARISON: None immediately available.     PROCEDURE: Multiple planar images of the thoracic spine were acquired in  a high field strength magnet. Several pulse sequences were used to  acquire the study. Contrast was not administered.     FINDINGS: There are extensive endplate changes at T7-T8. Posterior  spurring and disc bulge at T7-T8 as well as T8-T9. These do  cause  significant narrowing of the spinal canal. No walled off fluid  collection is seen. All of this may represent chronic change. The T2  images do not show significant amount of edema.     The other vertebral body heights are maintained.     Signal characteristics otherwise are as expected.     No abnormal cord signal is seen.       Impression:      Decreased height of T7 and T8 with extensive endplate  changes. Most of this does appear to be chronic. I cannot exclude at  least a mild degree of discitis. There are spurs and disc bulges at  T7-T8 and at T8-T9 which do cause moderate central canal narrowing.      This report was finalized on 5/28/2021 2:46 PM by Dr. Noah Lujan MD.       MRI Lumbar Spine Without Contrast [[id number]] Collected: 05/28/21 1006     Updated: 05/28/21 1030    Narrative:      EXAMINATION: MRI LUMBAR SPINE WO CONTRAST-      CLINICAL INDICATION: leg weakness; J96.01-Acute respiratory failure with  hypoxia; J96.02-Acute respiratory failure with hypercapnia;  J44.1-Chronic obstructive pulmonary disease with (acute) exacerbation;  L08.9-Local infection of the skin and subcutaneous tissue, unspecified     COMPARISON: None      TECHNIQUE: Multiplanar, multisequence MR imaging performed through the  lumbar spine WITHOUT contrast.     FINDINGS:      HARDWARE: No MRI evidence of hardware.     NUMBERING/ALIGNMENT:?   5 lumbar vertebral body segments.   Intact vertebral body alignment.     SPINAL CORD:?   Conus terminates at the?L1-2 level.      BONES:   No fracture. No marrow signal abnormality.      POSTERIOR ELEMENTS:  Posterior elements are intact.     SOFT TISSUES:   The visualized paraspinal soft tissues are unremarkable.       T12-L1:    No disk bulge or protrusion.  No central canal or neuroforaminal  stenosis.     L1/2:  No disk bulge or protrusion.  No central canal or neuroforaminal  stenosis.     L2/3:  No disk bulge or protrusion.  No central canal or neuroforaminal  stenosis.      L3/4:  Broad annular disc bulge with mild central and mild bilateral foraminal  narrowing     L4/5:  Central disc bulge causing mild-to-moderate central canal narrowing.     L5/S1:  No disk bulge or protrusion.  No central canal or neuroforaminal  stenosis.     OTHER:   No additional remarkable findings.        Impression:         1. Facet arthropathy.  2. Disc bulges most notable at L3-4 and L4-5.     This report was finalized on 5/28/2021 10:27 AM by Dr. Noah Lujan MD.       CT Head Without Contrast [[id number]] Collected: 05/27/21 1003     Updated: 05/27/21 1007    Narrative:      CT HEAD WO CONTRAST-     CLINICAL INDICATION: Evaluate left arm weakness and paresthesias;  J96.01-Acute respiratory failure with hypoxia; J96.02-Acute respiratory  failure with hypercapnia; J44.1-Chronic obstructive pulmonary disease  with (acute) exacerbation; L08.9-Local infection of the skin and  subcutaneous tissue, unspecified        COMPARISON: 11/10/2018      TECHNIQUE: Axial images of the brain were obtained with out intravenous  contrast.  Reformatted images were created in the sagittal and coronal  planes.     DOSE:     Radiation dose reduction techniques were utilized per ALARA protocol.  Automated exposure control was initiated through either or [employer] or  DoseRight software packages by  protocol.           FINDINGS:   Today's study shows no mass, hemorrhage, or midline shift.   The ventricles, cisterns, and sulci are unremarkable. There is no  hydrocephalus.   There is no evidence of acute ischemia.  I do not see epidural or subdural hematoma.  The gray-white differentiation is appropriate.   The bone window setting images show no destructive calvarial lesion or  acute calvarial fracture.   The posterior fossa is unremarkable.          Impression:         1. No acute ischemic event  2. No parenchymal mass, hemorrhage, or midline shift     This report was finalized on 5/27/2021 10:05 AM by Dr. Zamora  MD Le.       MRI Cervical Spine Without Contrast [[id number]] Collected: 05/25/21 1730     Updated: 05/25/21 1824    Narrative:      MRI CERVICAL SPINE WO CONTRAST-     CLINICAL INDICATION: cervical spine stenosis; J96.01-Acute respiratory  failure with hypoxia; J96.02-Acute respiratory failure with hypercapnia;  J44.1-Chronic obstructive pulmonary disease with (acute) exacerbation        COMPARISON: None immediately available     TECHNIQUE: Sagittal spin echo scans were obtained from the posterior  fossa through the upper thoracic spine and axial scans were performed  through selected cervical disc space levels, utilizing both spin echo  and gradient echo scans technique without contrast administration.      FINDINGS:   Straightening of the cervical spine  The vertebral body heights are adequately maintained.  The signal characteristics of the vertebral bodies are as expected on  the presented pulse sequences.     The disc space heights are preserved.  Broad annular disc bulge at C2-3 which does cause mild central canal  narrowing.  Broad annular disc bulge at C3-4 causing moderate central and moderate  bilateral foraminal narrowing.  Broad annular disc bulge at C4-5 causing mild central and  mild-to-moderate bilateral foraminal narrowing.  Broad annular disc bulge at C5-6 causing moderate central and moderate  bilateral foraminal narrowing.      There is no abnormal cord signal.             Impression:         1. Straightening of THE cervical spine.  2. Multiple disc bulges causing central canal narrowing most notable at  C5-6.  3. The study is degraded by patient motion.         This report was finalized on 5/25/2021 6:22 PM by Dr. Noah Lujan MD.       CT Cervical Spine Without Contrast [[id number]] Collected: 05/24/21 1857     Updated: 05/24/21 1901    Narrative:      CT CERVICAL SPINE WO CONTRAST-     CLINICAL INDICATION: left radicular nerve pain        COMPARISON: None available      TECHNIQUE: Axial  images of the cervical spine were acquired with out any  intravenous contrast. Reformatted images were then created in the  sagittal and coronal planes.     DOSE:      Radiation dose reduction techniques were utilized per ALARA protocol.  Automated exposure control was initiated through either or [employer] or  [employer] software packages by  protocol.           FINDINGS:   The provided study demonstrates preservation of the vertebral body  heights in the sagittally reconstructed images.     There is no prevertebral soft tissue swelling.     Straightening of cervical spine     There is diffuse disc space narrowing     I see no acute cervical spine fracture.       Impression:         1. Straightening of cervical spine     2. Diffuse arthritic change  3. No acute bony abnormality.     This report was finalized on 5/24/2021 6:59 PM by Dr. Noah Lujan MD.       XR Hand 2 View Left [[id number]] Collected: 05/24/21 1513     Updated: 05/24/21 1554    Narrative:      EXAMINATION: XR HAND 2 VW LEFT-      CLINICAL INDICATION: edema/ pain left hand post fall        COMPARISON: None available.     FINDINGS: 3 views of the left hand show fragment off of the tuft of the  distal phalanx of the 1st digit. This appears corticated.     No other evidence of fracture.       Impression:      1. Corticated fragment off the tuft of the distal phalanx of the 1st  digit. Radiographic appearance suggestive of subacute injury  2. Arthritic change.       This report was finalized on 5/24/2021 3:52 PM by Dr. Noah Lujan MD.             I have personally reviewed the above radiology results.   ---------------------------------------------------------------------------------------------------------------------      Assessment & Plan        Assessment/Plan       ASSESSMENT:    1.  Burned-out osteomyelitis    PLAN:    The patient presented to [facility] Emergency Department on 5/21/2021 for shortness of breath.  Since that  time, patient has been treated for COPD exacerbation.  After complaining of weakness, Dr. Connelly reached out to Dr. Villaseñor of neurosurgery at [facility] and although the patient has spinal stenosis, he believes decompression would make the patient worse.  He also recommends to see the patient outpatient for concerns of osteomyelitis with a normal procalcitonin and CRP.  He recommended that primary team ordered an MRI of the cervical and thoracic spine to rule out osteomyelitis.  After MRI results finalized, results were reviewed with Dr. Villaseñor who believes there could be burned-out osteomyelitis, as procalcitonin and CRP are normal.  Neurosurgery plans to follow the patient outpatient, but recommended ID involvement.    Today, the patient is sitting up in bed in no apparent distress.  CNA is at bedside.  Patient complains of chronic back pain as well as bilateral upper and lower extremity weakness, worse on the left.  Afebrile, no diarrhea.  CRP is only slightly elevated at 0.87.  WBC remains normal.  Procalcitonin on 5/28/2021 was normal.  Chest x-ray on 5/30/2021 showed no evidence of active or acute cardiopulmonary disease.  MRI of the thoracic spine with and without contrast on 5/28/2021 showed postcontrast imaging demonstrating enhancement along the inferior endplate of T7 and superior endplate of T8 as well as some enhancement posteriorly adjacent to the cord but no enhancing mass is seen.  Again, as above there certainly could be associated with at least a mild degree of discitis.  Moderate central canal narrowing at T7-T8 and T8-T9.  MRI of the thoracic spine without contrast on 5/28/2021 showed decreased height of T7 and T8 with extensive endplate changes.  Most of this does appear to be chronic.  I cannot exclude at least a mild degree of discitis.  There are spurs and disc bulges at T7-T8 and T8-T9 which do cause moderate central canal narrowing.  MRI of the cervical spine with and without  contrast on 2021 showed the study is markedly degraded by patient motion.  No destructive bony lesion.  No contrast-enhancing abnormality.  MRI of the lumbar spine without contrast on 2021 showed facet arthropathy.  Disc bulges most notable at L3-L4 and L4-L5.  COVID-19 and flu A/B PCR on 2021 was negative.  Blood cultures on 2021 finalized as no growth.    As inflammatory markers remain very low and patient has no clinical evidence of sepsis at this time, we do not believe patient has an active infection and recommend follow-up with neurosurgeon, Dr. Villaseñor, as an outpatient.  ID will sign off at this time.  Please call back for any questions.    Again, thank you Dr. Jimenez for allowing us to participate in the care of your patient and please feel free to call for any questions you may have.    Code Status:   Code Status and Medical Interventions:   Ordered at: 21 0057     Code Status:    CPR     Medical Interventions (Level of Support Prior to Arrest):    Full     TRAVIS Lord  21  10:35 EDT      Electronically signed by Viviane Smtih MD at 21 1019          Physical Therapy Notes (last 24 hours) (Notes from 21 1227 through 21 1227)      Keely Agosto, PT at 21 1405  Version 1 of 1         Acute Care - Physical Therapy Treatment Note  BAKARI George     Patient Name: Иван Miller  : 1955  MRN: [id number]  Today's Date: 2021   Onset of Illness/Injury or Date of Surgery: 21       PT Assessment (last 12 hours)      PT Evaluation and Treatment     Row Name 21 1300          Physical Therapy Time and Intention    Subjective Information  no complaints  -BC     Document Type  therapy note (daily note)  -BC     Mode of Treatment  physical therapy  -BC     Patient Effort  good  -BC     Row Name 21 1300          General Information    Patient Profile Reviewed  yes  -BC     Onset of Illness/Injury or Date of Surgery  21   -BC     Referring Physician  DR. Jimenez  -BC     Patient Observations  alert;cooperative;agree to therapy  -BC     Prior Level of Function  mod assist:  -BC     Risks Reviewed  patient:;LOB;nausea/vomiting;dizziness;increased discomfort;change in vital signs;increased drainage;lines disloged  -BC     Benefits Reviewed  patient:;improve function;increase independence;increase strength;increase balance;decrease pain;decrease risk of DVT;improve skin integrity;increase knowledge  -BC     Row Name 06/02/21 1300          Previous Level of Function/Home Environm    BADLs, Premorbid Functional Level  needs assistive device for safe performance  -BC     IADLs, Premorbid Functional Level  needs adaptive equipment for safe performance  -BC     Row Name 06/02/21 1300          Living Environment    Current Living Arrangements  home/apartment/condo  -BC     Lives With  child(ara), adult  -BC     Row Name 06/02/21 1300          Cognition    Affect/Mental Status (Cognitive)  WFL  -BC     Orientation Status (Cognition)  oriented to;person;place;situation  -BC     Follows Commands (Cognition)  follows two-step commands  -BC     Row Name 06/02/21 1300          Range of Motion Comprehensive    General Range of Motion  bilateral upper extremity ROM WNL  -BC     Row Name 06/02/21 1300          Mobility    Extremity Weight-bearing Status  left lower extremity;right lower extremity  -BC     Left Lower Extremity (Weight-bearing Status)  full weight-bearing (FWB)  -BC     Right Lower Extremity (Weight-bearing Status)  full weight-bearing (FWB)  -BC     Row Name 06/02/21 1300          Bed Mobility    Bed Mobility  bed mobility (all) activities  -BC     All Activities, [place] (Bed Mobility)  moderate assist (50% patient effort)  -BC     Supine-Sit [place] (Bed Mobility)  moderate assist (50% patient effort)  -BC     Sit-Supine [place] (Bed Mobility)  moderate assist (50% patient effort)  -BC     Bed Mobility, Safety Issues   decreased use of legs for bridging/pushing  -BC     Assistive Device (Bed Mobility)  bed rails  -BC     Row Name 06/02/21 1300          Transfers    Transfers  sit-stand transfer;stand-sit transfer  -BC     Maintains Weight-bearing Status (Transfers)  able to maintain  -BC     Sit-Stand [place] (Transfers)  minimum assist (75% patient effort)  -BC     Stand-Sit [place] (Transfers)  minimum assist (75% patient effort)  -BC     Row Name 06/02/21 1300          Sit-Stand Transfer    Assistive Device (Sit-Stand Transfers)  walker, front-wheeled  -BC     Row Name 06/02/21 1300          Stand-Sit Transfer    Assistive Device (Stand-Sit Transfers)  walker, front-wheeled  -BC     Row Name 06/02/21 1300          Gait/Stairs (Locomotion)    Gait/Stairs Locomotion  gait/ambulation independence  -BC     [place] Level (Gait)  minimum assist (75% patient effort)  -BC     Assistive Device (Gait)  walker, front-wheeled  -BC     Distance in Feet (Gait)  3 ft  -BC     Row Name 06/02/21 1300          Coping    Observed Emotional State  calm;cooperative  -BC     Verbalized Emotional State  acceptance  -BC     Family/Support Persons  family  -BC     Involvement in Care  not present at bedside  -BC     Family/Support System Care  self-care encouraged;support provided  -BC     Diversional Activities  television  -BC     Row Name 06/02/21 1300          Plan of Care Review    Plan of Care Reviewed With  patient  -BC     Progress  improving  -BC     Row Name 06/02/21 1300          Positioning and Restraints    Pre-Treatment Position  in bed  -BC     Post Treatment Position  bed  -BC     In Bed  notified nsg;supine;encouraged to call for assist;call light within reach;side rails up x3  -BC     In Chair  notified nsg;sitting;call light within reach;encouraged to call for assist  -BC       User Key  (r) = Recorded By, (t) = Taken By, (c) = Cosigned By    Initials Name Provider Type    BC Keely Agosto PT Physical Therapist         Physical Therapy Education                 Title: PT OT SLP Therapies (Done)     Topic: Physical Therapy (Done)     Point: Mobility training (Done)     Learning Progress Summary           Patient Acceptance, E, VU by EB at 6/2/2021 0713    Acceptance, E, VU by SM at 6/2/2021 0030    Acceptance, E, VU by EB at 6/1/2021 0739    Acceptance, E, VU by SM at 5/31/2021 2242    Acceptance, E, VU by NIR at 5/31/2021 0924    Acceptance, E, VU by SM at 5/31/2021 0035    Acceptance, E,TB, VU by MM at 5/28/2021 1802    Acceptance, E,TB, VU by MM at 5/27/2021 1143    Acceptance, E, VU by SM at 5/26/2021 2344    Acceptance, E,TB, VU by MM at 5/26/2021 0929    Acceptance, E,TB,D, VU,NR by AG at 5/24/2021 1249                   Point: Home exercise program (Done)     Learning Progress Summary           Patient Acceptance, E, VU by EB at 6/2/2021 0713    Acceptance, E, VU by SM at 6/2/2021 0030    Acceptance, E, VU by EB at 6/1/2021 0739    Acceptance, E, VU by SM at 5/31/2021 2242    Acceptance, E, VU by NIR at 5/31/2021 0924    Acceptance, E, VU by SM at 5/31/2021 0035    Acceptance, E,TB, VU by MM at 5/28/2021 1802    Acceptance, E,TB, VU by MM at 5/27/2021 1143    Acceptance, E, VU by SM at 5/26/2021 2344    Acceptance, E,TB, VU by MM at 5/26/2021 0929    Acceptance, E,TB,D, VU,NR by AG at 5/24/2021 1249                   Point: Body mechanics (Done)     Learning Progress Summary           Patient Acceptance, E, VU by EB at 6/2/2021 0713    Acceptance, E, VU by SM at 6/2/2021 0030    Acceptance, E, VU by EB at 6/1/2021 0739    Acceptance, E, VU by SM at 5/31/2021 2242    Acceptance, E, VU by NIR at 5/31/2021 0924    Acceptance, E, VU by SM at 5/31/2021 0035    Acceptance, E,TB, VU by MM at 5/28/2021 1802    Acceptance, E,TB, VU by MM at 5/27/2021 1143    Acceptance, E, VU by SM at 5/26/2021 2344    Acceptance, E,TB, VU by MM at 5/26/2021 0929    Acceptance, E,TB,D, VU,NR by AG at 5/24/2021 1249                   Point:  Precautions (Done)     Learning Progress Summary           Patient Acceptance, E, VU by EB at 6/2/2021 0713    Acceptance, E, VU by  at 6/2/2021 0030    Acceptance, E, VU by EB at 6/1/2021 0739    Acceptance, E, VU by  at 5/31/2021 2242    Acceptance, E, VU by  at 5/31/2021 0924    Acceptance, E, VU by  at 5/31/2021 0035    Acceptance, E,TB, VU by MM at 5/28/2021 1802    Acceptance, E,TB, VU by MM at 5/27/2021 1143    Acceptance, E, VU by  at 5/26/2021 2344    Acceptance, E,TB, VU by MM at 5/26/2021 0929    Acceptance, E,TB,D, VU,NR by  at 5/24/2021 1249                               User Key     Initials Effective Dates Name Provider Type Discipline    EB 06/16/16 -  Keily Godoy, RN Registered Nurse Nurse     04/03/18 -  Elvia Rousseau, PT Physical Therapist PT     07/19/18 -  Cruz Umanzor, RN Registered Nurse Nurse     07/08/19 -  Esha Lee, RN Registered Nurse Nurse     09/12/18 -  Chris Das, RN Registered Nurse Nurse              PT Recommendation and Plan     Plan of Care Reviewed With: patient  Progress: improving       Time Calculation:   PT Charges     Row Name 06/02/21 1403             Time Calculation    PT Received On  06/02/21  -BC         Time Calculation- PT    Total Timed Code Minutes- PT  45 minute(s)  -BC         Timed Charges    33977 - PT Therapeutic Exercise Minutes  30  -BC      07326 - Gait Training Minutes   15  -BC         Total Minutes    Timed Charges Total Minutes  45  -BC       Total Minutes  45  -BC        User Key  (r) = Recorded By, (t) = Taken By, (c) = Cosigned By    Initials Name Provider Type    BC Keely Agosto, PT Physical Therapist        Therapy Charges for Today     Code Description Service Date Service Provider Modifiers Qty    78510068416 HC PT THER PROC EA 15 MIN 6/1/2021 Keely Agosto, PT GP 2    36087658974 HC PT THER PROC EA 15 MIN 6/2/2021 Keely Agosto, PT GP 2    62772962264 HC GAIT TRAINING EA 15 MIN 6/2/2021  Keely Agosto, PT GP 1               Keely Agosto, PT  6/2/2021      Electronically signed by Keely Agosto, PT at 06/02/21 1405       Occupational Therapy Notes (last 24 hours) (Notes from 06/02/21 1227 through 06/03/21 1227)    No notes exist for this encounter.

## 2021-06-03 NOTE — PROGRESS NOTES
[facility] HOSPITALIST PROGRESS NOTE     Patient Identification:  Name:  Иван Miller  Age:  65 y.o.  Sex:  male  :  1955  MRN:  [id number]  Visit Number:  [id number]  Primary Care Provider:  Rosi Thomas APRN    Length of stay:  12    Chief complaint: Back pain    Subjective:    Patient continues to report feeling well except for neck pain and upper back pain.  He denies any fevers, chills, sweats or rigors.  Denies any other new symptoms overnight.  ----------------------------------------------------------------------------------------------------------------------  Current Hospital Meds:  apixaban, 5 mg, Oral, Q12H  atorvastatin, 20 mg, Oral, Daily  budesonide-formoterol, 2 puff, Inhalation, BID - RT  Canagliflozin, 300 mg, Oral, Daily  clopidogrel, 75 mg, Oral, Daily  docusate sodium, 100 mg, Oral, BID  fluticasone, 2 spray, Nasal, Daily  gabapentin, 600 mg, Oral, Q8H  insulin aspart, 0-9 Units, Subcutaneous, TID AC  insulin aspart, 30 Units, Subcutaneous, TID With Meals  insulin detemir, 40 Units, Subcutaneous, Daily  ipratropium-albuterol, 3 mL, Nebulization, Q6H - RT  lidocaine, 2 patch, Transdermal, Q24H  lisinopril, 20 mg, Oral, Q24H  metoprolol succinate XL, 75 mg, Oral, Q24H  pantoprazole, 40 mg, Oral, Q AM  polyethylene glycol, 17 g, Oral, Daily  sodium chloride, 10 mL, Intravenous, Q12H  tamsulosin, 0.4 mg, Oral, Nightly         ----------------------------------------------------------------------------------------------------------------------  Vital Signs:  Temp:  [97.6 °F (36.4 °C)-98.6 °F (37 °C)] 97.6 °F (36.4 °C)  Heart Rate:  [64-88] 88  Resp:  [16-24] 18  BP: (150-180)/(80-96) 180/92      21  0509 21  0504 21  0445   Weight: (!) 146 kg (320 lb 14.4 oz) (!) 148 kg (326 lb 9.6 oz) (!) 149 kg (327 lb 9.6 oz)     Body mass index is 48.38 kg/m².    Intake/Output Summary (Last 24 hours) at 6/3/2021 1050  Last data filed at 6/3/2021 1037  Gross per 24 hour    Intake 1920 ml   Output 3875 ml   Net -1955 ml     Diet Regular; Cardiac, Consistent Carbohydrate  ----------------------------------------------------------------------------------------------------------------------  Physical exam:  Constitutional: Morbidly obese  male in no apparent distress.     HENT:  Head:  Normocephalic and atraumatic.  Mouth:  Moist mucous membranes.    Eyes:  Conjunctivae and EOM are normal.  Pupils are equal, round, and reactive to light.  No scleral icterus.    Neck:  Neck supple. No thyromegaly.  No JVD present.    Cardiovascular:  Regular rate and rhythm with no murmurs, rubs, clicks or gallops appreciated.  Pulmonary/Chest:  Clear to auscultation bilaterally with no crackles, wheezes or rhonchi appreciated.  Abdominal:  Soft. Nontender. Nondistended  Bowel sounds are normal in all four quadrants. No organomegally appreciated.   Neurological:  Cranial nerves II-XII intact with no focal deficits.  No facial droop.  No slurred speech.   Skin:  Warm and dry to palpation with no rashes or lesions appreciated.  Peripheral vascular:  2+ radial and pedal pulses in bilateral upper and lower extremities.  Psychiatric:  Alert and oriented x3, demonstrates appropriate judgement and insight.    ----------------------------------------------------------------------------------------------------------------------  Tele:    ----------------------------------------------------------------------------------------------------------------------  Results from last 7 days   Lab Units 05/27/21 2204 05/27/21  1548   TROPONIN T ng/mL <0.010 <0.010     Results from last 7 days   Lab Units 06/03/21  0251 06/02/21  0111 06/02/21  0108 05/31/21  0117   CRP mg/dL 0.78*  --  0.98* 0.87*   WBC 10*3/mm3 7.23 7.59  --  8.49   HEMOGLOBIN g/dL 10.8* 11.2*  --  12.3*   HEMATOCRIT % 37.1* 38.7  --  43.0   MCV fL 85.1 86.0  --  84.6   MCHC g/dL 29.1* 28.9*  --  28.6*   PLATELETS 10*3/mm3 206 194  --  234          Results from last 7 days   Lab Units 06/03/21  0251 06/02/21  0111 06/02/21  0108 05/31/21  0117 05/30/21  0433 05/29/21  0306 05/28/21  0059   SODIUM mmol/L 140 136  --  137 138 139 137   POTASSIUM mmol/L 4.4 4.6 4.7 4.5 4.0 4.2 4.5   MAGNESIUM mg/dL  --   --  2.0  --  2.4  --   --    CHLORIDE mmol/L 102 99  --  96* 97* 99 99   CO2 mmol/L 28.7 26.7  --  35.4* 33.3* 33.6* 30.4*   BUN mg/dL 21 21  --  20 19 19 21   CREATININE mg/dL 0.96 1.12  --  1.24 0.95 0.87 0.91   EGFR IF NONAFRICN AM mL/min/1.73 78 66  --  58* 79 88 83   CALCIUM mg/dL 9.1 9.3  --  9.5 9.3 9.2 9.1   GLUCOSE mg/dL 228* 274*  --  164* 156* 120* 220*   ALBUMIN g/dL  --   --   --   --   --  3.35* 3.49*   BILIRUBIN mg/dL  --   --   --   --   --  0.4 0.3   ALK PHOS U/L  --   --   --   --   --  54 56   AST (SGOT) U/L  --   --   --   --   --  15 17   ALT (SGPT) U/L  --   --   --   --   --  20 21   Estimated Creatinine Clearance: 109.2 mL/min (by C-G formula based on SCr of 0.96 mg/dL).    No results found for: AMMONIA      No results found for: BLOODCX  No results found for: URINECX  No results found for: WOUNDCX  No results found for: STOOLCX    I have personally looked at the labs and they are summarized above.  ----------------------------------------------------------------------------------------------------------------------  Imaging Results (Last 24 Hours)     ** No results found for the last 24 hours. **        ----------------------------------------------------------------------------------------------------------------------  Assessment and Plan:    1.  Acute on chronic hypoxic/hypercapnic respiratory failure - acute phase resolved, continue 2 L nasal cannula which is patient's baseline.    2.  COPD exacerbation -resolved    3.  Generalized weakness -patient was denied inpatient rehab.  Currently awaiting skilled nursing facility placement.    4.  Paroxysmal A. Fib - currently in NSR, continue Eliquis    5.  Essential hypertension  -patient  with intermittent elevation in blood pressure.  Will increase lisinopril to 20 mg p.o. daily.    6.  Insulin-dependent type 2 diabetes mellitus -continue sliding scale insulin with Accu-Cheks before every meal and nightly    Disposition currently pending SNF placement        Joo Jimenez,   06/03/21  10:50 EDT

## 2021-06-03 NOTE — PLAN OF CARE
Goal Outcome Evaluation:   Pt resting in bed at this time with bipap on. Complaints of left shoulder/neck pain with PRN pain medications given as ordered. No distress noted. Will continue to follow plan of care.

## 2021-06-03 NOTE — PLAN OF CARE
Goal Outcome Evaluation:     Progress: no change   Pt resting in bed. Ordered bariatric bed for pt comfort. Denies any needs

## 2021-06-04 LAB
CRP SERPL-MCNC: 0.74 MG/DL (ref 0–0.5)
GLUCOSE BLDC GLUCOMTR-MCNC: 113 MG/DL (ref 70–130)
GLUCOSE BLDC GLUCOMTR-MCNC: 148 MG/DL (ref 70–130)
GLUCOSE BLDC GLUCOMTR-MCNC: 205 MG/DL (ref 70–130)
GLUCOSE BLDC GLUCOMTR-MCNC: 240 MG/DL (ref 70–130)
GLUCOSE BLDC GLUCOMTR-MCNC: 462 MG/DL (ref 70–130)

## 2021-06-04 PROCEDURE — 94799 UNLISTED PULMONARY SVC/PX: CPT

## 2021-06-04 PROCEDURE — 94660 CPAP INITIATION&MGMT: CPT

## 2021-06-04 PROCEDURE — 63710000001 INSULIN ASPART PER 5 UNITS: Performed by: INTERNAL MEDICINE

## 2021-06-04 PROCEDURE — 63710000001 INSULIN ASPART PER 5 UNITS: Performed by: PHYSICIAN ASSISTANT

## 2021-06-04 PROCEDURE — 82962 GLUCOSE BLOOD TEST: CPT

## 2021-06-04 PROCEDURE — 63710000001 INSULIN DETEMIR PER 5 UNITS: Performed by: INTERNAL MEDICINE

## 2021-06-04 PROCEDURE — 86140 C-REACTIVE PROTEIN: CPT | Performed by: PHYSICIAN ASSISTANT

## 2021-06-04 PROCEDURE — 99232 SBSQ HOSP IP/OBS MODERATE 35: CPT | Performed by: INTERNAL MEDICINE

## 2021-06-04 RX ORDER — LISINOPRIL 10 MG/1
40 TABLET ORAL
Status: DISCONTINUED | OUTPATIENT
Start: 2021-06-05 | End: 2021-06-14

## 2021-06-04 RX ADMIN — APIXABAN 5 MG: 5 TABLET, FILM COATED ORAL at 20:11

## 2021-06-04 RX ADMIN — DOCUSATE SODIUM 100 MG: 100 CAPSULE, LIQUID FILLED ORAL at 20:11

## 2021-06-04 RX ADMIN — IPRATROPIUM BROMIDE AND ALBUTEROL SULFATE 3 ML: .5; 3 SOLUTION RESPIRATORY (INHALATION) at 18:30

## 2021-06-04 RX ADMIN — INSULIN DETEMIR 40 UNITS: 100 INJECTION, SOLUTION SUBCUTANEOUS at 08:30

## 2021-06-04 RX ADMIN — IPRATROPIUM BROMIDE AND ALBUTEROL SULFATE 3 ML: .5; 3 SOLUTION RESPIRATORY (INHALATION) at 13:02

## 2021-06-04 RX ADMIN — INSULIN ASPART 4 UNITS: 100 INJECTION, SOLUTION INTRAVENOUS; SUBCUTANEOUS at 11:57

## 2021-06-04 RX ADMIN — GABAPENTIN 600 MG: 300 CAPSULE ORAL at 20:11

## 2021-06-04 RX ADMIN — METOPROLOL SUCCINATE 75 MG: 25 TABLET, EXTENDED RELEASE ORAL at 08:29

## 2021-06-04 RX ADMIN — SODIUM CHLORIDE, PRESERVATIVE FREE 10 ML: 5 INJECTION INTRAVENOUS at 08:29

## 2021-06-04 RX ADMIN — ACETAMINOPHEN 650 MG: 325 TABLET ORAL at 20:15

## 2021-06-04 RX ADMIN — HYDROXYZINE HYDROCHLORIDE 25 MG: 25 TABLET, FILM COATED ORAL at 20:15

## 2021-06-04 RX ADMIN — HYDROCODONE BITARTRATE AND ACETAMINOPHEN 1 TABLET: 5; 325 TABLET ORAL at 07:18

## 2021-06-04 RX ADMIN — IPRATROPIUM BROMIDE AND ALBUTEROL SULFATE 3 ML: .5; 3 SOLUTION RESPIRATORY (INHALATION) at 00:10

## 2021-06-04 RX ADMIN — LISINOPRIL 20 MG: 10 TABLET ORAL at 08:28

## 2021-06-04 RX ADMIN — POLYETHYLENE GLYCOL (3350) 17 G: 17 POWDER, FOR SOLUTION ORAL at 08:28

## 2021-06-04 RX ADMIN — HYDROCODONE BITARTRATE AND ACETAMINOPHEN 1 TABLET: 5; 325 TABLET ORAL at 17:12

## 2021-06-04 RX ADMIN — IPRATROPIUM BROMIDE AND ALBUTEROL SULFATE 3 ML: .5; 3 SOLUTION RESPIRATORY (INHALATION) at 06:22

## 2021-06-04 RX ADMIN — LIDOCAINE 2 PATCH: 50 PATCH CUTANEOUS at 08:29

## 2021-06-04 RX ADMIN — CANAGLIFLOZIN 300 MG: 300 TABLET, FILM COATED ORAL at 08:29

## 2021-06-04 RX ADMIN — GABAPENTIN 600 MG: 300 CAPSULE ORAL at 05:13

## 2021-06-04 RX ADMIN — ATORVASTATIN CALCIUM 20 MG: 20 TABLET, FILM COATED ORAL at 08:29

## 2021-06-04 RX ADMIN — DOCUSATE SODIUM 100 MG: 100 CAPSULE, LIQUID FILLED ORAL at 08:29

## 2021-06-04 RX ADMIN — APIXABAN 5 MG: 5 TABLET, FILM COATED ORAL at 08:28

## 2021-06-04 RX ADMIN — GABAPENTIN 600 MG: 300 CAPSULE ORAL at 17:12

## 2021-06-04 RX ADMIN — BUDESONIDE AND FORMOTEROL FUMARATE DIHYDRATE 2 PUFF: 160; 4.5 AEROSOL RESPIRATORY (INHALATION) at 18:30

## 2021-06-04 RX ADMIN — INSULIN ASPART 30 UNITS: 100 INJECTION, SOLUTION INTRAVENOUS; SUBCUTANEOUS at 08:30

## 2021-06-04 RX ADMIN — CLOPIDOGREL 75 MG: 75 TABLET, FILM COATED ORAL at 08:28

## 2021-06-04 RX ADMIN — INSULIN ASPART 9 UNITS: 100 INJECTION, SOLUTION INTRAVENOUS; SUBCUTANEOUS at 06:47

## 2021-06-04 RX ADMIN — PANTOPRAZOLE SODIUM 40 MG: 40 TABLET, DELAYED RELEASE ORAL at 05:13

## 2021-06-04 RX ADMIN — INSULIN ASPART 30 UNITS: 100 INJECTION, SOLUTION INTRAVENOUS; SUBCUTANEOUS at 17:09

## 2021-06-04 RX ADMIN — TAMSULOSIN HYDROCHLORIDE 0.4 MG: 0.4 CAPSULE ORAL at 20:11

## 2021-06-04 RX ADMIN — BUDESONIDE AND FORMOTEROL FUMARATE DIHYDRATE 2 PUFF: 160; 4.5 AEROSOL RESPIRATORY (INHALATION) at 06:22

## 2021-06-04 RX ADMIN — INSULIN ASPART 30 UNITS: 100 INJECTION, SOLUTION INTRAVENOUS; SUBCUTANEOUS at 11:58

## 2021-06-04 RX ADMIN — SODIUM CHLORIDE, PRESERVATIVE FREE 10 ML: 5 INJECTION INTRAVENOUS at 20:12

## 2021-06-04 RX ADMIN — FLUTICASONE PROPIONATE 2 SPRAY: 50 SPRAY, METERED NASAL at 08:34

## 2021-06-04 NOTE — PLAN OF CARE
Goal Outcome Evaluation:   Pt is currently resting in bed. No complaints or distress noted. Will continue to follow plan of care.

## 2021-06-04 NOTE — PLAN OF CARE
Goal Outcome Evaluation:  Plan of Care Reviewed With: patient  Progress: no change   Pt transferred to bariatric bed. When assisting pt during transfer, pt was very weak and unable to help. Denies any needs at this time.

## 2021-06-04 NOTE — DISCHARGE PLACEMENT REQUEST
"Иван Blanco (65 y.o. Male)     Date of Birth Social Security Number Address Home Phone MRN    1955  [address]  [place] [address] [phone] [id number]    Zoroastrian Marital Status          None        Admission Date Admission Type Admitting Provider Attending Provider Department, Room/Bed    5/21/21 Emergency Vivienne Flores DO Mullins, Thomas Anthony, DO [address], 3303/2S    Discharge Date Discharge Disposition Discharge Destination                       Attending Provider: Joo Jimenez DO    Allergies: Codeine, Ciprofloxacin    Isolation: None   Infection: None   Code Status: CPR    Ht: 175.3 cm (69\")   Wt: 149 kg (328 lb 4.8 oz)    Admission Cmt: None   Principal Problem: Respiratory failure (CMS/HCC) [J96.90]                 Active Insurance as of 5/21/2021     Primary Coverage     Payor Plan Insurance Group Employer/Plan Group    [facility] MEDICARE REPLACEMENT [facility] DUAL COMPLETE MEDICARE REPLACEMENT [id number]     Payor Plan Address Payor Plan Phone Number Payor Plan Fax Number Effective Dates    [address] [phone]  1/1/2021 - None Entered    [facility] [address]       Subscriber Name Subscriber Birth Date Member ID       ИВАН BLANCO 1955 [id number]           Secondary Coverage     Payor Plan Insurance Group Employer/Plan Group    [facility] MEDICAID      Payor Plan Address Payor Plan Phone Number Payor Plan Fax Number Effective Dates    [address] [address] [phone]  10/4/2019 - None Entered    [facility] [id number]       Subscriber Name Subscriber Birth Date Member ID       ИВАН BLANCO 1955 [id number]                 Emergency Contacts      (Rel.) Home Phone Work Phone Mobile Phone    PaulLamar (Spouse) [phone] -- [phone]    GARCIA MALDONADO (Daughter) [phone] -- [phone]            Emergency Contact Information     Name Relation Home Work Mobile    Lamar Blanco " Spouse [phone]    GARCIA MALDONADO Daughter [phone]          Insurance Information                [facility] MEDICARE REPLACEMENT/[facility] DUAL COMPLETE MEDICARE REPLACEMENT Phone: [phone]    Subscriber: Иван Miller Subscriber#: [id number]    Group#: [id number] Precert#:         [facility]/[employer] MEDICAID Phone: [phone]    Subscriber: Иван Miller Subscriber#: [id number]    Group#:  Precert#:              History & Physical      Nora Gomez PA-C at 21 0142     Attestation signed by Vivienne Flores DO at 21 0745    I have read the documentation below and agree.  Patient also seen and examined independently.  He remains on BiPAP.  Patient is easily arousable and is oriented x4.    Lungs are diminished but clear.  Patient is in no respiratory distress.  Heart is regular rate and rhythm without obvious murmur/gallop/rub.  Patient with decreased range of motion, swelling, tenderness and bruising in the right ankle and foot.  Right lower extremity appears to be more edematous with an increased circumference in comparison to the left calf area.    Agree with a respiratory culture if able to produce.  Continue with Rocephin and a dose of amoxicillin.  Agree with steroids and scheduled nebulized inhalants.  Plan to wean off BiPAP and placed on nasal cannula.      Obtain an x-ray of the right foot pain and a Doppler ultrasound of the right lower extremity to rule out DVT in the setting of recent surgery and decreased ambulation over the past few days.                       [facility] Medicine Services  HISTORY & PHYSICAL    Patient Identification:  Name:  Иван Miller  Age:  65 y.o.  Sex:  male  :  1955  MRN:  [id number]   Visit Number:  [id number]  Admit Date: 2021   Primary Care Physician:  Rosi Thomas APRN     Subjective     Chief complaint:   Chief Complaint   Patient presents with   •  Shortness of Breath     History of presenting illness:   Patient is a 65 y.o. male with past medical history significant for paroxysmal atrial fibrillation, CAD status post stenting in the past, known ischemic cardiomyopathy, COPD, insulin-dependent type 2 diabetes mellitus, GERD, essential hypertension, hyperlipidemia, that presented to the [facility] emergency department for evaluation of shortness of breath.    It should be of note that the patient was very lethargic at bedside.  He would arouse briefly with tactile stimulation but will quickly fall back asleep.  He was also somewhat difficult to understand as he currently has BiPAP in place and was slurring his speech.  The patient states that he has been feeling short of breath and having a productive cough with yellow sputum and wheezing for around 15 days.  He reports he was given an antibiotic but was unable to tell me who gave it to him.  He also admits to a subjective fever, periumbilical abdominal pain, and fatigue.  He denies any diaphoresis, chills, chest pain, leg edema, diarrhea, nausea, vomiting, dysuria.  He states that he wears 2 L of oxygen at home and CPAP at night.  He further admits to amphetamine use and states that he smokes it.  Additionally, he admits to a fall 2 nights ago and has been experiencing severe right ankle pain.  He has had difficulty walking secondary to pain and has bruising located on the lateral aspect of the right ankle.  He denies hitting his head or loss of consciousness.    Upon arrival to the ED, vitals were temperature 98.0 °F, pulse 97, respirations 22, /93, SPO2 96% on 6 L nasal cannula.  ABG with pH 7.325, PCO2 72.8, PO2 59.7, HCO3 37.9, O2 saturation 89.3 on 2 L nasal cannula.  CMP with glucose 279, CO2 34.1.  CBC with hemoglobin 11.6, MCH 24.2, MCHC 27.9.  UA with 3+ glucose, blood cultures pending x2.  Blood ethanol negative.  UDS positive for amphetamines.  Chest x-ray shows limited study due to  inadequate positioning, cardiac silhouette appears to be enlarged, underpenetration of the left hemithorax, no gross dense consolidation.  Noncontrast CT of the chest shows mild right base atelectasis, pleural thickening along the major fissure on the left, three-vessel CAD, no acute lung infiltrates.  EKG with sinus rhythm with occasional PVCs, nonspecific ST abnormality, heart rate 97, QTc 447 MS.  COVID-19 negative.    In the emergency department the patient received IV Rocephin 1 g, IV Decadron 10 mg, IV doxycycline, DuoNeb treatment x2.    Patient has been admitted to the progressive care unit as telemetry overflow for further evaluation and treatment  ---------------------------------------------------------------------------------------------------------------------   Review of Systems   Constitutional: Positive for fever (Undocumented). Negative for chills and diaphoresis.   HENT: Negative for congestion and sore throat.    Eyes: Negative for discharge and visual disturbance.   Respiratory: Positive for cough (Productive with yellow sputum), shortness of breath and wheezing.    Cardiovascular: Negative for chest pain, palpitations and leg swelling.   Gastrointestinal: Positive for abdominal pain (Periumbilical) and constipation. Negative for diarrhea, nausea and vomiting.   Genitourinary: Negative for dysuria and frequency.   Musculoskeletal: Positive for back pain (Chronic) and gait problem (Fall 2 nights ago). Negative for myalgias.   Skin: Negative for rash and wound.        Bruising of right ankle   Neurological: Negative for dizziness, syncope and light-headedness.   Psychiatric/Behavioral: Negative for confusion. The patient is not nervous/anxious.       ---------------------------------------------------------------------------------------------------------------------   Past Medical History:   Diagnosis Date   • Atrial fibrillation (CMS/HCC)    • COPD (chronic obstructive pulmonary disease) (CMS/HCC)     • Diabetes mellitus (CMS/HCC)    • GERD (gastroesophageal reflux disease)    • Hyperlipidemia    • Hypertension    • Myocardial infarction (CMS/HCC)    • Neuropathy      Past Surgical History:   Procedure Laterality Date   • ABDOMINAL SURGERY     • APPENDECTOMY     • CARDIAC CATHETERIZATION     • CORONARY STENT PLACEMENT       Family History   Problem Relation Age of Onset   • Hypertension Mother    • Heart attack Mother    • Hypertension Father      Social History     Socioeconomic History   • Marital status:      Spouse name: Not on file   • Number of children: Not on file   • Years of education: Not on file   • Highest education level: Not on file   Tobacco Use   • Smoking status: Former Smoker     Packs/day: 2.00     Years: 30.00     Pack years: 60.00     Types: Cigarettes   • Smokeless tobacco: Current User     Types: Snuff   Substance and Sexual Activity   • Alcohol use: No   • Drug use: No   • Sexual activity: Defer     ---------------------------------------------------------------------------------------------------------------------   Allergies:  Codeine and Ciprofloxacin  ---------------------------------------------------------------------------------------------------------------------   Medications below are reported home medications pulling from within the system; at this time, these medications have not been reconciled unless otherwise specified and are in the verification process for further verifcation as current home medications.    Prior to Admission Medications     Prescriptions Last Dose Informant Patient Reported? Taking?    albuterol (PROVENTIL HFA;VENTOLIN HFA) 108 (90 Base) MCG/ACT inhaler  Pharmacy Yes No    Inhale 2 puffs Every 4 (Four) Hours As Needed for Wheezing.    amLODIPine (NORVASC) 10 MG tablet  Pharmacy Yes No    Take 10 mg by mouth Daily.    apixaban (ELIQUIS) 5 MG tablet tablet  Pharmacy Yes No    Take 5 mg by mouth 2 (Two) Times a Day.    atorvastatin (LIPITOR) 20 MG  tablet  Pharmacy Yes No    Take 20 mg by mouth Daily.    budesonide-formoterol (SYMBICORT) 160-4.5 MCG/ACT inhaler  Pharmacy Yes No    Inhale 2 puffs 2 (Two) Times a Day.    carvedilol (COREG) 12.5 MG tablet   No No    Take 1 tablet by mouth 2 (Two) Times a Day With Meals.    fluticasone (FLONASE) 50 MCG/ACT nasal spray  Pharmacy Yes No    2 sprays into the nostril(s) as directed by provider Daily.    furosemide (LASIX) 40 MG tablet   No No    Take 1 tablet by mouth Daily.    gabapentin (NEURONTIN) 300 MG capsule   Yes No    Take 300 mg by mouth 3 (Three) Times a Day.    insulin lispro (humaLOG) 100 UNIT/ML injection  Pharmacy Yes No    Inject 20 Units under the skin into the appropriate area as directed 3 (Three) Times a Day Before Meals.    ipratropium-albuterol (DUO-NEB) 0.5-2.5 mg/3 ml nebulizer  Pharmacy Yes No    Take 3 mL by nebulization Every 4 (Four) Hours As Needed for Wheezing.    loratadine (CLARITIN) 10 MG tablet  Pharmacy Yes No    Take 10 mg by mouth Daily.    metFORMIN (GLUCOPHAGE) 1000 MG tablet  Pharmacy Yes No    Take 1,000 mg by mouth 2 (Two) Times a Day With Meals.    omeprazole (priLOSEC) 40 MG capsule  Pharmacy Yes No    Take 40 mg by mouth Daily.    sacubitril-valsartan (ENTRESTO) 49-51 MG tablet   Yes No    Take 1 tablet by mouth 2 (Two) Times a Day.    sildenafil (REVATIO) 20 MG tablet  Pharmacy Yes No    Take 20 mg by mouth Daily As Needed (sexual activity).    tamsulosin (FLOMAX) 0.4 MG capsule 24 hr capsule  Pharmacy Yes No    Take 1 capsule by mouth Every Night.        ---------------------------------------------------------------------------------------------------------------------    [facility] Scheduled Meds:          Current listed hospital scheduled medications may not yet reflect those currently placed in orders that are signed and held, awaiting patient's arrival to floor/unit.       ---------------------------------------------------------------------------------------------------------------------   Vital Signs:  Temp:  [98 °F (36.7 °C)] 98 °F (36.7 °C)  Heart Rate:  [] 98  Resp:  [20-32] 20  BP: (141-161)/(86-99) 151/88  Mean Arterial Pressure (Non-Invasive) for the past 24 hrs (Last 3 readings):   Noninvasive MAP (mmHg)   05/22/21 0033 112   05/21/21 2233 95   05/21/21 2118 119     SpO2 Percentage    05/21/21 2343 05/22/21 0033 05/22/21 0043   SpO2: 96% 95% 98%     SpO2:  [93 %-100 %] 98 %  on  Flow (L/min):  [6] 6;   Device (Oxygen Therapy): NPPV/NIV    Body mass index is 46.52 kg/m².  Wt Readings from Last 3 Encounters:   05/21/21 (!) 143 kg (315 lb)   11/25/19 127 kg (280 lb)   10/06/19 124 kg (273 lb 11.2 oz)     ---------------------------------------------------------------------------------------------------------------------   Physical Exam:  Physical Exam  Constitutional:       General: He is awake.      Appearance: Normal appearance. He is well-developed. He is morbidly obese.      Comments: BiPAP in place   HENT:      Head: Normocephalic and atraumatic.   Eyes:      General: Lids are normal.         Right eye: No discharge.         Left eye: No discharge.   Cardiovascular:      Rate and Rhythm: Normal rate and regular rhythm.      Pulses: Normal pulses. No decreased pulses.           Dorsalis pedis pulses are 2+ on the right side and 2+ on the left side.        Posterior tibial pulses are 2+ on the right side and 2+ on the left side.      Heart sounds: Normal heart sounds. No murmur heard.   No friction rub. No gallop.    Pulmonary:      Effort: Pulmonary effort is normal. No tachypnea or bradypnea.      Breath sounds: Decreased breath sounds (Diffuse) present. No wheezing, rhonchi or rales.   Abdominal:      General: Bowel sounds are normal.      Palpations: Abdomen is soft.      Tenderness: There is abdominal tenderness in the epigastric area and periumbilical area.    Musculoskeletal:      Right lower leg: No edema.      Left lower leg: No edema.      Right foot: Decreased range of motion. Swelling and tenderness present.      Left foot: Normal range of motion. No tenderness.        Feet:    Skin:     Findings: No abrasion, ecchymosis or erythema.   Neurological:      Mental Status: He is oriented to person, place, and time and easily aroused. He is lethargic.   Psychiatric:         Speech: Speech is slurred.         Behavior: Behavior is cooperative.       ---------------------------------------------------------------------------------------------------------------------  EKG:    Per cardiology read by Dr. Gorman, sinus rhythm with occasional PVCs, nonspecific ST abnormality, heart rate 97, QTc 447 MS; when compared to EKG from 10/2019 PVCs are now present.        Telemetry:    Normal sinus rhythm, heart rate 87 bpm, SPO2 95% on BiPAP    I have personally reviewed the EKG/Telemetry strip  ---------------------------------------------------------------------------------------------------------------------   Results from last 7 days   Lab Units 05/21/21 2045   CK TOTAL U/L 124     Results from last 7 days   Lab Units 05/21/21 2045   PROBNP pg/mL 799.5       Results from last 7 days   Lab Units 05/22/21  0054   PH, ARTERIAL pH units 7.328*   PO2 ART mm Hg 104.0   PCO2, ARTERIAL mm Hg 68.4*   HCO3 ART mmol/L 35.9*     Results from last 7 days   Lab Units 05/21/21 2045   CRP mg/dL 0.39   LACTATE mmol/L 1.0   WBC 10*3/mm3 9.07   HEMOGLOBIN g/dL 11.6*   HEMATOCRIT % 41.6   MCV fL 86.8   MCHC g/dL 27.9*   PLATELETS 10*3/mm3 265   INR  1.05     Results from last 7 days   Lab Units 05/21/21 2045   SODIUM mmol/L 144   POTASSIUM mmol/L 4.5   MAGNESIUM mg/dL 2.3   CHLORIDE mmol/L 102   CO2 mmol/L 34.1*   BUN mg/dL 20   CREATININE mg/dL 0.92   EGFR IF NONAFRICN AM mL/min/1.73 82   CALCIUM mg/dL 9.2   GLUCOSE mg/dL 279*   ALBUMIN g/dL 3.82   BILIRUBIN mg/dL 0.3   ALK PHOS U/L 62   AST  (SGOT) U/L 26   ALT (SGPT) U/L 29   Estimated Creatinine Clearance: 111.3 mL/min (by C-G formula based on SCr of 0.92 mg/dL).  No results found for: AMMONIA    No results found for: HGBA1C, POCGLU  Lab Results   Component Value Date    HGBA1C 7.70 (H) 11/09/2018     Lab Results   Component Value Date    TSH 2.100 05/21/2021    FREET4 0.95 05/21/2021     Pain Management Panel     Pain Management Panel Latest Ref Rng & Units 5/21/2021 10/4/2019    AMPHETAMINES SCREEN, URINE Negative Positive(A) Positive(A)    BARBITURATES SCREEN Negative Negative Negative    BENZODIAZEPINE SCREEN, URINE Negative Negative Negative    BUPRENORPHINEUR Negative Negative Negative    COCAINE SCREEN, URINE Negative Negative Negative    METHADONE SCREEN, URINE Negative Negative Negative        I have personally reviewed the above laboratory results.   ---------------------------------------------------------------------------------------------------------------------  Imaging Results (Last 7 Days)     Procedure Component Value Units Date/Time    CT Chest Without Contrast Diagnostic [[id number]] Collected: 05/22/21 0018     Updated: 05/22/21 0020    Narrative:      CT Chest WO    INDICATION:   Chest pain with shortness of breath for the past several days    TECHNIQUE:   CT of the thorax without IV contrast. Coronal and sagittal reconstructions were obtained.  Radiation dose reduction techniques included automated exposure control or exposure modulation based on body size. Count of known CT and cardiac nuc med studies  performed in previous 12 months: 0.     COMPARISON:   10/4/2019    FINDINGS:  Chest images at mediastinal window show no adenopathy or effusions. Coronary artery calcifications are seen in all 3 coronary distributions quite extensively.    Chest images at lung window show mild atelectasis at the right lung base posteriorly. There is nonspecific thickening along the major fissure on the left. It most likely reflects chronic pleural  fibrosis.      Impression:      Mild right base atelectasis. Pleural thickening along the major fissure on the left. Three-vessel coronary artery disease. No acute lung infiltrates are noted.    Signer Name: Ruiz Zamudio MD   Signed: 5/22/2021 12:18 AM   Workstation Name: [id number]    [facility]    XR Chest 1 View [[id number]] Collected: 05/21/21 2140     Updated: 05/21/21 2142    Narrative:      PROCEDURE: CR Chest 1 Vw    COMPARISON: October 2019    INDICATIONS: sob  Relevant clinical info: Sob... unable to get patient to lay on back     TECHNIQUE: Single AP  view of the chest    FINDINGS AND IMPRESSION COMBINED :        Examination is limited as patient is not adequately positioned and is in respiratory distress. Cardiac silhouette appears enlarged patient is rotated to the right. There is underpenetration of the left hemithorax. No gross dense consolidation seen.  Osseous structures are intact.             Signer Name: Lianna Chan MD   Signed: 5/21/2021 9:40 PM   Workstation Name: [email or web address]    [facility]        I have personally reviewed the above radiology results.     Last Echocardiogram:  Results for orders placed during the hospital encounter of 10/04/19    Transthoracic Echo Complete With Contrast if Necessary Per Protocol    Interpretation Summary  · Left ventricular wall thickness is consistent with mild concentric hypertrophy.  · Left atrial cavity size is mildly dilated.  · Estimated EF appears to be in the range of 56 - 60%.  · Left ventricular diastolic function is normal.  · Normal right ventricular cavity size and systolic function noted.  · Estimated right ventricular systolic pressure from tricuspid regurgitation is normal (<35 mmHg).  · The quality of the study is limited due to poor acoustic windows related to patient body  habitus.    ---------------------------------------------------------------------------------------------------------------------    Assessment & Plan      Active Hospital Problems    Diagnosis  POA   • **Respiratory failure (CMS/[facility]) [J96.90]  Yes   • Hyperlipidemia LDL goal <70 [E78.5]  Yes   • Essential hypertension [I10]  Yes     #Acute on chronic hypoxic and hypercarbic respiratory failure (2L NC at home)   #Sepsis criteria POA (pulse 100, RR 32) suspect 2/2 to acute COPD exacerbation   #UDS positive for amphetamines  -Chest x-ray and noncontrast CT of chest shows no acute infiltrates  -Initial ABG with pH 7.325, PCO2 72.8, PO2 59.7 on 2 L nasal cannula  -Currently on BiPAP; ABG with pH 7.328, PCO2 68.4, PO2 104.0, O2 saturation 98.0 on IPAP 20, EPAP 8  -Suspect acute hypoxic and hypercarbic respiratory failure secondary to acute COPD exacerbation  -Patient was given IV Rocephin and IV doxycycline in the emergency department; we will continue with IV Rocephin and switch to IV azithromycin for anti-inflammatory properties  -Lactobacillus for GI protection  -Obtain respiratory culture, respiratory panel, MRSA swab  -Adjust antibiotic therapy as necessary  -Blood cultures pending x2  -IV Solu-Medrol 40 mg every 12 hours  -DuoNeb treatments ordered  -Incentive spirometry given, encourage use  -Continue supplemental oxygen as needed, titrate to maintain SPO2 greater than or equal to 90%  -Attempt to wean off BiPAP later this morning    #Periumbilical abdominal pain  -Obtain KUB  -Patient states he has been constipated; MiraLAX and Esperanza-Colace ordered    #Right ankle edema and ecchymosis secondary to recent fall  -X-ray of right ankle ordered  -Elevate lower extremity  -Tylenol as needed for pain    #Insulin-dependent type 2 diabetes mellitus  #Glucosuria  #Diabetic neuropathy  -Hemoglobin A1c ordered  -Hold home oral hypoglycemics to prevent hypoglycemia  -Will add SSI for now. Accu-cheks qAC and qhs; Titrate  insulin therapy as necessary.    -Continue home Neurontin once reconciled per pharmacy    #Chronic normocytic anemia  -H&H stable; repeat a.m. CBC monitor H&H closely  -If hemoglobin less than 7 or platelets less than 50, will transfuse    #Reported history nonischemic cardiomyopathy  #Reported history of paroxysmal atrial fibrillation; anticoagulated on Eliquis  -Follows with Dr. Mccabe, cardiology as an outpatient; at his most recent visit in November 2019, cardiology noted that there is no clear-cut documentation of atrial fibrillation or diagnosis of heart failure however, he was advised to continue Entresto and Eliquis  -Echo from 10/2019 reveals an EF of 56 to 60% with normal left ventricular diastolic function and normal right ventricular cavity size and systolic function  -Patient is currently in normal sinus rhythm  -Continue home Entresto, Lasix, and Eliquis once reconciled per pharmacy  -Monitor for signs of volume overload with daily weights, strict I's and O's    #CAD status post multiple stents in the past  -Currently chest pain-free; EKG shows no acute ischemic changes  -Continue home Lipitor and Coreg  -Monitor closely on telemetry    #Essential hypertension  -Continue home antihypertensive agents with holding parameters once reconciled per pharmacy  -We will make IV hydralazine available for SBP greater than 170  -Continue to monitor VS per hospital protocol    #Hyperlipidemia  -Continue home statin    #GERD  -Protonix    #Obstructive sleep apnea  -CPAP ordered    #Chronic back pain  -Patient is requesting pain medication, however, told him we will hold at this time given respiratory status  -Tylenol available as needed  -Lidoderm patches ordered    #Morbid obesity  -BMI 46.52 kg/m²  -Complicates all aspects of patient care  -Patient may have some underlying obesity hypoventilation syndrome as well      F/E/N: No IV fluids.  Replace electrolytes as necessary.   NPO  ---------------------------------------------------  DVT Prophylaxis: Eliquis  GI Prophylaxis: Protonix  Activity: Up with assistance, fall precaution    The patient is considered to be a high risk patient due to: Acute hypoxic and hypercarbic respiratory failure; sepsis secondary to acute COPD exacerbation    INPATIENT status due to the need for care which can only be reasonably provided in an hospital setting such as aggressive/expedited ancillary services and/or consultation services, the necessity for IV medications, close physician monitoring and/or the possible need for procedures.  In such, I feel patient’s risk for adverse outcomes and need for care warrant INPATIENT evaluation and predict the patient’s care encounter to likely last beyond 2 midnights.    Code Status: FULL CODE     I have discussed the patient's assessment and plan with the patient, nursing staff, and attending physician      Nora Gomez PA-C  Hospitalist Service -- [facility]       05/22/21  01:42 EDT    Attending Physician: Vivienne Flores DO       Electronically signed by Vivienne Flores DO at 05/22/21 0747       Vital Signs (last day)     Date/Time   Temp   Temp src   Pulse   Resp   BP   Patient Position   SpO2    06/04/21 1032   98 (36.7)   Oral   57   18   121/72   Lying   95    06/04/21 0649   98 (36.7)   Oral   67   18   162/98   Lying   99    06/04/21 0634   --   --   74   18   --   --   99    06/04/21 0622   --   --   68   18   --   --   96    06/04/21 0405   --   --   75   24   --   --   98    06/04/21 0258   97.9 (36.6)   Axillary   73   20   130/82   Lying   96    06/04/21 0022   --   --   72   24   --   --   96    06/04/21 0020   --   --   82   20   153/89   Lying   98    06/04/21 0010   --   --   91   24   --   --   95    06/03/21 1845   97.8 (36.6)   --   75   20   163/94   Lying   94    06/03/21 1824   --   --   68   20   --   --   95    06/03/21 1816   --   --   88   22   --   --   95    06/03/21  1428   98 (36.7)   Oral   62   20   113/62   Lying   97    06/03/21 1244   --   --   66   24   --   --   98    06/03/21 1234   --   --   73   24   --   --   93    06/03/21 1037   97.6 (36.4)   Oral   88   18   180/92   Lying   97    06/03/21 0659   97.6 (36.4)   Oral   78   18   169/96   Lying   96    06/03/21 0646   --   --   75   16   --   --   99    06/03/21 0635   --   --   84   24   --   --   97    06/03/21 0355   --   --   --   --   --   --   98    06/03/21 0256   98.6 (37)   Axillary   67   20   158/89   Lying   98    06/03/21 0058   --   --   85   22   --   --   99    06/03/21 0046   --   --   79   23   --   --   96    06/03/21 0036   --   --   --   --   156/83   Lying   --              Lines, Drains & Airways    Active LDAs     Name:   Placement date:   Placement time:   Site:   Days:    Peripheral IV 05/30/21 1837 Anterior;Right Wrist   05/30/21 1837    Wrist   4                  Current Facility-Administered Medications   Medication Dose Route Frequency Provider Last Rate Last Admin   • acetaminophen (TYLENOL) tablet 650 mg  650 mg Oral Q6H PRN Nora Gomez PA-C   650 mg at 05/28/21 0037   • albuterol (PROVENTIL) nebulizer solution 0.083% 2.5 mg/3mL  2.5 mg Nebulization Q6H PRN Joo Jimenez DO       • apixaban (ELIQUIS) tablet 5 mg  5 mg Oral Q12H Joo Jimenez DO   5 mg at 06/04/21 0828   • atorvastatin (LIPITOR) tablet 20 mg  20 mg Oral Daily Joo Jimenez DO   20 mg at 06/04/21 0829   • budesonide-formoterol (SYMBICORT) 160-4.5 MCG/ACT inhaler 2 puff  2 puff Inhalation BID - RT Joo Jimenez DO   2 puff at 06/04/21 0622   • calcium carbonate (TUMS) chewable tablet 500 mg (200 mg elemental)  2 tablet Oral TID PRN Nora Gomez PA-C       • Canagliflozin (INVOKANA) 300 MG tablet tablet 300 mg  300 mg Oral Daily Joo Jimenez DO   300 mg at 06/04/21 0829   • clopidogrel (PLAVIX) tablet 75 mg  75 mg Oral Daily Ryan Gorman MD    75 mg at 06/04/21 0828   • dextrose (D50W) 25 g/ 50mL Intravenous Solution 25 g  25 g Intravenous Q15 Min PRN Vivienne Flores, DO       • dextrose (D50W) 25 g/ 50mL Intravenous Solution 25 g  25 g Intravenous Q15 Min PRN Nora Gomez PA-C       • dextrose (GLUTOSE) oral gel 15 g  15 g Oral Q15 Min PRN Vivienne Flores, DO       • dextrose (GLUTOSE) oral gel 15 g  15 g Oral Q15 Min PRN Nora Gomez PA-C       • docusate sodium (COLACE) capsule 100 mg  100 mg Oral BID Joo Jimenez DO   100 mg at 06/04/21 0829   • fluticasone (FLONASE) 50 MCG/ACT nasal spray 2 spray  2 spray Nasal Daily Joo Jimenez DO   2 spray at 06/04/21 0834   • gabapentin (NEURONTIN) capsule 600 mg  600 mg Oral Q8H Joo Jimenez DO   600 mg at 06/04/21 0513   • glucagon (human recombinant) (GLUCAGEN DIAGNOSTIC) injection 1 mg  1 mg Subcutaneous Q15 Min PRN Vivienne Flores DO       • glucagon (human recombinant) (GLUCAGEN DIAGNOSTIC) injection 1 mg  1 mg Subcutaneous Q15 Min PRN Nora Gomez PA-C       • hydrALAZINE (APRESOLINE) injection 10 mg  10 mg Intravenous Q6H PRN Nora Gomez PA-C   10 mg at 05/27/21 2257   • HYDROcodone-acetaminophen (NORCO) 5-325 MG per tablet 1 tablet  1 tablet Oral Q8H PRN Joo Jimenez DO   1 tablet at 06/04/21 0718   • hydrOXYzine (ATARAX) tablet 25 mg  25 mg Oral TID PRN Nora Gomez PA-C   25 mg at 06/03/21 1735   • insulin aspart (novoLOG) injection 0-9 Units  0-9 Units Subcutaneous TID AC Nora Gomez PA-C   9 Units at 06/04/21 0647   • insulin aspart (novoLOG) injection 30 Units  30 Units Subcutaneous TID With Meals Dequan Connelly MD   30 Units at 06/04/21 0830   • insulin detemir (LEVEMIR) injection 40 Units  40 Units Subcutaneous Daily Dequan Connelly MD   40 Units at 06/04/21 0830   • ipratropium (ATROVENT) nebulizer solution 0.5 mg  0.5 mg Nebulization Q6H PRN Joo Jimenez,        •  ipratropium-albuterol (DUO-NEB) nebulizer solution 3 mL  3 mL Nebulization Q4H PRN Joo Jimenez DO       • ipratropium-albuterol (DUO-NEB) nebulizer solution 3 mL  3 mL Nebulization Q6H - RT Nora Gomez PA-C   3 mL at 06/04/21 0622   • lidocaine (LIDODERM) 5 % 2 patch  2 patch Transdermal Q24H Nora Gomez PA-C   2 patch at 06/04/21 0829   • lisinopril (PRINIVIL,ZESTRIL) tablet 20 mg  20 mg Oral Q24H Joo Jimenez DO   20 mg at 06/04/21 0828   • melatonin tablet 5 mg  5 mg Oral Nightly PRN Nora Gomez PA-C   5 mg at 05/22/21 2012   • metoprolol succinate XL (TOPROL-XL) 24 hr tablet 75 mg  75 mg Oral Q24H Ryan Gorman MD   75 mg at 06/04/21 0829   • nitroglycerin (NITROSTAT) SL tablet 0.4 mg  0.4 mg Sublingual Q5 Min PRN Vivienne Flores DO       • pantoprazole (PROTONIX) EC tablet 40 mg  40 mg Oral Q AM Nora Gomez PA-C   40 mg at 06/04/21 0513   • polyethylene glycol (MIRALAX) packet 17 g  17 g Oral Daily Nora Gomez PA-C   17 g at 06/04/21 0828   • sennosides-docusate (PERICOLACE) 8.6-50 MG per tablet 1 tablet  1 tablet Oral Nightly PRN Nora Gomez PA-C       • sodium chloride 0.9 % flush 10 mL  10 mL Intravenous PRN Vivienne Flores, DO       • sodium chloride 0.9 % flush 10 mL  10 mL Intravenous Q12H Vivienne Flores DO   10 mL at 06/04/21 0829   • sodium chloride 0.9 % flush 10 mL  10 mL Intravenous PRN Vivienne Flores, DO       • tamsulosin (FLOMAX) 24 hr capsule 0.4 mg  0.4 mg Oral Nightly Joo Jimenez DO   0.4 mg at 06/03/21 2049   • traMADol (ULTRAM) tablet 50 mg  50 mg Oral Q6H PRN Dequan Cnonelly MD   50 mg at 06/03/21 2352     Operative/Procedure Notes (most recent note)    No notes of this type exist for this encounter.            Physician Progress Notes (most recent note)      Joo Jimenez DO at 06/03/21 1050              [facility]      Patient Identification:  Name:  Иван Miller  Age:  65 y.o.  Sex:  male  :  1955  MRN:  [id number]  Visit Number:  [id number]  Primary Care Provider:  Rosi Thomas APRN    Length of stay:  12    Chief complaint: Back pain    Subjective:    Patient continues to report feeling well except for neck pain and upper back pain.  He denies any fevers, chills, sweats or rigors.  Denies any other new symptoms overnight.  ----------------------------------------------------------------------------------------------------------------------  Current Hospital Meds:  apixaban, 5 mg, Oral, Q12H  atorvastatin, 20 mg, Oral, Daily  budesonide-formoterol, 2 puff, Inhalation, BID - RT  Canagliflozin, 300 mg, Oral, Daily  clopidogrel, 75 mg, Oral, Daily  docusate sodium, 100 mg, Oral, BID  fluticasone, 2 spray, Nasal, Daily  gabapentin, 600 mg, Oral, Q8H  insulin aspart, 0-9 Units, Subcutaneous, TID AC  insulin aspart, 30 Units, Subcutaneous, TID With Meals  insulin detemir, 40 Units, Subcutaneous, Daily  ipratropium-albuterol, 3 mL, Nebulization, Q6H - RT  lidocaine, 2 patch, Transdermal, Q24H  lisinopril, 20 mg, Oral, Q24H  metoprolol succinate XL, 75 mg, Oral, Q24H  pantoprazole, 40 mg, Oral, Q AM  polyethylene glycol, 17 g, Oral, Daily  sodium chloride, 10 mL, Intravenous, Q12H  tamsulosin, 0.4 mg, Oral, Nightly         ----------------------------------------------------------------------------------------------------------------------  Vital Signs:  Temp:  [97.6 °F (36.4 °C)-98.6 °F (37 °C)] 97.6 °F (36.4 °C)  Heart Rate:  [64-88] 88  Resp:  [16-24] 18  BP: (150-180)/(80-96) 180/92      21  0509 21  0504 21  0445   Weight: (!) 146 kg (320 lb 14.4 oz) (!) 148 kg (326 lb 9.6 oz) (!) 149 kg (327 lb 9.6 oz)     Body mass index is 48.38 kg/m².    Intake/Output Summary (Last 24 hours) at 6/3/2021 1050  Last data filed at 6/3/2021 1037  Gross per 24 hour   Intake 1920 ml   Output 3875 ml   Net -1955 ml      Diet Regular; Cardiac, Consistent Carbohydrate  ----------------------------------------------------------------------------------------------------------------------  Physical exam:  Constitutional: Morbidly obese  male in no apparent distress.     HENT:  Head:  Normocephalic and atraumatic.  Mouth:  Moist mucous membranes.    Eyes:  Conjunctivae and EOM are normal.  Pupils are equal, round, and reactive to light.  No scleral icterus.    Neck:  Neck supple. No thyromegaly.  No JVD present.    Cardiovascular:  Regular rate and rhythm with no murmurs, rubs, clicks or gallops appreciated.  Pulmonary/Chest:  Clear to auscultation bilaterally with no crackles, wheezes or rhonchi appreciated.  Abdominal:  Soft. Nontender. Nondistended  Bowel sounds are normal in all four quadrants. No organomegally appreciated.   Neurological:  Cranial nerves II-XII intact with no focal deficits.  No facial droop.  No slurred speech.   Skin:  Warm and dry to palpation with no rashes or lesions appreciated.  Peripheral vascular:  2+ radial and pedal pulses in bilateral upper and lower extremities.  Psychiatric:  Alert and oriented x3, demonstrates appropriate judgement and insight.    ----------------------------------------------------------------------------------------------------------------------  Tele:    ----------------------------------------------------------------------------------------------------------------------  Results from last 7 days   Lab Units 05/27/21 2204 05/27/21  1548   TROPONIN T ng/mL <0.010 <0.010     Results from last 7 days   Lab Units 06/03/21  0251 06/02/21  0111 06/02/21  0108 05/31/21  0117   CRP mg/dL 0.78*  --  0.98* 0.87*   WBC 10*3/mm3 7.23 7.59  --  8.49   HEMOGLOBIN g/dL 10.8* 11.2*  --  12.3*   HEMATOCRIT % 37.1* 38.7  --  43.0   MCV fL 85.1 86.0  --  84.6   MCHC g/dL 29.1* 28.9*  --  28.6*   PLATELETS 10*3/mm3 206 194  --  234         Results from last 7 days   Lab Units  06/03/21  0251 06/02/21  0111 06/02/21  0108 05/31/21  0117 05/30/21  0433 05/29/21  0306 05/28/21  0059   SODIUM mmol/L 140 136  --  137 138 139 137   POTASSIUM mmol/L 4.4 4.6 4.7 4.5 4.0 4.2 4.5   MAGNESIUM mg/dL  --   --  2.0  --  2.4  --   --    CHLORIDE mmol/L 102 99  --  96* 97* 99 99   CO2 mmol/L 28.7 26.7  --  35.4* 33.3* 33.6* 30.4*   BUN mg/dL 21 21  --  20 19 19 21   CREATININE mg/dL 0.96 1.12  --  1.24 0.95 0.87 0.91   EGFR IF NONAFRICN AM mL/min/1.73 78 66  --  58* 79 88 83   CALCIUM mg/dL 9.1 9.3  --  9.5 9.3 9.2 9.1   GLUCOSE mg/dL 228* 274*  --  164* 156* 120* 220*   ALBUMIN g/dL  --   --   --   --   --  3.35* 3.49*   BILIRUBIN mg/dL  --   --   --   --   --  0.4 0.3   ALK PHOS U/L  --   --   --   --   --  54 56   AST (SGOT) U/L  --   --   --   --   --  15 17   ALT (SGPT) U/L  --   --   --   --   --  20 21   Estimated Creatinine Clearance: 109.2 mL/min (by C-G formula based on SCr of 0.96 mg/dL).    No results found for: AMMONIA      No results found for: BLOODCX  No results found for: URINECX  No results found for: WOUNDCX  No results found for: STOOLCX    I have personally looked at the labs and they are summarized above.  ----------------------------------------------------------------------------------------------------------------------  Imaging Results (Last 24 Hours)     ** No results found for the last 24 hours. **        ----------------------------------------------------------------------------------------------------------------------  Assessment and Plan:    1.  Acute on chronic hypoxic/hypercapnic respiratory failure - acute phase resolved, continue 2 L nasal cannula which is patient's baseline.    2.  COPD exacerbation -resolved    3.  Generalized weakness -patient was denied inpatient rehab.  Currently awaiting skilled nursing facility placement.    4.  Paroxysmal A. Fib - currently in NSR, continue Eliquis    5.  Essential hypertension  -patient with intermittent elevation in blood  pressure.  Will increase lisinopril to 20 mg p.o. daily.    6.  Insulin-dependent type 2 diabetes mellitus -continue sliding scale insulin with Accu-Cheks before every meal and nightly    Disposition currently pending SNF placement        Joo Jimenez DO  21  10:50 EDT    Electronically signed by Joo Jimenez DO at 21 1052          Consult Notes (most recent note)      Viviane Smith MD at 21 1034      Consult Orders    1. Inpatient Infectious Diseases Consult [[id number]] ordered by Dequan Connelly MD at 21 1446                       INFECTIOUS DISEASE CONSULTATION REPORT        Patient Identification:  Name:  Иван Miller  Age:  65 y.o.  Sex:  male  :  1955  MRN:  [id number]   Visit Number:  [id number]  Primary Care Physician:  Rosi Thomas APRN       LOS: 9 days        Subjective       Subjective     History of present illness:      Thank you Dr. Jimenez for allowing us to participate in the care of your patient.  As you well know, Mr. Иван Miller is a 65 y.o. male with past medical history significant for paroxysmal atrial fibrillation, CAD, ischemic cardiomyopathy, COPD, insulin-dependent type 2 diabetes mellitus, hypertension, and hyperlipidemia, who presented to [facility] Emergency Department on 2021 for shortness of breath.  Since that time, patient has been treated for COPD exacerbation.  After complaining of weakness, Dr. Connelly reached out to Dr. Villaseñor of neurosurgery at [facility] and although the patient has spinal stenosis, he believes decompression would make the patient worse.  He also recommends to see the patient outpatient for concerns of osteomyelitis with a normal procalcitonin and CRP.  He recommended that primary team ordered an MRI of the cervical and thoracic spine to rule out osteomyelitis.  After MRI results finalized, results were reviewed with Dr. Villaseñor who believes there could be burned-out  osteomyelitis, as procalcitonin and CRP are normal.  Neurosurgery plans to follow the patient outpatient, but recommended ID involvement.    Today, the patient is sitting up in bed in no apparent distress.  CNA is at bedside.  Patient complains of chronic back pain as well as bilateral upper and lower extremity weakness, worse on the left.  Afebrile, no diarrhea.  CRP is only slightly elevated at 0.87.  WBC remains normal.  Procalcitonin on 5/28/2021 was normal.  Chest x-ray on 5/30/2021 showed no evidence of active or acute cardiopulmonary disease.  MRI of the thoracic spine with and without contrast on 5/28/2021 showed postcontrast imaging demonstrating enhancement along the inferior endplate of T7 and superior endplate of T8 as well as some enhancement posteriorly adjacent to the cord but no enhancing mass is seen.  Again, as above there certainly could be associated with at least a mild degree of discitis.  Moderate central canal narrowing at T7-T8 and T8-T9.  MRI of the thoracic spine without contrast on 5/28/2021 showed decreased height of T7 and T8 with extensive endplate changes.  Most of this does appear to be chronic.  I cannot exclude at least a mild degree of discitis.  There are spurs and disc bulges at T7-T8 and T8-T9 which do cause moderate central canal narrowing.  MRI of the cervical spine with and without contrast on 5/28/2021 showed the study is markedly degraded by patient motion.  No destructive bony lesion.  No contrast-enhancing abnormality.  MRI of the lumbar spine without contrast on 5/28/2021 showed facet arthropathy.  Disc bulges most notable at L3-L4 and L4-L5.  COVID-19 and flu A/B PCR on 5/21/2021 was negative.  Blood cultures on 5/21/2021 finalized as no growth.    Infectious Disease consultation was requested for antimicrobial management.    ---------------------------------------------------------------------------------------------------------------------     Review Of  Systems:    Constitutional: no fever, chills and night sweats. No appetite change or unexpected weight change. No fatigue.  Generalized weakness.  Eyes: no eye drainage, itching or redness.  HEENT: no mouth sores, dysphagia or nose bleed.  Respiratory: no for shortness of breath, cough or production of sputum.  Cardiovascular: no chest pain, no palpitations, no orthopnea.  Gastrointestinal: no nausea, vomiting or diarrhea. No abdominal pain, hematemesis or rectal bleeding.  Genitourinary: no dysuria or polyuria.  Hematologic/lymphatic: no lymph node abnormalities, no easy bruising or easy bleeding.  Musculoskeletal: Back pain.  Skin: No rash and no itching.  Neurological: no loss of consciousness, no seizure, no headache.  Bilateral upper and lower extremity weakness, worse on the left.  Unsteady gait.  Behavioral/Psych: no depression or suicidal ideation.  Endocrine: no hot flashes.  Immunologic: negative.    ---------------------------------------------------------------------------------------------------------------------     Past Medical History    Past Medical History:   Diagnosis Date   • Atrial fibrillation (CMS/[facility])    • COPD (chronic obstructive pulmonary disease) (CMS/[facility])    • Diabetes mellitus (CMS/[facility])    • GERD (gastroesophageal reflux disease)    • Hyperlipidemia    • Hypertension    • Myocardial infarction (CMS/[facility])    • Neuropathy        Past Surgical History    Past Surgical History:   Procedure Laterality Date   • ABDOMINAL SURGERY     • APPENDECTOMY     • CARDIAC CATHETERIZATION     • CORONARY STENT PLACEMENT         Family History    Family History   Problem Relation Age of Onset   • Hypertension Mother    • Heart attack Mother    • Hypertension Father        Social History    Social History     Tobacco Use   • Smoking status: Former Smoker     Packs/day: 2.00     Years: 30.00     Pack years: 60.00     Types: Cigarettes   • Smokeless tobacco: Current User     Types: Snuff   Substance Use Topics    • Alcohol use: No   • Drug use: No       Allergies    Codeine and Ciprofloxacin  ---------------------------------------------------------------------------------------------------------------------     Home Medications:    Prior to Admission Medications       Prescriptions Last Dose Informant Patient Reported? Taking?    albuterol (PROVENTIL HFA;VENTOLIN HFA) 108 (90 Base) MCG/ACT inhaler 5/20/2021 Pharmacy Yes Yes    Inhale 2 puffs Every 6 (Six) Hours As Needed for Wheezing.    amoxicillin-clavulanate (AUGMENTIN) 875-125 MG per tablet 5/20/2021 Pharmacy Yes Yes    Take 1 tablet by mouth 2 (Two) Times a Day. Prior to [facility] Admission, Patient was on: 10 day supply starting 5/18 - patient has taken 4 doses    Budeson-Glycopyrrol-Formoterol (BREZTRI) 160-9-4.8 MCG/ACT aerosol inhaler 5/20/2021 Pharmacy Yes Yes    Inhale 2 puffs 2 (Two) Times a Day.    carvedilol (COREG) 25 MG tablet 5/20/2021 Pharmacy Yes Yes    Take 25 mg by mouth 2 (Two) Times a Day With Meals.    Dapagliflozin Propanediol (Farxiga) 10 MG tablet 5/20/2021 Pharmacy Yes Yes    Take 10 mg by mouth Daily.    furosemide (LASIX) 40 MG tablet 5/20/2021 Pharmacy Yes Yes    Take 80 mg by mouth Daily.    gabapentin (NEURONTIN) 600 MG tablet 5/20/2021 Pharmacy Yes Yes    Take 600 mg by mouth 3 (Three) Times a Day.    Insulin Lispro, 1 Unit Dial, (HumaLOG KwikPen) 100 UNIT/ML solution pen-injector 5/20/2021 Pharmacy Yes Yes    Inject 35 Units under the skin into the appropriate area as directed 3 (Three) Times a Day.    lidocaine (LIDODERM) 5 % 5/20/2021 Pharmacy Yes Yes    Place 1 patch on the skin as directed by provider Daily. Prior to [facility] Admission, Patient was on:  Place on Shoulder or back and Remove & Discard patch within 12 hours or as directed by MD    magnesium oxide (MAG-OX) 400 MG tablet 5/20/2021 Pharmacy Yes Yes    Take 400 mg by mouth Daily.    pantoprazole (PROTONIX) 40 MG EC tablet 5/20/2021 Pharmacy Yes Yes    Take 40 mg by mouth  Daily.    apixaban (ELIQUIS) 5 MG tablet tablet 5/20/2021 Pharmacy Yes No    Take 5 mg by mouth 2 (Two) Times a Day.    atorvastatin (LIPITOR) 20 MG tablet 5/20/2021 Pharmacy Yes No    Take 20 mg by mouth Daily.    fluticasone (FLONASE) 50 MCG/ACT nasal spray 5/20/2021 Pharmacy Yes No    2 sprays into the nostril(s) as directed by provider Daily.    ipratropium-albuterol (DUO-NEB) 0.5-2.5 mg/3 ml nebulizer 5/20/2021 Pharmacy Yes No    Take 3 mL by nebulization Every 4 (Four) Hours As Needed for Wheezing.    metFORMIN (GLUCOPHAGE) 1000 MG tablet 5/20/2021 Pharmacy Yes No    Take 1,000 mg by mouth 2 (Two) Times a Day With Meals.    sildenafil (REVATIO) 20 MG tablet Unknown Self Yes No    Take 20 mg by mouth As Needed.    tamsulosin (FLOMAX) 0.4 MG capsule 24 hr capsule 5/20/2021 Pharmacy Yes No    Take 1 capsule by mouth Every Night.          ---------------------------------------------------------------------------------------------------------------------    Objective       Objective     Hospital Scheduled Meds:  apixaban, 5 mg, Oral, Q12H  atorvastatin, 20 mg, Oral, Daily  budesonide-formoterol, 2 puff, Inhalation, BID - RT  Canagliflozin, 300 mg, Oral, Daily  clopidogrel, 75 mg, Oral, Daily  docusate sodium, 100 mg, Oral, BID  fluticasone, 2 spray, Nasal, Daily  gabapentin, 600 mg, Oral, Q8H  insulin aspart, 0-9 Units, Subcutaneous, TID AC  insulin aspart, 30 Units, Subcutaneous, TID With Meals  insulin detemir, 40 Units, Subcutaneous, Daily  ipratropium-albuterol, 3 mL, Nebulization, Q6H - RT  lactobacillus acidophilus, 1 capsule, Oral, Daily  lidocaine, 2 patch, Transdermal, Q24H  lisinopril, 5 mg, Oral, Q24H  metoprolol succinate XL, 75 mg, Oral, Q24H  pantoprazole, 40 mg, Oral, Q AM  polyethylene glycol, 17 g, Oral, Daily  sodium chloride, 10 mL, Intravenous, Q12H  tamsulosin, 0.4 mg, Oral, Nightly          ---------------------------------------------------------------------------------------------------------------------   Vital Signs:  Temp:  [97 °F (36.1 °C)-98.6 °F (37 °C)] 98.3 °F (36.8 °C)  Heart Rate:  [58-82] 70  Resp:  [15-25] 20  BP: (119-145)/(67-91) 130/67  Mean Arterial Pressure (Non-Invasive) for the past 24 hrs (Last 3 readings):   Noninvasive MAP (mmHg)   05/31/21 1032 92   05/31/21 0633 90   05/30/21 1835 87     SpO2 Percentage    05/31/21 0648 05/31/21 0655 05/31/21 1032   SpO2: 95% 99% 95%     SpO2:  [95 %-99 %] 95 %  on  Flow (L/min):  [2] 2;   Device (Oxygen Therapy): nasal cannula    Body mass index is 47.49 kg/m².  Wt Readings from Last 3 Encounters:   05/31/21 (!) 146 kg (321 lb 9.6 oz)   11/25/19 127 kg (280 lb)   10/06/19 124 kg (273 lb 11.2 oz)     ---------------------------------------------------------------------------------------------------------------------     Physical Exam:    Constitutional: Morbidly obese  male is sitting off the side of the bed in no apparent distress.  HENT:  Head: Normocephalic and atraumatic.  Mouth:  Moist mucous membranes.    Eyes:  Conjunctivae and EOM are normal.  No scleral icterus.  Neck:  Neck supple.  No JVD present.    Cardiovascular:  Normal rate, regular rhythm and normal heart sounds with no murmur. No edema.  Pulmonary/Chest:  No respiratory distress, no wheezes, no crackles, with normal breath sounds and good air movement.  Abdominal:  Soft.  Bowel sounds are normal.  No distension and no tenderness.   Musculoskeletal:  No edema, no tenderness, and no deformity.  No swelling or redness of joints.  Some tenderness to palpation along the spine, especially at the thoracic spine.  Neurological:  Alert and oriented to person, place, and time.  No facial droop.  No slurred speech.   Skin:  Skin is warm and dry.  No rash noted.  No pallor.   Psychiatric:  Normal mood and affect.  Behavior is  normal.    ---------------------------------------------------------------------------------------------------------------------    Results from last 7 days   Lab Units 05/27/21  2204 05/27/21  1548   TROPONIN T ng/mL <0.010 <0.010     Results from last 7 days   Lab Units 05/30/21  0433   PROBNP pg/mL 190.6     Results from last 7 days   Lab Units 05/26/21  0128   CHOLESTEROL mg/dL 153   TRIGLYCERIDES mg/dL 130   HDL CHOL mg/dL 30*   LDL CHOL mg/dL 99     Results from last 7 days   Lab Units 05/25/21  1117   PH, ARTERIAL pH units 7.376   PO2 ART mm Hg 74.6*   PCO2, ARTERIAL mm Hg 63.9*   HCO3 ART mmol/L 37.5*     Results from last 7 days   Lab Units 05/31/21 0117 05/30/21 0433 05/29/21 0306 05/28/21  1834   CRP mg/dL 0.87*  --  0.34 <0.30   WBC 10*3/mm3 8.49 8.38 8.77  --    HEMOGLOBIN g/dL 12.3* 12.1* 11.7*  --    HEMATOCRIT % 43.0 42.1 39.8  --    MCV fL 84.6 85.7 83.6  --    MCHC g/dL 28.6* 28.7* 29.4*  --    PLATELETS 10*3/mm3 234 217 228  --      Results from last 7 days   Lab Units 05/31/21 0117 05/30/21 0433 05/29/21 0306 05/28/21  0059 05/27/21  0324 05/26/21  0128   SODIUM mmol/L 137 138 139 137 138 135*   POTASSIUM mmol/L 4.5 4.0 4.2 4.5 4.4 4.0   MAGNESIUM mg/dL  --  2.4  --   --  2.7* 2.5*   CHLORIDE mmol/L 96* 97* 99 99 97* 96*   CO2 mmol/L 35.4* 33.3* 33.6* 30.4* 36.4* 35.3*   BUN mg/dL 20 19 19 21 25* 31*   CREATININE mg/dL 1.24 0.95 0.87 0.91 0.86 0.96   EGFR IF NONAFRICN AM mL/min/1.73 58* 79 88 83 89 78   CALCIUM mg/dL 9.5 9.3 9.2 9.1 9.2 9.1   GLUCOSE mg/dL 164* 156* 120* 220* 164* 233*   ALBUMIN g/dL  --   --  3.35* 3.49* 3.54 3.39*   BILIRUBIN mg/dL  --   --  0.4 0.3 0.4 0.3   ALK PHOS U/L  --   --  54 56 51 48   AST (SGOT) U/L  --   --  15 17 13 14   ALT (SGPT) U/L  --   --  20 21 21 20   Estimated Creatinine Clearance: 83.7 mL/min (by C-G formula based on SCr of 1.24 mg/dL).  No results found for: AMMONIA    Glucose   Date/Time Value Ref Range Status   05/31/2021 0632 280 (H) 70 - 130  mg/dL Final   05/30/2021 2150 159 (H) 70 - 130 mg/dL Final   05/30/2021 1907 220 (H) 70 - 130 mg/dL Final   05/30/2021 1621 253 (H) 70 - 130 mg/dL Final   05/30/2021 1023 214 (H) 70 - 130 mg/dL Final   05/30/2021 0706 147 (H) 70 - 130 mg/dL Final   05/29/2021 1825 218 (H) 70 - 130 mg/dL Final   05/29/2021 1638 149 (H) 70 - 130 mg/dL Final     Lab Results   Component Value Date    HGBA1C 8.80 (H) 05/21/2021     Lab Results   Component Value Date    TSH 2.100 05/21/2021    FREET4 0.95 05/21/2021       No results found for: BLOODCX  No results found for: URINECX  No results found for: WOUNDCX  No results found for: STOOLCX  No results found for: RESPCX  Pain Management Panel       Pain Management Panel Latest Ref Rng & Units 5/21/2021 10/4/2019    AMPHETAMINES SCREEN, URINE Negative Positive(A) Positive(A)    BARBITURATES SCREEN Negative Negative Negative    BENZODIAZEPINE SCREEN, URINE Negative Negative Negative    BUPRENORPHINEUR Negative Negative Negative    COCAINE SCREEN, URINE Negative Negative Negative    METHADONE SCREEN, URINE Negative Negative Negative          I have personally reviewed the above laboratory results.   ---------------------------------------------------------------------------------------------------------------------  Imaging Results (Last 7 Days)       Procedure Component Value Units Date/Time    XR Chest AP [[id number]] Collected: 05/30/21 1101     Updated: 05/30/21 1103    Narrative:      XR CHEST AP-     CLINICAL INDICATION: SOA; J96.01-Acute respiratory failure with hypoxia;  J96.02-Acute respiratory failure with hypercapnia; J44.1-Chronic  obstructive pulmonary disease with (acute) exacerbation; L08.9-Local  infection of the skin and subcutaneous tissue, unspecified        COMPARISON: 05/21/2021      TECHNIQUE: Single frontal view of the chest.     FINDINGS:     Slightly underpenetrated  The cardiac silhouette is normal. The pulmonary vasculature is  unremarkable.  There is no evidence  of an acute osseous abnormality.   There are no suspicious-appearing parenchymal soft tissue nodules.          Impression:      No evidence of active or acute cardiopulmonary disease on today's chest  radiograph.     This report was finalized on 5/30/2021 11:01 AM by Dr. Noah Lujan MD.       MRI Thoracic Spine With & Without Contrast [[id number]] Collected: 05/28/21 1805     Updated: 05/28/21 1816    Narrative:      EXAMINATION: MRI THORACIC SPINE W WO CONTRAST-      CLINICAL INDICATION: Abnormal MRI without contrast; J96.01-Acute  respiratory failure with hypoxia; J96.02-Acute respiratory failure with  hypercapnia; J44.1-Chronic obstructive pulmonary disease with (acute)  exacerbation; L08.9-Local infection of the skin and subcutaneous tissue,  unspecified        COMPARISON: Unenhanced study performed earlier the same day.     PROCEDURE: Multiple planar images of the thoracic spine were acquired in  a high field strength magnet. Several pulse sequences were used to  acquire the study. The images were acquired before and after gadolinium.     FINDINGS: Today's exam does show some enhancement in around the abnormal  levels of T7 and T8. I do not see an enhancing mass, but the appearance  could certainly represent at least some degree of discitis. Trauma could  also have a similar appearance. There is moderate compromise of the cord  at T7-T8 and T8-T9.  Again, however, the study, particularly the axially acquired images are  markedly standard by patient motion.     There is no abnormal cord mass identified.       Impression:      1. Postcontrast imaging demonstrating enhancement along the inferior  endplate of T7 and superior endplate of T8 as well as some enhancement  posteriorly adjacent to the cord but no enhancing mass is seen. Again,  as above this certainly could be associated with least a mild degree of  discitis.  2. Moderate central canal narrowing at T7-T8 and T8-T9.     Study is degraded by patient  motion, particularly the axial images      This report was finalized on 5/28/2021 6:14 PM by Dr. Noah Lujan MD.       MRI Cervical Spine With & Without Contrast [[id number]] Collected: 05/28/21 1803     Updated: 05/28/21 1806    Narrative:      MRI CERVICAL SPINE W WO CONTRAST-     CLINICAL INDICATION: eval abnormal density around C6 noted by  Neurosurgeon; J96.01-Acute respiratory failure with hypoxia;  J96.02-Acute respiratory failure with hypercapnia; J44.1-Chronic  obstructive pulmonary disease with (acute) exacerbation; L08.9-Local  infection of the skin and subcutaneous tissue, unspecified        COMPARISON: 05/25/2021     TECHNIQUE: Sagittal spin echo scans were obtained from the posterior  fossa through the upper thoracic spine and axial scans were performed  through selected cervical disc space levels, utilizing both spin echo  and gradient echo scans technique without contrast administration.  Images were then acquired after contrast     FINDINGS:  The study is markedly degraded by patient motion  Straightening of the cervical spine  The vertebral body heights are adequately maintained.  The signal characteristics of the vertebral bodies are as expected on  the presented pulse sequences.     The disc space heights are preserved.  No paraspinal contrast-enhancing masses are seen        There is no abnormal cord signal.             Impression:         1. The study is markedly degraded by patient motion  2. No destructive bony lesion  3. No contrast-enhancing abnormalities        This report was finalized on 5/28/2021 6:04 PM by Dr. Noah Lujan MD.       MRI Thoracic Spine Without Contrast [[id number]] Collected: 05/28/21 1118     Updated: 05/28/21 1448    Narrative:      EXAMINATION: MRI THORACIC SPINE WO CONTRAST-      CLINICAL INDICATION: weakness; J96.01-Acute respiratory failure with  hypoxia; J96.02-Acute respiratory failure with hypercapnia;  J44.1-Chronic obstructive pulmonary disease with (acute)  exacerbation;  L08.9-Local infection of the skin and subcutaneous tissue, unspecified        COMPARISON: None immediately available.     PROCEDURE: Multiple planar images of the thoracic spine were acquired in  a high field strength magnet. Several pulse sequences were used to  acquire the study. Contrast was not administered.     FINDINGS: There are extensive endplate changes at T7-T8. Posterior  spurring and disc bulge at T7-T8 as well as T8-T9. These do cause  significant narrowing of the spinal canal. No walled off fluid  collection is seen. All of this may represent chronic change. The T2  images do not show significant amount of edema.     The other vertebral body heights are maintained.     Signal characteristics otherwise are as expected.     No abnormal cord signal is seen.       Impression:      Decreased height of T7 and T8 with extensive endplate  changes. Most of this does appear to be chronic. I cannot exclude at  least a mild degree of discitis. There are spurs and disc bulges at  T7-T8 and at T8-T9 which do cause moderate central canal narrowing.      This report was finalized on 5/28/2021 2:46 PM by Dr. Noah Lujan MD.       MRI Lumbar Spine Without Contrast [[id number]] Collected: 05/28/21 1006     Updated: 05/28/21 1030    Narrative:      EXAMINATION: MRI LUMBAR SPINE WO CONTRAST-      CLINICAL INDICATION: leg weakness; J96.01-Acute respiratory failure with  hypoxia; J96.02-Acute respiratory failure with hypercapnia;  J44.1-Chronic obstructive pulmonary disease with (acute) exacerbation;  L08.9-Local infection of the skin and subcutaneous tissue, unspecified     COMPARISON: None      TECHNIQUE: Multiplanar, multisequence MR imaging performed through the  lumbar spine WITHOUT contrast.     FINDINGS:      HARDWARE: No MRI evidence of hardware.     NUMBERING/ALIGNMENT:?   5 lumbar vertebral body segments.   Intact vertebral body alignment.     SPINAL CORD:?   Conus terminates at the?L1-2 level.       BONES:   No fracture. No marrow signal abnormality.      POSTERIOR ELEMENTS:  Posterior elements are intact.     SOFT TISSUES:   The visualized paraspinal soft tissues are unremarkable.       T12-L1:    No disk bulge or protrusion.  No central canal or neuroforaminal  stenosis.     L1/2:  No disk bulge or protrusion.  No central canal or neuroforaminal  stenosis.     L2/3:  No disk bulge or protrusion.  No central canal or neuroforaminal  stenosis.     L3/4:  Broad annular disc bulge with mild central and mild bilateral foraminal  narrowing     L4/5:  Central disc bulge causing mild-to-moderate central canal narrowing.     L5/S1:  No disk bulge or protrusion.  No central canal or neuroforaminal  stenosis.     OTHER:   No additional remarkable findings.        Impression:         1. Facet arthropathy.  2. Disc bulges most notable at L3-4 and L4-5.     This report was finalized on 5/28/2021 10:27 AM by Dr. Noah Lujan MD.       CT Head Without Contrast [[id number]] Collected: 05/27/21 1003     Updated: 05/27/21 1007    Narrative:      CT HEAD WO CONTRAST-     CLINICAL INDICATION: Evaluate left arm weakness and paresthesias;  J96.01-Acute respiratory failure with hypoxia; J96.02-Acute respiratory  failure with hypercapnia; J44.1-Chronic obstructive pulmonary disease  with (acute) exacerbation; L08.9-Local infection of the skin and  subcutaneous tissue, unspecified        COMPARISON: 11/10/2018      TECHNIQUE: Axial images of the brain were obtained with out intravenous  contrast.  Reformatted images were created in the sagittal and coronal  planes.     DOSE:     Radiation dose reduction techniques were utilized per ALARA protocol.  Automated exposure control was initiated through either or [employer] or  [employer] software packages by  protocol.           FINDINGS:   Today's study shows no mass, hemorrhage, or midline shift.   The ventricles, cisterns, and sulci are unremarkable. There is  no  hydrocephalus.   There is no evidence of acute ischemia.  I do not see epidural or subdural hematoma.  The gray-white differentiation is appropriate.   The bone window setting images show no destructive calvarial lesion or  acute calvarial fracture.   The posterior fossa is unremarkable.          Impression:         1. No acute ischemic event  2. No parenchymal mass, hemorrhage, or midline shift     This report was finalized on 5/27/2021 10:05 AM by Dr. Noah Lujan MD.       MRI Cervical Spine Without Contrast [[id number]] Collected: 05/25/21 1730     Updated: 05/25/21 1824    Narrative:      MRI CERVICAL SPINE WO CONTRAST-     CLINICAL INDICATION: cervical spine stenosis; J96.01-Acute respiratory  failure with hypoxia; J96.02-Acute respiratory failure with hypercapnia;  J44.1-Chronic obstructive pulmonary disease with (acute) exacerbation        COMPARISON: None immediately available     TECHNIQUE: Sagittal spin echo scans were obtained from the posterior  fossa through the upper thoracic spine and axial scans were performed  through selected cervical disc space levels, utilizing both spin echo  and gradient echo scans technique without contrast administration.      FINDINGS:   Straightening of the cervical spine  The vertebral body heights are adequately maintained.  The signal characteristics of the vertebral bodies are as expected on  the presented pulse sequences.     The disc space heights are preserved.  Broad annular disc bulge at C2-3 which does cause mild central canal  narrowing.  Broad annular disc bulge at C3-4 causing moderate central and moderate  bilateral foraminal narrowing.  Broad annular disc bulge at C4-5 causing mild central and  mild-to-moderate bilateral foraminal narrowing.  Broad annular disc bulge at C5-6 causing moderate central and moderate  bilateral foraminal narrowing.      There is no abnormal cord signal.             Impression:         1. Straightening of THE cervical  spine.  2. Multiple disc bulges causing central canal narrowing most notable at  C5-6.  3. The study is degraded by patient motion.         This report was finalized on 5/25/2021 6:22 PM by Dr. Noah Lujan MD.       CT Cervical Spine Without Contrast [[id number]] Collected: 05/24/21 1857     Updated: 05/24/21 1901    Narrative:      CT CERVICAL SPINE WO CONTRAST-     CLINICAL INDICATION: left radicular nerve pain        COMPARISON: None available      TECHNIQUE: Axial images of the cervical spine were acquired with out any  intravenous contrast. Reformatted images were then created in the  sagittal and coronal planes.     DOSE:      Radiation dose reduction techniques were utilized per ALARA protocol.  Automated exposure control was initiated through either or [employer] or  [employer] software packages by  protocol.           FINDINGS:   The provided study demonstrates preservation of the vertebral body  heights in the sagittally reconstructed images.     There is no prevertebral soft tissue swelling.     Straightening of cervical spine     There is diffuse disc space narrowing     I see no acute cervical spine fracture.       Impression:         1. Straightening of cervical spine     2. Diffuse arthritic change  3. No acute bony abnormality.     This report was finalized on 5/24/2021 6:59 PM by Dr. Noah Lujan MD.       XR Hand 2 View Left [[id number]] Collected: 05/24/21 1513     Updated: 05/24/21 1554    Narrative:      EXAMINATION: XR HAND 2 VW LEFT-      CLINICAL INDICATION: edema/ pain left hand post fall        COMPARISON: None available.     FINDINGS: 3 views of the left hand show fragment off of the tuft of the  distal phalanx of the 1st digit. This appears corticated.     No other evidence of fracture.       Impression:      1. Corticated fragment off the tuft of the distal phalanx of the 1st  digit. Radiographic appearance suggestive of subacute injury  2. Arthritic change.       This report  was finalized on 5/24/2021 3:52 PM by Dr. Noah Lujan MD.             I have personally reviewed the above radiology results.   ---------------------------------------------------------------------------------------------------------------------      Assessment & Plan        Assessment/Plan       ASSESSMENT:    1.  Burned-out osteomyelitis    PLAN:    The patient presented to [facility] on 5/21/2021 for shortness of breath.  Since that time, patient has been treated for COPD exacerbation.  After complaining of weakness, Dr. Connelly reached out to Dr. Villaseñor of neurosurgery at [facility] and although the patient has spinal stenosis, he believes decompression would make the patient worse.  He also recommends to see the patient outpatient for concerns of osteomyelitis with a normal procalcitonin and CRP.  He recommended that primary team ordered an MRI of the cervical and thoracic spine to rule out osteomyelitis.  After MRI results finalized, results were reviewed with Dr. Villaseñor who believes there could be burned-out osteomyelitis, as procalcitonin and CRP are normal.  Neurosurgery plans to follow the patient outpatient, but recommended ID involvement.    Today, the patient is sitting up in bed in no apparent distress.  CNA is at bedside.  Patient complains of chronic back pain as well as bilateral upper and lower extremity weakness, worse on the left.  Afebrile, no diarrhea.  CRP is only slightly elevated at 0.87.  WBC remains normal.  Procalcitonin on 5/28/2021 was normal.  Chest x-ray on 5/30/2021 showed no evidence of active or acute cardiopulmonary disease.  MRI of the thoracic spine with and without contrast on 5/28/2021 showed postcontrast imaging demonstrating enhancement along the inferior endplate of T7 and superior endplate of T8 as well as some enhancement posteriorly adjacent to the cord but no enhancing mass is seen.  Again, as above there certainly could be  associated with at least a mild degree of discitis.  Moderate central canal narrowing at T7-T8 and T8-T9.  MRI of the thoracic spine without contrast on 2021 showed decreased height of T7 and T8 with extensive endplate changes.  Most of this does appear to be chronic.  I cannot exclude at least a mild degree of discitis.  There are spurs and disc bulges at T7-T8 and T8-T9 which do cause moderate central canal narrowing.  MRI of the cervical spine with and without contrast on 2021 showed the study is markedly degraded by patient motion.  No destructive bony lesion.  No contrast-enhancing abnormality.  MRI of the lumbar spine without contrast on 2021 showed facet arthropathy.  Disc bulges most notable at L3-L4 and L4-L5.  COVID-19 and flu A/B PCR on 2021 was negative.  Blood cultures on 2021 finalized as no growth.    As inflammatory markers remain very low and patient has no clinical evidence of sepsis at this time, we do not believe patient has an active infection and recommend follow-up with neurosurgeon, Dr. Villaseñor, as an outpatient.  ID will sign off at this time.  Please call back for any questions.    Again, thank you Dr. Jimenez for allowing us to participate in the care of your patient and please feel free to call for any questions you may have.    Code Status:   Code Status and Medical Interventions:   Ordered at: 21 0057     Code Status:    CPR     Medical Interventions (Level of Support Prior to Arrest):    Full     TRAVIS Lodr  21  10:35 EDT      Electronically signed by Viviane Smith MD at 21 1019          Physical Therapy Notes (most recent note)      Keely Agosto, PT at 21 1405  Version 1 of 1         Acute Care - Physical Therapy Treatment Note  BAKARI Vazquez     Patient Name: Иван Miller  : 1955  MRN: [id number]  Today's Date: 2021   Onset of Illness/Injury or Date of Surgery: 21       PT Assessment (last 12 hours)       PT Evaluation and Treatment     Row Name 06/02/21 1300          Physical Therapy Time and Intention    Subjective Information  no complaints  -BC     Document Type  therapy note (daily note)  -BC     Mode of Treatment  physical therapy  -BC     Patient Effort  good  -BC     Row Name 06/02/21 1300          General Information    Patient Profile Reviewed  yes  -BC     Onset of Illness/Injury or Date of Surgery  06/02/21  -BC     Referring Physician  DR. Jimenez  -BC     Patient Observations  alert;cooperative;agree to therapy  -BC     Prior Level of Function  mod assist:  -BC     Risks Reviewed  patient:;LOB;nausea/vomiting;dizziness;increased discomfort;change in vital signs;increased drainage;lines disloged  -BC     Benefits Reviewed  patient:;improve function;increase independence;increase strength;increase balance;decrease pain;decrease risk of DVT;improve skin integrity;increase knowledge  -BC     Row Name 06/02/21 1300          Previous Level of Function/Home Environm    BADLs, Premorbid Functional Level  needs assistive device for safe performance  -BC     IADLs, Premorbid Functional Level  needs adaptive equipment for safe performance  -BC     Row Name 06/02/21 1300          Living Environment    Current Living Arrangements  home/apartment/condo  -BC     Lives With  child(ara), adult  -BC     Row Name 06/02/21 1300          Cognition    Affect/Mental Status (Cognitive)  WFL  -BC     Orientation Status (Cognition)  oriented to;person;place;situation  -BC     Follows Commands (Cognition)  follows two-step commands  -BC     Row Name 06/02/21 1300          Range of Motion Comprehensive    General Range of Motion  bilateral upper extremity ROM WNL  -BC     Row Name 06/02/21 1300          Mobility    Extremity Weight-bearing Status  left lower extremity;right lower extremity  -BC     Left Lower Extremity (Weight-bearing Status)  full weight-bearing (FWB)  -BC     Right Lower Extremity (Weight-bearing Status)   full weight-bearing (FWB)  -BC     Row Name 06/02/21 1300          Bed Mobility    Bed Mobility  bed mobility (all) activities  -BC     All Activities, [place] (Bed Mobility)  moderate assist (50% patient effort)  -BC     Supine-Sit [place] (Bed Mobility)  moderate assist (50% patient effort)  -BC     Sit-Supine [place] (Bed Mobility)  moderate assist (50% patient effort)  -BC     Bed Mobility, Safety Issues  decreased use of legs for bridging/pushing  -BC     Assistive Device (Bed Mobility)  bed rails  -BC     Row Name 06/02/21 1300          Transfers    Transfers  sit-stand transfer;stand-sit transfer  -BC     Maintains Weight-bearing Status (Transfers)  able to maintain  -BC     Sit-Stand [place] (Transfers)  minimum assist (75% patient effort)  -BC     Stand-Sit [place] (Transfers)  minimum assist (75% patient effort)  -BC     Row Name 06/02/21 1300          Sit-Stand Transfer    Assistive Device (Sit-Stand Transfers)  walker, front-wheeled  -BC     Row Name 06/02/21 1300          Stand-Sit Transfer    Assistive Device (Stand-Sit Transfers)  walker, front-wheeled  -BC     Row Name 06/02/21 1300          Gait/Stairs (Locomotion)    Gait/Stairs Locomotion  gait/ambulation independence  -BC     [place] Level (Gait)  minimum assist (75% patient effort)  -BC     Assistive Device (Gait)  walker, front-wheeled  -BC     Distance in Feet (Gait)  3 ft  -BC     Row Name 06/02/21 1300          Coping    Observed Emotional State  calm;cooperative  -BC     Verbalized Emotional State  acceptance  -BC     Family/Support Persons  family  -BC     Involvement in Care  not present at bedside  -BC     Family/Support System Care  self-care encouraged;support provided  -BC     Diversional Activities  television  -BC     Row Name 06/02/21 1300          Plan of Care Review    Plan of Care Reviewed With  patient  -BC     Progress  improving  -BC     Row Name 06/02/21 1300          Positioning and Restraints     Pre-Treatment Position  in bed  -BC     Post Treatment Position  bed  -BC     In Bed  notified nsg;supine;encouraged to call for assist;call light within reach;side rails up x3  -BC     In Chair  notified nsg;sitting;call light within reach;encouraged to call for assist  -BC       User Key  (r) = Recorded By, (t) = Taken By, (c) = Cosigned By    Initials Name Provider Type    Keely Sargent PT Physical Therapist        Physical Therapy Education                 Title: PT OT SLP Therapies (Done)     Topic: Physical Therapy (Done)     Point: Mobility training (Done)     Learning Progress Summary           Patient Acceptance, E, VU by EB at 6/2/2021 0713    Acceptance, E, VU by SM at 6/2/2021 0030    Acceptance, E, VU by EB at 6/1/2021 0739    Acceptance, E, VU by SM at 5/31/2021 2242    Acceptance, E, VU by JM at 5/31/2021 0924    Acceptance, E, VU by SM at 5/31/2021 0035    Acceptance, E,TB, VU by MM at 5/28/2021 1802    Acceptance, E,TB, VU by MM at 5/27/2021 1143    Acceptance, E, VU by SM at 5/26/2021 2344    Acceptance, E,TB, VU by MM at 5/26/2021 0929    Acceptance, E,TB,D, VU,NR by  at 5/24/2021 1249                   Point: Home exercise program (Done)     Learning Progress Summary           Patient Acceptance, E, VU by EB at 6/2/2021 0713    Acceptance, E, VU by SM at 6/2/2021 0030    Acceptance, E, VU by EB at 6/1/2021 0739    Acceptance, E, VU by SM at 5/31/2021 2242    Acceptance, E, VU by JM at 5/31/2021 0924    Acceptance, E, VU by SM at 5/31/2021 0035    Acceptance, E,TB, VU by MM at 5/28/2021 1802    Acceptance, E,TB, VU by MM at 5/27/2021 1143    Acceptance, E, VU by SM at 5/26/2021 2344    Acceptance, E,TB, VU by MM at 5/26/2021 0929    Acceptance, E,TB,D, VU,NR by AG at 5/24/2021 1249                   Point: Body mechanics (Done)     Learning Progress Summary           Patient Acceptance, E, VU by EB at 6/2/2021 0713    Acceptance, E, VU by SM at 6/2/2021 0030    Acceptance, E, VU by EB  at 6/1/2021 0739    Acceptance, E, VU by SM at 5/31/2021 2242    Acceptance, E, VU by  at 5/31/2021 0924    Acceptance, E, VU by SM at 5/31/2021 0035    Acceptance, E,TB, VU by MM at 5/28/2021 1802    Acceptance, E,TB, VU by MM at 5/27/2021 1143    Acceptance, E, VU by SM at 5/26/2021 2344    Acceptance, E,TB, VU by MM at 5/26/2021 0929    Acceptance, E,TB,D, VU,NR by AG at 5/24/2021 1249                   Point: Precautions (Done)     Learning Progress Summary           Patient Acceptance, E, VU by EB at 6/2/2021 0713    Acceptance, E, VU by SM at 6/2/2021 0030    Acceptance, E, VU by EB at 6/1/2021 0739    Acceptance, E, VU by  at 5/31/2021 2242    Acceptance, E, VU by  at 5/31/2021 0924    Acceptance, E, VU by SM at 5/31/2021 0035    Acceptance, E,TB, VU by MM at 5/28/2021 1802    Acceptance, E,TB, VU by MM at 5/27/2021 1143    Acceptance, E, VU by SM at 5/26/2021 2344    Acceptance, E,TB, VU by MM at 5/26/2021 0929    Acceptance, E,TB,D, VU,NR by AG at 5/24/2021 1249                               User Key     Initials Effective Dates Name Provider Type Discipline     06/16/16 -  Keily Godoy, RN Registered Nurse Nurse     04/03/18 -  Elvia Rousseau, PT Physical Therapist PT     07/19/18 -  Cruz Umanzor, RN Registered Nurse Nurse     07/08/19 -  Esha Lee, RN Registered Nurse Nurse     09/12/18 -  Chris Das, RN Registered Nurse Nurse              PT Recommendation and Plan     Plan of Care Reviewed With: patient  Progress: improving       Time Calculation:   PT Charges     Row Name 06/02/21 1403             Time Calculation    PT Received On  06/02/21  -BC         Time Calculation- PT    Total Timed Code Minutes- PT  45 minute(s)  -BC         Timed Charges    94150 - PT Therapeutic Exercise Minutes  30  -BC      64779 - Gait Training Minutes   15  -BC         Total Minutes    Timed Charges Total Minutes  45  -BC       Total Minutes  45  -BC        User Key  (r) = Recorded  By, (t) = Taken By, (c) = Cosigned By    Initials Name Provider Type    BC Keely Agosto, PT Physical Therapist        Therapy Charges for Today     Code Description Service Date Service Provider Modifiers Qty    22347979601 HC PT THER PROC EA 15 MIN 6/1/2021 Keely Agosto, PT GP 2    82961096289 HC PT THER PROC EA 15 MIN 6/2/2021 Keely Agosto, PT GP 2    19839461514 HC GAIT TRAINING EA 15 MIN 6/2/2021 Keely Agosto, PT GP 1               Keely Agosto PT  6/2/2021      Electronically signed by Keely Agosto PT at 06/02/21 1149

## 2021-06-04 NOTE — PROGRESS NOTES
[facility]     Patient Identification:  Name:  Иван Miller  Age:  65 y.o.  Sex:  male  :  1955  MRN:  [id number]  Visit Number:  [id number]  Primary Care Provider:  Rosi Thomas APRN    Length of stay:  13    Chief complaint: Back pain    Subjective:    Patient was asleep when I entered the room but was easily awakened.  Patient did mumble about has back still hurting and then did go back to sleep.  Per nursing staff, no new events overnight.  Patient has no new complaints today.  ----------------------------------------------------------------------------------------------------------------------  Current Hospital Meds:  apixaban, 5 mg, Oral, Q12H  atorvastatin, 20 mg, Oral, Daily  budesonide-formoterol, 2 puff, Inhalation, BID - RT  Canagliflozin, 300 mg, Oral, Daily  clopidogrel, 75 mg, Oral, Daily  docusate sodium, 100 mg, Oral, BID  fluticasone, 2 spray, Nasal, Daily  gabapentin, 600 mg, Oral, Q8H  insulin aspart, 0-9 Units, Subcutaneous, TID AC  insulin aspart, 30 Units, Subcutaneous, TID With Meals  insulin detemir, 40 Units, Subcutaneous, Daily  ipratropium-albuterol, 3 mL, Nebulization, Q6H - RT  lidocaine, 2 patch, Transdermal, Q24H  lisinopril, 20 mg, Oral, Q24H  metoprolol succinate XL, 75 mg, Oral, Q24H  pantoprazole, 40 mg, Oral, Q AM  polyethylene glycol, 17 g, Oral, Daily  sodium chloride, 10 mL, Intravenous, Q12H  tamsulosin, 0.4 mg, Oral, Nightly         ----------------------------------------------------------------------------------------------------------------------  Vital Signs:  Temp:  [97.8 °F (36.6 °C)-98 °F (36.7 °C)] 98 °F (36.7 °C)  Heart Rate:  [57-91] 72  Resp:  [18-24] 18  BP: (121-165)/(72-98) 165/87      21  0504 21  0445 21  0400   Weight: (!) 148 kg (326 lb 9.6 oz) (!) 149 kg (327 lb 9.6 oz) (!) 149 kg (328 lb 4.8 oz)     Body mass index is 48.48 kg/m².    Intake/Output Summary (Last 24 hours) at 2021  1559  Last data filed at 6/4/2021 1517  Gross per 24 hour   Intake 2160 ml   Output 4300 ml   Net -2140 ml     Diet Regular; Cardiac, Consistent Carbohydrate  ----------------------------------------------------------------------------------------------------------------------  Physical exam:  Constitutional: Morbidly obese  male in no apparent distress.     HENT:  Head:  Normocephalic and atraumatic.  Mouth:  Moist mucous membranes.    Eyes:  Conjunctivae and EOM are normal.  Pupils are equal, round, and reactive to light.  No scleral icterus.    Neck:  Neck supple. No thyromegaly.  No JVD present.    Cardiovascular:  Regular rate and rhythm with no murmurs, rubs, clicks or gallops appreciated.  Pulmonary/Chest:  Clear to auscultation bilaterally with no crackles, wheezes or rhonchi appreciated.  Abdominal:  Soft. Nontender. Nondistended  Bowel sounds are normal in all four quadrants. No organomegally appreciated.   Neurological:  Cranial nerves II-XII intact with no focal deficits.  No facial droop.  No slurred speech.   Skin:  Warm and dry to palpation with no rashes or lesions appreciated.  Peripheral vascular:  2+ radial and pedal pulses in bilateral upper and lower extremities.  Psychiatric:  Alert and oriented x3, demonstrates appropriate judgement and insight.      Essentially no change in physical exam in comparison to 6/3/2021  ----------------------------------------------------------------------------------------------------------------------  Tele:    ----------------------------------------------------------------------------------------------------------------------      Results from last 7 days   Lab Units 06/04/21  0029 06/03/21  0251 06/02/21  0111 06/02/21  0108 05/31/21  0117   CRP mg/dL 0.74* 0.78*  --  0.98* 0.87*   WBC 10*3/mm3  --  7.23 7.59  --  8.49   HEMOGLOBIN g/dL  --  10.8* 11.2*  --  12.3*   HEMATOCRIT %  --  37.1* 38.7  --  43.0   MCV fL  --  85.1 86.0  --  84.6   MCHC g/dL   --  29.1* 28.9*  --  28.6*   PLATELETS 10*3/mm3  --  206 194  --  234         Results from last 7 days   Lab Units 06/03/21  0251 06/02/21  0111 06/02/21  0108 05/31/21  0117 05/30/21  0433 05/29/21  0306   SODIUM mmol/L 140 136  --  137 138 139   POTASSIUM mmol/L 4.4 4.6 4.7 4.5 4.0 4.2   MAGNESIUM mg/dL  --   --  2.0  --  2.4  --    CHLORIDE mmol/L 102 99  --  96* 97* 99   CO2 mmol/L 28.7 26.7  --  35.4* 33.3* 33.6*   BUN mg/dL 21 21  --  20 19 19   CREATININE mg/dL 0.96 1.12  --  1.24 0.95 0.87   EGFR IF NONAFRICN AM mL/min/1.73 78 66  --  58* 79 88   CALCIUM mg/dL 9.1 9.3  --  9.5 9.3 9.2   GLUCOSE mg/dL 228* 274*  --  164* 156* 120*   ALBUMIN g/dL  --   --   --   --   --  3.35*   BILIRUBIN mg/dL  --   --   --   --   --  0.4   ALK PHOS U/L  --   --   --   --   --  54   AST (SGOT) U/L  --   --   --   --   --  15   ALT (SGPT) U/L  --   --   --   --   --  20   Estimated Creatinine Clearance: 109.2 mL/min (by C-G formula based on SCr of 0.96 mg/dL).    No results found for: AMMONIA      No results found for: BLOODCX  No results found for: URINECX  No results found for: WOUNDCX  No results found for: STOOLCX    I have personally looked at the labs and they are summarized above.  ----------------------------------------------------------------------------------------------------------------------  Imaging Results (Last 24 Hours)     ** No results found for the last 24 hours. **        ----------------------------------------------------------------------------------------------------------------------  Assessment and Plan:    1.  Acute on chronic hypoxic/hypercapnic respiratory failure - acute phase resolved, continue 2 L nasal cannula which is patient's baseline.    2.  COPD exacerbation -resolved    3.  Generalized weakness -patient was denied inpatient rehab.  Currently awaiting skilled nursing facility placement.    4.  Paroxysmal A. Fib - currently in NSR, continue Eliquis    5.  Essential hypertension  -patient  still with uncontrolled blood pressure, will increase lisinopril to 40 mg p.o. daily.    6.  Insulin-dependent type 2 diabetes mellitus -continue sliding scale insulin with Accu-Cheks before every meal and nightly    Disposition currently pending SNF placement        Joo Jimenez,   06/04/21  15:59 EDT

## 2021-06-05 LAB
A-A DO2: 46.5 MMHG (ref 0–300)
A-A DO2: 46.6 MMHG (ref 0–300)
ANION GAP SERPL CALCULATED.3IONS-SCNC: 7.8 MMOL/L (ref 5–15)
ARTERIAL PATENCY WRIST A: ABNORMAL
ARTERIAL PATENCY WRIST A: POSITIVE
ATMOSPHERIC PRESS: 728 MMHG
ATMOSPHERIC PRESS: 729 MMHG
BASE EXCESS BLDA CALC-SCNC: 7.8 MMOL/L (ref 0–2)
BASE EXCESS BLDA CALC-SCNC: 8.6 MMOL/L (ref 0–2)
BDY SITE: ABNORMAL
BDY SITE: ABNORMAL
BODY TEMPERATURE: 0 C
BODY TEMPERATURE: 0 C
BUN SERPL-MCNC: 16 MG/DL (ref 8–23)
BUN/CREAT SERPL: 18 (ref 7–25)
CALCIUM SPEC-SCNC: 9.2 MG/DL (ref 8.6–10.5)
CHLORIDE SERPL-SCNC: 102 MMOL/L (ref 98–107)
CO2 BLDA-SCNC: 38 MMOL/L (ref 22–33)
CO2 BLDA-SCNC: 38.4 MMOL/L (ref 22–33)
CO2 SERPL-SCNC: 30.2 MMOL/L (ref 22–29)
COHGB MFR BLD: 1.3 % (ref 0–5)
COHGB MFR BLD: 1.5 % (ref 0–5)
CREAT SERPL-MCNC: 0.89 MG/DL (ref 0.76–1.27)
CRP SERPL-MCNC: 0.47 MG/DL (ref 0–0.5)
DEPRECATED RDW RBC AUTO: 51.3 FL (ref 37–54)
EPAP: 8
EPAP: 8
ERYTHROCYTE [DISTWIDTH] IN BLOOD BY AUTOMATED COUNT: 16.6 % (ref 12.3–15.4)
GFR SERPL CREATININE-BSD FRML MDRD: 86 ML/MIN/1.73
GLUCOSE BLDC GLUCOMTR-MCNC: 125 MG/DL (ref 70–130)
GLUCOSE BLDC GLUCOMTR-MCNC: 171 MG/DL (ref 70–130)
GLUCOSE BLDC GLUCOMTR-MCNC: 246 MG/DL (ref 70–130)
GLUCOSE BLDC GLUCOMTR-MCNC: 325 MG/DL (ref 70–130)
GLUCOSE SERPL-MCNC: 161 MG/DL (ref 65–99)
HCO3 BLDA-SCNC: 36 MMOL/L (ref 20–26)
HCO3 BLDA-SCNC: 36.3 MMOL/L (ref 20–26)
HCT VFR BLD AUTO: 36.6 % (ref 37.5–51)
HCT VFR BLD CALC: 33.3 % (ref 38–51)
HCT VFR BLD CALC: 41.7 % (ref 38–51)
HGB BLD-MCNC: 10.4 G/DL (ref 13–17.7)
HGB BLDA-MCNC: 10.9 G/DL (ref 14–18)
HGB BLDA-MCNC: 13.6 G/DL (ref 14–18)
INHALED O2 CONCENTRATION: 28 %
INHALED O2 CONCENTRATION: 28 %
IPAP: 20
IPAP: 20
Lab: ABNORMAL
MCH RBC QN AUTO: 24.3 PG (ref 26.6–33)
MCHC RBC AUTO-ENTMCNC: 28.4 G/DL (ref 31.5–35.7)
MCV RBC AUTO: 85.5 FL (ref 79–97)
METHGB BLD QL: <1 % (ref 0–3)
METHGB BLD QL: <1 % (ref 0–3)
MODALITY: ABNORMAL
MODALITY: ABNORMAL
NOTE: ABNORMAL
NOTE: ABNORMAL
NOTIFIED BY: ABNORMAL
NOTIFIED BY: ABNORMAL
NOTIFIED WHO: ABNORMAL
NOTIFIED WHO: ABNORMAL
OXYHGB MFR BLDV: 92.7 % (ref 94–99)
OXYHGB MFR BLDV: 93.5 % (ref 94–99)
PCO2 BLDA: 66.3 MM HG (ref 35–45)
PCO2 BLDA: 67.4 MM HG (ref 35–45)
PCO2 TEMP ADJ BLD: ABNORMAL MM[HG]
PCO2 TEMP ADJ BLD: ABNORMAL MM[HG]
PH BLDA: 7.34 PH UNITS (ref 7.35–7.45)
PH BLDA: 7.34 PH UNITS (ref 7.35–7.45)
PH, TEMP CORRECTED: ABNORMAL
PH, TEMP CORRECTED: ABNORMAL
PLATELET # BLD AUTO: 207 10*3/MM3 (ref 140–450)
PMV BLD AUTO: 9.5 FL (ref 6–12)
PO2 BLDA: 69 MM HG (ref 83–108)
PO2 BLDA: 70.4 MM HG (ref 83–108)
PO2 TEMP ADJ BLD: ABNORMAL MM[HG]
PO2 TEMP ADJ BLD: ABNORMAL MM[HG]
POTASSIUM SERPL-SCNC: 4.6 MMOL/L (ref 3.5–5.2)
RBC # BLD AUTO: 4.28 10*6/MM3 (ref 4.14–5.8)
SAO2 % BLDCOA: 93.5 % (ref 94–99)
SAO2 % BLDCOA: 94 % (ref 94–99)
SET MECH RESP RATE: 22
SODIUM SERPL-SCNC: 140 MMOL/L (ref 136–145)
VENTILATOR MODE: ABNORMAL
VENTILATOR MODE: ABNORMAL
WBC # BLD AUTO: 7.43 10*3/MM3 (ref 3.4–10.8)

## 2021-06-05 PROCEDURE — 80048 BASIC METABOLIC PNL TOTAL CA: CPT | Performed by: INTERNAL MEDICINE

## 2021-06-05 PROCEDURE — 82375 ASSAY CARBOXYHB QUANT: CPT

## 2021-06-05 PROCEDURE — 63710000001 INSULIN DETEMIR PER 5 UNITS: Performed by: INTERNAL MEDICINE

## 2021-06-05 PROCEDURE — 36600 WITHDRAWAL OF ARTERIAL BLOOD: CPT

## 2021-06-05 PROCEDURE — 82805 BLOOD GASES W/O2 SATURATION: CPT

## 2021-06-05 PROCEDURE — 83050 HGB METHEMOGLOBIN QUAN: CPT

## 2021-06-05 PROCEDURE — 82962 GLUCOSE BLOOD TEST: CPT

## 2021-06-05 PROCEDURE — 63710000001 INSULIN ASPART PER 5 UNITS: Performed by: INTERNAL MEDICINE

## 2021-06-05 PROCEDURE — 86140 C-REACTIVE PROTEIN: CPT | Performed by: PHYSICIAN ASSISTANT

## 2021-06-05 PROCEDURE — 94799 UNLISTED PULMONARY SVC/PX: CPT

## 2021-06-05 PROCEDURE — 94660 CPAP INITIATION&MGMT: CPT

## 2021-06-05 PROCEDURE — 63710000001 INSULIN ASPART PER 5 UNITS: Performed by: PHYSICIAN ASSISTANT

## 2021-06-05 PROCEDURE — 99232 SBSQ HOSP IP/OBS MODERATE 35: CPT | Performed by: INTERNAL MEDICINE

## 2021-06-05 PROCEDURE — 85027 COMPLETE CBC AUTOMATED: CPT | Performed by: INTERNAL MEDICINE

## 2021-06-05 RX ADMIN — IPRATROPIUM BROMIDE AND ALBUTEROL SULFATE 3 ML: .5; 3 SOLUTION RESPIRATORY (INHALATION) at 00:20

## 2021-06-05 RX ADMIN — SODIUM CHLORIDE, PRESERVATIVE FREE 10 ML: 5 INJECTION INTRAVENOUS at 20:13

## 2021-06-05 RX ADMIN — BUDESONIDE AND FORMOTEROL FUMARATE DIHYDRATE 2 PUFF: 160; 4.5 AEROSOL RESPIRATORY (INHALATION) at 06:15

## 2021-06-05 RX ADMIN — GABAPENTIN 600 MG: 300 CAPSULE ORAL at 14:09

## 2021-06-05 RX ADMIN — LIDOCAINE 2 PATCH: 50 PATCH CUTANEOUS at 09:49

## 2021-06-05 RX ADMIN — FLUTICASONE PROPIONATE 2 SPRAY: 50 SPRAY, METERED NASAL at 09:49

## 2021-06-05 RX ADMIN — IPRATROPIUM BROMIDE AND ALBUTEROL SULFATE 3 ML: .5; 3 SOLUTION RESPIRATORY (INHALATION) at 06:16

## 2021-06-05 RX ADMIN — INSULIN ASPART 30 UNITS: 100 INJECTION, SOLUTION INTRAVENOUS; SUBCUTANEOUS at 09:48

## 2021-06-05 RX ADMIN — CANAGLIFLOZIN 300 MG: 300 TABLET, FILM COATED ORAL at 09:48

## 2021-06-05 RX ADMIN — CLOPIDOGREL 75 MG: 75 TABLET, FILM COATED ORAL at 09:48

## 2021-06-05 RX ADMIN — INSULIN ASPART 30 UNITS: 100 INJECTION, SOLUTION INTRAVENOUS; SUBCUTANEOUS at 17:47

## 2021-06-05 RX ADMIN — TAMSULOSIN HYDROCHLORIDE 0.4 MG: 0.4 CAPSULE ORAL at 20:12

## 2021-06-05 RX ADMIN — LISINOPRIL 40 MG: 10 TABLET ORAL at 09:47

## 2021-06-05 RX ADMIN — PANTOPRAZOLE SODIUM 40 MG: 40 TABLET, DELAYED RELEASE ORAL at 05:09

## 2021-06-05 RX ADMIN — IPRATROPIUM BROMIDE AND ALBUTEROL SULFATE 3 ML: .5; 3 SOLUTION RESPIRATORY (INHALATION) at 18:21

## 2021-06-05 RX ADMIN — IPRATROPIUM BROMIDE AND ALBUTEROL SULFATE 3 ML: .5; 3 SOLUTION RESPIRATORY (INHALATION) at 13:35

## 2021-06-05 RX ADMIN — INSULIN ASPART 4 UNITS: 100 INJECTION, SOLUTION INTRAVENOUS; SUBCUTANEOUS at 09:49

## 2021-06-05 RX ADMIN — HYDROCODONE BITARTRATE AND ACETAMINOPHEN 1 TABLET: 5; 325 TABLET ORAL at 01:06

## 2021-06-05 RX ADMIN — POLYETHYLENE GLYCOL (3350) 17 G: 17 POWDER, FOR SOLUTION ORAL at 09:48

## 2021-06-05 RX ADMIN — ATORVASTATIN CALCIUM 20 MG: 20 TABLET, FILM COATED ORAL at 09:49

## 2021-06-05 RX ADMIN — GABAPENTIN 600 MG: 300 CAPSULE ORAL at 20:12

## 2021-06-05 RX ADMIN — GABAPENTIN 600 MG: 300 CAPSULE ORAL at 05:09

## 2021-06-05 RX ADMIN — SODIUM CHLORIDE, PRESERVATIVE FREE 10 ML: 5 INJECTION INTRAVENOUS at 09:49

## 2021-06-05 RX ADMIN — HYDROXYZINE HYDROCHLORIDE 25 MG: 25 TABLET, FILM COATED ORAL at 14:09

## 2021-06-05 RX ADMIN — HYDROCODONE BITARTRATE AND ACETAMINOPHEN 1 TABLET: 5; 325 TABLET ORAL at 09:47

## 2021-06-05 RX ADMIN — INSULIN ASPART 30 UNITS: 100 INJECTION, SOLUTION INTRAVENOUS; SUBCUTANEOUS at 12:11

## 2021-06-05 RX ADMIN — INSULIN DETEMIR 40 UNITS: 100 INJECTION, SOLUTION SUBCUTANEOUS at 09:48

## 2021-06-05 RX ADMIN — HYDROXYZINE HYDROCHLORIDE 25 MG: 25 TABLET, FILM COATED ORAL at 23:30

## 2021-06-05 RX ADMIN — Medication 5 MG: at 01:06

## 2021-06-05 RX ADMIN — DOCUSATE SODIUM 100 MG: 100 CAPSULE, LIQUID FILLED ORAL at 20:12

## 2021-06-05 RX ADMIN — APIXABAN 5 MG: 5 TABLET, FILM COATED ORAL at 20:12

## 2021-06-05 RX ADMIN — HYDROCODONE BITARTRATE AND ACETAMINOPHEN 1 TABLET: 5; 325 TABLET ORAL at 17:50

## 2021-06-05 RX ADMIN — ACETAMINOPHEN 650 MG: 325 TABLET ORAL at 20:11

## 2021-06-05 RX ADMIN — DOCUSATE SODIUM 100 MG: 100 CAPSULE, LIQUID FILLED ORAL at 09:49

## 2021-06-05 RX ADMIN — BUDESONIDE AND FORMOTEROL FUMARATE DIHYDRATE 2 PUFF: 160; 4.5 AEROSOL RESPIRATORY (INHALATION) at 18:21

## 2021-06-05 RX ADMIN — METOPROLOL SUCCINATE 75 MG: 25 TABLET, EXTENDED RELEASE ORAL at 09:48

## 2021-06-05 RX ADMIN — INSULIN ASPART 7 UNITS: 100 INJECTION, SOLUTION INTRAVENOUS; SUBCUTANEOUS at 12:11

## 2021-06-05 RX ADMIN — APIXABAN 5 MG: 5 TABLET, FILM COATED ORAL at 09:49

## 2021-06-05 NOTE — PROGRESS NOTES
[facility]     Patient Identification:  Name:  Иван Miller  Age:  65 y.o.  Sex:  male  :  1955  MRN:  [id number]  Visit Number:  [id number]  Primary Care Provider:  Rosi Thomas APRN    Length of stay:  14    Chief complaint: Back pain    Subjective:    Patient reports he is doing well today except for his chronic neck and upper back pain.  He denies any fevers or chills.  Denies any other new events overnight.  ----------------------------------------------------------------------------------------------------------------------  Current Hospital Meds:  apixaban, 5 mg, Oral, Q12H  atorvastatin, 20 mg, Oral, Daily  budesonide-formoterol, 2 puff, Inhalation, BID - RT  Canagliflozin, 300 mg, Oral, Daily  clopidogrel, 75 mg, Oral, Daily  docusate sodium, 100 mg, Oral, BID  fluticasone, 2 spray, Nasal, Daily  gabapentin, 600 mg, Oral, Q8H  insulin aspart, 0-9 Units, Subcutaneous, TID AC  insulin aspart, 30 Units, Subcutaneous, TID With Meals  insulin detemir, 40 Units, Subcutaneous, Daily  ipratropium-albuterol, 3 mL, Nebulization, Q6H - RT  lidocaine, 2 patch, Transdermal, Q24H  lisinopril, 40 mg, Oral, Q24H  metoprolol succinate XL, 75 mg, Oral, Q24H  pantoprazole, 40 mg, Oral, Q AM  polyethylene glycol, 17 g, Oral, Daily  sodium chloride, 10 mL, Intravenous, Q12H  tamsulosin, 0.4 mg, Oral, Nightly         ----------------------------------------------------------------------------------------------------------------------  Vital Signs:  Temp:  [98.4 °F (36.9 °C)-98.7 °F (37.1 °C)] 98.5 °F (36.9 °C)  Heart Rate:  [69-92] 70  Resp:  [18-24] 20  BP: (117-180)/(71-98) 180/98      21  0445 21  0400 21  0445   Weight: (!) 149 kg (327 lb 9.6 oz) (!) 149 kg (328 lb 4.8 oz) (!) 149 kg (328 lb 12.8 oz)     Body mass index is 48.56 kg/m².    Intake/Output Summary (Last 24 hours) at 2021 1228  Last data filed at 2021 0936  Gross per 24 hour   Intake 840  ml   Output 2550 ml   Net -1710 ml     Diet Regular; Cardiac, Consistent Carbohydrate  ----------------------------------------------------------------------------------------------------------------------  Physical exam:  Constitutional: Morbidly obese  male in no apparent distress.     HENT:  Head:  Normocephalic and atraumatic.  Mouth:  Moist mucous membranes.    Eyes:  Conjunctivae and EOM are normal.  Pupils are equal, round, and reactive to light.  No scleral icterus.    Neck:  Neck supple. No thyromegaly.  No JVD present.    Cardiovascular:  Regular rate and rhythm with no murmurs, rubs, clicks or gallops appreciated.  Pulmonary/Chest:  Clear to auscultation bilaterally with no crackles, wheezes or rhonchi appreciated.  Abdominal:  Soft. Nontender. Nondistended  Bowel sounds are normal in all four quadrants. No organomegally appreciated.   Neurological:  Cranial nerves II-XII intact with no focal deficits.  No facial droop.  No slurred speech.   Skin:  Warm and dry to palpation with no rashes or lesions appreciated.  Peripheral vascular:  2+ radial and pedal pulses in bilateral upper and lower extremities.  Psychiatric:  Alert and oriented x3, demonstrates appropriate judgement and insight.      Essentially no change in physical exam in comparison to 6/4/2021  ----------------------------------------------------------------------------------------------------------------------  Tele:    ----------------------------------------------------------------------------------------------------------------------      Results from last 7 days   Lab Units 06/05/21  0037 06/04/21  0029 06/03/21  0251 06/02/21  0111   CRP mg/dL 0.47 0.74* 0.78*  --    WBC 10*3/mm3 7.43  --  7.23 7.59   HEMOGLOBIN g/dL 10.4*  --  10.8* 11.2*   HEMATOCRIT % 36.6*  --  37.1* 38.7   MCV fL 85.5  --  85.1 86.0   MCHC g/dL 28.4*  --  29.1* 28.9*   PLATELETS 10*3/mm3 207  --  206 194     Results from last 7 days   Lab Units  06/05/21  0659   PH, ARTERIAL pH units 7.343*   PO2 ART mm Hg 70.4*   PCO2, ARTERIAL mm Hg 66.3*   HCO3 ART mmol/L 36.0*     Results from last 7 days   Lab Units 06/05/21  0037 06/03/21  0251 06/02/21  0111 06/02/21  0108 05/31/21  0117 05/30/21  0433   SODIUM mmol/L 140 140 136  --   --  138   POTASSIUM mmol/L 4.6 4.4 4.6 4.7   < > 4.0   MAGNESIUM mg/dL  --   --   --  2.0  --  2.4   CHLORIDE mmol/L 102 102 99  --   --  97*   CO2 mmol/L 30.2* 28.7 26.7  --   --  33.3*   BUN mg/dL 16 21 21  --   --  19   CREATININE mg/dL 0.89 0.96 1.12  --   --  0.95   EGFR IF NONAFRICN AM mL/min/1.73 86 78 66  --   --  79   CALCIUM mg/dL 9.2 9.1 9.3  --   --  9.3   GLUCOSE mg/dL 161* 228* 274*  --   --  156*    < > = values in this interval not displayed.   Estimated Creatinine Clearance: 117.8 mL/min (by C-G formula based on SCr of 0.89 mg/dL).    No results found for: AMMONIA      No results found for: BLOODCX  No results found for: URINECX  No results found for: WOUNDCX  No results found for: STOOLCX    I have personally looked at the labs and they are summarized above.  ----------------------------------------------------------------------------------------------------------------------  Imaging Results (Last 24 Hours)     ** No results found for the last 24 hours. **        ----------------------------------------------------------------------------------------------------------------------  Assessment and Plan:    1.  Acute on chronic hypoxic/hypercapnic respiratory failure - acute phase resolved, continue 2-3 L nasal cannula which is patient's baseline.    2.  COPD exacerbation -resolved    3.  Generalized weakness -patient was denied inpatient rehab.  Currently awaiting skilled nursing facility placement.    4.  Paroxysmal A. Fib - currently in NSR, continue Eliquis    5.  Essential hypertension  -overall, patient's blood pressure is better controlled today with increase of lisinopril.  We will continue to monitor for  another 24 hours and make medication adjustments as necessary.    6.  Insulin-dependent type 2 diabetes mellitus -continue sliding scale insulin with Accu-Cheks before every meal and nightly    Disposition currently pending SNF placement        Joo Jimenez DO  06/05/21  12:28 EDT

## 2021-06-05 NOTE — PLAN OF CARE
Goal Outcome Evaluation:  Plan of Care Reviewed With: patient  Progress: no change  Outcome Summary: Pt currently down for MRI. Pt has complained frequently of left shoulder and neck pain today, along with generalized weakness. Physical therapy got pt up in chair; took max of 3 assist to get pt back to bed. V/S stable with no signs of respiratory distress present. Will continue to monitor pt and follow plan of care.

## 2021-06-05 NOTE — PLAN OF CARE
Goal Outcome Evaluation:  Plan of Care Reviewed With: patient  Progress: improving   Patient resting in bed with no acute distress noted at this time. Patient has had complaints of neck and back pain this shift with PRN medication given and tolerable pain level noted. Patient stated he still has SOA with exertion and has been on 3L NC this shift. Will continue to monitor and follow plan of care with interventions implemented as needed.

## 2021-06-06 ENCOUNTER — APPOINTMENT (OUTPATIENT)
Dept: GENERAL RADIOLOGY | Facility: HOSPITAL | Age: 66
End: 2021-06-06

## 2021-06-06 ENCOUNTER — APPOINTMENT (OUTPATIENT)
Dept: CARDIOLOGY | Facility: HOSPITAL | Age: 66
End: 2021-06-06

## 2021-06-06 LAB
A-A DO2: 109.9 MMHG (ref 0–300)
A-A DO2: 530.9 MMHG (ref 0–300)
ALBUMIN SERPL-MCNC: 3.62 G/DL (ref 3.5–5.2)
ALBUMIN/GLOB SERPL: 1 G/DL
ALP SERPL-CCNC: 110 U/L (ref 39–117)
ALT SERPL W P-5'-P-CCNC: 17 U/L (ref 1–41)
ANION GAP SERPL CALCULATED.3IONS-SCNC: 11.7 MMOL/L (ref 5–15)
ARTERIAL PATENCY WRIST A: POSITIVE
ARTERIAL PATENCY WRIST A: POSITIVE
AST SERPL-CCNC: 19 U/L (ref 1–40)
ATMOSPHERIC PRESS: 729 MMHG
ATMOSPHERIC PRESS: 729 MMHG
B PARAPERT DNA SPEC QL NAA+PROBE: NOT DETECTED
B PERT DNA SPEC QL NAA+PROBE: NOT DETECTED
BASE EXCESS BLDA CALC-SCNC: 3.1 MMOL/L (ref 0–2)
BASE EXCESS BLDA CALC-SCNC: 7.1 MMOL/L (ref 0–2)
BASOPHILS # BLD AUTO: 0.06 10*3/MM3 (ref 0–0.2)
BASOPHILS NFR BLD AUTO: 0.6 % (ref 0–1.5)
BDY SITE: ABNORMAL
BDY SITE: ABNORMAL
BH CV ECHO MEAS - ACS: 1.5 CM
BH CV ECHO MEAS - AO MAX PG: 7.6 MMHG
BH CV ECHO MEAS - AO MEAN PG: 5 MMHG
BH CV ECHO MEAS - AO ROOT AREA (BSA CORRECTED): 1.5
BH CV ECHO MEAS - AO ROOT AREA: 11 CM^2
BH CV ECHO MEAS - AO ROOT DIAM: 3.8 CM
BH CV ECHO MEAS - AO V2 MAX: 138 CM/SEC
BH CV ECHO MEAS - AO V2 MEAN: 101 CM/SEC
BH CV ECHO MEAS - AO V2 VTI: 21 CM
BH CV ECHO MEAS - BSA(HAYCOCK): 2.8 M^2
BH CV ECHO MEAS - BSA: 2.5 M^2
BH CV ECHO MEAS - BZI_BMI: 48.1 KILOGRAMS/M^2
BH CV ECHO MEAS - BZI_METRIC_HEIGHT: 175.3 CM
BH CV ECHO MEAS - BZI_METRIC_WEIGHT: 147.9 KG
BH CV ECHO MEAS - EDV(CUBED): 185.2 ML
BH CV ECHO MEAS - EDV(MOD-SP4): 53.9 ML
BH CV ECHO MEAS - EDV(TEICH): 160 ML
BH CV ECHO MEAS - EF(CUBED): 54 %
BH CV ECHO MEAS - EF(MOD-SP4): 47.5 %
BH CV ECHO MEAS - EF(TEICH): 45.2 %
BH CV ECHO MEAS - ESV(CUBED): 85.2 ML
BH CV ECHO MEAS - ESV(MOD-SP4): 28.3 ML
BH CV ECHO MEAS - ESV(TEICH): 87.7 ML
BH CV ECHO MEAS - FS: 22.8 %
BH CV ECHO MEAS - IVS/LVPW: 1.1
BH CV ECHO MEAS - IVSD: 1.4 CM
BH CV ECHO MEAS - LA DIMENSION: 4 CM
BH CV ECHO MEAS - LA/AO: 1.1
BH CV ECHO MEAS - LV DIASTOLIC VOL/BSA (35-75): 21.2 ML/M^2
BH CV ECHO MEAS - LV MASS(C)D: 339.6 GRAMS
BH CV ECHO MEAS - LV MASS(C)DI: 133.6 GRAMS/M^2
BH CV ECHO MEAS - LV SYSTOLIC VOL/BSA (12-30): 11.1 ML/M^2
BH CV ECHO MEAS - LVIDD: 5.7 CM
BH CV ECHO MEAS - LVIDS: 4.4 CM
BH CV ECHO MEAS - LVLD AP4: 8.5 CM
BH CV ECHO MEAS - LVLS AP4: 8.3 CM
BH CV ECHO MEAS - LVOT AREA (M): 3.5 CM^2
BH CV ECHO MEAS - LVOT AREA: 3.5 CM^2
BH CV ECHO MEAS - LVOT DIAM: 2.1 CM
BH CV ECHO MEAS - LVPWD: 1.3 CM
BH CV ECHO MEAS - MV A MAX VEL: 44.1 CM/SEC
BH CV ECHO MEAS - MV E MAX VEL: 68.1 CM/SEC
BH CV ECHO MEAS - MV E/A: 1.5
BH CV ECHO MEAS - PA ACC TIME: 0.1 SEC
BH CV ECHO MEAS - PA PR(ACCEL): 36.3 MMHG
BH CV ECHO MEAS - SI(AO): 91.2 ML/M^2
BH CV ECHO MEAS - SI(CUBED): 39.3 ML/M^2
BH CV ECHO MEAS - SI(MOD-SP4): 10.1 ML/M^2
BH CV ECHO MEAS - SI(TEICH): 28.5 ML/M^2
BH CV ECHO MEAS - SV(AO): 231.9 ML
BH CV ECHO MEAS - SV(CUBED): 100 ML
BH CV ECHO MEAS - SV(MOD-SP4): 25.6 ML
BH CV ECHO MEAS - SV(TEICH): 72.4 ML
BILIRUB SERPL-MCNC: 0.4 MG/DL (ref 0–1.2)
BODY TEMPERATURE: 0 C
BODY TEMPERATURE: 0 C
BUN SERPL-MCNC: 19 MG/DL (ref 8–23)
BUN/CREAT SERPL: 20.9 (ref 7–25)
C PNEUM DNA NPH QL NAA+NON-PROBE: NOT DETECTED
CALCIUM SPEC-SCNC: 9.5 MG/DL (ref 8.6–10.5)
CHLORIDE SERPL-SCNC: 101 MMOL/L (ref 98–107)
CO2 BLDA-SCNC: 35.4 MMOL/L (ref 22–33)
CO2 BLDA-SCNC: 35.5 MMOL/L (ref 22–33)
CO2 SERPL-SCNC: 26.3 MMOL/L (ref 22–29)
COHGB MFR BLD: 1.1 % (ref 0–5)
COHGB MFR BLD: 1.4 % (ref 0–5)
CREAT SERPL-MCNC: 0.91 MG/DL (ref 0.76–1.27)
CRP SERPL-MCNC: 0.5 MG/DL (ref 0–0.5)
DEPRECATED RDW RBC AUTO: 52.4 FL (ref 37–54)
EOSINOPHIL # BLD AUTO: 0.25 10*3/MM3 (ref 0–0.4)
EOSINOPHIL NFR BLD AUTO: 2.3 % (ref 0.3–6.2)
EPAP: 8
EPAP: 8
ERYTHROCYTE [DISTWIDTH] IN BLOOD BY AUTOMATED COUNT: 17.2 % (ref 12.3–15.4)
FLUAV SUBTYP SPEC NAA+PROBE: NOT DETECTED
FLUBV RNA ISLT QL NAA+PROBE: NOT DETECTED
GFR SERPL CREATININE-BSD FRML MDRD: 83 ML/MIN/1.73
GLOBULIN UR ELPH-MCNC: 3.8 GM/DL
GLUCOSE BLDC GLUCOMTR-MCNC: 135 MG/DL (ref 70–130)
GLUCOSE BLDC GLUCOMTR-MCNC: 154 MG/DL (ref 70–130)
GLUCOSE BLDC GLUCOMTR-MCNC: 175 MG/DL (ref 70–130)
GLUCOSE BLDC GLUCOMTR-MCNC: 182 MG/DL (ref 70–130)
GLUCOSE BLDC GLUCOMTR-MCNC: 193 MG/DL (ref 70–130)
GLUCOSE BLDC GLUCOMTR-MCNC: 216 MG/DL (ref 70–130)
GLUCOSE SERPL-MCNC: 252 MG/DL (ref 65–99)
HADV DNA SPEC NAA+PROBE: NOT DETECTED
HCO3 BLDA-SCNC: 33.1 MMOL/L (ref 20–26)
HCO3 BLDA-SCNC: 33.7 MMOL/L (ref 20–26)
HCOV 229E RNA SPEC QL NAA+PROBE: NOT DETECTED
HCOV HKU1 RNA SPEC QL NAA+PROBE: NOT DETECTED
HCOV NL63 RNA SPEC QL NAA+PROBE: NOT DETECTED
HCOV OC43 RNA SPEC QL NAA+PROBE: NOT DETECTED
HCT VFR BLD AUTO: 42.5 % (ref 37.5–51)
HCT VFR BLD CALC: 38.6 % (ref 38–51)
HCT VFR BLD CALC: 42.1 % (ref 38–51)
HGB BLD-MCNC: 12.7 G/DL (ref 13–17.7)
HGB BLDA-MCNC: 12.6 G/DL (ref 14–18)
HGB BLDA-MCNC: 13.7 G/DL (ref 14–18)
HMPV RNA NPH QL NAA+NON-PROBE: NOT DETECTED
HPIV1 RNA SPEC QL NAA+PROBE: NOT DETECTED
HPIV2 RNA SPEC QL NAA+PROBE: NOT DETECTED
HPIV3 RNA NPH QL NAA+PROBE: NOT DETECTED
HPIV4 P GENE NPH QL NAA+PROBE: NOT DETECTED
IMM GRANULOCYTES # BLD AUTO: 0.09 10*3/MM3 (ref 0–0.05)
IMM GRANULOCYTES NFR BLD AUTO: 0.8 % (ref 0–0.5)
INHALED O2 CONCENTRATION: 100 %
INHALED O2 CONCENTRATION: 35 %
IPAP: 20
IPAP: 20
LYMPHOCYTES # BLD AUTO: 2.1 10*3/MM3 (ref 0.7–3.1)
LYMPHOCYTES NFR BLD AUTO: 19.4 % (ref 19.6–45.3)
Lab: ABNORMAL
M PNEUMO IGG SER IA-ACNC: NOT DETECTED
MAXIMAL PREDICTED HEART RATE: 154 BPM
MCH RBC QN AUTO: 24.9 PG (ref 26.6–33)
MCHC RBC AUTO-ENTMCNC: 29.9 G/DL (ref 31.5–35.7)
MCV RBC AUTO: 83.3 FL (ref 79–97)
METHGB BLD QL: <1 % (ref 0–3)
METHGB BLD QL: <1 % (ref 0–3)
MODALITY: ABNORMAL
MODALITY: ABNORMAL
MONOCYTES # BLD AUTO: 1.11 10*3/MM3 (ref 0.1–0.9)
MONOCYTES NFR BLD AUTO: 10.2 % (ref 5–12)
NEUTROPHILS NFR BLD AUTO: 66.7 % (ref 42.7–76)
NEUTROPHILS NFR BLD AUTO: 7.22 10*3/MM3 (ref 1.7–7)
NOTE: ABNORMAL
NOTE: ABNORMAL
NOTIFIED BY: ABNORMAL
NOTIFIED WHO: ABNORMAL
NRBC BLD AUTO-RTO: 0.3 /100 WBC (ref 0–0.2)
OXYHGB MFR BLDV: 90.7 % (ref 94–99)
OXYHGB MFR BLDV: 92.4 % (ref 94–99)
PCO2 BLDA: 56 MM HG (ref 35–45)
PCO2 BLDA: 77.5 MM HG (ref 35–45)
PCO2 TEMP ADJ BLD: ABNORMAL MM[HG]
PCO2 TEMP ADJ BLD: ABNORMAL MM[HG]
PH BLDA: 7.24 PH UNITS (ref 7.35–7.45)
PH BLDA: 7.39 PH UNITS (ref 7.35–7.45)
PH, TEMP CORRECTED: ABNORMAL
PH, TEMP CORRECTED: ABNORMAL
PLATELET # BLD AUTO: 277 10*3/MM3 (ref 140–450)
PMV BLD AUTO: 9.7 FL (ref 6–12)
PO2 BLDA: 66.7 MM HG (ref 83–108)
PO2 BLDA: 73.6 MM HG (ref 83–108)
PO2 TEMP ADJ BLD: ABNORMAL MM[HG]
PO2 TEMP ADJ BLD: ABNORMAL MM[HG]
POTASSIUM SERPL-SCNC: 4.4 MMOL/L (ref 3.5–5.2)
PROCALCITONIN SERPL-MCNC: 0.09 NG/ML (ref 0–0.25)
PROT SERPL-MCNC: 7.4 G/DL (ref 6–8.5)
QT INTERVAL: 318 MS
QTC INTERVAL: 442 MS
RBC # BLD AUTO: 5.1 10*6/MM3 (ref 4.14–5.8)
RHINOVIRUS RNA SPEC NAA+PROBE: DETECTED
RSV RNA NPH QL NAA+NON-PROBE: NOT DETECTED
SAO2 % BLDCOA: 91.4 % (ref 94–99)
SAO2 % BLDCOA: 93.7 % (ref 94–99)
SET MECH RESP RATE: 22
SET MECH RESP RATE: 22
SODIUM SERPL-SCNC: 139 MMOL/L (ref 136–145)
STRESS TARGET HR: 131 BPM
TROPONIN T SERPL-MCNC: 0.02 NG/ML (ref 0–0.03)
VENTILATOR MODE: ABNORMAL
VENTILATOR MODE: ABNORMAL
WBC # BLD AUTO: 10.83 10*3/MM3 (ref 3.4–10.8)

## 2021-06-06 PROCEDURE — 94799 UNLISTED PULMONARY SVC/PX: CPT

## 2021-06-06 PROCEDURE — 99233 SBSQ HOSP IP/OBS HIGH 50: CPT | Performed by: INTERNAL MEDICINE

## 2021-06-06 PROCEDURE — 93306 TTE W/DOPPLER COMPLETE: CPT | Performed by: INTERNAL MEDICINE

## 2021-06-06 PROCEDURE — 93010 ELECTROCARDIOGRAM REPORT: CPT | Performed by: INTERNAL MEDICINE

## 2021-06-06 PROCEDURE — 36600 WITHDRAWAL OF ARTERIAL BLOOD: CPT

## 2021-06-06 PROCEDURE — 93005 ELECTROCARDIOGRAM TRACING: CPT | Performed by: PHYSICIAN ASSISTANT

## 2021-06-06 PROCEDURE — 87070 CULTURE OTHR SPECIMN AEROBIC: CPT | Performed by: INTERNAL MEDICINE

## 2021-06-06 PROCEDURE — 25010000002 FUROSEMIDE PER 20 MG: Performed by: INTERNAL MEDICINE

## 2021-06-06 PROCEDURE — 93306 TTE W/DOPPLER COMPLETE: CPT

## 2021-06-06 PROCEDURE — 86140 C-REACTIVE PROTEIN: CPT | Performed by: PHYSICIAN ASSISTANT

## 2021-06-06 PROCEDURE — 71045 X-RAY EXAM CHEST 1 VIEW: CPT

## 2021-06-06 PROCEDURE — 84145 PROCALCITONIN (PCT): CPT | Performed by: INTERNAL MEDICINE

## 2021-06-06 PROCEDURE — 25010000002 PIPERACILLIN SOD-TAZOBACTAM PER 1 G: Performed by: INTERNAL MEDICINE

## 2021-06-06 PROCEDURE — 87205 SMEAR GRAM STAIN: CPT | Performed by: INTERNAL MEDICINE

## 2021-06-06 PROCEDURE — 87633 RESP VIRUS 12-25 TARGETS: CPT | Performed by: INTERNAL MEDICINE

## 2021-06-06 PROCEDURE — 80053 COMPREHEN METABOLIC PANEL: CPT | Performed by: INTERNAL MEDICINE

## 2021-06-06 PROCEDURE — 82375 ASSAY CARBOXYHB QUANT: CPT

## 2021-06-06 PROCEDURE — 82805 BLOOD GASES W/O2 SATURATION: CPT

## 2021-06-06 PROCEDURE — 82962 GLUCOSE BLOOD TEST: CPT

## 2021-06-06 PROCEDURE — 63710000001 INSULIN DETEMIR PER 5 UNITS: Performed by: INTERNAL MEDICINE

## 2021-06-06 PROCEDURE — 84484 ASSAY OF TROPONIN QUANT: CPT | Performed by: PHYSICIAN ASSISTANT

## 2021-06-06 PROCEDURE — 85025 COMPLETE CBC W/AUTO DIFF WBC: CPT | Performed by: INTERNAL MEDICINE

## 2021-06-06 PROCEDURE — 63710000001 INSULIN ASPART PER 5 UNITS: Performed by: PHYSICIAN ASSISTANT

## 2021-06-06 PROCEDURE — 83050 HGB METHEMOGLOBIN QUAN: CPT

## 2021-06-06 RX ORDER — SODIUM CHLORIDE 0.9 % (FLUSH) 0.9 %
10 SYRINGE (ML) INJECTION AS NEEDED
Status: DISCONTINUED | OUTPATIENT
Start: 2021-06-06 | End: 2021-06-17 | Stop reason: HOSPADM

## 2021-06-06 RX ORDER — SODIUM CHLORIDE 0.9 % (FLUSH) 0.9 %
10 SYRINGE (ML) INJECTION EVERY 12 HOURS SCHEDULED
Status: DISCONTINUED | OUTPATIENT
Start: 2021-06-06 | End: 2021-06-17 | Stop reason: HOSPADM

## 2021-06-06 RX ORDER — L.ACID,PARA/B.BIFIDUM/S.THERM 8B CELL
1 CAPSULE ORAL DAILY
Status: DISCONTINUED | OUTPATIENT
Start: 2021-06-06 | End: 2021-06-13

## 2021-06-06 RX ORDER — FUROSEMIDE 10 MG/ML
40 INJECTION INTRAMUSCULAR; INTRAVENOUS ONCE
Status: COMPLETED | OUTPATIENT
Start: 2021-06-06 | End: 2021-06-06

## 2021-06-06 RX ADMIN — LIDOCAINE 2 PATCH: 50 PATCH CUTANEOUS at 10:27

## 2021-06-06 RX ADMIN — TAMSULOSIN HYDROCHLORIDE 0.4 MG: 0.4 CAPSULE ORAL at 21:34

## 2021-06-06 RX ADMIN — GABAPENTIN 600 MG: 300 CAPSULE ORAL at 05:04

## 2021-06-06 RX ADMIN — BUDESONIDE AND FORMOTEROL FUMARATE DIHYDRATE 2 PUFF: 160; 4.5 AEROSOL RESPIRATORY (INHALATION) at 19:01

## 2021-06-06 RX ADMIN — SODIUM CHLORIDE, PRESERVATIVE FREE 10 ML: 5 INJECTION INTRAVENOUS at 10:27

## 2021-06-06 RX ADMIN — METOPROLOL SUCCINATE 75 MG: 25 TABLET, EXTENDED RELEASE ORAL at 10:26

## 2021-06-06 RX ADMIN — GABAPENTIN 600 MG: 300 CAPSULE ORAL at 21:34

## 2021-06-06 RX ADMIN — GABAPENTIN 600 MG: 300 CAPSULE ORAL at 14:30

## 2021-06-06 RX ADMIN — ACETAMINOPHEN 650 MG: 325 TABLET ORAL at 15:42

## 2021-06-06 RX ADMIN — PIPERACILLIN SODIUM AND TAZOBACTAM SODIUM 4.5 G: 4; .5 INJECTION, POWDER, LYOPHILIZED, FOR SOLUTION INTRAVENOUS at 21:34

## 2021-06-06 RX ADMIN — FUROSEMIDE 40 MG: 10 INJECTION, SOLUTION INTRAMUSCULAR; INTRAVENOUS at 07:38

## 2021-06-06 RX ADMIN — IPRATROPIUM BROMIDE AND ALBUTEROL SULFATE 3 ML: .5; 3 SOLUTION RESPIRATORY (INHALATION) at 06:38

## 2021-06-06 RX ADMIN — DOCUSATE SODIUM 50 MG AND SENNOSIDES 8.6 MG 1 TABLET: 8.6; 5 TABLET, FILM COATED ORAL at 10:23

## 2021-06-06 RX ADMIN — PIPERACILLIN SODIUM AND TAZOBACTAM SODIUM 4.5 G: 4; .5 INJECTION, POWDER, LYOPHILIZED, FOR SOLUTION INTRAVENOUS at 15:42

## 2021-06-06 RX ADMIN — PANTOPRAZOLE SODIUM 40 MG: 40 TABLET, DELAYED RELEASE ORAL at 05:04

## 2021-06-06 RX ADMIN — APIXABAN 5 MG: 5 TABLET, FILM COATED ORAL at 21:34

## 2021-06-06 RX ADMIN — LISINOPRIL 40 MG: 10 TABLET ORAL at 10:23

## 2021-06-06 RX ADMIN — VANCOMYCIN HYDROCHLORIDE 2500 MG: 1 INJECTION, POWDER, LYOPHILIZED, FOR SOLUTION INTRAVENOUS at 17:03

## 2021-06-06 RX ADMIN — SODIUM CHLORIDE, PRESERVATIVE FREE 10 ML: 5 INJECTION INTRAVENOUS at 10:26

## 2021-06-06 RX ADMIN — POLYETHYLENE GLYCOL (3350) 17 G: 17 POWDER, FOR SOLUTION ORAL at 10:26

## 2021-06-06 RX ADMIN — INSULIN ASPART 2 UNITS: 100 INJECTION, SOLUTION INTRAVENOUS; SUBCUTANEOUS at 07:38

## 2021-06-06 RX ADMIN — IPRATROPIUM BROMIDE AND ALBUTEROL SULFATE 3 ML: .5; 3 SOLUTION RESPIRATORY (INHALATION) at 19:01

## 2021-06-06 RX ADMIN — SODIUM CHLORIDE, PRESERVATIVE FREE 10 ML: 5 INJECTION INTRAVENOUS at 21:35

## 2021-06-06 RX ADMIN — HYDROCODONE BITARTRATE AND ACETAMINOPHEN 1 TABLET: 5; 325 TABLET ORAL at 01:11

## 2021-06-06 RX ADMIN — CLOPIDOGREL 75 MG: 75 TABLET, FILM COATED ORAL at 10:23

## 2021-06-06 RX ADMIN — Medication 1 CAPSULE: at 15:44

## 2021-06-06 RX ADMIN — ATORVASTATIN CALCIUM 20 MG: 20 TABLET, FILM COATED ORAL at 10:23

## 2021-06-06 RX ADMIN — DOCUSATE SODIUM 100 MG: 100 CAPSULE, LIQUID FILLED ORAL at 10:23

## 2021-06-06 RX ADMIN — CANAGLIFLOZIN 300 MG: 300 TABLET, FILM COATED ORAL at 10:23

## 2021-06-06 RX ADMIN — IPRATROPIUM BROMIDE AND ALBUTEROL SULFATE 3 ML: .5; 3 SOLUTION RESPIRATORY (INHALATION) at 00:00

## 2021-06-06 RX ADMIN — APIXABAN 5 MG: 5 TABLET, FILM COATED ORAL at 10:24

## 2021-06-06 RX ADMIN — BUDESONIDE AND FORMOTEROL FUMARATE DIHYDRATE 2 PUFF: 160; 4.5 AEROSOL RESPIRATORY (INHALATION) at 07:13

## 2021-06-06 RX ADMIN — INSULIN ASPART 2 UNITS: 100 INJECTION, SOLUTION INTRAVENOUS; SUBCUTANEOUS at 11:27

## 2021-06-06 RX ADMIN — FLUTICASONE PROPIONATE 2 SPRAY: 50 SPRAY, METERED NASAL at 14:31

## 2021-06-06 RX ADMIN — INSULIN DETEMIR 40 UNITS: 100 INJECTION, SOLUTION SUBCUTANEOUS at 10:24

## 2021-06-06 RX ADMIN — DOCUSATE SODIUM 100 MG: 100 CAPSULE, LIQUID FILLED ORAL at 21:34

## 2021-06-06 RX ADMIN — IPRATROPIUM BROMIDE AND ALBUTEROL SULFATE 3 ML: .5; 3 SOLUTION RESPIRATORY (INHALATION) at 12:51

## 2021-06-06 NOTE — PROGRESS NOTES
[facility] HOSPITALIST PROGRESS NOTE     Patient Identification:  Name:  Иван Miller  Age:  65 y.o.  Sex:  male  :  1955  MRN:  [id number]  Visit Number:  [id number]  Primary Care Provider:  Rosi Thomas APRN    Length of stay:  15    Chief complaint: Back pain    Subjective:    Note is made of rapid response called early this morning where patient was found to be in respiratory distress.  Patient was urgently transferred to the intensive care unit for further evaluation.  According to nocturnist cross cover note, patient's oxygen saturation did drop to 80% while on BiPAP with an FiO2 of 100%.  Once transferred to the intensive care unit, patient's oxygen saturation slowly began to improve until O2 saturation was above 90% on BiPAP settings 22/8 with FiO2 of 100%.  Stat portable chest x-ray was obtained which did demonstrate diffuse bilateral pulmonary infiltrates.  Suspicion for flash pulmonary edema is held or possible underlying infectious process.  However, patient does not complain of shortness of breath but continues to report chronic pain in his neck for which he is requesting additional narcotics.  ----------------------------------------------------------------------------------------------------------------------  Current Hospital Meds:  apixaban, 5 mg, Oral, Q12H  atorvastatin, 20 mg, Oral, Daily  budesonide-formoterol, 2 puff, Inhalation, BID - RT  Canagliflozin, 300 mg, Oral, Daily  clopidogrel, 75 mg, Oral, Daily  docusate sodium, 100 mg, Oral, BID  fluticasone, 2 spray, Nasal, Daily  gabapentin, 600 mg, Oral, Q8H  insulin aspart, 0-9 Units, Subcutaneous, TID AC  insulin aspart, 30 Units, Subcutaneous, TID With Meals  insulin detemir, 40 Units, Subcutaneous, Daily  ipratropium-albuterol, 3 mL, Nebulization, Q6H - RT  lidocaine, 2 patch, Transdermal, Q24H  lisinopril, 40 mg, Oral, Q24H  metoprolol succinate XL, 75 mg, Oral, Q24H  pantoprazole, 40 mg, Oral, Q AM  polyethylene  glycol, 17 g, Oral, Daily  sodium chloride, 10 mL, Intravenous, Q12H  tamsulosin, 0.4 mg, Oral, Nightly      Pharmacy to dose vancomycin,   Pharmacy to Dose Zosyn,       ----------------------------------------------------------------------------------------------------------------------  Vital Signs:  Temp:  [98 °F (36.7 °C)-98.4 °F (36.9 °C)] 98.4 °F (36.9 °C)  Heart Rate:  [] 104  Resp:  [20-35] 28  BP: ()/() 145/84      06/04/21  0400 06/05/21  0445 06/06/21  0500   Weight: (!) 149 kg (328 lb 4.8 oz) (!) 149 kg (328 lb 12.8 oz) (!) 148 kg (326 lb 4.8 oz)     Body mass index is 48.19 kg/m².    Intake/Output Summary (Last 24 hours) at 6/6/2021 1424  Last data filed at 6/6/2021 1300  Gross per 24 hour   Intake 1080 ml   Output 3550 ml   Net -2470 ml     Diet Regular; Cardiac, Consistent Carbohydrate  ----------------------------------------------------------------------------------------------------------------------  Physical exam:  Constitutional: Morbidly obese  male in no apparent distress.     HENT:  Head:  Normocephalic and atraumatic.  Mouth:  Moist mucous membranes.    Eyes:  Conjunctivae and EOM are normal.  Pupils are equal, round, and reactive to light.  No scleral icterus.    Neck:  Neck supple. No thyromegaly.  No JVD present.    Cardiovascular:  Regular rate and rhythm with no murmurs, rubs, clicks or gallops appreciated.  Pulmonary/Chest:   Diffuse inspiratory crackles bilaterally with no wheezes or rhonchi appreciated.  Abdominal:  Soft. Nontender. Nondistended  Bowel sounds are normal in all four quadrants. No organomegally appreciated.   Neurological:  Cranial nerves II-XII intact with no focal deficits.  No facial droop.  No slurred speech.   Skin:  Warm and dry to palpation with no rashes or lesions appreciated.  Peripheral vascular:  2+ radial and pedal pulses in bilateral upper and lower extremities.  Psychiatric:  Alert and oriented x3, demonstrates appropriate  judgement and insight.    ----------------------------------------------------------------------------------------------------------------------  Tele:    ----------------------------------------------------------------------------------------------------------------------  Results from last 7 days   Lab Units 06/06/21 0624   TROPONIN T ng/mL 0.016     Results from last 7 days   Lab Units 06/06/21 0725 06/06/21 0034 06/05/21 0037 06/04/21 0029 06/03/21 0251   CRP mg/dL  --  0.50 0.47 0.74* 0.78*   WBC 10*3/mm3 10.83*  --  7.43  --  7.23   HEMOGLOBIN g/dL 12.7*  --  10.4*  --  10.8*   HEMATOCRIT % 42.5  --  36.6*  --  37.1*   MCV fL 83.3  --  85.5  --  85.1   MCHC g/dL 29.9*  --  28.4*  --  29.1*   PLATELETS 10*3/mm3 277  --  207  --  206     Results from last 7 days   Lab Units 06/06/21  0630   PH, ARTERIAL pH units 7.239*   PO2 ART mm Hg 73.6*   PCO2, ARTERIAL mm Hg 77.5*   HCO3 ART mmol/L 33.1*     Results from last 7 days   Lab Units 06/06/21 0725 06/05/21 0037 06/03/21 0251 06/02/21  0108   SODIUM mmol/L 139 140 140  --    POTASSIUM mmol/L 4.4 4.6 4.4 4.7   MAGNESIUM mg/dL  --   --   --  2.0   CHLORIDE mmol/L 101 102 102  --    CO2 mmol/L 26.3 30.2* 28.7  --    BUN mg/dL 19 16 21  --    CREATININE mg/dL 0.91 0.89 0.96  --    EGFR IF NONAFRICN AM mL/min/1.73 83 86 78  --    CALCIUM mg/dL 9.5 9.2 9.1  --    GLUCOSE mg/dL 252* 161* 228*  --    ALBUMIN g/dL 3.62  --   --   --    BILIRUBIN mg/dL 0.4  --   --   --    ALK PHOS U/L 110  --   --   --    AST (SGOT) U/L 19  --   --   --    ALT (SGPT) U/L 17  --   --   --    Estimated Creatinine Clearance: 115.2 mL/min (by C-G formula based on SCr of 0.91 mg/dL).    No results found for: AMMONIA      No results found for: BLOODCX  No results found for: URINECX  No results found for: WOUNDCX  No results found for: STOOLCX    I have personally looked at the labs and they are summarized  above.  ----------------------------------------------------------------------------------------------------------------------  Imaging Results (Last 24 Hours)     Procedure Component Value Units Date/Time    XR Chest 1 View [[id number]] Collected: 06/06/21 0708     Updated: 06/06/21 0710    Narrative:      CR Chest 1 Vw    INDICATION:   Acute respiratory failure.     COMPARISON:    5/30/2021    FINDINGS:  Single portable AP view(s) of the chest.    No visible support lines.    The heart size is the upper limits of normal. Trachea is midline. Interval development of prominent bilateral airspace disease. No obvious pleural fluid. No pneumothorax.       Impression:      Interval development of prominent bilateral airspace disease when compared to the study of 5/30/2021.    Signer Name: Cristian Guillaume MD   Signed: 6/6/2021 7:08 AM   Workstation Name: [id number]    [facility]        ----------------------------------------------------------------------------------------------------------------------  Assessment and Plan:    1.  Acute on chronic hypoxic/hypercapnic respiratory failure -patient with acute decompensation overnight, etiology not quite clear.  Stat transthoracic echo obtained which demonstrates preserved left ventricular systolic function.  Underlying etiology possibly infectious although desaturation was quite acute.  Will empirically cover with vancomycin and Zosyn, will obtain stat procalcitonin.  We will continue BiPAP support, have been able to decrease FiO2 to 50%.  Have also given Lasix 40 mg IV x1 dose.  Continue to monitor for improvement.    2.  COPD exacerbation -resolved    3.  Generalized weakness -patient was denied inpatient rehab.  Currently awaiting skilled nursing facility placement.    4.  Paroxysmal A. Fib -patient in A. fib this morning which was rate controlled, continue Eliquis    5.  Essential hypertension  -no acute elevations in blood pressure to account for  flash pulmonary edema.  We will continue current antihypertensive medications    6.  Insulin-dependent type 2 diabetes mellitus -continue sliding scale insulin with Accu-Cheks before every meal and nightly    Disposition will likely require an additional 48 to 72 hours of hospitalization.        Joo Jimenez DO  06/06/21  14:24 EDT

## 2021-06-06 NOTE — NURSING NOTE
Tried to call wife Lamar and daughter Annette unable to a hold of her to make aware of patients move to CCU.

## 2021-06-06 NOTE — PROGRESS NOTES
Nurse Practitioner - Brief Progress Note  PERMANENT  2021 09:13    [facility] - [facility] - [place], [place] ([facility])    ROMEO BLANCO    Date of Service 2021 09:13    HPI/Events of Note [facility] Provider Assessment Note    Tele ICU focused assessment note - Information obtained from patient's chart.    64 y/o male transferred to the ICU with respiratory distress requiring Bipap.     admitted to the hospital with hypercarbic and hypoxic respiriatory failure.    PMHx: COPD  Labs: glucose 252, WBC 10.83  AB.239/ 77.5/ 73.6/ 33.1    Assessment and Plan:  Hypoxic and hypercarbic respiratory failure  -Rapid respons called for hypoxic respiratory failure. Pt placed on bipap and transferred to the ICU. Bedside intensivist present and currently managing care, will defer to bedside team at this time.        __y___   Video Assessment performed  __y___   Most recent labs reviewed  ___y__   Vital Signs reviewed  __y___   Best Practices addressed:                 VTE prophylaxis: Eliquis                 SUP (when indicated): Protonix                 Glycemic control: SSI                      Please notify bedside physician when present or [facility] if glc > 180 X 2                 Sepsis guidelines: N/A                 Lung protective strategy                 Targeted Temperature Management: N/A    __n___     Spoke with bedside RN  __n___     Orders written      Contact [facility] for any needs if bedside physician is not present.    Note drafted by JOANN Pyle-C, ACNPC-AG.      Interventions Major-Respiratory failure - evaluation and management        Electronically Signed by: Ladonna Martinez (NP) on 2021 09:24    Annotated By: Kapil Munoz (MD)    Date: 21 09:31  Agree with assessment and plan.

## 2021-06-06 NOTE — PROGRESS NOTES
[facility]  CROSS COVER NOTE    Rapid response was called overhead.    Upon evaluation at bedside, the patient was lying on his side, appeared to be in acute distress, diaphoretic, tachypneic, with BiPAP in place.  O2 saturation 79 to 80% on bipap (IPAP 20, EPAP 8, FiO2 100).     /98, pulse 117. Glucose 193.     I ordered a stat EKG, troponin, ABG.    O2 saturation slowly began to improve around 88 to 89% on above settings.  We will admit the patient to the CCU given concerns for possible intubation.    It should be of note patient was admitted on 5/22/2021 for acute on chronic hypoxic and hypercapnic respiratory failure secondary to an acute COPD exacerbation.  MARCELO Jones states that the patient was doing well overall and has been on 3 L nasal cannula throughout the night with O2 saturation around 98 to 99%.  RN reports that he was walking by the patient's room when he called out asking for a breathing treatment.  MARCELO Jones states that when he went into the room the patient's O2 saturation was around 90% and then suddenly dropped to 50 to 60% on 3 L NC.  RN placed BiPAP on the patient and called a rapid response.  RN reports that the only medications given at around 0500 was Neurontin 600 mg (which the patient has been receiving) and Protonix.    ABG is as follows:      EKG revealed normal sinus rhythm with occasional PVCs, heart rate 118 bpm,  MS.  When compared to EKG from 6/2/2021, PVCs are now present.  Currently awaiting troponin.      Patient currently in the CCU.  Telemetry reveals normal sinus rhythm with occasional PVCs, heart rate 108 bpm, SPO2 99% on BiPAP (IPAP 20, EPAP 8, FiO2 100)      Lillian Gomez PA-C  Hospitalist Service -- [facility]   Pager: [phone]    06/06/21  06:46 EDT

## 2021-06-06 NOTE — PLAN OF CARE
Goal Outcome Evaluation:      Patient was brought to unit emergently this morning for possible intubation due to respiratory distress.  Patient improved and remained on bipap until this afternoon when he took it off and refused to put it back on.  Patient on O2 nasal cannula; O2 sats up and down.  Shaffer was placed to due diuresing and need for strict I's and O's.  Patient is waiting for SNF placement.  Unable to reach patient's wife or daughter.

## 2021-06-06 NOTE — PLAN OF CARE
Goal Outcome Evaluation:  Plan of Care Reviewed With: patient  Progress: improving   Patient resting in bed with no acute distress noted at this time. Patient stated he still had SOA with activity and remained on 3L NC  this shift until put on bipap. Patient had complaints of back, neck and shoulder pain this shift with PRN medication given and tolerable pain level noted. Will continue to monitor and  follow plan of care with interventions implemented as needed.

## 2021-06-06 NOTE — NURSING NOTE
Patient called out wanting a breathing treatment upon entering patients room. Oxygen was reading 59% on 3L/NC. Resp called x2 to come give a breathing treatment and I placed patient back on his bipap. 02 went up to 67% patient was having a hard time breathing, face was discolored and sweating. Rapid Response called see documentation.

## 2021-06-06 NOTE — PROGRESS NOTES
Kinetics :  Vancomycin  Day 1    The patient has been evaluated for vancomycin therapy for pneumonia.  We will load the patient with vancomycin 2500mg x 1 and follow with 1250mg q 12 hrs to maximize the auc/trough level projections for this patient.  We will continue to monitor with you.        The zosyn regimen shall be 4.5 gm q 8 hrs as the extended infusion protocol.

## 2021-06-06 NOTE — NURSING NOTE
Patient is now on 5l/bubble high flow.  O2 sat improved but is noted to still drop at times and slowly recover.

## 2021-06-07 ENCOUNTER — APPOINTMENT (OUTPATIENT)
Dept: GENERAL RADIOLOGY | Facility: HOSPITAL | Age: 66
End: 2021-06-07

## 2021-06-07 ENCOUNTER — TELEPHONE (OUTPATIENT)
Dept: GENERAL RADIOLOGY | Facility: HOSPITAL | Age: 66
End: 2021-06-07

## 2021-06-07 LAB
A-A DO2: 178.8 MMHG (ref 0–300)
ANION GAP SERPL CALCULATED.3IONS-SCNC: 8.5 MMOL/L (ref 5–15)
ARTERIAL PATENCY WRIST A: ABNORMAL
ATMOSPHERIC PRESS: 728 MMHG
BASE EXCESS BLDA CALC-SCNC: 7.4 MMOL/L (ref 0–2)
BDY SITE: ABNORMAL
BODY TEMPERATURE: 0 C
BUN SERPL-MCNC: 21 MG/DL (ref 8–23)
BUN/CREAT SERPL: 22.1 (ref 7–25)
CALCIUM SPEC-SCNC: 8.9 MG/DL (ref 8.6–10.5)
CHLORIDE SERPL-SCNC: 103 MMOL/L (ref 98–107)
CO2 BLDA-SCNC: 35.6 MMOL/L (ref 22–33)
CO2 SERPL-SCNC: 29.5 MMOL/L (ref 22–29)
COHGB MFR BLD: 2 % (ref 0–5)
CREAT SERPL-MCNC: 0.95 MG/DL (ref 0.76–1.27)
CRP SERPL-MCNC: 7.73 MG/DL (ref 0–0.5)
DEPRECATED RDW RBC AUTO: 52.5 FL (ref 37–54)
EPAP: 8
ERYTHROCYTE [DISTWIDTH] IN BLOOD BY AUTOMATED COUNT: 16.8 % (ref 12.3–15.4)
GFR SERPL CREATININE-BSD FRML MDRD: 79 ML/MIN/1.73
GLUCOSE BLDC GLUCOMTR-MCNC: 140 MG/DL (ref 70–130)
GLUCOSE BLDC GLUCOMTR-MCNC: 150 MG/DL (ref 70–130)
GLUCOSE BLDC GLUCOMTR-MCNC: 156 MG/DL (ref 70–130)
GLUCOSE BLDC GLUCOMTR-MCNC: 184 MG/DL (ref 70–130)
GLUCOSE SERPL-MCNC: 107 MG/DL (ref 65–99)
HCO3 BLDA-SCNC: 33.8 MMOL/L (ref 20–26)
HCT VFR BLD AUTO: 37.7 % (ref 37.5–51)
HCT VFR BLD CALC: 32.9 % (ref 38–51)
HGB BLD-MCNC: 11 G/DL (ref 13–17.7)
HGB BLDA-MCNC: 10.7 G/DL (ref 14–18)
INHALED O2 CONCENTRATION: 45 %
IPAP: 20
Lab: ABNORMAL
MAGNESIUM SERPL-MCNC: 2.2 MG/DL (ref 1.6–2.4)
MCH RBC QN AUTO: 24.9 PG (ref 26.6–33)
MCHC RBC AUTO-ENTMCNC: 29.2 G/DL (ref 31.5–35.7)
MCV RBC AUTO: 85.5 FL (ref 79–97)
METHGB BLD QL: <1 % (ref 0–3)
MODALITY: ABNORMAL
NOTE: ABNORMAL
OXYHGB MFR BLDV: 92.9 % (ref 94–99)
PCO2 BLDA: 56.5 MM HG (ref 35–45)
PCO2 TEMP ADJ BLD: ABNORMAL MM[HG]
PH BLDA: 7.39 PH UNITS (ref 7.35–7.45)
PH, TEMP CORRECTED: ABNORMAL
PLATELET # BLD AUTO: 222 10*3/MM3 (ref 140–450)
PMV BLD AUTO: 9.4 FL (ref 6–12)
PO2 BLDA: 66 MM HG (ref 83–108)
PO2 TEMP ADJ BLD: ABNORMAL MM[HG]
POTASSIUM SERPL-SCNC: 3.9 MMOL/L (ref 3.5–5.2)
RBC # BLD AUTO: 4.41 10*6/MM3 (ref 4.14–5.8)
SAO2 % BLDCOA: 94.7 % (ref 94–99)
SARS-COV-2 RNA RESP QL NAA+PROBE: NOT DETECTED
SET MECH RESP RATE: 22
SODIUM SERPL-SCNC: 141 MMOL/L (ref 136–145)
VENTILATOR MODE: ABNORMAL
WBC # BLD AUTO: 15.85 10*3/MM3 (ref 3.4–10.8)

## 2021-06-07 PROCEDURE — 71045 X-RAY EXAM CHEST 1 VIEW: CPT

## 2021-06-07 PROCEDURE — 83050 HGB METHEMOGLOBIN QUAN: CPT

## 2021-06-07 PROCEDURE — 94799 UNLISTED PULMONARY SVC/PX: CPT

## 2021-06-07 PROCEDURE — 63710000001 INSULIN DETEMIR PER 5 UNITS: Performed by: INTERNAL MEDICINE

## 2021-06-07 PROCEDURE — 99222 1ST HOSP IP/OBS MODERATE 55: CPT | Performed by: INTERNAL MEDICINE

## 2021-06-07 PROCEDURE — 80048 BASIC METABOLIC PNL TOTAL CA: CPT | Performed by: INTERNAL MEDICINE

## 2021-06-07 PROCEDURE — 82805 BLOOD GASES W/O2 SATURATION: CPT

## 2021-06-07 PROCEDURE — 83735 ASSAY OF MAGNESIUM: CPT | Performed by: INTERNAL MEDICINE

## 2021-06-07 PROCEDURE — 71045 X-RAY EXAM CHEST 1 VIEW: CPT | Performed by: RADIOLOGY

## 2021-06-07 PROCEDURE — 86140 C-REACTIVE PROTEIN: CPT | Performed by: PHYSICIAN ASSISTANT

## 2021-06-07 PROCEDURE — 36600 WITHDRAWAL OF ARTERIAL BLOOD: CPT

## 2021-06-07 PROCEDURE — 99232 SBSQ HOSP IP/OBS MODERATE 35: CPT | Performed by: INTERNAL MEDICINE

## 2021-06-07 PROCEDURE — 97110 THERAPEUTIC EXERCISES: CPT

## 2021-06-07 PROCEDURE — 85027 COMPLETE CBC AUTOMATED: CPT | Performed by: INTERNAL MEDICINE

## 2021-06-07 PROCEDURE — 25010000002 PIPERACILLIN SOD-TAZOBACTAM PER 1 G: Performed by: INTERNAL MEDICINE

## 2021-06-07 PROCEDURE — 63710000001 INSULIN ASPART PER 5 UNITS: Performed by: PHYSICIAN ASSISTANT

## 2021-06-07 PROCEDURE — 25010000002 VANCOMYCIN 5 G RECONSTITUTED SOLUTION: Performed by: INTERNAL MEDICINE

## 2021-06-07 PROCEDURE — U0003 INFECTIOUS AGENT DETECTION BY NUCLEIC ACID (DNA OR RNA); SEVERE ACUTE RESPIRATORY SYNDROME CORONAVIRUS 2 (SARS-COV-2) (CORONAVIRUS DISEASE [COVID-19]), AMPLIFIED PROBE TECHNIQUE, MAKING USE OF HIGH THROUGHPUT TECHNOLOGIES AS DESCRIBED BY CMS-2020-01-R: HCPCS | Performed by: INTERNAL MEDICINE

## 2021-06-07 PROCEDURE — 82962 GLUCOSE BLOOD TEST: CPT

## 2021-06-07 PROCEDURE — 87040 BLOOD CULTURE FOR BACTERIA: CPT | Performed by: PHYSICIAN ASSISTANT

## 2021-06-07 PROCEDURE — 82375 ASSAY CARBOXYHB QUANT: CPT

## 2021-06-07 RX ORDER — BENZONATATE 100 MG/1
100 CAPSULE ORAL 3 TIMES DAILY PRN
Status: DISCONTINUED | OUTPATIENT
Start: 2021-06-07 | End: 2021-06-17 | Stop reason: HOSPADM

## 2021-06-07 RX ORDER — ACETYLCYSTEINE 200 MG/ML
3 SOLUTION ORAL; RESPIRATORY (INHALATION)
Status: DISCONTINUED | OUTPATIENT
Start: 2021-06-07 | End: 2021-06-09

## 2021-06-07 RX ORDER — HYDROCODONE BITARTRATE AND ACETAMINOPHEN 7.5; 325 MG/1; MG/1
1 TABLET ORAL EVERY 8 HOURS PRN
Status: DISCONTINUED | OUTPATIENT
Start: 2021-06-07 | End: 2021-06-10

## 2021-06-07 RX ADMIN — APIXABAN 5 MG: 5 TABLET, FILM COATED ORAL at 21:48

## 2021-06-07 RX ADMIN — ATORVASTATIN CALCIUM 20 MG: 20 TABLET, FILM COATED ORAL at 08:08

## 2021-06-07 RX ADMIN — BUDESONIDE AND FORMOTEROL FUMARATE DIHYDRATE 2 PUFF: 160; 4.5 AEROSOL RESPIRATORY (INHALATION) at 07:06

## 2021-06-07 RX ADMIN — IPRATROPIUM BROMIDE AND ALBUTEROL SULFATE 3 ML: .5; 3 SOLUTION RESPIRATORY (INHALATION) at 19:21

## 2021-06-07 RX ADMIN — IPRATROPIUM BROMIDE AND ALBUTEROL SULFATE 3 ML: .5; 3 SOLUTION RESPIRATORY (INHALATION) at 13:57

## 2021-06-07 RX ADMIN — ACETYLCYSTEINE 3 ML: 200 SOLUTION ORAL; RESPIRATORY (INHALATION) at 13:57

## 2021-06-07 RX ADMIN — CANAGLIFLOZIN 300 MG: 300 TABLET, FILM COATED ORAL at 08:08

## 2021-06-07 RX ADMIN — VANCOMYCIN HYDROCHLORIDE 1250 MG: 5 INJECTION, POWDER, LYOPHILIZED, FOR SOLUTION INTRAVENOUS at 03:55

## 2021-06-07 RX ADMIN — INSULIN DETEMIR 40 UNITS: 100 INJECTION, SOLUTION SUBCUTANEOUS at 08:10

## 2021-06-07 RX ADMIN — ACETYLCYSTEINE 3 ML: 200 SOLUTION ORAL; RESPIRATORY (INHALATION) at 19:21

## 2021-06-07 RX ADMIN — BUDESONIDE AND FORMOTEROL FUMARATE DIHYDRATE 2 PUFF: 160; 4.5 AEROSOL RESPIRATORY (INHALATION) at 19:21

## 2021-06-07 RX ADMIN — PANTOPRAZOLE SODIUM 40 MG: 40 TABLET, DELAYED RELEASE ORAL at 08:07

## 2021-06-07 RX ADMIN — IPRATROPIUM BROMIDE AND ALBUTEROL SULFATE 3 ML: .5; 3 SOLUTION RESPIRATORY (INHALATION) at 00:37

## 2021-06-07 RX ADMIN — METOPROLOL SUCCINATE 75 MG: 25 TABLET, EXTENDED RELEASE ORAL at 08:08

## 2021-06-07 RX ADMIN — PIPERACILLIN SODIUM AND TAZOBACTAM SODIUM 4.5 G: 4; .5 INJECTION, POWDER, LYOPHILIZED, FOR SOLUTION INTRAVENOUS at 21:49

## 2021-06-07 RX ADMIN — SODIUM CHLORIDE, PRESERVATIVE FREE 10 ML: 5 INJECTION INTRAVENOUS at 08:09

## 2021-06-07 RX ADMIN — FLUTICASONE PROPIONATE 2 SPRAY: 50 SPRAY, METERED NASAL at 08:09

## 2021-06-07 RX ADMIN — INSULIN ASPART 2 UNITS: 100 INJECTION, SOLUTION INTRAVENOUS; SUBCUTANEOUS at 11:28

## 2021-06-07 RX ADMIN — PIPERACILLIN SODIUM AND TAZOBACTAM SODIUM 4.5 G: 4; .5 INJECTION, POWDER, LYOPHILIZED, FOR SOLUTION INTRAVENOUS at 05:27

## 2021-06-07 RX ADMIN — HYDROCODONE BITARTRATE AND ACETAMINOPHEN 1 TABLET: 5; 325 TABLET ORAL at 05:27

## 2021-06-07 RX ADMIN — HYDROCODONE BITARTRATE AND ACETAMINOPHEN 1 TABLET: 7.5; 325 TABLET ORAL at 21:48

## 2021-06-07 RX ADMIN — GABAPENTIN 600 MG: 300 CAPSULE ORAL at 05:27

## 2021-06-07 RX ADMIN — LISINOPRIL 40 MG: 10 TABLET ORAL at 08:08

## 2021-06-07 RX ADMIN — Medication 1 CAPSULE: at 08:07

## 2021-06-07 RX ADMIN — CLOPIDOGREL 75 MG: 75 TABLET, FILM COATED ORAL at 08:08

## 2021-06-07 RX ADMIN — DOCUSATE SODIUM 100 MG: 100 CAPSULE, LIQUID FILLED ORAL at 21:48

## 2021-06-07 RX ADMIN — TAMSULOSIN HYDROCHLORIDE 0.4 MG: 0.4 CAPSULE ORAL at 21:48

## 2021-06-07 RX ADMIN — PIPERACILLIN SODIUM AND TAZOBACTAM SODIUM 4.5 G: 4; .5 INJECTION, POWDER, LYOPHILIZED, FOR SOLUTION INTRAVENOUS at 13:10

## 2021-06-07 RX ADMIN — APIXABAN 5 MG: 5 TABLET, FILM COATED ORAL at 08:08

## 2021-06-07 RX ADMIN — IPRATROPIUM BROMIDE AND ALBUTEROL SULFATE 3 ML: .5; 3 SOLUTION RESPIRATORY (INHALATION) at 07:06

## 2021-06-07 RX ADMIN — SODIUM CHLORIDE, PRESERVATIVE FREE 10 ML: 5 INJECTION INTRAVENOUS at 21:49

## 2021-06-07 RX ADMIN — DOCUSATE SODIUM 100 MG: 100 CAPSULE, LIQUID FILLED ORAL at 08:08

## 2021-06-07 RX ADMIN — GABAPENTIN 600 MG: 300 CAPSULE ORAL at 13:09

## 2021-06-07 RX ADMIN — GABAPENTIN 600 MG: 300 CAPSULE ORAL at 21:48

## 2021-06-07 RX ADMIN — INSULIN ASPART 2 UNITS: 100 INJECTION, SOLUTION INTRAVENOUS; SUBCUTANEOUS at 17:16

## 2021-06-07 RX ADMIN — HYDROXYZINE HYDROCHLORIDE 25 MG: 25 TABLET, FILM COATED ORAL at 21:48

## 2021-06-07 RX ADMIN — LIDOCAINE 2 PATCH: 50 PATCH CUTANEOUS at 08:08

## 2021-06-07 NOTE — PROGRESS NOTES
Discharge Planning Assessment  [facility]     Patient Name: Иван Miller  MRN: [id number]  Today's Date: 6/7/2021    Admit Date: 5/21/2021      Discharge Plan     Row Name 06/07/21 1613       Plan    Plan  SS attempted to speak with Erin at [facility] and was placed on hold for several minutes. SS will follow up on 6/8/21.    Patient/Family in Agreement with Plan  yes        Continued Care and Services - Admitted Since 5/21/2021     Destination     Service Provider Request Status Selected Services Address Phone Fax Patient Preferred    [facility]  Pending - Request Sent N/A [address] [facility] [address] [phone] --    [facility]  Declined  Current or History of Substance Abuse N/A [address] [facility] [address] [phone] --    [facility]  Declined  Unable to Meet Patient Needs N/A [address] [id number] [phone] --    [facility]  Declined  out of network with pt's insurance N/A [address] [place] [address] [phone] --    [facility]  Declined  Current or History of Substance Abuse N/A [address] [place] [address] [phone] --    [facility]  Declined  No Bed Available N/A [address] [place] [id number] [phone] --                ALENA Alex

## 2021-06-07 NOTE — CONSULTS
Primary hospitalist: Dr. Connelly    Reason for Consultation: respiratory failure, concern for left sided mucous plugging      Chief complaint:   Chief Complaint   Patient presents with   • Shortness of Breath       History of present illness:   Patient is a 65-year-old male past medical history of paroxysmal atrial fibrillation, COPD, chronic hypoxic respiratory failure with 2 L diabetes, GERD, hyperlipidemia, hypertension, MI, neuropathy, cigarette dependence.  History is obtained from chart review and patient directly.  He is currently on BiPAP therapy.  He presented to the ED on 5/22 due to shortness of breath.  He also admitted to productive cough with yellow sputum, wheezing, subjective fever, abdominal pain, fatigue, subjective fever.   He stated that he had taken antibiotics recently (medication not specified).    He was reportedly lethargic upon presentation.  After lab evaluation revealed notable for elevated PCO2 and decreased PO2 with pH 7.35, he was initiated on BiPAP therapy.  Initial imaging was negative for acute infiltrates.  COVID-19 testing was negative.  He was admitted for acute on chronic hypoxic hypercarbic respiratory failure in the setting of COPD exacerbation and right ankle fracture, with management of other chronic conditions.  During course of stay, he was notable for an episode of nonsustained V. tach.  Cardiology was on board in the setting of paroxysmal atrial fibrillation management.  Infectious disease team was consulted in setting of osteomyelitis.  Oxygenation progressively improved to his baseline use.  However, rapid response was called overnight.  Patient developed acute shortness of breath and requested a breathing treatment.  RN noted that saturation was initially at 90% and acutely dropped.  He was placed on BiPAP therapy and rapid response was called.  ABG showed worsening respiratory acidosis with PO2 persistently at 73.  He was transferred to CCU for further monitoring.   X-ray was notable for worsening left-sided consolidation with concern for mucous plugging.  He is currently on BiPAP therapy upon assessment this morning.  He confirms use of 3 L/min oxygen at baseline.  He states that he has been able to produce some sputum upon coughing.  Shortness of breath improving as compared to the episode last night with use of the BIPAP.  He reports difficulty with turning as the current hospital was causing some positional discomfort.  No other acute complaints.      Review of Systems:   Review of Systems - History obtained from chart review and the patient  General ROS: negative for - chills, fever  Psychological ROS: negative for - anxiety or depression  ENT ROS: negative for - headaches or vocal changes  Allergy and Immunology ROS: negative for - nasal congestion or postnasal drip  Endocrine ROS: negative for - polydipsia/polyuria  Respiratory ROS: positive for - shortness of breath, productive cough.   Cardiovascular ROS: negative for - chest pain or swelling  Gastrointestinal ROS: negative for - abdominal pain or change in appetite.  Musculoskeletal ROS: positive - join stiffness. negative for - joint swelling  Neurological ROS: no TIA or stroke symptoms          Past medical history, past surgical history, social history and family history:  •  has a past medical history of Atrial fibrillation (CMS/HCC), COPD (chronic obstructive pulmonary disease) (CMS/HCC), Diabetes mellitus (CMS/HCC), GERD (gastroesophageal reflux disease), Hyperlipidemia, Hypertension, Myocardial infarction (CMS/HCC), and Neuropathy.  •  has a past surgical history that includes Coronary stent placement; Cardiac catheterization; Abdominal surgery; and Appendectomy.  •  reports that he has quit smoking. His smoking use included cigarettes. He has a 60.00 pack-year smoking history. His smokeless tobacco use includes snuff. He reports that he does not drink alcohol and does not use drugs.  • family history includes  Heart attack in his mother; Hypertension in his father and mother.     Medication and allergies:  • Prior to admission  Current Outpatient Medications   Medication Instructions   • albuterol (PROVENTIL HFA;VENTOLIN HFA) 108 (90 Base) MCG/ACT inhaler 2 puffs, Inhalation, Every 6 Hours PRN   • amoxicillin-clavulanate (AUGMENTIN) 875-125 MG per tablet 1 tablet, Oral, 2 Times Daily, Prior to [facility] Admission, Patient was on: 10 day supply starting 5/18 - patient has taken 4 doses    • apixaban (ELIQUIS) 5 mg, Oral, 2 Times Daily   • atorvastatin (LIPITOR) 20 mg, Oral, Daily   • Budeson-Glycopyrrol-Formoterol (BREZTRI) 160-9-4.8 MCG/ACT aerosol inhaler 2 puffs, Inhalation, 2 Times Daily   • carvedilol (COREG) 25 mg, Oral, 2 Times Daily With Meals   • Farxiga 10 mg, Oral, Daily   • fluticasone (FLONASE) 50 MCG/ACT nasal spray 2 sprays, Nasal, Daily   • furosemide (LASIX) 80 mg, Oral, Daily   • gabapentin (NEURONTIN) 600 mg, Oral, 3 Times Daily   • Insulin Lispro (1 Unit Dial) (HUMALOG KWIKPEN) 35 Units, Subcutaneous, 3 Times Daily   • ipratropium-albuterol (DUO-NEB) 0.5-2.5 mg/3 ml nebulizer 3 mL, Nebulization, Every 4 Hours PRN   • lidocaine (LIDODERM) 5 % 1 patch, Transdermal, Every 24 Hours, Prior to [facility] Admission, Patient was on:  Place on Shoulder or back and Remove & Discard patch within 12 hours or as directed by MD    • magnesium oxide (MAG-OX) 400 mg, Oral, Daily   • metFORMIN (GLUCOPHAGE) 1,000 mg, Oral, 2 Times Daily With Meals   • pantoprazole (PROTONIX) 40 mg, Oral, Daily   • sildenafil (REVATIO) 20 mg, Oral, As Needed   • tamsulosin (FLOMAX) 0.4 MG capsule 24 hr capsule 1 capsule, Oral, Nightly        • Active medication:  apixaban, 5 mg, Oral, Q12H  atorvastatin, 20 mg, Oral, Daily  budesonide-formoterol, 2 puff, Inhalation, BID - RT  Canagliflozin, 300 mg, Oral, Daily  clopidogrel, 75 mg, Oral, Daily  docusate sodium, 100 mg, Oral, BID  fluticasone, 2 spray, Nasal, Daily  gabapentin, 600 mg, Oral,  "Q8H  insulin aspart, 0-9 Units, Subcutaneous, TID AC  insulin detemir, 40 Units, Subcutaneous, Daily  ipratropium-albuterol, 3 mL, Nebulization, Q6H - RT  lactobacillus acidophilus, 1 capsule, Oral, Daily  lidocaine, 2 patch, Transdermal, Q24H  lisinopril, 40 mg, Oral, Q24H  metoprolol succinate XL, 75 mg, Oral, Q24H  pantoprazole, 40 mg, Oral, Q AM  piperacillin-tazobactam, 4.5 g, Intravenous, Q8H  polyethylene glycol, 17 g, Oral, Daily  sodium chloride, 10 mL, Intravenous, Q12H  sodium chloride, 10 mL, Intravenous, Q12H  tamsulosin, 0.4 mg, Oral, Nightly  vancomycin, 1,250 mg, Intravenous, Q12H        • Continuous Infusions:        Allergies:    Codeine and Ciprofloxacin      Vital Signs    height is 175.3 cm (69\") and weight is 145 kg (320 lb 6.4 oz) (abnormal). His axillary temperature is 99.5 °F (37.5 °C). His blood pressure is 155/85 and his pulse is 91. His respiration is 26 and oxygen saturation is 94%.        Physical Exam:  Constitutional:  oriented to person, place, and time. No acute distress. On BIPAP. Large body habitus.   Head: Normocephalic and atraumatic.   Right Ear and left Ear : External ear normal.   Nose: Nose normal.   Eyes: Pupils are equal, round, and reactive to light.  No scleral icterus.   Neck: Normal range of motion. Neck supple.    Cardiovascular: Normal rate, regular rhythm, normal heart sounds. No murmur heard.  Pulmonary: Bilateral air entry positive.  Decreased breath sounds on the left side with rhonchi.  On BiPAP.  Abdominal: Soft, nontender. Exhibits no distension.   Musculoskeletal: Normal range of motion. Exhibits no deformity.   Neurological: alert and oriented to person, place, and time. No cranial nerve deficit. Coordination normal.   Skin: Skin is warm and dry. No erythema.   Psychiatric:   normal mood and affect.   behavior is normal.       Input output:     Intake & Output (last 3 days)       06/04 0701 - 06/05 0700 06/05 0701 - 06/06 0700 06/06 0701 - 06/07 0700 06/07 " 0701 - 06/08 0700    P.O. 960 1680 1380     I.V. (mL/kg) 0 (0) 0 (0)      IV Piggyback   680.1     Total Intake(mL/kg) 960 (6.4) 1680 (11.4) 2060.1 (14.2)     Urine (mL/kg/hr) 2850 (0.8) 3850 (1.1) 3100 (0.9)     Stool 0 0 0     Total Output 2850 3850 3100     Net -1890 -2170 -1039.9             Urine Unmeasured Occurrence 1 x 1 x 1 x     Stool Unmeasured Occurrence 1 x 1 x             Diuretic support:  Diuretics (From admission, onward)    Ordered     Dose/Rate Route Frequency Start Stop    06/06/21 0716  furosemide (LASIX) injection 40 mg     Ordering Provider: Joo Jimenez DO    40 mg Intravenous Once 06/06/21 0815 06/06/21 0738    05/30/21 1440  bumetanide (BUMEX) injection 0.5 mg     Ordering Provider: Dequan Connelly MD    0.5 mg Intravenous Once 05/30/21 1600 05/30/21 1522    05/29/21 1347  bumetanide (BUMEX) injection 1 mg     Ordering Provider: Dequan Connelly MD    1 mg Intravenous Once 05/29/21 1500 05/29/21 1439    05/28/21 1659  bumetanide (BUMEX) injection 1 mg     Ordering Provider: Dequan Connelly MD    1 mg Intravenous Once 05/28/21 1745 05/28/21 1826          Microbiology data:  Microbiology Results (last 10 days)     Procedure Component Value - Date/Time    COVID PRE-OP / PRE-PROCEDURE SCREENING ORDER (NO ISOLATION) - Swab, Nasopharynx [[id number]]  (Normal) Collected: 06/07/21 0805    Lab Status: Final result Specimen: Swab from Nasopharynx Updated: 06/07/21 0905    Narrative:      The following orders were created for panel order COVID PRE-OP / PRE-PROCEDURE SCREENING ORDER (NO ISOLATION) - Swab, Nasopharynx.  Procedure                               Abnormality         Status                     ---------                               -----------         ------                     COVID-19,CEPHEID,COR/KYLE...[[id number]]  Normal              Final result                 Please view results for these tests on the individual orders.    COVID-19,CEPHEID,COR/KYLE/PAD/NICCI IN-HOUSE(OR  EMERGENT/ADD-ON),NP SWAB IN TRANSPORT MEDIA 3-4 HR TAT, RT-PCR - Swab, Nasopharynx [[id number]]  (Normal) Collected: 06/07/21 0805    Lab Status: Final result Specimen: Swab from Nasopharynx Updated: 06/07/21 0905     COVID19 Not Detected    Narrative:      Fact sheet for providers: https://www.fda.gov/media/640949/download     Fact sheet for patients: https://www.fda.gov/media/564156/download  Fact sheet for providers: https://www.fda.gov/media/807376/download     Fact sheet for patients: https://www.fda.gov/media/680922/download    Respiratory Panel, PCR (WITHOUT COVID) - Swab, Nasopharynx [[id number]]  (Abnormal) Collected: 06/06/21 1528    Lab Status: Final result Specimen: Swab from Nasopharynx Updated: 06/06/21 1634     ADENOVIRUS, PCR Not Detected     Coronavirus 229E Not Detected     Coronavirus HKU1 Not Detected     Coronavirus NL63 Not Detected     Coronavirus OC43 Not Detected     Human Metapneumovirus Not Detected     Human Rhinovirus/Enterovirus Detected     Influenza B PCR Not Detected     Parainfluenza Virus 1 Not Detected     Parainfluenza Virus 2 Not Detected     Parainfluenza Virus 3 Not Detected     Parainfluenza Virus 4 Not Detected     Bordetella pertussis pcr Not Detected     Chlamydophila pneumoniae PCR Not Detected     Mycoplasma pneumo by PCR Not Detected     Influenza A PCR Not Detected     RSV, PCR Not Detected     Bordetella parapertussis PCR Not Detected    Narrative:      The coronavirus on the RVP is NOT COVID-19 and is NOT indicative of infection with COVID-19.    In the setting of a positive respiratory panel with a viral infection PLUS a negative procalcitonin without other underlying concern for bacterial infection, consider observing off antibiotics or discontinuation of antibiotics and continue supportive care. If the respiratory panel is positive for atypical bacterial infection (Bordetella pertussis, Chlamydophila pneumoniae, or Mycoplasma pneumoniae), consider antibiotic  de-escalation to target atypical bacterial infection.    Respiratory Culture - Sputum, Cough [[id number]] Collected: 06/06/21 1430    Lab Status: Preliminary result Specimen: Sputum from Cough Updated: 06/06/21 1651     Gram Stain Less than 25 Epithelial cells per low power field      Moderate (3+) WBCs seen      Few (2+) Gram positive cocci in pairs      Rare (1+) Gram positive cocci in clusters      Rare (1+) Gram negative cocci           Antibiotic support:  Anti-Infectives (From admission, onward)    Ordered     Dose/Rate Route Frequency Start Stop    06/06/21 1451  vancomycin 1250 mg/250 mL 0.9% NS IVPB ([facility])     Ordering Provider: Joo Jimenez DO    1,250 mg  over 60 Minutes Intravenous Every 12 Hours 06/07/21 0400 06/13/21 0359    06/06/21 1451  piperacillin-tazobactam (ZOSYN) 4.5 g/100 mL 0.9% NS IVPB (mbp)     Ordering Provider: Joo Jimenez DO    4.5 g  over 4 Hours Intravenous Every 8 Hours 06/06/21 2200 06/13/21 2159    06/06/21 1451  piperacillin-tazobactam (ZOSYN) 4.5 g/100 mL 0.9% NS IVPB (mbp)     Ordering Provider: Joo Jimenez DO    4.5 g  over 30 Minutes Intravenous Once 06/06/21 1600 06/06/21 1612    06/06/21 1451  vancomycin 2500 mg/500 mL 0.9% NS IVPB ([facility])     Ordering Provider: Joo Jimenez DO    2,500 mg  over 180 Minutes Intravenous Once 06/06/21 1600 06/06/21 2003 05/24/21 1050  cefdinir (OMNICEF) capsule 300 mg     Ordering Provider: Dequan Connelly MD    300 mg Oral Every 12 Hours Scheduled 05/24/21 2100 05/28/21 2059 05/22/21 0347  azithromycin 500 MG/250 ML 0.9% NS IVPB (MBP)     Ordering Provider: Nora Gomez PA-C    500 mg  over 60 Minutes Intravenous Every 24 Hours 05/22/21 0500 05/26/21 0611    05/21/21 2055  doxycycline (VIBRAMYCIN) 100 mg/100 mL 0.9% NS MBP     Ordering Provider: Rakan Bills MD    100 mg  over 60 Minutes Intravenous Once 05/21/21 2057 05/21/21 2220 05/21/21 2055  cefTRIAXone  (ROCEPHIN) 1 g/100 mL 0.9% NS (MBP)     Ordering Provider: Rakan Bills MD    1 g  over 30 Minutes Intravenous Once 05/21/21 2056 05/21/21 2136           VTE prophylaxis  Mechanical Order History:     None      Pharmalogical Order History:      Ordered     Dose Route Frequency Stop    05/23/21 1031  apixaban (ELIQUIS) tablet 5 mg      5 mg PO Every 12 Hours Scheduled --    05/22/21 0214  heparin (porcine) 5000 UNIT/ML injection 5,000 Units  Status:  Discontinued      5,000 Units SC Every 12 Hours Scheduled 05/23/21 1031                Code status  Code Status and Medical Interventions:   Ordered at: 05/22/21 0057     Code Status:    CPR     Medical Interventions (Level of Support Prior to Arrest):    Full        Active diet orders:  Dietary Orders (From admission, onward)     Start     Ordered    05/28/21 1329  Diet Regular; Cardiac, Consistent Carbohydrate  Diet Effective Now     Question Answer Comment   Diet Texture / Consistency Regular    Common Modifiers Cardiac    Common Modifiers Consistent Carbohydrate        05/28/21 1329                  Persistent past medical history of neuropathy, myocardial infarction, COPD and atrial fibrillation.  Former smoker.  60-pack-year smoking history.  Pertinent home medications include albuterol, Breztri aerosol inhaler, Lasix, apixaban, duo nebs.     Result review: ABG reviewed.  Cardiac troponin within normal limits.  Serum creatinine within normal limits.  Worsening leukocytosis.  Elevated ANC.  Platelet count normal.  Hemoglobin stable.  Respiratory viral panel positive for human rhinovirus/enterovirus.     EKG on 6/6/2021 showed QTC of 442 ms.  NV interval of 186 ms.  Sinus tachycardia.  QRS duration of 92 ms.     Results for orders placed during the hospital encounter of 05/21/21     Adult Transthoracic Echo Complete W/ Cont if Necessary Per Protocol     Interpretation Summary  · Left ventricular ejection fraction appears to be 51 - 55%. Left ventricular  systolic function is normal.  · Left ventricular wall thickness is consistent with mild concentric hypertrophy.  · There is no evidence of pericardial effusion           Assessment:  65-year-old gentleman admitted to [facility] on 5/22/2021 with complaint of shortness of breath.     · Acute on chronic hypoxic respiratory failure; use 2 L/min of supplemental oxygen.  · Loss of left lung volume likely due to mucous plugging.  · Viral pneumonia due to human rhinovirus/enterovirus  · Obstructive sleep apnea on CPAP at bedtime  · COPD, former smoker; 60-pack-year smoking history  · UDS positive for amphetamines on admission.  · History of paroxysmal atrial fibrillation; anticoagulated with p.o. Eliquis        Plan:  Consider speech and swallow evaluation  Started patient on Mucomyst nebs.  Nursing communication placed: Keeping the patient on lateral rotation therapy.  Keep his left side up for 4 hours and right side up for 2 hours.  Alternate over 24 hours x 3 days.  This will help with gravitational drainage of the mucus.  On Symbicort nebs and duo nebs.  On IV Zosyn as per primary team  We will place the patient on Mucomyst nebs, left side up and on BiPAP with new settings.  We will repeat the chest x-ray in 4 hours to reevaluate the pulmonary parenchyma.  Added CoughAssist device.  Order blood cultures  Ordered respiratory culture from sputum.  Titrate FiO2 to keep SPO2 around 90%  Recommend incentive spirometer to avoid basal atelectasis  Recommend outpatient follow-up with pulmonary medicine on discharge.  Recommend aggressive PT OT while in hospital.  Recommend arranging for pulmonary rehabilitation on discharge.  I recommend discharging the patient on short acting beta agonist as rescue inhaler, long-acting muscarinic antagonist inhaler, long-acting beta agonist inhaler and inhaled steroid.  Please involve pharmacy for insurance clearance.      Scribed for Dr. Diane by Gabrielle Chan PA-C    I have  discussed the clinical plan with Dr. Connelly.  Nurses and respiratory therapist were updated about the plan.   I, Capo Diane M.D. attest that the above note accurately reflects the work and decisions made by me.  Patient was seen and evaluated by me, including history of present illness, physical exam, assessment, and treatment plan.  The above note was reviewed and edited by me.    Thank you for involving us in the care of the patient.  Capo Diane M.D  Pulmonary and Critical Care Medicine

## 2021-06-07 NOTE — CONSULTS
Reconsult.  Please refer to our most recent progress note.  Thank you.    Nora Bautista PA-C  1237 EST  6/7/2021

## 2021-06-07 NOTE — PROGRESS NOTES
PROGRESS NOTE         Patient Identification:  Name:  Иван Miller  Age:  65 y.o.  Sex:  male  :  1955  MRN:  [id number]  Visit Number:  [id number]  Primary Care Provider:  Rosi Thomas APRN         LOS: 16 days       ----------------------------------------------------------------------------------------------------------------------  Subjective       Chief Complaints:    Shortness of Breath        Interval History:      ID have been reconsulted for concerns of HCAP.  Early on 2021, a rapid response was called for the patient who was in respiratory distress.  He was transferred urgently to ICU and patient is now on 5 L/min high flow nasal cannula this morning, but was on BiPAP overnight.  Patient reports that he is overall feeling better today, but has felt somewhat feverish at times.  Admits to a productive cough.  Denies any worsened shortness of breath, chest pain, abdominal pain, nausea, vomiting, or diarrhea.  Afebrile, no diarrhea.  CRP is significantly worsened at 7.73.  WBC is worsened at 15.85.  Respiratory panel PCR on 2021 showed human rhinovirus/enterovirus.  Procalcitonin on 2021 was normal.  Respiratory culture on 2021 is in process.  Chest x-ray on 2021 showed interval development of prominent bilateral airspace disease.     Review of Systems:    Constitutional: no chills and night sweats.  Generalized fatigue. Fever.  Eyes: no eye drainage, itching or redness.   HEENT: no mouth sores, dysphagia or nose bleed.  Respiratory: no for shortness of breath.  Productive cough.  Cardiovascular: no chest pain, no palpitations, no orthopnea.  Gastrointestinal: no nausea, vomiting or diarrhea. No abdominal pain, hematemesis or rectal bleeding.  Genitourinary: no dysuria or polyuria.  Hematologic/lymphatic: no lymph node abnormalities, no easy bruising or easy bleeding.  Musculoskeletal: no muscle or joint pain.  Skin: No rash and no itching.  Neurological: no loss of  consciousness, no seizure, no headache.  Behavioral/Psych: no depression or suicidal ideation.  Endocrine: no hot flashes.  Immunologic: negative.    ----------------------------------------------------------------------------------------------------------------------      Objective       [facility] Meds:  apixaban, 5 mg, Oral, Q12H  atorvastatin, 20 mg, Oral, Daily  budesonide-formoterol, 2 puff, Inhalation, BID - RT  Canagliflozin, 300 mg, Oral, Daily  clopidogrel, 75 mg, Oral, Daily  docusate sodium, 100 mg, Oral, BID  fluticasone, 2 spray, Nasal, Daily  gabapentin, 600 mg, Oral, Q8H  insulin aspart, 0-9 Units, Subcutaneous, TID AC  insulin aspart, 30 Units, Subcutaneous, TID With Meals  insulin detemir, 40 Units, Subcutaneous, Daily  ipratropium-albuterol, 3 mL, Nebulization, Q6H - RT  lactobacillus acidophilus, 1 capsule, Oral, Daily  lidocaine, 2 patch, Transdermal, Q24H  lisinopril, 40 mg, Oral, Q24H  metoprolol succinate XL, 75 mg, Oral, Q24H  pantoprazole, 40 mg, Oral, Q AM  piperacillin-tazobactam, 4.5 g, Intravenous, Q8H  polyethylene glycol, 17 g, Oral, Daily  sodium chloride, 10 mL, Intravenous, Q12H  sodium chloride, 10 mL, Intravenous, Q12H  tamsulosin, 0.4 mg, Oral, Nightly  vancomycin, 1,250 mg, Intravenous, Q12H         ----------------------------------------------------------------------------------------------------------------------    Vital Signs:  Temp:  [97.6 °F (36.4 °C)-99 °F (37.2 °C)] 98.9 °F (37.2 °C)  Heart Rate:  [] 86  Resp:  [18-34] 22  BP: (101-168)/() 101/69  Mean Arterial Pressure (Non-Invasive) for the past 24 hrs (Last 3 readings):   Noninvasive MAP (mmHg)   06/07/21 0705 76   06/07/21 0605 96   06/07/21 0510 104     SpO2 Percentage    06/07/21 0605 06/07/21 0705 06/07/21 0706   SpO2: 99% 97% 97%     SpO2:  [80 %-100 %] 97 %  on  Flow (L/min):  [3-6] 5;   Device (Oxygen Therapy): NPPV/NIV    Body mass index is 47.31 kg/m².  Wt Readings from Last 3 Encounters:    06/07/21 (!) 145 kg (320 lb 6.4 oz)   11/25/19 127 kg (280 lb)   10/06/19 124 kg (273 lb 11.2 oz)        Intake/Output Summary (Last 24 hours) at 6/7/2021 0804  Last data filed at 6/7/2021 0600  Gross per 24 hour   Intake 2060.12 ml   Output 3100 ml   Net -1039.88 ml     Diet Regular; Cardiac, Consistent Carbohydrate  ----------------------------------------------------------------------------------------------------------------------      Physical Exam:    Constitutional: Morbidly obese  male is sitting up in bed in no apparent distress.  On nasal cannula this morning.  HENT:  Head: Normocephalic and atraumatic.  Mouth:  Moist mucous membranes.    Eyes:  Conjunctivae and EOM are normal.  No scleral icterus.  Neck:  Neck supple.  No JVD present.    Cardiovascular:  Normal rate, regular rhythm and normal heart sounds with no murmur. No edema.  Pulmonary/Chest:  No respiratory distress, no wheezes, no crackles, with normal breath sounds and good air movement.  Abdominal:  Soft.  Bowel sounds are normal.  No distension and no tenderness.  Shaffer catheter in place with yellow, clear urine.  Musculoskeletal:  No tenderness, and no deformity.  No swelling or redness of joints.  Mild edema bilateral lower extremities.   Neurological:  Alert and oriented to person, place, and time.  No facial droop.  No slurred speech.   Skin:  Skin is warm and dry.  No rash noted.  No pallor.   Psychiatric:  Normal mood and affect.  Behavior is normal.  ----------------------------------------------------------------------------------------------------------------------  Results from last 7 days   Lab Units 06/06/21  0624   TROPONIN T ng/mL 0.016         Results from last 7 days   Lab Units 06/07/21  0449   PH, ARTERIAL pH units 7.386   PO2 ART mm Hg 66.0*   PCO2, ARTERIAL mm Hg 56.5*   HCO3 ART mmol/L 33.8*     Results from last 7 days   Lab Units 06/07/21  0043 06/06/21  0725 06/06/21  0034 06/05/21  0037   CRP mg/dL 7.73*  --   0.50 0.47   WBC 10*3/mm3 15.85* 10.83*  --  7.43   HEMOGLOBIN g/dL 11.0* 12.7*  --  10.4*   HEMATOCRIT % 37.7 42.5  --  36.6*   MCV fL 85.5 83.3  --  85.5   MCHC g/dL 29.2* 29.9*  --  28.4*   PLATELETS 10*3/mm3 222 277  --  207     Results from last 7 days   Lab Units 06/07/21  0534 06/07/21  0043 06/06/21  0725 06/05/21  0037 06/02/21  0108   SODIUM mmol/L  --  141 139 140  --    POTASSIUM mmol/L  --  3.9 4.4 4.6 4.7   MAGNESIUM mg/dL 2.2  --   --   --  2.0   CHLORIDE mmol/L  --  103 101 102  --    CO2 mmol/L  --  29.5* 26.3 30.2*  --    BUN mg/dL  --  21 19 16  --    CREATININE mg/dL  --  0.95 0.91 0.89  --    EGFR IF NONAFRICN AM mL/min/1.73  --  79 83 86  --    CALCIUM mg/dL  --  8.9 9.5 9.2  --    GLUCOSE mg/dL  --  107* 252* 161*  --    ALBUMIN g/dL  --   --  3.62  --   --    BILIRUBIN mg/dL  --   --  0.4  --   --    ALK PHOS U/L  --   --  110  --   --    AST (SGOT) U/L  --   --  19  --   --    ALT (SGPT) U/L  --   --  17  --   --    Estimated Creatinine Clearance: 108.2 mL/min (by C-G formula based on SCr of 0.95 mg/dL).  No results found for: AMMONIA    Glucose   Date/Time Value Ref Range Status   06/07/2021 0707 140 (H) 70 - 130 mg/dL Final   06/06/2021 2133 154 (H) 70 - 130 mg/dL Final   06/06/2021 1616 135 (H) 70 - 130 mg/dL Final   06/06/2021 1123 175 (H) 70 - 130 mg/dL Final   06/06/2021 0853 216 (H) 70 - 130 mg/dL Final   06/06/2021 0724 182 (H) 70 - 130 mg/dL Final   06/06/2021 0614 193 (H) 70 - 130 mg/dL Final   06/05/2021 1910 171 (H) 70 - 130 mg/dL Final     Lab Results   Component Value Date    HGBA1C 8.80 (H) 05/21/2021     Lab Results   Component Value Date    TSH 2.100 05/21/2021    FREET4 0.95 05/21/2021       No results found for: BLOODCX  No results found for: URINECX  No results found for: WOUNDCX  No results found for: STOOLCX  No results found for: RESPCX  Pain Management Panel       Pain Management Panel Latest Ref Rng & Units 5/21/2021 10/4/2019    AMPHETAMINES SCREEN, URINE Negative  Positive(A) Positive(A)    BARBITURATES SCREEN Negative Negative Negative    BENZODIAZEPINE SCREEN, URINE Negative Negative Negative    BUPRENORPHINEUR Negative Negative Negative    COCAINE SCREEN, URINE Negative Negative Negative    METHADONE SCREEN, URINE Negative Negative Negative              ----------------------------------------------------------------------------------------------------------------------  Imaging Results (Last 24 Hours)       ** No results found for the last 24 hours. **            ----------------------------------------------------------------------------------------------------------------------    Assessment/Plan       Assessment/Plan     ASSESSMENT:    1.  Burned-out osteomyelitis  2.  HCAP    PLAN:    ID have been reconsulted for concerns of HCAP.  Early on 6/6/2021, a rapid response was called for the patient who was in respiratory distress.  He was transferred urgently to ICU and patient is now on 5 L/min high flow nasal cannula this morning, but was on BiPAP overnight.  Patient reports that he is overall feeling better today, but has felt somewhat feverish at times.  Admits to a productive cough.  Denies any worsened shortness of breath, chest pain, abdominal pain, nausea, vomiting, or diarrhea.  Afebrile, no diarrhea.  CRP is significantly worsened at 7.73.  WBC is worsened at 15.85.  Respiratory panel PCR on 6/6/2021 showed human rhinovirus/enterovirus.  Procalcitonin on 6/6/2021 was normal.  Respiratory culture on 6/6/2021 is in process.  Chest x-ray on 6/6/2021 showed interval development of prominent bilateral airspace disease.     MRI of the thoracic spine with and without contrast on 5/28/2021 showed postcontrast imaging demonstrating enhancement along the inferior endplate of T7 and superior endplate of T8 as well as some enhancement posteriorly adjacent to the cord but no enhancing mass is seen.  Again, as above there certainly could be associated with at least a mild  degree of discitis.  Moderate central canal narrowing at T7-T8 and T8-T9.  MRI of the thoracic spine without contrast on 5/28/2021 showed decreased height of T7 and T8 with extensive endplate changes.  Most of this does appear to be chronic.  I cannot exclude at least a mild degree of discitis.  There are spurs and disc bulges at T7-T8 and T8-T9 which do cause moderate central canal narrowing.  MRI of the cervical spine with and without contrast on 5/28/2021 showed the study is markedly degraded by patient motion.  No destructive bony lesion.  No contrast-enhancing abnormality.  MRI of the lumbar spine without contrast on 5/28/2021 showed facet arthropathy.  Disc bulges most notable at L3-L4 and L4-L5.  COVID-19 and flu A/B PCR on 5/21/2021 was negative.  Blood cultures on 5/21/2021 finalized as no growth.     Regarding osteomyelitis, we recommend follow-up with Dr. Villaseñor, as an outpatient, as patient does not appear to have active osteomyelitis at this time.    Regarding HCAP, would recommend to continue on Zosyn monotherapy for aspiration coverage and to avoid combination of vancomycin and Zosyn, as risk outweighs benefit.  We will follow closely and adjust antibiotic therapy as appropriate.    Code Status:   Code Status and Medical Interventions:   Ordered at: 05/22/21 0057     Code Status:    CPR     Medical Interventions (Level of Support Prior to Arrest):    Full     Scribed for Viviane Smith MD by TRAVIS Lord. 6/7/2021  13:01 EDT  TRAVIS Lord  06/07/21  08:04 EDT    Physician Attestation:    The documentation recorded by the scribe accurately reflects the service I personally performed and the decisions made by me.    Viviane Smith MD  Infectious Diseases  06/08/21  08:57 EDT

## 2021-06-07 NOTE — PLAN OF CARE
Goal Outcome Evaluation:  Pt remains alert and oriented.  Has been interchanged between 5LNC and Bipap today; tolerated well.  Reports he is less short of breath today.  VSS.  No BM. Adequate UOP.  Excellent appetite.  ALEN GLOVER.

## 2021-06-07 NOTE — PROGRESS NOTES
"Assisted By: Regina ROBERTSON    CC: Follow-up with acute on chronic respiratory failure    Interview History/HPI: Patient states he feels better after receiving Lasix therapy.  He has had significant urine output, he is continued to have urine output this morning as well.  He had 1100 cc out overnight.  He states he has had a cough occasionally productive of phlegm, he states that he had no elevated temperature prior to this decompensation approximate 48 hours ago.  He does not think he was making much progress with therapy prior to this.  He says before his fall several weeks ago he was walking fairly independently or with a \"walking stick\".  He is tolerating his diet, of note he has not received the scheduled 30 units of NovoLog insulin over the last 24 to 48 hours.  He has been receiving his Levemir.    ROS:     Vitals:    06/07/21 0805   BP: (!) 122/108   Pulse: 90   Resp: 26   Temp: 99.5 °F (37.5 °C)   SpO2: 93%         Intake/Output Summary (Last 24 hours) at 6/7/2021 0820  Last data filed at 6/7/2021 0600  Gross per 24 hour   Intake 2060.12 ml   Output 3100 ml   Net -1039.88 ml       EXAM: Patient is morbidly obese by BMI, he has no acute distress he has some tachypnea with normal conversation however, his saturation currently is 97% on 5 L oxygen.  He did wear his BiPAP overnight.  Shaffer catheter in place with some light to medium yellow clear urine.  Lungs have bilateral breath sounds that are clear anteriorly, no definite rhonchi rales or wheezing heard, heart is a regular rate and rhythm without murmur rub or gallop.  Abdomen is rounded soft benign bowel sounds active, no skin rashes, trace edema is noted,  strength certainly better in the right than the left left is about 3-4/5.  Right  strength is at least-5/5.  He can lift both legs off the bed and he can dorsi and plantar flex his feet.      EKG: Yesterday showed sinus rhythm some artifact PVC noted.    Tele: Sinus rhythm    LABS:     Lab Results " (last 48 hours)     Procedure Component Value Units Date/Time    POC Glucose Once [[id number]]  (Abnormal) Collected: 06/07/21 0707    Specimen: Blood Updated: 06/07/21 0732     Glucose 140 mg/dL     Magnesium [[id number]]  (Normal) Collected: 06/07/21 0534    Specimen: Blood Updated: 06/07/21 0636     Magnesium 2.2 mg/dL     Blood Gas, Arterial With Co-Ox [[id number]]  (Abnormal) Collected: 06/07/21 0449    Specimen: Arterial Blood Updated: 06/07/21 0453     Site Left Radial     Jayjay's Test N/A     pH, Arterial 7.386 pH units      pCO2, Arterial 56.5 mm Hg      Comment: 83 Value above reference range        pO2, Arterial 66.0 mm Hg      Comment: 84 Value below reference range        HCO3, Arterial 33.8 mmol/L      Comment: 83 Value above reference range        Base Excess, Arterial 7.4 mmol/L      O2 Saturation, Arterial 94.7 %      Hemoglobin, Blood Gas 10.7 g/dL      Comment: 84 Value below reference range        Hematocrit, Blood Gas 32.9 %      Comment: 84 Value below reference range        Oxyhemoglobin 92.9 %      Comment: 84 Value below reference range        Methemoglobin <1.00 %      Comment: 94 Value below reportable range < 1.0        Carboxyhemoglobin 2.0 %      A-a Gradiant 178.8 mmHg      CO2 Content 35.6 mmol/L      Temperature 0.0 C      Barometric Pressure for Blood Gas 728 mmHg      Modality BiPap     FIO2 45 %      Ventilator Mode NA     Set Mech Resp Rate 22.0     IPAP 20     Comment: Meter: [id number]     :  [id number]        EPA 8     Note --     Collected by [id number]     pH, Temp Corrected --     pCO2, Temperature Corrected --     pO2, Temperature Corrected --    Basic Metabolic Panel [[id number]]  (Abnormal) Collected: 06/07/21 0043    Specimen: Blood Updated: 06/07/21 0220     Glucose 107 mg/dL      BUN 21 mg/dL      Creatinine 0.95 mg/dL      Sodium 141 mmol/L      Potassium 3.9 mmol/L      Chloride 103 mmol/L      CO2 29.5 mmol/L      Calcium 8.9 mg/dL      eGFR Non African Amer  "79 mL/min/1.73      BUN/Creatinine Ratio 22.1     Anion Gap 8.5 mmol/L     Narrative:      GFR Normal >60  Chronic Kidney Disease <60  Kidney Failure <15      C-reactive Protein [[id number]]  (Abnormal) Collected: 06/07/21 0043    Specimen: Blood Updated: 06/07/21 0220     C-Reactive Protein 7.73 mg/dL     CBC (No Diff) [[id number]]  (Abnormal) Collected: 06/07/21 0043    Specimen: Blood Updated: 06/07/21 0220     WBC 15.85 10*3/mm3      RBC 4.41 10*6/mm3      Hemoglobin 11.0 g/dL      Hematocrit 37.7 %      MCV 85.5 fL      MCH 24.9 pg      MCHC 29.2 g/dL      RDW 16.8 %      RDW-SD 52.5 fl      MPV 9.4 fL      Platelets 222 10*3/mm3     POC Glucose Once [[id number]]  (Abnormal) Collected: 06/06/21 2133    Specimen: Blood Updated: 06/06/21 2148     Glucose 154 mg/dL     Procalcitonin [[id number]]  (Normal) Collected: 06/06/21 1434    Specimen: Blood Updated: 06/06/21 2100     Procalcitonin 0.09 ng/mL     Narrative:      As a Marker for Sepsis (Non-Neonates):     1. <0.5 ng/mL represents a low risk of severe sepsis and/or septic shock.  2. >2 ng/mL represents a high risk of severe sepsis and/or septic shock.    As a Marker for Lower Respiratory Tract Infections that require antibiotic therapy:  PCT on Admission     Antibiotic Therapy             6-12 Hrs later  >0.5                          Strongly Recommended            >0.25 - <0.5             Recommended  0.1 - 0.25                  Discouraged                       Remeasure/reassess PCT  <0.1                         Strongly Discouraged         Remeasure/reassess PCT      As 28 day mortality risk marker: \"Change in Procalcitonin Result\" (>80% or <=80%) if Day 0 (or Day 1) and Day 4 values are available. Refer to http://[email or web address]-pct-[email or web address]/    Change in PCT <=80 %   A decrease of PCT levels below or equal to 80% defines a positive change in PCT test result representing a higher risk for 28-day all-cause mortality of patients diagnosed with severe sepsis " or septic shock.    Change in PCT >80 %   A decrease of PCT levels of more than 80% defines a negative change in PCT result representing a lower risk for 28-day all-cause mortality of patients diagnosed with severe sepsis or septic shock.              Results may be falsely decreased if patient taking Biotin.     Respiratory Culture - Sputum, Cough [[id number]] Collected: 06/06/21 1430    Specimen: Sputum from Cough Updated: 06/06/21 1651     Gram Stain Less than 25 Epithelial cells per low power field      Moderate (3+) WBCs seen      Few (2+) Gram positive cocci in pairs      Rare (1+) Gram positive cocci in clusters      Rare (1+) Gram negative cocci    Respiratory Panel, PCR (WITHOUT COVID) - Swab, Nasopharynx [[id number]]  (Abnormal) Collected: 06/06/21 1528    Specimen: Swab from Nasopharynx Updated: 06/06/21 1634     ADENOVIRUS, PCR Not Detected     Coronavirus 229E Not Detected     Coronavirus HKU1 Not Detected     Coronavirus NL63 Not Detected     Coronavirus OC43 Not Detected     Human Metapneumovirus Not Detected     Human Rhinovirus/Enterovirus Detected     Influenza B PCR Not Detected     Parainfluenza Virus 1 Not Detected     Parainfluenza Virus 2 Not Detected     Parainfluenza Virus 3 Not Detected     Parainfluenza Virus 4 Not Detected     Bordetella pertussis pcr Not Detected     Chlamydophila pneumoniae PCR Not Detected     Mycoplasma pneumo by PCR Not Detected     Influenza A PCR Not Detected     RSV, PCR Not Detected     Bordetella parapertussis PCR Not Detected    Narrative:      The coronavirus on the RVP is NOT COVID-19 and is NOT indicative of infection with COVID-19.    In the setting of a positive respiratory panel with a viral infection PLUS a negative procalcitonin without other underlying concern for bacterial infection, consider observing off antibiotics or discontinuation of antibiotics and continue supportive care. If the respiratory panel is positive for atypical bacterial infection  (Bordetella pertussis, Chlamydophila pneumoniae, or Mycoplasma pneumoniae), consider antibiotic de-escalation to target atypical bacterial infection.    POC Glucose Once [[id number]]  (Abnormal) Collected: 06/06/21 1616    Specimen: Blood Updated: 06/06/21 1623     Glucose 135 mg/dL     Blood Gas, Arterial With Co-Ox [[id number]]  (Abnormal) Collected: 06/06/21 1508    Specimen: Arterial Blood Updated: 06/06/21 1512     Site Right Radial     Jayjay's Test Positive     pH, Arterial 7.388 pH units      pCO2, Arterial 56.0 mm Hg      Comment: 83 Value above reference range        pO2, Arterial 66.7 mm Hg      Comment: 84 Value below reference range        HCO3, Arterial 33.7 mmol/L      Comment: 83 Value above reference range        Base Excess, Arterial 7.1 mmol/L      O2 Saturation, Arterial 93.7 %      Comment: 84 Value below reference range        Hemoglobin, Blood Gas 12.6 g/dL      Comment: 84 Value below reference range        Hematocrit, Blood Gas 38.6 %      Oxyhemoglobin 92.4 %      Comment: 84 Value below reference range        Methemoglobin <1.00 %      Comment: 94 Value below reportable range < 1.0        Carboxyhemoglobin 1.4 %      A-a Gradiant 109.9 mmHg      CO2 Content 35.4 mmol/L      Temperature 0.0 C      Barometric Pressure for Blood Gas 729 mmHg      Modality BiPap     FIO2 35 %      Ventilator Mode NA     Set Mech Resp Rate 22.0     IPAP 20     Comment: Meter: [id number]     :  [id number]        EPAP 8     Note --     Collected by [id number]     pH, Temp Corrected --     pCO2, Temperature Corrected --     pO2, Temperature Corrected --    POC Glucose Once [[id number]]  (Abnormal) Collected: 06/06/21 1123    Specimen: Blood Updated: 06/06/21 1130     Glucose 175 mg/dL     POC Glucose Once [[id number]]  (Abnormal) Collected: 06/06/21 0853    Specimen: Blood Updated: 06/06/21 0907     Glucose 216 mg/dL     CBC & Differential [[id number]]  (Abnormal) Collected: 06/06/21 0725    Specimen: Blood  Updated: 06/06/21 0808    Narrative:      The following orders were created for panel order CBC & Differential.  Procedure                               Abnormality         Status                     ---------                               -----------         ------                     CBC Auto Differential[[id number]]        Abnormal            Final result                 Please view results for these tests on the individual orders.    CBC Auto Differential [[id number]]  (Abnormal) Collected: 06/06/21 0725    Specimen: Blood Updated: 06/06/21 0808     WBC 10.83 10*3/mm3      RBC 5.10 10*6/mm3      Hemoglobin 12.7 g/dL      Hematocrit 42.5 %      MCV 83.3 fL      MCH 24.9 pg      MCHC 29.9 g/dL      RDW 17.2 %      RDW-SD 52.4 fl      MPV 9.7 fL      Platelets 277 10*3/mm3      Neutrophil % 66.7 %      Lymphocyte % 19.4 %      Monocyte % 10.2 %      Eosinophil % 2.3 %      Basophil % 0.6 %      Immature Grans % 0.8 %      Neutrophils, Absolute 7.22 10*3/mm3      Lymphocytes, Absolute 2.10 10*3/mm3      Monocytes, Absolute 1.11 10*3/mm3      Eosinophils, Absolute 0.25 10*3/mm3      Basophils, Absolute 0.06 10*3/mm3      Immature Grans, Absolute 0.09 10*3/mm3      nRBC 0.3 /100 WBC     Comprehensive Metabolic Panel [[id number]]  (Abnormal) Collected: 06/06/21 0725    Specimen: Blood Updated: 06/06/21 0803     Glucose 252 mg/dL      BUN 19 mg/dL      Creatinine 0.91 mg/dL      Sodium 139 mmol/L      Potassium 4.4 mmol/L      Comment: Slight hemolysis detected by analyzer. Results may be affected.        Chloride 101 mmol/L      CO2 26.3 mmol/L      Calcium 9.5 mg/dL      Total Protein 7.4 g/dL      Albumin 3.62 g/dL      ALT (SGPT) 17 U/L      AST (SGOT) 19 U/L      Alkaline Phosphatase 110 U/L      Total Bilirubin 0.4 mg/dL      eGFR Non African Amer 83 mL/min/1.73      Globulin 3.8 gm/dL      A/G Ratio 1.0 g/dL      BUN/Creatinine Ratio 20.9     Anion Gap 11.7 mmol/L     Narrative:      GFR Normal >60  Chronic Kidney  Disease <60  Kidney Failure <15      POC Glucose Once [[id number]]  (Abnormal) Collected: 06/06/21 0724    Specimen: Blood Updated: 06/06/21 0735     Glucose 182 mg/dL     Troponin [[id number]]  (Normal) Collected: 06/06/21 0624    Specimen: Blood Updated: 06/06/21 0701     Troponin T 0.016 ng/mL     Narrative:      Troponin T Reference Range:  <= 0.03 ng/mL-   Negative for AMI  >0.03 ng/mL-     Abnormal for myocardial necrosis.  Clinicians would have to utilize clinical acumen, EKG, Troponin and serial changes to determine if it is an Acute Myocardial Infarction or myocardial injury due to an underlying chronic condition.       Results may be falsely decreased if patient taking Biotin.      Blood Gas, Arterial With Co-Ox [[id number]]  (Abnormal) Collected: 06/06/21 0630    Specimen: Arterial Blood Updated: 06/06/21 0637     Site Right Radial     Jayjay's Test Positive     pH, Arterial 7.239 pH units      Comment: 84 Value below reference range        pCO2, Arterial 77.5 mm Hg      Comment: 86 Value above critical limit        pO2, Arterial 73.6 mm Hg      Comment: 84 Value below reference range        HCO3, Arterial 33.1 mmol/L      Comment: 83 Value above reference range        Base Excess, Arterial 3.1 mmol/L      O2 Saturation, Arterial 91.4 %      Comment: 84 Value below reference range        Hemoglobin, Blood Gas 13.7 g/dL      Hematocrit, Blood Gas 42.1 %      Oxyhemoglobin 90.7 %      Comment: 84 Value below reference range        Methemoglobin <1.00 %      Comment: 94 Value below reportable range < 1.0        Carboxyhemoglobin 1.1 %      A-a Gradiant 530.9 mmHg      CO2 Content 35.5 mmol/L      Temperature 0.0 C      Barometric Pressure for Blood Gas 729 mmHg      Modality BiPap     FIO2 100 %      Ventilator Mode NA     Set Mech Resp Rate 22.0     IPAP 20     Comment: Meter: [id number]     :  [id number]        EPAP 8     Note --     Notified Who RT MANJLUA SIFUENTES     Notified By [id number]     Notified Time  06/06/2021 06:36     Collected by [id number]     pH, Temp Corrected --     pCO2, Temperature Corrected --     pO2, Temperature Corrected --    POC Glucose Once [[id number]]  (Abnormal) Collected: 06/06/21 0614    Specimen: Blood Updated: 06/06/21 0622     Glucose 193 mg/dL     C-reactive Protein [[id number]]  (Normal) Collected: 06/06/21 0034    Specimen: Blood Updated: 06/06/21 0151     C-Reactive Protein 0.50 mg/dL     POC Glucose Once [[id number]]  (Abnormal) Collected: 06/05/21 1910    Specimen: Blood Updated: 06/05/21 1916     Glucose 171 mg/dL     POC Glucose Once [[id number]]  (Normal) Collected: 06/05/21 1617    Specimen: Blood Updated: 06/05/21 1624     Glucose 125 mg/dL                Radiology:    Imaging Results (Last 72 Hours)     Procedure Component Value Units Date/Time    XR Chest 1 View [[id number]] Collected: 06/06/21 0708     Updated: 06/06/21 0710    Narrative:      CR Chest 1 Vw    INDICATION:   Acute respiratory failure.     COMPARISON:    5/30/2021    FINDINGS:  Single portable AP view(s) of the chest.    No visible support lines.    The heart size is the upper limits of normal. Trachea is midline. Interval development of prominent bilateral airspace disease. No obvious pleural fluid. No pneumothorax.       Impression:      Interval development of prominent bilateral airspace disease when compared to the study of 5/30/2021.    Signer Name: Cristian Guillaume MD   Signed: 6/6/2021 7:08 AM   Workstation Name: [id number]    [facility]          Results for orders placed during the hospital encounter of 05/21/21    Adult Transthoracic Echo Complete W/ Cont if Necessary Per Protocol    Interpretation Summary  · Left ventricular ejection fraction appears to be 51 - 55%. Left ventricular systolic function is normal.  · Left ventricular wall thickness is consistent with mild concentric hypertrophy.  · There is no evidence of pericardial effusion      Assessment/Plan:   Acute hypoxic and  hypercarbic respiratory failure superimposed on chronic lung disease Home O2 dependent 3 L with BiPAP at home as well.  This was secondary to bilateral infiltrates that were new, this was either infectious or pulmonary edema.  Troponins were negative, I am going to check BNP in the a.m.  Patient was given Lasix and he has diuresed well and states he is feeling better.  I am holding on further Lasix at this time as his blood pressure is marginal and he is still having significant output, repeat chest x-ray is pending.  Patient did test positive for rhinovirus and may at least be part of this process however this would be unusual causes bilateral infiltrates, I have sent a Covid swab as well.  Sputum Gram stain was polymicrobial.  Patient's initial Covid test was negative.  I have asked infectious disease to evaluate antibiotic and best choice.  CRP and white count have elevated significantly over yesterday.  Pulmonary is going to evaluate, ABG is compensated this a.m.    Severe sepsis with respiratory failure superimposed on pneumonia with elevated CRP and white count.  He did require BiPAP.  Being treated as above.    SVT last night, this looks like A. fib RVR on my review of the strips, he has a history of A. fib, he is chronically anticoagulated with Eliquis for stroke prevention.    DVT prophylaxis, Eliquis will serve for this as well    Weakness, continue PT and OT, he does have spinal disease and possibly burned-out osteomyelitis but neurosurgery is only going to see him as an outpatient.  There is nothing acute to do.    Diabetes, controlled, continue Levemir Invokana and sliding scale insulin.    Chronic pain, he is now in bed he states he is having worsened discomfort and would like his medication adjusted, Norco increased to 7.5.    Disposition yet to be determined he will most likely need some form of rehabilitation unit.    Dequan Connelly MD

## 2021-06-08 LAB
ALBUMIN SERPL-MCNC: 2.98 G/DL (ref 3.5–5.2)
ALBUMIN/GLOB SERPL: 0.9 G/DL
ALP SERPL-CCNC: 74 U/L (ref 39–117)
ALT SERPL W P-5'-P-CCNC: 11 U/L (ref 1–41)
ANION GAP SERPL CALCULATED.3IONS-SCNC: 4.8 MMOL/L (ref 5–15)
ANISOCYTOSIS BLD QL: NORMAL
AST SERPL-CCNC: 10 U/L (ref 1–40)
B PARAPERT DNA SPEC QL NAA+PROBE: NOT DETECTED
B PERT DNA SPEC QL NAA+PROBE: NOT DETECTED
BACTERIA SPEC RESP CULT: NORMAL
BACTERIA UR QL AUTO: ABNORMAL /HPF
BASOPHILS # BLD AUTO: 0.04 10*3/MM3 (ref 0–0.2)
BASOPHILS NFR BLD AUTO: 0.4 % (ref 0–1.5)
BILIRUB SERPL-MCNC: 0.4 MG/DL (ref 0–1.2)
BILIRUB UR QL STRIP: NEGATIVE
BUN SERPL-MCNC: 22 MG/DL (ref 8–23)
BUN/CREAT SERPL: 19.8 (ref 7–25)
C PNEUM DNA NPH QL NAA+NON-PROBE: NOT DETECTED
CALCIUM SPEC-SCNC: 9.1 MG/DL (ref 8.6–10.5)
CHLORIDE SERPL-SCNC: 103 MMOL/L (ref 98–107)
CLARITY UR: CLEAR
CO2 SERPL-SCNC: 31.2 MMOL/L (ref 22–29)
COLOR UR: YELLOW
CREAT SERPL-MCNC: 1.11 MG/DL (ref 0.76–1.27)
CRP SERPL-MCNC: 14.26 MG/DL (ref 0–0.5)
DEPRECATED RDW RBC AUTO: 52.7 FL (ref 37–54)
EOSINOPHIL # BLD AUTO: 0.25 10*3/MM3 (ref 0–0.4)
EOSINOPHIL NFR BLD AUTO: 2.5 % (ref 0.3–6.2)
ERYTHROCYTE [DISTWIDTH] IN BLOOD BY AUTOMATED COUNT: 16.9 % (ref 12.3–15.4)
FLUAV SUBTYP SPEC NAA+PROBE: NOT DETECTED
FLUBV RNA ISLT QL NAA+PROBE: NOT DETECTED
GFR SERPL CREATININE-BSD FRML MDRD: 66 ML/MIN/1.73
GLOBULIN UR ELPH-MCNC: 3.3 GM/DL
GLUCOSE BLDC GLUCOMTR-MCNC: 145 MG/DL (ref 70–130)
GLUCOSE BLDC GLUCOMTR-MCNC: 165 MG/DL (ref 70–130)
GLUCOSE BLDC GLUCOMTR-MCNC: 176 MG/DL (ref 70–130)
GLUCOSE BLDC GLUCOMTR-MCNC: 248 MG/DL (ref 70–130)
GLUCOSE SERPL-MCNC: 191 MG/DL (ref 65–99)
GLUCOSE UR STRIP-MCNC: ABNORMAL MG/DL
GRAM STN SPEC: NORMAL
HADV DNA SPEC NAA+PROBE: NOT DETECTED
HCOV 229E RNA SPEC QL NAA+PROBE: NOT DETECTED
HCOV HKU1 RNA SPEC QL NAA+PROBE: NOT DETECTED
HCOV NL63 RNA SPEC QL NAA+PROBE: NOT DETECTED
HCOV OC43 RNA SPEC QL NAA+PROBE: NOT DETECTED
HCT VFR BLD AUTO: 36.3 % (ref 37.5–51)
HGB BLD-MCNC: 10.3 G/DL (ref 13–17.7)
HGB UR QL STRIP.AUTO: ABNORMAL
HMPV RNA NPH QL NAA+NON-PROBE: NOT DETECTED
HPIV1 RNA SPEC QL NAA+PROBE: NOT DETECTED
HPIV2 RNA SPEC QL NAA+PROBE: NOT DETECTED
HPIV3 RNA NPH QL NAA+PROBE: NOT DETECTED
HPIV4 P GENE NPH QL NAA+PROBE: NOT DETECTED
HYALINE CASTS UR QL AUTO: ABNORMAL /LPF
HYPOCHROMIA BLD QL: NORMAL
IMM GRANULOCYTES # BLD AUTO: 0.03 10*3/MM3 (ref 0–0.05)
IMM GRANULOCYTES NFR BLD AUTO: 0.3 % (ref 0–0.5)
KETONES UR QL STRIP: NEGATIVE
LEUKOCYTE ESTERASE UR QL STRIP.AUTO: NEGATIVE
LYMPHOCYTES # BLD AUTO: 0.9 10*3/MM3 (ref 0.7–3.1)
LYMPHOCYTES NFR BLD AUTO: 8.9 % (ref 19.6–45.3)
M PNEUMO IGG SER IA-ACNC: NOT DETECTED
MCH RBC QN AUTO: 24.2 PG (ref 26.6–33)
MCHC RBC AUTO-ENTMCNC: 28.4 G/DL (ref 31.5–35.7)
MCV RBC AUTO: 85.2 FL (ref 79–97)
MONOCYTES # BLD AUTO: 0.88 10*3/MM3 (ref 0.1–0.9)
MONOCYTES NFR BLD AUTO: 8.7 % (ref 5–12)
MRSA DNA SPEC QL NAA+PROBE: POSITIVE
NEUTROPHILS NFR BLD AUTO: 79.2 % (ref 42.7–76)
NEUTROPHILS NFR BLD AUTO: 8.02 10*3/MM3 (ref 1.7–7)
NITRITE UR QL STRIP: NEGATIVE
NRBC BLD AUTO-RTO: 0 /100 WBC (ref 0–0.2)
NT-PROBNP SERPL-MCNC: 366 PG/ML (ref 0–900)
PH UR STRIP.AUTO: <=5 [PH] (ref 5–8)
PLAT MORPH BLD: NORMAL
PLATELET # BLD AUTO: 212 10*3/MM3 (ref 140–450)
PMV BLD AUTO: 9.1 FL (ref 6–12)
POTASSIUM SERPL-SCNC: 4.4 MMOL/L (ref 3.5–5.2)
PROT SERPL-MCNC: 6.3 G/DL (ref 6–8.5)
PROT UR QL STRIP: ABNORMAL
RBC # BLD AUTO: 4.26 10*6/MM3 (ref 4.14–5.8)
RBC # UR: ABNORMAL /HPF
REF LAB TEST METHOD: ABNORMAL
RHINOVIRUS RNA SPEC NAA+PROBE: NOT DETECTED
RSV RNA NPH QL NAA+NON-PROBE: NOT DETECTED
S AUREUS DNA SPEC QL NAA+PROBE: POSITIVE
SODIUM SERPL-SCNC: 139 MMOL/L (ref 136–145)
SP GR UR STRIP: 1.03 (ref 1–1.03)
SQUAMOUS #/AREA URNS HPF: ABNORMAL /HPF
STOMATOCYTES BLD QL SMEAR: NORMAL
UROBILINOGEN UR QL STRIP: ABNORMAL
WBC # BLD AUTO: 10.12 10*3/MM3 (ref 3.4–10.8)
WBC UR QL AUTO: ABNORMAL /HPF

## 2021-06-08 PROCEDURE — 63710000001 INSULIN DETEMIR PER 5 UNITS: Performed by: INTERNAL MEDICINE

## 2021-06-08 PROCEDURE — 85025 COMPLETE CBC W/AUTO DIFF WBC: CPT | Performed by: INTERNAL MEDICINE

## 2021-06-08 PROCEDURE — 80053 COMPREHEN METABOLIC PANEL: CPT | Performed by: INTERNAL MEDICINE

## 2021-06-08 PROCEDURE — 87633 RESP VIRUS 12-25 TARGETS: CPT | Performed by: PHYSICIAN ASSISTANT

## 2021-06-08 PROCEDURE — 63710000001 INSULIN ASPART PER 5 UNITS: Performed by: PHYSICIAN ASSISTANT

## 2021-06-08 PROCEDURE — 94799 UNLISTED PULMONARY SVC/PX: CPT

## 2021-06-08 PROCEDURE — 25010000002 PIPERACILLIN SOD-TAZOBACTAM PER 1 G: Performed by: INTERNAL MEDICINE

## 2021-06-08 PROCEDURE — 82962 GLUCOSE BLOOD TEST: CPT

## 2021-06-08 PROCEDURE — 85007 BL SMEAR W/DIFF WBC COUNT: CPT | Performed by: INTERNAL MEDICINE

## 2021-06-08 PROCEDURE — 87640 STAPH A DNA AMP PROBE: CPT | Performed by: INTERNAL MEDICINE

## 2021-06-08 PROCEDURE — 99232 SBSQ HOSP IP/OBS MODERATE 35: CPT | Performed by: INTERNAL MEDICINE

## 2021-06-08 PROCEDURE — 94660 CPAP INITIATION&MGMT: CPT

## 2021-06-08 PROCEDURE — 83880 ASSAY OF NATRIURETIC PEPTIDE: CPT | Performed by: INTERNAL MEDICINE

## 2021-06-08 PROCEDURE — 87641 MR-STAPH DNA AMP PROBE: CPT | Performed by: INTERNAL MEDICINE

## 2021-06-08 PROCEDURE — 81001 URINALYSIS AUTO W/SCOPE: CPT | Performed by: INTERNAL MEDICINE

## 2021-06-08 PROCEDURE — 86140 C-REACTIVE PROTEIN: CPT | Performed by: PHYSICIAN ASSISTANT

## 2021-06-08 RX ADMIN — INSULIN DETEMIR 40 UNITS: 100 INJECTION, SOLUTION SUBCUTANEOUS at 09:04

## 2021-06-08 RX ADMIN — FLUTICASONE PROPIONATE 2 SPRAY: 50 SPRAY, METERED NASAL at 08:54

## 2021-06-08 RX ADMIN — POLYETHYLENE GLYCOL (3350) 17 G: 17 POWDER, FOR SOLUTION ORAL at 08:55

## 2021-06-08 RX ADMIN — IPRATROPIUM BROMIDE AND ALBUTEROL SULFATE 3 ML: .5; 3 SOLUTION RESPIRATORY (INHALATION) at 06:44

## 2021-06-08 RX ADMIN — IPRATROPIUM BROMIDE AND ALBUTEROL SULFATE 3 ML: .5; 3 SOLUTION RESPIRATORY (INHALATION) at 00:21

## 2021-06-08 RX ADMIN — ACETYLCYSTEINE 3 ML: 200 SOLUTION ORAL; RESPIRATORY (INHALATION) at 12:51

## 2021-06-08 RX ADMIN — METOPROLOL SUCCINATE 75 MG: 25 TABLET, EXTENDED RELEASE ORAL at 08:54

## 2021-06-08 RX ADMIN — PIPERACILLIN SODIUM AND TAZOBACTAM SODIUM 4.5 G: 4; .5 INJECTION, POWDER, LYOPHILIZED, FOR SOLUTION INTRAVENOUS at 22:29

## 2021-06-08 RX ADMIN — BUDESONIDE AND FORMOTEROL FUMARATE DIHYDRATE 2 PUFF: 160; 4.5 AEROSOL RESPIRATORY (INHALATION) at 18:56

## 2021-06-08 RX ADMIN — SODIUM CHLORIDE, PRESERVATIVE FREE 10 ML: 5 INJECTION INTRAVENOUS at 20:24

## 2021-06-08 RX ADMIN — GABAPENTIN 600 MG: 300 CAPSULE ORAL at 06:20

## 2021-06-08 RX ADMIN — TAMSULOSIN HYDROCHLORIDE 0.4 MG: 0.4 CAPSULE ORAL at 20:25

## 2021-06-08 RX ADMIN — HYDROCODONE BITARTRATE AND ACETAMINOPHEN 1 TABLET: 7.5; 325 TABLET ORAL at 08:54

## 2021-06-08 RX ADMIN — HYDROCODONE BITARTRATE AND ACETAMINOPHEN 1 TABLET: 7.5; 325 TABLET ORAL at 20:25

## 2021-06-08 RX ADMIN — SODIUM CHLORIDE, PRESERVATIVE FREE 10 ML: 5 INJECTION INTRAVENOUS at 08:55

## 2021-06-08 RX ADMIN — APIXABAN 5 MG: 5 TABLET, FILM COATED ORAL at 20:25

## 2021-06-08 RX ADMIN — CANAGLIFLOZIN 300 MG: 300 TABLET, FILM COATED ORAL at 08:53

## 2021-06-08 RX ADMIN — APIXABAN 5 MG: 5 TABLET, FILM COATED ORAL at 08:53

## 2021-06-08 RX ADMIN — GABAPENTIN 600 MG: 300 CAPSULE ORAL at 14:17

## 2021-06-08 RX ADMIN — ACETYLCYSTEINE 3 ML: 200 SOLUTION ORAL; RESPIRATORY (INHALATION) at 00:21

## 2021-06-08 RX ADMIN — DOCUSATE SODIUM 100 MG: 100 CAPSULE, LIQUID FILLED ORAL at 08:53

## 2021-06-08 RX ADMIN — ACETYLCYSTEINE 3 ML: 200 SOLUTION ORAL; RESPIRATORY (INHALATION) at 06:44

## 2021-06-08 RX ADMIN — GABAPENTIN 600 MG: 300 CAPSULE ORAL at 22:29

## 2021-06-08 RX ADMIN — IPRATROPIUM BROMIDE AND ALBUTEROL SULFATE 3 ML: .5; 3 SOLUTION RESPIRATORY (INHALATION) at 18:56

## 2021-06-08 RX ADMIN — LIDOCAINE 2 PATCH: 50 PATCH CUTANEOUS at 08:55

## 2021-06-08 RX ADMIN — DOCUSATE SODIUM 100 MG: 100 CAPSULE, LIQUID FILLED ORAL at 20:25

## 2021-06-08 RX ADMIN — ACETYLCYSTEINE 3 ML: 200 SOLUTION ORAL; RESPIRATORY (INHALATION) at 18:56

## 2021-06-08 RX ADMIN — ETHYL ALCOHOL 1 EACH: 62 SWAB TOPICAL at 20:25

## 2021-06-08 RX ADMIN — HYDROXYZINE HYDROCHLORIDE 25 MG: 25 TABLET, FILM COATED ORAL at 08:53

## 2021-06-08 RX ADMIN — SODIUM CHLORIDE, PRESERVATIVE FREE 10 ML: 5 INJECTION INTRAVENOUS at 20:25

## 2021-06-08 RX ADMIN — PIPERACILLIN SODIUM AND TAZOBACTAM SODIUM 4.5 G: 4; .5 INJECTION, POWDER, LYOPHILIZED, FOR SOLUTION INTRAVENOUS at 14:17

## 2021-06-08 RX ADMIN — PIPERACILLIN SODIUM AND TAZOBACTAM SODIUM 4.5 G: 4; .5 INJECTION, POWDER, LYOPHILIZED, FOR SOLUTION INTRAVENOUS at 08:54

## 2021-06-08 RX ADMIN — PANTOPRAZOLE SODIUM 40 MG: 40 TABLET, DELAYED RELEASE ORAL at 06:20

## 2021-06-08 RX ADMIN — IPRATROPIUM BROMIDE AND ALBUTEROL SULFATE 3 ML: .5; 3 SOLUTION RESPIRATORY (INHALATION) at 12:51

## 2021-06-08 RX ADMIN — CLOPIDOGREL 75 MG: 75 TABLET, FILM COATED ORAL at 08:53

## 2021-06-08 RX ADMIN — ATORVASTATIN CALCIUM 20 MG: 20 TABLET, FILM COATED ORAL at 08:53

## 2021-06-08 RX ADMIN — Medication 1 CAPSULE: at 08:53

## 2021-06-08 RX ADMIN — INSULIN ASPART 4 UNITS: 100 INJECTION, SOLUTION INTRAVENOUS; SUBCUTANEOUS at 10:34

## 2021-06-08 RX ADMIN — Medication 5 MG: at 20:25

## 2021-06-08 RX ADMIN — INSULIN ASPART 2 UNITS: 100 INJECTION, SOLUTION INTRAVENOUS; SUBCUTANEOUS at 17:50

## 2021-06-08 RX ADMIN — LISINOPRIL 40 MG: 10 TABLET ORAL at 08:53

## 2021-06-08 NOTE — PROGRESS NOTES
Discharge Planning Assessment  [facility]     Patient Name: Иван Miller  MRN: [id number]  Today's Date: 6/8/2021    Admit Date: 5/21/2021    Discharge Plan     Row Name 06/08/21 1446       Plan    Plan  SS spoke with Harish at [facility]. Harish reported that Pt had been approved by insurance. SS notified Dr. Connelly who reported that Pt would possibly be ready for discharge in the next 48 hours. SS will continue to follow and assist with discharge planning.    Patient/Family in Agreement with Plan  yes        Continued Care and Services - Admitted Since 5/21/2021     Destination     Service Provider Request Status Selected Services Address Phone Fax Patient Preferred    [facility]  Pending - Request Sent N/A [address] [facility] [address] [phone] --    [facility]  Declined  Current or History of Substance Abuse N/A [address] [facility] [address] [phone] --    [facility]  Declined  Unable to Meet Patient Needs N/A [address] [place] [id number] [phone] --    [facility]  Declined  out of network with pt's insurance N/A [address] [place] [address] [phone] --    [facility]  Declined  Current or History of Substance Abuse N/A [address] [place] [id number] [phone] --    [facility]  Declined  No Bed Available N/A [address] [place] [id number] [phone] --                ALENA Alex

## 2021-06-08 NOTE — PLAN OF CARE
Goal Outcome Evaluation:              Outcome Summary: Pt continues to use 5L bubble HF NC and tolerating well. Pt has complained of neck and back pain this shift, prn meds given. Pt was moved from bariatric bed per request and is resting with no distress noted. Will continue to monitor and follow plan of care.

## 2021-06-08 NOTE — PROGRESS NOTES
"Chief Complaint:    Chief Complaint   Patient presents with   • Shortness of Breath        Interval History:   Patient is notable for use of nasal cannula on 5 L/min.  He remains on for intermittent BiPAP use and is tolerating this well.  Shortness of breath is at baseline without significant worsening at this time.  He reports some joint stiffness, but no other complaints currently.  Chest x-ray notable for interval improvement of left lung following.       Review of Systems:   Review of Systems - History obtained from chart review and the patient  General ROS: negative for - chills, fever  Psychological ROS: negative for - anxiety or depression  ENT ROS: negative for - headaches or vocal changes  Allergy and Immunology ROS: negative for - nasal congestion or postnasal drip  Endocrine ROS: negative for - polydipsia/polyuria  Respiratory ROS: positive for - shortness of breath, improving  negative for - wheezing  Cardiovascular ROS: negative for - chest pain or palpitations  Gastrointestinal ROS: negative for - abdominal pain or change in bowel habits  Musculoskeletal ROS: Positive for - joint pain, joint stiffness  Neurological ROS: no TIA or stroke symptoms        Vital Signs    height is 175.3 cm (69\") and weight is 145 kg (320 lb) (abnormal). His oral temperature is 97.5 °F (36.4 °C). His blood pressure is 159/95 and his pulse is 89. His respiration is 18 and oxygen saturation is 94%.        Physical Exam:  Constitutional:  oriented to person, place, and time. No acute distress.  Tolerating nasal cannula  Head: Normocephalic and atraumatic.   Right Ear and left Ear : External ear normal.   Nose: Nose normal.   Eyes: Pupils are equal, round, and reactive to light.  No scleral icterus.   Neck: Normal range of motion. Neck supple.    Cardiovascular: Normal rate, regular rhythm, normal heart sounds.  No murmur heard.  Pulmonary: Bilateral air entry positive  No wheezing, rhonchi, crackles. Breath sounds improved " throughout the left lung as compared to diminished sounds yesterday.   Abdominal: Soft. Bowel sounds are normal. Exhibits no distension. There is no tenderness.   Musculoskeletal: Normal range of motion. Exhibits no deformity.   Neurological: alert and oriented to person, place, and time. No cranial nerve deficit. Coordination normal.   Skin: Skin is warm and dry. No erythema.   Psychiatric:   normal mood and affect.   behavior is normal.         Medication:  • Active medication:  acetylcysteine, 3 mL, Nebulization, Q6H - RT  apixaban, 5 mg, Oral, Q12H  atorvastatin, 20 mg, Oral, Daily  budesonide-formoterol, 2 puff, Inhalation, BID - RT  Canagliflozin, 300 mg, Oral, Daily  clopidogrel, 75 mg, Oral, Daily  docusate sodium, 100 mg, Oral, BID  fluticasone, 2 spray, Nasal, Daily  gabapentin, 600 mg, Oral, Q8H  insulin aspart, 0-9 Units, Subcutaneous, TID AC  insulin detemir, 40 Units, Subcutaneous, Daily  ipratropium-albuterol, 3 mL, Nebulization, Q6H - RT  lactobacillus acidophilus, 1 capsule, Oral, Daily  lidocaine, 2 patch, Transdermal, Q24H  lisinopril, 40 mg, Oral, Q24H  metoprolol succinate XL, 75 mg, Oral, Q24H  pantoprazole, 40 mg, Oral, Q AM  piperacillin-tazobactam, 4.5 g, Intravenous, Q8H  polyethylene glycol, 17 g, Oral, Daily  sodium chloride, 10 mL, Intravenous, Q12H  sodium chloride, 10 mL, Intravenous, Q12H  tamsulosin, 0.4 mg, Oral, Nightly        • Continuous Infusions:          Scribed for Dr. Diane by Gabrielle Chan PA-C  Result review: No leukocytosis.  Elevated ANC.  Hemoglobin stable.  Platelet count stable.Blood cultures pending.  Respiratory culture on 6/6/2021 Showed heavy growth of normal respiratory crystal.          Assessment:  65-year-old gentleman admitted to [facility] on 5/22/2021 with complaint of shortness of breath.     · Acute on chronic hypoxic respiratory failure; use 3 L/min of supplemental oxygen: Improving  · Loss of left lung volume likely due to mucous plugging:  Improved  · Viral pneumonia due to human rhinovirus/enterovirus  · Obstructive sleep apnea on CPAP at bedtime  · COPD, former smoker; 60-pack-year smoking history  · UDS positive for amphetamines on admission.  · History of paroxysmal atrial fibrillation; anticoagulated with p.o. Eliquis     Plan:  Consider speech and swallow evaluation  On Mucomyst nebs  Nursing communication placed: Keeping the patient on lateral rotation therapy.  Keep his left side up for 4 hours and right side up for 2 hours.  Alternate over 24 hours x 3 days.  This will help with gravitational drainage of the mucus.  BiPAP at at bedtime and as needed  On duo nebs and Symbicort nebs  On IV Zosyn as per primary team  CoughAssist device.  Recommend following blood cultures.  Titrate FiO2 to keep SPO2 around 90%  Recommend incentive spirometer to avoid basal atelectasis  Recommend outpatient follow-up with pulmonary medicine on discharge.  Recommend aggressive PT OT while in hospital.  Recommend arranging for pulmonary rehabilitation on discharge.  I recommend discharging the patient on short acting beta agonist as rescue inhaler, long-acting muscarinic antagonist inhaler, long-acting beta agonist inhaler and inhaled steroid.  Please involve pharmacy for insurance clearance.         We will sign off.  Please call us in case of any other questions.        I, Capo Diane M.D. attest that the above note accurately reflects the work and decisions made by me.  Patient was seen and evaluated by me, including history of present illness, physical exam, assessment, and treatment plan.  The above note was reviewed and edited by me.     Thank you for involving me in the care of the patient.  Capo Diane M.D  Pulmonary and Critical Care Medicine

## 2021-06-08 NOTE — PROGRESS NOTES
PROGRESS NOTE         Patient Identification:  Name:  Иван Miller  Age:  65 y.o.  Sex:  male  :  1955  MRN:  [id number]  Visit Number:  [id number]  Primary Care Provider:  Rosi Thomas APRN         LOS: 17 days       ----------------------------------------------------------------------------------------------------------------------  Subjective       Chief Complaints:    Shortness of Breath        Interval History:      The patient is sitting up in bed this morning on 5 L bubble high flow nasal cannula in no apparent distress.  Patient complains of chronic back pain, which she reports is no worse than usual.  Urine in Shaffer catheter collection system is dark today. Denies any shortness of breath, chest pain, nausea, vomiting, or diarrhea.  CRP is worsened at 14.26.  Leukocytosis is resolved.  Chest x-ray on 2021 showed improved aeration of the left lung.    Review of Systems:    Constitutional: no fever, chills and night sweats.  Generalized fatigue.  Eyes: no eye drainage, itching or redness.  HEENT: no mouth sores, dysphagia or nose bleed.  Respiratory: no for shortness of breath.  Productive cough.  Cardiovascular: no chest pain, no palpitations, no orthopnea.  Gastrointestinal: no nausea, vomiting or diarrhea. No abdominal pain, hematemesis or rectal bleeding.  Genitourinary: no dysuria or polyuria.  Hematologic/lymphatic: no lymph node abnormalities, no easy bruising or easy bleeding.  Musculoskeletal: Chronic back pain.  Skin: No rash and no itching.  Neurological: no loss of consciousness, no seizure, no headache.  Behavioral/Psych: no depression or suicidal ideation.  Endocrine: no hot flashes.  Immunologic: negative.    ----------------------------------------------------------------------------------------------------------------------      Objective       [facility]:  acetylcysteine, 3 mL, Nebulization, Q6H - RT  apixaban, 5 mg, Oral, Q12H  atorvastatin, 20 mg,  Oral, Daily  budesonide-formoterol, 2 puff, Inhalation, BID - RT  Canagliflozin, 300 mg, Oral, Daily  clopidogrel, 75 mg, Oral, Daily  docusate sodium, 100 mg, Oral, BID  fluticasone, 2 spray, Nasal, Daily  gabapentin, 600 mg, Oral, Q8H  insulin aspart, 0-9 Units, Subcutaneous, TID AC  insulin detemir, 40 Units, Subcutaneous, Daily  ipratropium-albuterol, 3 mL, Nebulization, Q6H - RT  lactobacillus acidophilus, 1 capsule, Oral, Daily  lidocaine, 2 patch, Transdermal, Q24H  lisinopril, 40 mg, Oral, Q24H  metoprolol succinate XL, 75 mg, Oral, Q24H  pantoprazole, 40 mg, Oral, Q AM  piperacillin-tazobactam, 4.5 g, Intravenous, Q8H  polyethylene glycol, 17 g, Oral, Daily  sodium chloride, 10 mL, Intravenous, Q12H  sodium chloride, 10 mL, Intravenous, Q12H  tamsulosin, 0.4 mg, Oral, Nightly         ----------------------------------------------------------------------------------------------------------------------    Vital Signs:  Temp:  [97.5 °F (36.4 °C)-99.3 °F (37.4 °C)] 97.5 °F (36.4 °C)  Heart Rate:  [72-98] 89  Resp:  [10-36] 18  BP: (105-169)/() 159/95  Mean Arterial Pressure (Non-Invasive) for the past 24 hrs (Last 3 readings):   Noninvasive MAP (mmHg)   06/08/21 0910 121   06/08/21 0803 118   06/08/21 0703 112     SpO2 Percentage    06/08/21 0705 06/08/21 0803 06/08/21 0910   SpO2: Comment: unable to obtain 93% 94%     SpO2:  [78 %-100 %] 94 %  on  Flow (L/min):  [5] 5;   Device (Oxygen Therapy): bubble;high-flow nasal cannula    Body mass index is 47.26 kg/m².  Wt Readings from Last 3 Encounters:   06/08/21 (!) 145 kg (320 lb)   11/25/19 127 kg (280 lb)   10/06/19 124 kg (273 lb 11.2 oz)        Intake/Output Summary (Last 24 hours) at 6/8/2021 1025  Last data filed at 6/8/2021 0800  Gross per 24 hour   Intake 1380 ml   Output 4800 ml   Net -3420 ml     Diet Regular; Cardiac, Consistent  Carbohydrate  ----------------------------------------------------------------------------------------------------------------------      Physical Exam:    Constitutional: Morbidly obese  male is sitting up in bed in no apparent distress.  On nasal cannula this morning.  HENT:  Head: Normocephalic and atraumatic.  Mouth:  Moist mucous membranes.    Eyes:  Conjunctivae and EOM are normal.  No scleral icterus.  Neck:  Neck supple.  No JVD present.    Cardiovascular:  Normal rate, regular rhythm and normal heart sounds with no murmur. No edema.  Pulmonary/Chest:  No respiratory distress, no wheezes, no crackles, with normal breath sounds and good air movement.  Abdominal:  Soft.  Bowel sounds are normal.  No distension and no tenderness.  Shaffer catheter in place with dark yellow, clear urine.  Musculoskeletal:  No tenderness, and no deformity.  No swelling or redness of joints.  Mild edema bilateral lower extremities.   Neurological:  Alert and oriented to person, place, and time.  No facial droop.  No slurred speech.   Skin:  Skin is warm and dry.  No rash noted.  No pallor.   Psychiatric:  Normal mood and affect.  Behavior is normal.  ----------------------------------------------------------------------------------------------------------------------  Results from last 7 days   Lab Units 06/06/21  0624   TROPONIN T ng/mL 0.016     Results from last 7 days   Lab Units 06/08/21  0035   PROBNP pg/mL 366.0       Results from last 7 days   Lab Units 06/07/21  0449   PH, ARTERIAL pH units 7.386   PO2 ART mm Hg 66.0*   PCO2, ARTERIAL mm Hg 56.5*   HCO3 ART mmol/L 33.8*     Results from last 7 days   Lab Units 06/08/21  0035 06/07/21  0043 06/06/21  0725 06/06/21  0034   CRP mg/dL 14.26* 7.73*  --  0.50   WBC 10*3/mm3 10.12 15.85* 10.83*  --    HEMOGLOBIN g/dL 10.3* 11.0* 12.7*  --    HEMATOCRIT % 36.3* 37.7 42.5  --    MCV fL 85.2 85.5 83.3  --    MCHC g/dL 28.4* 29.2* 29.9*  --    PLATELETS 10*3/mm3 212 222 277   --      Results from last 7 days   Lab Units 06/08/21  0035 06/07/21  0534 06/07/21  0043 06/06/21  0725 06/02/21  0108   SODIUM mmol/L 139  --  141 139  --    POTASSIUM mmol/L 4.4  --  3.9 4.4 4.7   MAGNESIUM mg/dL  --  2.2  --   --  2.0   CHLORIDE mmol/L 103  --  103 101  --    CO2 mmol/L 31.2*  --  29.5* 26.3  --    BUN mg/dL 22  --  21 19  --    CREATININE mg/dL 1.11  --  0.95 0.91  --    EGFR IF NONAFRICN AM mL/min/1.73 66  --  79 83  --    CALCIUM mg/dL 9.1  --  8.9 9.5  --    GLUCOSE mg/dL 191*  --  107* 252*  --    ALBUMIN g/dL 2.98*  --   --  3.62  --    BILIRUBIN mg/dL 0.4  --   --  0.4  --    ALK PHOS U/L 74  --   --  110  --    AST (SGOT) U/L 10  --   --  19  --    ALT (SGPT) U/L 11  --   --  17  --    Estimated Creatinine Clearance: 92.6 mL/min (by C-G formula based on SCr of 1.11 mg/dL).  No results found for: AMMONIA    Glucose   Date/Time Value Ref Range Status   06/08/2021 0622 145 (H) 70 - 130 mg/dL Final   06/07/2021 2152 184 (H) 70 - 130 mg/dL Final   06/07/2021 1713 150 (H) 70 - 130 mg/dL Final   06/07/2021 1125 156 (H) 70 - 130 mg/dL Final   06/07/2021 0707 140 (H) 70 - 130 mg/dL Final   06/06/2021 2133 154 (H) 70 - 130 mg/dL Final   06/06/2021 1616 135 (H) 70 - 130 mg/dL Final   06/06/2021 1123 175 (H) 70 - 130 mg/dL Final     Lab Results   Component Value Date    HGBA1C 8.80 (H) 05/21/2021     Lab Results   Component Value Date    TSH 2.100 05/21/2021    FREET4 0.95 05/21/2021       No results found for: BLOODCX  No results found for: URINECX  No results found for: WOUNDCX  No results found for: STOOLCX  Respiratory Culture   Date Value Ref Range Status   06/06/2021   Final    Heavy growth (4+) Normal Respiratory Radha: NO S.aureus/MRSA or Pseudomonas aeruginosa     Pain Management Panel       Pain Management Panel Latest Ref Rng & Units 5/21/2021 10/4/2019    AMPHETAMINES SCREEN, URINE Negative Positive(A) Positive(A)    BARBITURATES SCREEN Negative Negative Negative    BENZODIAZEPINE  SCREEN, URINE Negative Negative Negative    BUPRENORPHINEUR Negative Negative Negative    COCAINE SCREEN, URINE Negative Negative Negative    METHADONE SCREEN, URINE Negative Negative Negative              ----------------------------------------------------------------------------------------------------------------------  Imaging Results (Last 24 Hours)       Procedure Component Value Units Date/Time    XR Chest 1 View [[id number]] Collected: 06/07/21 1757     Updated: 06/07/21 1759    Narrative:      EXAM:    XR Chest, 1 View     EXAM DATE:    6/7/2021 5:37 PM     CLINICAL HISTORY:    Mucus plugging of leftmain stem bronchus; J96.01-Acute respiratory  failure with hypoxia; J96.02-Acute respiratory failure with hypercapnia;  J44.1-Chronic obstructive pulmonary disease with (acute) exacerbation;  L08.9-Local infection of the skin and subcutaneous tissue, unspecified     TECHNIQUE:    Frontal view of the chest.     COMPARISON:    06/07/2021     FINDINGS:    LUNGS:  Improved aeration of left lung.    PLEURAL SPACE:  Unremarkable.  No pneumothorax.    HEART:  Heart size is stable.    MEDIASTINUM:  Unremarkable.    BONES/JOINTS:  Unremarkable.       Impression:        Improved aeration of left lung.     This report was finalized on 6/7/2021 5:57 PM by Dr. Jonah Loving MD.               ----------------------------------------------------------------------------------------------------------------------    Assessment/Plan       Assessment/Plan     ASSESSMENT:    1.  Burned-out osteomyelitis  2.  HCAP    PLAN:    The patient is sitting up in bed this morning on 5 L bubble high flow nasal cannula in no apparent distress.  Patient complains of chronic back pain, which she reports is no worse than usual.  Urine in Shaffer catheter collection system is dark today. Denies any shortness of breath, chest pain, nausea, vomiting, or diarrhea.  CRP is worsened at 14.26.  Leukocytosis is resolved.  Chest x-ray on 06/07/2021 showed  improved aeration of the left lung.    Respiratory panel PCR on 6/6/2021 showed human rhinovirus/enterovirus.  Procalcitonin on 6/6/2021 was normal.  Respiratory culture on 6/6/2021 is in process.  Chest x-ray on 6/6/2021 showed interval development of prominent bilateral airspace disease.      MRI of the thoracic spine with and without contrast on 5/28/2021 showed postcontrast imaging demonstrating enhancement along the inferior endplate of T7 and superior endplate of T8 as well as some enhancement posteriorly adjacent to the cord but no enhancing mass is seen.  Again, as above there certainly could be associated with at least a mild degree of discitis.  Moderate central canal narrowing at T7-T8 and T8-T9.  MRI of the thoracic spine without contrast on 5/28/2021 showed decreased height of T7 and T8 with extensive endplate changes.  Most of this does appear to be chronic.  I cannot exclude at least a mild degree of discitis.  There are spurs and disc bulges at T7-T8 and T8-T9 which do cause moderate central canal narrowing.  MRI of the cervical spine with and without contrast on 5/28/2021 showed the study is markedly degraded by patient motion.  No destructive bony lesion.  No contrast-enhancing abnormality.  MRI of the lumbar spine without contrast on 5/28/2021 showed facet arthropathy.  Disc bulges most notable at L3-L4 and L4-L5.  COVID-19 and flu A/B PCR on 5/21/2021 was negative.  Blood cultures on 5/21/2021 finalized as no growth.     Regarding osteomyelitis, we recommend follow-up with Dr. Villaseñor, as an outpatient, as patient does not appear to have active osteomyelitis at this time.    Would recommend to consider repeating CT/MRI of the spine.  Urinalysis with culture has been ordered and we will consider adding vancomycin back if CRP continues to worsen.  For now, recommend to continue on Zosyn and we will follow closely.    Code Status:   Code Status and Medical Interventions:   Ordered at: 05/22/21 0057      Code Status:    CPR     Medical Interventions (Level of Support Prior to Arrest):    Full       TRAVIS Lord  06/08/21  10:25 EDT

## 2021-06-08 NOTE — PROGRESS NOTES
Assisted By: José Luis ROBERTSON    CC: Follow-up on respiratory failure    Interview History/HPI: Patient states he is breathing better, he is currently on 5-1/2 L of oxygen.  He states that he is still having some neck and back pain but the adjustment in [place] has helped.  He would like to go to a normal bed because therapy attempted to work with him but he states he is in a hole in the bariatric bed and did better on the normal bed.  He has a Shaffer catheter in place and we discussed getting this out but he wants to wait until he gets his normal bed to consider this at the current time.  He states he thinks his strength is about the same.  No cough, noted chest x-ray yesterday show significant improvement in his aeration of his left lung.      Vitals:    06/08/21 1204   BP: 128/69   Pulse: 81   Resp: 20   Temp: 97.8 °F (36.6 °C)   SpO2: 98%         Intake/Output Summary (Last 24 hours) at 6/8/2021 1302  Last data filed at 6/8/2021 1000  Gross per 24 hour   Intake 1100 ml   Output 5250 ml   Net -4150 ml       EXAM: Morbidly obese by BMI but in no distress, on 5 and half liters bubble high flow, he is currently getting a nebulizer as well.  He does not appear to be using accessory muscles to breathe, he is in a bariatric bed Shaffer catheter in place with medium yellow clear urine.  Extremities have trace edema, lungs have bilateral breath sounds that overall are clear but diminished in the bases, heart is regular rate and rhythm without murmur rub or gallop abdomen is soft benign, no change in strength from yesterday.    Tele: Sinus rhythm    LABS:     Lab Results (last 48 hours)     Procedure Component Value Units Date/Time    POC Glucose Once [[id number]]  (Abnormal) Collected: 06/08/21 1023    Specimen: Blood Updated: 06/08/21 1031     Glucose 248 mg/dL     Respiratory Culture - Sputum, Cough [[id number]] Collected: 06/06/21 1430    Specimen: Sputum from Cough Updated: 06/08/21 0850     Respiratory Culture Heavy growth (4+)  Normal Respiratory Radha: NO S.aureus/MRSA or Pseudomonas aeruginosa     Gram Stain Less than 25 Epithelial cells per low power field      Moderate (3+) WBCs seen      Few (2+) Gram positive cocci in pairs      Rare (1+) Gram positive cocci in clusters      Rare (1+) Gram negative cocci    POC Glucose Once [[id number]]  (Abnormal) Collected: 06/08/21 0622    Specimen: Blood Updated: 06/08/21 0631     Glucose 145 mg/dL     Comprehensive Metabolic Panel [[id number]]  (Abnormal) Collected: 06/08/21 0035    Specimen: Blood Updated: 06/08/21 0120     Glucose 191 mg/dL      BUN 22 mg/dL      Creatinine 1.11 mg/dL      Sodium 139 mmol/L      Potassium 4.4 mmol/L      Chloride 103 mmol/L      CO2 31.2 mmol/L      Calcium 9.1 mg/dL      Total Protein 6.3 g/dL      Albumin 2.98 g/dL      ALT (SGPT) 11 U/L      AST (SGOT) 10 U/L      Alkaline Phosphatase 74 U/L      Total Bilirubin 0.4 mg/dL      eGFR Non African Amer 66 mL/min/1.73      Globulin 3.3 gm/dL      A/G Ratio 0.9 g/dL      BUN/Creatinine Ratio 19.8     Anion Gap 4.8 mmol/L     Narrative:      GFR Normal >60  Chronic Kidney Disease <60  Kidney Failure <15      C-reactive Protein [[id number]]  (Abnormal) Collected: 06/08/21 0035    Specimen: Blood Updated: 06/08/21 0120     C-Reactive Protein 14.26 mg/dL     CBC & Differential [[id number]]  (Abnormal) Collected: 06/08/21 0035    Specimen: Blood Updated: 06/08/21 0115    Narrative:      The following orders were created for panel order CBC & Differential.  Procedure                               Abnormality         Status                     ---------                               -----------         ------                     Scan Slide[[id number]]                                       Final result               CBC Auto Differential[[id number]]        Abnormal            Final result                 Please view results for these tests on the individual orders.    Scan Slide [[id number]] Collected: 06/08/21 0035     Specimen: Blood Updated: 06/08/21 0115     Anisocytosis Slight/1+     Hypochromia Mod/2+     Stomatocytes Slight/1+     Platelet Morphology Normal    CBC Auto Differential [[id number]]  (Abnormal) Collected: 06/08/21 0035    Specimen: Blood Updated: 06/08/21 0115     WBC 10.12 10*3/mm3      RBC 4.26 10*6/mm3      Hemoglobin 10.3 g/dL      Hematocrit 36.3 %      MCV 85.2 fL      MCH 24.2 pg      MCHC 28.4 g/dL      RDW 16.9 %      RDW-SD 52.7 fl      MPV 9.1 fL      Platelets 212 10*3/mm3      Neutrophil % 79.2 %      Lymphocyte % 8.9 %      Monocyte % 8.7 %      Eosinophil % 2.5 %      Basophil % 0.4 %      Immature Grans % 0.3 %      Neutrophils, Absolute 8.02 10*3/mm3      Lymphocytes, Absolute 0.90 10*3/mm3      Monocytes, Absolute 0.88 10*3/mm3      Eosinophils, Absolute 0.25 10*3/mm3      Basophils, Absolute 0.04 10*3/mm3      Immature Grans, Absolute 0.03 10*3/mm3      nRBC 0.0 /100 WBC     BNP [[id number]]  (Normal) Collected: 06/08/21 0035    Specimen: Blood Updated: 06/08/21 0115     proBNP 366.0 pg/mL     Narrative:      Among patients with dyspnea, NT-proBNP is highly sensitive for the detection of acute congestive heart failure. In addition NT-proBNP of <300 pg/ml effectively rules out acute congestive heart failure with 99% negative predictive value.    Results may be falsely decreased if patient taking Biotin.      POC Glucose Once [[id number]]  (Abnormal) Collected: 06/07/21 2152    Specimen: Blood Updated: 06/07/21 2158     Glucose 184 mg/dL     POC Glucose Once [[id number]]  (Abnormal) Collected: 06/07/21 1713    Specimen: Blood Updated: 06/07/21 1729     Glucose 150 mg/dL     Blood Culture - Blood, Hand, Left [[id number]] Collected: 06/07/21 1313    Specimen: Blood from Hand, Left Updated: 06/07/21 1336    Blood Culture - Blood, Arm, Left [[id number]] Collected: 06/07/21 1313    Specimen: Blood from Arm, Left Updated: 06/07/21 1336    POC Glucose Once [[id number]]  (Abnormal) Collected: 06/07/21 1125     Specimen: Blood Updated: 06/07/21 1130     Glucose 156 mg/dL     COVID PRE-OP / PRE-PROCEDURE SCREENING ORDER (NO ISOLATION) - Swab, Nasopharynx [[id number]]  (Normal) Collected: 06/07/21 0805    Specimen: Swab from Nasopharynx Updated: 06/07/21 0905    Narrative:      The following orders were created for panel order COVID PRE-OP / PRE-PROCEDURE SCREENING ORDER (NO ISOLATION) - Swab, Nasopharynx.  Procedure                               Abnormality         Status                     ---------                               -----------         ------                     COVID-19,CEPHEID,COR/KYLE...[[id number]]  Normal              Final result                 Please view results for these tests on the individual orders.    COVID-19,CEPHEID,COR/KYLE/PAD/NICCI IN-HOUSE(OR EMERGENT/ADD-ON),NP SWAB IN TRANSPORT MEDIA 3-4 HR TAT, RT-PCR - Swab, Nasopharynx [[id number]]  (Normal) Collected: 06/07/21 0805    Specimen: Swab from Nasopharynx Updated: 06/07/21 0905     COVID19 Not Detected    Narrative:      Fact sheet for providers: https://www.fda.gov/media/189142/download     Fact sheet for patients: https://www.fda.gov/media/369984/download  Fact sheet for providers: https://www.fda.gov/media/686695/download     Fact sheet for patients: https://www.fda.gov/media/909881/download    POC Glucose Once [[id number]]  (Abnormal) Collected: 06/07/21 0707    Specimen: Blood Updated: 06/07/21 0732     Glucose 140 mg/dL     Magnesium [[id number]]  (Normal) Collected: 06/07/21 0534    Specimen: Blood Updated: 06/07/21 0636     Magnesium 2.2 mg/dL     Blood Gas, Arterial With Co-Ox [[id number]]  (Abnormal) Collected: 06/07/21 0449    Specimen: Arterial Blood Updated: 06/07/21 0453     Site Left Radial     Jayjay's Test N/A     pH, Arterial 7.386 pH units      pCO2, Arterial 56.5 mm Hg      Comment: 83 Value above reference range        pO2, Arterial 66.0 mm Hg      Comment: 84 Value below reference range        HCO3, Arterial 33.8 mmol/L       Comment: 83 Value above reference range        Base Excess, Arterial 7.4 mmol/L      O2 Saturation, Arterial 94.7 %      Hemoglobin, Blood Gas 10.7 g/dL      Comment: 84 Value below reference range        Hematocrit, Blood Gas 32.9 %      Comment: 84 Value below reference range        Oxyhemoglobin 92.9 %      Comment: 84 Value below reference range        Methemoglobin <1.00 %      Comment: 94 Value below reportable range < 1.0        Carboxyhemoglobin 2.0 %      A-a Gradiant 178.8 mmHg      CO2 Content 35.6 mmol/L      Temperature 0.0 C      Barometric Pressure for Blood Gas 728 mmHg      Modality BiPap     FIO2 45 %      Ventilator Mode NA     Set Mech Resp Rate 22.0     IPAP 20     Comment: Meter: [id number]     :  [id number]        EPAP 8     Note --     Collected by [id number]     pH, Temp Corrected --     pCO2, Temperature Corrected --     pO2, Temperature Corrected --    Basic Metabolic Panel [[id number]]  (Abnormal) Collected: 06/07/21 0043    Specimen: Blood Updated: 06/07/21 0220     Glucose 107 mg/dL      BUN 21 mg/dL      Creatinine 0.95 mg/dL      Sodium 141 mmol/L      Potassium 3.9 mmol/L      Chloride 103 mmol/L      CO2 29.5 mmol/L      Calcium 8.9 mg/dL      eGFR Non African Amer 79 mL/min/1.73      BUN/Creatinine Ratio 22.1     Anion Gap 8.5 mmol/L     Narrative:      GFR Normal >60  Chronic Kidney Disease <60  Kidney Failure <15      C-reactive Protein [[id number]]  (Abnormal) Collected: 06/07/21 0043    Specimen: Blood Updated: 06/07/21 0220     C-Reactive Protein 7.73 mg/dL     CBC (No Diff) [[id number]]  (Abnormal) Collected: 06/07/21 0043    Specimen: Blood Updated: 06/07/21 0220     WBC 15.85 10*3/mm3      RBC 4.41 10*6/mm3      Hemoglobin 11.0 g/dL      Hematocrit 37.7 %      MCV 85.5 fL      MCH 24.9 pg      MCHC 29.2 g/dL      RDW 16.8 %      RDW-SD 52.5 fl      MPV 9.4 fL      Platelets 222 10*3/mm3     POC Glucose Once [[id number]]  (Abnormal) Collected: 06/06/21 2133    Specimen:  "Blood Updated: 06/06/21 2148     Glucose 154 mg/dL     Procalcitonin [[id number]]  (Normal) Collected: 06/06/21 1434    Specimen: Blood Updated: 06/06/21 2100     Procalcitonin 0.09 ng/mL     Narrative:      As a Marker for Sepsis (Non-Neonates):     1. <0.5 ng/mL represents a low risk of severe sepsis and/or septic shock.  2. >2 ng/mL represents a high risk of severe sepsis and/or septic shock.    As a Marker for Lower Respiratory Tract Infections that require antibiotic therapy:  PCT on Admission     Antibiotic Therapy             6-12 Hrs later  >0.5                          Strongly Recommended            >0.25 - <0.5             Recommended  0.1 - 0.25                  Discouraged                       Remeasure/reassess PCT  <0.1                         Strongly Discouraged         Remeasure/reassess PCT      As 28 day mortality risk marker: \"Change in Procalcitonin Result\" (>80% or <=80%) if Day 0 (or Day 1) and Day 4 values are available. Refer to http://www.[employer]-pct-calculator.com/    Change in PCT <=80 %   A decrease of PCT levels below or equal to 80% defines a positive change in PCT test result representing a higher risk for 28-day all-cause mortality of patients diagnosed with severe sepsis or septic shock.    Change in PCT >80 %   A decrease of PCT levels of more than 80% defines a negative change in PCT result representing a lower risk for 28-day all-cause mortality of patients diagnosed with severe sepsis or septic shock.              Results may be falsely decreased if patient taking Biotin.                Radiology:    Imaging Results (Last 72 Hours)     Procedure Component Value Units Date/Time    XR Chest 1 View [[id number]] Collected: 06/07/21 1757     Updated: 06/07/21 1759    Narrative:      EXAM:    XR Chest, 1 View     EXAM DATE:    6/7/2021 5:37 PM     CLINICAL HISTORY:    Mucus plugging of leftmain stem bronchus; J96.01-Acute respiratory  failure with hypoxia; J96.02-Acute respiratory " failure with hypercapnia;  J44.1-Chronic obstructive pulmonary disease with (acute) exacerbation;  L08.9-Local infection of the skin and subcutaneous tissue, unspecified     TECHNIQUE:    Frontal view of the chest.     COMPARISON:    06/07/2021     FINDINGS:    LUNGS:  Improved aeration of left lung.    PLEURAL SPACE:  Unremarkable.  No pneumothorax.    HEART:  Heart size is stable.    MEDIASTINUM:  Unremarkable.    BONES/JOINTS:  Unremarkable.       Impression:        Improved aeration of left lung.     This report was finalized on 6/7/2021 5:57 PM by Dr. Jonah Loving MD.       XR Chest 1 View [[id number]] Collected: 06/07/21 0857     Updated: 06/07/21 0917    Narrative:      EXAM:    XR Chest, 1 View     EXAM DATE:    6/7/2021 8:27 AM     CLINICAL HISTORY:    acute on chronic hypoxic respiratory failure; J96.01-Acute respiratory  failure with hypoxia; J96.02-Acute respiratory failure with hypercapnia;  J44.1-Chronic obstructive pulmonary disease with (acute) exacerbation;  L08.9-Local infection of the skin and subcutaneous tissue, unspecified     TECHNIQUE:    Frontal view of the chest.     COMPARISON:    06/06/2021     FINDINGS:    LUNGS:  Loss of lung volume noted of the left lung may represent  mucous plug.  Left lower lobe atelectasis or airspace disease again  noted.  Right lung shows improved aeration.    PLEURAL SPACE:  Possibly tiny left pleural effusion.  No pneumothorax.    HEART:  Heart size not well evaluated.    MEDIASTINUM:  Unremarkable.    BONES/JOINTS:  Unremarkable.       Impression:      1.  Loss of lung volume noted of the left lung may represent mucous  plug.  2.  Left lower lobe atelectasis or airspace disease again noted.  3.  Possibly tiny left pleural effusion.  4.  Right lung shows improved aeration.     This report was finalized on 6/7/2021 8:58 AM by Dr. Jonah Loving MD.       XR Chest 1 View [[id number]] Collected: 06/06/21 0708     Updated: 06/06/21 0710    Narrative:      CR Chest 1  Vw    INDICATION:   Acute respiratory failure.     COMPARISON:    5/30/2021    FINDINGS:  Single portable AP view(s) of the chest.    No visible support lines.    The heart size is the upper limits of normal. Trachea is midline. Interval development of prominent bilateral airspace disease. No obvious pleural fluid. No pneumothorax.       Impression:      Interval development of prominent bilateral airspace disease when compared to the study of 5/30/2021.    Signer Name: Cristian Guillaume MD   Signed: 6/6/2021 7:08 AM   Workstation Name: [id number]    [facility]          Results for orders placed during the hospital encounter of 05/21/21    Adult Transthoracic Echo Complete W/ Cont if Necessary Per Protocol    Interpretation Summary  · Left ventricular ejection fraction appears to be 51 - 55%. Left ventricular systolic function is normal.  · Left ventricular wall thickness is consistent with mild concentric hypertrophy.  · There is no evidence of pericardial effusion      Assessment/Plan:   Acute hypoxic respiratory failure probably related to mucous plugging which is now improved.  Patient also had evidence of rhinovirus.  Patient is on Zosyn for possible HCAP, CRP is risen slightly but overall following the trend, hopefully with the lung clearing patient CRP will be improved tomorrow.  ID is following and adjusting antibiotics.  Respiratory culture just showed normal crystal, Covid was negative.  ID considering adding back vancomycin if CRP worsens, I will check an MRSA PCR.  Noted culture of the sputum from the sixth only grew out normal crystal    Weakness, continue therapy as tolerated, transition him back to a normal bed, he has had MRIs of his spine and these have been reviewed by neurosurgery who is going to see the patient as an outpatient.  Noted infectious disease said to consider repeat MRI however patient being on still high flow and having a tenuous respiratory status am going to hold  on doing this at the current time.  Once he is back to a normal bed and his oxygen requirements are less may consider repeating this at that time.    Diabetes, improving control, follow    History of paroxysmal atrial fibrillation, now sinus rhythm, on Eliquis for stroke prevention which also serves for DVT prophylaxis.    Severe sepsis associated with respiratory failure and pneumonia being treated with Zosyn, this was least partly viral with rhinovirus.    Chronic pain, improved with Norco 7.5    RIO, continue BiPAP at night    Diastolic heart failure, currently stable, pro BNP normal.    Coronary disease status post stenting, asymptomatic, continue Plavix.    Hypertension, improving control, follow, noted creatinine is risen slightly, if creatinine continues to rise tomorrow would consider holding lisinopril.    Possible burned-out osteomyelitis, as above.    Disposition yet to be determined will need some form of rehabilitation.    Dequan Connelly MD

## 2021-06-08 NOTE — PLAN OF CARE
Goal Outcome Evaluation:   Pts VSS and afebrile. He is currently wearing his BiPAP, tolerating well. Pt was transferred over here from CCU this shift. He is currently resting at this time with no complaints. Bed in low position, side rails up X2. Call light in reach.

## 2021-06-09 LAB
ANION GAP SERPL CALCULATED.3IONS-SCNC: 7 MMOL/L (ref 5–15)
ANISOCYTOSIS BLD QL: NORMAL
BASOPHILS # BLD AUTO: 0.04 10*3/MM3 (ref 0–0.2)
BASOPHILS NFR BLD AUTO: 0.4 % (ref 0–1.5)
BUN SERPL-MCNC: 18 MG/DL (ref 8–23)
BUN/CREAT SERPL: 18 (ref 7–25)
CALCIUM SPEC-SCNC: 9.2 MG/DL (ref 8.6–10.5)
CHLORIDE SERPL-SCNC: 100 MMOL/L (ref 98–107)
CO2 SERPL-SCNC: 31 MMOL/L (ref 22–29)
CREAT SERPL-MCNC: 1 MG/DL (ref 0.76–1.27)
CRP SERPL-MCNC: 9.45 MG/DL (ref 0–0.5)
DEPRECATED RDW RBC AUTO: 51.4 FL (ref 37–54)
EOSINOPHIL # BLD AUTO: 0.33 10*3/MM3 (ref 0–0.4)
EOSINOPHIL NFR BLD AUTO: 3.7 % (ref 0.3–6.2)
ERYTHROCYTE [DISTWIDTH] IN BLOOD BY AUTOMATED COUNT: 16.2 % (ref 12.3–15.4)
GFR SERPL CREATININE-BSD FRML MDRD: 75 ML/MIN/1.73
GLUCOSE BLDC GLUCOMTR-MCNC: 142 MG/DL (ref 70–130)
GLUCOSE BLDC GLUCOMTR-MCNC: 233 MG/DL (ref 70–130)
GLUCOSE BLDC GLUCOMTR-MCNC: 237 MG/DL (ref 70–130)
GLUCOSE BLDC GLUCOMTR-MCNC: 310 MG/DL (ref 70–130)
GLUCOSE SERPL-MCNC: 143 MG/DL (ref 65–99)
HCT VFR BLD AUTO: 37.6 % (ref 37.5–51)
HGB BLD-MCNC: 10.7 G/DL (ref 13–17.7)
HYPOCHROMIA BLD QL: NORMAL
IMM GRANULOCYTES # BLD AUTO: 0.04 10*3/MM3 (ref 0–0.05)
IMM GRANULOCYTES NFR BLD AUTO: 0.4 % (ref 0–0.5)
LYMPHOCYTES # BLD AUTO: 1.07 10*3/MM3 (ref 0.7–3.1)
LYMPHOCYTES NFR BLD AUTO: 12 % (ref 19.6–45.3)
MCH RBC QN AUTO: 24.4 PG (ref 26.6–33)
MCHC RBC AUTO-ENTMCNC: 28.5 G/DL (ref 31.5–35.7)
MCV RBC AUTO: 85.8 FL (ref 79–97)
MONOCYTES # BLD AUTO: 0.85 10*3/MM3 (ref 0.1–0.9)
MONOCYTES NFR BLD AUTO: 9.6 % (ref 5–12)
NEUTROPHILS NFR BLD AUTO: 6.57 10*3/MM3 (ref 1.7–7)
NEUTROPHILS NFR BLD AUTO: 73.9 % (ref 42.7–76)
NRBC BLD AUTO-RTO: 0 /100 WBC (ref 0–0.2)
PLAT MORPH BLD: NORMAL
PLATELET # BLD AUTO: 230 10*3/MM3 (ref 140–450)
PMV BLD AUTO: 9.3 FL (ref 6–12)
POTASSIUM SERPL-SCNC: 4.5 MMOL/L (ref 3.5–5.2)
RBC # BLD AUTO: 4.38 10*6/MM3 (ref 4.14–5.8)
SODIUM SERPL-SCNC: 138 MMOL/L (ref 136–145)
STOMATOCYTES BLD QL SMEAR: NORMAL
WBC # BLD AUTO: 8.9 10*3/MM3 (ref 3.4–10.8)

## 2021-06-09 PROCEDURE — 94660 CPAP INITIATION&MGMT: CPT

## 2021-06-09 PROCEDURE — 85025 COMPLETE CBC W/AUTO DIFF WBC: CPT | Performed by: INTERNAL MEDICINE

## 2021-06-09 PROCEDURE — 87070 CULTURE OTHR SPECIMN AEROBIC: CPT | Performed by: PHYSICIAN ASSISTANT

## 2021-06-09 PROCEDURE — 99232 SBSQ HOSP IP/OBS MODERATE 35: CPT | Performed by: INTERNAL MEDICINE

## 2021-06-09 PROCEDURE — 94799 UNLISTED PULMONARY SVC/PX: CPT

## 2021-06-09 PROCEDURE — 25010000002 PIPERACILLIN SOD-TAZOBACTAM PER 1 G: Performed by: INTERNAL MEDICINE

## 2021-06-09 PROCEDURE — 97110 THERAPEUTIC EXERCISES: CPT

## 2021-06-09 PROCEDURE — 25010000002 HYDRALAZINE PER 20 MG: Performed by: PHYSICIAN ASSISTANT

## 2021-06-09 PROCEDURE — 80048 BASIC METABOLIC PNL TOTAL CA: CPT | Performed by: INTERNAL MEDICINE

## 2021-06-09 PROCEDURE — 63710000001 INSULIN ASPART PER 5 UNITS: Performed by: PHYSICIAN ASSISTANT

## 2021-06-09 PROCEDURE — 87205 SMEAR GRAM STAIN: CPT | Performed by: PHYSICIAN ASSISTANT

## 2021-06-09 PROCEDURE — 82962 GLUCOSE BLOOD TEST: CPT

## 2021-06-09 PROCEDURE — 86140 C-REACTIVE PROTEIN: CPT | Performed by: PHYSICIAN ASSISTANT

## 2021-06-09 PROCEDURE — 85007 BL SMEAR W/DIFF WBC COUNT: CPT | Performed by: INTERNAL MEDICINE

## 2021-06-09 PROCEDURE — 63710000001 INSULIN DETEMIR PER 5 UNITS: Performed by: INTERNAL MEDICINE

## 2021-06-09 RX ADMIN — SODIUM CHLORIDE, PRESERVATIVE FREE 10 ML: 5 INJECTION INTRAVENOUS at 08:45

## 2021-06-09 RX ADMIN — DOXYCYCLINE 100 MG: 100 INJECTION, POWDER, LYOPHILIZED, FOR SOLUTION INTRAVENOUS at 13:00

## 2021-06-09 RX ADMIN — LIDOCAINE 2 PATCH: 50 PATCH CUTANEOUS at 08:39

## 2021-06-09 RX ADMIN — GABAPENTIN 600 MG: 300 CAPSULE ORAL at 14:25

## 2021-06-09 RX ADMIN — INSULIN ASPART 4 UNITS: 100 INJECTION, SOLUTION INTRAVENOUS; SUBCUTANEOUS at 18:17

## 2021-06-09 RX ADMIN — Medication 1 CAPSULE: at 08:38

## 2021-06-09 RX ADMIN — FLUTICASONE PROPIONATE 2 SPRAY: 50 SPRAY, METERED NASAL at 08:44

## 2021-06-09 RX ADMIN — ETHYL ALCOHOL 1 EACH: 62 SWAB TOPICAL at 08:45

## 2021-06-09 RX ADMIN — CANAGLIFLOZIN 300 MG: 300 TABLET, FILM COATED ORAL at 08:38

## 2021-06-09 RX ADMIN — GABAPENTIN 600 MG: 300 CAPSULE ORAL at 22:31

## 2021-06-09 RX ADMIN — PIPERACILLIN SODIUM AND TAZOBACTAM SODIUM 4.5 G: 4; .5 INJECTION, POWDER, LYOPHILIZED, FOR SOLUTION INTRAVENOUS at 06:24

## 2021-06-09 RX ADMIN — HYDRALAZINE HYDROCHLORIDE 10 MG: 20 INJECTION INTRAMUSCULAR; INTRAVENOUS at 20:37

## 2021-06-09 RX ADMIN — PIPERACILLIN SODIUM AND TAZOBACTAM SODIUM 4.5 G: 4; .5 INJECTION, POWDER, LYOPHILIZED, FOR SOLUTION INTRAVENOUS at 14:25

## 2021-06-09 RX ADMIN — ATORVASTATIN CALCIUM 20 MG: 20 TABLET, FILM COATED ORAL at 08:37

## 2021-06-09 RX ADMIN — IPRATROPIUM BROMIDE AND ALBUTEROL SULFATE 3 ML: .5; 3 SOLUTION RESPIRATORY (INHALATION) at 18:58

## 2021-06-09 RX ADMIN — INSULIN ASPART 7 UNITS: 100 INJECTION, SOLUTION INTRAVENOUS; SUBCUTANEOUS at 11:34

## 2021-06-09 RX ADMIN — BENZONATATE 100 MG: 100 CAPSULE ORAL at 20:27

## 2021-06-09 RX ADMIN — DOCUSATE SODIUM 100 MG: 100 CAPSULE, LIQUID FILLED ORAL at 08:37

## 2021-06-09 RX ADMIN — HYDROXYZINE HYDROCHLORIDE 25 MG: 25 TABLET, FILM COATED ORAL at 18:08

## 2021-06-09 RX ADMIN — BUDESONIDE AND FORMOTEROL FUMARATE DIHYDRATE 2 PUFF: 160; 4.5 AEROSOL RESPIRATORY (INHALATION) at 18:58

## 2021-06-09 RX ADMIN — ACETYLCYSTEINE 3 ML: 200 SOLUTION ORAL; RESPIRATORY (INHALATION) at 00:47

## 2021-06-09 RX ADMIN — ETHYL ALCOHOL 1 EACH: 62 SWAB TOPICAL at 20:27

## 2021-06-09 RX ADMIN — HYDROCODONE BITARTRATE AND ACETAMINOPHEN 1 TABLET: 7.5; 325 TABLET ORAL at 20:27

## 2021-06-09 RX ADMIN — APIXABAN 5 MG: 5 TABLET, FILM COATED ORAL at 20:26

## 2021-06-09 RX ADMIN — BUDESONIDE AND FORMOTEROL FUMARATE DIHYDRATE 2 PUFF: 160; 4.5 AEROSOL RESPIRATORY (INHALATION) at 06:52

## 2021-06-09 RX ADMIN — PANTOPRAZOLE SODIUM 40 MG: 40 TABLET, DELAYED RELEASE ORAL at 06:21

## 2021-06-09 RX ADMIN — TAMSULOSIN HYDROCHLORIDE 0.4 MG: 0.4 CAPSULE ORAL at 20:27

## 2021-06-09 RX ADMIN — ACETYLCYSTEINE 3 ML: 200 SOLUTION ORAL; RESPIRATORY (INHALATION) at 06:52

## 2021-06-09 RX ADMIN — CLOPIDOGREL 75 MG: 75 TABLET, FILM COATED ORAL at 08:38

## 2021-06-09 RX ADMIN — HYDROCODONE BITARTRATE AND ACETAMINOPHEN 1 TABLET: 7.5; 325 TABLET ORAL at 08:43

## 2021-06-09 RX ADMIN — IPRATROPIUM BROMIDE AND ALBUTEROL SULFATE 3 ML: .5; 3 SOLUTION RESPIRATORY (INHALATION) at 06:51

## 2021-06-09 RX ADMIN — GABAPENTIN 600 MG: 300 CAPSULE ORAL at 06:21

## 2021-06-09 RX ADMIN — INSULIN DETEMIR 40 UNITS: 100 INJECTION, SOLUTION SUBCUTANEOUS at 08:37

## 2021-06-09 RX ADMIN — Medication 5 MG: at 20:27

## 2021-06-09 RX ADMIN — IPRATROPIUM BROMIDE AND ALBUTEROL SULFATE 3 ML: .5; 3 SOLUTION RESPIRATORY (INHALATION) at 00:47

## 2021-06-09 RX ADMIN — APIXABAN 5 MG: 5 TABLET, FILM COATED ORAL at 08:37

## 2021-06-09 RX ADMIN — LISINOPRIL 40 MG: 10 TABLET ORAL at 11:35

## 2021-06-09 RX ADMIN — PIPERACILLIN SODIUM AND TAZOBACTAM SODIUM 4.5 G: 4; .5 INJECTION, POWDER, LYOPHILIZED, FOR SOLUTION INTRAVENOUS at 22:32

## 2021-06-09 RX ADMIN — SODIUM CHLORIDE, PRESERVATIVE FREE 10 ML: 5 INJECTION INTRAVENOUS at 20:27

## 2021-06-09 RX ADMIN — SODIUM CHLORIDE, PRESERVATIVE FREE 10 ML: 5 INJECTION INTRAVENOUS at 08:44

## 2021-06-09 RX ADMIN — DOXYCYCLINE 100 MG: 100 INJECTION, POWDER, LYOPHILIZED, FOR SOLUTION INTRAVENOUS at 22:34

## 2021-06-09 RX ADMIN — POLYETHYLENE GLYCOL (3350) 17 G: 17 POWDER, FOR SOLUTION ORAL at 08:38

## 2021-06-09 RX ADMIN — METOPROLOL SUCCINATE 75 MG: 25 TABLET, EXTENDED RELEASE ORAL at 08:37

## 2021-06-09 RX ADMIN — IPRATROPIUM BROMIDE AND ALBUTEROL SULFATE 3 ML: .5; 3 SOLUTION RESPIRATORY (INHALATION) at 12:52

## 2021-06-09 NOTE — PLAN OF CARE
Goal Outcome Evaluation:   VSS and afebrile. Pt is currently on the BiPaP. His O2 was increased to 6L bubble high flow earlier in the shift. He has been uncomfortable throughout the shift, administered pain meds PRN. Pt is hopeful for discharge in the next couple days. Bed in low position, side rails up X2. Call light in reach.

## 2021-06-09 NOTE — PROGRESS NOTES
Assisted By: Annelise ROBERTSON    CC: Follow-up on respiratory failure    Interview History/HPI: Patient states that he is doing okay, he feels like his breathing is doing okay he is on 5 L bubble flow at this time.  He states he would like the Mucomyst stopped because the smell really bothers him.  Since his lung is now open to I am going to stop this at patient request.  Patient is tolerating his diet, complains of no chest pain currently.  He thinks his strength is doing about the same.  He is now on a normal bed and off the bariatric bed which she seems to like, he is willing to try getting his Shaffer catheter out now.  He had a good net output yesterday.    ROS:     Vitals:    06/09/21 1138   BP: 123/67   Pulse: 85   Resp: 20   Temp: 98.8 °F (37.1 °C)   SpO2: 99%         Intake/Output Summary (Last 24 hours) at 6/9/2021 1320  Last data filed at 6/9/2021 0900  Gross per 24 hour   Intake 960 ml   Output 3400 ml   Net -2440 ml       EXAM: Morbidly obese by BMI, lying in bed on 5 L but no distress, no JVD is noted, he can speak in fairly full sentences without difficulty.  He is not using accessory muscles to breathe, lungs have bilateral breath sounds that are clear anteriorly diminished at the bases heart regular rate and rhythm without murmur rub or gallop, abdomen is rounded soft benign.  Trace edema is noted.    Tele: Sinus rhythm    LABS:     Lab Results (last 48 hours)     Procedure Component Value Units Date/Time    Respiratory Culture - Sputum, Cough [[id number]] Collected: 06/09/21 1136    Specimen: Sputum from Cough Updated: 06/09/21 1230     Gram Stain Few (2+) WBCs seen      Few (2+) Epithelial cells seen      Few (2+) Budding yeast      Few (2+) Gram positive cocci    POC Glucose Once [[id number]]  (Abnormal) Collected: 06/09/21 1034    Specimen: Blood Updated: 06/09/21 1100     Glucose 310 mg/dL     POC Glucose Once [[id number]]  (Abnormal) Collected: 06/09/21 0622    Specimen: Blood Updated: 06/09/21 0627      Glucose 142 mg/dL     Basic Metabolic Panel [[id number]]  (Abnormal) Collected: 06/09/21 0048    Specimen: Blood Updated: 06/09/21 0212     Glucose 143 mg/dL      BUN 18 mg/dL      Creatinine 1.00 mg/dL      Sodium 138 mmol/L      Potassium 4.5 mmol/L      Chloride 100 mmol/L      CO2 31.0 mmol/L      Calcium 9.2 mg/dL      eGFR Non African Amer 75 mL/min/1.73      BUN/Creatinine Ratio 18.0     Anion Gap 7.0 mmol/L     Narrative:      GFR Normal >60  Chronic Kidney Disease <60  Kidney Failure <15      C-reactive Protein [[id number]]  (Abnormal) Collected: 06/09/21 0048    Specimen: Blood Updated: 06/09/21 0212     C-Reactive Protein 9.45 mg/dL     CBC & Differential [[id number]]  (Abnormal) Collected: 06/09/21 0048    Specimen: Blood Updated: 06/09/21 0209    Narrative:      The following orders were created for panel order CBC & Differential.  Procedure                               Abnormality         Status                     ---------                               -----------         ------                     Scan Slide[[id number]]                                       Final result               CBC Auto Differential[[id number]]        Abnormal            Final result                 Please view results for these tests on the individual orders.    Scan Slide [[id number]] Collected: 06/09/21 0048    Specimen: Blood Updated: 06/09/21 0209     Anisocytosis Slight/1+     Hypochromia Mod/2+     Stomatocytes Slight/1+     Platelet Morphology Normal    CBC Auto Differential [[id number]]  (Abnormal) Collected: 06/09/21 0048    Specimen: Blood Updated: 06/09/21 0208     WBC 8.90 10*3/mm3      RBC 4.38 10*6/mm3      Hemoglobin 10.7 g/dL      Hematocrit 37.6 %      MCV 85.8 fL      MCH 24.4 pg      MCHC 28.5 g/dL      RDW 16.2 %      RDW-SD 51.4 fl      MPV 9.3 fL      Platelets 230 10*3/mm3      Neutrophil % 73.9 %      Lymphocyte % 12.0 %      Monocyte % 9.6 %      Eosinophil % 3.7 %      Basophil % 0.4 %      Immature Grans  % 0.4 %      Neutrophils, Absolute 6.57 10*3/mm3      Lymphocytes, Absolute 1.07 10*3/mm3      Monocytes, Absolute 0.85 10*3/mm3      Eosinophils, Absolute 0.33 10*3/mm3      Basophils, Absolute 0.04 10*3/mm3      Immature Grans, Absolute 0.04 10*3/mm3      nRBC 0.0 /100 WBC     POC Glucose Once [[id number]]  (Abnormal) Collected: 06/08/21 2030    Specimen: Blood Updated: 06/08/21 2315     Glucose 176 mg/dL     POC Glucose Once [[id number]]  (Abnormal) Collected: 06/08/21 1707    Specimen: Blood Updated: 06/08/21 1717     Glucose 165 mg/dL     MRSA Screen, PCR (Inpatient) - Swab, Nares [[id number]]  (Abnormal) Collected: 06/08/21 1509    Specimen: Swab from Nares Updated: 06/08/21 1641     MRSA PCR Positive     Staph aureus by PCR Positive    Respiratory Panel, PCR (WITHOUT COVID) - Swab, Nasopharynx [[id number]]  (Normal) Collected: 06/08/21 1429    Specimen: Swab from Nasopharynx Updated: 06/08/21 1534     ADENOVIRUS, PCR Not Detected     Coronavirus 229E Not Detected     Coronavirus HKU1 Not Detected     Coronavirus NL63 Not Detected     Coronavirus OC43 Not Detected     Human Metapneumovirus Not Detected     Human Rhinovirus/Enterovirus Not Detected     Influenza B PCR Not Detected     Parainfluenza Virus 1 Not Detected     Parainfluenza Virus 2 Not Detected     Parainfluenza Virus 3 Not Detected     Parainfluenza Virus 4 Not Detected     Bordetella pertussis pcr Not Detected     Chlamydophila pneumoniae PCR Not Detected     Mycoplasma pneumo by PCR Not Detected     Influenza A PCR Not Detected     RSV, PCR Not Detected     Bordetella parapertussis PCR Not Detected    Narrative:      The coronavirus on the RVP is NOT COVID-19 and is NOT indicative of infection with COVID-19.    In the setting of a positive respiratory panel with a viral infection PLUS a negative procalcitonin without other underlying concern for bacterial infection, consider observing off antibiotics or discontinuation of antibiotics and continue  supportive care. If the respiratory panel is positive for atypical bacterial infection (Bordetella pertussis, Chlamydophila pneumoniae, or Mycoplasma pneumoniae), consider antibiotic de-escalation to target atypical bacterial infection.    Urinalysis, Microscopic Only - Urine, Catheter [[id number]]  (Abnormal) Collected: 06/08/21 1234    Specimen: Urine, Catheter Updated: 06/08/21 1513     RBC, UA 21-30 /HPF      WBC, UA 0-2 /HPF      Bacteria, UA None Seen /HPF      Squamous Epithelial Cells, UA 0-2 /HPF      Hyaline Casts, UA None Seen /LPF      Methodology Manual Light Microscopy    Urinalysis With Microscopic If Indicated (No Culture) - Urine, Catheter [[id number]]  (Abnormal) Collected: 06/08/21 1234    Specimen: Urine, Catheter Updated: 06/08/21 1452     Color, UA Yellow     Appearance, UA Clear     pH, UA <=5.0     Specific Gravity, UA 1.029     Glucose, UA >=1000 mg/dL (3+)     Ketones, UA Negative     Bilirubin, UA Negative     Blood, UA Moderate (2+)     Protein, UA Trace     Leuk Esterase, UA Negative     Nitrite, UA Negative     Urobilinogen, UA 0.2 E.U./dL    Blood Culture - Blood, Arm, Left [[id number]] Collected: 06/07/21 1313    Specimen: Blood from Arm, Left Updated: 06/08/21 1345     Blood Culture No growth at 24 hours    Blood Culture - Blood, Hand, Left [[id number]] Collected: 06/07/21 1313    Specimen: Blood from Hand, Left Updated: 06/08/21 1345     Blood Culture No growth at 24 hours    POC Glucose Once [[id number]]  (Abnormal) Collected: 06/08/21 1023    Specimen: Blood Updated: 06/08/21 1031     Glucose 248 mg/dL     Respiratory Culture - Sputum, Cough [[id number]] Collected: 06/06/21 1430    Specimen: Sputum from Cough Updated: 06/08/21 0850     Respiratory Culture Heavy growth (4+) Normal Respiratory Radha: NO S.aureus/MRSA or Pseudomonas aeruginosa     Gram Stain Less than 25 Epithelial cells per low power field      Moderate (3+) WBCs seen      Few (2+) Gram positive cocci in pairs      Rare  (1+) Gram positive cocci in clusters      Rare (1+) Gram negative cocci    POC Glucose Once [[id number]]  (Abnormal) Collected: 06/08/21 0622    Specimen: Blood Updated: 06/08/21 0631     Glucose 145 mg/dL     Comprehensive Metabolic Panel [[id number]]  (Abnormal) Collected: 06/08/21 0035    Specimen: Blood Updated: 06/08/21 0120     Glucose 191 mg/dL      BUN 22 mg/dL      Creatinine 1.11 mg/dL      Sodium 139 mmol/L      Potassium 4.4 mmol/L      Chloride 103 mmol/L      CO2 31.2 mmol/L      Calcium 9.1 mg/dL      Total Protein 6.3 g/dL      Albumin 2.98 g/dL      ALT (SGPT) 11 U/L      AST (SGOT) 10 U/L      Alkaline Phosphatase 74 U/L      Total Bilirubin 0.4 mg/dL      eGFR Non African Amer 66 mL/min/1.73      Globulin 3.3 gm/dL      A/G Ratio 0.9 g/dL      BUN/Creatinine Ratio 19.8     Anion Gap 4.8 mmol/L     Narrative:      GFR Normal >60  Chronic Kidney Disease <60  Kidney Failure <15      C-reactive Protein [[id number]]  (Abnormal) Collected: 06/08/21 0035    Specimen: Blood Updated: 06/08/21 0120     C-Reactive Protein 14.26 mg/dL     CBC & Differential [[id number]]  (Abnormal) Collected: 06/08/21 0035    Specimen: Blood Updated: 06/08/21 0115    Narrative:      The following orders were created for panel order CBC & Differential.  Procedure                               Abnormality         Status                     ---------                               -----------         ------                     Scan Slide[[id number]]                                       Final result               CBC Auto Differential[[id number]]        Abnormal            Final result                 Please view results for these tests on the individual orders.    Scan Slide [[id number]] Collected: 06/08/21 0035    Specimen: Blood Updated: 06/08/21 0115     Anisocytosis Slight/1+     Hypochromia Mod/2+     Stomatocytes Slight/1+     Platelet Morphology Normal    CBC Auto Differential [[id number]]  (Abnormal) Collected: 06/08/21 0035     Specimen: Blood Updated: 06/08/21 0115     WBC 10.12 10*3/mm3      RBC 4.26 10*6/mm3      Hemoglobin 10.3 g/dL      Hematocrit 36.3 %      MCV 85.2 fL      MCH 24.2 pg      MCHC 28.4 g/dL      RDW 16.9 %      RDW-SD 52.7 fl      MPV 9.1 fL      Platelets 212 10*3/mm3      Neutrophil % 79.2 %      Lymphocyte % 8.9 %      Monocyte % 8.7 %      Eosinophil % 2.5 %      Basophil % 0.4 %      Immature Grans % 0.3 %      Neutrophils, Absolute 8.02 10*3/mm3      Lymphocytes, Absolute 0.90 10*3/mm3      Monocytes, Absolute 0.88 10*3/mm3      Eosinophils, Absolute 0.25 10*3/mm3      Basophils, Absolute 0.04 10*3/mm3      Immature Grans, Absolute 0.03 10*3/mm3      nRBC 0.0 /100 WBC     BNP [[id number]]  (Normal) Collected: 06/08/21 0035    Specimen: Blood Updated: 06/08/21 0115     proBNP 366.0 pg/mL     Narrative:      Among patients with dyspnea, NT-proBNP is highly sensitive for the detection of acute congestive heart failure. In addition NT-proBNP of <300 pg/ml effectively rules out acute congestive heart failure with 99% negative predictive value.    Results may be falsely decreased if patient taking Biotin.      POC Glucose Once [[id number]]  (Abnormal) Collected: 06/07/21 2152    Specimen: Blood Updated: 06/07/21 2158     Glucose 184 mg/dL     POC Glucose Once [[id number]]  (Abnormal) Collected: 06/07/21 1713    Specimen: Blood Updated: 06/07/21 1729     Glucose 150 mg/dL                Radiology:    Imaging Results (Last 72 Hours)     Procedure Component Value Units Date/Time    XR Chest 1 View [[id number]] Collected: 06/07/21 1757     Updated: 06/07/21 1759    Narrative:      EXAM:    XR Chest, 1 View     EXAM DATE:    6/7/2021 5:37 PM     CLINICAL HISTORY:    Mucus plugging of leftmain stem bronchus; J96.01-Acute respiratory  failure with hypoxia; J96.02-Acute respiratory failure with hypercapnia;  J44.1-Chronic obstructive pulmonary disease with (acute) exacerbation;  L08.9-Local infection of the skin and subcutaneous  tissue, unspecified     TECHNIQUE:    Frontal view of the chest.     COMPARISON:    06/07/2021     FINDINGS:    LUNGS:  Improved aeration of left lung.    PLEURAL SPACE:  Unremarkable.  No pneumothorax.    HEART:  Heart size is stable.    MEDIASTINUM:  Unremarkable.    BONES/JOINTS:  Unremarkable.       Impression:        Improved aeration of left lung.     This report was finalized on 6/7/2021 5:57 PM by Dr. Jonah Loving MD.       XR Chest 1 View [[id number]] Collected: 06/07/21 0857     Updated: 06/07/21 0917    Narrative:      EXAM:    XR Chest, 1 View     EXAM DATE:    6/7/2021 8:27 AM     CLINICAL HISTORY:    acute on chronic hypoxic respiratory failure; J96.01-Acute respiratory  failure with hypoxia; J96.02-Acute respiratory failure with hypercapnia;  J44.1-Chronic obstructive pulmonary disease with (acute) exacerbation;  L08.9-Local infection of the skin and subcutaneous tissue, unspecified     TECHNIQUE:    Frontal view of the chest.     COMPARISON:    06/06/2021     FINDINGS:    LUNGS:  Loss of lung volume noted of the left lung may represent  mucous plug.  Left lower lobe atelectasis or airspace disease again  noted.  Right lung shows improved aeration.    PLEURAL SPACE:  Possibly tiny left pleural effusion.  No pneumothorax.    HEART:  Heart size not well evaluated.    MEDIASTINUM:  Unremarkable.    BONES/JOINTS:  Unremarkable.       Impression:      1.  Loss of lung volume noted of the left lung may represent mucous  plug.  2.  Left lower lobe atelectasis or airspace disease again noted.  3.  Possibly tiny left pleural effusion.  4.  Right lung shows improved aeration.     This report was finalized on 6/7/2021 8:58 AM by Dr. Jonah Loving MD.             Results for orders placed during the hospital encounter of 05/21/21    Adult Transthoracic Echo Complete W/ Cont if Necessary Per Protocol    Interpretation Summary  · Left ventricular ejection fraction appears to be 51 - 55%. Left ventricular systolic  function is normal.  · Left ventricular wall thickness is consistent with mild concentric hypertrophy.  · There is no evidence of pericardial effusion      Assessment/Plan:   Cute hypoxic respiratory failure, he had significant mucous plug most likely that severo out the left lung, this is now clear.  Patient has been on Zosyn also for antibiotics, noted his MRSA swab was positive last night, he was started on ethyl alcohol swabs and although CRP is coming down, infectious disease has recommended beginning the patient on doxycycline which has been done, patient overall appears to be improving, continue to follow.    Weakness, strength is the same as yesterday, this may be cervical in origin.  Neurosurgery has recommended seeing him as an outpatient after my discussion with him earlier in the stay.  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  He is going to need further rehabilitation and has been accepted to a SNF but he is not medically ready to go as of yet.  Continue therapy here as tolerated.    Shaffer catheter out    Paroxysmal atrial fibrillation, stable now in sinus rhythm, Eliquis for stroke prevention    Diabetes, overall has been controlled with exception of the last glucose, will continue to follow on current insulin.    RIO, continue BiPAP at night    Diastolic heart failure appears compensated at this time    Coronary disease status post stenting in the past, continue Plavix    HTN, controlled    Disposition SNF    Dequan Connelly MD

## 2021-06-09 NOTE — THERAPY TREATMENT NOTE
Acute Care - Physical Therapy Treatment Note   [place]     Patient Name: Иван Miller  : 1955  MRN: [id number]  Today's Date: 2021   Onset of Illness/Injury or Date of Surgery: 21       PT Assessment (last 12 hours)      PT Evaluation and Treatment     Row Name 21 1400          Physical Therapy Time and Intention    Subjective Information  no complaints  -BC     Document Type  therapy note (daily note)  -BC     Mode of Treatment  physical therapy  -BC     Patient Effort  good  -BC     Row Name 21 1400          General Information    Patient Profile Reviewed  yes  -BC     Onset of Illness/Injury or Date of Surgery  21  -BC     Referring Physician  Dr. Connelly  -BC     Patient Observations  alert;cooperative;agree to therapy  -BC     Risks Reviewed  patient:;LOB;nausea/vomiting;dizziness;increased discomfort;change in vital signs;increased drainage;lines disloged  -BC     Benefits Reviewed  patient:;improve function;increase independence;increase strength;increase balance;decrease pain;decrease risk of DVT;improve skin integrity;increase knowledge  -BC     Row Name 21 1400          Previous Level of Function/Home Environm    BADLs, Premorbid Functional Level  needs assistive device for safe performance  -BC     IADLs, Premorbid Functional Level  needs adaptive equipment for safe performance;needs assistance for safe performance  -BC     Row Name 21 1400          Living Environment    Current Living Arrangements  home/apartment/condo  -BC     Lives With  spouse;child(ara), adult  -BC     Row Name 21 1400          Cognition    Affect/Mental Status (Cognitive)  WFL  -BC     Orientation Status (Cognition)  oriented x 3  -BC     Follows Commands (Cognition)  follows one-step commands  -BC     Cognitive Function (Cognitive)  safety deficit  -BC     Row Name 21 1400          Range of Motion (ROM)    Range of Motion  ROM is WFL  -BC     Row Name 21 1400           Range of Motion Comprehensive    General Range of Motion  bilateral lower extremity ROM WFL  -BC     Row Name 06/09/21 1400          Mobility    Extremity Weight-bearing Status  left lower extremity;right lower extremity  -BC     Left Lower Extremity (Weight-bearing Status)  weight-bearing as tolerated (WBAT)  -BC     Right Lower Extremity (Weight-bearing Status)  weight-bearing as tolerated (WBAT)  -BC     Row Name 06/09/21 1400          Bed Mobility    Bed Mobility  bed mobility (all) activities;supine-sit;sit-supine  -BC     All Activities, [place] (Bed Mobility)  moderate assist (50% patient effort)  -BC     Supine-Sit [place] (Bed Mobility)  moderate assist (50% patient effort)  -BC     Sit-Supine [place] (Bed Mobility)  moderate assist (50% patient effort)  -BC     Bed Mobility, Safety Issues  decreased use of legs for bridging/pushing  -BC     Assistive Device (Bed Mobility)  bed rails  -BC     Row Name 06/09/21 1400          Transfers    Comment (Transfers)  pt unable to transfer  -BC     Row Name 06/09/21 1400          Gait/Stairs (Locomotion)    Comment (Gait/Stairs)  pt unable to ambulate  -BC     Row Name 06/09/21 1400          Coping    Observed Emotional State  calm;cooperative  -BC     Verbalized Emotional State  acceptance  -BC     Trust Relationship/Rapport  care explained;choices provided  -BC     Family/Support Persons  spouse;family  -BC     Involvement in Care  not present at bedside  -BC     Family/Support System Care  self-care encouraged;support provided  -BC     Diversional Activities  television  -BC     Row Name 06/09/21 1400          Plan of Care Review    Plan of Care Reviewed With  patient  -BC     Row Name 06/09/21 1400          Physical Therapy Goals    Bed Mobility Goal Selection (PT)  bed mobility, PT goal 1  -BC     Transfer Goal Selection (PT)  transfer, PT goal 1  -BC     Gait Training Goal Selection (PT)  gait training, PT goal 1  -BC     Row Name 06/09/21 1400           Bed Mobility Goal 1 (PT)    Activity/Assistive Device (Bed Mobility Goal 1, PT)  bed mobility activities, all  -BC     [place] Level/Cues Needed (Bed Mobility Goal 1, PT)  moderate assist (50-74% patient effort)  -BC     Time Frame (Bed Mobility Goal 1, PT)  by discharge  -BC     Row Name 06/09/21 1400          Transfer Goal 1 (PT)    Activity/Assistive Device (Transfer Goal 1, PT)  transfers, all  -BC     [place] Level/Cues Needed (Transfer Goal 1, PT)  moderate assist (50-74% patient effort)  -BC     Time Frame (Transfer Goal 1, PT)  by discharge  -BC     Row Name 06/09/21 1400          Positioning and Restraints    Pre-Treatment Position  in bed  -BC     Post Treatment Position  bed  -BC     In Bed  notified nsg;supine;sitting EOB;call light within reach;encouraged to call for assist;side rails up x3  -BC       User Key  (r) = Recorded By, (t) = Taken By, (c) = Cosigned By    Initials Name Provider Type    BC Keely Agosto PT Physical Therapist        Physical Therapy Education                 Title: PT OT SLP Therapies (Done)     Topic: Physical Therapy (Done)     Point: Mobility training (Done)     Learning Progress Summary           Patient Acceptance, E, VU by AM at 6/9/2021 1435    Acceptance, E, VU by SB at 6/8/2021 1006    Acceptance, E, NR by BRISSA at 6/7/2021 1817    Acceptance, E, VU by TW at 6/6/2021 1602    Acceptance, E,TB, VU by MM at 6/5/2021 1227    Acceptance, E, VU by EB at 6/2/2021 0713    Acceptance, E, VU by SM at 6/2/2021 0030    Acceptance, E, VU by EB at 6/1/2021 0739    Acceptance, E, VU by SM at 5/31/2021 2242    Acceptance, E, VU by JM at 5/31/2021 0924    Acceptance, E, VU by SM at 5/31/2021 0035    Acceptance, E,TB, VU by MM at 5/28/2021 1802    Acceptance, E,TB, VU by MM at 5/27/2021 1143    Acceptance, E, VU by SM at 5/26/2021 2344    Acceptance, E,TB, VU by MM at 5/26/2021 0929    Acceptance, E,TB,D, VU,NR by  at 5/24/2021 1249                   Point: Home  exercise program (Done)     Learning Progress Summary           Patient Acceptance, E, VU by AM at 6/9/2021 1435    Acceptance, E, VU by SB at 6/8/2021 1006    Acceptance, E, NR by BRISSA at 6/7/2021 1817    Acceptance, E, VU by TW at 6/6/2021 1602    Acceptance, E,TB, VU by MM at 6/5/2021 1227    Acceptance, E, VU by EB at 6/2/2021 0713    Acceptance, E, VU by SM at 6/2/2021 0030    Acceptance, E, VU by EB at 6/1/2021 0739    Acceptance, E, VU by SM at 5/31/2021 2242    Acceptance, E, VU by JM at 5/31/2021 0924    Acceptance, E, VU by SM at 5/31/2021 0035    Acceptance, E,TB, VU by MM at 5/28/2021 1802    Acceptance, E,TB, VU by MM at 5/27/2021 1143    Acceptance, E, VU by SM at 5/26/2021 2344    Acceptance, E,TB, VU by MM at 5/26/2021 0929    Acceptance, E,TB,D, VU,NR by AG at 5/24/2021 1249                   Point: Body mechanics (Done)     Learning Progress Summary           Patient Acceptance, E, VU by AM at 6/9/2021 1435    Acceptance, E, VU by SB at 6/8/2021 1006    Acceptance, E, NR by BRISSA at 6/7/2021 1817    Acceptance, E, VU by TW at 6/6/2021 1602    Acceptance, E,TB, VU by MM at 6/5/2021 1227    Acceptance, E, VU by EB at 6/2/2021 0713    Acceptance, E, VU by SM at 6/2/2021 0030    Acceptance, E, VU by EB at 6/1/2021 0739    Acceptance, E, VU by SM at 5/31/2021 2242    Acceptance, E, VU by JM at 5/31/2021 0924    Acceptance, E, VU by SM at 5/31/2021 0035    Acceptance, E,TB, VU by MM at 5/28/2021 1802    Acceptance, E,TB, VU by MM at 5/27/2021 1143    Acceptance, E, VU by SM at 5/26/2021 2344    Acceptance, E,TB, VU by MM at 5/26/2021 0929    Acceptance, E,TB,D, VU,NR by AG at 5/24/2021 1249                   Point: Precautions (Done)     Learning Progress Summary           Patient Acceptance, E, VU by AM at 6/9/2021 1435    Acceptance, E, VU by SB at 6/8/2021 1006    Acceptance, E, NR by BRISSA at 6/7/2021 1817    Acceptance, E, VU by TW at 6/6/2021 1602    Acceptance, E,TB, VU by MM at 6/5/2021 1227     Acceptance, E, VU by EB at 6/2/2021 0713    Acceptance, E, VU by SM at 6/2/2021 0030    Acceptance, E, VU by EB at 6/1/2021 0739    Acceptance, E, VU by  at 5/31/2021 2242    Acceptance, E, VU by  at 5/31/2021 0924    Acceptance, E, VU by  at 5/31/2021 0035    Acceptance, E,TB, VU by MM at 5/28/2021 1802    Acceptance, E,TB, VU by MM at 5/27/2021 1143    Acceptance, E, VU by SM at 5/26/2021 2344    Acceptance, E,TB, VU by MM at 5/26/2021 0929    Acceptance, E,TB,D, VU,NR by  at 5/24/2021 1249                               User Key     Initials Effective Dates Name Provider Type Discipline    TW 06/16/16 -  Marifer Garcia, RN Registered Nurse Nurse    EB 06/16/16 -  Keily Godoy, RN Registered Nurse Nurse     04/03/18 -  Elvia Rousseau, PT Physical Therapist PT     07/19/18 -  Cruz Umanzor RN Registered Nurse Nurse     07/08/19 -  Esha Lee, RN Registered Nurse Nurse    BRISSA 08/13/18 -  Regina Garzon, RN Registered Nurse Nurse     09/12/18 -  Chris Das, RN Registered Nurse Nurse     07/10/20 -  Marie Villela RN Registered Nurse Nurse     06/24/20 -  Cindi Valle, RN Registered Nurse Nurse              PT Recommendation and Plan     Plan of Care Reviewed With: patient  Progress: improving       Time Calculation:   PT Charges     Row Name 06/09/21 1509             Time Calculation    PT Received On  06/09/21  -BC         Time Calculation- PT    Total Timed Code Minutes- PT  45 minute(s)  -BC         Timed Charges    06047 - PT Therapeutic Exercise Minutes  45  -BC         Total Minutes    Timed Charges Total Minutes  45  -BC       Total Minutes  45  -BC        User Key  (r) = Recorded By, (t) = Taken By, (c) = Cosigned By    Initials Name Provider Type    BC Keely Agosto, PT Physical Therapist        Therapy Charges for Today     Code Description Service Date Service Provider Modifiers Qty    28620633924 HC PT THER PROC EA 15 MIN 6/9/2021 Keely Agosto, PT GP  3               Keely Agosto, PT  6/9/2021

## 2021-06-09 NOTE — PROGRESS NOTES
Discharge Planning Assessment   [place]     Patient Name: Иван Miller  MRN: [id number]  Today's Date: 6/9/2021    Admit Date: 5/21/2021      Discharge Plan     Row Name 06/09/21 1520       Plan    Plan  Pt was admitted on 5/21/21. Pt has been accepted to [facility] Dr. Connelly aware. SS will continue to follow and assist with discharge planning.    Patient/Family in Agreement with Plan  yes        Continued Care and Services - Admitted Since 5/21/2021     Destination     Service Provider Request Status Selected Services Address Phone Fax Patient Preferred    [facility]  Pending - Request Sent N/A [address] [facility] [address] [phone] --    [facility]  Declined  Current or History of Substance Abuse N/A [address] [facility] [address] [phone] --    [facility]  Declined  Unable to Meet Patient Needs N/A [address] [id number] [phone] --    [facility]  Declined  out of network with pt's insurance N/A [address] [place] [id number] [phone] --    [facility]  Declined  Current or History of Substance Abuse N/A [address] [place] [id number] [phone] --    [facility]  Declined  No Bed Available N/A [address] [place] [id number] [phone] --                  ALENA Alex

## 2021-06-09 NOTE — PROGRESS NOTES
PROGRESS NOTE         Patient Identification:  Name:  Иван Miller  Age:  65 y.o.  Sex:  male  :  1955  MRN:  [id number]  Visit Number:  [id number]  Primary Care Provider:  Rosi Thomas APRN         LOS: 18 days       ----------------------------------------------------------------------------------------------------------------------  Subjective       Chief Complaints:    Shortness of Breath        Interval History:      The patient is sitting up in bed this morning on 6 L bubble high flow nasal cannula in no apparent distress.  Patient continues to complain of chronic back pain, but otherwise denies any new complaints including shortness of breath, chest pain, abdominal pain, nausea, vomiting, or diarrhea.  Afebrile, no diarrhea.  CRP is improving at 9.45.  WBC remains normal.  MRSA screen on 2021 was positive.  Urinalysis on 2021 was negative.  Respiratory panel PCR on 2021 was negative.    Review of Systems:    Constitutional: no fever, chills and night sweats.  Generalized fatigue.  Eyes: no eye drainage, itching or redness.  HEENT: no mouth sores, dysphagia or nose bleed.  Respiratory: no for shortness of breath.  Productive cough.  Cardiovascular: no chest pain, no palpitations, no orthopnea.  Gastrointestinal: no nausea, vomiting or diarrhea. No abdominal pain, hematemesis or rectal bleeding.  Genitourinary: no dysuria or polyuria.  Hematologic/lymphatic: no lymph node abnormalities, no easy bruising or easy bleeding.  Musculoskeletal: Chronic back pain.  Skin: No rash and no itching.  Neurological: no loss of consciousness, no seizure, no headache.  Behavioral/Psych: no depression or suicidal ideation.  Endocrine: no hot flashes.  Immunologic: negative.    ----------------------------------------------------------------------------------------------------------------------      Objective       Current Hospital Meds:  acetylcysteine, 3 mL, Nebulization, Q6H - RT  apixaban, 5  mg, Oral, Q12H  atorvastatin, 20 mg, Oral, Daily  budesonide-formoterol, 2 puff, Inhalation, BID - RT  Canagliflozin, 300 mg, Oral, Daily  clopidogrel, 75 mg, Oral, Daily  docusate sodium, 100 mg, Oral, BID  doxycycline, 100 mg, Intravenous, Q12H  ethyl alcohol, 1 application, Nasal, BID  fluticasone, 2 spray, Nasal, Daily  gabapentin, 600 mg, Oral, Q8H  insulin aspart, 0-9 Units, Subcutaneous, TID AC  insulin detemir, 40 Units, Subcutaneous, Daily  ipratropium-albuterol, 3 mL, Nebulization, Q6H - RT  lactobacillus acidophilus, 1 capsule, Oral, Daily  lidocaine, 2 patch, Transdermal, Q24H  lisinopril, 40 mg, Oral, Q24H  metoprolol succinate XL, 75 mg, Oral, Q24H  pantoprazole, 40 mg, Oral, Q AM  piperacillin-tazobactam, 4.5 g, Intravenous, Q8H  polyethylene glycol, 17 g, Oral, Daily  sodium chloride, 10 mL, Intravenous, Q12H  sodium chloride, 10 mL, Intravenous, Q12H  tamsulosin, 0.4 mg, Oral, Nightly         ----------------------------------------------------------------------------------------------------------------------    Vital Signs:  Temp:  [97.6 °F (36.4 °C)-98.1 °F (36.7 °C)] 98.1 °F (36.7 °C)  Heart Rate:  [] 95  Resp:  [11-37] 28  BP: ()/() 149/130  Mean Arterial Pressure (Non-Invasive) for the past 24 hrs (Last 3 readings):   Noninvasive MAP (mmHg)   06/09/21 0902 135   06/09/21 0737 123   06/09/21 0502 126     SpO2 Percentage    06/09/21 0737 06/09/21 0902 06/09/21 1030   SpO2: 94% 95% 99%     SpO2:  [88 %-100 %] 99 %  on  Flow (L/min):  [5-6] 6;   Device (Oxygen Therapy): NPPV/NIV    Body mass index is 46.74 kg/m².  Wt Readings from Last 3 Encounters:   06/09/21 (!) 144 kg (316 lb 8 oz)   11/25/19 127 kg (280 lb)   10/06/19 124 kg (273 lb 11.2 oz)        Intake/Output Summary (Last 24 hours) at 6/9/2021 1031  Last data filed at 6/9/2021 0900  Gross per 24 hour   Intake 1400 ml   Output 3400 ml   Net -2000 ml     Diet Regular; Cardiac, Consistent  Carbohydrate  ----------------------------------------------------------------------------------------------------------------------      Physical Exam:    Constitutional: Morbidly obese  male is sitting up in bed in no apparent distress.  On nasal cannula this morning.  HENT:  Head: Normocephalic and atraumatic.  Mouth:  Moist mucous membranes.    Eyes:  Conjunctivae and EOM are normal.  No scleral icterus.  Neck:  Neck supple.  No JVD present.    Cardiovascular:  Normal rate, regular rhythm and normal heart sounds with no murmur. No edema.  Pulmonary/Chest:  No respiratory distress, no wheezes, no crackles, with normal breath sounds and good air movement.  Abdominal:  Soft.  Bowel sounds are normal.  No distension and no tenderness.  Shaffer catheter in place with dark yellow, clear urine.  Musculoskeletal:  No tenderness, and no deformity.  No swelling or redness of joints.  Mild edema bilateral lower extremities.   Neurological:  Alert and oriented to person, place, and time.  No facial droop.  No slurred speech.   Skin:  Skin is warm and dry.  No rash noted.  No pallor.   Psychiatric:  Normal mood and affect.  Behavior is normal.  ----------------------------------------------------------------------------------------------------------------------  Results from last 7 days   Lab Units 06/06/21  0624   TROPONIN T ng/mL 0.016     Results from last 7 days   Lab Units 06/08/21  0035   PROBNP pg/mL 366.0       Results from last 7 days   Lab Units 06/07/21  0449   PH, ARTERIAL pH units 7.386   PO2 ART mm Hg 66.0*   PCO2, ARTERIAL mm Hg 56.5*   HCO3 ART mmol/L 33.8*     Results from last 7 days   Lab Units 06/09/21  0048 06/08/21  0035 06/07/21  0043   CRP mg/dL 9.45* 14.26* 7.73*   WBC 10*3/mm3 8.90 10.12 15.85*   HEMOGLOBIN g/dL 10.7* 10.3* 11.0*   HEMATOCRIT % 37.6 36.3* 37.7   MCV fL 85.8 85.2 85.5   MCHC g/dL 28.5* 28.4* 29.2*   PLATELETS 10*3/mm3 230 212 222     Results from last 7 days   Lab Units  06/09/21  0048 06/08/21  0035 06/07/21  0534 06/07/21  0043 06/06/21  0725   SODIUM mmol/L 138 139  --  141 139   POTASSIUM mmol/L 4.5 4.4  --  3.9 4.4   MAGNESIUM mg/dL  --   --  2.2  --   --    CHLORIDE mmol/L 100 103  --  103 101   CO2 mmol/L 31.0* 31.2*  --  29.5* 26.3   BUN mg/dL 18 22  --  21 19   CREATININE mg/dL 1.00 1.11  --  0.95 0.91   EGFR IF NONAFRICN AM mL/min/1.73 75 66  --  79 83   CALCIUM mg/dL 9.2 9.1  --  8.9 9.5   GLUCOSE mg/dL 143* 191*  --  107* 252*   ALBUMIN g/dL  --  2.98*  --   --  3.62   BILIRUBIN mg/dL  --  0.4  --   --  0.4   ALK PHOS U/L  --  74  --   --  110   AST (SGOT) U/L  --  10  --   --  19   ALT (SGPT) U/L  --  11  --   --  17   Estimated Creatinine Clearance: 102.8 mL/min (by C-G formula based on SCr of 1 mg/dL).  No results found for: AMMONIA    Glucose   Date/Time Value Ref Range Status   06/09/2021 0622 142 (H) 70 - 130 mg/dL Final   06/08/2021 2030 176 (H) 70 - 130 mg/dL Final   06/08/2021 1707 165 (H) 70 - 130 mg/dL Final   06/08/2021 1023 248 (H) 70 - 130 mg/dL Final   06/08/2021 0622 145 (H) 70 - 130 mg/dL Final   06/07/2021 2152 184 (H) 70 - 130 mg/dL Final   06/07/2021 1713 150 (H) 70 - 130 mg/dL Final   06/07/2021 1125 156 (H) 70 - 130 mg/dL Final     Lab Results   Component Value Date    HGBA1C 8.80 (H) 05/21/2021     Lab Results   Component Value Date    TSH 2.100 05/21/2021    FREET4 0.95 05/21/2021       No results found for: BLOODCX  No results found for: URINECX  No results found for: WOUNDCX  No results found for: STOOLCX  Respiratory Culture   Date Value Ref Range Status   06/06/2021   Final    Heavy growth (4+) Normal Respiratory Radha: NO S.aureus/MRSA or Pseudomonas aeruginosa     Pain Management Panel       Pain Management Panel Latest Ref Rng & Units 5/21/2021 10/4/2019    AMPHETAMINES SCREEN, URINE Negative Positive(A) Positive(A)    BARBITURATES SCREEN Negative Negative Negative    BENZODIAZEPINE SCREEN, URINE Negative Negative Negative     BUPRENORPHINEUR Negative Negative Negative    COCAINE SCREEN, URINE Negative Negative Negative    METHADONE SCREEN, URINE Negative Negative Negative              ----------------------------------------------------------------------------------------------------------------------  Imaging Results (Last 24 Hours)       ** No results found for the last 24 hours. **            ----------------------------------------------------------------------------------------------------------------------    Assessment/Plan       Assessment/Plan     ASSESSMENT:    1.  Burned-out osteomyelitis  2.  HCAP    PLAN:    The patient is sitting up in bed this morning on 6 L bubble high flow nasal cannula in no apparent distress.  Patient continues to complain of chronic back pain, but otherwise denies any new complaints including shortness of breath, chest pain, abdominal pain, nausea, vomiting, or diarrhea.  Afebrile, no diarrhea.  CRP is improving at 9.45.  WBC remains normal.  MRSA screen on 6/8/2021 was positive.  Urinalysis on 6/8/2021 was negative.  Respiratory panel PCR on 6/8/2021 was negative.     MRI of the thoracic spine with and without contrast on 5/28/2021 showed postcontrast imaging demonstrating enhancement along the inferior endplate of T7 and superior endplate of T8 as well as some enhancement posteriorly adjacent to the cord but no enhancing mass is seen.  Again, as above there certainly could be associated with at least a mild degree of discitis.  Moderate central canal narrowing at T7-T8 and T8-T9.  MRI of the thoracic spine without contrast on 5/28/2021 showed decreased height of T7 and T8 with extensive endplate changes.  Most of this does appear to be chronic.  I cannot exclude at least a mild degree of discitis.  There are spurs and disc bulges at T7-T8 and T8-T9 which do cause moderate central canal narrowing.  MRI of the cervical spine with and without contrast on 5/28/2021 showed the study is markedly degraded  by patient motion.  No destructive bony lesion.  No contrast-enhancing abnormality.  MRI of the lumbar spine without contrast on 5/28/2021 showed facet arthropathy.  Disc bulges most notable at L3-L4 and L4-L5.  COVID-19 and flu A/B PCR on 5/21/2021 was negative.  Blood cultures on 5/21/2021 finalized as no growth.     Regarding osteomyelitis, we recommend follow-up with Dr. Villaseñor, as an outpatient, as patient does not appear to have active osteomyelitis at this time. Would recommend to consider repeating CT/MRI of the spine if back pain worsens or inflammatory markers rise.      Although CRP is improving, supplemental oxygen requirements have increased and MRSA screen was positive.  For this reason, doxycycline 100 mg IV every 12 hours x7 days has been added in order to avoid vancomycin and Zosyn combination.  Recommend to continue on Zosyn.  We will continue to follow closely and adjust antibiotic therapy as appropriate.    Code Status:   Code Status and Medical Interventions:   Ordered at: 05/22/21 0057     Code Status:    CPR     Medical Interventions (Level of Support Prior to Arrest):    Full       TRAVIS Lord  06/09/21  10:31 EDT

## 2021-06-09 NOTE — PLAN OF CARE
Goal Outcome Evaluation:           Progress: improving  Outcome Summary: Patient vital signs currently stable and he is now on 3.5L regular nasal cannula. He has tolerated being titrated down to a lower oxygen very well so far. His lopez catheter was also discontinued today after bladder training performed. External catheter now in place and patient encouraged to urinate since lopez catheter discontinuation. He is alert and oriented x4. He has complained of constant back/neck/shoulder pain that has been relieved by PRN pain medication. He has also complained of lower body and left sided numbness that he states is his baseline since taking a fall before admission. Otherwise, no significant changes noted. Patient does frequently seek out staff and is unkempt despite several attempts to maintain cleanliness. Will continue to monitor. Safety measures in place.

## 2021-06-10 ENCOUNTER — APPOINTMENT (OUTPATIENT)
Dept: CT IMAGING | Facility: HOSPITAL | Age: 66
End: 2021-06-10

## 2021-06-10 LAB
6-ACETYL MORPHINE: NEGATIVE
A-A DO2: 154.9 MMHG (ref 0–300)
A-A DO2: 174.8 MMHG (ref 0–300)
ALBUMIN SERPL-MCNC: 2.97 G/DL (ref 3.5–5.2)
ALBUMIN/GLOB SERPL: 0.8 G/DL
ALP SERPL-CCNC: 69 U/L (ref 39–117)
ALT SERPL W P-5'-P-CCNC: 12 U/L (ref 1–41)
AMPHET+METHAMPHET UR QL: NEGATIVE
ANION GAP SERPL CALCULATED.3IONS-SCNC: 5.8 MMOL/L (ref 5–15)
ANISOCYTOSIS BLD QL: NORMAL
ARTERIAL PATENCY WRIST A: ABNORMAL
ARTERIAL PATENCY WRIST A: POSITIVE
AST SERPL-CCNC: 12 U/L (ref 1–40)
ATMOSPHERIC PRESS: 725 MMHG
ATMOSPHERIC PRESS: 727 MMHG
BARBITURATES UR QL SCN: NEGATIVE
BASE EXCESS BLDA CALC-SCNC: 10.5 MMOL/L (ref 0–2)
BASE EXCESS BLDA CALC-SCNC: 9.3 MMOL/L (ref 0–2)
BASOPHILS # BLD AUTO: 0.05 10*3/MM3 (ref 0–0.2)
BASOPHILS NFR BLD AUTO: 0.8 % (ref 0–1.5)
BDY SITE: ABNORMAL
BDY SITE: ABNORMAL
BENZODIAZ UR QL SCN: NEGATIVE
BILIRUB SERPL-MCNC: 0.3 MG/DL (ref 0–1.2)
BODY TEMPERATURE: 0 C
BODY TEMPERATURE: 0 C
BUN SERPL-MCNC: 20 MG/DL (ref 8–23)
BUN/CREAT SERPL: 21.1 (ref 7–25)
BUPRENORPHINE SERPL-MCNC: NEGATIVE NG/ML
CALCIUM SPEC-SCNC: 9.3 MG/DL (ref 8.6–10.5)
CANNABINOIDS SERPL QL: NEGATIVE
CHLORIDE SERPL-SCNC: 100 MMOL/L (ref 98–107)
CO2 BLDA-SCNC: 38.3 MMOL/L (ref 22–33)
CO2 BLDA-SCNC: 40.1 MMOL/L (ref 22–33)
CO2 SERPL-SCNC: 32.2 MMOL/L (ref 22–29)
COCAINE UR QL: NEGATIVE
COHGB MFR BLD: 1.4 % (ref 0–5)
COHGB MFR BLD: 1.5 % (ref 0–5)
CREAT SERPL-MCNC: 0.95 MG/DL (ref 0.76–1.27)
CRP SERPL-MCNC: 4.58 MG/DL (ref 0–0.5)
DEPRECATED RDW RBC AUTO: 52.3 FL (ref 37–54)
EOSINOPHIL # BLD AUTO: 0.45 10*3/MM3 (ref 0–0.4)
EOSINOPHIL NFR BLD AUTO: 7.4 % (ref 0.3–6.2)
EPAP: 10
ERYTHROCYTE [DISTWIDTH] IN BLOOD BY AUTOMATED COUNT: 16.5 % (ref 12.3–15.4)
GFR SERPL CREATININE-BSD FRML MDRD: 79 ML/MIN/1.73
GLOBULIN UR ELPH-MCNC: 3.5 GM/DL
GLUCOSE BLDC GLUCOMTR-MCNC: 128 MG/DL (ref 70–130)
GLUCOSE BLDC GLUCOMTR-MCNC: 134 MG/DL (ref 70–130)
GLUCOSE BLDC GLUCOMTR-MCNC: 181 MG/DL (ref 70–130)
GLUCOSE BLDC GLUCOMTR-MCNC: 182 MG/DL (ref 70–130)
GLUCOSE SERPL-MCNC: 168 MG/DL (ref 65–99)
HCO3 BLDA-SCNC: 36.4 MMOL/L (ref 20–26)
HCO3 BLDA-SCNC: 38 MMOL/L (ref 20–26)
HCT VFR BLD AUTO: 35.9 % (ref 37.5–51)
HCT VFR BLD CALC: 33.1 % (ref 38–51)
HCT VFR BLD CALC: 34.3 % (ref 38–51)
HGB BLD-MCNC: 10.2 G/DL (ref 13–17.7)
HGB BLDA-MCNC: 10.8 G/DL (ref 14–18)
HGB BLDA-MCNC: 11.2 G/DL (ref 14–18)
HYPOCHROMIA BLD QL: NORMAL
IMM GRANULOCYTES # BLD AUTO: 0.03 10*3/MM3 (ref 0–0.05)
IMM GRANULOCYTES NFR BLD AUTO: 0.5 % (ref 0–0.5)
INHALED O2 CONCENTRATION: 44 %
INHALED O2 CONCENTRATION: 50 %
IPAP: 22
LYMPHOCYTES # BLD AUTO: 1.16 10*3/MM3 (ref 0.7–3.1)
LYMPHOCYTES NFR BLD AUTO: 19 % (ref 19.6–45.3)
Lab: ABNORMAL
MCH RBC QN AUTO: 24.8 PG (ref 26.6–33)
MCHC RBC AUTO-ENTMCNC: 28.4 G/DL (ref 31.5–35.7)
MCV RBC AUTO: 87.3 FL (ref 79–97)
METHADONE UR QL SCN: NEGATIVE
METHGB BLD QL: <1 % (ref 0–3)
METHGB BLD QL: <1 % (ref 0–3)
MODALITY: ABNORMAL
MODALITY: ABNORMAL
MONOCYTES # BLD AUTO: 0.73 10*3/MM3 (ref 0.1–0.9)
MONOCYTES NFR BLD AUTO: 11.9 % (ref 5–12)
NEUTROPHILS NFR BLD AUTO: 3.7 10*3/MM3 (ref 1.7–7)
NEUTROPHILS NFR BLD AUTO: 60.4 % (ref 42.7–76)
NOTE: ABNORMAL
NOTE: ABNORMAL
NOTIFIED BY: ABNORMAL
NOTIFIED BY: ABNORMAL
NOTIFIED WHO: ABNORMAL
NOTIFIED WHO: ABNORMAL
NRBC BLD AUTO-RTO: 0 /100 WBC (ref 0–0.2)
OPIATES UR QL: POSITIVE
OXYCODONE UR QL SCN: NEGATIVE
OXYHGB MFR BLDV: 90.6 % (ref 94–99)
OXYHGB MFR BLDV: 96.9 % (ref 94–99)
PCO2 BLDA: 62.2 MM HG (ref 35–45)
PCO2 BLDA: 67.2 MM HG (ref 35–45)
PCO2 TEMP ADJ BLD: ABNORMAL MM[HG]
PCO2 TEMP ADJ BLD: ABNORMAL MM[HG]
PCP UR QL SCN: NEGATIVE
PH BLDA: 7.36 PH UNITS (ref 7.35–7.45)
PH BLDA: 7.38 PH UNITS (ref 7.35–7.45)
PH, TEMP CORRECTED: ABNORMAL
PH, TEMP CORRECTED: ABNORMAL
PLAT MORPH BLD: NORMAL
PLATELET # BLD AUTO: 228 10*3/MM3 (ref 140–450)
PMV BLD AUTO: 9.4 FL (ref 6–12)
PO2 BLDA: 70 MM HG (ref 83–108)
PO2 BLDA: 97.9 MM HG (ref 83–108)
PO2 TEMP ADJ BLD: ABNORMAL MM[HG]
PO2 TEMP ADJ BLD: ABNORMAL MM[HG]
POLYCHROMASIA BLD QL SMEAR: NORMAL
POTASSIUM SERPL-SCNC: 4.3 MMOL/L (ref 3.5–5.2)
PROT SERPL-MCNC: 6.5 G/DL (ref 6–8.5)
RBC # BLD AUTO: 4.11 10*6/MM3 (ref 4.14–5.8)
SAO2 % BLDCOA: 92.2 % (ref 94–99)
SAO2 % BLDCOA: 98.1 % (ref 94–99)
SET MECH RESP RATE: 16
SODIUM SERPL-SCNC: 138 MMOL/L (ref 136–145)
STOMATOCYTES BLD QL SMEAR: NORMAL
VENTILATOR MODE: ABNORMAL
VENTILATOR MODE: ABNORMAL
WBC # BLD AUTO: 6.12 10*3/MM3 (ref 3.4–10.8)

## 2021-06-10 PROCEDURE — 94799 UNLISTED PULMONARY SVC/PX: CPT

## 2021-06-10 PROCEDURE — 80307 DRUG TEST PRSMV CHEM ANLYZR: CPT | Performed by: INTERNAL MEDICINE

## 2021-06-10 PROCEDURE — 86140 C-REACTIVE PROTEIN: CPT | Performed by: PHYSICIAN ASSISTANT

## 2021-06-10 PROCEDURE — 70450 CT HEAD/BRAIN W/O DYE: CPT

## 2021-06-10 PROCEDURE — 25010000002 NALOXONE PER 1 MG

## 2021-06-10 PROCEDURE — 63710000001 INSULIN ASPART PER 5 UNITS: Performed by: PHYSICIAN ASSISTANT

## 2021-06-10 PROCEDURE — 82962 GLUCOSE BLOOD TEST: CPT

## 2021-06-10 PROCEDURE — 80053 COMPREHEN METABOLIC PANEL: CPT | Performed by: INTERNAL MEDICINE

## 2021-06-10 PROCEDURE — 85007 BL SMEAR W/DIFF WBC COUNT: CPT | Performed by: INTERNAL MEDICINE

## 2021-06-10 PROCEDURE — 63710000001 INSULIN DETEMIR PER 5 UNITS: Performed by: INTERNAL MEDICINE

## 2021-06-10 PROCEDURE — 83050 HGB METHEMOGLOBIN QUAN: CPT

## 2021-06-10 PROCEDURE — 85025 COMPLETE CBC W/AUTO DIFF WBC: CPT | Performed by: INTERNAL MEDICINE

## 2021-06-10 PROCEDURE — 99233 SBSQ HOSP IP/OBS HIGH 50: CPT | Performed by: INTERNAL MEDICINE

## 2021-06-10 PROCEDURE — 94660 CPAP INITIATION&MGMT: CPT

## 2021-06-10 PROCEDURE — 82805 BLOOD GASES W/O2 SATURATION: CPT

## 2021-06-10 PROCEDURE — 70450 CT HEAD/BRAIN W/O DYE: CPT | Performed by: RADIOLOGY

## 2021-06-10 PROCEDURE — 25010000002 PIPERACILLIN SOD-TAZOBACTAM PER 1 G: Performed by: INTERNAL MEDICINE

## 2021-06-10 PROCEDURE — 82375 ASSAY CARBOXYHB QUANT: CPT

## 2021-06-10 PROCEDURE — 36600 WITHDRAWAL OF ARTERIAL BLOOD: CPT

## 2021-06-10 RX ORDER — BUMETANIDE 0.25 MG/ML
2 INJECTION INTRAMUSCULAR; INTRAVENOUS EVERY 12 HOURS
Status: DISCONTINUED | OUTPATIENT
Start: 2021-06-10 | End: 2021-06-11

## 2021-06-10 RX ORDER — GABAPENTIN 300 MG/1
300 CAPSULE ORAL EVERY 8 HOURS SCHEDULED
Status: DISCONTINUED | OUTPATIENT
Start: 2021-06-10 | End: 2021-06-10

## 2021-06-10 RX ORDER — NALOXONE HYDROCHLORIDE 0.4 MG/ML
INJECTION, SOLUTION INTRAMUSCULAR; INTRAVENOUS; SUBCUTANEOUS
Status: COMPLETED
Start: 2021-06-10 | End: 2021-06-10

## 2021-06-10 RX ADMIN — PIPERACILLIN SODIUM AND TAZOBACTAM SODIUM 4.5 G: 4; .5 INJECTION, POWDER, LYOPHILIZED, FOR SOLUTION INTRAVENOUS at 05:56

## 2021-06-10 RX ADMIN — APIXABAN 5 MG: 5 TABLET, FILM COATED ORAL at 20:44

## 2021-06-10 RX ADMIN — ETHYL ALCOHOL 1 EACH: 62 SWAB TOPICAL at 08:39

## 2021-06-10 RX ADMIN — DOCUSATE SODIUM 100 MG: 100 CAPSULE, LIQUID FILLED ORAL at 08:38

## 2021-06-10 RX ADMIN — Medication 1 CAPSULE: at 08:37

## 2021-06-10 RX ADMIN — DOCUSATE SODIUM 100 MG: 100 CAPSULE, LIQUID FILLED ORAL at 20:44

## 2021-06-10 RX ADMIN — HYDROXYZINE HYDROCHLORIDE 25 MG: 25 TABLET, FILM COATED ORAL at 08:38

## 2021-06-10 RX ADMIN — METOPROLOL SUCCINATE 75 MG: 25 TABLET, EXTENDED RELEASE ORAL at 08:37

## 2021-06-10 RX ADMIN — ATORVASTATIN CALCIUM 20 MG: 20 TABLET, FILM COATED ORAL at 08:37

## 2021-06-10 RX ADMIN — DOXYCYCLINE 100 MG: 100 INJECTION, POWDER, LYOPHILIZED, FOR SOLUTION INTRAVENOUS at 12:24

## 2021-06-10 RX ADMIN — LIDOCAINE 2 PATCH: 50 PATCH CUTANEOUS at 08:37

## 2021-06-10 RX ADMIN — INSULIN ASPART 2 UNITS: 100 INJECTION, SOLUTION INTRAVENOUS; SUBCUTANEOUS at 12:24

## 2021-06-10 RX ADMIN — BENZONATATE 100 MG: 100 CAPSULE ORAL at 08:38

## 2021-06-10 RX ADMIN — IPRATROPIUM BROMIDE AND ALBUTEROL SULFATE 3 ML: .5; 3 SOLUTION RESPIRATORY (INHALATION) at 00:52

## 2021-06-10 RX ADMIN — IPRATROPIUM BROMIDE AND ALBUTEROL SULFATE 3 ML: .5; 3 SOLUTION RESPIRATORY (INHALATION) at 06:57

## 2021-06-10 RX ADMIN — SODIUM CHLORIDE, PRESERVATIVE FREE 10 ML: 5 INJECTION INTRAVENOUS at 08:38

## 2021-06-10 RX ADMIN — PANTOPRAZOLE SODIUM 40 MG: 40 TABLET, DELAYED RELEASE ORAL at 05:56

## 2021-06-10 RX ADMIN — HYDROCODONE BITARTRATE AND ACETAMINOPHEN 1 TABLET: 7.5; 325 TABLET ORAL at 05:56

## 2021-06-10 RX ADMIN — PIPERACILLIN SODIUM AND TAZOBACTAM SODIUM 4.5 G: 4; .5 INJECTION, POWDER, LYOPHILIZED, FOR SOLUTION INTRAVENOUS at 15:42

## 2021-06-10 RX ADMIN — NALOXONE HYDROCHLORIDE 0.4 MG: 0.4 INJECTION, SOLUTION INTRAMUSCULAR; INTRAVENOUS; SUBCUTANEOUS at 11:33

## 2021-06-10 RX ADMIN — CLOPIDOGREL 75 MG: 75 TABLET, FILM COATED ORAL at 08:38

## 2021-06-10 RX ADMIN — LISINOPRIL 40 MG: 10 TABLET ORAL at 08:38

## 2021-06-10 RX ADMIN — BUDESONIDE AND FORMOTEROL FUMARATE DIHYDRATE 2 PUFF: 160; 4.5 AEROSOL RESPIRATORY (INHALATION) at 18:51

## 2021-06-10 RX ADMIN — TAMSULOSIN HYDROCHLORIDE 0.4 MG: 0.4 CAPSULE ORAL at 20:44

## 2021-06-10 RX ADMIN — GABAPENTIN 600 MG: 300 CAPSULE ORAL at 05:56

## 2021-06-10 RX ADMIN — SODIUM CHLORIDE, PRESERVATIVE FREE 10 ML: 5 INJECTION INTRAVENOUS at 20:44

## 2021-06-10 RX ADMIN — BUMETANIDE 2 MG: 0.25 INJECTION, SOLUTION INTRAMUSCULAR; INTRAVENOUS at 13:20

## 2021-06-10 RX ADMIN — CANAGLIFLOZIN 300 MG: 300 TABLET, FILM COATED ORAL at 08:37

## 2021-06-10 RX ADMIN — ACETAMINOPHEN 650 MG: 325 TABLET ORAL at 20:49

## 2021-06-10 RX ADMIN — IPRATROPIUM BROMIDE AND ALBUTEROL SULFATE 3 ML: .5; 3 SOLUTION RESPIRATORY (INHALATION) at 13:14

## 2021-06-10 RX ADMIN — DOXYCYCLINE 100 MG: 100 INJECTION, POWDER, LYOPHILIZED, FOR SOLUTION INTRAVENOUS at 23:42

## 2021-06-10 RX ADMIN — INSULIN DETEMIR 40 UNITS: 100 INJECTION, SOLUTION SUBCUTANEOUS at 08:50

## 2021-06-10 RX ADMIN — POLYETHYLENE GLYCOL (3350) 17 G: 17 POWDER, FOR SOLUTION ORAL at 08:37

## 2021-06-10 RX ADMIN — IPRATROPIUM BROMIDE AND ALBUTEROL SULFATE 3 ML: .5; 3 SOLUTION RESPIRATORY (INHALATION) at 18:51

## 2021-06-10 RX ADMIN — FLUTICASONE PROPIONATE 2 SPRAY: 50 SPRAY, METERED NASAL at 08:38

## 2021-06-10 RX ADMIN — BUMETANIDE 2 MG: 0.25 INJECTION, SOLUTION INTRAMUSCULAR; INTRAVENOUS at 23:42

## 2021-06-10 RX ADMIN — APIXABAN 5 MG: 5 TABLET, FILM COATED ORAL at 08:37

## 2021-06-10 RX ADMIN — BUDESONIDE AND FORMOTEROL FUMARATE DIHYDRATE 2 PUFF: 160; 4.5 AEROSOL RESPIRATORY (INHALATION) at 06:57

## 2021-06-10 NOTE — PLAN OF CARE
Goal Outcome Evaluation:   Pts VSS and afebrile. He is currently wearing his BiPAP. Had to increase O2 to 3.5L NC earlier in the shift due to him being short of breath. He is currently resting at this time with no complaints. Bed in low position, side rails up X2. Call light in reach.

## 2021-06-10 NOTE — PROGRESS NOTES
PROGRESS NOTE         Patient Identification:  Name:  Иван Miller  Age:  65 y.o.  Sex:  male  :  1955  MRN:  [id number]  Visit Number:  [id number]  Primary Care Provider:  Rosi Thomas APRN         LOS: 19 days       ----------------------------------------------------------------------------------------------------------------------  Subjective       Chief Complaints:    Shortness of Breath        Interval History:      Patient resting in bed this morning.  Overall feels he is breathing better today.  Continues on 4 L per nasal cannula with no apparent distress.  WBC normal.  CRP improving at 4.58.  Respiratory culture in progress.     Review of Systems:    Constitutional: no fever, chills and night sweats.  Generalized fatigue.  Eyes: no eye drainage, itching or redness.  HEENT: no mouth sores, dysphagia or nose bleed.  Respiratory: no for shortness of breath.  Productive cough.  Cardiovascular: no chest pain, no palpitations, no orthopnea.  Gastrointestinal: no nausea, vomiting or diarrhea. No abdominal pain, hematemesis or rectal bleeding.  Genitourinary: no dysuria or polyuria.  Hematologic/lymphatic: no lymph node abnormalities, no easy bruising or easy bleeding.  Musculoskeletal: Chronic back pain.  Skin: No rash and no itching.  Neurological: no loss of consciousness, no seizure, no headache.  Behavioral/Psych: no depression or suicidal ideation.  Endocrine: no hot flashes.  Immunologic: negative.    ----------------------------------------------------------------------------------------------------------------------      Objective       Current [facility] Meds:  apixaban, 5 mg, Oral, Q12H  atorvastatin, 20 mg, Oral, Daily  budesonide-formoterol, 2 puff, Inhalation, BID - RT  bumetanide, 2 mg, Intravenous, Q12H  Canagliflozin, 300 mg, Oral, Daily  clopidogrel, 75 mg, Oral, Daily  docusate sodium, 100 mg, Oral, BID  doxycycline, 100 mg, Intravenous, Q12H  ethyl alcohol, 1 application,  Nasal, BID  fluticasone, 2 spray, Nasal, Daily  gabapentin, 300 mg, Oral, Q8H  insulin aspart, 0-9 Units, Subcutaneous, TID AC  insulin detemir, 40 Units, Subcutaneous, Daily  ipratropium-albuterol, 3 mL, Nebulization, Q6H - RT  lactobacillus acidophilus, 1 capsule, Oral, Daily  lidocaine, 2 patch, Transdermal, Q24H  lisinopril, 40 mg, Oral, Q24H  metoprolol succinate XL, 75 mg, Oral, Q24H  pantoprazole, 40 mg, Oral, Q AM  piperacillin-tazobactam, 4.5 g, Intravenous, Q8H  polyethylene glycol, 17 g, Oral, Daily  sodium chloride, 10 mL, Intravenous, Q12H  sodium chloride, 10 mL, Intravenous, Q12H  tamsulosin, 0.4 mg, Oral, Nightly         ----------------------------------------------------------------------------------------------------------------------    Vital Signs:  Temp:  [98.2 °F (36.8 °C)-98.6 °F (37 °C)] 98.4 °F (36.9 °C)  Heart Rate:  [] 95  Resp:  [12-36] 25  BP: (114-176)/() 138/83  Mean Arterial Pressure (Non-Invasive) for the past 24 hrs (Last 3 readings):   Noninvasive MAP (mmHg)   06/10/21 0804 85   06/10/21 0704 102   06/10/21 0602 109     SpO2 Percentage    06/10/21 0606 06/10/21 0659 06/10/21 0704   SpO2: (!) 86% 91% 94%     SpO2:  [85 %-99 %] 94 %  on  Flow (L/min):  [3-5] 4;   Device (Oxygen Therapy): nasal cannula    Body mass index is 41.23 kg/m².  Wt Readings from Last 3 Encounters:   06/10/21 127 kg (279 lb 3.2 oz)   11/25/19 127 kg (280 lb)   10/06/19 124 kg (273 lb 11.2 oz)        Intake/Output Summary (Last 24 hours) at 6/10/2021 1231  Last data filed at 6/10/2021 0800  Gross per 24 hour   Intake 1386.12 ml   Output 3525 ml   Net -2138.88 ml     Diet Regular; Cardiac, Consistent Carbohydrate  ----------------------------------------------------------------------------------------------------------------------      Physical Exam:    Constitutional: Morbidly obese  male is sitting up in bed in no apparent distress.  On nasal cannula this morning.  HENT:  Head:  Normocephalic and atraumatic.  Mouth:  Moist mucous membranes.    Eyes:  Conjunctivae and EOM are normal.  No scleral icterus.  Neck:  Neck supple.  No JVD present.    Cardiovascular:  Normal rate, regular rhythm and normal heart sounds with no murmur. No edema.  Pulmonary/Chest:  No respiratory distress, no wheezes, no crackles, with normal breath sounds and good air movement.  Abdominal:  Soft.  Bowel sounds are normal.  No distension and no tenderness.  Shaffer catheter in place with dark yellow, clear urine.  Musculoskeletal:  No tenderness, and no deformity.  No swelling or redness of joints.  Mild edema bilateral lower extremities.   Neurological:  Alert and oriented to person, place, and time.  No facial droop.  No slurred speech.   Skin:  Skin is warm and dry.  No rash noted.  No pallor.   Psychiatric:  Normal mood and affect.  Behavior is normal.  ----------------------------------------------------------------------------------------------------------------------  Results from last 7 days   Lab Units 06/06/21  0624   TROPONIN T ng/mL 0.016     Results from last 7 days   Lab Units 06/08/21  0035   PROBNP pg/mL 366.0       Results from last 7 days   Lab Units 06/10/21  1126   PH, ARTERIAL pH units 7.376   PO2 ART mm Hg 97.9   PCO2, ARTERIAL mm Hg 62.2*   HCO3 ART mmol/L 36.4*     Results from last 7 days   Lab Units 06/10/21  0133 06/09/21  0048 06/08/21  0035   CRP mg/dL 4.58* 9.45* 14.26*   WBC 10*3/mm3 6.12 8.90 10.12   HEMOGLOBIN g/dL 10.2* 10.7* 10.3*   HEMATOCRIT % 35.9* 37.6 36.3*   MCV fL 87.3 85.8 85.2   MCHC g/dL 28.4* 28.5* 28.4*   PLATELETS 10*3/mm3 228 230 212     Results from last 7 days   Lab Units 06/10/21  0133 06/09/21  0048 06/08/21  0035 06/07/21  0534 06/06/21  0725   SODIUM mmol/L 138 138 139  --  139   POTASSIUM mmol/L 4.3 4.5 4.4  --  4.4   MAGNESIUM mg/dL  --   --   --  2.2  --    CHLORIDE mmol/L 100 100 103  --  101   CO2 mmol/L 32.2* 31.0* 31.2*  --  26.3   BUN mg/dL 20 18 22  --  19    CREATININE mg/dL 0.95 1.00 1.11  --  0.91   EGFR IF NONAFRICN AM mL/min/1.73 79 75 66  --  83   CALCIUM mg/dL 9.3 9.2 9.1  --  9.5   GLUCOSE mg/dL 168* 143* 191*  --  252*   ALBUMIN g/dL 2.97*  --  2.98*  --  3.62   BILIRUBIN mg/dL 0.3  --  0.4  --  0.4   ALK PHOS U/L 69  --  74  --  110   AST (SGOT) U/L 12  --  10  --  19   ALT (SGPT) U/L 12  --  11  --  17   Estimated Creatinine Clearance: 100.8 mL/min (by C-G formula based on SCr of 0.95 mg/dL).  No results found for: AMMONIA    Glucose   Date/Time Value Ref Range Status   06/10/2021 1133 182 (H) 70 - 130 mg/dL Final   06/10/2021 0603 134 (H) 70 - 130 mg/dL Final   06/09/2021 2033 233 (H) 70 - 130 mg/dL Final   06/09/2021 1810 237 (H) 70 - 130 mg/dL Final   06/09/2021 1034 310 (H) 70 - 130 mg/dL Final   06/09/2021 0622 142 (H) 70 - 130 mg/dL Final   06/08/2021 2030 176 (H) 70 - 130 mg/dL Final   06/08/2021 1707 165 (H) 70 - 130 mg/dL Final     Lab Results   Component Value Date    HGBA1C 8.80 (H) 05/21/2021     Lab Results   Component Value Date    TSH 2.100 05/21/2021    FREET4 0.95 05/21/2021       No results found for: BLOODCX  No results found for: URINECX  No results found for: WOUNDCX  No results found for: STOOLCX  Respiratory Culture   Date Value Ref Range Status   06/06/2021   Final    Heavy growth (4+) Normal Respiratory Radha: NO S.aureus/MRSA or Pseudomonas aeruginosa     Pain Management Panel       Pain Management Panel Latest Ref Rng & Units 6/10/2021 5/21/2021    AMPHETAMINES SCREEN, URINE Negative Negative Positive(A)    BARBITURATES SCREEN Negative Negative Negative    BENZODIAZEPINE SCREEN, URINE Negative Negative Negative    BUPRENORPHINEUR Negative Negative Negative    COCAINE SCREEN, URINE Negative Negative Negative    METHADONE SCREEN, URINE Negative Negative Negative              ----------------------------------------------------------------------------------------------------------------------  Imaging Results (Last 24 Hours)        Procedure Component Value Units Date/Time    CT Head Without Contrast [[id number]] Resulted: 06/10/21 1203     Updated: 06/10/21 1206            ----------------------------------------------------------------------------------------------------------------------    Assessment/Plan       Assessment/Plan     ASSESSMENT:    1.  Burned-out osteomyelitis  2.  HCAP    PLAN:    Patient resting in bed this morning.  Overall feels he is breathing better today.  Continues on 4 L per nasal cannula with no apparent distress.  WBC normal.  CRP improving at 4.58.  Respiratory culture in progress.     MRSA screen on 6/8/2021 was positive.  Urinalysis on 6/8/2021 was negative.  Respiratory panel PCR on 6/8/2021 was negative.     MRI of the thoracic spine with and without contrast on 5/28/2021 showed postcontrast imaging demonstrating enhancement along the inferior endplate of T7 and superior endplate of T8 as well as some enhancement posteriorly adjacent to the cord but no enhancing mass is seen.  Again, as above there certainly could be associated with at least a mild degree of discitis.  Moderate central canal narrowing at T7-T8 and T8-T9.  MRI of the thoracic spine without contrast on 5/28/2021 showed decreased height of T7 and T8 with extensive endplate changes.  Most of this does appear to be chronic.  I cannot exclude at least a mild degree of discitis.  There are spurs and disc bulges at T7-T8 and T8-T9 which do cause moderate central canal narrowing.  MRI of the cervical spine with and without contrast on 5/28/2021 showed the study is markedly degraded by patient motion.  No destructive bony lesion.  No contrast-enhancing abnormality.  MRI of the lumbar spine without contrast on 5/28/2021 showed facet arthropathy.  Disc bulges most notable at L3-L4 and L4-L5.  COVID-19 and flu A/B PCR on 5/21/2021 was negative.  Blood cultures on 5/21/2021 finalized as no growth.     Regarding osteomyelitis, we recommend follow-up with   [facility], as an outpatient, as patient does not appear to have active osteomyelitis at this time. Would recommend to consider repeating CT/MRI of the spine if back pain worsens or inflammatory markers rise.      Recommend to continue Zosyn and doxycycline for now.  We will continue to follow closely and adjust antibiotic therapy as appropriate.    Code Status:   Code Status and Medical Interventions:   Ordered at: 05/22/21 0057     Code Status:    CPR     Medical Interventions (Level of Support Prior to Arrest):    Full       OSVALDO Salomon  06/10/21  12:31 EDT

## 2021-06-10 NOTE — PLAN OF CARE
Goal Outcome Evaluation:           Progress: declining  Outcome Summary: Patient vital signs currently stable and he remains on continuous BiPap until his mentation improves per MD. External catheter in place. Disoriented to place, time, and situation. Uncooperative with delirium noted. See previous note for more details regarding patients care today. Safety measures in place. Continuously monitoring.

## 2021-06-10 NOTE — NURSING NOTE
"Dr. Neri and respiratory therapy notified after patient became lethargic with pinpoint pupils and scleral redness. He was difficult to arouse. RT at bedside along with MD. Night shift reported patients daughter visited several times overnight. The patient had stated earlier this morning that, \"My daughter is who I smoke meth with.\" I asked the patient if he had taken any medications from outside of the hospital overnight and the patient responded with, \"I don't know, I can't remember.\" After notifying the MD of this, along with the patients history, the MD ordered for the patient to receive IV Naloxone. After several minutes, the patient did respond to the Naloxone. He is now easier to arouse but continues to be disoriented and delirious, which is not his baseline. STAT head CT and urine toxicology screen ordered by MD. Patient currently remaining on continuous BiPap until his mentality improves, per Dr. Neri. Continuously monitoring patient alongside RT. Safety measures in place.     MARCELO Eid   "

## 2021-06-10 NOTE — PROGRESS NOTES
[facility] HOSPITALIST PROGRESS NOTE     Patient Identification:  Name:  Иван Miller  Age:  65 y.o.  Sex:  male  :  1955  MRN:  [id number]  Visit Number:  [id number]  Primary Care Provider:  Rosi Thomas APRN    Length of stay:  19    Chief complaint:   Back pain    Subjective:    Patient seen and examined reviewed his condition, more lethargic after his daughter's visit last night, patient initially was on nasal cannula however he is back on BiPAP with depressed mentation.  .  ----------------------------------------------------------------------------------------------------------------------  Current Hospital Meds:  apixaban, 5 mg, Oral, Q12H  atorvastatin, 20 mg, Oral, Daily  budesonide-formoterol, 2 puff, Inhalation, BID - RT  bumetanide, 2 mg, Intravenous, Q12H  Canagliflozin, 300 mg, Oral, Daily  clopidogrel, 75 mg, Oral, Daily  docusate sodium, 100 mg, Oral, BID  doxycycline, 100 mg, Intravenous, Q12H  ethyl alcohol, 1 application, Nasal, BID  fluticasone, 2 spray, Nasal, Daily  insulin aspart, 0-9 Units, Subcutaneous, TID AC  insulin detemir, 40 Units, Subcutaneous, Daily  ipratropium-albuterol, 3 mL, Nebulization, Q6H - RT  lactobacillus acidophilus, 1 capsule, Oral, Daily  lidocaine, 2 patch, Transdermal, Q24H  lisinopril, 40 mg, Oral, Q24H  metoprolol succinate XL, 75 mg, Oral, Q24H  pantoprazole, 40 mg, Oral, Q AM  polyethylene glycol, 17 g, Oral, Daily  sodium chloride, 10 mL, Intravenous, Q12H  sodium chloride, 10 mL, Intravenous, Q12H  tamsulosin, 0.4 mg, Oral, Nightly         ----------------------------------------------------------------------------------------------------------------------  Vital Signs:  Temp:  [98.2 °F (36.8 °C)-98.6 °F (37 °C)] 98.4 °F (36.9 °C)  Heart Rate:  [73-96] 77  Resp:  [12-36] 18  BP: (116-176)/() 158/92      21  0500 21  0500 06/10/21  0500   Weight: (!) 145 kg (320 lb) (!) 144 kg (316 lb 8 oz) 127 kg (279 lb 3.2 oz)      Body mass index is 41.23 kg/m².    Intake/Output Summary (Last 24 hours) at 6/10/2021 1727  Last data filed at 6/10/2021 1637  Gross per 24 hour   Intake 1011.12 ml   Output 3950 ml   Net -2938.88 ml     Diet Regular; Cardiac, Consistent Carbohydrate  ----------------------------------------------------------------------------------------------------------------------  Physical exam:  Constitutional: Morbidly obese, moderate respiratory distress.     HENT:  Head:  Normocephalic and atraumatic.  Mouth:  Moist mucous membranes.    Eyes:  Conjunctivae and EOM are normal.  Pupils are equal, round, and reactive to light.  No scleral icterus.    Neck:  Neck supple. No thyromegaly.  No JVD present.    Cardiovascular:  Regular rate and rhythm with no murmurs, rubs, clicks or gallops appreciated.  Pulmonary/Chest:   Bilateral crackles.  Abdominal:  Soft. Nontender. Nondistended  Bowel sounds are normal in all four quadrants. No organomegally appreciated.   Neurological:   Sleepy, lethargic, arousable to tactile stimuli. cranial nerves II-XII intact with no focal deficits.  No facial droop.  No slurred speech.   Skin:  Warm and dry to palpation with no rashes or lesions appreciated.  Peripheral vascular:  2+ radial and pedal pulses in bilateral upper and lower extremities.      ----------------------------------------------------------------------------------------------------------------------  Results from last 7 days   Lab Units 06/06/21  0624   TROPONIN T ng/mL 0.016     Results from last 7 days   Lab Units 06/10/21  0133 06/09/21  0048 06/08/21  0035   CRP mg/dL 4.58* 9.45* 14.26*   WBC 10*3/mm3 6.12 8.90 10.12   HEMOGLOBIN g/dL 10.2* 10.7* 10.3*   HEMATOCRIT % 35.9* 37.6 36.3*   MCV fL 87.3 85.8 85.2   MCHC g/dL 28.4* 28.5* 28.4*   PLATELETS 10*3/mm3 228 230 212     Results from last 7 days   Lab Units 06/10/21  1126   PH, ARTERIAL pH units 7.376   PO2 ART mm Hg 97.9   PCO2, ARTERIAL mm Hg 62.2*   HCO3 ART mmol/L  36.4*     Results from last 7 days   Lab Units 06/10/21  0133 06/09/21  0048 06/08/21  0035 06/07/21  0534 06/06/21  0725   SODIUM mmol/L 138 138 139  --  139   POTASSIUM mmol/L 4.3 4.5 4.4  --  4.4   MAGNESIUM mg/dL  --   --   --  2.2  --    CHLORIDE mmol/L 100 100 103  --  101   CO2 mmol/L 32.2* 31.0* 31.2*  --  26.3   BUN mg/dL 20 18 22  --  19   CREATININE mg/dL 0.95 1.00 1.11  --  0.91   EGFR IF NONAFRICN AM mL/min/1.73 79 75 66  --  83   CALCIUM mg/dL 9.3 9.2 9.1  --  9.5   GLUCOSE mg/dL 168* 143* 191*  --  252*   ALBUMIN g/dL 2.97*  --  2.98*  --  3.62   BILIRUBIN mg/dL 0.3  --  0.4  --  0.4   ALK PHOS U/L 69  --  74  --  110   AST (SGOT) U/L 12  --  10  --  19   ALT (SGPT) U/L 12  --  11  --  17   Estimated Creatinine Clearance: 100.8 mL/min (by C-G formula based on SCr of 0.95 mg/dL).    No results found for: AMMONIA      No results found for: BLOODCX  No results found for: URINECX  No results found for: WOUNDCX  No results found for: STOOLCX    I have personally looked at the labs and they are summarized above.  ----------------------------------------------------------------------------------------------------------------------  Imaging Results (Last 24 Hours)     Procedure Component Value Units Date/Time    CT Head Without Contrast [[id number]] Collected: 06/10/21 8582     Updated: 06/10/21 1238    Narrative:      EXAM:    CT Head Without Intravenous Contrast     EXAM DATE:    6/10/2021 12:03 PM     CLINICAL HISTORY:    Mental status change, persistent or worsening; J96.01-Acute  respiratory failure with hypoxia; J96.02-Acute respiratory failure with  hypercapnia; J44.1-Chronic obstructive pulmonary disease with (acute)  exacerbation; L08.9-Local infection of the skin and subcutaneous tissue,  unspecified     TECHNIQUE:    Axial computed tomography images of the head/brain without intravenous  contrast.  Sagittal and coronal reformatted images were created and  reviewed.  This CT exam was performed using  one or more of the following  dose reduction techniques:  automated exposure control, adjustment of  the mA and/or kV according to patient size, and/or use of iterative  reconstruction technique.     COMPARISON:    No relevant prior studies available.     FINDINGS:    BRAIN:  Unremarkable.  No hemorrhage.  No significant white matter  disease.  No edema.    VENTRICLES:  Unremarkable.  No ventriculomegaly.    BONES/JOINTS:  Unremarkable.  No acute fracture.    SOFT TISSUES:  Unremarkable.    SINUSES:  Unremarkable as visualized.  No acute sinusitis.    MASTOID AIR CELLS:  Unremarkable as visualized.  No mastoid effusion.       Impression:        Unremarkable exam demonstrating no CT evidence of acute intracranial  findings.     This report was finalized on 6/10/2021 12:36 PM by Dr. Jonah Loving MD.           ----------------------------------------------------------------------------------------------------------------------      Assessment and Plan:    *Acute on chronic hypoxic/hypercapnic respiratory failure, stable oxygenation on a BiPAP (backordered from nasal cannula after his family visit)  *History of toxic encephalopathy with methamphetamine abuse  *Lethargic, suspected opioid induced with gabapentin  *COPD exacerbation  *PAF  *Acute on chronic decompensated diastolic heart failure  *Essential hypertension  *DM type II  *RIO  *Reported history of chronic osteomyelitis, MRI reviewed not showing evidence of acute inflammatory changes, ID noted, thinking no evidence of acute bony/cartilage infection  *CAP    --Narcan was given without much of response, patient is still arousable to tactile stimuli, his eyes are both injected with nonlateralizing exam suspicious of drug overdose  --DC gabapentin and hydrocodone, monitor mentation, stat head CT did not reveal any acute pathology  --Start the patient on 2 mg IV Bumex, see orders  --U tox  --BMP in a.m.  --If within the next 12 to 16 hours mentation is not  improving, will  get brain MRI  --Continue BiPAP for pulse ox goal of more than 94%, stat ABG did not reveal CO2 retention  --Continue other therapies  --Insulin sliding scale  --Continue doxycycline, hold on Zosyn        Jagdeep Neri MD  06/10/21  17:27 EDT

## 2021-06-11 LAB
ANION GAP SERPL CALCULATED.3IONS-SCNC: 6.6 MMOL/L (ref 5–15)
BACTERIA SPEC RESP CULT: NORMAL
BUN SERPL-MCNC: 25 MG/DL (ref 8–23)
BUN/CREAT SERPL: 24.3 (ref 7–25)
CALCIUM SPEC-SCNC: 9.3 MG/DL (ref 8.6–10.5)
CHLORIDE SERPL-SCNC: 99 MMOL/L (ref 98–107)
CO2 SERPL-SCNC: 35.4 MMOL/L (ref 22–29)
CREAT SERPL-MCNC: 1.03 MG/DL (ref 0.76–1.27)
CRP SERPL-MCNC: 2.29 MG/DL (ref 0–0.5)
GFR SERPL CREATININE-BSD FRML MDRD: 72 ML/MIN/1.73
GLUCOSE BLDC GLUCOMTR-MCNC: 154 MG/DL (ref 70–130)
GLUCOSE BLDC GLUCOMTR-MCNC: 212 MG/DL (ref 70–130)
GLUCOSE BLDC GLUCOMTR-MCNC: 231 MG/DL (ref 70–130)
GLUCOSE BLDC GLUCOMTR-MCNC: 250 MG/DL (ref 70–130)
GLUCOSE SERPL-MCNC: 232 MG/DL (ref 65–99)
GRAM STN SPEC: NORMAL
POTASSIUM SERPL-SCNC: 4 MMOL/L (ref 3.5–5.2)
SODIUM SERPL-SCNC: 141 MMOL/L (ref 136–145)

## 2021-06-11 PROCEDURE — 94799 UNLISTED PULMONARY SVC/PX: CPT

## 2021-06-11 PROCEDURE — 94660 CPAP INITIATION&MGMT: CPT

## 2021-06-11 PROCEDURE — 86140 C-REACTIVE PROTEIN: CPT | Performed by: PHYSICIAN ASSISTANT

## 2021-06-11 PROCEDURE — 82962 GLUCOSE BLOOD TEST: CPT

## 2021-06-11 PROCEDURE — 63710000001 INSULIN DETEMIR PER 5 UNITS: Performed by: INTERNAL MEDICINE

## 2021-06-11 PROCEDURE — 63710000001 INSULIN ASPART PER 5 UNITS: Performed by: INTERNAL MEDICINE

## 2021-06-11 PROCEDURE — 99233 SBSQ HOSP IP/OBS HIGH 50: CPT | Performed by: INTERNAL MEDICINE

## 2021-06-11 PROCEDURE — 63710000001 INSULIN ASPART PER 5 UNITS: Performed by: PHYSICIAN ASSISTANT

## 2021-06-11 PROCEDURE — 80048 BASIC METABOLIC PNL TOTAL CA: CPT | Performed by: INTERNAL MEDICINE

## 2021-06-11 RX ORDER — BUMETANIDE 0.25 MG/ML
2.5 INJECTION INTRAMUSCULAR; INTRAVENOUS EVERY 8 HOURS
Status: DISCONTINUED | OUTPATIENT
Start: 2021-06-11 | End: 2021-06-14

## 2021-06-11 RX ORDER — BUMETANIDE 0.25 MG/ML
2 INJECTION INTRAMUSCULAR; INTRAVENOUS EVERY 8 HOURS
Status: DISCONTINUED | OUTPATIENT
Start: 2021-06-11 | End: 2021-06-11

## 2021-06-11 RX ORDER — DOXYCYCLINE 100 MG/1
100 CAPSULE ORAL EVERY 12 HOURS SCHEDULED
Status: COMPLETED | OUTPATIENT
Start: 2021-06-11 | End: 2021-06-16

## 2021-06-11 RX ORDER — LIDOCAINE 50 MG/G
1 PATCH TOPICAL
Status: DISCONTINUED | OUTPATIENT
Start: 2021-06-12 | End: 2021-06-17 | Stop reason: HOSPADM

## 2021-06-11 RX ORDER — METOPROLOL SUCCINATE 50 MG/1
100 TABLET, EXTENDED RELEASE ORAL
Status: DISCONTINUED | OUTPATIENT
Start: 2021-06-12 | End: 2021-06-12

## 2021-06-11 RX ORDER — SPIRONOLACTONE 25 MG/1
50 TABLET ORAL DAILY
Status: DISCONTINUED | OUTPATIENT
Start: 2021-06-11 | End: 2021-06-14

## 2021-06-11 RX ADMIN — METOPROLOL SUCCINATE 75 MG: 25 TABLET, EXTENDED RELEASE ORAL at 09:45

## 2021-06-11 RX ADMIN — BUDESONIDE AND FORMOTEROL FUMARATE DIHYDRATE 2 PUFF: 160; 4.5 AEROSOL RESPIRATORY (INHALATION) at 18:30

## 2021-06-11 RX ADMIN — ACETAMINOPHEN 650 MG: 325 TABLET ORAL at 17:02

## 2021-06-11 RX ADMIN — ATORVASTATIN CALCIUM 20 MG: 20 TABLET, FILM COATED ORAL at 12:06

## 2021-06-11 RX ADMIN — CLOPIDOGREL 75 MG: 75 TABLET, FILM COATED ORAL at 09:45

## 2021-06-11 RX ADMIN — LIDOCAINE 1 PATCH: 50 PATCH CUTANEOUS at 23:43

## 2021-06-11 RX ADMIN — SODIUM CHLORIDE, PRESERVATIVE FREE 10 ML: 5 INJECTION INTRAVENOUS at 09:46

## 2021-06-11 RX ADMIN — IPRATROPIUM BROMIDE AND ALBUTEROL SULFATE 3 ML: .5; 3 SOLUTION RESPIRATORY (INHALATION) at 07:17

## 2021-06-11 RX ADMIN — IPRATROPIUM BROMIDE AND ALBUTEROL SULFATE 3 ML: .5; 3 SOLUTION RESPIRATORY (INHALATION) at 13:25

## 2021-06-11 RX ADMIN — ETHYL ALCOHOL 1 EACH: 62 SWAB TOPICAL at 09:46

## 2021-06-11 RX ADMIN — POLYETHYLENE GLYCOL (3350) 17 G: 17 POWDER, FOR SOLUTION ORAL at 09:45

## 2021-06-11 RX ADMIN — DOCUSATE SODIUM 100 MG: 100 CAPSULE, LIQUID FILLED ORAL at 20:46

## 2021-06-11 RX ADMIN — IPRATROPIUM BROMIDE AND ALBUTEROL SULFATE 3 ML: .5; 3 SOLUTION RESPIRATORY (INHALATION) at 18:31

## 2021-06-11 RX ADMIN — INSULIN DETEMIR 40 UNITS: 100 INJECTION, SOLUTION SUBCUTANEOUS at 09:58

## 2021-06-11 RX ADMIN — CANAGLIFLOZIN 300 MG: 300 TABLET, FILM COATED ORAL at 09:45

## 2021-06-11 RX ADMIN — APIXABAN 5 MG: 5 TABLET, FILM COATED ORAL at 20:46

## 2021-06-11 RX ADMIN — LIDOCAINE 2 PATCH: 50 PATCH CUTANEOUS at 09:44

## 2021-06-11 RX ADMIN — DOCUSATE SODIUM 100 MG: 100 CAPSULE, LIQUID FILLED ORAL at 09:45

## 2021-06-11 RX ADMIN — DOXYCYCLINE 100 MG: 100 CAPSULE ORAL at 20:46

## 2021-06-11 RX ADMIN — BUMETANIDE 2.5 MG: 0.25 INJECTION INTRAMUSCULAR; INTRAVENOUS at 20:46

## 2021-06-11 RX ADMIN — INSULIN ASPART 2 UNITS: 100 INJECTION, SOLUTION INTRAVENOUS; SUBCUTANEOUS at 09:44

## 2021-06-11 RX ADMIN — LISINOPRIL 40 MG: 10 TABLET ORAL at 09:44

## 2021-06-11 RX ADMIN — HYDROXYZINE HYDROCHLORIDE 25 MG: 25 TABLET, FILM COATED ORAL at 20:49

## 2021-06-11 RX ADMIN — INSULIN ASPART 4 UNITS: 100 INJECTION, SOLUTION INTRAVENOUS; SUBCUTANEOUS at 12:20

## 2021-06-11 RX ADMIN — DOXYCYCLINE 100 MG: 100 INJECTION, POWDER, LYOPHILIZED, FOR SOLUTION INTRAVENOUS at 12:07

## 2021-06-11 RX ADMIN — HYDROXYZINE HYDROCHLORIDE 25 MG: 25 TABLET, FILM COATED ORAL at 09:45

## 2021-06-11 RX ADMIN — INSULIN ASPART 7 UNITS: 100 INJECTION, SOLUTION INTRAVENOUS; SUBCUTANEOUS at 17:02

## 2021-06-11 RX ADMIN — TAMSULOSIN HYDROCHLORIDE 0.4 MG: 0.4 CAPSULE ORAL at 20:46

## 2021-06-11 RX ADMIN — SPIRONOLACTONE 50 MG: 25 TABLET ORAL at 17:01

## 2021-06-11 RX ADMIN — BUDESONIDE AND FORMOTEROL FUMARATE DIHYDRATE 2 PUFF: 160; 4.5 AEROSOL RESPIRATORY (INHALATION) at 07:17

## 2021-06-11 RX ADMIN — PANTOPRAZOLE SODIUM 40 MG: 40 TABLET, DELAYED RELEASE ORAL at 06:14

## 2021-06-11 RX ADMIN — APIXABAN 5 MG: 5 TABLET, FILM COATED ORAL at 09:45

## 2021-06-11 RX ADMIN — INSULIN ASPART 6 UNITS: 100 INJECTION, SOLUTION INTRAVENOUS; SUBCUTANEOUS at 17:02

## 2021-06-11 RX ADMIN — FLUTICASONE PROPIONATE 2 SPRAY: 50 SPRAY, METERED NASAL at 09:43

## 2021-06-11 RX ADMIN — BUMETANIDE 2 MG: 0.25 INJECTION, SOLUTION INTRAMUSCULAR; INTRAVENOUS at 12:07

## 2021-06-11 RX ADMIN — Medication 1 CAPSULE: at 09:45

## 2021-06-11 RX ADMIN — IPRATROPIUM BROMIDE AND ALBUTEROL SULFATE 3 ML: .5; 3 SOLUTION RESPIRATORY (INHALATION) at 00:23

## 2021-06-11 NOTE — PLAN OF CARE
Goal Outcome Evaluation:           Progress: improving  Outcome Summary: Pt transfered from PCU, he's stable on 5L n/c, A&O, complaints of pain at times in feet, neck, back. BS elevated.Will con't to monitor.

## 2021-06-11 NOTE — PAYOR COMM NOTE
"[facility]  NPI: [id number]    Utilization Review   Contact:Rebekah Ng MSN, APRN, NP-C  Phone: [phone]  Fax: [phone]    [facility]/ Attn: greg  Continue Stay Update  REF#  [id number]          Иван Blanco (65 y.o. Male)     Date of Birth Social Security Number Address Home Phone MRN    1955  [address]  [facility] [phone] [id number]    Episcopalian Marital Status          None        Admission Date Admission Type Admitting Provider Attending Provider Department, Room/Bed    5/21/21 Emergency Dequan Connelly MD Baibars, Motaz, MD [facility] PROGRESS CARE, P216/S2    Discharge Date Discharge Disposition Discharge Destination                       Attending Provider: Jagdeep Neri MD    Allergies: Codeine, Ciprofloxacin    Isolation: Droplet   Infection: Rhinovirus  (06/06/21), MRSA No Isolation this Admit (06/09/21)   Code Status: CPR    Ht: 175.3 cm (69\")   Wt: 127 kg (280 lb)    Admission Cmt: None   Principal Problem: Respiratory failure (CMS/HCC) [J96.90]                 Active Insurance as of 5/21/2021     Primary Coverage     Payor Plan Insurance Group Employer/Plan Group    UNITED HEALTHCARE MEDICARE REPLACEMENT UNITED [facility] DUAL COMPLETE MEDICARE REPLACEMENT [id number]     Payor Plan Address Payor Plan Phone Number Payor Plan Fax Number Effective Dates    [address] [phone]  1/1/2021 - None Entered    [facility] [address]       Subscriber Name Subscriber Birth Date Member ID       ИВАН BLANCO 1955 [id number]           Secondary Coverage     Payor Plan Insurance Group Employer/Plan Group    [facility] MEDICAID      Payor Plan Address Payor Plan Phone Number Payor Plan Fax Number Effective Dates    [address] [address] [phone]  10/4/2019 - None Entered    [facility] [id number]       Subscriber Name Subscriber Birth Date Member ID       ИВАН BLANCO 1955 [id number]                 Emergency Contacts      (Rel.) " Home Phone Work Phone Mobile Phone    Lamar Miller (Spouse) [phone] -- [phone]    GARCIA MALDONADO (Daughter) [phone] -- [phone]            Jessie Crump APRN   Nurse Practitioner   Infectious Disease   Progress Notes      Cosign Needed   Date of Service:  21   Creation Time:  21            Cosign Needed        Expand AllCollapse All     Show:Clear all  [x]Manual[x]Template[x]Copied    Added by:  [x]Jessie Crump APRN    []Luisa for details                PROGRESS NOTE           Patient Identification:  Name:  Иван Miller  Age:  65 y.o.  Sex:  male  :  1955  MRN:  [id number]  Visit Number:  [id number]  Primary Care Provider:  Rosi Thomas APRN            LOS: 20 days         ----------------------------------------------------------------------------------------------------------------------  Subjective         Chief Complaints:     Shortness of Breath           Interval History:       Patient sitting up in bed this morning.  Overall feels he is breathing better today.  CRP improving at 2.29.     Review of Systems:     Constitutional: no fever, chills and night sweats.  Generalized fatigue.  Eyes: no eye drainage, itching or redness.  HEENT: no mouth sores, dysphagia or nose bleed.  Respiratory: no for shortness of breath.  Productive cough.  Cardiovascular: no chest pain, no palpitations, no orthopnea.  Gastrointestinal: no nausea, vomiting or diarrhea. No abdominal pain, hematemesis or rectal bleeding.  Genitourinary: no dysuria or polyuria.  Hematologic/lymphatic: no lymph node abnormalities, no easy bruising or easy bleeding.  Musculoskeletal: Chronic back pain.  Skin: No rash and no itching.  Neurological: no loss of consciousness, no seizure, no headache.  Behavioral/Psych: no depression or suicidal ideation.  Endocrine: no hot flashes.  Immunologic: negative.        ----------------------------------------------------------------------------------------------------------------------        Objective         [facility] Meds:  apixaban, 5 mg, Oral, Q12H  atorvastatin, 20 mg, Oral, Daily  budesonide-formoterol, 2 puff, Inhalation, BID - RT  bumetanide, 2 mg, Intravenous, Q12H  Canagliflozin, 300 mg, Oral, Daily  clopidogrel, 75 mg, Oral, Daily  docusate sodium, 100 mg, Oral, BID  doxycycline, 100 mg, Intravenous, Q12H  ethyl alcohol, 1 application, Nasal, BID  fluticasone, 2 spray, Nasal, Daily  insulin aspart, 0-9 Units, Subcutaneous, TID AC  insulin detemir, 40 Units, Subcutaneous, Daily  ipratropium-albuterol, 3 mL, Nebulization, Q6H - RT  lactobacillus acidophilus, 1 capsule, Oral, Daily  lidocaine, 2 patch, Transdermal, Q24H  lisinopril, 40 mg, Oral, Q24H  metoprolol succinate XL, 75 mg, Oral, Q24H  pantoprazole, 40 mg, Oral, Q AM  polyethylene glycol, 17 g, Oral, Daily  sodium chloride, 10 mL, Intravenous, Q12H  sodium chloride, 10 mL, Intravenous, Q12H  tamsulosin, 0.4 mg, Oral, Nightly        ----------------------------------------------------------------------------------------------------------------------     Vital Signs:  Temp:  [97.6 °F (36.4 °C)-98.9 °F (37.2 °C)] 97.7 °F (36.5 °C)  Heart Rate:  [57-89] 83  Resp:  [18-29] 18  BP: ()/() 158/100  Mean Arterial Pressure (Non-Invasive) for the past 24 hrs (Last 3 readings):    Noninvasive MAP (mmHg)   06/11/21 0710 116   06/11/21 0602 106   06/11/21 0502 100            SpO2 Percentage     06/11/21 0710 06/11/21 0717 06/11/21 0727   SpO2: 97% 94% 98%      SpO2:  [76 %-100 %] 98 %  on  Flow (L/min):  [4-6] 6;   Device (Oxygen Therapy): nasal cannula     Body mass index is 41.35 kg/m².      Wt Readings from Last 3 Encounters:   06/11/21 127 kg (280 lb)   11/25/19 127 kg (280 lb)   10/06/19 124 kg (273 lb 11.2 oz)         Intake/Output Summary (Last 24 hours) at 6/11/2021 1043  Last data filed at  6/11/2021 0800      Gross per 24 hour   Intake 2437.46 ml   Output 3750 ml   Net -1312.54 ml      Diet Regular; Cardiac, Consistent Carbohydrate  ----------------------------------------------------------------------------------------------------------------------        Physical Exam:     Constitutional: Morbidly obese  male is sitting up in bed in no apparent distress.  On nasal cannula this morning.  HENT:  Head: Normocephalic and atraumatic.  Mouth:  Moist mucous membranes.    Eyes:  Conjunctivae and EOM are normal.  No scleral icterus.  Neck:  Neck supple.  No JVD present.    Cardiovascular:  Normal rate, regular rhythm and normal heart sounds with no murmur. No edema.  Pulmonary/Chest:  No respiratory distress, no wheezes, no crackles, with normal breath sounds and good air movement.  Abdominal:  Soft.  Bowel sounds are normal.  No distension and no tenderness.  Shaffer catheter in place with dark yellow, clear urine.  Musculoskeletal:  No tenderness, and no deformity.  No swelling or redness of joints.  Mild edema bilateral lower extremities.   Neurological:  Alert and oriented to person, place, and time.  No facial droop.  No slurred speech.   Skin:  Skin is warm and dry.  No rash noted.  No pallor.   Psychiatric:  Normal mood and affect.  Behavior is normal.  ----------------------------------------------------------------------------------------------------------------------       Results from last 7 days   Lab Units 06/06/21  0624   TROPONIN T ng/mL 0.016           Results from last 7 days   Lab Units 06/08/21  0035   PROBNP pg/mL 366.0            Results from last 7 days   Lab Units 06/10/21  2215   PH, ARTERIAL pH units 7.361   PO2 ART mm Hg 70.0*   PCO2, ARTERIAL mm Hg 67.2*   HCO3 ART mmol/L 38.0*              Results from last 7 days   Lab Units 06/11/21  0343 06/10/21  0133 06/09/21  0048 06/08/21  0035   CRP mg/dL 2.29* 4.58* 9.45* 14.26*   WBC 10*3/mm3  --  6.12 8.90 10.12   HEMOGLOBIN g/dL   --  10.2* 10.7* 10.3*   HEMATOCRIT %  --  35.9* 37.6 36.3*   MCV fL  --  87.3 85.8 85.2   MCHC g/dL  --  28.4* 28.5* 28.4*   PLATELETS 10*3/mm3  --  228 230 212                Results from last 7 days   Lab Units 06/11/21  0343 06/10/21  0133 06/09/21  0048 06/08/21  0035 06/07/21  0534 06/06/21  0725   SODIUM mmol/L 141 138 138 139  --  139   POTASSIUM mmol/L 4.0 4.3 4.5 4.4  --  4.4   MAGNESIUM mg/dL  --   --   --   --  2.2  --    CHLORIDE mmol/L 99 100 100 103  --  101   CO2 mmol/L 35.4* 32.2* 31.0* 31.2*  --  26.3   BUN mg/dL 25* 20 18 22  --  19   CREATININE mg/dL 1.03 0.95 1.00 1.11  --  0.91   EGFR IF NONAFRICN AM mL/min/1.73 72 79 75 66  --  83   CALCIUM mg/dL 9.3 9.3 9.2 9.1  --  9.5   GLUCOSE mg/dL 232* 168* 143* 191*  --  252*   ALBUMIN g/dL  --  2.97*  --  2.98*  --  3.62   BILIRUBIN mg/dL  --  0.3  --  0.4  --  0.4   ALK PHOS U/L  --  69  --  74  --  110   AST (SGOT) U/L  --  12  --  10  --  19   ALT (SGPT) U/L  --  12  --  11  --  17   Estimated Creatinine Clearance: 93 mL/min (by C-G formula based on SCr of 1.03 mg/dL).  No results found for: AMMONIA           Glucose   Date/Time Value Ref Range Status   06/11/2021 0615 154 (H) 70 - 130 mg/dL Final   06/10/2021 2055 181 (H) 70 - 130 mg/dL Final   06/10/2021 1841 128 70 - 130 mg/dL Final   06/10/2021 1133 182 (H) 70 - 130 mg/dL Final   06/10/2021 0603 134 (H) 70 - 130 mg/dL Final   06/09/2021 2033 233 (H) 70 - 130 mg/dL Final   06/09/2021 1810 237 (H) 70 - 130 mg/dL Final   06/09/2021 1034 310 (H) 70 - 130 mg/dL Final            Lab Results   Component Value Date     HGBA1C 8.80 (H) 05/21/2021            Lab Results   Component Value Date     TSH 2.100 05/21/2021     FREET4 0.95 05/21/2021         No results found for: BLOODCX  No results found for: URINECX  No results found for: WOUNDCX  No results found for: STOOLCX         Respiratory Culture   Date Value Ref Range Status   06/06/2021     Final     Heavy growth (4+) Normal Respiratory Radha: NO  S.aureus/MRSA or Pseudomonas aeruginosa              Pain Management Panel         Pain Management Panel Latest Ref Rng & Units 6/10/2021 5/21/2021     AMPHETAMINES SCREEN, URINE Negative Negative Positive(A)     BARBITURATES SCREEN Negative Negative Negative     BENZODIAZEPINE SCREEN, URINE Negative Negative Negative     BUPRENORPHINEUR Negative Negative Negative     COCAINE SCREEN, URINE Negative Negative Negative     METHADONE SCREEN, URINE Negative Negative Negative                   ----------------------------------------------------------------------------------------------------------------------      Imaging Results (Last 24 Hours)      ** No results found for the last 24 hours. **             ----------------------------------------------------------------------------------------------------------------------     Assessment/Plan            Assessment/Plan         ASSESSMENT:     1.  Burned-out osteomyelitis  2.  HCAP     PLAN:     Patient sitting up in bed this morning.  Overall feels he is breathing better today.  CRP improving at 2.29.     Respiratory culture in progress.      MRSA screen on 6/8/2021 was positive.  Urinalysis on 6/8/2021 was negative.  Respiratory panel PCR on 6/8/2021 was negative.     MRI of the thoracic spine with and without contrast on 5/28/2021 showed postcontrast imaging demonstrating enhancement along the inferior endplate of T7 and superior endplate of T8 as well as some enhancement posteriorly adjacent to the cord but no enhancing mass is seen.  Again, as above there certainly could be associated with at least a mild degree of discitis.  Moderate central canal narrowing at T7-T8 and T8-T9.  MRI of the thoracic spine without contrast on 5/28/2021 showed decreased height of T7 and T8 with extensive endplate changes.  Most of this does appear to be chronic.  I cannot exclude at least a mild degree of discitis.  There are spurs and disc bulges at T7-T8 and T8-T9 which do cause  moderate central canal narrowing.  MRI of the cervical spine with and without contrast on 2021 showed the study is markedly degraded by patient motion.  No destructive bony lesion.  No contrast-enhancing abnormality.  MRI of the lumbar spine without contrast on 2021 showed facet arthropathy.  Disc bulges most notable at L3-L4 and L4-L5.  COVID-19 and flu A/B PCR on 2021 was negative.  Blood cultures on 2021 finalized as no growth.     Regarding osteomyelitis, we recommend follow-up with Dr. Villaseñor, as an outpatient, as patient does not appear to have active osteomyelitis at this time. Would recommend to consider repeating CT/MRI of the spine if back pain worsens or inflammatory markers rise.       Zosyn discontinued by primary team.  Antibiotic therapy now being managed by primary team, ID will sign off for now.  Please contact us if we can further assist in this case.     Code Status:       Code Status and Medical Interventions:   Ordered at: 21 0057     Code Status:     CPR     Medical Interventions (Level of Support Prior to Arrest):     Full         OSVALDO Salomon  21  10:43 EDT                       Jagdeep Neri MD   Physician   Medicine   Progress Notes      Signed   Date of Service:  21   Creation Time:  21            Signed        Expand AllCollapse All     Show:Clear all  [x]Manual[x]Template[x]Copied    Added by:  [x]Jagdeep Neri MD    []mercy for details       [facility]     Patient Identification:  Name:  Иван Miller  Age:  65 y.o.  Sex:  male  :  1955  MRN:  [id number]  Visit Number:  [id number]  Primary Care Provider:  Rosi Thomas APRN     Length of stay:  20     Chief complaint:   Back pain     Subjective:    Patient seen and examined reviewed his condition, much more awake and alert, denies any active cardiopulmonary complaints at this  time  .  ----------------------------------------------------------------------------------------------------------------------  Current Hospital Meds:  apixaban, 5 mg, Oral, Q12H  atorvastatin, 20 mg, Oral, Daily  budesonide-formoterol, 2 puff, Inhalation, BID - RT  bumetanide, 2.5 mg, Intravenous, Q8H  Canagliflozin, 300 mg, Oral, Daily  clopidogrel, 75 mg, Oral, Daily  docusate sodium, 100 mg, Oral, BID  doxycycline, 100 mg, Oral, Q12H  ethyl alcohol, 1 application, Nasal, BID  fluticasone, 2 spray, Nasal, Daily  insulin aspart, 0-9 Units, Subcutaneous, TID AC  insulin detemir, 40 Units, Subcutaneous, Daily  ipratropium-albuterol, 3 mL, Nebulization, Q6H - RT  lactobacillus acidophilus, 1 capsule, Oral, Daily  lidocaine, 2 patch, Transdermal, Q24H  lisinopril, 40 mg, Oral, Q24H  [START ON 6/12/2021] metoprolol succinate XL, 100 mg, Oral, Q24H  pantoprazole, 40 mg, Oral, Q AM  polyethylene glycol, 17 g, Oral, Daily  sodium chloride, 10 mL, Intravenous, Q12H  sodium chloride, 10 mL, Intravenous, Q12H  spironolactone, 50 mg, Oral, Daily  tamsulosin, 0.4 mg, Oral, Nightly        ----------------------------------------------------------------------------------------------------------------------  Vital Signs:  Temp:  [97.6 °F (36.4 °C)-98.6 °F (37 °C)] 97.8 °F (36.6 °C)  Heart Rate:  [57-87] 76  Resp:  [16-29] 24  BP: ()/() 143/88  Vitals               06/09/21  0500 06/10/21  0500 06/11/21  0500   Weight: (!) 144 kg (316 lb 8 oz) 127 kg (279 lb 3.2 oz) 127 kg (280 lb)         Body mass index is 41.35 kg/m².     Intake/Output Summary (Last 24 hours) at 6/11/2021 1348  Last data filed at 6/11/2021 1325      Gross per 24 hour   Intake 2437.46 ml   Output 4400 ml   Net -1962.54 ml      Diet Regular; Cardiac, Consistent Carbohydrate  ----------------------------------------------------------------------------------------------------------------------  Physical exam:  Constitutional: Morbidly obese, improved  respiratory distress  HENT:  Head:  Normocephalic and atraumatic.  Mouth:  Moist mucous membranes.    Eyes:  Conjunctivae and EOM are normal.  Pupils are equal, round, and reactive to light.  No scleral icterus.    Neck:  Neck supple. No thyromegaly.  No JVD present.    Cardiovascular:  Regular rate and rhythm with no murmurs, rubs, clicks or gallops appreciated.  Pulmonary/Chest:   Bilateral crackles.  Abdominal:  Soft. Nontender. Nondistended  Bowel sounds are normal in all four quadrants. No organomegally appreciated.   Neurological:   Awake and alert, moving his 4 extremities freely   skin:  Warm and dry to palpation with no rashes or lesions appreciated.  Peripheral vascular:  2+ radial and pedal pulses in bilateral upper and lower extremities.        ----------------------------------------------------------------------------------------------------------------------       Results from last 7 days   Lab Units 06/06/21  0624   TROPONIN T ng/mL 0.016              Results from last 7 days   Lab Units 06/11/21  0343 06/10/21  0133 06/09/21  0048 06/08/21  0035   CRP mg/dL 2.29* 4.58* 9.45* 14.26*   WBC 10*3/mm3  --  6.12 8.90 10.12   HEMOGLOBIN g/dL  --  10.2* 10.7* 10.3*   HEMATOCRIT %  --  35.9* 37.6 36.3*   MCV fL  --  87.3 85.8 85.2   MCHC g/dL  --  28.4* 28.5* 28.4*   PLATELETS 10*3/mm3  --  228 230 212           Results from last 7 days   Lab Units 06/10/21  2215   PH, ARTERIAL pH units 7.361   PO2 ART mm Hg 70.0*   PCO2, ARTERIAL mm Hg 67.2*   HCO3 ART mmol/L 38.0*                Results from last 7 days   Lab Units 06/11/21  0343 06/10/21  0133 06/09/21  0048 06/08/21  0035 06/07/21  0534 06/06/21  0725   SODIUM mmol/L 141 138 138 139  --  139   POTASSIUM mmol/L 4.0 4.3 4.5 4.4  --  4.4   MAGNESIUM mg/dL  --   --   --   --  2.2  --    CHLORIDE mmol/L 99 100 100 103  --  101   CO2 mmol/L 35.4* 32.2* 31.0* 31.2*  --  26.3   BUN mg/dL 25* 20 18 22  --  19   CREATININE mg/dL 1.03 0.95 1.00 1.11  --  0.91      EGFR IF NONAFRICN AM mL/min/1.73 72 79 75 66  --  83   CALCIUM mg/dL 9.3 9.3 9.2 9.1  --  9.5   GLUCOSE mg/dL 232* 168* 143* 191*  --  252*   ALBUMIN g/dL  --  2.97*  --  2.98*  --  3.62   BILIRUBIN mg/dL  --  0.3  --  0.4  --  0.4   ALK PHOS U/L  --  69  --  74  --  110   AST (SGOT) U/L  --  12  --  10  --  19   ALT (SGPT) U/L  --  12  --  11  --  17   Estimated Creatinine Clearance: 93 mL/min (by C-G formula based on SCr of 1.03 mg/dL).     No results found for: AMMONIA      No results found for: BLOODCX  No results found for: URINECX  No results found for: WOUNDCX  No results found for: STOOLCX     I have personally looked at the labs and they are summarized above.  ----------------------------------------------------------------------------------------------------------------------      Imaging Results (Last 24 Hours)      ** No results found for the last 24 hours. **          ----------------------------------------------------------------------------------------------------------------------        Assessment and Plan:     *Acute on chronic hypoxic/hypercapnic respiratory failure, stable oxygenation off BiPAP  *History of toxic encephalopathy with methamphetamine abuse  *Lethargic, suspected opioid induced with gabapentin, resolved  *COPD exacerbation  *PAF  *Acute on chronic decompensated diastolic heart failure  *Essential hypertension, stage I  *DM type II, uncontrolled  *RIO  *Reported history of chronic osteomyelitis, MRI reviewed not showing evidence of acute inflammatory changes, ID noted, thinking no evidence of acute bony/cartilage infection  *CAP     --Increase Bumex to 2.5 mg IV every 8 hours, add Aldactone 50 mg a day  --Keep off gabapentin and hydrocodone, monitor mentation, stat head CT did not reveal any acute pathology  --Switch IV doxycycline to p.o.  --PT OT, transfer to [facility]  --BMP in a.m.  --Nocturnal BiPAP, will need official polysomnogram in the outpatient setting  --Continue other  therapies  --Increase Levemir by 7 units, add aspart 7 units 3 times daily, continue insulin sliding scale  --Increase Toprol-XL to 100 mg a day, continue apixaban        Jagdeep Neri MD  06/11/21  13:48 EDT

## 2021-06-11 NOTE — NURSING NOTE
Report called to MARCELO Rosales on [address] at this time. Patient to be transferring to room 336.  updated of patient transferring rooms due to him not being allowed any visitors. Patient refused family notification of transfer.     MARCELO Eid

## 2021-06-11 NOTE — PROGRESS NOTES
Discharge Planning Assessment  [facility]     Patient Name: Иван Miller  MRN: [id number]  Today's Date: 6/11/2021    Admit Date: 5/21/2021      Discharge Plan     Row Name 06/11/21 1609       Plan    Plan  Pt has lost prior authorization at [facility] and [facility] per Harish. Pt will be resubmitted for approval on Monday, 6/14/21. Harish at [facility] will need contacted on Monday, 6/14/21 at [phone]. SS will continue to follow and assist with discharge planning.    Patient/Family in Agreement with Plan  yes        Continued Care and Services - Admitted Since 5/21/2021     Destination     Service Provider Request Status Selected Services Address Phone Fax Patient Preferred    [facility]  Pending - Request Sent N/A [address] [facility] [address] [phone] --    [facility]  Declined  Current or History of Substance Abuse N/A [address] [facility] [address] [phone] --    [facility]  Declined  Unable to Meet Patient Needs N/A [address] [place] [id number] [phone] --    [facility] [place]  Declined  out of network with pt's insurance N/A [address] [place] [address] [phone] --    [facility]  Declined  Current or History of Substance Abuse N/A [address] [place] [id number] [phone] --    [facility]  Declined  No Bed Available N/A [address] [place] [id number] [phone] --                      ALENA Alex

## 2021-06-11 NOTE — PLAN OF CARE
Goal Outcome Evaluation:  Plan of Care Reviewed With: patient        Progress: improving  Outcome Summary: Pt is currently asleep in bed with BiPap on. Pt's mental status improved and pt is A&O x4 and tolerated 6L NC well. Pt complained of pain and PRN Tylenol administered. External catheter remains in place. No needs at this time. Will continue to monitor.

## 2021-06-11 NOTE — PROGRESS NOTES
[facility] HOSPITALIST PROGRESS NOTE     Patient Identification:  Name:  Иван Miller  Age:  65 y.o.  Sex:  male  :  1955  MRN:  [id number]  Visit Number:  [id number]  Primary Care Provider:  Rosi Thomas APRN    Length of stay:  20    Chief complaint:   Back pain    Subjective:    Patient seen and examined reviewed his condition, much more awake and alert, denies any active cardiopulmonary complaints at this time  .  ----------------------------------------------------------------------------------------------------------------------  Current Hospital Meds:  apixaban, 5 mg, Oral, Q12H  atorvastatin, 20 mg, Oral, Daily  budesonide-formoterol, 2 puff, Inhalation, BID - RT  bumetanide, 2.5 mg, Intravenous, Q8H  Canagliflozin, 300 mg, Oral, Daily  clopidogrel, 75 mg, Oral, Daily  docusate sodium, 100 mg, Oral, BID  doxycycline, 100 mg, Oral, Q12H  ethyl alcohol, 1 application, Nasal, BID  fluticasone, 2 spray, Nasal, Daily  insulin aspart, 0-9 Units, Subcutaneous, TID AC  insulin detemir, 40 Units, Subcutaneous, Daily  ipratropium-albuterol, 3 mL, Nebulization, Q6H - RT  lactobacillus acidophilus, 1 capsule, Oral, Daily  lidocaine, 2 patch, Transdermal, Q24H  lisinopril, 40 mg, Oral, Q24H  [START ON 2021] metoprolol succinate XL, 100 mg, Oral, Q24H  pantoprazole, 40 mg, Oral, Q AM  polyethylene glycol, 17 g, Oral, Daily  sodium chloride, 10 mL, Intravenous, Q12H  sodium chloride, 10 mL, Intravenous, Q12H  spironolactone, 50 mg, Oral, Daily  tamsulosin, 0.4 mg, Oral, Nightly         ----------------------------------------------------------------------------------------------------------------------  Vital Signs:  Temp:  [97.6 °F (36.4 °C)-98.6 °F (37 °C)] 97.8 °F (36.6 °C)  Heart Rate:  [57-87] 76  Resp:  [16-29] 24  BP: ()/() 143/88      21  0500 06/10/21  0500 21  0500   Weight: (!) 144 kg (316 lb 8 oz) 127 kg (279 lb 3.2 oz) 127 kg (280 lb)     Body mass index is  41.35 kg/m².    Intake/Output Summary (Last 24 hours) at 6/11/2021 1348  Last data filed at 6/11/2021 1325  Gross per 24 hour   Intake 2437.46 ml   Output 4400 ml   Net -1962.54 ml     Diet Regular; Cardiac, Consistent Carbohydrate  ----------------------------------------------------------------------------------------------------------------------  Physical exam:  Constitutional: Morbidly obese, improved respiratory distress  HENT:  Head:  Normocephalic and atraumatic.  Mouth:  Moist mucous membranes.    Eyes:  Conjunctivae and EOM are normal.  Pupils are equal, round, and reactive to light.  No scleral icterus.    Neck:  Neck supple. No thyromegaly.  No JVD present.    Cardiovascular:  Regular rate and rhythm with no murmurs, rubs, clicks or gallops appreciated.  Pulmonary/Chest:   Bilateral crackles.  Abdominal:  Soft. Nontender. Nondistended  Bowel sounds are normal in all four quadrants. No organomegally appreciated.   Neurological:   Awake and alert, moving his 4 extremities freely   skin:  Warm and dry to palpation with no rashes or lesions appreciated.  Peripheral vascular:  2+ radial and pedal pulses in bilateral upper and lower extremities.      ----------------------------------------------------------------------------------------------------------------------  Results from last 7 days   Lab Units 06/06/21  0624   TROPONIN T ng/mL 0.016     Results from last 7 days   Lab Units 06/11/21  0343 06/10/21  0133 06/09/21  0048 06/08/21  0035   CRP mg/dL 2.29* 4.58* 9.45* 14.26*   WBC 10*3/mm3  --  6.12 8.90 10.12   HEMOGLOBIN g/dL  --  10.2* 10.7* 10.3*   HEMATOCRIT %  --  35.9* 37.6 36.3*   MCV fL  --  87.3 85.8 85.2   MCHC g/dL  --  28.4* 28.5* 28.4*   PLATELETS 10*3/mm3  --  228 230 212     Results from last 7 days   Lab Units 06/10/21  2215   PH, ARTERIAL pH units 7.361   PO2 ART mm Hg 70.0*   PCO2, ARTERIAL mm Hg 67.2*   HCO3 ART mmol/L 38.0*     Results from last 7 days   Lab Units 06/11/21 0343  06/10/21  0133 06/09/21  0048 06/08/21  0035 06/07/21  0534 06/06/21  0725   SODIUM mmol/L 141 138 138 139  --  139   POTASSIUM mmol/L 4.0 4.3 4.5 4.4  --  4.4   MAGNESIUM mg/dL  --   --   --   --  2.2  --    CHLORIDE mmol/L 99 100 100 103  --  101   CO2 mmol/L 35.4* 32.2* 31.0* 31.2*  --  26.3   BUN mg/dL 25* 20 18 22  --  19   CREATININE mg/dL 1.03 0.95 1.00 1.11  --  0.91   EGFR IF NONAFRICN AM mL/min/1.73 72 79 75 66  --  83   CALCIUM mg/dL 9.3 9.3 9.2 9.1  --  9.5   GLUCOSE mg/dL 232* 168* 143* 191*  --  252*   ALBUMIN g/dL  --  2.97*  --  2.98*  --  3.62   BILIRUBIN mg/dL  --  0.3  --  0.4  --  0.4   ALK PHOS U/L  --  69  --  74  --  110   AST (SGOT) U/L  --  12  --  10  --  19   ALT (SGPT) U/L  --  12  --  11  --  17   Estimated Creatinine Clearance: 93 mL/min (by C-G formula based on SCr of 1.03 mg/dL).    No results found for: AMMONIA      No results found for: BLOODCX  No results found for: URINECX  No results found for: WOUNDCX  No results found for: STOOLCX    I have personally looked at the labs and they are summarized above.  ----------------------------------------------------------------------------------------------------------------------  Imaging Results (Last 24 Hours)     ** No results found for the last 24 hours. **        ----------------------------------------------------------------------------------------------------------------------      Assessment and Plan:    *Acute on chronic hypoxic/hypercapnic respiratory failure, stable oxygenation off BiPAP  *History of toxic encephalopathy with methamphetamine abuse  *Lethargic, suspected opioid induced with gabapentin, resolved  *COPD exacerbation  *PAF  *Acute on chronic decompensated diastolic heart failure  *Essential hypertension, stage I  *DM type II, uncontrolled  *RIO  *Reported history of chronic osteomyelitis, MRI reviewed not showing evidence of acute inflammatory changes, ID noted, thinking no evidence of acute bony/cartilage  infection  *CAP    --Increase Bumex to 2.5 mg IV every 8 hours, add Aldactone 50 mg a day  --Keep off gabapentin and hydrocodone, monitor mentation, stat head CT did not reveal any acute pathology  --Switch IV doxycycline to p.o.  --PT OT, transfer to [facility]  --BMP in a.m.  --Nocturnal BiPAP, will need official polysomnogram in the outpatient setting  --Continue other therapies  --Increase Levemir by 7 units, add aspart 7 units 3 times daily, continue insulin sliding scale  --Increase Toprol-XL to 100 mg a day, continue apixaban      Jagdeep Neri MD  06/11/21  13:48 EDT

## 2021-06-11 NOTE — PROGRESS NOTES
PROGRESS NOTE         Patient Identification:  Name:  Иван Miller  Age:  65 y.o.  Sex:  male  :  1955  MRN:  [id number]  Visit Number:  [id number]  Primary Care Provider:  Rosi Thomas APRN         LOS: 20 days       ----------------------------------------------------------------------------------------------------------------------  Subjective       Chief Complaints:    Shortness of Breath        Interval History:      Patient sitting up in bed this morning.  Overall feels he is breathing better today.  CRP improving at 2.29.    Review of Systems:    Constitutional: no fever, chills and night sweats.  Generalized fatigue.  Eyes: no eye drainage, itching or redness.  HEENT: no mouth sores, dysphagia or nose bleed.  Respiratory: no for shortness of breath.  Productive cough.  Cardiovascular: no chest pain, no palpitations, no orthopnea.  Gastrointestinal: no nausea, vomiting or diarrhea. No abdominal pain, hematemesis or rectal bleeding.  Genitourinary: no dysuria or polyuria.  Hematologic/lymphatic: no lymph node abnormalities, no easy bruising or easy bleeding.  Musculoskeletal: Chronic back pain.  Skin: No rash and no itching.  Neurological: no loss of consciousness, no seizure, no headache.  Behavioral/Psych: no depression or suicidal ideation.  Endocrine: no hot flashes.  Immunologic: negative.    ----------------------------------------------------------------------------------------------------------------------      Objective       [facility] Meds:  apixaban, 5 mg, Oral, Q12H  atorvastatin, 20 mg, Oral, Daily  budesonide-formoterol, 2 puff, Inhalation, BID - RT  bumetanide, 2 mg, Intravenous, Q12H  Canagliflozin, 300 mg, Oral, Daily  clopidogrel, 75 mg, Oral, Daily  docusate sodium, 100 mg, Oral, BID  doxycycline, 100 mg, Intravenous, Q12H  ethyl alcohol, 1 application, Nasal, BID  fluticasone, 2 spray, Nasal, Daily  insulin aspart, 0-9 Units, Subcutaneous, TID AC  insulin  detemir, 40 Units, Subcutaneous, Daily  ipratropium-albuterol, 3 mL, Nebulization, Q6H - RT  lactobacillus acidophilus, 1 capsule, Oral, Daily  lidocaine, 2 patch, Transdermal, Q24H  lisinopril, 40 mg, Oral, Q24H  metoprolol succinate XL, 75 mg, Oral, Q24H  pantoprazole, 40 mg, Oral, Q AM  polyethylene glycol, 17 g, Oral, Daily  sodium chloride, 10 mL, Intravenous, Q12H  sodium chloride, 10 mL, Intravenous, Q12H  tamsulosin, 0.4 mg, Oral, Nightly         ----------------------------------------------------------------------------------------------------------------------    Vital Signs:  Temp:  [97.6 °F (36.4 °C)-98.9 °F (37.2 °C)] 97.7 °F (36.5 °C)  Heart Rate:  [57-89] 83  Resp:  [18-29] 18  BP: ()/() 158/100  Mean Arterial Pressure (Non-Invasive) for the past 24 hrs (Last 3 readings):   Noninvasive MAP (mmHg)   06/11/21 0710 116   06/11/21 0602 106   06/11/21 0502 100     SpO2 Percentage    06/11/21 0710 06/11/21 0717 06/11/21 0727   SpO2: 97% 94% 98%     SpO2:  [76 %-100 %] 98 %  on  Flow (L/min):  [4-6] 6;   Device (Oxygen Therapy): nasal cannula    Body mass index is 41.35 kg/m².  Wt Readings from Last 3 Encounters:   06/11/21 127 kg (280 lb)   11/25/19 127 kg (280 lb)   10/06/19 124 kg (273 lb 11.2 oz)        Intake/Output Summary (Last 24 hours) at 6/11/2021 1043  Last data filed at 6/11/2021 0800  Gross per 24 hour   Intake 2437.46 ml   Output 3750 ml   Net -1312.54 ml     Diet Regular; Cardiac, Consistent Carbohydrate  ----------------------------------------------------------------------------------------------------------------------      Physical Exam:    Constitutional: Morbidly obese  male is sitting up in bed in no apparent distress.  On nasal cannula this morning.  HENT:  Head: Normocephalic and atraumatic.  Mouth:  Moist mucous membranes.    Eyes:  Conjunctivae and EOM are normal.  No scleral icterus.  Neck:  Neck supple.  No JVD present.    Cardiovascular:  Normal rate,  regular rhythm and normal heart sounds with no murmur. No edema.  Pulmonary/Chest:  No respiratory distress, no wheezes, no crackles, with normal breath sounds and good air movement.  Abdominal:  Soft.  Bowel sounds are normal.  No distension and no tenderness.  Shaffer catheter in place with dark yellow, clear urine.  Musculoskeletal:  No tenderness, and no deformity.  No swelling or redness of joints.  Mild edema bilateral lower extremities.   Neurological:  Alert and oriented to person, place, and time.  No facial droop.  No slurred speech.   Skin:  Skin is warm and dry.  No rash noted.  No pallor.   Psychiatric:  Normal mood and affect.  Behavior is normal.  ----------------------------------------------------------------------------------------------------------------------  Results from last 7 days   Lab Units 06/06/21  0624   TROPONIN T ng/mL 0.016     Results from last 7 days   Lab Units 06/08/21  0035   PROBNP pg/mL 366.0       Results from last 7 days   Lab Units 06/10/21  2215   PH, ARTERIAL pH units 7.361   PO2 ART mm Hg 70.0*   PCO2, ARTERIAL mm Hg 67.2*   HCO3 ART mmol/L 38.0*     Results from last 7 days   Lab Units 06/11/21  0343 06/10/21  0133 06/09/21  0048 06/08/21  0035   CRP mg/dL 2.29* 4.58* 9.45* 14.26*   WBC 10*3/mm3  --  6.12 8.90 10.12   HEMOGLOBIN g/dL  --  10.2* 10.7* 10.3*   HEMATOCRIT %  --  35.9* 37.6 36.3*   MCV fL  --  87.3 85.8 85.2   MCHC g/dL  --  28.4* 28.5* 28.4*   PLATELETS 10*3/mm3  --  228 230 212     Results from last 7 days   Lab Units 06/11/21  0343 06/10/21  0133 06/09/21  0048 06/08/21  0035 06/07/21  0534 06/06/21  0725   SODIUM mmol/L 141 138 138 139  --  139   POTASSIUM mmol/L 4.0 4.3 4.5 4.4  --  4.4   MAGNESIUM mg/dL  --   --   --   --  2.2  --    CHLORIDE mmol/L 99 100 100 103  --  101   CO2 mmol/L 35.4* 32.2* 31.0* 31.2*  --  26.3   BUN mg/dL 25* 20 18 22  --  19   CREATININE mg/dL 1.03 0.95 1.00 1.11  --  0.91   EGFR IF NONAFRICN AM mL/min/1.73 72 79 75 66  --  83    CALCIUM mg/dL 9.3 9.3 9.2 9.1  --  9.5   GLUCOSE mg/dL 232* 168* 143* 191*  --  252*   ALBUMIN g/dL  --  2.97*  --  2.98*  --  3.62   BILIRUBIN mg/dL  --  0.3  --  0.4  --  0.4   ALK PHOS U/L  --  69  --  74  --  110   AST (SGOT) U/L  --  12  --  10  --  19   ALT (SGPT) U/L  --  12  --  11  --  17   Estimated Creatinine Clearance: 93 mL/min (by C-G formula based on SCr of 1.03 mg/dL).  No results found for: AMMONIA    Glucose   Date/Time Value Ref Range Status   06/11/2021 0615 154 (H) 70 - 130 mg/dL Final   06/10/2021 2055 181 (H) 70 - 130 mg/dL Final   06/10/2021 1841 128 70 - 130 mg/dL Final   06/10/2021 1133 182 (H) 70 - 130 mg/dL Final   06/10/2021 0603 134 (H) 70 - 130 mg/dL Final   06/09/2021 2033 233 (H) 70 - 130 mg/dL Final   06/09/2021 1810 237 (H) 70 - 130 mg/dL Final   06/09/2021 1034 310 (H) 70 - 130 mg/dL Final     Lab Results   Component Value Date    HGBA1C 8.80 (H) 05/21/2021     Lab Results   Component Value Date    TSH 2.100 05/21/2021    FREET4 0.95 05/21/2021       No results found for: BLOODCX  No results found for: URINECX  No results found for: WOUNDCX  No results found for: STOOLCX  Respiratory Culture   Date Value Ref Range Status   06/06/2021   Final    Heavy growth (4+) Normal Respiratory Radha: NO S.aureus/MRSA or Pseudomonas aeruginosa     Pain Management Panel       Pain Management Panel Latest Ref Rng & Units 6/10/2021 5/21/2021    AMPHETAMINES SCREEN, URINE Negative Negative Positive(A)    BARBITURATES SCREEN Negative Negative Negative    BENZODIAZEPINE SCREEN, URINE Negative Negative Negative    BUPRENORPHINEUR Negative Negative Negative    COCAINE SCREEN, URINE Negative Negative Negative    METHADONE SCREEN, URINE Negative Negative Negative              ----------------------------------------------------------------------------------------------------------------------  Imaging Results (Last 24 Hours)       ** No results found for the last 24 hours. **             ----------------------------------------------------------------------------------------------------------------------    Assessment/Plan       Assessment/Plan     ASSESSMENT:    1.  Burned-out osteomyelitis  2.  HCAP    PLAN:    Patient sitting up in bed this morning.  Overall feels he is breathing better today.  CRP improving at 2.29.    Respiratory culture in progress.     MRSA screen on 6/8/2021 was positive.  Urinalysis on 6/8/2021 was negative.  Respiratory panel PCR on 6/8/2021 was negative.     MRI of the thoracic spine with and without contrast on 5/28/2021 showed postcontrast imaging demonstrating enhancement along the inferior endplate of T7 and superior endplate of T8 as well as some enhancement posteriorly adjacent to the cord but no enhancing mass is seen.  Again, as above there certainly could be associated with at least a mild degree of discitis.  Moderate central canal narrowing at T7-T8 and T8-T9.  MRI of the thoracic spine without contrast on 5/28/2021 showed decreased height of T7 and T8 with extensive endplate changes.  Most of this does appear to be chronic.  I cannot exclude at least a mild degree of discitis.  There are spurs and disc bulges at T7-T8 and T8-T9 which do cause moderate central canal narrowing.  MRI of the cervical spine with and without contrast on 5/28/2021 showed the study is markedly degraded by patient motion.  No destructive bony lesion.  No contrast-enhancing abnormality.  MRI of the lumbar spine without contrast on 5/28/2021 showed facet arthropathy.  Disc bulges most notable at L3-L4 and L4-L5.  COVID-19 and flu A/B PCR on 5/21/2021 was negative.  Blood cultures on 5/21/2021 finalized as no growth.     Regarding osteomyelitis, we recommend follow-up with Dr. Villaseñor, as an outpatient, as patient does not appear to have active osteomyelitis at this time. Would recommend to consider repeating CT/MRI of the spine if back pain worsens or inflammatory markers rise.       Zosyn discontinued by primary team.  Antibiotic therapy now being managed by primary team, ID will sign off for now.  Please contact us if we can further assist in this case.    Code Status:   Code Status and Medical Interventions:   Ordered at: 05/22/21 0057     Code Status:    CPR     Medical Interventions (Level of Support Prior to Arrest):    Full       OSVALDO Salomon  06/11/21  10:43 EDT

## 2021-06-11 NOTE — THERAPY TREATMENT NOTE
Acute Care - Physical Therapy Treatment Note   [facility]     Patient Name: Иван Miller  : 1955  MRN: [id number]  Today's Date: 2021   Onset of Illness/Injury or Date of Surgery: 21       PT Assessment (last 12 hours)      PT Evaluation and Treatment     Row Name 21 1400          Physical Therapy Time and Intention    Subjective Information  no complaints  -BC     Document Type  therapy note (daily note)  -BC     Mode of Treatment  physical therapy  -BC     Patient Effort  adequate  -BC     Row Name 21 1400          General Information    Patient Profile Reviewed  yes  -BC     Onset of Illness/Injury or Date of Surgery  21  -BC     Referring Physician  Halle  -BC     Patient Observations  alert;cooperative;agree to therapy  -BC     Risks Reviewed  patient:;LOB;nausea/vomiting;dizziness;change in vital signs;increased discomfort;increased drainage;lines disloged  -BC     Benefits Reviewed  patient:;improve function;increase independence;increase strength;increase balance;decrease pain;decrease risk of DVT;improve skin integrity;increase knowledge  -BC     Row Name 21 1400          Living Environment    Current Living Arrangements  home/apartment/condo  -BC     Lives With  spouse  -BC     Row Name 21 1400          Cognition    Affect/Mental Status (Cognitive)  WF  -BC     Orientation Status (Cognition)  oriented x 3  -BC     Follows Commands (Cognition)  follows one-step commands  -BC     Cognitive Function (Cognitive)  [facility]  -BC     Safety Deficit (Cognitive)  moderate deficit;ability to follow commands  -BC     Row Name 21 1400          Range of Motion (ROM)    Range of Motion  ROM is WFL  -BC     Row Name 21 1400          Mobility    Additional Documentation  -- pt has very weak legs  -BC     Row Name 21 1400          Bed Mobility    Bed Mobility  bed mobility (all) activities  -BC     All Activities, [place] (Bed Mobility)  minimum assist  (75% patient effort)  -BC     Supine-Sit [place] (Bed Mobility)  minimum assist (75% patient effort)  -BC     Sit-Supine [place] (Bed Mobility)  minimum assist (75% patient effort)  -BC     Assistive Device (Bed Mobility)  bed rails;draw sheet  -BC     Row Name 06/11/21 1400          Transfers    Comment (Transfers)  pt unable to transfer  -BC     Row Name 06/11/21 1400          Gait/Stairs (Locomotion)    Comment (Gait/Stairs)  Pt unable to ambulate  -BC     Row Name 06/11/21 1400          Coping    Observed Emotional State  calm;cooperative  -BC     Verbalized Emotional State  acceptance  -BC     Trust Relationship/Rapport  choices provided;care explained  -BC     Family/Support Persons  spouse  -BC     Involvement in Care  not present at bedside  -BC     Family/Support System Care  self-care encouraged;support provided  -BC     Diversional Activities  television  -BC     Row Name 06/11/21 1400          Plan of Care Review    Plan of Care Reviewed With  patient  -BC     Progress  no change  -BC     Row Name 06/11/21 1400          Positioning and Restraints    Pre-Treatment Position  in bed  -BC     Post Treatment Position  bed  -BC     In Bed  notified nsg;supine;call light within reach;encouraged to call for assist;side rails up x2  -BC       User Key  (r) = Recorded By, (t) = Taken By, (c) = Cosigned By    Initials Name Provider Type    Keely Sargent PT Physical Therapist        Physical Therapy Education                 Title: PT OT SLP Therapies (Done)     Topic: Physical Therapy (Done)     Point: Mobility training (Done)     Learning Progress Summary           Patient Acceptance, E, VU by AM at 6/11/2021 1334    Acceptance, E, VU by AM at 6/10/2021 1610    Acceptance, E, VU by AM at 6/9/2021 1435    Acceptance, E, VU by SB at 6/8/2021 1006    Acceptance, E, NR by LE at 6/7/2021 1817    Acceptance, E, VU by TW at 6/6/2021 1602    Acceptance, E,TB, VU by MM at 6/5/2021 1227    Acceptance, E,  VU by EB at 6/2/2021 0713    Acceptance, E, VU by SM at 6/2/2021 0030    Acceptance, E, VU by EB at 6/1/2021 0739    Acceptance, E, VU by SM at 5/31/2021 2242    Acceptance, E, VU by JM at 5/31/2021 0924    Acceptance, E, VU by SM at 5/31/2021 0035    Acceptance, E,TB, VU by MM at 5/28/2021 1802    Acceptance, E,TB, VU by MM at 5/27/2021 1143    Acceptance, E, VU by SM at 5/26/2021 2344    Acceptance, E,TB, VU by MM at 5/26/2021 0929    Acceptance, E,TB,D, VU,NR by AG at 5/24/2021 1249                   Point: Home exercise program (Done)     Learning Progress Summary           Patient Acceptance, E, VU by AM at 6/11/2021 1334    Acceptance, E, VU by AM at 6/10/2021 1610    Acceptance, E, VU by AM at 6/9/2021 1435    Acceptance, E, VU by SB at 6/8/2021 1006    Acceptance, E, NR by LE at 6/7/2021 1817    Acceptance, E, VU by TW at 6/6/2021 1602    Acceptance, E,TB, VU by MM at 6/5/2021 1227    Acceptance, E, VU by EB at 6/2/2021 0713    Acceptance, E, VU by SM at 6/2/2021 0030    Acceptance, E, VU by EB at 6/1/2021 0739    Acceptance, E, VU by SM at 5/31/2021 2242    Acceptance, E, VU by JM at 5/31/2021 0924    Acceptance, E, VU by SM at 5/31/2021 0035    Acceptance, E,TB, VU by MM at 5/28/2021 1802    Acceptance, E,TB, VU by MM at 5/27/2021 1143    Acceptance, E, VU by SM at 5/26/2021 2344    Acceptance, E,TB, VU by MM at 5/26/2021 0929    Acceptance, E,TB,D, VU,NR by AG at 5/24/2021 1249                   Point: Body mechanics (Done)     Learning Progress Summary           Patient Acceptance, E, VU by AM at 6/11/2021 1334    Acceptance, E, VU by AM at 6/10/2021 1610    Acceptance, E, VU by AM at 6/9/2021 1435    Acceptance, E, VU by SB at 6/8/2021 1006    Acceptance, E, NR by LE at 6/7/2021 1817    Acceptance, E, VU by TW at 6/6/2021 1602    Acceptance, E,TB, VU by MM at 6/5/2021 1227    Acceptance, E, VU by EB at 6/2/2021 0713    Acceptance, E, VU by SM at 6/2/2021 0030    Acceptance, E, VU by EB at 6/1/2021 0739     Acceptance, E, VU by SM at 5/31/2021 2242    Acceptance, E, VU by JM at 5/31/2021 0924    Acceptance, E, VU by SM at 5/31/2021 0035    Acceptance, E,TB, VU by MM at 5/28/2021 1802    Acceptance, E,TB, VU by MM at 5/27/2021 1143    Acceptance, E, VU by SM at 5/26/2021 2344    Acceptance, E,TB, VU by MM at 5/26/2021 0929    Acceptance, E,TB,D, VU,NR by AG at 5/24/2021 1249                   Point: Precautions (Done)     Learning Progress Summary           Patient Acceptance, E, VU by AM at 6/11/2021 1334    Acceptance, E, VU by AM at 6/10/2021 1610    Acceptance, E, VU by AM at 6/9/2021 1435    Acceptance, E, VU by SB at 6/8/2021 1006    Acceptance, E, NR by LE at 6/7/2021 1817    Acceptance, E, VU by TW at 6/6/2021 1602    Acceptance, E,TB, VU by MM at 6/5/2021 1227    Acceptance, E, VU by EB at 6/2/2021 0713    Acceptance, E, VU by SM at 6/2/2021 0030    Acceptance, E, VU by EB at 6/1/2021 0739    Acceptance, E, VU by SM at 5/31/2021 2242    Acceptance, E, VU by JM at 5/31/2021 0924    Acceptance, E, VU by SM at 5/31/2021 0035    Acceptance, E,TB, VU by MM at 5/28/2021 1802    Acceptance, E,TB, VU by MM at 5/27/2021 1143    Acceptance, E, VU by SM at 5/26/2021 2344    Acceptance, E,TB, VU by MM at 5/26/2021 0929    Acceptance, E,TB,D, VU,NR by AG at 5/24/2021 1249                               User Key     Initials Effective Dates Name Provider Type Discipline    TW 06/16/16 -  Marifer Garcia, RN Registered Nurse Nurse    EB 06/16/16 -  Keily Godoy, RN Registered Nurse Nurse    AG 04/03/18 -  Elvia Rousseau, PT Physical Therapist PT    JM 07/19/18 -  Cruz Umanzor, RN Registered Nurse Nurse    MM 07/08/19 -  Esha Lee, RN Registered Nurse Nurse    BRISSA 08/13/18 -  Regina Garzon, RN Registered Nurse Nurse     09/12/18 -  Chris Das, RN Registered Nurse Nurse    SB 07/10/20 -  Marie Villela, RN Registered Nurse Nurse    AM 06/24/20 -  Cindi Valle, RN Registered Nurse Nurse               PT Recommendation and Plan     Plan of Care Reviewed With: patient  Progress: no change       Time Calculation:   PT Charges     Row Name 06/11/21 1441             Time Calculation    PT Received On  06/11/21  -BC         Time Calculation- PT    Total Timed Code Minutes- PT  45 minute(s)  -BC         Timed Charges    55663 - PT Therapeutic Exercise Minutes  45  -BC         Total Minutes    Timed Charges Total Minutes  45  -BC       Total Minutes  45  -BC        User Key  (r) = Recorded By, (t) = Taken By, (c) = Cosigned By    Initials Name Provider Type    BC Keely Agosto, PT Physical Therapist                 Keely Agosto, PT  6/11/2021

## 2021-06-12 LAB
ANION GAP SERPL CALCULATED.3IONS-SCNC: 7.8 MMOL/L (ref 5–15)
BACTERIA SPEC AEROBE CULT: NORMAL
BACTERIA SPEC AEROBE CULT: NORMAL
BUN SERPL-MCNC: 19 MG/DL (ref 8–23)
BUN/CREAT SERPL: 21.1 (ref 7–25)
CALCIUM SPEC-SCNC: 9.7 MG/DL (ref 8.6–10.5)
CHLORIDE SERPL-SCNC: 95 MMOL/L (ref 98–107)
CO2 SERPL-SCNC: 36.2 MMOL/L (ref 22–29)
CREAT SERPL-MCNC: 0.9 MG/DL (ref 0.76–1.27)
CRP SERPL-MCNC: 1.47 MG/DL (ref 0–0.5)
DEPRECATED RDW RBC AUTO: 49.9 FL (ref 37–54)
ERYTHROCYTE [DISTWIDTH] IN BLOOD BY AUTOMATED COUNT: 16.6 % (ref 12.3–15.4)
GFR SERPL CREATININE-BSD FRML MDRD: 84 ML/MIN/1.73
GLUCOSE BLDC GLUCOMTR-MCNC: 167 MG/DL (ref 70–130)
GLUCOSE BLDC GLUCOMTR-MCNC: 232 MG/DL (ref 70–130)
GLUCOSE BLDC GLUCOMTR-MCNC: 257 MG/DL (ref 70–130)
GLUCOSE BLDC GLUCOMTR-MCNC: 274 MG/DL (ref 70–130)
GLUCOSE SERPL-MCNC: 204 MG/DL (ref 65–99)
HCT VFR BLD AUTO: 39.4 % (ref 37.5–51)
HGB BLD-MCNC: 11.3 G/DL (ref 13–17.7)
MCH RBC QN AUTO: 24.1 PG (ref 26.6–33)
MCHC RBC AUTO-ENTMCNC: 28.7 G/DL (ref 31.5–35.7)
MCV RBC AUTO: 84.2 FL (ref 79–97)
PLATELET # BLD AUTO: 256 10*3/MM3 (ref 140–450)
PMV BLD AUTO: 9.1 FL (ref 6–12)
POTASSIUM SERPL-SCNC: 3.6 MMOL/L (ref 3.5–5.2)
RBC # BLD AUTO: 4.68 10*6/MM3 (ref 4.14–5.8)
SODIUM SERPL-SCNC: 139 MMOL/L (ref 136–145)
WBC # BLD AUTO: 6.16 10*3/MM3 (ref 3.4–10.8)

## 2021-06-12 PROCEDURE — 63710000001 INSULIN ASPART PER 5 UNITS: Performed by: INTERNAL MEDICINE

## 2021-06-12 PROCEDURE — 94799 UNLISTED PULMONARY SVC/PX: CPT

## 2021-06-12 PROCEDURE — 82962 GLUCOSE BLOOD TEST: CPT

## 2021-06-12 PROCEDURE — 99233 SBSQ HOSP IP/OBS HIGH 50: CPT | Performed by: INTERNAL MEDICINE

## 2021-06-12 PROCEDURE — 85027 COMPLETE CBC AUTOMATED: CPT | Performed by: INTERNAL MEDICINE

## 2021-06-12 PROCEDURE — 94660 CPAP INITIATION&MGMT: CPT

## 2021-06-12 PROCEDURE — 63710000001 INSULIN DETEMIR PER 5 UNITS: Performed by: INTERNAL MEDICINE

## 2021-06-12 PROCEDURE — 86140 C-REACTIVE PROTEIN: CPT | Performed by: PHYSICIAN ASSISTANT

## 2021-06-12 PROCEDURE — 80048 BASIC METABOLIC PNL TOTAL CA: CPT | Performed by: INTERNAL MEDICINE

## 2021-06-12 PROCEDURE — 25010000002 HYDRALAZINE PER 20 MG: Performed by: PHYSICIAN ASSISTANT

## 2021-06-12 RX ORDER — METOPROLOL SUCCINATE 50 MG/1
150 TABLET, EXTENDED RELEASE ORAL
Status: DISCONTINUED | OUTPATIENT
Start: 2021-06-13 | End: 2021-06-17 | Stop reason: HOSPADM

## 2021-06-12 RX ORDER — HYDROCODONE BITARTRATE AND ACETAMINOPHEN 5; 325 MG/1; MG/1
1 TABLET ORAL EVERY 8 HOURS PRN
Status: DISCONTINUED | OUTPATIENT
Start: 2021-06-12 | End: 2021-06-17 | Stop reason: HOSPADM

## 2021-06-12 RX ORDER — GABAPENTIN 100 MG/1
100 CAPSULE ORAL EVERY 12 HOURS SCHEDULED
Status: DISCONTINUED | OUTPATIENT
Start: 2021-06-12 | End: 2021-06-17 | Stop reason: HOSPADM

## 2021-06-12 RX ADMIN — INSULIN DETEMIR 47 UNITS: 100 INJECTION, SOLUTION SUBCUTANEOUS at 08:45

## 2021-06-12 RX ADMIN — TAMSULOSIN HYDROCHLORIDE 0.4 MG: 0.4 CAPSULE ORAL at 21:06

## 2021-06-12 RX ADMIN — ATORVASTATIN CALCIUM 20 MG: 20 TABLET, FILM COATED ORAL at 08:48

## 2021-06-12 RX ADMIN — IPRATROPIUM BROMIDE AND ALBUTEROL SULFATE 3 ML: .5; 3 SOLUTION RESPIRATORY (INHALATION) at 00:14

## 2021-06-12 RX ADMIN — LIDOCAINE 1 PATCH: 50 PATCH CUTANEOUS at 08:49

## 2021-06-12 RX ADMIN — SPIRONOLACTONE 50 MG: 25 TABLET ORAL at 08:46

## 2021-06-12 RX ADMIN — LISINOPRIL 40 MG: 10 TABLET ORAL at 08:46

## 2021-06-12 RX ADMIN — HYDROCODONE BITARTRATE AND ACETAMINOPHEN 1 TABLET: 5; 325 TABLET ORAL at 17:29

## 2021-06-12 RX ADMIN — GABAPENTIN 100 MG: 100 CAPSULE ORAL at 21:12

## 2021-06-12 RX ADMIN — DOXYCYCLINE 100 MG: 100 CAPSULE ORAL at 21:06

## 2021-06-12 RX ADMIN — FLUTICASONE PROPIONATE 2 SPRAY: 50 SPRAY, METERED NASAL at 10:38

## 2021-06-12 RX ADMIN — INSULIN ASPART 7 UNITS: 100 INJECTION, SOLUTION INTRAVENOUS; SUBCUTANEOUS at 08:47

## 2021-06-12 RX ADMIN — Medication 1 CAPSULE: at 08:46

## 2021-06-12 RX ADMIN — IPRATROPIUM BROMIDE AND ALBUTEROL SULFATE 3 ML: .5; 3 SOLUTION RESPIRATORY (INHALATION) at 13:54

## 2021-06-12 RX ADMIN — HYDROXYZINE HYDROCHLORIDE 25 MG: 25 TABLET, FILM COATED ORAL at 21:06

## 2021-06-12 RX ADMIN — DOXYCYCLINE 100 MG: 100 CAPSULE ORAL at 08:46

## 2021-06-12 RX ADMIN — HYDROXYZINE HYDROCHLORIDE 25 MG: 25 TABLET, FILM COATED ORAL at 11:28

## 2021-06-12 RX ADMIN — APIXABAN 5 MG: 5 TABLET, FILM COATED ORAL at 08:46

## 2021-06-12 RX ADMIN — ACETAMINOPHEN 650 MG: 325 TABLET ORAL at 03:57

## 2021-06-12 RX ADMIN — BUDESONIDE AND FORMOTEROL FUMARATE DIHYDRATE 2 PUFF: 160; 4.5 AEROSOL RESPIRATORY (INHALATION) at 18:25

## 2021-06-12 RX ADMIN — ETHYL ALCOHOL 1 EACH: 62 SWAB TOPICAL at 12:42

## 2021-06-12 RX ADMIN — METOPROLOL SUCCINATE 100 MG: 50 TABLET, EXTENDED RELEASE ORAL at 08:46

## 2021-06-12 RX ADMIN — INSULIN ASPART 2 UNITS: 100 INJECTION, SOLUTION INTRAVENOUS; SUBCUTANEOUS at 08:46

## 2021-06-12 RX ADMIN — POLYETHYLENE GLYCOL (3350) 17 G: 17 POWDER, FOR SOLUTION ORAL at 08:47

## 2021-06-12 RX ADMIN — INSULIN ASPART 7 UNITS: 100 INJECTION, SOLUTION INTRAVENOUS; SUBCUTANEOUS at 17:29

## 2021-06-12 RX ADMIN — BUMETANIDE 2.5 MG: 0.25 INJECTION INTRAMUSCULAR; INTRAVENOUS at 04:05

## 2021-06-12 RX ADMIN — BUMETANIDE 2.5 MG: 0.25 INJECTION INTRAMUSCULAR; INTRAVENOUS at 12:42

## 2021-06-12 RX ADMIN — IPRATROPIUM BROMIDE AND ALBUTEROL SULFATE 3 ML: .5; 3 SOLUTION RESPIRATORY (INHALATION) at 07:25

## 2021-06-12 RX ADMIN — DOCUSATE SODIUM 100 MG: 100 CAPSULE, LIQUID FILLED ORAL at 08:46

## 2021-06-12 RX ADMIN — INSULIN ASPART 7 UNITS: 100 INJECTION, SOLUTION INTRAVENOUS; SUBCUTANEOUS at 11:25

## 2021-06-12 RX ADMIN — LIDOCAINE 2 PATCH: 50 PATCH CUTANEOUS at 08:47

## 2021-06-12 RX ADMIN — DOCUSATE SODIUM 100 MG: 100 CAPSULE, LIQUID FILLED ORAL at 21:06

## 2021-06-12 RX ADMIN — PANTOPRAZOLE SODIUM 40 MG: 40 TABLET, DELAYED RELEASE ORAL at 03:57

## 2021-06-12 RX ADMIN — DOCUSATE SODIUM 50 MG AND SENNOSIDES 8.6 MG 1 TABLET: 8.6; 5 TABLET, FILM COATED ORAL at 05:41

## 2021-06-12 RX ADMIN — CLOPIDOGREL 75 MG: 75 TABLET, FILM COATED ORAL at 08:46

## 2021-06-12 RX ADMIN — HYDRALAZINE HYDROCHLORIDE 10 MG: 20 INJECTION INTRAMUSCULAR; INTRAVENOUS at 10:55

## 2021-06-12 RX ADMIN — BUDESONIDE AND FORMOTEROL FUMARATE DIHYDRATE 2 PUFF: 160; 4.5 AEROSOL RESPIRATORY (INHALATION) at 07:25

## 2021-06-12 RX ADMIN — APIXABAN 5 MG: 5 TABLET, FILM COATED ORAL at 21:06

## 2021-06-12 RX ADMIN — BUMETANIDE 2.5 MG: 0.25 INJECTION INTRAMUSCULAR; INTRAVENOUS at 21:06

## 2021-06-12 RX ADMIN — INSULIN ASPART 4 UNITS: 100 INJECTION, SOLUTION INTRAVENOUS; SUBCUTANEOUS at 10:55

## 2021-06-12 RX ADMIN — IPRATROPIUM BROMIDE AND ALBUTEROL SULFATE 3 ML: .5; 3 SOLUTION RESPIRATORY (INHALATION) at 18:25

## 2021-06-12 RX ADMIN — GABAPENTIN 100 MG: 100 CAPSULE ORAL at 13:53

## 2021-06-12 RX ADMIN — INSULIN ASPART 6 UNITS: 100 INJECTION, SOLUTION INTRAVENOUS; SUBCUTANEOUS at 17:29

## 2021-06-12 RX ADMIN — CANAGLIFLOZIN 300 MG: 300 TABLET, FILM COATED ORAL at 08:46

## 2021-06-12 RX ADMIN — Medication 5 MG: at 23:59

## 2021-06-12 NOTE — NURSING NOTE
Pt has sign on door for visitors to check in at nurses station; however, when I walked into patient's room there were 2 visitors in his room. I told them they weren't allowed to be there, so they left. Will monitor pt's mental status, level of alertness, etc.

## 2021-06-12 NOTE — PLAN OF CARE
Goal Outcome Evaluation:           Progress: improving     Patient has been awake and alert as of this writing. He had a couple of visitors in his room when I walked in there, but they left with no complaints. He is on his bi-pap and is tolerating it. There is a lidocaine patch on the bottom of both of his feet to assist with pain since he is currently unable to have pain meds. He is stable, will continue to monitor.

## 2021-06-12 NOTE — PLAN OF CARE
Goal Outcome Evaluation:  Plan of Care Reviewed With: patient           Outcome Summary: Pt has frequently asked for staff. Continues to be on 5L, sats 92-96%. Alert and oriented at this time. PRN hydralazine given. Will continue to monitor.

## 2021-06-12 NOTE — PROGRESS NOTES
[facility] HOSPITALIST PROGRESS NOTE     Patient Identification:  Name:  Иван Miller  Age:  65 y.o.  Sex:  male  :  1955  MRN:  [id number]  Visit Number:  [id number]  Primary Care Provider:  Rosi Thomas APRN    Length of stay:  21    Chief complaint:   Back pain    Subjective:    Patient seen and examined, much more awake and alert, able to communicate freely, asking to be back on his hydrocodone and gabapentin for chronic lower back pain and cervical pain.  Is known to have advanced osteoarthritis  .  ----------------------------------------------------------------------------------------------------------------------  Current Hospital Meds:  apixaban, 5 mg, Oral, Q12H  atorvastatin, 20 mg, Oral, Daily  budesonide-formoterol, 2 puff, Inhalation, BID - RT  bumetanide, 2.5 mg, Intravenous, Q8H  Canagliflozin, 300 mg, Oral, Daily  clopidogrel, 75 mg, Oral, Daily  docusate sodium, 100 mg, Oral, BID  doxycycline, 100 mg, Oral, Q12H  ethyl alcohol, 1 application, Nasal, BID  fluticasone, 2 spray, Nasal, Daily  gabapentin, 100 mg, Oral, Q12H  insulin aspart, 0-9 Units, Subcutaneous, TID AC  insulin aspart, 7 Units, Subcutaneous, TID With Meals  [START ON 2021] insulin detemir, 55 Units, Subcutaneous, Daily  ipratropium-albuterol, 3 mL, Nebulization, Q6H - RT  lactobacillus acidophilus, 1 capsule, Oral, Daily  lidocaine, 1 patch, Transdermal, Q24H  lidocaine, 2 patch, Transdermal, Q24H  lisinopril, 40 mg, Oral, Q24H  metoprolol succinate XL, 100 mg, Oral, Q24H  pantoprazole, 40 mg, Oral, Q AM  polyethylene glycol, 17 g, Oral, Daily  sodium chloride, 10 mL, Intravenous, Q12H  sodium chloride, 10 mL, Intravenous, Q12H  spironolactone, 50 mg, Oral, Daily  tamsulosin, 0.4 mg, Oral, Nightly         ----------------------------------------------------------------------------------------------------------------------  Vital Signs:  Temp:  [97.8 °F (36.6 °C)-98.6 °F (37 °C)] 98.6 °F (37 °C)  Heart  Rate:  [72-85] 85  Resp:  [20-30] 20  BP: (119-180)/() 159/83      06/10/21  0500 06/11/21  0500 06/11/21  1528   Weight: 127 kg (279 lb 3.2 oz) 127 kg (280 lb) (!) 141 kg (310 lb)     Body mass index is 45.78 kg/m².    Intake/Output Summary (Last 24 hours) at 6/12/2021 1258  Last data filed at 6/12/2021 1245  Gross per 24 hour   Intake 1620 ml   Output 2950 ml   Net -1330 ml     Diet Regular; Cardiac, Consistent Carbohydrate  ----------------------------------------------------------------------------------------------------------------------  Physical exam:  Constitutional: Morbidly obese, improved respiratory distress compared to yesterday  HENT:  Head:  Normocephalic and atraumatic.  Mouth:  Moist mucous membranes.    Eyes:  Conjunctivae and EOM are normal.  Pupils are equal, round, and reactive to light.  No scleral icterus.    Neck:  Neck supple. No thyromegaly.  No JVD present.    Cardiovascular:  Regular rate and rhythm with no murmurs, rubs, clicks or gallops appreciated.  Pulmonary/Chest:   Bilateral crackles.  Abdominal:  Soft. Nontender. Nondistended  Bowel sounds are normal in all four quadrants. No organomegally appreciated.   Neurological:   Awake and alert, moving his 4 extremities freely   skin:  Warm and dry to palpation with no rashes or lesions appreciated.  Peripheral vascular:  2+ radial and pedal pulses in bilateral upper and lower extremities.      ----------------------------------------------------------------------------------------------------------------------  Results from last 7 days   Lab Units 06/06/21  0624   TROPONIN T ng/mL 0.016     Results from last 7 days   Lab Units 06/12/21  0501 06/11/21  0343 06/10/21  0133 06/09/21  0048   CRP mg/dL 1.47* 2.29* 4.58* 9.45*   WBC 10*3/mm3 6.16  --  6.12 8.90   HEMOGLOBIN g/dL 11.3*  --  10.2* 10.7*   HEMATOCRIT % 39.4  --  35.9* 37.6   MCV fL 84.2  --  87.3 85.8   MCHC g/dL 28.7*  --  28.4* 28.5*   PLATELETS 10*3/mm3 256  --  228 230      Results from last 7 days   Lab Units 06/10/21  2215   PH, ARTERIAL pH units 7.361   PO2 ART mm Hg 70.0*   PCO2, ARTERIAL mm Hg 67.2*   HCO3 ART mmol/L 38.0*     Results from last 7 days   Lab Units 06/12/21  0501 06/11/21  0343 06/10/21  0133 06/08/21  0035 06/07/21  0534 06/06/21  0725   SODIUM mmol/L 139 141 138 139  --  139   POTASSIUM mmol/L 3.6 4.0 4.3 4.4  --  4.4   MAGNESIUM mg/dL  --   --   --   --  2.2  --    CHLORIDE mmol/L 95* 99 100 103  --  101   CO2 mmol/L 36.2* 35.4* 32.2* 31.2*  --  26.3   BUN mg/dL 19 25* 20 22  --  19   CREATININE mg/dL 0.90 1.03 0.95 1.11  --  0.91   EGFR IF NONAFRICN AM mL/min/1.73 84 72 79 66  --  83   CALCIUM mg/dL 9.7 9.3 9.3 9.1  --  9.5   GLUCOSE mg/dL 204* 232* 168* 191*  --  252*   ALBUMIN g/dL  --   --  2.97* 2.98*  --  3.62   BILIRUBIN mg/dL  --   --  0.3 0.4  --  0.4   ALK PHOS U/L  --   --  69 74  --  110   AST (SGOT) U/L  --   --  12 10  --  19   ALT (SGPT) U/L  --   --  12 11  --  17   Estimated Creatinine Clearance: 112.8 mL/min (by C-G formula based on SCr of 0.9 mg/dL).    No results found for: AMMONIA      No results found for: BLOODCX  No results found for: URINECX  No results found for: WOUNDCX  No results found for: STOOLCX    I have personally looked at the labs and they are summarized above.  ----------------------------------------------------------------------------------------------------------------------  Imaging Results (Last 24 Hours)     ** No results found for the last 24 hours. **        ----------------------------------------------------------------------------------------------------------------------      Assessment and Plan:    *Acute on chronic hypoxic/hypercapnic respiratory failure, currently on nasal cannula 5 to 6 L/min, off BiPAP  *History of toxic encephalopathy with methamphetamine abuse  *Lethargic, suspected opioid induced with gabapentin, resolved  *COPD exacerbation  *PAF  *Acute on chronic decompensated diastolic heart  failure  *Essential hypertension, stage I  *DM type II, uncontrolled with diabetic neuropathy  *RIO  *Reported history of chronic osteomyelitis, MRI reviewed not showing evidence of acute inflammatory changes, ID noted, thinking no evidence of acute bony/cartilage infection  *CAP    --- Continue Bumex 2.5 mg IV every 8 hours / aldactone 50 mg a day  --Fluid restriction 1 L/day  --Restart low-dose gabapentin 100 mg p.o. twice a day  --Start hydrocodone 5 mg p.o. every 8 hours for severe pain, monitor mental status  --Continue p.o. doxycycline   --PT OT, transfer to [facility]  --BMP in a.m.  --Nocturnal BiPAP, will need official polysomnogram in the outpatient setting  --Continue other therapies  --Increase Levemir to 52 units / aspart 7 units 3 times daily, continue insulin sliding scale  --Increase Toprol-XL to 150 mg a day, continue apixaban  --Planning to rehab DC on Monday      Jagdeep Neri MD  06/12/21  12:58 EDT

## 2021-06-13 LAB
ANION GAP SERPL CALCULATED.3IONS-SCNC: 11.5 MMOL/L (ref 5–15)
BUN SERPL-MCNC: 21 MG/DL (ref 8–23)
BUN/CREAT SERPL: 22.3 (ref 7–25)
CALCIUM SPEC-SCNC: 10.1 MG/DL (ref 8.6–10.5)
CHLORIDE SERPL-SCNC: 97 MMOL/L (ref 98–107)
CO2 SERPL-SCNC: 29.5 MMOL/L (ref 22–29)
CREAT SERPL-MCNC: 0.94 MG/DL (ref 0.76–1.27)
CRP SERPL-MCNC: 1.04 MG/DL (ref 0–0.5)
GFR SERPL CREATININE-BSD FRML MDRD: 80 ML/MIN/1.73
GLUCOSE BLDC GLUCOMTR-MCNC: 163 MG/DL (ref 70–130)
GLUCOSE BLDC GLUCOMTR-MCNC: 173 MG/DL (ref 70–130)
GLUCOSE BLDC GLUCOMTR-MCNC: 178 MG/DL (ref 70–130)
GLUCOSE BLDC GLUCOMTR-MCNC: 315 MG/DL (ref 70–130)
GLUCOSE SERPL-MCNC: 179 MG/DL (ref 65–99)
POTASSIUM SERPL-SCNC: 3.8 MMOL/L (ref 3.5–5.2)
SODIUM SERPL-SCNC: 138 MMOL/L (ref 136–145)

## 2021-06-13 PROCEDURE — 94799 UNLISTED PULMONARY SVC/PX: CPT

## 2021-06-13 PROCEDURE — 63710000001 INSULIN DETEMIR PER 5 UNITS: Performed by: INTERNAL MEDICINE

## 2021-06-13 PROCEDURE — 82962 GLUCOSE BLOOD TEST: CPT

## 2021-06-13 PROCEDURE — 99232 SBSQ HOSP IP/OBS MODERATE 35: CPT | Performed by: INTERNAL MEDICINE

## 2021-06-13 PROCEDURE — 63710000001 INSULIN ASPART PER 5 UNITS: Performed by: INTERNAL MEDICINE

## 2021-06-13 PROCEDURE — 80048 BASIC METABOLIC PNL TOTAL CA: CPT | Performed by: INTERNAL MEDICINE

## 2021-06-13 PROCEDURE — 86140 C-REACTIVE PROTEIN: CPT | Performed by: PHYSICIAN ASSISTANT

## 2021-06-13 PROCEDURE — 94660 CPAP INITIATION&MGMT: CPT

## 2021-06-13 RX ORDER — AMOXICILLIN 250 MG
2 CAPSULE ORAL 2 TIMES DAILY
Status: DISCONTINUED | OUTPATIENT
Start: 2021-06-13 | End: 2021-06-17 | Stop reason: HOSPADM

## 2021-06-13 RX ADMIN — INSULIN ASPART 12 UNITS: 100 INJECTION, SOLUTION INTRAVENOUS; SUBCUTANEOUS at 16:45

## 2021-06-13 RX ADMIN — BUDESONIDE AND FORMOTEROL FUMARATE DIHYDRATE 2 PUFF: 160; 4.5 AEROSOL RESPIRATORY (INHALATION) at 19:35

## 2021-06-13 RX ADMIN — DOCUSATE SODIUM 100 MG: 100 CAPSULE, LIQUID FILLED ORAL at 08:58

## 2021-06-13 RX ADMIN — INSULIN ASPART 2 UNITS: 100 INJECTION, SOLUTION INTRAVENOUS; SUBCUTANEOUS at 16:45

## 2021-06-13 RX ADMIN — INSULIN DETEMIR 55 UNITS: 100 INJECTION, SOLUTION SUBCUTANEOUS at 08:57

## 2021-06-13 RX ADMIN — SPIRONOLACTONE 50 MG: 25 TABLET ORAL at 08:58

## 2021-06-13 RX ADMIN — INSULIN ASPART 7 UNITS: 100 INJECTION, SOLUTION INTRAVENOUS; SUBCUTANEOUS at 11:58

## 2021-06-13 RX ADMIN — PANTOPRAZOLE SODIUM 40 MG: 40 TABLET, DELAYED RELEASE ORAL at 05:18

## 2021-06-13 RX ADMIN — BUMETANIDE 2.5 MG: 0.25 INJECTION INTRAMUSCULAR; INTRAVENOUS at 20:59

## 2021-06-13 RX ADMIN — INSULIN ASPART 7 UNITS: 100 INJECTION, SOLUTION INTRAVENOUS; SUBCUTANEOUS at 08:58

## 2021-06-13 RX ADMIN — HYDROCODONE BITARTRATE AND ACETAMINOPHEN 1 TABLET: 5; 325 TABLET ORAL at 22:30

## 2021-06-13 RX ADMIN — TAMSULOSIN HYDROCHLORIDE 0.4 MG: 0.4 CAPSULE ORAL at 20:37

## 2021-06-13 RX ADMIN — FLUTICASONE PROPIONATE 2 SPRAY: 50 SPRAY, METERED NASAL at 09:00

## 2021-06-13 RX ADMIN — GABAPENTIN 100 MG: 100 CAPSULE ORAL at 20:37

## 2021-06-13 RX ADMIN — APIXABAN 5 MG: 5 TABLET, FILM COATED ORAL at 20:37

## 2021-06-13 RX ADMIN — IPRATROPIUM BROMIDE AND ALBUTEROL SULFATE 3 ML: .5; 3 SOLUTION RESPIRATORY (INHALATION) at 12:31

## 2021-06-13 RX ADMIN — HYDROCODONE BITARTRATE AND ACETAMINOPHEN 1 TABLET: 5; 325 TABLET ORAL at 01:17

## 2021-06-13 RX ADMIN — DOCUSATE SODIUM 50 MG AND SENNOSIDES 8.6 MG 2 TABLET: 8.6; 5 TABLET, FILM COATED ORAL at 22:22

## 2021-06-13 RX ADMIN — IPRATROPIUM BROMIDE AND ALBUTEROL SULFATE 3 ML: .5; 3 SOLUTION RESPIRATORY (INHALATION) at 06:41

## 2021-06-13 RX ADMIN — GABAPENTIN 100 MG: 100 CAPSULE ORAL at 08:58

## 2021-06-13 RX ADMIN — BUMETANIDE 2.5 MG: 0.25 INJECTION INTRAMUSCULAR; INTRAVENOUS at 05:17

## 2021-06-13 RX ADMIN — ETHYL ALCOHOL 1 EACH: 62 SWAB TOPICAL at 09:10

## 2021-06-13 RX ADMIN — POLYETHYLENE GLYCOL (3350) 17 G: 17 POWDER, FOR SOLUTION ORAL at 08:59

## 2021-06-13 RX ADMIN — SODIUM CHLORIDE, PRESERVATIVE FREE 10 ML: 5 INJECTION INTRAVENOUS at 09:00

## 2021-06-13 RX ADMIN — METOPROLOL SUCCINATE 150 MG: 50 TABLET, EXTENDED RELEASE ORAL at 08:59

## 2021-06-13 RX ADMIN — CLOPIDOGREL 75 MG: 75 TABLET, FILM COATED ORAL at 08:58

## 2021-06-13 RX ADMIN — CANAGLIFLOZIN 300 MG: 300 TABLET, FILM COATED ORAL at 08:59

## 2021-06-13 RX ADMIN — DOXYCYCLINE 100 MG: 100 CAPSULE ORAL at 08:58

## 2021-06-13 RX ADMIN — BUDESONIDE AND FORMOTEROL FUMARATE DIHYDRATE 2 PUFF: 160; 4.5 AEROSOL RESPIRATORY (INHALATION) at 06:40

## 2021-06-13 RX ADMIN — Medication 1 CAPSULE: at 08:58

## 2021-06-13 RX ADMIN — APIXABAN 5 MG: 5 TABLET, FILM COATED ORAL at 08:59

## 2021-06-13 RX ADMIN — LISINOPRIL 40 MG: 10 TABLET ORAL at 08:58

## 2021-06-13 RX ADMIN — HYDROCODONE BITARTRATE AND ACETAMINOPHEN 1 TABLET: 5; 325 TABLET ORAL at 09:10

## 2021-06-13 RX ADMIN — LIDOCAINE 2 PATCH: 50 PATCH CUTANEOUS at 08:59

## 2021-06-13 RX ADMIN — BUMETANIDE 2.5 MG: 0.25 INJECTION INTRAMUSCULAR; INTRAVENOUS at 13:31

## 2021-06-13 RX ADMIN — ATORVASTATIN CALCIUM 20 MG: 20 TABLET, FILM COATED ORAL at 08:58

## 2021-06-13 RX ADMIN — INSULIN ASPART 2 UNITS: 100 INJECTION, SOLUTION INTRAVENOUS; SUBCUTANEOUS at 08:59

## 2021-06-13 RX ADMIN — IPRATROPIUM BROMIDE AND ALBUTEROL SULFATE 3 ML: .5; 3 SOLUTION RESPIRATORY (INHALATION) at 19:35

## 2021-06-13 RX ADMIN — DOXYCYCLINE 100 MG: 100 CAPSULE ORAL at 20:37

## 2021-06-13 RX ADMIN — LIDOCAINE 1 PATCH: 50 PATCH CUTANEOUS at 08:59

## 2021-06-13 NOTE — PLAN OF CARE
Goal Outcome Evaluation:           Progress: no change     At beginning of shift patient was restless and wanted to sit up in the chair because he was tired of laying in bed. Patient was up in chair at the nurses station for approximately one hour. When putting him back into bed patient was in the middle of being transferred when it was decided he was too weak to remain standing. He was lowered to the floor by staff. He had no obvious signs of trauma, he denied pain, vitals were taken and TRAVIS Huang was notified. He has been very restless throughout the shift, setting off his bed alarm many times. Pain medicine given X1 for back and neck pain. Will continue to monitor.

## 2021-06-13 NOTE — NURSING NOTE
Upon transferring patient from the chair to the bed patient wasn't able to  the middle of transfer. Patient gently lowered to the floor. Denies any acute problems or pain. Patient transferred back into the bed with help from additional staff. Hailey Pete made aware. Will continue to monitor.

## 2021-06-13 NOTE — PLAN OF CARE
Goal Outcome Evaluation:           Progress: no change  Outcome Summary: Patient has frequently asked for medications today, instructed pt on dose and times medications can be given.  Verbalized understanding. Oxygen decreased to 4L/NC, O2 sats in the mid 90s,

## 2021-06-13 NOTE — PROGRESS NOTES
[facility] HOSPITALIST PROGRESS NOTE     Patient Identification:  Name:  Иван Miller  Age:  65 y.o.  Sex:  male  :  1955  MRN:  [id number]  Visit Number:  [id number]  Primary Care Provider:  Rosi Thomas APRN    Length of stay:  22    Chief complaint:   Back pain    Subjective:    Patient seen and examined, reviewed his condition, no fever or chills, better breathing, pt on 3 LPM at home, excellent urine output     .  ----------------------------------------------------------------------------------------------------------------------  Current Hospital Meds:  apixaban, 5 mg, Oral, Q12H  atorvastatin, 20 mg, Oral, Daily  budesonide-formoterol, 2 puff, Inhalation, BID - RT  bumetanide, 2.5 mg, Intravenous, Q8H  Canagliflozin, 300 mg, Oral, Daily  clopidogrel, 75 mg, Oral, Daily  docusate sodium, 100 mg, Oral, BID  doxycycline, 100 mg, Oral, Q12H  ethyl alcohol, 1 application, Nasal, BID  fluticasone, 2 spray, Nasal, Daily  gabapentin, 100 mg, Oral, Q12H  insulin aspart, 0-9 Units, Subcutaneous, TID AC  insulin aspart, 12 Units, Subcutaneous, TID With Meals  [START ON 2021] insulin detemir, 60 Units, Subcutaneous, Daily  ipratropium-albuterol, 3 mL, Nebulization, Q6H - RT  lidocaine, 1 patch, Transdermal, Q24H  lidocaine, 2 patch, Transdermal, Q24H  lisinopril, 40 mg, Oral, Q24H  metoprolol succinate XL, 150 mg, Oral, Q24H  pantoprazole, 40 mg, Oral, Q AM  polyethylene glycol, 17 g, Oral, Daily  sodium chloride, 10 mL, Intravenous, Q12H  sodium chloride, 10 mL, Intravenous, Q12H  spironolactone, 50 mg, Oral, Daily  tamsulosin, 0.4 mg, Oral, Nightly         ----------------------------------------------------------------------------------------------------------------------  Vital Signs:  Temp:  [97.5 °F (36.4 °C)-98.4 °F (36.9 °C)] 97.6 °F (36.4 °C)  Heart Rate:  [81-98] 81  Resp:  [18-24] 18  BP: (106-172)/() 106/68      21  0500 21  1528 21  0500   Weight: 127 kg  (280 lb) (!) 141 kg (310 lb) (!) 140 kg (309 lb 11.2 oz)     Body mass index is 45.73 kg/m².    Intake/Output Summary (Last 24 hours) at 6/13/2021 1305  Last data filed at 6/13/2021 1255  Gross per 24 hour   Intake 720 ml   Output 1200 ml   Net -480 ml     Diet Regular; Cardiac, Consistent Carbohydrate, Daily Fluid Restriction; 1000 mL Fluid Per Day  ----------------------------------------------------------------------------------------------------------------------  Physical exam:  Constitutional: Morbidly obese, improved respiratory distress compared to yesterday  HENT:  Head:  Normocephalic and atraumatic.  Mouth:  Moist mucous membranes.    Eyes:  Conjunctivae and EOM are normal.  Pupils are equal, round, and reactive to light.  No scleral icterus.    Neck:  Neck supple. No thyromegaly.  No JVD present.    Cardiovascular:  Regular rate and rhythm with no murmurs, rubs, clicks or gallops appreciated.  Pulmonary/Chest:   Bilateral crackles.  Abdominal:  Soft. Nontender. Nondistended  Bowel sounds are normal in all four quadrants. No organomegally appreciated.   Neurological:   Awake and alert, moving his 4 extremities freely   skin:  Warm and dry to palpation with no rashes or lesions appreciated.  Peripheral vascular:  2+ radial and pedal pulses in bilateral upper and lower extremities.      ----------------------------------------------------------------------------------------------------------------------      Results from last 7 days   Lab Units 06/13/21  0300 06/12/21  0501 06/11/21  0343 06/10/21  0133 06/09/21  0048   CRP mg/dL 1.04* 1.47* 2.29* 4.58* 9.45*   WBC 10*3/mm3  --  6.16  --  6.12 8.90   HEMOGLOBIN g/dL  --  11.3*  --  10.2* 10.7*   HEMATOCRIT %  --  39.4  --  35.9* 37.6   MCV fL  --  84.2  --  87.3 85.8   MCHC g/dL  --  28.7*  --  28.4* 28.5*   PLATELETS 10*3/mm3  --  256  --  228 230     Results from last 7 days   Lab Units 06/10/21  2215   PH, ARTERIAL pH units 7.361   PO2 ART mm Hg 70.0*    PCO2, ARTERIAL mm Hg 67.2*   HCO3 ART mmol/L 38.0*     Results from last 7 days   Lab Units 06/13/21  0300 06/12/21  0501 06/11/21  0343 06/10/21  0133 06/08/21  0035 06/07/21  0534   SODIUM mmol/L 138 139 141 138 139  --    POTASSIUM mmol/L 3.8 3.6 4.0 4.3 4.4  --    MAGNESIUM mg/dL  --   --   --   --   --  2.2   CHLORIDE mmol/L 97* 95* 99 100 103  --    CO2 mmol/L 29.5* 36.2* 35.4* 32.2* 31.2*  --    BUN mg/dL 21 19 25* 20 22  --    CREATININE mg/dL 0.94 0.90 1.03 0.95 1.11  --    EGFR IF NONAFRICN AM mL/min/1.73 80 84 72 79 66  --    CALCIUM mg/dL 10.1 9.7 9.3 9.3 9.1  --    GLUCOSE mg/dL 179* 204* 232* 168* 191*  --    ALBUMIN g/dL  --   --   --  2.97* 2.98*  --    BILIRUBIN mg/dL  --   --   --  0.3 0.4  --    ALK PHOS U/L  --   --   --  69 74  --    AST (SGOT) U/L  --   --   --  12 10  --    ALT (SGPT) U/L  --   --   --  12 11  --    Estimated Creatinine Clearance: 107.6 mL/min (by C-G formula based on SCr of 0.94 mg/dL).    No results found for: AMMONIA      No results found for: BLOODCX  No results found for: URINECX  No results found for: WOUNDCX  No results found for: STOOLCX    I have personally looked at the labs and they are summarized above.  ----------------------------------------------------------------------------------------------------------------------  Imaging Results (Last 24 Hours)     ** No results found for the last 24 hours. **        ----------------------------------------------------------------------------------------------------------------------      Assessment and Plan:    *Acute on chronic hypoxic/hypercapnic respiratory failure, currently on nasal cannula 5 L/min, off BiPAP  *History of toxic encephalopathy with methamphetamine abuse  *Lethargic, suspected opioid induced with gabapentin, resolved  *COPD exacerbation  *PAF  *Acute on chronic decompensated diastolic heart failure  *Essential hypertension, stage I  *DM type II, uncontrolled with diabetic neuropathy  *RIO  *Reported  history of chronic osteomyelitis, MRI reviewed not showing evidence of acute inflammatory changes, per ID no evidence of acute bony/cartilage infection  *CAP    --Continue Bumex 2.5 mg IV every 8 hours / aldactone 50 mg a day  --Fluid restriction 1 L/day  --Cont gabapentin 100 mg p.o. twice a day  --Start hydrocodone 5 mg p.o. every 8 hours for severe pain, monitor mental status  --Continue p.o. doxycycline   --PT OT, transfer to [facility]  --BMP in a.m.  --Nocturnal BiPAP, will need official polysomnogram in the outpatient setting  --Continue other therapies  --Increase Levemir to 60 units / aspart 12 units 3 times daily, continue insulin sliding scale  --Toprol-XL to 150 mg a day, continue apixaban  --Planning to rehab DC on Monday      Jagdeep Neri MD  06/13/21  13:05 EDT

## 2021-06-14 LAB
ANION GAP SERPL CALCULATED.3IONS-SCNC: 11.3 MMOL/L (ref 5–15)
BUN SERPL-MCNC: 49 MG/DL (ref 8–23)
BUN/CREAT SERPL: 24.3 (ref 7–25)
CALCIUM SPEC-SCNC: 9.5 MG/DL (ref 8.6–10.5)
CHLORIDE SERPL-SCNC: 95 MMOL/L (ref 98–107)
CO2 SERPL-SCNC: 30.7 MMOL/L (ref 22–29)
CREAT SERPL-MCNC: 2.02 MG/DL (ref 0.76–1.27)
CRP SERPL-MCNC: 0.84 MG/DL (ref 0–0.5)
GFR SERPL CREATININE-BSD FRML MDRD: 33 ML/MIN/1.73
GLUCOSE BLDC GLUCOMTR-MCNC: 153 MG/DL (ref 70–130)
GLUCOSE BLDC GLUCOMTR-MCNC: 189 MG/DL (ref 70–130)
GLUCOSE BLDC GLUCOMTR-MCNC: 190 MG/DL (ref 70–130)
GLUCOSE BLDC GLUCOMTR-MCNC: 205 MG/DL (ref 70–130)
GLUCOSE SERPL-MCNC: 203 MG/DL (ref 65–99)
MAGNESIUM SERPL-MCNC: 2.7 MG/DL (ref 1.6–2.4)
POTASSIUM SERPL-SCNC: 3.9 MMOL/L (ref 3.5–5.2)
POTASSIUM SERPL-SCNC: 4.5 MMOL/L (ref 3.5–5.2)
SODIUM SERPL-SCNC: 137 MMOL/L (ref 136–145)

## 2021-06-14 PROCEDURE — 82962 GLUCOSE BLOOD TEST: CPT

## 2021-06-14 PROCEDURE — 86140 C-REACTIVE PROTEIN: CPT | Performed by: PHYSICIAN ASSISTANT

## 2021-06-14 PROCEDURE — 99232 SBSQ HOSP IP/OBS MODERATE 35: CPT | Performed by: INTERNAL MEDICINE

## 2021-06-14 PROCEDURE — 97530 THERAPEUTIC ACTIVITIES: CPT

## 2021-06-14 PROCEDURE — 80048 BASIC METABOLIC PNL TOTAL CA: CPT | Performed by: INTERNAL MEDICINE

## 2021-06-14 PROCEDURE — 94799 UNLISTED PULMONARY SVC/PX: CPT

## 2021-06-14 PROCEDURE — 63710000001 INSULIN ASPART PER 5 UNITS: Performed by: INTERNAL MEDICINE

## 2021-06-14 PROCEDURE — 83735 ASSAY OF MAGNESIUM: CPT | Performed by: PHYSICIAN ASSISTANT

## 2021-06-14 PROCEDURE — 63710000001 INSULIN DETEMIR PER 5 UNITS: Performed by: INTERNAL MEDICINE

## 2021-06-14 PROCEDURE — 97110 THERAPEUTIC EXERCISES: CPT

## 2021-06-14 PROCEDURE — 84132 ASSAY OF SERUM POTASSIUM: CPT | Performed by: PHYSICIAN ASSISTANT

## 2021-06-14 RX ADMIN — LIDOCAINE 2 PATCH: 50 PATCH CUTANEOUS at 08:37

## 2021-06-14 RX ADMIN — INSULIN ASPART 2 UNITS: 100 INJECTION, SOLUTION INTRAVENOUS; SUBCUTANEOUS at 16:50

## 2021-06-14 RX ADMIN — IPRATROPIUM BROMIDE AND ALBUTEROL SULFATE 3 ML: .5; 3 SOLUTION RESPIRATORY (INHALATION) at 07:06

## 2021-06-14 RX ADMIN — CANAGLIFLOZIN 300 MG: 300 TABLET, FILM COATED ORAL at 08:38

## 2021-06-14 RX ADMIN — INSULIN ASPART 2 UNITS: 100 INJECTION, SOLUTION INTRAVENOUS; SUBCUTANEOUS at 11:08

## 2021-06-14 RX ADMIN — SODIUM CHLORIDE, PRESERVATIVE FREE 10 ML: 5 INJECTION INTRAVENOUS at 21:56

## 2021-06-14 RX ADMIN — SPIRONOLACTONE 50 MG: 25 TABLET ORAL at 08:37

## 2021-06-14 RX ADMIN — SODIUM CHLORIDE 500 ML: 9 INJECTION, SOLUTION INTRAVENOUS at 13:28

## 2021-06-14 RX ADMIN — GABAPENTIN 100 MG: 100 CAPSULE ORAL at 21:54

## 2021-06-14 RX ADMIN — APIXABAN 5 MG: 5 TABLET, FILM COATED ORAL at 08:38

## 2021-06-14 RX ADMIN — INSULIN ASPART 2 UNITS: 100 INJECTION, SOLUTION INTRAVENOUS; SUBCUTANEOUS at 08:36

## 2021-06-14 RX ADMIN — APIXABAN 5 MG: 5 TABLET, FILM COATED ORAL at 21:55

## 2021-06-14 RX ADMIN — SODIUM CHLORIDE, PRESERVATIVE FREE 10 ML: 5 INJECTION INTRAVENOUS at 08:39

## 2021-06-14 RX ADMIN — HYDROCODONE BITARTRATE AND ACETAMINOPHEN 1 TABLET: 5; 325 TABLET ORAL at 14:53

## 2021-06-14 RX ADMIN — DOCUSATE SODIUM 50 MG AND SENNOSIDES 8.6 MG 2 TABLET: 8.6; 5 TABLET, FILM COATED ORAL at 21:54

## 2021-06-14 RX ADMIN — INSULIN DETEMIR 60 UNITS: 100 INJECTION, SOLUTION SUBCUTANEOUS at 08:35

## 2021-06-14 RX ADMIN — DOCUSATE SODIUM 50 MG AND SENNOSIDES 8.6 MG 2 TABLET: 8.6; 5 TABLET, FILM COATED ORAL at 08:38

## 2021-06-14 RX ADMIN — INSULIN ASPART 12 UNITS: 100 INJECTION, SOLUTION INTRAVENOUS; SUBCUTANEOUS at 08:36

## 2021-06-14 RX ADMIN — DOXYCYCLINE 100 MG: 100 CAPSULE ORAL at 08:38

## 2021-06-14 RX ADMIN — CLOPIDOGREL 75 MG: 75 TABLET, FILM COATED ORAL at 08:38

## 2021-06-14 RX ADMIN — HYDROXYZINE HYDROCHLORIDE 25 MG: 25 TABLET, FILM COATED ORAL at 16:11

## 2021-06-14 RX ADMIN — CALCIUM CARBONATE 2 TABLET: 500 TABLET, CHEWABLE ORAL at 21:59

## 2021-06-14 RX ADMIN — TAMSULOSIN HYDROCHLORIDE 0.4 MG: 0.4 CAPSULE ORAL at 21:55

## 2021-06-14 RX ADMIN — INSULIN ASPART 12 UNITS: 100 INJECTION, SOLUTION INTRAVENOUS; SUBCUTANEOUS at 16:50

## 2021-06-14 RX ADMIN — FLUTICASONE PROPIONATE 2 SPRAY: 50 SPRAY, METERED NASAL at 08:39

## 2021-06-14 RX ADMIN — POLYETHYLENE GLYCOL (3350) 17 G: 17 POWDER, FOR SOLUTION ORAL at 08:38

## 2021-06-14 RX ADMIN — BUDESONIDE AND FORMOTEROL FUMARATE DIHYDRATE 2 PUFF: 160; 4.5 AEROSOL RESPIRATORY (INHALATION) at 07:06

## 2021-06-14 RX ADMIN — PANTOPRAZOLE SODIUM 40 MG: 40 TABLET, DELAYED RELEASE ORAL at 05:14

## 2021-06-14 RX ADMIN — LIDOCAINE 1 PATCH: 50 PATCH CUTANEOUS at 08:39

## 2021-06-14 RX ADMIN — DOXYCYCLINE 100 MG: 100 CAPSULE ORAL at 21:55

## 2021-06-14 RX ADMIN — IPRATROPIUM BROMIDE AND ALBUTEROL SULFATE 3 ML: .5; 3 SOLUTION RESPIRATORY (INHALATION) at 13:54

## 2021-06-14 RX ADMIN — SODIUM CHLORIDE, PRESERVATIVE FREE 10 ML: 5 INJECTION INTRAVENOUS at 08:38

## 2021-06-14 RX ADMIN — GABAPENTIN 100 MG: 100 CAPSULE ORAL at 08:37

## 2021-06-14 RX ADMIN — IPRATROPIUM BROMIDE AND ALBUTEROL SULFATE 3 ML: .5; 3 SOLUTION RESPIRATORY (INHALATION) at 00:45

## 2021-06-14 RX ADMIN — BUDESONIDE AND FORMOTEROL FUMARATE DIHYDRATE 2 PUFF: 160; 4.5 AEROSOL RESPIRATORY (INHALATION) at 18:27

## 2021-06-14 RX ADMIN — IPRATROPIUM BROMIDE AND ALBUTEROL SULFATE 3 ML: .5; 3 SOLUTION RESPIRATORY (INHALATION) at 18:27

## 2021-06-14 RX ADMIN — INSULIN ASPART 12 UNITS: 100 INJECTION, SOLUTION INTRAVENOUS; SUBCUTANEOUS at 11:09

## 2021-06-14 RX ADMIN — ATORVASTATIN CALCIUM 20 MG: 20 TABLET, FILM COATED ORAL at 08:38

## 2021-06-14 NOTE — THERAPY TREATMENT NOTE
Acute Care - Physical Therapy Progress Note  [facility]     Patient Name: Иван Miller  : 1955  MRN: [id number]  Today's Date: 2021   Onset of Illness/Injury or Date of Surgery: 21       PT Assessment (last 12 hours)      PT Evaluation and Treatment     Row Name 21 1312          Physical Therapy Time and Intention    Subjective Information  complains of;weakness  -AG     Document Type  progress note/recertification  -AG     Mode of Treatment  physical therapy  -AG     Patient Effort  adequate  -AG     Symptoms Noted During/After Treatment  fatigue  -AG     Row Name 21 1312          General Information    Patient Profile Reviewed  yes  -AG     Row Name 21 1312          Cognition    Affect/Mental Status (Cognitive)  WFL  -     Orientation Status (Cognition)  oriented x 3  -AG     Follows Commands (Cognition)  follows one-step commands;verbal cues/prompting required  -AG     Personal Safety Interventions  fall prevention program maintained;gait belt;nonskid shoes/slippers when out of bed;supervised activity  -AG     Row Name 21 1312          Pain    Additional Documentation  Pain Scale: FACES Pre/Post-Treatment (Group)  -AG     Row Name 21 1312          Pain Scale: FACES Pre/Post-Treatment    Pain: FACES Scale, Pretreatment  0-->no hurt  -AG     Posttreatment Pain Rating  0-->no hurt  -AG     Row Name 21 1312          Bed Mobility    Bed Mobility  rolling left;scooting/bridging;supine-sit;sit-supine  -AG     Rolling Left [place] (Bed Mobility)  verbal cues;nonverbal cues (demo/gesture);minimum assist (75% patient effort)  -AG     Scooting/Bridging [place] (Bed Mobility)  verbal cues;nonverbal cues (demo/gesture);minimum assist (75% patient effort)  -AG     Supine-Sit [place] (Bed Mobility)  verbal cues;nonverbal cues (demo/gesture);minimum assist (75% patient effort)  -AG     Sit-Supine [place] (Bed Mobility)  verbal cues;nonverbal cues  (demo/gesture);moderate assist (50% patient effort)  -AG     Bed Mobility, Safety Issues  decreased use of arms for pushing/pulling;decreased use of legs for bridging/pushing  -     Assistive Device (Bed Mobility)  bed rails;draw sheet  -     Row Name 06/14/21 1312          Transfers    Transfers  sit-stand transfer;stand-sit transfer  -AG     Sit-Stand [place] (Transfers)  verbal cues;nonverbal cues (demo/gesture);maximum assist (25% patient effort)  -     Stand-Sit [place] (Transfers)  verbal cues;nonverbal cues (demo/gesture);maximum assist (25% patient effort)  -AG     Row Name 06/14/21 1312          Sit-Stand Transfer    Assistive Device (Sit-Stand Transfers)  walker, front-wheeled  -AG     Row Name 06/14/21 1312          Stand-Sit Transfer    Assistive Device (Stand-Sit Transfers)  walker, front-wheeled  -AG     Row Name 06/14/21 1312          Gait/Stairs (Locomotion)    Comment (Gait/Stairs)  unable to perform at this time d/t LE weakness  -     Row Name 06/14/21 1312          Safety Issues, Functional Mobility    Impairments Affecting Function (Mobility)  balance;coordination;endurance/activity tolerance;motor control;postural/trunk control;range of motion (ROM);strength  -     Row Name 06/14/21 1312          Balance    Balance Assessment  sitting static balance;sitting dynamic balance;standing static balance  -     Static Sitting Balance  WFL;supported;sitting, edge of bed  -AG     Dynamic Sitting Balance  mild impairment;moderate impairment;unsupported;sitting, edge of bed  -AG     Static Standing Balance  severe impairment;supported;standing  -AG     Comment, Balance  performed sit<>stand x 3 trials at bedside with RW.   Pt. able to partially stand to get hips off bed, however, unable to perform trunk, hip, knee extension adequately for even semi-erect posture.  Pt. able to perform brief partial stand for scooting.   -     Row Name 06/14/21 1312          Motor Skills    Therapeutic  Exercise  hip;knee;ankle  -AG     Row Name 06/14/21 1312          Hip (Therapeutic Exercise)    Hip (Therapeutic Exercise)  strengthening exercise  -AG     Hip Strengthening (Therapeutic Exercise)  bilateral;flexion;marching while seated;sitting;10 repetitions  -AG     Row Name 06/14/21 1312          Knee (Therapeutic Exercise)    Knee (Therapeutic Exercise)  strengthening exercise  -AG     Knee Strengthening (Therapeutic Exercise)  bilateral;flexion;extension;marching while seated;LAQ (long arc quad);sitting;10 repetitions;2 sets  -AG     Row Name 06/14/21 1312          Ankle (Therapeutic Exercise)    Ankle (Therapeutic Exercise)  strengthening exercise  -AG     Ankle Strengthening (Therapeutic Exercise)  bilateral;dorsiflexion;sitting;10 repetitions;2 sets  -AG     Row Name 06/14/21 1312          Coping    Observed Emotional State  calm;cooperative  -AG     Verbalized Emotional State  acceptance  -AG     Family/Support Persons  family  -AG     Involvement in Care  not present at bedside  -AG     Row Name 06/14/21 1312          Plan of Care Review    Plan of Care Reviewed With  patient  -AG     Progress  no change  -AG     Outcome Summary  unable to significantly improve functional mobility at this time.  Displays sitting balance deficit which needs to be addressed as well.   -AG     Row Name 06/14/21 1312          Positioning and Restraints    Pre-Treatment Position  in bed  -AG     Post Treatment Position  bed  -AG     In Bed  supine;call light within reach;encouraged to call for assist;side rails up x3  -AG     Row Name 06/14/21 1312          Therapy Plan Review/Discharge Plan (PT)    Therapy Plan Review (PT)  evaluation/treatment results reviewed;care plan/treatment goals reviewed;risks/benefits reviewed;current/potential barriers reviewed;participants voiced agreement with care plan;participants included;patient  -AG       User Key  (r) = Recorded By, (t) = Taken By, (c) = Cosigned By    Initials Name Provider  Type    Elvia Sepulveda, PT Physical Therapist        Physical Therapy Education                 Title: PT OT SLP Therapies (Done)     Topic: Physical Therapy (Done)     Point: Mobility training (Done)     Learning Progress Summary           Patient Acceptance, E,D, NR,VU by AG at 6/14/2021 1311    Acceptance, E, VU by AM at 6/11/2021 1334    Acceptance, E, VU by AM at 6/10/2021 1610    Acceptance, E, VU by AM at 6/9/2021 1435    Acceptance, E, VU by SB at 6/8/2021 1006    Acceptance, E, NR by LE at 6/7/2021 1817    Acceptance, E, VU by TW at 6/6/2021 1602    Acceptance, E,TB, VU by MM at 6/5/2021 1227    Acceptance, E, VU by EB at 6/2/2021 0713    Acceptance, E, VU by SM at 6/2/2021 0030    Acceptance, E, VU by EB at 6/1/2021 0739    Acceptance, E, VU by SM at 5/31/2021 2242    Acceptance, E, VU by JM at 5/31/2021 0924    Acceptance, E, VU by SM at 5/31/2021 0035    Acceptance, E,TB, VU by MM at 5/28/2021 1802    Acceptance, E,TB, VU by MM at 5/27/2021 1143    Acceptance, E, VU by SM at 5/26/2021 2344    Acceptance, E,TB, VU by MM at 5/26/2021 0929    Acceptance, E,TB,D, VU,NR by AG at 5/24/2021 1249                   Point: Home exercise program (Done)     Learning Progress Summary           Patient Acceptance, E,D, NR,VU by AG at 6/14/2021 1311    Acceptance, E, VU by AM at 6/11/2021 1334    Acceptance, E, VU by AM at 6/10/2021 1610    Acceptance, E, VU by AM at 6/9/2021 1435    Acceptance, E, VU by SB at 6/8/2021 1006    Acceptance, E, NR by LE at 6/7/2021 1817    Acceptance, E, VU by TW at 6/6/2021 1602    Acceptance, E,TB, VU by MM at 6/5/2021 1227    Acceptance, E, VU by EB at 6/2/2021 0713    Acceptance, E, VU by SM at 6/2/2021 0030    Acceptance, E, VU by EB at 6/1/2021 0739    Acceptance, E, VU by SM at 5/31/2021 2242    Acceptance, E, VU by JM at 5/31/2021 0924    Acceptance, E, VU by SM at 5/31/2021 0035    Acceptance, E,TB, VU by MM at 5/28/2021 1802    Acceptance, E,TB, VU by MM at 5/27/2021 1143     Acceptance, E, VU by SM at 5/26/2021 2344    Acceptance, E,TB, VU by MM at 5/26/2021 0929    Acceptance, E,TB,D, VU,NR by AG at 5/24/2021 1249                   Point: Body mechanics (Done)     Learning Progress Summary           Patient Acceptance, E,D, NR,VU by AG at 6/14/2021 1311    Acceptance, E, VU by AM at 6/11/2021 1334    Acceptance, E, VU by AM at 6/10/2021 1610    Acceptance, E, VU by AM at 6/9/2021 1435    Acceptance, E, VU by SB at 6/8/2021 1006    Acceptance, E, NR by LE at 6/7/2021 1817    Acceptance, E, VU by TW at 6/6/2021 1602    Acceptance, E,TB, VU by MM at 6/5/2021 1227    Acceptance, E, VU by EB at 6/2/2021 0713    Acceptance, E, VU by SM at 6/2/2021 0030    Acceptance, E, VU by EB at 6/1/2021 0739    Acceptance, E, VU by SM at 5/31/2021 2242    Acceptance, E, VU by JM at 5/31/2021 0924    Acceptance, E, VU by SM at 5/31/2021 0035    Acceptance, E,TB, VU by MM at 5/28/2021 1802    Acceptance, E,TB, VU by MM at 5/27/2021 1143    Acceptance, E, VU by SM at 5/26/2021 2344    Acceptance, E,TB, VU by MM at 5/26/2021 0929    Acceptance, E,TB,D, VU,NR by AG at 5/24/2021 1249                   Point: Precautions (Done)     Learning Progress Summary           Patient Acceptance, E,D, NR,VU by AG at 6/14/2021 1311    Acceptance, E, VU by AM at 6/11/2021 1334    Acceptance, E, VU by AM at 6/10/2021 1610    Acceptance, E, VU by AM at 6/9/2021 1435    Acceptance, E, VU by SB at 6/8/2021 1006    Acceptance, E, NR by LE at 6/7/2021 1817    Acceptance, E, VU by TW at 6/6/2021 1602    Acceptance, E,TB, VU by MM at 6/5/2021 1227    Acceptance, E, VU by EB at 6/2/2021 0713    Acceptance, E, VU by SM at 6/2/2021 0030    Acceptance, E, VU by EB at 6/1/2021 0739    Acceptance, E, VU by SM at 5/31/2021 2242    Acceptance, E, VU by JM at 5/31/2021 0924    Acceptance, E, VU by SM at 5/31/2021 0035    Acceptance, E,TB, VU by MM at 5/28/2021 1802    Acceptance, E,TB, VU by MM at 5/27/2021 1143    Acceptance, E, VU by SM  at 5/26/2021 2344    Acceptance, E,TB, VU by  at 5/26/2021 0929    Acceptance, E,TB,D, VU,NR by  at 5/24/2021 1249                               User Key     Initials Effective Dates Name Provider Type Discipline    TW 06/16/16 -  Marifer Garcia, RN Registered Nurse Nurse    EB 06/16/16 -  Keily Godoy, RN Registered Nurse Nurse    AG 04/03/18 -  Elvia Rousseau, PT Physical Therapist PT    JM 07/19/18 -  Cruz Umanzor RN Registered Nurse Nurse    MM 07/08/19 -  Esha Lee, RN Registered Nurse Nurse    LE 08/13/18 -  Regina Garzon, RN Registered Nurse Nurse    SM 09/12/18 -  Chris Das, RN Registered Nurse Nurse    SB 07/10/20 -  Marie Villela, RN Registered Nurse Nurse    AM 06/24/20 -  Cindi Valle, RN Registered Nurse Nurse              PT Recommendation and Plan: continue to follow and progress toward current goals.   Anticipated Discharge Disposition (PT): inpatient rehabilitation facility, skilled nursing facility  Planned Therapy Interventions (PT): balance training, bed mobility training, home exercise program, patient/family education, postural re-education, strengthening, transfer training  Therapy Frequency (PT): other (see comments) (3-5 times/ week per priority)  Plan of Care Reviewed With: patient  Progress: no change  Outcome Summary: unable to significantly improve functional mobility at this time.  Displays sitting balance deficit which needs to be addressed as well.        Time Calculation:   PT Charges     Row Name 06/14/21 1311             Time Calculation    PT Received On  06/14/21  -      PT Goal Re-Cert Due Date  06/28/21  -        User Key  (r) = Recorded By, (t) = Taken By, (c) = Cosigned By    Initials Name Provider Type     Elvia Rousseau, PT Physical Therapist        Therapy Charges for Today     Code Description Service Date Service Provider Modifiers Qty    16629153443 HC PT THER PROC EA 15 MIN 6/14/2021 Elvia Rousseau, PT GP 1    [id number]   PT THERAPEUTIC ACT EA 15 MIN 6/14/2021 Elvia Rousseau, PT GP 2               Elvia Rousseau, PT  6/14/2021

## 2021-06-14 NOTE — PROGRESS NOTES
[facility] HOSPITALIST PROGRESS NOTE     Patient Identification:  Name:  Иван Miller  Age:  65 y.o.  Sex:  male  :  1955  MRN:  [id number]  Visit Number:  [id number]  Primary Care Provider:  Rosi Thomas APRN    Length of stay:  23    Chief complaint:   Back pain    Subjective:    Patient seen and examined, he is feeling all over better compared to before, discussed with the , hopefully will have a bed available for him tomorrow  .  ----------------------------------------------------------------------------------------------------------------------  Current Hospital Meds:  apixaban, 5 mg, Oral, Q12H  atorvastatin, 20 mg, Oral, Daily  budesonide-formoterol, 2 puff, Inhalation, BID - RT  Canagliflozin, 300 mg, Oral, Daily  clopidogrel, 75 mg, Oral, Daily  doxycycline, 100 mg, Oral, Q12H  fluticasone, 2 spray, Nasal, Daily  gabapentin, 100 mg, Oral, Q12H  insulin aspart, 0-9 Units, Subcutaneous, TID AC  insulin aspart, 12 Units, Subcutaneous, TID With Meals  insulin detemir, 60 Units, Subcutaneous, Daily  ipratropium-albuterol, 3 mL, Nebulization, Q6H - RT  lidocaine, 1 patch, Transdermal, Q24H  lidocaine, 2 patch, Transdermal, Q24H  metoprolol succinate XL, 150 mg, Oral, Q24H  pantoprazole, 40 mg, Oral, Q AM  polyethylene glycol, 17 g, Oral, Daily  senna-docusate sodium, 2 tablet, Oral, BID  sodium chloride, 500 mL, Intravenous, Once  sodium chloride, 10 mL, Intravenous, Q12H  sodium chloride, 10 mL, Intravenous, Q12H  tamsulosin, 0.4 mg, Oral, Nightly         ----------------------------------------------------------------------------------------------------------------------  Vital Signs:  Temp:  [97.4 °F (36.3 °C)-98.4 °F (36.9 °C)] 98.1 °F (36.7 °C)  Heart Rate:  [59-80] 79  Resp:  [18-20] 18  BP: ()/(54-64) 100/61      21  1528 21  0500 21  0500   Weight: (!) 141 kg (310 lb) (!) 140 kg (309 lb 11.2 oz) 135 kg (297 lb 8 oz)     Body mass index is  43.93 kg/m².    Intake/Output Summary (Last 24 hours) at 6/14/2021 1256  Last data filed at 6/14/2021 0700  Gross per 24 hour   Intake 300 ml   Output 300 ml   Net 0 ml     Diet Regular; Cardiac, Consistent Carbohydrate, Daily Fluid Restriction; 1000 mL Fluid Per Day  ----------------------------------------------------------------------------------------------------------------------  Physical exam:  Constitutional: Morbidly obese, improved respiratory distress compared to yesterday  HENT:  Head:  Normocephalic and atraumatic.  Mouth:  Moist mucous membranes.    Eyes:  Conjunctivae and EOM are normal.  Pupils are equal, round, and reactive to light.  No scleral icterus.    Neck:  Neck supple. No thyromegaly.  No JVD present.    Cardiovascular:  Regular rate and rhythm with no murmurs, rubs, clicks or gallops appreciated.  Pulmonary/Chest:   Bilateral crackles.  Abdominal:  Soft. Nontender. Nondistended  Bowel sounds are normal in all four quadrants. No organomegally appreciated.   Neurological:   Awake and alert, moving his 4 extremities freely   skin:  Warm and dry to palpation with no rashes or lesions appreciated.  Peripheral vascular:  2+ radial and pedal pulses in bilateral upper and lower extremities.      ----------------------------------------------------------------------------------------------------------------------      Results from last 7 days   Lab Units 06/14/21  0302 06/13/21  0300 06/12/21  0501 06/10/21  0133 06/09/21  0048   CRP mg/dL 0.84* 1.04* 1.47* 4.58* 9.45*   WBC 10*3/mm3  --   --  6.16 6.12 8.90   HEMOGLOBIN g/dL  --   --  11.3* 10.2* 10.7*   HEMATOCRIT %  --   --  39.4 35.9* 37.6   MCV fL  --   --  84.2 87.3 85.8   MCHC g/dL  --   --  28.7* 28.4* 28.5*   PLATELETS 10*3/mm3  --   --  256 228 230     Results from last 7 days   Lab Units 06/10/21  2215   PH, ARTERIAL pH units 7.361   PO2 ART mm Hg 70.0*   PCO2, ARTERIAL mm Hg 67.2*   HCO3 ART mmol/L 38.0*     Results from last 7 days    Lab Units 06/14/21  0850 06/13/21  0300 06/12/21  0501 06/10/21  0133 06/08/21  0035   SODIUM mmol/L 137 138 139 138 139   POTASSIUM mmol/L 4.5 3.8 3.6 4.3 4.4   CHLORIDE mmol/L 95* 97* 95* 100 103   CO2 mmol/L 30.7* 29.5* 36.2* 32.2* 31.2*   BUN mg/dL 49* 21 19 20 22   CREATININE mg/dL 2.02* 0.94 0.90 0.95 1.11   EGFR IF NONAFRICN AM mL/min/1.73 33* 80 84 79 66   CALCIUM mg/dL 9.5 10.1 9.7 9.3 9.1   GLUCOSE mg/dL 203* 179* 204* 168* 191*   ALBUMIN g/dL  --   --   --  2.97* 2.98*   BILIRUBIN mg/dL  --   --   --  0.3 0.4   ALK PHOS U/L  --   --   --  69 74   AST (SGOT) U/L  --   --   --  12 10   ALT (SGPT) U/L  --   --   --  12 11   Estimated Creatinine Clearance: 49 mL/min (A) (by C-G formula based on SCr of 2.02 mg/dL (H)).    No results found for: AMMONIA      No results found for: BLOODCX  No results found for: URINECX  No results found for: WOUNDCX  No results found for: STOOLCX    I have personally looked at the labs and they are summarized above.  ----------------------------------------------------------------------------------------------------------------------  Imaging Results (Last 24 Hours)     ** No results found for the last 24 hours. **        ----------------------------------------------------------------------------------------------------------------------      Assessment and Plan:    *Acute on chronic hypoxic/hypercapnic respiratory failure, currently on nasal cannula 5 L/min, off BiPAP, on night CPAP  *deuritic induced kidney injury  *History of toxic encephalopathy with methamphetamine abuse  *Lethargic, suspected opioid induced with gabapentin, resolved  *COPD exacerbation  *PAF  *Acute on chronic decompensated diastolic heart failure  *Essential hypertension, stage I  *DM type II, uncontrolled with diabetic neuropathy, better in house control  *RIO  *Reported history of chronic osteomyelitis, MRI reviewed not showing evidence of acute inflammatory changes, per ID no evidence of acute  bony/cartilage infection  *CAP    --Hold Bumex / aldactone / linsopril, bolus with 500 cc x1  --BMP in am  --Cont gabapentin 100 mg p.o. twice a day  --Cont hydrocodone 5 mg p.o. every 8 hours for severe pain  --Continue p.o. doxycycline   --PT OT, transfer to [facility]  --Nocturnal CPAP, will need official polysomnogram in the outpatient setting  --Cont Levemir 60 units / aspart 12 units 3 times daily, continue insulin sliding scale  --Toprol-XL to 150 mg a day, continue apixaban  --Planning to rehab DC, hopefully tmrw      Jagdeep Neri MD  06/14/21  12:56 EDT

## 2021-06-14 NOTE — PLAN OF CARE
Goal Outcome Evaluation:  Plan of Care Reviewed With: patient        Progress: no change  Outcome Summary: Pt has rested in bed throughout shift. PRN pain med given. Pt has yet to have any urine output this shift. Bladder scan has shown 251 ml.

## 2021-06-14 NOTE — PLAN OF CARE
Goal Outcome Evaluation:           Progress: no change  Outcome Summary: Patient was slightly hypotensive, bp meds and diuretics adjusted. Fluid bolus given, BP improved.  Pt has been more cooperative today with staff.

## 2021-06-15 LAB
ANION GAP SERPL CALCULATED.3IONS-SCNC: 10.7 MMOL/L (ref 5–15)
BUN SERPL-MCNC: 67 MG/DL (ref 8–23)
BUN/CREAT SERPL: 28 (ref 7–25)
CALCIUM SPEC-SCNC: 9.2 MG/DL (ref 8.6–10.5)
CHLORIDE SERPL-SCNC: 99 MMOL/L (ref 98–107)
CO2 SERPL-SCNC: 28.3 MMOL/L (ref 22–29)
CREAT SERPL-MCNC: 2.39 MG/DL (ref 0.76–1.27)
GFR SERPL CREATININE-BSD FRML MDRD: 27 ML/MIN/1.73
GLUCOSE BLDC GLUCOMTR-MCNC: 139 MG/DL (ref 70–130)
GLUCOSE BLDC GLUCOMTR-MCNC: 209 MG/DL (ref 70–130)
GLUCOSE BLDC GLUCOMTR-MCNC: 252 MG/DL (ref 70–130)
GLUCOSE BLDC GLUCOMTR-MCNC: 291 MG/DL (ref 70–130)
GLUCOSE SERPL-MCNC: 214 MG/DL (ref 65–99)
POTASSIUM SERPL-SCNC: 4.4 MMOL/L (ref 3.5–5.2)
SODIUM SERPL-SCNC: 138 MMOL/L (ref 136–145)

## 2021-06-15 PROCEDURE — 63710000001 INSULIN ASPART PER 5 UNITS: Performed by: INTERNAL MEDICINE

## 2021-06-15 PROCEDURE — 99232 SBSQ HOSP IP/OBS MODERATE 35: CPT | Performed by: INTERNAL MEDICINE

## 2021-06-15 PROCEDURE — 94660 CPAP INITIATION&MGMT: CPT

## 2021-06-15 PROCEDURE — 94799 UNLISTED PULMONARY SVC/PX: CPT

## 2021-06-15 PROCEDURE — 82962 GLUCOSE BLOOD TEST: CPT

## 2021-06-15 PROCEDURE — 63710000001 INSULIN DETEMIR PER 5 UNITS: Performed by: INTERNAL MEDICINE

## 2021-06-15 PROCEDURE — 80048 BASIC METABOLIC PNL TOTAL CA: CPT | Performed by: INTERNAL MEDICINE

## 2021-06-15 RX ADMIN — SODIUM CHLORIDE, PRESERVATIVE FREE 10 ML: 5 INJECTION INTRAVENOUS at 08:20

## 2021-06-15 RX ADMIN — BUDESONIDE AND FORMOTEROL FUMARATE DIHYDRATE 2 PUFF: 160; 4.5 AEROSOL RESPIRATORY (INHALATION) at 18:28

## 2021-06-15 RX ADMIN — APIXABAN 5 MG: 5 TABLET, FILM COATED ORAL at 08:17

## 2021-06-15 RX ADMIN — IPRATROPIUM BROMIDE AND ALBUTEROL SULFATE 3 ML: .5; 3 SOLUTION RESPIRATORY (INHALATION) at 00:26

## 2021-06-15 RX ADMIN — SODIUM CHLORIDE, PRESERVATIVE FREE 10 ML: 5 INJECTION INTRAVENOUS at 08:19

## 2021-06-15 RX ADMIN — INSULIN ASPART 12 UNITS: 100 INJECTION, SOLUTION INTRAVENOUS; SUBCUTANEOUS at 08:17

## 2021-06-15 RX ADMIN — SODIUM CHLORIDE 1000 ML: 9 INJECTION, SOLUTION INTRAVENOUS at 12:12

## 2021-06-15 RX ADMIN — GABAPENTIN 100 MG: 100 CAPSULE ORAL at 08:17

## 2021-06-15 RX ADMIN — GABAPENTIN 100 MG: 100 CAPSULE ORAL at 20:04

## 2021-06-15 RX ADMIN — DOXYCYCLINE 100 MG: 100 CAPSULE ORAL at 08:18

## 2021-06-15 RX ADMIN — IPRATROPIUM BROMIDE AND ALBUTEROL SULFATE 3 ML: .5; 3 SOLUTION RESPIRATORY (INHALATION) at 13:33

## 2021-06-15 RX ADMIN — HYDROCODONE BITARTRATE AND ACETAMINOPHEN 1 TABLET: 5; 325 TABLET ORAL at 16:07

## 2021-06-15 RX ADMIN — PANTOPRAZOLE SODIUM 40 MG: 40 TABLET, DELAYED RELEASE ORAL at 05:41

## 2021-06-15 RX ADMIN — CLOPIDOGREL 75 MG: 75 TABLET, FILM COATED ORAL at 08:18

## 2021-06-15 RX ADMIN — ATORVASTATIN CALCIUM 20 MG: 20 TABLET, FILM COATED ORAL at 08:18

## 2021-06-15 RX ADMIN — FLUTICASONE PROPIONATE 2 SPRAY: 50 SPRAY, METERED NASAL at 08:19

## 2021-06-15 RX ADMIN — TAMSULOSIN HYDROCHLORIDE 0.4 MG: 0.4 CAPSULE ORAL at 20:05

## 2021-06-15 RX ADMIN — BUDESONIDE AND FORMOTEROL FUMARATE DIHYDRATE 2 PUFF: 160; 4.5 AEROSOL RESPIRATORY (INHALATION) at 07:12

## 2021-06-15 RX ADMIN — INSULIN DETEMIR 60 UNITS: 100 INJECTION, SOLUTION SUBCUTANEOUS at 08:16

## 2021-06-15 RX ADMIN — INSULIN ASPART 12 UNITS: 100 INJECTION, SOLUTION INTRAVENOUS; SUBCUTANEOUS at 10:56

## 2021-06-15 RX ADMIN — INSULIN ASPART 6 UNITS: 100 INJECTION, SOLUTION INTRAVENOUS; SUBCUTANEOUS at 10:56

## 2021-06-15 RX ADMIN — APIXABAN 5 MG: 5 TABLET, FILM COATED ORAL at 20:05

## 2021-06-15 RX ADMIN — LIDOCAINE 1 PATCH: 50 PATCH CUTANEOUS at 08:19

## 2021-06-15 RX ADMIN — LIDOCAINE 2 PATCH: 50 PATCH CUTANEOUS at 08:17

## 2021-06-15 RX ADMIN — SODIUM CHLORIDE, PRESERVATIVE FREE 10 ML: 5 INJECTION INTRAVENOUS at 20:06

## 2021-06-15 RX ADMIN — IPRATROPIUM BROMIDE AND ALBUTEROL SULFATE 3 ML: .5; 3 SOLUTION RESPIRATORY (INHALATION) at 18:28

## 2021-06-15 RX ADMIN — INSULIN ASPART 4 UNITS: 100 INJECTION, SOLUTION INTRAVENOUS; SUBCUTANEOUS at 08:17

## 2021-06-15 RX ADMIN — CANAGLIFLOZIN 300 MG: 300 TABLET, FILM COATED ORAL at 08:19

## 2021-06-15 RX ADMIN — DOXYCYCLINE 100 MG: 100 CAPSULE ORAL at 20:05

## 2021-06-15 RX ADMIN — SODIUM CHLORIDE, PRESERVATIVE FREE 10 ML: 5 INJECTION INTRAVENOUS at 20:05

## 2021-06-15 RX ADMIN — IPRATROPIUM BROMIDE AND ALBUTEROL SULFATE 3 ML: .5; 3 SOLUTION RESPIRATORY (INHALATION) at 07:12

## 2021-06-15 NOTE — PROGRESS NOTES
[facility]     Patient Identification:  Name:  Иван Miller  Age:  65 y.o.  Sex:  male  :  1955  MRN:  [id number]  Visit Number:  [id number]  Primary Care Provider:  Rosi Thomas APRN    Length of stay:  24    Chief complaint:   Back pain    Subjective:    Patient seen and examined, denied any active cardiopulmonary complaints.    ----------------------------------------------------------------------------------------------------------------------  Current Hospital Meds:  apixaban, 5 mg, Oral, Q12H  atorvastatin, 20 mg, Oral, Daily  budesonide-formoterol, 2 puff, Inhalation, BID - RT  clopidogrel, 75 mg, Oral, Daily  doxycycline, 100 mg, Oral, Q12H  fluticasone, 2 spray, Nasal, Daily  gabapentin, 100 mg, Oral, Q12H  insulin aspart, 0-9 Units, Subcutaneous, TID AC  insulin aspart, 12 Units, Subcutaneous, TID With Meals  insulin detemir, 60 Units, Subcutaneous, Daily  ipratropium-albuterol, 3 mL, Nebulization, Q6H - RT  lidocaine, 1 patch, Transdermal, Q24H  lidocaine, 2 patch, Transdermal, Q24H  metoprolol succinate XL, 150 mg, Oral, Q24H  pantoprazole, 40 mg, Oral, Q AM  polyethylene glycol, 17 g, Oral, Daily  senna-docusate sodium, 2 tablet, Oral, BID  sodium chloride, 10 mL, Intravenous, Q12H  sodium chloride, 10 mL, Intravenous, Q12H  tamsulosin, 0.4 mg, Oral, Nightly         ----------------------------------------------------------------------------------------------------------------------  Vital Signs:  Temp:  [97.5 °F (36.4 °C)-98.3 °F (36.8 °C)] 97.7 °F (36.5 °C)  Heart Rate:  [72-96] 81  Resp:  [18-20] 18  BP: ()/(51-80) 116/76      21  0500 21  0500 06/15/21  0522   Weight: (!) 140 kg (309 lb 11.2 oz) 135 kg (297 lb 8 oz) (!) 136 kg (300 lb 11.2 oz)     Body mass index is 44.41 kg/m².    Intake/Output Summary (Last 24 hours) at 6/15/2021 1646  Last data filed at 6/15/2021 1500  Gross per 24 hour   Intake 1340 ml   Output 925 ml   Net 415  ml     Diet Regular; Cardiac, Consistent Carbohydrate  ----------------------------------------------------------------------------------------------------------------------  Physical exam:  Constitutional: Morbidly obese, stable respiratory status, no distress  HENT:  Head:  Normocephalic and atraumatic.  Mouth:  Moist mucous membranes.    Eyes:  Conjunctivae and EOM are normal.  Pupils are equal, round, and reactive to light.  No scleral icterus.    Neck:  Neck supple. No thyromegaly.  No JVD present.    Cardiovascular:  Regular rate and rhythm with no murmurs, rubs, clicks or gallops appreciated.  Pulmonary/Chest:   Bilateral crackles.  Abdominal:  Soft. Nontender. Nondistended  Bowel sounds are normal in all four quadrants. No organomegally appreciated.   Neurological:   Awake and alert, moving his 4 extremities freely   skin:  Warm and dry to palpation with no rashes or lesions appreciated.  Peripheral vascular:  2+ radial and pedal pulses in bilateral upper and lower extremities.      ----------------------------------------------------------------------------------------------------------------------      Results from last 7 days   Lab Units 06/14/21  0302 06/13/21  0300 06/12/21  0501 06/10/21  0133 06/09/21  0048   CRP mg/dL 0.84* 1.04* 1.47* 4.58* 9.45*   WBC 10*3/mm3  --   --  6.16 6.12 8.90   HEMOGLOBIN g/dL  --   --  11.3* 10.2* 10.7*   HEMATOCRIT %  --   --  39.4 35.9* 37.6   MCV fL  --   --  84.2 87.3 85.8   MCHC g/dL  --   --  28.7* 28.4* 28.5*   PLATELETS 10*3/mm3  --   --  256 228 230     Results from last 7 days   Lab Units 06/10/21  2215   PH, ARTERIAL pH units 7.361   PO2 ART mm Hg 70.0*   PCO2, ARTERIAL mm Hg 67.2*   HCO3 ART mmol/L 38.0*     Results from last 7 days   Lab Units 06/15/21  0314 06/14/21  2125 06/14/21  0850 06/13/21  0300 06/10/21  0133   SODIUM mmol/L 138  --  137 138 138   POTASSIUM mmol/L 4.4 3.9 4.5 3.8 4.3   MAGNESIUM mg/dL  --  2.7*  --   --   --    CHLORIDE mmol/L 99  --   95* 97* 100   CO2 mmol/L 28.3  --  30.7* 29.5* 32.2*   BUN mg/dL 67*  --  49* 21 20   CREATININE mg/dL 2.39*  --  2.02* 0.94 0.95   EGFR IF NONAFRICN AM mL/min/1.73 27*  --  33* 80 79   CALCIUM mg/dL 9.2  --  9.5 10.1 9.3   GLUCOSE mg/dL 214*  --  203* 179* 168*   ALBUMIN g/dL  --   --   --   --  2.97*   BILIRUBIN mg/dL  --   --   --   --  0.3   ALK PHOS U/L  --   --   --   --  69   AST (SGOT) U/L  --   --   --   --  12   ALT (SGPT) U/L  --   --   --   --  12   Estimated Creatinine Clearance: 41.6 mL/min (A) (by C-G formula based on SCr of 2.39 mg/dL (H)).    No results found for: AMMONIA      No results found for: BLOODCX  No results found for: URINECX  No results found for: WOUNDCX  No results found for: STOOLCX    I have personally looked at the labs and they are summarized above.  ----------------------------------------------------------------------------------------------------------------------  Imaging Results (Last 24 Hours)     ** No results found for the last 24 hours. **        ----------------------------------------------------------------------------------------------------------------------      Assessment and Plan:    *Acute on chronic hypoxic/hypercapnic respiratory failure, currently on nasal cannula 5 L/min, off BiPAP, on night CPAP  *deuritic induced NICKY, worsening today  *History of toxic encephalopathy with methamphetamine abuse  *Lethargic, suspected opioid induced with gabapentin, resolved  *COPD exacerbation  *PAF  *Acute on chronic decompensated diastolic heart failure  *Essential hypertension, stage I  *DM type II, uncontrolled with diabetic neuropathy, better in house control  *RIO  *Reported history of chronic osteomyelitis, MRI reviewed not showing evidence of acute inflammatory changes, per ID no evidence of acute bony/cartilage infection  *CAP    --Hold Bumex / aldactone / linsopril, bolus with 1 L, liberate diet  --BMP in am  --Cont gabapentin 100 mg p.o. twice a day  --Cont  hydrocodone 5 mg p.o. every 8 hours for severe pain  --Continue p.o. doxycycline   --Nocturnal CPAP, will need official polysomnogram in the outpatient setting  --Cont Levemir 60 units / aspart 12 units 3 times daily, continue insulin sliding scale, improved blood sugar  --Toprol-XL to 150 mg a day, continue apixaban  --Planning to rehab DC, when kidney function stabilized      Jagdeep Neri MD  06/15/21  16:46 EDT

## 2021-06-15 NOTE — PLAN OF CARE
Goal Outcome Evaluation:              Outcome Summary: Patient has had no complaints or request at this time, rested well in bed, will continue to monitor.

## 2021-06-15 NOTE — DISCHARGE PLACEMENT REQUEST
"Иван Blanco (65 y.o. Male)     Date of Birth Social Security Number Address Home Phone MRN    1955  [address]  [place] [address] [phone] [id number]    Alevism Marital Status          None        Admission Date Admission Type Admitting Provider Attending Provider Department, Room/Bed    5/21/21 Emergency Dequan Connelly MD Baibars, Motaz, MD [address], 3336/1P    Discharge Date Discharge Disposition Discharge Destination                       Attending Provider: Jagdeep Neri MD    Allergies: Codeine, Ciprofloxacin    Isolation: Droplet   Infection: Rhinovirus  (06/06/21), MRSA No Isolation this Admit (06/09/21)   Code Status: CPR    Ht: 175.3 cm (69\")   Wt: 136 kg (300 lb 11.2 oz)    Admission Cmt: None   Principal Problem: Respiratory failure (CMS/HCC) [J96.90]                 Active Insurance as of 5/21/2021     Primary Coverage     Payor Plan Insurance Group Employer/Plan Group    [facility] MEDICARE REPLACEMENT [facility] DUAL COMPLETE MEDICARE REPLACEMENT [id number]     Payor Plan Address Payor Plan Phone Number Payor Plan Fax Number Effective Dates    [address] [phone]  1/1/2021 - None Entered    [facility] [id number]       Subscriber Name Subscriber Birth Date Member ID       ИВАН BLANCO 1955 [id number]           Secondary Coverage     Payor Plan Insurance Group Employer/Plan Group    [facility] MEDICAID      Payor Plan Address Payor Plan Phone Number Payor Plan Fax Number Effective Dates    [address] [address] [phone]  10/4/2019 - None Entered    [facility] [address]       Subscriber Name Subscriber Birth Date Member ID       ИВАН BLANCO 1955 [id number]                 Emergency Contacts      (Rel.) Home Phone Work Phone Mobile Phone    Lamar Blanco (Spouse) [phone] -- [phone]    GARCIA MALDONADO (Daughter) [phone] -- [phone]               Physical Therapy Notes (most recent note) "      Elvia Rousseau, PT at 21 1322  Version 1 of 1         Acute Care - Physical Therapy Progress Note   George     Patient Name: Иван Miller  : 1955  MRN: [id number]  Today's Date: 2021   Onset of Illness/Injury or Date of Surgery: 21       PT Assessment (last 12 hours)      PT Evaluation and Treatment     Row Name 21 1312          Physical Therapy Time and Intention    Subjective Information  complains of;weakness  -AG     Document Type  progress note/recertification  -AG     Mode of Treatment  physical therapy  -AG     Patient Effort  adequate  -AG     Symptoms Noted During/After Treatment  fatigue  -AG     Row Name 21 1312          General Information    Patient Profile Reviewed  yes  -AG     Row Name 21 1312          Cognition    Affect/Mental Status (Cognitive)  WFL  -AG     Orientation Status (Cognition)  oriented x 3  -AG     Follows Commands (Cognition)  follows one-step commands;verbal cues/prompting required  -AG     Personal Safety Interventions  fall prevention program maintained;gait belt;nonskid shoes/slippers when out of bed;supervised activity  -AG     Row Name 21 1312          Pain    Additional Documentation  Pain Scale: FACES Pre/Post-Treatment (Group)  -AG     Row Name 21 1312          Pain Scale: FACES Pre/Post-Treatment    Pain: FACES Scale, Pretreatment  0-->no hurt  -AG     Posttreatment Pain Rating  0-->no hurt  -AG     Row Name 21 1312          Bed Mobility    Bed Mobility  rolling left;scooting/bridging;supine-sit;sit-supine  -AG     Rolling Left [place] (Bed Mobility)  verbal cues;nonverbal cues (demo/gesture);minimum assist (75% patient effort)  -AG     Scooting/Bridging [place] (Bed Mobility)  verbal cues;nonverbal cues (demo/gesture);minimum assist (75% patient effort)  -AG     Supine-Sit [place] (Bed Mobility)  verbal cues;nonverbal cues (demo/gesture);minimum assist (75% patient effort)  -AG      Sit-Supine [place] (Bed Mobility)  verbal cues;nonverbal cues (demo/gesture);moderate assist (50% patient effort)  -AG     Bed Mobility, Safety Issues  decreased use of arms for pushing/pulling;decreased use of legs for bridging/pushing  -     Assistive Device (Bed Mobility)  bed rails;draw sheet  -     Row Name 06/14/21 1312          Transfers    Transfers  sit-stand transfer;stand-sit transfer  -AG     Sit-Stand [place] (Transfers)  verbal cues;nonverbal cues (demo/gesture);maximum assist (25% patient effort)  -AG     Stand-Sit [place] (Transfers)  verbal cues;nonverbal cues (demo/gesture);maximum assist (25% patient effort)  -     Row Name 06/14/21 1312          Sit-Stand Transfer    Assistive Device (Sit-Stand Transfers)  walker, front-wheeled  -AG     Row Name 06/14/21 1312          Stand-Sit Transfer    Assistive Device (Stand-Sit Transfers)  walker, front-wheeled  -     Row Name 06/14/21 1312          Gait/Stairs (Locomotion)    Comment (Gait/Stairs)  unable to perform at this time d/t LE weakness  -     Row Name 06/14/21 1312          Safety Issues, Functional Mobility    Impairments Affecting Function (Mobility)  balance;coordination;endurance/activity tolerance;motor control;postural/trunk control;range of motion (ROM);strength  -AG     Row Name 06/14/21 1312          Balance    Balance Assessment  sitting static balance;sitting dynamic balance;standing static balance  -     Static Sitting Balance  WFL;supported;sitting, edge of bed  -AG     Dynamic Sitting Balance  mild impairment;moderate impairment;unsupported;sitting, edge of bed  -AG     Static Standing Balance  severe impairment;supported;standing  -AG     Comment, Balance  performed sit<>stand x 3 trials at bedside with RW.   Pt. able to partially stand to get hips off bed, however, unable to perform trunk, hip, knee extension adequately for even semi-erect posture.  Pt. able to perform brief partial stand for scooting.    -AG     Row Name 06/14/21 1312          Motor Skills    Therapeutic Exercise  hip;knee;ankle  -AG     Row Name 06/14/21 1312          Hip (Therapeutic Exercise)    Hip (Therapeutic Exercise)  strengthening exercise  -AG     Hip Strengthening (Therapeutic Exercise)  bilateral;flexion;marching while seated;sitting;10 repetitions  -AG     Row Name 06/14/21 1312          Knee (Therapeutic Exercise)    Knee (Therapeutic Exercise)  strengthening exercise  -AG     Knee Strengthening (Therapeutic Exercise)  bilateral;flexion;extension;marching while seated;LAQ (long arc quad);sitting;10 repetitions;2 sets  -AG     Row Name 06/14/21 1312          Ankle (Therapeutic Exercise)    Ankle (Therapeutic Exercise)  strengthening exercise  -AG     Ankle Strengthening (Therapeutic Exercise)  bilateral;dorsiflexion;sitting;10 repetitions;2 sets  -AG     Row Name 06/14/21 1312          Coping    Observed Emotional State  calm;cooperative  -AG     Verbalized Emotional State  acceptance  -AG     Family/Support Persons  family  -AG     Involvement in Care  not present at bedside  -AG     Row Name 06/14/21 1312          Plan of Care Review    Plan of Care Reviewed With  patient  -AG     Progress  no change  -AG     Outcome Summary  unable to significantly improve functional mobility at this time.  Displays sitting balance deficit which needs to be addressed as well.   -AG     Row Name 06/14/21 1312          Positioning and Restraints    Pre-Treatment Position  in bed  -AG     Post Treatment Position  bed  -AG     In Bed  supine;call light within reach;encouraged to call for assist;side rails up x3  -AG     Row Name 06/14/21 1312          Therapy Plan Review/Discharge Plan (PT)    Therapy Plan Review (PT)  evaluation/treatment results reviewed;care plan/treatment goals reviewed;risks/benefits reviewed;current/potential barriers reviewed;participants voiced agreement with care plan;participants included;patient  -AG       User Key  (r) =  Recorded By, (t) = Taken By, (c) = Cosigned By    Initials Name Provider Type    Elvia Sepulveda, PT Physical Therapist        Physical Therapy Education                 Title: PT OT SLP Therapies (Done)     Topic: Physical Therapy (Done)     Point: Mobility training (Done)     Learning Progress Summary           Patient Acceptance, E,D, NR,VU by AG at 6/14/2021 1311    Acceptance, E, VU by AM at 6/11/2021 1334    Acceptance, E, VU by AM at 6/10/2021 1610    Acceptance, E, VU by AM at 6/9/2021 1435    Acceptance, E, VU by SB at 6/8/2021 1006    Acceptance, E, NR by LE at 6/7/2021 1817    Acceptance, E, VU by TW at 6/6/2021 1602    Acceptance, E,TB, VU by MM at 6/5/2021 1227    Acceptance, E, VU by EB at 6/2/2021 0713    Acceptance, E, VU by SM at 6/2/2021 0030    Acceptance, E, VU by EB at 6/1/2021 0739    Acceptance, E, VU by SM at 5/31/2021 2242    Acceptance, E, VU by JM at 5/31/2021 0924    Acceptance, E, VU by SM at 5/31/2021 0035    Acceptance, E,TB, VU by MM at 5/28/2021 1802    Acceptance, E,TB, VU by MM at 5/27/2021 1143    Acceptance, E, VU by SM at 5/26/2021 2344    Acceptance, E,TB, VU by MM at 5/26/2021 0929    Acceptance, E,TB,D, VU,NR by AG at 5/24/2021 1249                   Point: Home exercise program (Done)     Learning Progress Summary           Patient Acceptance, E,D, NR,VU by AG at 6/14/2021 1311    Acceptance, E, VU by AM at 6/11/2021 1334    Acceptance, E, VU by AM at 6/10/2021 1610    Acceptance, E, VU by AM at 6/9/2021 1435    Acceptance, E, VU by SB at 6/8/2021 1006    Acceptance, E, NR by LE at 6/7/2021 1817    Acceptance, E, VU by TW at 6/6/2021 1602    Acceptance, E,TB, VU by MM at 6/5/2021 1227    Acceptance, E, VU by EB at 6/2/2021 0713    Acceptance, E, VU by SM at 6/2/2021 0030    Acceptance, E, VU by EB at 6/1/2021 0739    Acceptance, E, VU by SM at 5/31/2021 2242    Acceptance, E, VU by JM at 5/31/2021 0924    Acceptance, E, VU by SM at 5/31/2021 0035    Acceptance, E,TB, VU by  MM at 5/28/2021 1802    Acceptance, E,TB, VU by MM at 5/27/2021 1143    Acceptance, E, VU by SM at 5/26/2021 2344    Acceptance, E,TB, VU by MM at 5/26/2021 0929    Acceptance, E,TB,D, VU,NR by AG at 5/24/2021 1249                   Point: Body mechanics (Done)     Learning Progress Summary           Patient Acceptance, E,D, NR,VU by AG at 6/14/2021 1311    Acceptance, E, VU by AM at 6/11/2021 1334    Acceptance, E, VU by AM at 6/10/2021 1610    Acceptance, E, VU by AM at 6/9/2021 1435    Acceptance, E, VU by SB at 6/8/2021 1006    Acceptance, E, NR by LE at 6/7/2021 1817    Acceptance, E, VU by TW at 6/6/2021 1602    Acceptance, E,TB, VU by MM at 6/5/2021 1227    Acceptance, E, VU by EB at 6/2/2021 0713    Acceptance, E, VU by SM at 6/2/2021 0030    Acceptance, E, VU by EB at 6/1/2021 0739    Acceptance, E, VU by SM at 5/31/2021 2242    Acceptance, E, VU by JM at 5/31/2021 0924    Acceptance, E, VU by SM at 5/31/2021 0035    Acceptance, E,TB, VU by MM at 5/28/2021 1802    Acceptance, E,TB, VU by MM at 5/27/2021 1143    Acceptance, E, VU by SM at 5/26/2021 2344    Acceptance, E,TB, VU by MM at 5/26/2021 0929    Acceptance, E,TB,D, VU,NR by AG at 5/24/2021 1249                   Point: Precautions (Done)     Learning Progress Summary           Patient Acceptance, E,D, NR,VU by AG at 6/14/2021 1311    Acceptance, E, VU by AM at 6/11/2021 1334    Acceptance, E, VU by AM at 6/10/2021 1610    Acceptance, E, VU by AM at 6/9/2021 1435    Acceptance, E, VU by SB at 6/8/2021 1006    Acceptance, E, NR by LE at 6/7/2021 1817    Acceptance, E, VU by TW at 6/6/2021 1602    Acceptance, E,TB, VU by MM at 6/5/2021 1227    Acceptance, E, VU by EB at 6/2/2021 0713    Acceptance, E, VU by SM at 6/2/2021 0030    Acceptance, E, VU by EB at 6/1/2021 0739    Acceptance, E, VU by SM at 5/31/2021 2242    Acceptance, E, VU by JM at 5/31/2021 0924    Acceptance, E, VU by SM at 5/31/2021 0035    Acceptance, E,TB, VU by MM at 5/28/2021 1802     Acceptance, E,TB, VU by  at 5/27/2021 1143    Acceptance, E, VU by  at 5/26/2021 2344    Acceptance, E,TB, VU by  at 5/26/2021 0929    Acceptance, E,TB,D, VU,NR by  at 5/24/2021 1249                               User Key     Initials Effective Dates Name Provider Type Discipline    TW 06/16/16 -  Marifer Garcia, RN Registered Nurse Nurse    EB 06/16/16 -  Keily Godoy, RN Registered Nurse Nurse     04/03/18 -  Elvia Rousseau, PT Physical Therapist PT     07/19/18 -  Cruz Umanzor, RN Registered Nurse Nurse    MM 07/08/19 -  Esha Lee, RN Registered Nurse Nurse    BRISSA 08/13/18 -  Regina Garzon, RN Registered Nurse Nurse     09/12/18 -  Chris Das, RN Registered Nurse Nurse    SB 07/10/20 -  Marie Villela RN Registered Nurse Nurse     06/24/20 -  Cindi Valle RN Registered Nurse Nurse              PT Recommendation and Plan: continue to follow and progress toward current goals.   Anticipated Discharge Disposition (PT): inpatient rehabilitation facility, skilled nursing facility  Planned Therapy Interventions (PT): balance training, bed mobility training, home exercise program, patient/family education, postural re-education, strengthening, transfer training  Therapy Frequency (PT): other (see comments) (3-5 times/ week per priority)  Plan of Care Reviewed With: patient  Progress: no change  Outcome Summary: unable to significantly improve functional mobility at this time.  Displays sitting balance deficit which needs to be addressed as well.        Time Calculation:   PT Charges     Row Name 06/14/21 1311             Time Calculation    PT Received On  06/14/21  -      PT Goal Re-Cert Due Date  06/28/21  -        User Key  (r) = Recorded By, (t) = Taken By, (c) = Cosigned By    Initials Name Provider Type     Elvia Rousseau, PT Physical Therapist        Therapy Charges for Today     Code Description Service Date Service Provider Modifiers Qty    89239556324  PT  THER PROC EA 15 MIN 2021 Elvia Rousseau, PT GP 1    [id number]  PT THERAPEUTIC ACT EA 15 MIN 2021 Elvia Rousseau, PT GP 2               Elvia Rousseau, PT  2021      Electronically signed by Elvia Rousseau, PT at 21 1323          Occupational Therapy Notes (most recent note)      Roby Stern, OT at 21 1633          Acute Care - Occupational Therapy Treatment Note  [facility]     Patient Name: Иван Miller  : 1955  MRN: [id number]  Today's Date: 2021  Onset of Illness/Injury or Date of Surgery: 21     Referring Physician: Dr. Jimenez    Admit Date: 2021       ICD-10-CM ICD-9-CM   1. Acute respiratory failure with hypoxia and hypercapnia (CMS/HCC)  J96.01 518.81    J96.02    2. COPD with acute exacerbation (CMS/HCC)  J44.1 491.21   3. Finger infection  L08.9 686.9     Patient Active Problem List   Diagnosis   • Symptomatic bradycardia   • Acute respiratory failure with hypoxemia (CMS/HCC)   • Coronary artery disease status post multiple stents   • Non-ischemic cardiomyopathy (CMS/HCC LV ejection fraction 40 to 45%   • Chronic systolic congestive heart failure (CMS/HCC)   • Essential hypertension   • Hyperlipidemia LDL goal <70   • Insulin dependent diabetes mellitus   • Respiratory failure (CMS/HCC)   • Finger infection     Past Medical History:   Diagnosis Date   • Atrial fibrillation (CMS/HCC)    • COPD (chronic obstructive pulmonary disease) (CMS/HCC)    • Diabetes mellitus (CMS/HCC)    • GERD (gastroesophageal reflux disease)    • Hyperlipidemia    • Hypertension    • Myocardial infarction (CMS/HCC)    • Neuropathy      Past Surgical History:   Procedure Laterality Date   • ABDOMINAL SURGERY     • APPENDECTOMY     • CARDIAC CATHETERIZATION     • CORONARY STENT PLACEMENT              OT ASSESSMENT FLOWSHEET (last 12 hours)      OT Evaluation and Treatment     Row Name 21 1627                   OT Time and Intention    Document Type  therapy note (daily  note)  -KR        Mode of Treatment  occupational therapy  -KR        Patient Effort  good  -KR        Comment  Pt. remains motivated to particpate in rehabilitation to increase safety/independence needed in home environment  -KR           General Information    General Observations of Patient  alert/cooperative  -KR           Motor Skills    Coordination  fine motor deficit;left;upper extremity  -KR        Therapeutic Exercise  hand  -KR           Hand (Therapeutic Exercise)    Hand (Therapeutic Exercise)  strengthening exercise FMC for fxl feeding tasks from sitting in bed, bilateral UE  -KR        Hand Strengthening (Therapeutic Exercise)  left; strengthening;finger flexion  -KR           Self-Feeding Assessment/Training    [place] Level (Feeding)  feeding skills;liquids to mouth;standby assist  -KR           Plan of Care Review    Plan of Care Reviewed With  patient  -KR        Progress  improving  -KR           Positioning and Restraints    Pre-Treatment Position  in bed  -KR        Post Treatment Position  bed  -KR        In Bed  call light within reach;encouraged to call for assist  -KR           Therapy Assessment/Plan (OT)    Rehab Potential (OT)  good, to achieve stated therapy goals  -KR        Planned Therapy Interventions (OT)  neuromuscular control/coordination retraining;activity tolerance training;ROM/therapeutic exercise;strengthening exercise  -KR           Therapy Plan Review/Discharge Plan (OT)    Anticipated Discharge Disposition (OT)  inpatient rehabilitation facility;home  -KR          User Key  (r) = Recorded By, (t) = Taken By, (c) = Cosigned By    Initials Name Effective Dates    KR Roby Stern, OT 04/03/18 -                OT Recommendation and Plan  Planned Therapy Interventions (OT): neuromuscular control/coordination retraining, activity tolerance training, ROM/therapeutic exercise, strengthening exercise  Plan of Care Review  Plan of Care Reviewed With: patient  Progress:  improving  Plan of Care Reviewed With: patient        Time Calculation:     Therapy Charges for Today     Code Description Service Date Service Provider Modifiers Qty    83439698751 HC OT EVAL MOD COMPLEXITY 4 5/25/2021 Roby Stern OT GO 1    15515708266 HC OT SELF CARE/MGMT/TRAIN EA 15 MIN 5/26/2021 Roby Stern OT GO 1               Roby Stern OT  5/26/2021    Electronically signed by Roby Stern OT at 05/26/21 0604

## 2021-06-15 NOTE — PROGRESS NOTES
Antimicrobial Length of Therapy:    Day 7 of 7 doxycycline    Thank you.  Vianey Gonzalez, Pharm.D.  6/15/2021  14:44 EDT

## 2021-06-15 NOTE — PLAN OF CARE
Goal Outcome Evaluation:  Plan of Care Reviewed With: patient        Progress: no change  Outcome Summary: Patient has done well  today.  NS bolus given.  Has rested well in bed today.

## 2021-06-16 VITALS
WEIGHT: 297.3 LBS | HEIGHT: 69 IN | BODY MASS INDEX: 44.03 KG/M2 | DIASTOLIC BLOOD PRESSURE: 73 MMHG | RESPIRATION RATE: 20 BRPM | HEART RATE: 73 BPM | OXYGEN SATURATION: 95 % | TEMPERATURE: 98 F | SYSTOLIC BLOOD PRESSURE: 152 MMHG

## 2021-06-16 LAB
ANION GAP SERPL CALCULATED.3IONS-SCNC: 8.3 MMOL/L (ref 5–15)
BUN SERPL-MCNC: 51 MG/DL (ref 8–23)
BUN/CREAT SERPL: 34.2 (ref 7–25)
CALCIUM SPEC-SCNC: 9.2 MG/DL (ref 8.6–10.5)
CHLORIDE SERPL-SCNC: 102 MMOL/L (ref 98–107)
CO2 SERPL-SCNC: 28.7 MMOL/L (ref 22–29)
CREAT SERPL-MCNC: 1.49 MG/DL (ref 0.76–1.27)
GFR SERPL CREATININE-BSD FRML MDRD: 47 ML/MIN/1.73
GLUCOSE BLDC GLUCOMTR-MCNC: 172 MG/DL (ref 70–130)
GLUCOSE BLDC GLUCOMTR-MCNC: 188 MG/DL (ref 70–130)
GLUCOSE BLDC GLUCOMTR-MCNC: 208 MG/DL (ref 70–130)
GLUCOSE BLDC GLUCOMTR-MCNC: 220 MG/DL (ref 70–130)
GLUCOSE SERPL-MCNC: 201 MG/DL (ref 65–99)
POTASSIUM SERPL-SCNC: 4.3 MMOL/L (ref 3.5–5.2)
SODIUM SERPL-SCNC: 139 MMOL/L (ref 136–145)

## 2021-06-16 PROCEDURE — 82962 GLUCOSE BLOOD TEST: CPT

## 2021-06-16 PROCEDURE — 80048 BASIC METABOLIC PNL TOTAL CA: CPT | Performed by: INTERNAL MEDICINE

## 2021-06-16 PROCEDURE — 63710000001 INSULIN DETEMIR PER 5 UNITS: Performed by: INTERNAL MEDICINE

## 2021-06-16 PROCEDURE — 99239 HOSP IP/OBS DSCHRG MGMT >30: CPT | Performed by: INTERNAL MEDICINE

## 2021-06-16 PROCEDURE — 63710000001 INSULIN ASPART PER 5 UNITS: Performed by: INTERNAL MEDICINE

## 2021-06-16 PROCEDURE — 94660 CPAP INITIATION&MGMT: CPT

## 2021-06-16 PROCEDURE — 94799 UNLISTED PULMONARY SVC/PX: CPT

## 2021-06-16 RX ORDER — GABAPENTIN 100 MG/1
100 CAPSULE ORAL EVERY 12 HOURS SCHEDULED
Qty: 6 CAPSULE | Refills: 0 | Status: ON HOLD | OUTPATIENT
Start: 2021-06-16 | End: 2021-08-01

## 2021-06-16 RX ORDER — HYDROCODONE BITARTRATE AND ACETAMINOPHEN 5; 325 MG/1; MG/1
1 TABLET ORAL EVERY 8 HOURS PRN
Start: 2021-06-16 | End: 2021-06-16

## 2021-06-16 RX ORDER — METOPROLOL SUCCINATE 50 MG/1
150 TABLET, EXTENDED RELEASE ORAL
Status: ON HOLD
Start: 2021-06-17 | End: 2021-08-01

## 2021-06-16 RX ORDER — HYDROXYZINE HYDROCHLORIDE 25 MG/1
25 TABLET, FILM COATED ORAL 3 TIMES DAILY PRN
Status: ON HOLD
Start: 2021-06-16 | End: 2021-08-01

## 2021-06-16 RX ORDER — GABAPENTIN 100 MG/1
100 CAPSULE ORAL EVERY 12 HOURS SCHEDULED
Start: 2021-06-16 | End: 2021-06-16

## 2021-06-16 RX ORDER — CLOPIDOGREL BISULFATE 75 MG/1
75 TABLET ORAL DAILY
Qty: 30 TABLET | Status: ON HOLD
Start: 2021-06-16 | End: 2021-08-01

## 2021-06-16 RX ORDER — AMOXICILLIN 250 MG
2 CAPSULE ORAL 2 TIMES DAILY
Start: 2021-06-16

## 2021-06-16 RX ORDER — HYDROCODONE BITARTRATE AND ACETAMINOPHEN 5; 325 MG/1; MG/1
1 TABLET ORAL EVERY 8 HOURS PRN
Qty: 6 TABLET | Refills: 0 | Status: SHIPPED | OUTPATIENT
Start: 2021-06-16 | End: 2021-06-19

## 2021-06-16 RX ORDER — BUMETANIDE 2 MG/1
2 TABLET ORAL DAILY
Qty: 30 TABLET | Refills: 0 | Status: ON HOLD
Start: 2021-06-16 | End: 2021-08-01

## 2021-06-16 RX ADMIN — INSULIN ASPART 2 UNITS: 100 INJECTION, SOLUTION INTRAVENOUS; SUBCUTANEOUS at 08:36

## 2021-06-16 RX ADMIN — IPRATROPIUM BROMIDE AND ALBUTEROL SULFATE 3 ML: .5; 3 SOLUTION RESPIRATORY (INHALATION) at 13:51

## 2021-06-16 RX ADMIN — INSULIN ASPART 4 UNITS: 100 INJECTION, SOLUTION INTRAVENOUS; SUBCUTANEOUS at 11:29

## 2021-06-16 RX ADMIN — IPRATROPIUM BROMIDE AND ALBUTEROL SULFATE 3 ML: .5; 3 SOLUTION RESPIRATORY (INHALATION) at 18:57

## 2021-06-16 RX ADMIN — LIDOCAINE 2 PATCH: 50 PATCH CUTANEOUS at 08:37

## 2021-06-16 RX ADMIN — INSULIN ASPART 12 UNITS: 100 INJECTION, SOLUTION INTRAVENOUS; SUBCUTANEOUS at 08:37

## 2021-06-16 RX ADMIN — LIDOCAINE 1 PATCH: 50 PATCH CUTANEOUS at 08:37

## 2021-06-16 RX ADMIN — GABAPENTIN 100 MG: 100 CAPSULE ORAL at 20:39

## 2021-06-16 RX ADMIN — DOCUSATE SODIUM 50 MG AND SENNOSIDES 8.6 MG 2 TABLET: 8.6; 5 TABLET, FILM COATED ORAL at 20:35

## 2021-06-16 RX ADMIN — APIXABAN 5 MG: 5 TABLET, FILM COATED ORAL at 09:38

## 2021-06-16 RX ADMIN — IPRATROPIUM BROMIDE AND ALBUTEROL SULFATE 3 ML: .5; 3 SOLUTION RESPIRATORY (INHALATION) at 00:06

## 2021-06-16 RX ADMIN — SODIUM CHLORIDE, PRESERVATIVE FREE 10 ML: 5 INJECTION INTRAVENOUS at 08:38

## 2021-06-16 RX ADMIN — INSULIN DETEMIR 60 UNITS: 100 INJECTION, SOLUTION SUBCUTANEOUS at 08:39

## 2021-06-16 RX ADMIN — ATORVASTATIN CALCIUM 20 MG: 20 TABLET, FILM COATED ORAL at 08:37

## 2021-06-16 RX ADMIN — BUDESONIDE AND FORMOTEROL FUMARATE DIHYDRATE 2 PUFF: 160; 4.5 AEROSOL RESPIRATORY (INHALATION) at 06:30

## 2021-06-16 RX ADMIN — DOXYCYCLINE 100 MG: 100 CAPSULE ORAL at 09:38

## 2021-06-16 RX ADMIN — GABAPENTIN 100 MG: 100 CAPSULE ORAL at 08:37

## 2021-06-16 RX ADMIN — HYDROXYZINE HYDROCHLORIDE 25 MG: 25 TABLET, FILM COATED ORAL at 18:09

## 2021-06-16 RX ADMIN — INSULIN ASPART 4 UNITS: 100 INJECTION, SOLUTION INTRAVENOUS; SUBCUTANEOUS at 17:27

## 2021-06-16 RX ADMIN — APIXABAN 5 MG: 5 TABLET, FILM COATED ORAL at 20:35

## 2021-06-16 RX ADMIN — HYDROCODONE BITARTRATE AND ACETAMINOPHEN 1 TABLET: 5; 325 TABLET ORAL at 05:32

## 2021-06-16 RX ADMIN — INSULIN ASPART 12 UNITS: 100 INJECTION, SOLUTION INTRAVENOUS; SUBCUTANEOUS at 11:30

## 2021-06-16 RX ADMIN — INSULIN ASPART 12 UNITS: 100 INJECTION, SOLUTION INTRAVENOUS; SUBCUTANEOUS at 17:27

## 2021-06-16 RX ADMIN — CLOPIDOGREL 75 MG: 75 TABLET, FILM COATED ORAL at 17:27

## 2021-06-16 RX ADMIN — BUDESONIDE AND FORMOTEROL FUMARATE DIHYDRATE 2 PUFF: 160; 4.5 AEROSOL RESPIRATORY (INHALATION) at 18:57

## 2021-06-16 RX ADMIN — HYDROXYZINE HYDROCHLORIDE 25 MG: 25 TABLET, FILM COATED ORAL at 10:07

## 2021-06-16 RX ADMIN — TAMSULOSIN HYDROCHLORIDE 0.4 MG: 0.4 CAPSULE ORAL at 20:35

## 2021-06-16 RX ADMIN — PANTOPRAZOLE SODIUM 40 MG: 40 TABLET, DELAYED RELEASE ORAL at 05:29

## 2021-06-16 RX ADMIN — FLUTICASONE PROPIONATE 2 SPRAY: 50 SPRAY, METERED NASAL at 11:29

## 2021-06-16 RX ADMIN — METOPROLOL SUCCINATE 150 MG: 50 TABLET, EXTENDED RELEASE ORAL at 09:38

## 2021-06-16 RX ADMIN — IPRATROPIUM BROMIDE AND ALBUTEROL SULFATE 3 ML: .5; 3 SOLUTION RESPIRATORY (INHALATION) at 06:30

## 2021-06-16 NOTE — CASE MANAGEMENT/SOCIAL WORK
Discharge Planning Assessment   Arthur     Patient Name: Иван Miller  MRN: [id number]  Today's Date: 6/16/2021    Admit Date: 5/21/2021    Discharge Plan     Row Name 06/16/21 1326       Plan    Final Discharge Disposition Code  03 - skilled nursing facility (SNF)    Final Note Pt is being discharged to [facility] today. SS notified [facility] per Harish [phone] and pt has a bed available. SS faxed clinicals to [facility] [phone]. SS spoke to pt's daughter, Annette at [phone] and she voices agreement for pt to be discharged to [facility] today. Pt's dtr voices agreement to take pt's C-Pap machine to the nursing home today. SS completed EMS PCS form and will arrange EMS when pt is ready for transport. SS to follow.        Continued Care and Services - Admitted Since 5/21/2021     Destination Coordination complete    Service Provider Request Status Selected Services Address Phone Fax Patient Preferred    [facility]   Selected Skilled Nursing [address] [facility] [address] [phone] --    [facility]  Declined  Current or History of Substance Abuse N/A [address] [facility] [id number] [phone] --    [facility]  Declined  Unable to Meet Patient Needs N/A [address] [facility] [place] [id number] [phone] --    [facility]  Declined  out of network with pt's insurance N/A [address] [place] [address] [phone] --    [facility]  Declined  Current or History of Substance Abuse N/A [address] [place] [address] [phone] --    [facility]  Declined  No Bed Available N/A [address] [place] [address] [phone] --              ANISH Gustafson    
Discharge Planning Assessment   George     Patient Name: Иван Miller  MRN: [id number]  Today's Date: 6/4/2021    Admit Date: 5/21/2021    Discharge Needs Assessment    No documentation.       Discharge Plan     Row Name 06/04/21 0915       Plan    Plan SS spoke with [facility] meli Martin who states Administration continues to review referral. SS to follow.    1052am: SS spoke with [facility] per Veronica they are unable to accept pt. SS faxed referral to [facility] per Erin to review at fax [phone]. SS to follow.     1320pm: SS followed up with [facility] per Erin who states her clinical team is still reviewing. SS to follow.    1457pm: SS spoke with pt on this date about discharge planning. Pt remains agreeable for short term NHP. SS spoke with [facility] per Erin who states clinical team is still reviewing. SS to follow.     1533pm: SS spoke with [facility] per Erin who states she will start pre-authorization with insurance. SS to follow.         Elvia Pelayo    
Discharge Planning Assessment   George     Patient Name: Иван Miller  MRN: [id number]  Today's Date: 6/16/2021    Admit Date: 5/21/2021    Discharge Needs Assessment    No documentation.       Discharge Plan     Row Name 06/16/21 1532       Plan    Final Note SS contacted [facility] per Chente and scheduled transport to [facility].    Row Name 06/16/21 1326       Plan    Final Discharge Disposition Code  03 - skilled nursing facility (SNF)    Final Note  Pt is being discharged to [facility] today. SS notified [facility] per Harish [phone] and pt has a bed available. SS faxed clinicals to [facility] [phone]. SS spoke to pt's daughter, Annette at [phone] and she voices agreement for pt to be discharged to [facility] today. Pt's dtr voices agreement to take pt's C-Pap machine to the nursing home today. SS completed EMS PCS form and will arrange EMS when pt is ready for transport. SS to follow.          Elvia Pelayo    
Discharge Planning Assessment   George     Patient Name: Иван Miller  MRN: [id number]  Today's Date: 6/14/2021    Admit Date: 5/21/2021    Discharge Plan     Row Name 06/14/21 1417       Plan    Plan Pt was transferred to [address] [place] [address] from [facility]. SS contacted [facility] per Harish [phone] who states she has pre-auth from pt's insurance and pt can be admitted on Tuesday, 6/15/21. [facility] request pt to bring his C-Pap machine from home. SS spoke to pt and pt continues to be agreeable to SNF placement at [facility] and is agreeable to take his home C-Pap machine. SS made Harish at [facility] aware. SS to follow.        Continued Care and Services - Admitted Since 5/21/2021     Destination Coordination complete    Service Provider Request Status Selected Services Address Phone Fax Patient Preferred    [facility]   Selected Skilled Nursing [address] [facility] [address] [phone] --    [facility]  Declined  Current or History of Substance Abuse N/A [address] [place] [id number] [phone] --    [facility]  Declined  Unable to Meet Patient Needs N/A [address] [place] [address] [phone] --    [facility]  Declined  out of network with pt's insurance N/A [address] , [place] [address] [phone] --    [facility]  Declined  Current or History of Substance Abuse N/A [address] , [place] [address] [phone] --    [facility]  Declined  No Bed Available N/A [address], [place] [address] [phone] --              ANISH Gustafson    
Discharge Planning Assessment   George     Patient Name: Иван Miller  MRN: [id number]  Today's Date: 6/1/2021    Admit Date: 5/21/2021        Discharge Plan     Row Name 06/01/21 1649       Plan    Plan  SS noted inpatient rehab received denial from pt's insurance on pt admit.  SS spoke with pt on this date who is agreeable for short term nursing home placement for rehab.  SS made referral to [facility] who denied pt admit. SS faxed pt information to [facility] for review.  SS will follow.          IRASEMA RiceW    
Discharge Planning Assessment  BAKARI Vazquez     Patient Name: Иван Miller  MRN: [id number]  Today's Date: 6/3/2021    Admit Date: 5/21/2021    Discharge Needs Assessment    No documentation.       Discharge Plan     Row Name 06/03/21 1245       Plan    Plan SS faxed referral to [facility] meli Martin to review. SS to follow.    1533pm: SS spoke with [facility] per Veronica who states Administration is reviewing referral. SS to follow.         Elvia Pelayo    
Discharge Planning Assessment  [facility]     Patient Name: Иван Miller  MRN: [id number]  Today's Date: 6/3/2021    Admit Date: 5/21/2021        Discharge Plan     Row Name 06/03/21 0847       Plan    Plan  [facility] and [facility] are not in network with pt's insurance.  SS will follow.        Continued Care and Services - Admitted Since 5/21/2021     Destination     Service Provider Request Status Selected Services Address Phone Fax Patient Preferred    [facility]  Pending - Request Sent N/A [address] [facility] [address] [phone] --    [facility]  Pending - Request Sent N/A [address] [facility] [address] [phone] --    [facility]  Declined  Current or History of Substance Abuse N/A [address] [facility] [address] [phone] --    [facility]  Declined  Unable to Meet Patient Needs N/A [address] [facility] [address] [phone] --                KELLY Rice    
Discharge Planning Assessment  [facility]     Patient Name: Иван Miller  MRN: [id number]  Today's Date: 6/2/2021    Admit Date: 5/21/2021        Discharge Plan     Row Name 06/02/21 1726       Plan    Plan  SS faxed pt information to [facility] and [facility] for review.  SS will follow.        Continued Care and Services - Admitted Since 5/21/2021     Destination     Service Provider Request Status Selected Services Address Phone Fax Patient Preferred    [facility]  Pending - Request Sent N/A [address] [facility] [address] [phone] --    [facility]  Pending - Request Sent N/A [address] [facility] [address] [phone] --    [facility]  Declined  Current or History of Substance Abuse N/A [address] [facility] [address] [phone] --    [facility]  Declined  Unable to Meet Patient Needs N/A [address] [facility] [address] [phone] --              KELLY Rice    
Discharge Planning Assessment  [facility]     Patient Name: Иван Miller  MRN: [id number]  Today's Date: 6/15/2021    Admit Date: 5/21/2021    Discharge Plan     Row Name 06/15/21 1355       Plan    Plan SS discussed pt with Dr. Neri and pt is not medically stable on this date for discharge. SS notified [facility] per Harish who states pre-auth from insurance will still be available for admission on Wednesday, 6/16/21. [facility] requested updated therapy notes. SS faxed updated therapy notes to [facility]. SS to follow.        Continued Care and Services - Admitted Since 5/21/2021     Destination Coordination complete    Service Provider Request Status Selected Services Address Phone Fax Patient Preferred    [facility]   Selected Skilled Nursing [address] [facility] [address] [phone] --    [facility]  Declined  Current or History of Substance Abuse N/A [address] [place] [address] [phone] --    [facility]  Declined  Unable to Meet Patient Needs N/A [address] [place] [address] [phone] --    [facility]  Declined  out of network with pt's insurance N/A [address] [place] [address] [phone] --    [facility]  Declined  Current or History of Substance Abuse N/A [address] [place] [address] [phone] --    [facility]  Declined  No Bed Available N/A [address] [place] [address] [phone] --              ANISH Gustafson    
Discharge Planning Assessment  [facility]     Patient Name: Иван Miller  MRN: [id number]  Today's Date: 6/3/2021    Admit Date: 5/21/2021      Discharge Plan     Row Name 06/03/21 1214       Plan    Plan  SS spoke with pt's daughter, Annette, at [phone] on this date with pt's permission who stated she is agreeable to pt placement and wants notified of progress.  [facility] and [facility] have denied pt admit.  [facility] are not in network with pt's insurance.  SS will continue to attempt placement.        Continued Care and Services - Admitted Since 5/21/2021     Destination     Service Provider Request Status Selected Services Address Phone Fax Patient Preferred    [facility]  Pending - Request Sent N/A [address] [place] [id number] [phone] --    [facility]  Declined  Current or History of Substance Abuse N/A [address] [facility] [address] [phone] --    [facility]  Declined  Unable to Meet Patient Needs N/A [address] [facility] [place] [address] [phone] --    [facility]  Declined  Current or History of Substance Abuse N/A [address] [facility] [address] [phone] --                IRASEMA RiceW    
Principal Discharge DX:	Hypertension

## 2021-06-16 NOTE — PLAN OF CARE
Goal Outcome Evaluation:           Progress: no change  Outcome Summary: Patient is being discharged today, no acute changes

## 2021-06-16 NOTE — DISCHARGE SUMMARY
[facility]    Patient Identification:  Name:  Иван Miller  Age:  65 y.o.  Sex:  male  :  1955  MRN:  [id number]  Visit Number:  [id number]    Date of Admission: 2021  Date of Discharge:  2021     PCP: Rosi Thomas APRN    DISCHARGE DIAGNOSIS  *Acute on chronic hypoxic/hypercapnic respiratory failure, currently on nasal cannula 5 L/min, off BiPAP, on night CPAP, back to baseline  *deuritic induced NICKY, improved  *History of toxic encephalopathy with methamphetamine abuse, resolved  *Lethargic, suspected opioid induced with gabapentin, resolved  *COPD exacerbation  *PAF  *Acute on chronic decompensated diastolic heart failure  *Essential hypertension, stage I  *DM type II, uncontrolled with diabetic neuropathy, better in house control  *RIO  *Reported history of chronic osteomyelitis, MRI reviewed not showing evidence of acute inflammatory changes, per ID no evidence of acute bony/cartilage infection, needs fu with ortho in OPC  *CAP       HOSPITAL COURSE  Patient is a 65 y.o. male presented to [facility] complaining of SOB.  Please see the admitting history and physical for further details.  Found to have acute on chronic hypoxic/hypercapnic respiratory failure from COPD exacerbation.  Patient also has avulsion fracture of right ankle from recent fall at home.  PT/OT has been consulted as well as orthopedic surgery although I suspect they will simply place him in a brace/cast.  He had the fracture noted however his  strength on the left is very poor, he was complaining of some worsened weakness in his legs and his left arm after his fall.  Discussion with neurosurgery Dr. Villaseñor in [facility] revelaed no recc for acute intervention.  The cervical spine MRI was a very poor study could not tell a lot about that however there was a question of osteomyelitis on the thoracic spine.  Case was discussed with the neurosurgeon however with the patient's  repeatedly normal pro calcitonin and CRP he feels like this is most likely burned-out osteomyelitis and not acute, which was the same opinion of the infectious disease. PT was placed on Doxycyclin for the CAP.  Will be restarted on PO bumex     VITAL SIGNS:  Temp:  [97.8 °F (36.6 °C)-98.5 °F (36.9 °C)] 97.9 °F (36.6 °C)  Heart Rate:  [] 84  Resp:  [16-20] 18  BP: (105-132)/(60-78) 125/60  SpO2:  [92 %-99 %] 92 %  on  Flow (L/min):  [3-5] 3;   Device (Oxygen Therapy): humidified;nasal cannula    Body mass index is 43.9 kg/m².  Wt Readings from Last 3 Encounters:   06/16/21 135 kg (297 lb 4.8 oz)   11/25/19 127 kg (280 lb)   10/06/19 124 kg (273 lb 11.2 oz)       PHYSICAL EXAM:  Constitutional: Morbidly obese, stable respiratory status, no distress  HENT:  Head:  Normocephalic and atraumatic.  Mouth:  Moist mucous membranes.    Eyes:  Conjunctivae and EOM are normal.  Pupils are equal, round, and reactive to light.  No scleral icterus.    Neck:  Neck supple. No thyromegaly.  No JVD present.    Cardiovascular:  Regular rate and rhythm with no murmurs, rubs, clicks or gallops appreciated.  Pulmonary/Chest:   Bilateral crackles.  Abdominal:  Soft. Nontender. Nondistended  Bowel sounds are normal in all four quadrants. No organomegally appreciated.   Neurological:   Awake and alert, moving his 4 extremities freely   skin:  Warm and dry to palpation with no rashes or lesions appreciated.  Peripheral vascular:  2+ radial and pedal pulses in bilateral upper and lower extremities.    DISCHARGE DISPOSITION   Stable    DISCHARGE MEDICATIONS:     Discharge Medications      New Medications      Instructions Start Date   bumetanide 2 MG tablet  Commonly known as: BUMEX   2 mg, Oral, Daily      clopidogrel 75 MG tablet  Commonly known as: PLAVIX   75 mg, Oral, Daily      gabapentin 100 MG capsule  Commonly known as: NEURONTIN  Replaces: gabapentin 600 MG tablet   100 mg, Oral, Every 12 Hours Scheduled       HYDROcodone-acetaminophen 5-325 MG per tablet  Commonly known as: NORCO   1 tablet, Oral, Every 8 Hours PRN      hydrOXYzine 25 MG tablet  Commonly known as: ATARAX   25 mg, Oral, 3 Times Daily PRN      insulin aspart 100 UNIT/ML injection  Commonly known as: novoLOG   12 Units, Subcutaneous, 3 Times Daily With Meals      insulin detemir 100 UNIT/ML injection  Commonly known as: LEVEMIR   60 Units, Subcutaneous, Daily   Start Date: June 17, 2021     metoprolol succinate XL 50 MG 24 hr tablet  Commonly known as: TOPROL-XL   150 mg, Oral, Every 24 Hours Scheduled   Start Date: June 17, 2021     sennosides-docusate 8.6-50 MG per tablet  Commonly known as: PERICOLACE   2 tablets, Oral, 2 Times Daily         Continue These Medications      Instructions Start Date   albuterol sulfate  (90 Base) MCG/ACT inhaler  Commonly known as: PROVENTIL HFA;VENTOLIN HFA;PROAIR HFA   2 puffs, Inhalation, Every 6 Hours PRN      apixaban 5 MG tablet tablet  Commonly known as: ELIQUIS   5 mg, Oral, 2 Times Daily      atorvastatin 20 MG tablet  Commonly known as: LIPITOR   20 mg, Oral, Daily      Budeson-Glycopyrrol-Formoterol 160-9-4.8 MCG/ACT aerosol inhaler  Commonly known as: BREZTRI   2 puffs, Inhalation, 2 Times Daily      Farxiga 10 MG tablet  Generic drug: Dapagliflozin Propanediol   10 mg, Oral, Daily      fluticasone 50 MCG/ACT nasal spray  Commonly known as: FLONASE   2 sprays, Nasal, Daily      ipratropium-albuterol 0.5-2.5 mg/3 ml nebulizer  Commonly known as: DUO-NEB   3 mL, Nebulization, Every 4 Hours PRN      lidocaine 5 %  Commonly known as: LIDODERM   1 patch, Transdermal, Every 24 Hours, Prior to [facility] Admission, Patient was on:  Place on Shoulder or back and Remove & Discard patch within 12 hours or as directed by MD       magnesium oxide 400 MG tablet  Commonly known as: MAG-OX   400 mg, Oral, Daily      metFORMIN 1000 MG tablet  Commonly known as: GLUCOPHAGE   1,000 mg, Oral, 2 Times Daily With Meals       pantoprazole 40 MG EC tablet  Commonly known as: PROTONIX   40 mg, Oral, Daily      tamsulosin 0.4 MG capsule 24 hr capsule  Commonly known as: FLOMAX   1 capsule, Oral, Nightly         Stop These Medications    amoxicillin-clavulanate 875-125 MG per tablet  Commonly known as: AUGMENTIN     carvedilol 25 MG tablet  Commonly known as: COREG     furosemide 40 MG tablet  Commonly known as: LASIX     gabapentin 600 MG tablet  Commonly known as: NEURONTIN  Replaced by: gabapentin 100 MG capsule     HumaLOG KwikPen 100 UNIT/ML solution pen-injector  Generic drug: Insulin Lispro (1 Unit Dial)     sildenafil 20 MG tablet  Commonly known as: REVATIO            Diet Instructions     Diet: Consistent Carbohydrate, Cardiac      Discharge Diet:  Consistent Carbohydrate  Cardiac           Activity Instructions     Activity as Tolerated          No future appointments.    Additional Instructions for the Follow-ups that You Need to Schedule     Discharge Follow-up with PCP   As directed       Currently Documented PCP:    Rosi Thomas APRN    PCP Phone Number:    [phone]     Follow Up Details: PCP in a week            Contact information for follow-up providers     Roby Corbin MD Follow up in 1 week(s).    Specialty: Orthopedic Surgery  Contact information:  [address] [facility] [address] [phone]             Rosi Thomas APRN .    Specialty: Family Medicine  Why: PCP in a week  Contact information:  [address] [facility] [address] [phone]                   Contact information for after-discharge care     Destination     [facility] .    Service: Skilled Nursing  Contact information:  [address] [facility] [address] [phone]                              TEST  RESULTS PENDING AT DISCHARGE       CODE STATUS  Code Status and Medical Interventions:   Ordered at: 05/22/21 0057     Code Status:    CPR     Medical Interventions (Level of Support Prior to  Arrest):    Full       Jagdeep Neri MD  06/16/21  13:02 EDT    Please note that this discharge summary required more than 30 minutes to complete.    Please send a copy of this dictation to the following providers:  Rosi Thomas APRN

## 2021-06-16 NOTE — DISCHARGE PLACEMENT REQUEST
"Иван Blanco (65 y.o. Male)     Date of Birth Social Security Number Address Home Phone MRN    1955  [address]  [place] [address] [phone] [id number]    [employer] Marital Status          None        Admission Date Admission Type Admitting Provider Attending Provider Department, Room/Bed    5/21/21 Emergency Dequan Connelly MD Baibars, Motaz, MD [address], 3336/1P    Discharge Date Discharge Disposition Discharge Destination         Rehab Facility or Unit (DC - External)              Attending Provider: Jagdeep Neri MD    Allergies: Codeine, Ciprofloxacin    Isolation: Droplet   Infection: Rhinovirus  (06/06/21), MRSA No Isolation this Admit (06/09/21)   Code Status: CPR    Ht: 175.3 cm (69\")   Wt: 135 kg (297 lb 4.8 oz)    Admission Cmt: None   Principal Problem: Respiratory failure (CMS/HCC) [J96.90]                 Active Insurance as of 5/21/2021     Primary Coverage     Payor Plan Insurance Group Employer/Plan Group    UNITED [employer] MEDICARE REPLACEMENT UNITED [facility] DUAL COMPLETE MEDICARE REPLACEMENT [id number]     Payor Plan Address Payor Plan Phone Number Payor Plan Fax Number Effective Dates    [address] [phone]  1/1/2021 - None Entered    [facility] [id number]       Subscriber Name Subscriber Birth Date Member ID       ИВАН BLANCO 1955 [id number]           Secondary Coverage     Payor Plan Insurance Group Employer/Plan Group    [facility] MEDICAID      Payor Plan Address Payor Plan Phone Number Payor Plan Fax Number Effective Dates    [address] [address] [phone]  10/4/2019 - None Entered    [facility] [id number]       Subscriber Name Subscriber Birth Date Member ID       ИВАН BLANCO 1955 [id number]                 Emergency Contacts      (Rel.) Home Phone Work Phone Mobile Phone    PaulLamar garrison (Spouse) [phone] -- [phone]    GARCIA MALDONADO (Daughter) [phone] -- [phone]              "           Current Facility-Administered Medications   Medication Dose Route Frequency Provider Last Rate Last Admin   • albuterol (PROVENTIL) nebulizer solution 0.083% 2.5 mg/3mL  2.5 mg Nebulization Q6H PRN Joo Jimenez DO       • apixaban (ELIQUIS) tablet 5 mg  5 mg Oral Q12H Joo Jimenez DO   5 mg at 06/16/21 0938   • atorvastatin (LIPITOR) tablet 20 mg  20 mg Oral Daily Joo Jimenez DO   20 mg at 06/16/21 0837   • benzonatate (TESSALON) capsule 100 mg  100 mg Oral TID PRN Vivienne Flores DO   100 mg at 06/10/21 0838   • budesonide-formoterol (SYMBICORT) 160-4.5 MCG/ACT inhaler 2 puff  2 puff Inhalation BID - RT Joo Jimenez DO   2 puff at 06/16/21 0630   • calcium carbonate (TUMS) chewable tablet 500 mg (200 mg elemental)  2 tablet Oral TID PRN Nora Gomez PA-C   2 tablet at 06/14/21 2159   • clopidogrel (PLAVIX) tablet 75 mg  75 mg Oral Daily Ryan Gorman MD   75 mg at 06/15/21 0818   • dextrose (D50W) 25 g/ 50mL Intravenous Solution 25 g  25 g Intravenous Q15 Min PRN Vivienne Flores, DO       • dextrose (D50W) 25 g/ 50mL Intravenous Solution 25 g  25 g Intravenous Q15 Min PRN Nora Gomze PA-C       • dextrose (GLUTOSE) oral gel 15 g  15 g Oral Q15 Min PRN Vivienne Flores DO       • dextrose (GLUTOSE) oral gel 15 g  15 g Oral Q15 Min PRN Nora Gomez PA-C       • fluticasone (FLONASE) 50 MCG/ACT nasal spray 2 spray  2 spray Nasal Daily Joo Jimenez DO   2 spray at 06/16/21 1129   • gabapentin (NEURONTIN) capsule 100 mg  100 mg Oral Q12H Jagdeep Neri MD   100 mg at 06/16/21 0837   • glucagon (human recombinant) (GLUCAGEN DIAGNOSTIC) injection 1 mg  1 mg Subcutaneous Q15 Min PRN Vivienne Flores DO       • glucagon (human recombinant) (GLUCAGEN DIAGNOSTIC) injection 1 mg  1 mg Subcutaneous Q15 Min PRN Nora Gomez PA-C       • hydrALAZINE (APRESOLINE) injection 10 mg  10 mg Intravenous  Q6H PRN Nora Gomez PA-C   10 mg at 06/12/21 1055   • HYDROcodone-acetaminophen (NORCO) 5-325 MG per tablet 1 tablet  1 tablet Oral Q8H PRN Jagdeep Neri MD   1 tablet at 06/16/21 0532   • hydrOXYzine (ATARAX) tablet 25 mg  25 mg Oral TID PRN Nora Gomez PA-C   25 mg at 06/16/21 1007   • insulin aspart (novoLOG) injection 0-9 Units  0-9 Units Subcutaneous TID AC Jagdeep Neri MD   4 Units at 06/16/21 1129   • insulin aspart (novoLOG) injection 12 Units  12 Units Subcutaneous TID With Meals Jagdeep Neri MD   12 Units at 06/16/21 1130   • insulin detemir (LEVEMIR) injection 60 Units  60 Units Subcutaneous Daily Jagdeep Neri MD   60 Units at 06/16/21 0839   • ipratropium (ATROVENT) nebulizer solution 0.5 mg  0.5 mg Nebulization Q6H PRN Joo Jimenez DO       • ipratropium-albuterol (DUO-NEB) nebulizer solution 3 mL  3 mL Nebulization Q4H PRN Joo Jimenez DO       • ipratropium-albuterol (DUO-NEB) nebulizer solution 3 mL  3 mL Nebulization Q6H - RT Nora Gomez PA-C   3 mL at 06/16/21 0630   • lidocaine (LIDODERM) 5 % 1 patch  1 patch Transdermal Q24H Hailey Pete PA-C   1 patch at 06/16/21 0837   • lidocaine (LIDODERM) 5 % 2 patch  2 patch Transdermal Q24H Nora Gomez PA-C   2 patch at 06/16/21 0837   • melatonin tablet 5 mg  5 mg Oral Nightly PRN Nora Gomez PA-C   5 mg at 06/12/21 2359   • metoprolol succinate XL (TOPROL-XL) 24 hr tablet 150 mg  150 mg Oral Q24H Jagdeep Neri MD   150 mg at 06/16/21 0938   • nitroglycerin (NITROSTAT) SL tablet 0.4 mg  0.4 mg Sublingual Q5 Min PRN Vivienne Flores DO       • pantoprazole (PROTONIX) EC tablet 40 mg  40 mg Oral Q AM Nora Gomez PA-C   40 mg at 06/16/21 0529   • polyethylene glycol (MIRALAX) packet 17 g  17 g Oral Daily Nora Gomez PA-C   17 g at 06/14/21 0838   • sennosides-docusate (PERICOLACE) 8.6-50 MG per tablet 1 tablet  1 tablet Oral Nightly PRN  Nora Gomez PA-C   1 tablet at 21 0541   • sennosides-docusate (PERICOLACE) 8.6-50 MG per tablet 2 tablet  2 tablet Oral BID Jagdeep Neri MD   2 tablet at 21 2154   • sodium chloride 0.9 % flush 10 mL  10 mL Intravenous PRN Vivienne Flores DO       • sodium chloride 0.9 % flush 10 mL  10 mL Intravenous Q12H Vivienne Flores DO   10 mL at 21 0838   • sodium chloride 0.9 % flush 10 mL  10 mL Intravenous PRN Vivienne Flores DO       • sodium chloride 0.9 % flush 10 mL  10 mL Intravenous Q12H Joo Jimenez DO   10 mL at 21 0838   • sodium chloride 0.9 % flush 10 mL  10 mL Intravenous PRN Joo Jimenez DO       • tamsulosin (FLOMAX) 24 hr capsule 0.4 mg  0.4 mg Oral Nightly Joo Jimenez DO   0.4 mg at 06/15/21 2005        Physician Progress Notes (most recent note)      Jagdeep Neri MD at 06/15/21 1646              [facility]     Patient Identification:  Name:  Иван Miller  Age:  65 y.o.  Sex:  male  :  1955  MRN:  [id number]  Visit Number:  [id number]  Primary Care Provider:  Rosi Thomas APRN    Length of stay:  24    Chief complaint:   Back pain    Subjective:    Patient seen and examined, denied any active cardiopulmonary complaints.    ----------------------------------------------------------------------------------------------------------------------  Current Hospital Meds:  apixaban, 5 mg, Oral, Q12H  atorvastatin, 20 mg, Oral, Daily  budesonide-formoterol, 2 puff, Inhalation, BID - RT  clopidogrel, 75 mg, Oral, Daily  doxycycline, 100 mg, Oral, Q12H  fluticasone, 2 spray, Nasal, Daily  gabapentin, 100 mg, Oral, Q12H  insulin aspart, 0-9 Units, Subcutaneous, TID AC  insulin aspart, 12 Units, Subcutaneous, TID With Meals  insulin detemir, 60 Units, Subcutaneous, Daily  ipratropium-albuterol, 3 mL, Nebulization, Q6H - RT  lidocaine, 1 patch, Transdermal, Q24H  lidocaine, 2  patch, Transdermal, Q24H  metoprolol succinate XL, 150 mg, Oral, Q24H  pantoprazole, 40 mg, Oral, Q AM  polyethylene glycol, 17 g, Oral, Daily  senna-docusate sodium, 2 tablet, Oral, BID  sodium chloride, 10 mL, Intravenous, Q12H  sodium chloride, 10 mL, Intravenous, Q12H  tamsulosin, 0.4 mg, Oral, Nightly         ----------------------------------------------------------------------------------------------------------------------  Vital Signs:  Temp:  [97.5 °F (36.4 °C)-98.3 °F (36.8 °C)] 97.7 °F (36.5 °C)  Heart Rate:  [72-96] 81  Resp:  [18-20] 18  BP: ()/(51-80) 116/76      06/13/21  0500 06/14/21  0500 06/15/21  0522   Weight: (!) 140 kg (309 lb 11.2 oz) 135 kg (297 lb 8 oz) (!) 136 kg (300 lb 11.2 oz)     Body mass index is 44.41 kg/m².    Intake/Output Summary (Last 24 hours) at 6/15/2021 1646  Last data filed at 6/15/2021 1500  Gross per 24 hour   Intake 1340 ml   Output 925 ml   Net 415 ml     Diet Regular; Cardiac, Consistent Carbohydrate  ----------------------------------------------------------------------------------------------------------------------  Physical exam:  Constitutional: Morbidly obese, stable respiratory status, no distress  HENT:  Head:  Normocephalic and atraumatic.  Mouth:  Moist mucous membranes.    Eyes:  Conjunctivae and EOM are normal.  Pupils are equal, round, and reactive to light.  No scleral icterus.    Neck:  Neck supple. No thyromegaly.  No JVD present.    Cardiovascular:  Regular rate and rhythm with no murmurs, rubs, clicks or gallops appreciated.  Pulmonary/Chest:   Bilateral crackles.  Abdominal:  Soft. Nontender. Nondistended  Bowel sounds are normal in all four quadrants. No organomegally appreciated.   Neurological:   Awake and alert, moving his 4 extremities freely   skin:  Warm and dry to palpation with no rashes or lesions appreciated.  Peripheral vascular:  2+ radial and pedal pulses in bilateral upper and lower  extremities.      ----------------------------------------------------------------------------------------------------------------------      Results from last 7 days   Lab Units 06/14/21  0302 06/13/21  0300 06/12/21  0501 06/10/21  0133 06/09/21  0048   CRP mg/dL 0.84* 1.04* 1.47* 4.58* 9.45*   WBC 10*3/mm3  --   --  6.16 6.12 8.90   HEMOGLOBIN g/dL  --   --  11.3* 10.2* 10.7*   HEMATOCRIT %  --   --  39.4 35.9* 37.6   MCV fL  --   --  84.2 87.3 85.8   MCHC g/dL  --   --  28.7* 28.4* 28.5*   PLATELETS 10*3/mm3  --   --  256 228 230     Results from last 7 days   Lab Units 06/10/21  2215   PH, ARTERIAL pH units 7.361   PO2 ART mm Hg 70.0*   PCO2, ARTERIAL mm Hg 67.2*   HCO3 ART mmol/L 38.0*     Results from last 7 days   Lab Units 06/15/21  0314 06/14/21 2125 06/14/21  0850 06/13/21  0300 06/10/21  0133   SODIUM mmol/L 138  --  137 138 138   POTASSIUM mmol/L 4.4 3.9 4.5 3.8 4.3   MAGNESIUM mg/dL  --  2.7*  --   --   --    CHLORIDE mmol/L 99  --  95* 97* 100   CO2 mmol/L 28.3  --  30.7* 29.5* 32.2*   BUN mg/dL 67*  --  49* 21 20   CREATININE mg/dL 2.39*  --  2.02* 0.94 0.95   EGFR IF NONAFRICN AM mL/min/1.73 27*  --  33* 80 79   CALCIUM mg/dL 9.2  --  9.5 10.1 9.3   GLUCOSE mg/dL 214*  --  203* 179* 168*   ALBUMIN g/dL  --   --   --   --  2.97*   BILIRUBIN mg/dL  --   --   --   --  0.3   ALK PHOS U/L  --   --   --   --  69   AST (SGOT) U/L  --   --   --   --  12   ALT (SGPT) U/L  --   --   --   --  12   Estimated Creatinine Clearance: 41.6 mL/min (A) (by C-G formula based on SCr of 2.39 mg/dL (H)).    No results found for: AMMONIA      No results found for: BLOODCX  No results found for: URINECX  No results found for: WOUNDCX  No results found for: STOOLCX    I have personally looked at the labs and they are summarized above.  ----------------------------------------------------------------------------------------------------------------------  Imaging Results (Last 24 Hours)     ** No results found for the last 24  hours. **        ----------------------------------------------------------------------------------------------------------------------      Assessment and Plan:    *Acute on chronic hypoxic/hypercapnic respiratory failure, currently on nasal cannula 5 L/min, off BiPAP, on night CPAP  *deuritic induced NICKY, worsening today  *History of toxic encephalopathy with methamphetamine abuse  *Lethargic, suspected opioid induced with gabapentin, resolved  *COPD exacerbation  *PAF  *Acute on chronic decompensated diastolic heart failure  *Essential hypertension, stage I  *DM type II, uncontrolled with diabetic neuropathy, better in house control  *RIO  *Reported history of chronic osteomyelitis, MRI reviewed not showing evidence of acute inflammatory changes, per ID no evidence of acute bony/cartilage infection  *CAP    --Hold Bumex / aldactone / linsopril, bolus with 1 L, liberate diet  --BMP in am  --Cont gabapentin 100 mg p.o. twice a day  --Cont hydrocodone 5 mg p.o. every 8 hours for severe pain  --Continue p.o. doxycycline   --Nocturnal CPAP, will need official polysomnogram in the outpatient setting  --Cont Levemir 60 units / aspart 12 units 3 times daily, continue insulin sliding scale, improved blood sugar  --Toprol-XL to 150 mg a day, continue apixaban  --Planning to rehab DC, when kidney function stabilized      Jagdeep Neri MD  06/15/21  16:46 EDT    Electronically signed by Jagdeep Neri MD at 06/15/21 1648          Physical Therapy Notes (most recent note)      Elvia Rousseau, PT at 21 1322  Version 1 of 1         Acute Care - Physical Therapy Progress Note  BAKARI Vazquez     Patient Name: Иван Miller  : 1955  MRN: [id number]  Today's Date: 2021   Onset of Illness/Injury or Date of Surgery: 21       PT Assessment (last 12 hours)      PT Evaluation and Treatment     Row Name 21 1312          Physical Therapy Time and Intention    Subjective Information  complains of;weakness  -AG      Document Type  progress note/recertification  -AG     Mode of Treatment  physical therapy  -AG     Patient Effort  adequate  -AG     Symptoms Noted During/After Treatment  fatigue  -AG     Row Name 06/14/21 1312          General Information    Patient Profile Reviewed  yes  -     Row Name 06/14/21 1312          Cognition    Affect/Mental Status (Cognitive)  WFL  -     Orientation Status (Cognition)  oriented x 3  -AG     Follows Commands (Cognition)  follows one-step commands;verbal cues/prompting required  -AG     Personal Safety Interventions  fall prevention program maintained;gait belt;nonskid shoes/slippers when out of bed;supervised activity  -AG     Row Name 06/14/21 1312          Pain    Additional Documentation  Pain Scale: FACES Pre/Post-Treatment (Group)  -     Row Name 06/14/21 1312          Pain Scale: FACES Pre/Post-Treatment    Pain: FACES Scale, Pretreatment  0-->no hurt  -AG     Posttreatment Pain Rating  0-->no hurt  -AG     Row Name 06/14/21 1312          Bed Mobility    Bed Mobility  rolling left;scooting/bridging;supine-sit;sit-supine  -AG     Rolling Left [place] (Bed Mobility)  verbal cues;nonverbal cues (demo/gesture);minimum assist (75% patient effort)  -AG     Scooting/Bridging [place] (Bed Mobility)  verbal cues;nonverbal cues (demo/gesture);minimum assist (75% patient effort)  -AG     Supine-Sit [place] (Bed Mobility)  verbal cues;nonverbal cues (demo/gesture);minimum assist (75% patient effort)  -AG     Sit-Supine [place] (Bed Mobility)  verbal cues;nonverbal cues (demo/gesture);moderate assist (50% patient effort)  -AG     Bed Mobility, Safety Issues  decreased use of arms for pushing/pulling;decreased use of legs for bridging/pushing  -     Assistive Device (Bed Mobility)  bed rails;draw sheet  -     Row Name 06/14/21 1312          Transfers    Transfers  sit-stand transfer;stand-sit transfer  -     Sit-Stand [place] (Transfers)  verbal  cues;nonverbal cues (demo/gesture);maximum assist (25% patient effort)  -     Stand-Sit [employer] (Transfers)  verbal cues;nonverbal cues (demo/gesture);maximum assist (25% patient effort)  -     Row Name 06/14/21 1312          Sit-Stand Transfer    Assistive Device (Sit-Stand Transfers)  walker, front-wheeled  -     Row Name 06/14/21 1312          Stand-Sit Transfer    Assistive Device (Stand-Sit Transfers)  walker, front-wheeled  -[facility] Name 06/14/21 1312          Gait/Stairs (Locomotion)    Comment (Gait/Stairs)  unable to perform at this time d/t LE weakness  -     Row Name 06/14/21 1312          Safety Issues, Functional Mobility    Impairments Affecting Function (Mobility)  balance;coordination;endurance/activity tolerance;motor control;postural/trunk control;range of motion (ROM);strength  -[facility] Name 06/14/21 1312          Balance    Balance Assessment  sitting static balance;sitting dynamic balance;standing static balance  -     Static Sitting Balance  WFL;supported;sitting, edge of bed  -     Dynamic Sitting Balance  mild impairment;moderate impairment;unsupported;sitting, edge of bed  -     Static Standing Balance  severe impairment;supported;standing  -     Comment, Balance  performed sit<>stand x 3 trials at bedside with RW.   Pt. able to partially stand to get hips off bed, however, unable to perform trunk, hip, knee extension adequately for even semi-erect posture.  Pt. able to perform brief partial stand for scooting.   -[facility] Name 06/14/21 1312          Motor Skills    Therapeutic Exercise  hip;knee;ankle  -AG     Row Name 06/14/21 1312          Hip (Therapeutic Exercise)    Hip (Therapeutic Exercise)  strengthening exercise  -     Hip Strengthening (Therapeutic Exercise)  bilateral;flexion;marching while seated;sitting;10 repetitions  -[facility] Name 06/14/21 1312          Knee (Therapeutic Exercise)    Knee (Therapeutic Exercise)  strengthening exercise  -      Knee Strengthening (Therapeutic Exercise)  bilateral;flexion;extension;marching while seated;LAQ (long arc quad);sitting;10 repetitions;2 sets  -AG     Row Name 06/14/21 1312          Ankle (Therapeutic Exercise)    Ankle (Therapeutic Exercise)  strengthening exercise  -AG     Ankle Strengthening (Therapeutic Exercise)  bilateral;dorsiflexion;sitting;10 repetitions;2 sets  -AG     Row Name 06/14/21 1312          Coping    Observed Emotional State  calm;cooperative  -AG     Verbalized Emotional State  acceptance  -AG     Family/Support Persons  family  -AG     Involvement in Care  not present at bedside  -AG     Row Name 06/14/21 1312          Plan of Care Review    Plan of Care Reviewed With  patient  -AG     Progress  no change  -AG     Outcome Summary  unable to significantly improve functional mobility at this time.  Displays sitting balance deficit which needs to be addressed as well.   -AG     Row Name 06/14/21 1312          Positioning and Restraints    Pre-Treatment Position  in bed  -AG     Post Treatment Position  bed  -AG     In Bed  supine;call light within reach;encouraged to call for assist;side rails up x3  -AG     Row Name 06/14/21 1312          Therapy Plan Review/Discharge Plan (PT)    Therapy Plan Review (PT)  evaluation/treatment results reviewed;care plan/treatment goals reviewed;risks/benefits reviewed;current/potential barriers reviewed;participants voiced agreement with care plan;participants included;patient  -AG       User Key  (r) = Recorded By, (t) = Taken By, (c) = Cosigned By    Initials Name Provider Type    AG Elvia Rousseau, PT Physical Therapist        Physical Therapy Education                 Title: PT OT SLP Therapies (Done)     Topic: Physical Therapy (Done)     Point: Mobility training (Done)     Learning Progress Summary           Patient Acceptance, E,D, NR,VU by AG at 6/14/2021 1311    Acceptance, E, VU by AM at 6/11/2021 1334    Acceptance, E, VU by AM at 6/10/2021 1610     Acceptance, E, VU by AM at 6/9/2021 1435    Acceptance, E, VU by SB at 6/8/2021 1006    Acceptance, E, NR by LE at 6/7/2021 1817    Acceptance, E, VU by TW at 6/6/2021 1602    Acceptance, E,TB, VU by MM at 6/5/2021 1227    Acceptance, E, VU by EB at 6/2/2021 0713    Acceptance, E, VU by SM at 6/2/2021 0030    Acceptance, E, VU by EB at 6/1/2021 0739    Acceptance, E, VU by SM at 5/31/2021 2242    Acceptance, E, VU by JM at 5/31/2021 0924    Acceptance, E, VU by SM at 5/31/2021 0035    Acceptance, E,TB, VU by MM at 5/28/2021 1802    Acceptance, E,TB, VU by MM at 5/27/2021 1143    Acceptance, E, VU by SM at 5/26/2021 2344    Acceptance, E,TB, VU by MM at 5/26/2021 0929    Acceptance, E,TB,D, VU,NR by AG at 5/24/2021 1249                   Point: Home exercise program (Done)     Learning Progress Summary           Patient Acceptance, E,D, NR,VU by AG at 6/14/2021 1311    Acceptance, E, VU by AM at 6/11/2021 1334    Acceptance, E, VU by AM at 6/10/2021 1610    Acceptance, E, VU by AM at 6/9/2021 1435    Acceptance, E, VU by SB at 6/8/2021 1006    Acceptance, E, NR by LE at 6/7/2021 1817    Acceptance, E, VU by TW at 6/6/2021 1602    Acceptance, E,TB, VU by MM at 6/5/2021 1227    Acceptance, E, VU by EB at 6/2/2021 0713    Acceptance, E, VU by SM at 6/2/2021 0030    Acceptance, E, VU by EB at 6/1/2021 0739    Acceptance, E, VU by SM at 5/31/2021 2242    Acceptance, E, VU by JM at 5/31/2021 0924    Acceptance, E, VU by SM at 5/31/2021 0035    Acceptance, E,TB, VU by MM at 5/28/2021 1802    Acceptance, E,TB, VU by MM at 5/27/2021 1143    Acceptance, E, VU by SM at 5/26/2021 2344    Acceptance, E,TB, VU by MM at 5/26/2021 0929    Acceptance, E,TB,D, VU,NR by AG at 5/24/2021 1249                   Point: Body mechanics (Done)     Learning Progress Summary           Patient Acceptance, E,D, NR,VU by AG at 6/14/2021 1311    Acceptance, E, VU by AM at 6/11/2021 1334    Acceptance, E, VU by AM at 6/10/2021 1610    Acceptance,  E, VU by AM at 6/9/2021 1435    Acceptance, E, VU by SB at 6/8/2021 1006    Acceptance, E, NR by LE at 6/7/2021 1817    Acceptance, E, VU by TW at 6/6/2021 1602    Acceptance, E,TB, VU by MM at 6/5/2021 1227    Acceptance, E, VU by EB at 6/2/2021 0713    Acceptance, E, VU by SM at 6/2/2021 0030    Acceptance, E, VU by EB at 6/1/2021 0739    Acceptance, E, VU by SM at 5/31/2021 2242    Acceptance, E, VU by JM at 5/31/2021 0924    Acceptance, E, VU by SM at 5/31/2021 0035    Acceptance, E,TB, VU by MM at 5/28/2021 1802    Acceptance, E,TB, VU by MM at 5/27/2021 1143    Acceptance, E, VU by SM at 5/26/2021 2344    Acceptance, E,TB, VU by MM at 5/26/2021 0929    Acceptance, E,TB,D, VU,NR by AG at 5/24/2021 1249                   Point: Precautions (Done)     Learning Progress Summary           Patient Acceptance, E,D, NR,VU by AG at 6/14/2021 1311    Acceptance, E, VU by AM at 6/11/2021 1334    Acceptance, E, VU by AM at 6/10/2021 1610    Acceptance, E, VU by AM at 6/9/2021 1435    Acceptance, E, VU by SB at 6/8/2021 1006    Acceptance, E, NR by LE at 6/7/2021 1817    Acceptance, E, VU by TW at 6/6/2021 1602    Acceptance, E,TB, VU by MM at 6/5/2021 1227    Acceptance, E, VU by EB at 6/2/2021 0713    Acceptance, E, VU by SM at 6/2/2021 0030    Acceptance, E, VU by EB at 6/1/2021 0739    Acceptance, E, VU by SM at 5/31/2021 2242    Acceptance, E, VU by JM at 5/31/2021 0924    Acceptance, E, VU by SM at 5/31/2021 0035    Acceptance, E,TB, VU by MM at 5/28/2021 1802    Acceptance, E,TB, VU by MM at 5/27/2021 1143    Acceptance, E, VU by  at 5/26/2021 2344    Acceptance, E,TB, VU by MM at 5/26/2021 0929    Acceptance, E,TB,D, VU,NR by  at 5/24/2021 1249                               User Key     Initials Effective Dates Name Provider Type Discipline     06/16/16 -  Marifer Garcia, RN Registered Nurse Nurse    EB 06/16/16 -  Keily Godoy, RN Registered Nurse Nurse     04/03/18 -  Elvia Rousseau, PT  Physical Therapist PT    JM 18 -  Cruz Umanzor, RN Registered Nurse Nurse    MM 19 -  Esha Lee, RN Registered Nurse Nurse    BRISSA 18 -  Regina Garzon, RN Registered Nurse Nurse    ISIDRO 18 -  Chris Das, RN Registered Nurse Nurse    SB 07/10/20 -  Marie Villela, RN Registered Nurse Nurse    AM 20 -  Cindi Valle, RN Registered Nurse Nurse              PT Recommendation and Plan: continue to follow and progress toward current goals.   Anticipated Discharge Disposition (PT): inpatient rehabilitation facility, skilled nursing facility  Planned Therapy Interventions (PT): balance training, bed mobility training, home exercise program, patient/family education, postural re-education, strengthening, transfer training  Therapy Frequency (PT): other (see comments) (3-5 times/ week per priority)  Plan of Care Reviewed With: patient  Progress: no change  Outcome Summary: unable to significantly improve functional mobility at this time.  Displays sitting balance deficit which needs to be addressed as well.        Time Calculation:   PT Charges     Row Name 21 1311             Time Calculation    PT Received On  21  -      PT Goal Re-Cert Due Date  21  -        User Key  (r) = Recorded By, (t) = Taken By, (c) = Cosigned By    Initials Name Provider Type     Elvia Rousseau, PT Physical Therapist        Therapy Charges for Today     Code Description Service Date Service Provider Modifiers Qty    03241598496 HC PT THER PROC EA 15 MIN 2021 Elvia Rousseau, PT GP 1    56211043294 HC PT THERAPEUTIC ACT EA 15 MIN 2021 Elvia Rousseau, PT GP 2               Elvia Rousseau PT  2021      Electronically signed by Elvia Rousseau, PT at 21 1323          Occupational Therapy Notes (most recent note)      Roby Stern, OT at 21 1633          Acute Care - Occupational Therapy Treatment Note  BAKARI Vazquez     Patient Name: Иван Miller  : 1955  MRN:  [id number]  Today's Date: 5/26/2021  Onset of Illness/Injury or Date of Surgery: 05/21/21     Referring Physician: Dr. Jimenez    Admit Date: 5/21/2021       ICD-10-CM ICD-9-CM   1. Acute respiratory failure with hypoxia and hypercapnia (CMS/HCC)  J96.01 518.81    J96.02    2. COPD with acute exacerbation (CMS/HCC)  J44.1 491.21   3. Finger infection  L08.9 686.9     Patient Active Problem List   Diagnosis   • Symptomatic bradycardia   • Acute respiratory failure with hypoxemia (CMS/HCC)   • Coronary artery disease status post multiple stents   • Non-ischemic cardiomyopathy (CMS/HCC LV ejection fraction 40 to 45%   • Chronic systolic congestive heart failure (CMS/HCC)   • Essential hypertension   • Hyperlipidemia LDL goal <70   • Insulin dependent diabetes mellitus   • Respiratory failure (CMS/HCC)   • Finger infection     Past Medical History:   Diagnosis Date   • Atrial fibrillation (CMS/HCC)    • COPD (chronic obstructive pulmonary disease) (CMS/HCC)    • Diabetes mellitus (CMS/HCC)    • GERD (gastroesophageal reflux disease)    • Hyperlipidemia    • Hypertension    • Myocardial infarction (CMS/HCC)    • Neuropathy      Past Surgical History:   Procedure Laterality Date   • ABDOMINAL SURGERY     • APPENDECTOMY     • CARDIAC CATHETERIZATION     • CORONARY STENT PLACEMENT              OT ASSESSMENT FLOWSHEET (last 12 hours)      OT Evaluation and Treatment     Row Name 05/26/21 1627                   OT Time and Intention    Document Type  therapy note (daily note)  -KR        Mode of Treatment  occupational therapy  -KR        Patient Effort  good  -KR        Comment  Pt. remains motivated to particpate in rehabilitation to increase safety/independence needed in home environment  -KR           General Information    General Observations of Patient  alert/cooperative  -KR           Motor Skills    Coordination  fine motor deficit;left;upper extremity  -KR        Therapeutic Exercise  hand  -KR           Hand  (Therapeutic Exercise)    Hand (Therapeutic Exercise)  strengthening exercise [facility] for fxl feeding tasks from sitting in bed, bilateral UE  -KR        Hand Strengthening (Therapeutic Exercise)  left; strengthening;finger flexion  -KR           Self-Feeding Assessment/Training    [place] Level (Feeding)  feeding skills;liquids to mouth;standby assist  -KR           Plan of Care Review    Plan of Care Reviewed With  patient  -KR        Progress  improving  -KR           Positioning and Restraints    Pre-Treatment Position  in bed  -KR        Post Treatment Position  bed  -KR        In Bed  call light within reach;encouraged to call for assist  -KR           Therapy Assessment/Plan (OT)    Rehab Potential (OT)  good, to achieve stated therapy goals  -KR        Planned Therapy Interventions (OT)  neuromuscular control/coordination retraining;activity tolerance training;ROM/therapeutic exercise;strengthening exercise  -KR           Therapy Plan Review/Discharge Plan (OT)    Anticipated Discharge Disposition (OT)  inpatient rehabilitation facility;home  -KR          User Key  (r) = Recorded By, (t) = Taken By, (c) = Cosigned By    Initials Name Effective Dates     Roby Stern OT 04/03/18 -                OT Recommendation and Plan  Planned Therapy Interventions (OT): neuromuscular control/coordination retraining, activity tolerance training, ROM/therapeutic exercise, strengthening exercise  Plan of Care Review  Plan of Care Reviewed With: patient  Progress: improving  Plan of Care Reviewed With: patient        Time Calculation:     Therapy Charges for Today     Code Description Service Date Service Provider Modifiers Qty    52964741290  OT EVAL MOD COMPLEXITY 4 5/25/2021 Roby Stern OT GO 1    95103024590  OT SELF CARE/MGMT/TRAIN EA 15 MIN 5/26/2021 Roby Stern OT GO 1               Roby Stern OT  5/26/2021    Electronically signed by Roby Stern OT at 05/26/21 1633       ADL Documentation  (most recent)      Most Recent Value   Transferring  4 - completely dependent   Toileting  3 - assistive equipment and person   Bathing  2 - assistive person   Dressing  2 - assistive person   Eating  0 - independent   Communication  0 - understands/communicates without difficulty   Swallowing  0 - swallows foods/liquids without difficulty   Equipment Currently Used at Home  -- [owns W/C, SW, O2,  reports he uses none of these. ]             Discharge Summary      Jagdeep Neri MD at 21 1302              [facility]    Patient Identification:  Name:  Иван Miller  Age:  65 y.o.  Sex:  male  :  1955  MRN:  [id number]  Visit Number:  [id number]    Date of Admission: 2021  Date of Discharge:  2021     PCP: Rosi Thomas APRN    DISCHARGE DIAGNOSIS  *Acute on chronic hypoxic/hypercapnic respiratory failure, currently on nasal cannula 5 L/min, off BiPAP, on night CPAP, back to baseline  *deuritic induced NICKY, improved  *History of toxic encephalopathy with methamphetamine abuse, resolved  *Lethargic, suspected opioid induced with gabapentin, resolved  *COPD exacerbation  *PAF  *Acute on chronic decompensated diastolic heart failure  *Essential hypertension, stage I  *DM type II, uncontrolled with diabetic neuropathy, better in house control  *RIO  *Reported history of chronic osteomyelitis, MRI reviewed not showing evidence of acute inflammatory changes, per ID no evidence of acute bony/cartilage infection, needs fu with ortho in OPC  *CAP       HOSPITAL COURSE  Patient is a 65 y.o. male presented to [facility] complaining of SOB.  Please see the admitting history and physical for further details.  Found to have acute on chronic hypoxic/hypercapnic respiratory failure from COPD exacerbation.  Patient also has avulsion fracture of right ankle from recent fall at home.  PT/OT has been consulted as well as orthopedic surgery although I suspect they will  simply place him in a brace/cast.  He had the fracture noted however his  strength on the left is very poor, he was complaining of some worsened weakness in his legs and his left arm after his fall.  Discussion with neurosurgery Dr. Villaseñor in [place] revelaed no recc for acute intervention.  The cervical spine MRI was a very poor study could not tell a lot about that however there was a question of osteomyelitis on the thoracic spine.  Case was discussed with the neurosurgeon however with the patient's repeatedly normal pro calcitonin and CRP he feels like this is most likely burned-out osteomyelitis and not acute, which was the same opinion of the infectious disease. PT was placed on Doxycyclin for the CAP.  Will be restarted on PO bumex     VITAL SIGNS:  Temp:  [97.8 °F (36.6 °C)-98.5 °F (36.9 °C)] 97.9 °F (36.6 °C)  Heart Rate:  [] 84  Resp:  [16-20] 18  BP: (105-132)/(60-78) 125/60  SpO2:  [92 %-99 %] 92 %  on  Flow (L/min):  [3-5] 3;   Device (Oxygen Therapy): humidified;nasal cannula    Body mass index is 43.9 kg/m².  Wt Readings from Last 3 Encounters:   06/16/21 135 kg (297 lb 4.8 oz)   11/25/19 127 kg (280 lb)   10/06/19 124 kg (273 lb 11.2 oz)       PHYSICAL EXAM:  Constitutional: Morbidly obese, stable respiratory status, no distress  HENT:  Head:  Normocephalic and atraumatic.  Mouth:  Moist mucous membranes.    Eyes:  Conjunctivae and EOM are normal.  Pupils are equal, round, and reactive to light.  No scleral icterus.    Neck:  Neck supple. No thyromegaly.  No JVD present.    Cardiovascular:  Regular rate and rhythm with no murmurs, rubs, clicks or gallops appreciated.  Pulmonary/Chest:   Bilateral crackles.  Abdominal:  Soft. Nontender. Nondistended  Bowel sounds are normal in all four quadrants. No organomegally appreciated.   Neurological:   Awake and alert, moving his 4 extremities freely   skin:  Warm and dry to palpation with no rashes or lesions appreciated.  Peripheral vascular:  2+  radial and pedal pulses in bilateral upper and lower extremities.    DISCHARGE DISPOSITION   Stable    DISCHARGE MEDICATIONS:     Discharge Medications      New Medications      Instructions Start Date   bumetanide 2 MG tablet  Commonly known as: BUMEX   2 mg, Oral, Daily      clopidogrel 75 MG tablet  Commonly known as: PLAVIX   75 mg, Oral, Daily      gabapentin 100 MG capsule  Commonly known as: NEURONTIN  Replaces: gabapentin 600 MG tablet   100 mg, Oral, Every 12 Hours Scheduled      HYDROcodone-acetaminophen 5-325 MG per tablet  Commonly known as: NORCO   1 tablet, Oral, Every 8 Hours PRN      hydrOXYzine 25 MG tablet  Commonly known as: ATARAX   25 mg, Oral, 3 Times Daily PRN      insulin aspart 100 UNIT/ML injection  Commonly known as: novoLOG   12 Units, Subcutaneous, 3 Times Daily With Meals      insulin detemir 100 UNIT/ML injection  Commonly known as: LEVEMIR   60 Units, Subcutaneous, Daily   Start Date: June 17, 2021     metoprolol succinate XL 50 MG 24 hr tablet  Commonly known as: TOPROL-XL   150 mg, Oral, Every 24 Hours Scheduled   Start Date: June 17, 2021     sennosides-docusate 8.6-50 MG per tablet  Commonly known as: PERICOLACE   2 tablets, Oral, 2 Times Daily         Continue These Medications      Instructions Start Date   albuterol sulfate  (90 Base) MCG/ACT inhaler  Commonly known as: PROVENTIL HFA;VENTOLIN HFA;PROAIR HFA   2 puffs, Inhalation, Every 6 Hours PRN      apixaban 5 MG tablet tablet  Commonly known as: ELIQUIS   5 mg, Oral, 2 Times Daily      atorvastatin 20 MG tablet  Commonly known as: LIPITOR   20 mg, Oral, Daily      Budeson-Glycopyrrol-Formoterol 160-9-4.8 MCG/ACT aerosol inhaler  Commonly known as: BREZTRI   2 puffs, Inhalation, 2 Times Daily      Farxiga 10 MG tablet  Generic drug: Dapagliflozin Propanediol   10 mg, Oral, Daily      fluticasone 50 MCG/ACT nasal spray  Commonly known as: FLONASE   2 sprays, Nasal, Daily      ipratropium-albuterol 0.5-2.5 mg/3 ml  nebulizer  Commonly known as: DUO-NEB   3 mL, Nebulization, Every 4 Hours PRN      lidocaine 5 %  Commonly known as: LIDODERM   1 patch, Transdermal, Every 24 Hours, Prior to [facility] Admission, Patient was on:  Place on Shoulder or back and Remove & Discard patch within 12 hours or as directed by MD       magnesium oxide 400 MG tablet  Commonly known as: MAG-OX   400 mg, Oral, Daily      metFORMIN 1000 MG tablet  Commonly known as: GLUCOPHAGE   1,000 mg, Oral, 2 Times Daily With Meals      pantoprazole 40 MG EC tablet  Commonly known as: PROTONIX   40 mg, Oral, Daily      tamsulosin 0.4 MG capsule 24 hr capsule  Commonly known as: FLOMAX   1 capsule, Oral, Nightly         Stop These Medications    amoxicillin-clavulanate 875-125 MG per tablet  Commonly known as: AUGMENTIN     carvedilol 25 MG tablet  Commonly known as: COREG     furosemide 40 MG tablet  Commonly known as: LASIX     gabapentin 600 MG tablet  Commonly known as: NEURONTIN  Replaced by: gabapentin 100 MG capsule     HumaLOG KwikPen 100 UNIT/ML solution pen-injector  Generic drug: Insulin Lispro (1 Unit Dial)     sildenafil 20 MG tablet  Commonly known as: REVATIO            Diet Instructions     Diet: Consistent Carbohydrate, Cardiac      Discharge Diet:  Consistent Carbohydrate  Cardiac           Activity Instructions     Activity as Tolerated          No future appointments.    Additional Instructions for the Follow-ups that You Need to Schedule     Discharge Follow-up with PCP   As directed       Currently Documented PCP:    Rosi Thomas APRN    PCP Phone Number:    [phone]     Follow Up Details: PCP in a week            Contact information for follow-up providers     Roby Corbin MD Follow up in 1 week(s).    Specialty: Orthopedic Surgery  Contact information:  [address] [place] [address] [phone]             Rosi Thomas APRN .    Specialty: Family Medicine  Why: PCP in a week  Contact information:  [address]  [facility] [address]  [phone]                   Contact information for after-discharge care     Destination     [facility] .    Service: Skilled Nursing  Contact information:  [address] [facility] [id number] [phone]                              TEST  RESULTS PENDING AT DISCHARGE       CODE STATUS  Code Status and Medical Interventions:   Ordered at: 05/22/21 0057     Code Status:    CPR     Medical Interventions (Level of Support Prior to Arrest):    Full       Jagdeep Neri MD  06/16/21  13:02 EDT    Please note that this discharge summary required more than 30 minutes to complete.    Please send a copy of this dictation to the following providers:  Rosi Thomas APRN      Electronically signed by Jagdeep Neri MD at 06/16/21 1312       Discharge Order (From admission, onward)     Start     Ordered    06/16/21 1257  Discharge patient  Once     Expected Discharge Date: 06/16/21    Discharge Disposition: Rehab Facility or Unit (DC - External)    Physician of Record for Attribution - Please select from Treatment Team: JAGDEEP NERI [[id number]]    Review needed by CMO to determine Physician of Record: No       Question Answer Comment   Physician of Record for Attribution - Please select from Treatment Team JAGDEEP NERI    Review needed by CMO to determine Physician of Record No        06/16/21 2284

## 2021-06-16 NOTE — PLAN OF CARE
Goal Outcome Evaluation:              Outcome Summary: Patient has rested well this shift, VSS, no complaints or request at this time, will continue to monitor.

## 2021-06-16 NOTE — PAYOR COMM NOTE
"CONTACT:  VIRGINIE CHO MSN, APRN  UTILIZATION MANAGEMENT DEPT.  [facility]  [address] [facility], [address]  PHONE:  [phone]  FAX: [phone]    CLINICAL UPDATE    REFER TO AUTH # [id number]    Иван Blanco (65 y.o. Male)     Date of Birth Social Security Number Address Home Phone MRN    1955  [address]  [place] [address] [phone] [id number]    Rastafarian Marital Status          None        Admission Date Admission Type Admitting Provider Attending Provider Department, Room/Bed    5/21/21 Emergency Dequan Connelly MD Baibars, Motaz, MD [facility] [address] [id number]/    Discharge Date Discharge Disposition Discharge Destination                       Attending Provider: Jagdeep Neri MD    Allergies: Codeine, Ciprofloxacin    Isolation: Droplet   Infection: Rhinovirus  (06/06/21), MRSA No Isolation this Admit (06/09/21)   Code Status: CPR    Ht: 175.3 cm (69\")   Wt: 135 kg (297 lb 4.8 oz)    Admission Cmt: None   Principal Problem: Respiratory failure (CMS/HCC) [J96.90]                 Active Insurance as of 5/21/2021     Primary Coverage     Payor Plan Insurance Group Employer/Plan Group    [facility] MEDICARE REPLACEMENT [facility] DUAL COMPLETE MEDICARE REPLACEMENT [id number]     Payor Plan Address Payor Plan Phone Number Payor Plan Fax Number Effective Dates    [address] [phone]  1/1/2021 - None Entered    [facility] [id number]       Subscriber Name Subscriber Birth Date Member ID       ИВАН BLANCO 1955 [id number]           Secondary Coverage     Payor Plan Insurance Group Employer/Plan Group    Replaced by [facility] MEDICAID      Payor Plan Address Payor Plan Phone Number Payor Plan Fax Number Effective Dates    [address] [address] [phone]  10/4/2019 - None Entered    [facility] [id number]       Subscriber Name Subscriber Birth Date Member ID       ИВАН BLANCO 1955 [id number]                 Emergency Contacts      (Rel.) " Home Phone Work Phone Mobile Phone    Lamar Miller (Spouse) [phone] -- [phone]    GARCIA MALDONADO (Daughter) [phone] -- [phone]              Vital Signs (last 2 days)     Date/Time   Temp   Temp src   Pulse   Resp   BP   Patient Position   SpO2    06/16/21 0415   --   --   111   16   --   --   99    06/16/21 0300   98.5 (36.9)   Oral   82   19   132/78   Lying   --    06/16/21 0013   --   --   88   17   --   --   99    06/16/21 0006   --   --   86   20   --   --   97    06/15/21 2300   97.8 (36.6)   Oral   92   18   105/62   Lying   --    06/15/21 1900   98.3 (36.8)   Oral   86   20   115/71   Lying   94    06/15/21 1837   --   --   96   20   --   --   97    06/15/21 1828   --   --   90   20   --   --   96    06/15/21 1400   --   --   81   18   116/76   Lying   93    06/15/21 1342   --   --   81   20   --   --   99    06/15/21 1333   --   --   87   20   --   --   98    06/15/21 0726   --   --   81   20   --   --   94    06/15/21 0712   --   --   96   20   --   --   96    06/15/21 0632   97.7 (36.5)   Oral   85   18   91/51   Lying   95    06/15/21 0400   --   --   72   18   --   --   95    06/15/21 0300   98.3 (36.8)   Oral   78   20   96/58   Lying   --    06/15/21 0102   --   --   73   --   104/69   Lying   --    06/15/21 0036   --   --   88   20   --   --   95    06/15/21 0026   --   --   84   20   --   --   94    06/14/21 2300   98.3 (36.8)   Oral   77   18   100/61   Lying   --    06/14/21 1837   97.5 (36.4)   Oral   80   20   140/80   Lying   96    06/14/21 1827   --   --   78   20   --   --   94    06/14/21 1443   --   --   70   18   113/74   Lying   --    06/14/21 1404   --   --   71   18   --   --   93    06/14/21 1354   --   --   76   20   --   --   (!) 88    06/14/21 1018   98.1 (36.7)   Oral   79   18   100/61   Lying   --    06/14/21 0706   --   --   67   20   --   --   96    06/14/21 0626   97.9 (36.6)   Oral   65   18   99/59   Lying   --    06/14/21 0327   --   --   --   --    92/62   Lying   --    06/14/21 0300   98.4 (36.9)   Oral   59   18   (!) 84/59   --   --    06/14/21 0250   --   --   --   --   (!) 88/64   Lying   --    06/14/21 0231   98.3 (36.8)   Oral   67   18   (!) 84/54   --   --    06/14/21 0056   --   --   72   20   --   --   97    06/14/21 0045   --   --   69   20   --   --   95            Intake & Output (last 2 days)       06/14 0701 - 06/15 0700 06/15 0701 - 06/16 0700    P.O. 720 1080    I.V. (mL/kg) 500 (3.7) 1115 (8.3)    Total Intake(mL/kg) 1220 (9) 2195 (16.3)    Urine (mL/kg/hr) 200 (0.1) 925 (0.3)    Stool 0 0    Total Output 200 925    Net +1020 +1270          Urine Unmeasured Occurrence 2 x 1 x    Stool Unmeasured Occurrence 6 x 0 x        Current Facility-Administered Medications   Medication Dose Route Frequency Provider Last Rate Last Admin   • albuterol (PROVENTIL) nebulizer solution 0.083% 2.5 mg/3mL  2.5 mg Nebulization Q6H PRN Joo Jimenez DO       • apixaban (ELIQUIS) tablet 5 mg  5 mg Oral Q12H Joo Jimenez DO   5 mg at 06/15/21 2005   • atorvastatin (LIPITOR) tablet 20 mg  20 mg Oral Daily Joo Jimenez DO   20 mg at 06/15/21 0818   • benzonatate (TESSALON) capsule 100 mg  100 mg Oral TID PRN Vivienne Flores DO   100 mg at 06/10/21 0838   • budesonide-formoterol (SYMBICORT) 160-4.5 MCG/ACT inhaler 2 puff  2 puff Inhalation BID - RT Joo iJmenez DO   2 puff at 06/15/21 1828   • calcium carbonate (TUMS) chewable tablet 500 mg (200 mg elemental)  2 tablet Oral TID PRN Nora Gomez PA-C   2 tablet at 06/14/21 2159   • clopidogrel (PLAVIX) tablet 75 mg  75 mg Oral Daily Ryan Gorman MD   75 mg at 06/15/21 0818   • dextrose (D50W) 25 g/ 50mL Intravenous Solution 25 g  25 g Intravenous Q15 Min PRN Vivienne Flores DO       • dextrose (D50W) 25 g/ 50mL Intravenous Solution 25 g  25 g Intravenous Q15 Min PRN Nora Gomez PA-C       • dextrose (GLUTOSE) oral gel 15 g  15 g Oral  Q15 Min PRN Vivienne Flores DO       • dextrose (GLUTOSE) oral gel 15 g  15 g Oral Q15 Min PRN Nora Gomez PA-C       • doxycycline (MONODOX) capsule 100 mg  100 mg Oral Q12H Jagdeep Neri MD   100 mg at 06/15/21 2005   • fluticasone (FLONASE) 50 MCG/ACT nasal spray 2 spray  2 spray Nasal Daily Joo Jimenez DO   2 spray at 06/15/21 0819   • gabapentin (NEURONTIN) capsule 100 mg  100 mg Oral Q12H Jagdeep Neri MD   100 mg at 06/15/21 2004   • glucagon (human recombinant) (GLUCAGEN DIAGNOSTIC) injection 1 mg  1 mg Subcutaneous Q15 Min PRN Vivienne Flores DO       • glucagon (human recombinant) (GLUCAGEN DIAGNOSTIC) injection 1 mg  1 mg Subcutaneous Q15 Min PRN Nora Gomez PA-C       • hydrALAZINE (APRESOLINE) injection 10 mg  10 mg Intravenous Q6H PRN Nora Gomez PA-C   10 mg at 06/12/21 1055   • HYDROcodone-acetaminophen (NORCO) 5-325 MG per tablet 1 tablet  1 tablet Oral Q8H PRN Jagdeep Neri MD   1 tablet at 06/16/21 0532   • hydrOXYzine (ATARAX) tablet 25 mg  25 mg Oral TID PRN Nora Gomez PA-C   25 mg at 06/14/21 1611   • insulin aspart (novoLOG) injection 0-9 Units  0-9 Units Subcutaneous TID AC Jagdeep Neri MD   6 Units at 06/15/21 1056   • insulin aspart (novoLOG) injection 12 Units  12 Units Subcutaneous TID With Meals Jagdeep Neri MD   12 Units at 06/15/21 1056   • insulin detemir (LEVEMIR) injection 60 Units  60 Units Subcutaneous Daily Jagdeep Neri MD   60 Units at 06/15/21 0816   • ipratropium (ATROVENT) nebulizer solution 0.5 mg  0.5 mg Nebulization Q6H PRN Joo Jimenez DO       • ipratropium-albuterol (DUO-NEB) nebulizer solution 3 mL  3 mL Nebulization Q4H PRN Joo Jimenez DO       • ipratropium-albuterol (DUO-NEB) nebulizer solution 3 mL  3 mL Nebulization Q6H - RT Nora Gomez PA-C   3 mL at 06/16/21 0006   • lidocaine (LIDODERM) 5 % 1 patch  1 patch Transdermal Q24H Hailey Pete,  HEMANTH   1 patch at 06/15/21 0819   • lidocaine (LIDODERM) 5 % 2 patch  2 patch Transdermal Q24H Nora Gomez PA-C   2 patch at 06/15/21 0817   • melatonin tablet 5 mg  5 mg Oral Nightly PRN Nora Gomez PA-C   5 mg at 06/12/21 2359   • metoprolol succinate XL (TOPROL-XL) 24 hr tablet 150 mg  150 mg Oral Q24H Jagdeep Neri MD   150 mg at 06/13/21 0859   • nitroglycerin (NITROSTAT) SL tablet 0.4 mg  0.4 mg Sublingual Q5 Min PRN Vivienne Flores DO       • pantoprazole (PROTONIX) EC tablet 40 mg  40 mg Oral Q AM Nora Gomez PA-C   40 mg at 06/16/21 0529   • polyethylene glycol (MIRALAX) packet 17 g  17 g Oral Daily Nora Gomez PA-C   17 g at 06/14/21 0838   • sennosides-docusate (PERICOLACE) 8.6-50 MG per tablet 1 tablet  1 tablet Oral Nightly PRN Nora Gomez PA-C   1 tablet at 06/12/21 0541   • sennosides-docusate (PERICOLACE) 8.6-50 MG per tablet 2 tablet  2 tablet Oral BID Jagdeep Neri MD   2 tablet at 06/14/21 2154   • sodium chloride 0.9 % flush 10 mL  10 mL Intravenous PRN Vivienne Flores, DO       • sodium chloride 0.9 % flush 10 mL  10 mL Intravenous Q12H Vivienne Flores DO   10 mL at 06/15/21 2005   • sodium chloride 0.9 % flush 10 mL  10 mL Intravenous PRN Vivienne Flores DO       • sodium chloride 0.9 % flush 10 mL  10 mL Intravenous Q12H Joo Jimenez DO   10 mL at 06/15/21 2006   • sodium chloride 0.9 % flush 10 mL  10 mL Intravenous PRN Joo Jimenez DO       • tamsulosin (FLOMAX) 24 hr capsule 0.4 mg  0.4 mg Oral Nightly Joo Jimenez DO   0.4 mg at 06/15/21 2005     Lab Results (last 48 hours)     Procedure Component Value Units Date/Time    Basic Metabolic Panel [[id number]]  (Abnormal) Collected: 06/16/21 0131    Specimen: Blood Updated: 06/16/21 0327     Glucose 201 mg/dL      BUN 51 mg/dL      Creatinine 1.49 mg/dL      Sodium 139 mmol/L      Potassium 4.3 mmol/L      Chloride 102 mmol/L       CO2 28.7 mmol/L      Calcium 9.2 mg/dL      eGFR Non African Amer 47 mL/min/1.73      BUN/Creatinine Ratio 34.2     Anion Gap 8.3 mmol/L     Narrative:      GFR Normal >60  Chronic Kidney Disease <60  Kidney Failure <15      POC Glucose Once [[id number]]  (Abnormal) Collected: 06/15/21 2004    Specimen: Blood Updated: 06/15/21 2010     Glucose 252 mg/dL     POC Glucose Once [[id number]]  (Abnormal) Collected: 06/15/21 1634    Specimen: Blood Updated: 06/15/21 1643     Glucose 139 mg/dL     POC Glucose Once [[id number]]  (Abnormal) Collected: 06/15/21 1033    Specimen: Blood Updated: 06/15/21 1054     Glucose 291 mg/dL     POC Glucose Once [[id number]]  (Abnormal) Collected: 06/15/21 0637    Specimen: Blood Updated: 06/15/21 0644     Glucose 209 mg/dL     Basic Metabolic Panel [[id number]]  (Abnormal) Collected: 06/15/21 0314    Specimen: Blood Updated: 06/15/21 0354     Glucose 214 mg/dL      BUN 67 mg/dL      Creatinine 2.39 mg/dL      Sodium 138 mmol/L      Potassium 4.4 mmol/L      Comment: Specimen hemolyzed.  Results may be affected.        Chloride 99 mmol/L      CO2 28.3 mmol/L      Calcium 9.2 mg/dL      eGFR Non African Amer 27 mL/min/1.73      BUN/Creatinine Ratio 28.0     Anion Gap 10.7 mmol/L     Narrative:      GFR Normal >60  Chronic Kidney Disease <60  Kidney Failure <15      Potassium [[id number]]  (Normal) Collected: 06/14/21 2125    Specimen: Blood Updated: 06/14/21 2200     Potassium 3.9 mmol/L     Magnesium [[id number]]  (Abnormal) Collected: 06/14/21 2125    Specimen: Blood Updated: 06/14/21 2200     Magnesium 2.7 mg/dL     POC Glucose Once [[id number]]  (Abnormal) Collected: 06/14/21 2154    Specimen: Blood Updated: 06/14/21 2200     Glucose 205 mg/dL     POC Glucose Once [[id number]]  (Abnormal) Collected: 06/14/21 1636    Specimen: Blood Updated: 06/14/21 1656     Glucose 189 mg/dL     POC Glucose Once [[id number]]  (Abnormal) Collected: 06/14/21 1032    Specimen: Blood Updated: 06/14/21 1042      Glucose 190 mg/dL           Imaging Results (Last 48 Hours)     ** No results found for the last 48 hours. **        Orders (last 24 hrs)      Start     Ordered    06/16/21 0600  Basic Metabolic Panel  Morning Draw      06/15/21 1153    06/15/21 2011  POC Glucose Once  Once      06/15/21 2004    06/15/21 1644  POC Glucose Once  Once      06/15/21 1634    06/15/21 1245  sodium chloride 0.9 % bolus 1,000 mL  Once      06/15/21 1153    06/15/21 1153  Diet Regular; Cardiac, Consistent Carbohydrate  Diet Effective Now      06/15/21 1153    06/15/21 1055  POC Glucose Once  Once      06/15/21 1033    06/15/21 0645  POC Glucose Once  Once      06/15/21 0637    06/15/21 0600  Basic Metabolic Panel  Morning Draw      06/14/21 1255    06/14/21 0900  insulin detemir (LEVEMIR) injection 60 Units  Daily      06/13/21 1305    06/13/21 2100  sennosides-docusate (PERICOLACE) 8.6-50 MG per tablet 2 tablet  2 Times Daily      06/13/21 1306    06/13/21 1800  insulin aspart (novoLOG) injection 12 Units  3 Times Daily With Meals      06/13/21 1305    06/13/21 0900  metoprolol succinate XL (TOPROL-XL) 24 hr tablet 150 mg  Every 24 Hours Scheduled      06/12/21 1301    06/12/21 1300  gabapentin (NEURONTIN) capsule 100 mg  Every 12 Hours Scheduled      06/12/21 1258    06/12/21 1257  HYDROcodone-acetaminophen (NORCO) 5-325 MG per tablet 1 tablet  Every 8 Hours PRN      06/12/21 1258    06/12/21 0900  lidocaine (LIDODERM) 5 % 1 patch  Every 24 Hours Scheduled      06/11/21 2125    06/11/21 2100  doxycycline (MONODOX) capsule 100 mg  Every 12 Hours Scheduled      06/11/21 1345    06/07/21 0527  benzonatate (TESSALON) capsule 100 mg  3 Times Daily PRN      06/07/21 0528    06/06/21 2100  sodium chloride 0.9 % flush 10 mL  Every 12 Hours Scheduled      06/06/21 1600    06/06/21 1558  sodium chloride 0.9 % flush 10 mL  As Needed      06/06/21 1600    05/27/21 1300  ipratropium-albuterol (DUO-NEB) nebulizer solution 3 mL  Every 6 Hours - RT       05/27/21 0854    05/25/21 1145  clopidogrel (PLAVIX) tablet 75 mg  Daily      05/25/21 1134    05/23/21 2100  tamsulosin (FLOMAX) 24 hr capsule 0.4 mg  Nightly      05/23/21 1031    05/23/21 1300  apixaban (ELIQUIS) tablet 5 mg  Every 12 Hours Scheduled      05/23/21 1031    05/23/21 1300  fluticasone (FLONASE) 50 MCG/ACT nasal spray 2 spray  Daily      05/23/21 1031    05/23/21 1300  atorvastatin (LIPITOR) tablet 20 mg  Daily      05/23/21 1031    05/23/21 1300  Canagliflozin (INVOKANA) 300 MG tablet tablet 300 mg  Daily,   Status:  Discontinued      05/23/21 1031    05/23/21 1230  budesonide-formoterol (SYMBICORT) 160-4.5 MCG/ACT inhaler 2 puff  2 Times Daily - RT      05/23/21 1057    05/23/21 1126  ipratropium (ATROVENT) nebulizer solution 0.5 mg  Every 6 Hours PRN      05/23/21 1127    05/23/21 1030  albuterol (PROVENTIL) nebulizer solution 0.083% 2.5 mg/3mL  Every 6 Hours PRN      05/23/21 1031    05/23/21 1030  ipratropium-albuterol (DUO-NEB) nebulizer solution 3 mL  Every 4 Hours PRN      05/23/21 1031    05/22/21 0900  sodium chloride 0.9 % flush 10 mL  Every 12 Hours Scheduled      05/22/21 0214    05/22/21 0900  polyethylene glycol (MIRALAX) packet 17 g  Daily      05/22/21 0347    05/22/21 0900  lidocaine (LIDODERM) 5 % 2 patch  Every 24 Hours Scheduled      05/22/21 0348    05/22/21 0730  insulin aspart (novoLOG) injection 0-9 Units  3 Times Daily Before Meals      05/22/21 0348    05/22/21 0700  POC Glucose 4x Daily AC & at Bedtime  4 Times Daily Before Meals & at Bedtime     Comments: If bedtime blood glucose is greater than 350 mg/dl, call MD.      05/22/21 0214 05/22/21 0700  POC Glucose 4x Daily AC & at Bedtime  4 Times Daily Before Meals & at Bedtime     Comments: If bedtime blood glucose is greater than 350 mg/dl, call MD.      05/22/21 0348 05/22/21 0600  Incentive Spirometry  Every 4 Hours While Awake      05/22/21 0347 05/22/21 0600  pantoprazole (PROTONIX) EC tablet 40 mg  Every  Early Morning      05/22/21 0348    05/22/21 0400  Vital Signs  Every 4 Hours     Comments: Per per hospital policy    05/22/21 0214    05/22/21 0400  Strict Intake & Output  Every 4 Hours      05/22/21 0348    05/22/21 0349  Daily Weights  Daily      05/22/21 0348    05/22/21 0347  melatonin tablet 5 mg  Nightly PRN      05/22/21 0348    05/22/21 0347  hydrOXYzine (ATARAX) tablet 25 mg  3 Times Daily PRN      05/22/21 0348    05/22/21 0347  calcium carbonate (TUMS) chewable tablet 500 mg (200 mg elemental)  3 Times Daily PRN      05/22/21 0348    05/22/21 0346  hydrALAZINE (APRESOLINE) injection 10 mg  Every 6 Hours PRN      05/22/21 0348    05/22/21 0346  dextrose (GLUTOSE) oral gel 15 g  Every 15 Minutes PRN      05/22/21 0347    05/22/21 0346  dextrose (D50W) 25 g/ 50mL Intravenous Solution 25 g  Every 15 Minutes PRN      05/22/21 0348    05/22/21 0346  glucagon (human recombinant) (GLUCAGEN DIAGNOSTIC) injection 1 mg  Every 15 Minutes PRN      05/22/21 0348    05/22/21 0345  sennosides-docusate (PERICOLACE) 8.6-50 MG per tablet 1 tablet  Nightly PRN      05/22/21 0347    05/22/21 0215  Intake & Output  Every Shift      05/22/21 0214    05/22/21 0214  sodium chloride 0.9 % flush 10 mL  As Needed      05/22/21 0214    05/22/21 0214  dextrose (GLUTOSE) oral gel 15 g  Every 15 Minutes PRN      05/22/21 0214    05/22/21 0214  dextrose (D50W) 25 g/ 50mL Intravenous Solution 25 g  Every 15 Minutes PRN      05/22/21 0214    05/22/21 0214  glucagon (human recombinant) (GLUCAGEN DIAGNOSTIC) injection 1 mg  Every 15 Minutes PRN      05/22/21 0214    05/22/21 0214  nitroglycerin (NITROSTAT) SL tablet 0.4 mg  Every 5 Minutes PRN      05/22/21 0214    05/21/21 2046  sodium chloride 0.9 % flush 10 mL  As Needed      05/21/21 2049    Unscheduled  Telemetry - Pulse Oximetry  Continuous PRN     Comments: If Patient Develops Unresponsiveness, Acute Dyspnea, Cyanosis or Suspected Hypoxemia Start Continuous Pulse Ox Monitoring,  Apply Oxygen & Notify Provider    05/22/21 0214    Unscheduled  Oxygen Therapy- Nasal Cannula; Titrate for SPO2: 90% - 95%  Continuous PRN     Comments: If Patient Develops Unresponsiveness, Acute Dyspnea, Cyanosis or Suspected Hypoxemia Start Continuous Pulse Ox Monitoring, Apply Oxygen & Notify Provider    05/22/21 0214    Unscheduled  ECG 12 Lead  As Needed     Comments: Nurse to Release if Patient Expericences Acute Chest Pain or Dysrhythmias    05/22/21 0214    Unscheduled  Potassium  As Needed     Comments: For Ventricular Arrhythmias      05/22/21 0214    Unscheduled  Magnesium  As Needed     Comments: For Ventricular Arrhythmias      05/22/21 0214    Unscheduled  Troponin  As Needed     Comments: For Chest Pain      05/22/21 0214    Unscheduled  Digoxin Level  As Needed     Comments: For Atrial Arrhythmias      05/22/21 0214    Unscheduled  Blood Gas, Arterial -With Co-Ox Panel: Yes  As Needed     Comments: Per O2 PolicyNotify Physician      05/22/21 0214    Unscheduled  Up With Assistance  As Needed      05/22/21 0214    Unscheduled  Change Dressing to IV Site As Needed When Damp, Loose or Soiled  As Needed      06/06/21 1600    Unscheduled  Insert New Peripheral IV  As Needed     Comments: Frequency Per Facility Policy    06/06/21 1600    Unscheduled  Bladder Scan if Patient Unable to Void 4-6 Hours After Catheter Removal  As Needed      06/09/21 1319    Unscheduled  If Bladder Scan Volume is Less Than 500mL & Patient is Without Symptoms of Bladder Discomfort / Distention Monitor Every 1-2 Hours for Spontaneous Void  As Needed      06/09/21 1319    Unscheduled  Straight Cath Every 4-6 Hours As Needed If Patient is Unable to Void After 4-6 Hours, Bladder Scan Volume is Greater Than 500mL & Patient Has Symptoms of Bladder Discomfort / Distention  As Needed      06/09/21 1319    Unscheduled  Schedule / Prompt Voiding For Patients With Urinary Incontinence  As Needed      06/09/21 1319    --   amoxicillin-clavulanate (AUGMENTIN) 875-125 MG per tablet  2 Times Daily      05/22/21 1731    --  Budeson-Glycopyrrol-Formoterol (BREZTRI) 160-9-4.8 MCG/ACT aerosol inhaler  2 Times Daily      05/22/21 1732    --  carvedilol (COREG) 25 MG tablet  2 Times Daily With Meals      05/22/21 1732    --  Dapagliflozin Propanediol (Farxiga) 10 MG tablet  Daily      05/22/21 1732    --  furosemide (LASIX) 40 MG tablet  Daily      05/22/21 1732    --  gabapentin (NEURONTIN) 600 MG tablet  3 Times Daily      05/22/21 1732    --  Insulin Lispro, 1 Unit Dial, (HumaLOG KwikPen) 100 UNIT/ML solution pen-injector  3 Times Daily      05/22/21 1732    --  lidocaine (LIDODERM) 5 %  Every 24 Hours      05/22/21 1801    --  magnesium oxide (MAG-OX) 400 MG tablet  Daily      05/22/21 1801    --  pantoprazole (PROTONIX) 40 MG EC tablet  Daily      05/22/21 1801    --  sildenafil (REVATIO) 20 MG tablet  As Needed      05/22/21 1832    --  SCANNED - TELEMETRY        05/21/21 0000    --  SCANNED - TELEMETRY        05/21/21 0000    --  SCANNED - TELEMETRY        05/21/21 0000    --  SCANNED - TELEMETRY        05/21/21 0000    --  SCANNED - TELEMETRY        05/21/21 0000    --  SCANNED - TELEMETRY        05/21/21 0000    --  SCANNED - TELEMETRY        05/21/21 0000    --  SCANNED - TELEMETRY        05/21/21 0000    --  SCANNED - TELEMETRY        05/21/21 0000    --  SCANNED - TELEMETRY        05/21/21 0000    --  SCANNED - TELEMETRY        05/21/21 0000    --  SCANNED - TELEMETRY        05/21/21 0000    --  SCANNED - TELEMETRY        05/21/21 0000    --  SCANNED - TELEMETRY        05/21/21 0000    --  SCANNED - TELEMETRY        05/21/21 0000    --  SCANNED - TELEMETRY        05/21/21 0000    --  SCANNED - TELEMETRY        05/21/21 0000    --  SCANNED - TELEMETRY        05/21/21 0000    --  SCANNED - TELEMETRY        05/21/21 0000    --  SCANNED - TELEMETRY        05/21/21 0000    --  SCANNED - TELEMETRY        05/21/21 0000    --  SCANNED -  TELEMETRY        05/21/21 0000    --  SCANNED - TELEMETRY        05/21/21 0000    --  SCANNED - TELEMETRY        05/21/21 0000    --  SCANNED - TELEMETRY        05/21/21 0000    --  SCANNED - TELEMETRY        05/21/21 0000    --  SCANNED - TELEMETRY        05/21/21 0000    --  SCANNED - TELEMETRY        05/21/21 0000    --  SCANNED - TELEMETRY        05/21/21 0000    --  SCANNED - TELEMETRY        05/21/21 0000    --  SCANNED - TELEMETRY        05/21/21 0000    --  SCANNED - TELEMETRY        05/21/21 0000    --  SCANNED - TELEMETRY        05/21/21 0000    --  SCANNED - TELEMETRY        05/21/21 0000    --  SCANNED - TELEMETRY        05/21/21 0000    --  SCANNED - TELEMETRY        05/21/21 0000    --  SCANNED - TELEMETRY        05/21/21 0000    --  SCANNED - TELEMETRY        05/21/21 0000    --  SCANNED - TELEMETRY        05/21/21 0000    --  SCANNED - TELEMETRY        05/21/21 0000    --  SCANNED - TELEMETRY        05/21/21 0000    --  SCANNED - TELEMETRY        05/21/21 0000    --  SCANNED - TELEMETRY        05/21/21 0000    --  SCANNED - TELEMETRY        05/21/21 0000    --  SCANNED - TELEMETRY        05/21/21 0000    --  SCANNED - TELEMETRY        05/21/21 0000    --  SCANNED - TELEMETRY        05/21/21 0000    --  SCANNED - TELEMETRY        05/21/21 0000    --  SCANNED - TELEMETRY        05/21/21 0000    --  SCANNED - TELEMETRY        05/21/21 0000    --  SCANNED - TELEMETRY        05/21/21 0000    --  SCANNED - TELEMETRY        05/21/21 0000    --  SCANNED - TELEMETRY        05/21/21 0000    --  SCANNED - TELEMETRY        05/21/21 0000    --  SCANNED - TELEMETRY        05/21/21 0000    --  SCANNED - TELEMETRY        05/21/21 0000    --  SCANNED - TELEMETRY        05/21/21 0000    --  SCANNED - TELEMETRY        05/21/21 0000    --  SCANNED - TELEMETRY        05/21/21 0000    --  SCANNED - TELEMETRY        05/21/21 0000                   Physician Progress Notes (last 48 hours) (Notes from 06/14/21 0557 through  21 0557)      Jagdeep Neri MD at 06/15/21 1646              [facility]     Patient Identification:  Name:  Иван Miller  Age:  65 y.o.  Sex:  male  :  1955  MRN:  [id number]  Visit Number:  [id number]  Primary Care Provider:  Rosi Thomas APRN    Length of stay:  24    Chief complaint:   Back pain    Subjective:    Patient seen and examined, denied any active cardiopulmonary complaints.    ----------------------------------------------------------------------------------------------------------------------  Current Hospital Meds:  apixaban, 5 mg, Oral, Q12H  atorvastatin, 20 mg, Oral, Daily  budesonide-formoterol, 2 puff, Inhalation, BID - RT  clopidogrel, 75 mg, Oral, Daily  doxycycline, 100 mg, Oral, Q12H  fluticasone, 2 spray, Nasal, Daily  gabapentin, 100 mg, Oral, Q12H  insulin aspart, 0-9 Units, Subcutaneous, TID AC  insulin aspart, 12 Units, Subcutaneous, TID With Meals  insulin detemir, 60 Units, Subcutaneous, Daily  ipratropium-albuterol, 3 mL, Nebulization, Q6H - RT  lidocaine, 1 patch, Transdermal, Q24H  lidocaine, 2 patch, Transdermal, Q24H  metoprolol succinate XL, 150 mg, Oral, Q24H  pantoprazole, 40 mg, Oral, Q AM  polyethylene glycol, 17 g, Oral, Daily  senna-docusate sodium, 2 tablet, Oral, BID  sodium chloride, 10 mL, Intravenous, Q12H  sodium chloride, 10 mL, Intravenous, Q12H  tamsulosin, 0.4 mg, Oral, Nightly         ----------------------------------------------------------------------------------------------------------------------  Vital Signs:  Temp:  [97.5 °F (36.4 °C)-98.3 °F (36.8 °C)] 97.7 °F (36.5 °C)  Heart Rate:  [72-96] 81  Resp:  [18-20] 18  BP: ()/(51-80) 116/76      21  0500 21  0500 06/15/21  0522   Weight: (!) 140 kg (309 lb 11.2 oz) 135 kg (297 lb 8 oz) (!) 136 kg (300 lb 11.2 oz)     Body mass index is 44.41 kg/m².    Intake/Output Summary (Last 24 hours) at 6/15/2021 1646  Last data filed at 6/15/2021  1500  Gross per 24 hour   Intake 1340 ml   Output 925 ml   Net 415 ml     Diet Regular; Cardiac, Consistent Carbohydrate  ----------------------------------------------------------------------------------------------------------------------  Physical exam:  Constitutional: Morbidly obese, stable respiratory status, no distress  HENT:  Head:  Normocephalic and atraumatic.  Mouth:  Moist mucous membranes.    Eyes:  Conjunctivae and EOM are normal.  Pupils are equal, round, and reactive to light.  No scleral icterus.    Neck:  Neck supple. No thyromegaly.  No JVD present.    Cardiovascular:  Regular rate and rhythm with no murmurs, rubs, clicks or gallops appreciated.  Pulmonary/Chest:   Bilateral crackles.  Abdominal:  Soft. Nontender. Nondistended  Bowel sounds are normal in all four quadrants. No organomegally appreciated.   Neurological:   Awake and alert, moving his 4 extremities freely   skin:  Warm and dry to palpation with no rashes or lesions appreciated.  Peripheral vascular:  2+ radial and pedal pulses in bilateral upper and lower extremities.      ----------------------------------------------------------------------------------------------------------------------      Results from last 7 days   Lab Units 06/14/21  0302 06/13/21  0300 06/12/21  0501 06/10/21  0133 06/09/21  0048   CRP mg/dL 0.84* 1.04* 1.47* 4.58* 9.45*   WBC 10*3/mm3  --   --  6.16 6.12 8.90   HEMOGLOBIN g/dL  --   --  11.3* 10.2* 10.7*   HEMATOCRIT %  --   --  39.4 35.9* 37.6   MCV fL  --   --  84.2 87.3 85.8   MCHC g/dL  --   --  28.7* 28.4* 28.5*   PLATELETS 10*3/mm3  --   --  256 228 230     Results from last 7 days   Lab Units 06/10/21  2215   PH, ARTERIAL pH units 7.361   PO2 ART mm Hg 70.0*   PCO2, ARTERIAL mm Hg 67.2*   HCO3 ART mmol/L 38.0*     Results from last 7 days   Lab Units 06/15/21  0314 06/14/21  2125 06/14/21  0850 06/13/21  0300 06/10/21  0133   SODIUM mmol/L 138  --  137 138 138   POTASSIUM mmol/L 4.4 3.9 4.5 3.8 4.3    MAGNESIUM mg/dL  --  2.7*  --   --   --    CHLORIDE mmol/L 99  --  95* 97* 100   CO2 mmol/L 28.3  --  30.7* 29.5* 32.2*   BUN mg/dL 67*  --  49* 21 20   CREATININE mg/dL 2.39*  --  2.02* 0.94 0.95   EGFR IF NONAFRICN AM mL/min/1.73 27*  --  33* 80 79   CALCIUM mg/dL 9.2  --  9.5 10.1 9.3   GLUCOSE mg/dL 214*  --  203* 179* 168*   ALBUMIN g/dL  --   --   --   --  2.97*   BILIRUBIN mg/dL  --   --   --   --  0.3   ALK PHOS U/L  --   --   --   --  69   AST (SGOT) U/L  --   --   --   --  12   ALT (SGPT) U/L  --   --   --   --  12   Estimated Creatinine Clearance: 41.6 mL/min (A) (by C-G formula based on SCr of 2.39 mg/dL (H)).    No results found for: AMMONIA      No results found for: BLOODCX  No results found for: URINECX  No results found for: WOUNDCX  No results found for: STOOLCX    I have personally looked at the labs and they are summarized above.  ----------------------------------------------------------------------------------------------------------------------  Imaging Results (Last 24 Hours)     ** No results found for the last 24 hours. **        ----------------------------------------------------------------------------------------------------------------------      Assessment and Plan:    *Acute on chronic hypoxic/hypercapnic respiratory failure, currently on nasal cannula 5 L/min, off BiPAP, on night CPAP  *deuritic induced NICKY, worsening today  *History of toxic encephalopathy with methamphetamine abuse  *Lethargic, suspected opioid induced with gabapentin, resolved  *COPD exacerbation  *PAF  *Acute on chronic decompensated diastolic heart failure  *Essential hypertension, stage I  *DM type II, uncontrolled with diabetic neuropathy, better in house control  *RIO  *Reported history of chronic osteomyelitis, MRI reviewed not showing evidence of acute inflammatory changes, per ID no evidence of acute bony/cartilage infection  *CAP    --Hold Bumex / aldactone / linsopril, bolus with 1 L, liberate  diet  --BMP in am  --Cont gabapentin 100 mg p.o. twice a day  --Cont hydrocodone 5 mg p.o. every 8 hours for severe pain  --Continue p.o. doxycycline   --Nocturnal CPAP, will need official polysomnogram in the outpatient setting  --Cont Levemir 60 units / aspart 12 units 3 times daily, continue insulin sliding scale, improved blood sugar  --Toprol-XL to 150 mg a day, continue apixaban  --Planning to rehab DC, when kidney function stabilized      Jagdeep Neri MD  06/15/21  16:46 EDT    Electronically signed by Jagdeep Neri MD at 06/15/21 1648     Jagdeep Neri MD at 21 1256              [facility]     Patient Identification:  Name:  Иван Miller  Age:  65 y.o.  Sex:  male  :  1955  MRN:  [id number]  Visit Number:  [id number]  Primary Care Provider:  Rosi Thomas APRN    Length of stay:  23    Chief complaint:   Back pain    Subjective:    Patient seen and examined, he is feeling all over better compared to before, discussed with the , hopefully will have a bed available for him tomorrow  .  ----------------------------------------------------------------------------------------------------------------------  Current [facility] Meds:  apixaban, 5 mg, Oral, Q12H  atorvastatin, 20 mg, Oral, Daily  budesonide-formoterol, 2 puff, Inhalation, BID - RT  Canagliflozin, 300 mg, Oral, Daily  clopidogrel, 75 mg, Oral, Daily  doxycycline, 100 mg, Oral, Q12H  fluticasone, 2 spray, Nasal, Daily  gabapentin, 100 mg, Oral, Q12H  insulin aspart, 0-9 Units, Subcutaneous, TID AC  insulin aspart, 12 Units, Subcutaneous, TID With Meals  insulin detemir, 60 Units, Subcutaneous, Daily  ipratropium-albuterol, 3 mL, Nebulization, Q6H - RT  lidocaine, 1 patch, Transdermal, Q24H  lidocaine, 2 patch, Transdermal, Q24H  metoprolol succinate XL, 150 mg, Oral, Q24H  pantoprazole, 40 mg, Oral, Q AM  polyethylene glycol, 17 g, Oral, Daily  senna-docusate sodium, 2 tablet, Oral,  BID  sodium chloride, 500 mL, Intravenous, Once  sodium chloride, 10 mL, Intravenous, Q12H  sodium chloride, 10 mL, Intravenous, Q12H  tamsulosin, 0.4 mg, Oral, Nightly         ----------------------------------------------------------------------------------------------------------------------  Vital Signs:  Temp:  [97.4 °F (36.3 °C)-98.4 °F (36.9 °C)] 98.1 °F (36.7 °C)  Heart Rate:  [59-80] 79  Resp:  [18-20] 18  BP: ()/(54-64) 100/61      06/11/21  1528 06/13/21  0500 06/14/21  0500   Weight: (!) 141 kg (310 lb) (!) 140 kg (309 lb 11.2 oz) 135 kg (297 lb 8 oz)     Body mass index is 43.93 kg/m².    Intake/Output Summary (Last 24 hours) at 6/14/2021 1256  Last data filed at 6/14/2021 0700  Gross per 24 hour   Intake 300 ml   Output 300 ml   Net 0 ml     Diet Regular; Cardiac, Consistent Carbohydrate, Daily Fluid Restriction; 1000 mL Fluid Per Day  ----------------------------------------------------------------------------------------------------------------------  Physical exam:  Constitutional: Morbidly obese, improved respiratory distress compared to yesterday  HENT:  Head:  Normocephalic and atraumatic.  Mouth:  Moist mucous membranes.    Eyes:  Conjunctivae and EOM are normal.  Pupils are equal, round, and reactive to light.  No scleral icterus.    Neck:  Neck supple. No thyromegaly.  No JVD present.    Cardiovascular:  Regular rate and rhythm with no murmurs, rubs, clicks or gallops appreciated.  Pulmonary/Chest:   Bilateral crackles.  Abdominal:  Soft. Nontender. Nondistended  Bowel sounds are normal in all four quadrants. No organomegally appreciated.   Neurological:   Awake and alert, moving his 4 extremities freely   skin:  Warm and dry to palpation with no rashes or lesions appreciated.  Peripheral vascular:  2+ radial and pedal pulses in bilateral upper and lower  extremities.      ----------------------------------------------------------------------------------------------------------------------      Results from last 7 days   Lab Units 06/14/21  0302 06/13/21  0300 06/12/21  0501 06/10/21  0133 06/09/21  0048   CRP mg/dL 0.84* 1.04* 1.47* 4.58* 9.45*   WBC 10*3/mm3  --   --  6.16 6.12 8.90   HEMOGLOBIN g/dL  --   --  11.3* 10.2* 10.7*   HEMATOCRIT %  --   --  39.4 35.9* 37.6   MCV fL  --   --  84.2 87.3 85.8   MCHC g/dL  --   --  28.7* 28.4* 28.5*   PLATELETS 10*3/mm3  --   --  256 228 230     Results from last 7 days   Lab Units 06/10/21  2215   PH, ARTERIAL pH units 7.361   PO2 ART mm Hg 70.0*   PCO2, ARTERIAL mm Hg 67.2*   HCO3 ART mmol/L 38.0*     Results from last 7 days   Lab Units 06/14/21  0850 06/13/21  0300 06/12/21  0501 06/10/21  0133 06/08/21  0035   SODIUM mmol/L 137 138 139 138 139   POTASSIUM mmol/L 4.5 3.8 3.6 4.3 4.4   CHLORIDE mmol/L 95* 97* 95* 100 103   CO2 mmol/L 30.7* 29.5* 36.2* 32.2* 31.2*   BUN mg/dL 49* 21 19 20 22   CREATININE mg/dL 2.02* 0.94 0.90 0.95 1.11   EGFR IF NONAFRICN AM mL/min/1.73 33* 80 84 79 66   CALCIUM mg/dL 9.5 10.1 9.7 9.3 9.1   GLUCOSE mg/dL 203* 179* 204* 168* 191*   ALBUMIN g/dL  --   --   --  2.97* 2.98*   BILIRUBIN mg/dL  --   --   --  0.3 0.4   ALK PHOS U/L  --   --   --  69 74   AST (SGOT) U/L  --   --   --  12 10   ALT (SGPT) U/L  --   --   --  12 11   Estimated Creatinine Clearance: 49 mL/min (A) (by C-G formula based on SCr of 2.02 mg/dL (H)).    No results found for: AMMONIA      No results found for: BLOODCX  No results found for: URINECX  No results found for: WOUNDCX  No results found for: STOOLCX    I have personally looked at the labs and they are summarized above.  ----------------------------------------------------------------------------------------------------------------------  Imaging Results (Last 24 Hours)     ** No results found for the last 24 hours. **         ----------------------------------------------------------------------------------------------------------------------      Assessment and Plan:    *Acute on chronic hypoxic/hypercapnic respiratory failure, currently on nasal cannula 5 L/min, off BiPAP, on night CPAP  *deuritic induced kidney injury  *History of toxic encephalopathy with methamphetamine abuse  *Lethargic, suspected opioid induced with gabapentin, resolved  *COPD exacerbation  *PAF  *Acute on chronic decompensated diastolic heart failure  *Essential hypertension, stage I  *DM type II, uncontrolled with diabetic neuropathy, better in house control  *RIO  *Reported history of chronic osteomyelitis, MRI reviewed not showing evidence of acute inflammatory changes, per ID no evidence of acute bony/cartilage infection  *CAP    --Hold Bumex / aldactone / linsopril, bolus with 500 cc x1  --BMP in am  --Cont gabapentin 100 mg p.o. twice a day  --Cont hydrocodone 5 mg p.o. every 8 hours for severe pain  --Continue p.o. doxycycline   --PT OT, transfer to [facility]  --Nocturnal CPAP, will need official polysomnogram in the outpatient setting  --Cont Levemir 60 units / aspart 12 units 3 times daily, continue insulin sliding scale  --Toprol-XL to 150 mg a day, continue apixaban  --Planning to rehab DC, hopefully tmrw      Jagdeep Neri MD  06/14/21  12:56 EDT    Electronically signed by Jagdeep Neri MD at 06/14/21 7779       Consult Notes (last 48 hours) (Notes from 06/14/21 6768 through 06/16/21 2737)    No notes of this type exist for this encounter.

## 2021-06-17 NOTE — NURSING NOTE
Spoke with [facility] and she will find out ETA for transportation of patient to [facility]; she will call me back or have [facility] call me back.

## 2021-06-17 NOTE — NURSING NOTE
Called [facility] [employer] again and was told that it would probably be when one of their trucks got back from [place]; they have been out since 1630.

## 2021-07-31 ENCOUNTER — HOSPITAL ENCOUNTER (INPATIENT)
Facility: HOSPITAL | Age: 66
LOS: 11 days | Discharge: NURSING, REHAB OR LONG-TERM CARE | End: 2021-08-12
Attending: STUDENT IN AN ORGANIZED HEALTH CARE EDUCATION/TRAINING PROGRAM | Admitting: INTERNAL MEDICINE

## 2021-07-31 DIAGNOSIS — J18.9 PNEUMONIA OF BOTH LUNGS DUE TO INFECTIOUS ORGANISM, UNSPECIFIED PART OF LUNG: Primary | ICD-10-CM

## 2021-07-31 DIAGNOSIS — J96.22 ACUTE ON CHRONIC RESPIRATORY FAILURE WITH HYPOXIA AND HYPERCAPNIA (HCC): ICD-10-CM

## 2021-07-31 DIAGNOSIS — J96.21 ACUTE ON CHRONIC RESPIRATORY FAILURE WITH HYPOXIA AND HYPERCAPNIA (HCC): ICD-10-CM

## 2021-07-31 PROCEDURE — 93010 ELECTROCARDIOGRAM REPORT: CPT | Performed by: INTERNAL MEDICINE

## 2021-07-31 PROCEDURE — 93005 ELECTROCARDIOGRAM TRACING: CPT | Performed by: EMERGENCY MEDICINE

## 2021-07-31 PROCEDURE — 93005 ELECTROCARDIOGRAM TRACING: CPT | Performed by: STUDENT IN AN ORGANIZED HEALTH CARE EDUCATION/TRAINING PROGRAM

## 2021-07-31 PROCEDURE — 99285 EMERGENCY DEPT VISIT HI MDM: CPT

## 2021-08-01 ENCOUNTER — APPOINTMENT (OUTPATIENT)
Dept: GENERAL RADIOLOGY | Facility: HOSPITAL | Age: 66
End: 2021-08-01

## 2021-08-01 ENCOUNTER — APPOINTMENT (OUTPATIENT)
Dept: CT IMAGING | Facility: HOSPITAL | Age: 66
End: 2021-08-01

## 2021-08-01 PROBLEM — J18.9 PNEUMONIA OF BOTH LUNGS DUE TO INFECTIOUS ORGANISM: Status: ACTIVE | Noted: 2021-08-01

## 2021-08-01 PROBLEM — R00.1 SYMPTOMATIC BRADYCARDIA: Status: RESOLVED | Noted: 2018-11-09 | Resolved: 2021-08-01

## 2021-08-01 LAB
A-A DO2: 33.6 MMHG (ref 0–300)
A-A DO2: 38.8 MMHG (ref 0–300)
A-A DO2: 49.5 MMHG (ref 0–300)
ALBUMIN SERPL-MCNC: 3.54 G/DL (ref 3.5–5.2)
ALBUMIN SERPL-MCNC: 3.86 G/DL (ref 3.5–5.2)
ALBUMIN/GLOB SERPL: 1.3 G/DL
ALBUMIN/GLOB SERPL: 1.4 G/DL
ALP SERPL-CCNC: 72 U/L (ref 39–117)
ALP SERPL-CCNC: 76 U/L (ref 39–117)
ALT SERPL W P-5'-P-CCNC: 15 U/L (ref 1–41)
ALT SERPL W P-5'-P-CCNC: 18 U/L (ref 1–41)
ANION GAP SERPL CALCULATED.3IONS-SCNC: 7.6 MMOL/L (ref 5–15)
ANION GAP SERPL CALCULATED.3IONS-SCNC: 9.6 MMOL/L (ref 5–15)
ANISOCYTOSIS BLD QL: NORMAL
ARTERIAL PATENCY WRIST A: ABNORMAL
ARTERIAL PATENCY WRIST A: ABNORMAL
ARTERIAL PATENCY WRIST A: POSITIVE
AST SERPL-CCNC: 10 U/L (ref 1–40)
AST SERPL-CCNC: 13 U/L (ref 1–40)
ATMOSPHERIC PRESS: 724 MMHG
ATMOSPHERIC PRESS: 724 MMHG
ATMOSPHERIC PRESS: 725 MMHG
BASE EXCESS BLDA CALC-SCNC: 10.1 MMOL/L (ref 0–2)
BASE EXCESS BLDA CALC-SCNC: 10.8 MMOL/L (ref 0–2)
BASE EXCESS BLDA CALC-SCNC: 11.6 MMOL/L (ref 0–2)
BASOPHILS # BLD AUTO: 0.01 10*3/MM3 (ref 0–0.2)
BASOPHILS # BLD AUTO: 0.02 10*3/MM3 (ref 0–0.2)
BASOPHILS NFR BLD AUTO: 0.1 % (ref 0–1.5)
BASOPHILS NFR BLD AUTO: 0.2 % (ref 0–1.5)
BDY SITE: ABNORMAL
BILIRUB SERPL-MCNC: 0.2 MG/DL (ref 0–1.2)
BILIRUB SERPL-MCNC: 0.3 MG/DL (ref 0–1.2)
BODY TEMPERATURE: 0 C
BUN SERPL-MCNC: 22 MG/DL (ref 8–23)
BUN SERPL-MCNC: 26 MG/DL (ref 8–23)
BUN/CREAT SERPL: 26.8 (ref 7–25)
BUN/CREAT SERPL: 33.8 (ref 7–25)
CALCIUM SPEC-SCNC: 9.3 MG/DL (ref 8.6–10.5)
CALCIUM SPEC-SCNC: 9.3 MG/DL (ref 8.6–10.5)
CHLORIDE SERPL-SCNC: 98 MMOL/L (ref 98–107)
CHLORIDE SERPL-SCNC: 99 MMOL/L (ref 98–107)
CO2 BLDA-SCNC: 41.9 MMOL/L (ref 22–33)
CO2 BLDA-SCNC: 42.1 MMOL/L (ref 22–33)
CO2 BLDA-SCNC: 42.5 MMOL/L (ref 22–33)
CO2 SERPL-SCNC: 34.4 MMOL/L (ref 22–29)
CO2 SERPL-SCNC: 37.4 MMOL/L (ref 22–29)
COHGB MFR BLD: 0.9 % (ref 0–5)
COHGB MFR BLD: 1 % (ref 0–5)
COHGB MFR BLD: 1.3 % (ref 0–5)
CREAT SERPL-MCNC: 0.77 MG/DL (ref 0.76–1.27)
CREAT SERPL-MCNC: 0.82 MG/DL (ref 0.76–1.27)
CRP SERPL-MCNC: <0.3 MG/DL (ref 0–0.5)
DEPRECATED RDW RBC AUTO: 50.3 FL (ref 37–54)
DEPRECATED RDW RBC AUTO: 51.6 FL (ref 37–54)
EOSINOPHIL # BLD AUTO: 0 10*3/MM3 (ref 0–0.4)
EOSINOPHIL # BLD AUTO: 0.01 10*3/MM3 (ref 0–0.4)
EOSINOPHIL NFR BLD AUTO: 0 % (ref 0.3–6.2)
EOSINOPHIL NFR BLD AUTO: 0.1 % (ref 0.3–6.2)
EPAP: 8
ERYTHROCYTE [DISTWIDTH] IN BLOOD BY AUTOMATED COUNT: 16.3 % (ref 12.3–15.4)
ERYTHROCYTE [DISTWIDTH] IN BLOOD BY AUTOMATED COUNT: 16.4 % (ref 12.3–15.4)
FLUAV SUBTYP SPEC NAA+PROBE: NOT DETECTED
FLUBV RNA ISLT QL NAA+PROBE: NOT DETECTED
GAS FLOW AIRWAY: 3 LPM
GAS FLOW AIRWAY: 3 LPM
GFR SERPL CREATININE-BSD FRML MDRD: 101 ML/MIN/1.73
GFR SERPL CREATININE-BSD FRML MDRD: 94 ML/MIN/1.73
GLOBULIN UR ELPH-MCNC: 2.7 GM/DL
GLOBULIN UR ELPH-MCNC: 2.8 GM/DL
GLUCOSE BLDC GLUCOMTR-MCNC: 174 MG/DL (ref 70–130)
GLUCOSE BLDC GLUCOMTR-MCNC: 204 MG/DL (ref 70–130)
GLUCOSE BLDC GLUCOMTR-MCNC: 208 MG/DL (ref 70–130)
GLUCOSE BLDC GLUCOMTR-MCNC: 212 MG/DL (ref 70–130)
GLUCOSE SERPL-MCNC: 238 MG/DL (ref 65–99)
GLUCOSE SERPL-MCNC: 271 MG/DL (ref 65–99)
HCO3 BLDA-SCNC: 39.8 MMOL/L (ref 20–26)
HCO3 BLDA-SCNC: 39.8 MMOL/L (ref 20–26)
HCO3 BLDA-SCNC: 40 MMOL/L (ref 20–26)
HCT VFR BLD AUTO: 42.7 % (ref 37.5–51)
HCT VFR BLD AUTO: 43.3 % (ref 37.5–51)
HCT VFR BLD CALC: 38.5 % (ref 38–51)
HCT VFR BLD CALC: 40.4 % (ref 38–51)
HCT VFR BLD CALC: 41.5 % (ref 38–51)
HGB BLD-MCNC: 12.2 G/DL (ref 13–17.7)
HGB BLD-MCNC: 12.4 G/DL (ref 13–17.7)
HGB BLDA-MCNC: 12.6 G/DL (ref 14–18)
HGB BLDA-MCNC: 13.2 G/DL (ref 14–18)
HGB BLDA-MCNC: 13.5 G/DL (ref 14–18)
HOLD SPECIMEN: NORMAL
HOLD SPECIMEN: NORMAL
HYPOCHROMIA BLD QL: NORMAL
IMM GRANULOCYTES # BLD AUTO: 0.1 10*3/MM3 (ref 0–0.05)
IMM GRANULOCYTES # BLD AUTO: 0.11 10*3/MM3 (ref 0–0.05)
IMM GRANULOCYTES NFR BLD AUTO: 1.2 % (ref 0–0.5)
IMM GRANULOCYTES NFR BLD AUTO: 1.4 % (ref 0–0.5)
INHALED O2 CONCENTRATION: 30 %
INHALED O2 CONCENTRATION: 32 %
INHALED O2 CONCENTRATION: 32 %
IPAP: 16
LYMPHOCYTES # BLD AUTO: 0.56 10*3/MM3 (ref 0.7–3.1)
LYMPHOCYTES # BLD AUTO: 0.73 10*3/MM3 (ref 0.7–3.1)
LYMPHOCYTES NFR BLD AUTO: 6.8 % (ref 19.6–45.3)
LYMPHOCYTES NFR BLD AUTO: 9 % (ref 19.6–45.3)
Lab: ABNORMAL
MCH RBC QN AUTO: 24.2 PG (ref 26.6–33)
MCH RBC QN AUTO: 24.7 PG (ref 26.6–33)
MCHC RBC AUTO-ENTMCNC: 28.2 G/DL (ref 31.5–35.7)
MCHC RBC AUTO-ENTMCNC: 29 G/DL (ref 31.5–35.7)
MCV RBC AUTO: 84.9 FL (ref 79–97)
MCV RBC AUTO: 85.9 FL (ref 79–97)
METHGB BLD QL: 0.1 % (ref 0–3)
METHGB BLD QL: 0.2 % (ref 0–3)
METHGB BLD QL: <-0.1 % (ref 0–3)
MODALITY: ABNORMAL
MONOCYTES # BLD AUTO: 0.1 10*3/MM3 (ref 0.1–0.9)
MONOCYTES # BLD AUTO: 0.36 10*3/MM3 (ref 0.1–0.9)
MONOCYTES NFR BLD AUTO: 1.2 % (ref 5–12)
MONOCYTES NFR BLD AUTO: 4.4 % (ref 5–12)
NEUTROPHILS NFR BLD AUTO: 6.89 10*3/MM3 (ref 1.7–7)
NEUTROPHILS NFR BLD AUTO: 7.48 10*3/MM3 (ref 1.7–7)
NEUTROPHILS NFR BLD AUTO: 84.9 % (ref 42.7–76)
NEUTROPHILS NFR BLD AUTO: 90.7 % (ref 42.7–76)
NOTE: ABNORMAL
NOTIFIED BY: ABNORMAL
NOTIFIED WHO: ABNORMAL
NRBC BLD AUTO-RTO: 0 /100 WBC (ref 0–0.2)
NRBC BLD AUTO-RTO: 0 /100 WBC (ref 0–0.2)
NT-PROBNP SERPL-MCNC: 956.2 PG/ML (ref 0–900)
OXYHGB MFR BLDV: 94.1 % (ref 94–99)
OXYHGB MFR BLDV: 95.8 % (ref 94–99)
OXYHGB MFR BLDV: 97.6 % (ref 94–99)
PCO2 BLDA: 68.5 MM HG (ref 35–45)
PCO2 BLDA: 76 MM HG (ref 35–45)
PCO2 BLDA: 81.6 MM HG (ref 35–45)
PCO2 TEMP ADJ BLD: ABNORMAL MM[HG]
PH BLDA: 7.3 PH UNITS (ref 7.35–7.45)
PH BLDA: 7.33 PH UNITS (ref 7.35–7.45)
PH BLDA: 7.37 PH UNITS (ref 7.35–7.45)
PH, TEMP CORRECTED: ABNORMAL
PLAT MORPH BLD: NORMAL
PLATELET # BLD AUTO: 231 10*3/MM3 (ref 140–450)
PLATELET # BLD AUTO: 247 10*3/MM3 (ref 140–450)
PMV BLD AUTO: 8.8 FL (ref 6–12)
PMV BLD AUTO: 9.1 FL (ref 6–12)
PO2 BLDA: 77.7 MM HG (ref 83–108)
PO2 BLDA: 92.4 MM HG (ref 83–108)
PO2 BLDA: 93.5 MM HG (ref 83–108)
PO2 TEMP ADJ BLD: ABNORMAL MM[HG]
POTASSIUM SERPL-SCNC: 3.9 MMOL/L (ref 3.5–5.2)
POTASSIUM SERPL-SCNC: 4.1 MMOL/L (ref 3.5–5.2)
PROT SERPL-MCNC: 6.3 G/DL (ref 6–8.5)
PROT SERPL-MCNC: 6.6 G/DL (ref 6–8.5)
QT INTERVAL: 374 MS
QTC INTERVAL: 462 MS
RBC # BLD AUTO: 5.03 10*6/MM3 (ref 4.14–5.8)
RBC # BLD AUTO: 5.04 10*6/MM3 (ref 4.14–5.8)
SAO2 % BLDCOA: 95.5 % (ref 94–99)
SAO2 % BLDCOA: 96.9 % (ref 94–99)
SAO2 % BLDCOA: 97.2 % (ref 94–99)
SARS-COV-2 RNA PNL SPEC NAA+PROBE: NOT DETECTED
SET MECH RESP RATE: 18
SODIUM SERPL-SCNC: 142 MMOL/L (ref 136–145)
SODIUM SERPL-SCNC: 144 MMOL/L (ref 136–145)
TROPONIN T SERPL-MCNC: 0.01 NG/ML (ref 0–0.03)
TROPONIN T SERPL-MCNC: <0.01 NG/ML (ref 0–0.03)
VENTILATOR MODE: ABNORMAL
WBC # BLD AUTO: 8.12 10*3/MM3 (ref 3.4–10.8)
WBC # BLD AUTO: 8.25 10*3/MM3 (ref 3.4–10.8)
WHOLE BLOOD HOLD SPECIMEN: NORMAL

## 2021-08-01 PROCEDURE — 87040 BLOOD CULTURE FOR BACTERIA: CPT | Performed by: INTERNAL MEDICINE

## 2021-08-01 PROCEDURE — 82375 ASSAY CARBOXYHB QUANT: CPT

## 2021-08-01 PROCEDURE — 63710000001 INSULIN ASPART PER 5 UNITS: Performed by: INTERNAL MEDICINE

## 2021-08-01 PROCEDURE — 82805 BLOOD GASES W/O2 SATURATION: CPT

## 2021-08-01 PROCEDURE — 25010000002 LEVOFLOXACIN PER 250 MG: Performed by: STUDENT IN AN ORGANIZED HEALTH CARE EDUCATION/TRAINING PROGRAM

## 2021-08-01 PROCEDURE — 87040 BLOOD CULTURE FOR BACTERIA: CPT | Performed by: EMERGENCY MEDICINE

## 2021-08-01 PROCEDURE — 25010000002 METHYLPREDNISOLONE PER 125 MG: Performed by: STUDENT IN AN ORGANIZED HEALTH CARE EDUCATION/TRAINING PROGRAM

## 2021-08-01 PROCEDURE — 63710000001 INSULIN DETEMIR PER 5 UNITS: Performed by: INTERNAL MEDICINE

## 2021-08-01 PROCEDURE — 87636 SARSCOV2 & INF A&B AMP PRB: CPT | Performed by: STUDENT IN AN ORGANIZED HEALTH CARE EDUCATION/TRAINING PROGRAM

## 2021-08-01 PROCEDURE — 82962 GLUCOSE BLOOD TEST: CPT

## 2021-08-01 PROCEDURE — 84484 ASSAY OF TROPONIN QUANT: CPT | Performed by: STUDENT IN AN ORGANIZED HEALTH CARE EDUCATION/TRAINING PROGRAM

## 2021-08-01 PROCEDURE — 36600 WITHDRAWAL OF ARTERIAL BLOOD: CPT

## 2021-08-01 PROCEDURE — 94799 UNLISTED PULMONARY SVC/PX: CPT

## 2021-08-01 PROCEDURE — 83050 HGB METHEMOGLOBIN QUAN: CPT

## 2021-08-01 PROCEDURE — 85025 COMPLETE CBC W/AUTO DIFF WBC: CPT | Performed by: STUDENT IN AN ORGANIZED HEALTH CARE EDUCATION/TRAINING PROGRAM

## 2021-08-01 PROCEDURE — 94660 CPAP INITIATION&MGMT: CPT

## 2021-08-01 PROCEDURE — 86140 C-REACTIVE PROTEIN: CPT | Performed by: EMERGENCY MEDICINE

## 2021-08-01 PROCEDURE — 71045 X-RAY EXAM CHEST 1 VIEW: CPT

## 2021-08-01 PROCEDURE — 25010000002 PIPERACILLIN SOD-TAZOBACTAM PER 1 G: Performed by: INTERNAL MEDICINE

## 2021-08-01 PROCEDURE — 36415 COLL VENOUS BLD VENIPUNCTURE: CPT

## 2021-08-01 PROCEDURE — 87205 SMEAR GRAM STAIN: CPT | Performed by: INTERNAL MEDICINE

## 2021-08-01 PROCEDURE — 80053 COMPREHEN METABOLIC PANEL: CPT | Performed by: INTERNAL MEDICINE

## 2021-08-01 PROCEDURE — 85007 BL SMEAR W/DIFF WBC COUNT: CPT | Performed by: STUDENT IN AN ORGANIZED HEALTH CARE EDUCATION/TRAINING PROGRAM

## 2021-08-01 PROCEDURE — 71250 CT THORAX DX C-: CPT

## 2021-08-01 PROCEDURE — 87070 CULTURE OTHR SPECIMN AEROBIC: CPT | Performed by: INTERNAL MEDICINE

## 2021-08-01 PROCEDURE — 63710000001 PREDNISONE PER 1 MG: Performed by: INTERNAL MEDICINE

## 2021-08-01 PROCEDURE — 99223 1ST HOSP IP/OBS HIGH 75: CPT | Performed by: INTERNAL MEDICINE

## 2021-08-01 PROCEDURE — 80053 COMPREHEN METABOLIC PANEL: CPT | Performed by: STUDENT IN AN ORGANIZED HEALTH CARE EDUCATION/TRAINING PROGRAM

## 2021-08-01 PROCEDURE — 85025 COMPLETE CBC W/AUTO DIFF WBC: CPT | Performed by: INTERNAL MEDICINE

## 2021-08-01 PROCEDURE — 94640 AIRWAY INHALATION TREATMENT: CPT

## 2021-08-01 PROCEDURE — 25010000002 KETOROLAC TROMETHAMINE PER 15 MG: Performed by: STUDENT IN AN ORGANIZED HEALTH CARE EDUCATION/TRAINING PROGRAM

## 2021-08-01 PROCEDURE — 25010000002 HYDRALAZINE PER 20 MG: Performed by: INTERNAL MEDICINE

## 2021-08-01 PROCEDURE — 83880 ASSAY OF NATRIURETIC PEPTIDE: CPT | Performed by: STUDENT IN AN ORGANIZED HEALTH CARE EDUCATION/TRAINING PROGRAM

## 2021-08-01 RX ORDER — GABAPENTIN 300 MG/1
600 CAPSULE ORAL EVERY 12 HOURS SCHEDULED
Status: DISCONTINUED | OUTPATIENT
Start: 2021-08-01 | End: 2021-08-12 | Stop reason: HOSPADM

## 2021-08-01 RX ORDER — HEPARIN SODIUM 5000 [USP'U]/ML
5000 INJECTION, SOLUTION INTRAVENOUS; SUBCUTANEOUS EVERY 12 HOURS SCHEDULED
Status: DISCONTINUED | OUTPATIENT
Start: 2021-08-01 | End: 2021-08-01

## 2021-08-01 RX ORDER — FUROSEMIDE 40 MG/1
40 TABLET ORAL
Status: CANCELLED | OUTPATIENT
Start: 2021-08-01

## 2021-08-01 RX ORDER — SODIUM CHLORIDE 0.9 % (FLUSH) 0.9 %
10 SYRINGE (ML) INJECTION AS NEEDED
Status: DISCONTINUED | OUTPATIENT
Start: 2021-08-01 | End: 2021-08-12 | Stop reason: HOSPADM

## 2021-08-01 RX ORDER — BUMETANIDE 1 MG/1
2 TABLET ORAL DAILY
Status: DISCONTINUED | OUTPATIENT
Start: 2021-08-02 | End: 2021-08-07

## 2021-08-01 RX ORDER — IPRATROPIUM BROMIDE AND ALBUTEROL SULFATE 2.5; .5 MG/3ML; MG/3ML
3 SOLUTION RESPIRATORY (INHALATION) EVERY 4 HOURS PRN
Status: DISCONTINUED | OUTPATIENT
Start: 2021-08-01 | End: 2021-08-11

## 2021-08-01 RX ORDER — LEVOFLOXACIN 5 MG/ML
750 INJECTION, SOLUTION INTRAVENOUS EVERY 24 HOURS
Status: COMPLETED | OUTPATIENT
Start: 2021-08-01 | End: 2021-08-01

## 2021-08-01 RX ORDER — ALBUTEROL SULFATE 2.5 MG/3ML
2.5 SOLUTION RESPIRATORY (INHALATION) EVERY 6 HOURS PRN
Status: DISCONTINUED | OUTPATIENT
Start: 2021-08-01 | End: 2021-08-11

## 2021-08-01 RX ORDER — BUDESONIDE AND FORMOTEROL FUMARATE DIHYDRATE 160; 4.5 UG/1; UG/1
2 AEROSOL RESPIRATORY (INHALATION)
Status: DISCONTINUED | OUTPATIENT
Start: 2021-08-01 | End: 2021-08-12 | Stop reason: HOSPADM

## 2021-08-01 RX ORDER — TAMSULOSIN HYDROCHLORIDE 0.4 MG/1
0.4 CAPSULE ORAL NIGHTLY
Status: DISCONTINUED | OUTPATIENT
Start: 2021-08-01 | End: 2021-08-12 | Stop reason: HOSPADM

## 2021-08-01 RX ORDER — AMOXICILLIN 250 MG
2 CAPSULE ORAL 2 TIMES DAILY
Status: DISCONTINUED | OUTPATIENT
Start: 2021-08-01 | End: 2021-08-12 | Stop reason: HOSPADM

## 2021-08-01 RX ORDER — BUMETANIDE 1 MG/1
2 TABLET ORAL DAILY
Status: CANCELLED | OUTPATIENT
Start: 2021-08-01

## 2021-08-01 RX ORDER — NICOTINE POLACRILEX 4 MG
15 LOZENGE BUCCAL
Status: DISCONTINUED | OUTPATIENT
Start: 2021-08-01 | End: 2021-08-12 | Stop reason: HOSPADM

## 2021-08-01 RX ORDER — ECHINACEA PURPUREA EXTRACT 125 MG
2 TABLET ORAL AS NEEDED
Status: DISCONTINUED | OUTPATIENT
Start: 2021-08-01 | End: 2021-08-12 | Stop reason: HOSPADM

## 2021-08-01 RX ORDER — FLUTICASONE PROPIONATE 50 MCG
2 SPRAY, SUSPENSION (ML) NASAL DAILY
Status: DISCONTINUED | OUTPATIENT
Start: 2021-08-01 | End: 2021-08-12 | Stop reason: HOSPADM

## 2021-08-01 RX ORDER — HYDROCODONE BITARTRATE AND ACETAMINOPHEN 7.5; 325 MG/1; MG/1
1 TABLET ORAL EVERY 12 HOURS
COMMUNITY

## 2021-08-01 RX ORDER — HYDROCODONE BITARTRATE AND ACETAMINOPHEN 7.5; 325 MG/1; MG/1
1 TABLET ORAL 2 TIMES DAILY
Status: DISCONTINUED | OUTPATIENT
Start: 2021-08-01 | End: 2021-08-12 | Stop reason: HOSPADM

## 2021-08-01 RX ORDER — LIDOCAINE 50 MG/G
1 PATCH TOPICAL NIGHTLY
Status: DISCONTINUED | OUTPATIENT
Start: 2021-08-01 | End: 2021-08-12 | Stop reason: HOSPADM

## 2021-08-01 RX ORDER — ATORVASTATIN CALCIUM 20 MG/1
20 TABLET, FILM COATED ORAL DAILY
Status: DISCONTINUED | OUTPATIENT
Start: 2021-08-02 | End: 2021-08-12 | Stop reason: HOSPADM

## 2021-08-01 RX ORDER — POTASSIUM CHLORIDE 750 MG/1
10 TABLET, EXTENDED RELEASE ORAL DAILY
COMMUNITY

## 2021-08-01 RX ORDER — AMOXICILLIN AND CLAVULANATE POTASSIUM 875; 125 MG/1; MG/1
1 TABLET, FILM COATED ORAL EVERY 12 HOURS SCHEDULED
Status: CANCELLED | OUTPATIENT
Start: 2021-08-01 | End: 2021-08-08

## 2021-08-01 RX ORDER — SODIUM CHLORIDE 0.9 % (FLUSH) 0.9 %
10 SYRINGE (ML) INJECTION EVERY 12 HOURS SCHEDULED
Status: DISCONTINUED | OUTPATIENT
Start: 2021-08-01 | End: 2021-08-12 | Stop reason: HOSPADM

## 2021-08-01 RX ORDER — FAMOTIDINE 10 MG/ML
20 INJECTION, SOLUTION INTRAVENOUS EVERY 12 HOURS SCHEDULED
Status: DISCONTINUED | OUTPATIENT
Start: 2021-08-01 | End: 2021-08-01

## 2021-08-01 RX ORDER — BUMETANIDE 2 MG/1
2 TABLET ORAL DAILY
COMMUNITY

## 2021-08-01 RX ORDER — GABAPENTIN 600 MG/1
600 TABLET ORAL 2 TIMES DAILY
COMMUNITY

## 2021-08-01 RX ORDER — BUMETANIDE 0.25 MG/ML
1 INJECTION INTRAMUSCULAR; INTRAVENOUS ONCE
Status: COMPLETED | OUTPATIENT
Start: 2021-08-01 | End: 2021-08-01

## 2021-08-01 RX ORDER — METHYLPREDNISOLONE SODIUM SUCCINATE 125 MG/2ML
125 INJECTION, POWDER, LYOPHILIZED, FOR SOLUTION INTRAMUSCULAR; INTRAVENOUS ONCE
Status: COMPLETED | OUTPATIENT
Start: 2021-08-01 | End: 2021-08-01

## 2021-08-01 RX ORDER — PREDNISONE 20 MG/1
40 TABLET ORAL
Status: COMPLETED | OUTPATIENT
Start: 2021-08-01 | End: 2021-08-05

## 2021-08-01 RX ORDER — CARVEDILOL 25 MG/1
25 TABLET ORAL 2 TIMES DAILY WITH MEALS
Status: DISCONTINUED | OUTPATIENT
Start: 2021-08-01 | End: 2021-08-12 | Stop reason: HOSPADM

## 2021-08-01 RX ORDER — POTASSIUM CHLORIDE 750 MG/1
10 TABLET, FILM COATED, EXTENDED RELEASE ORAL DAILY
Status: DISCONTINUED | OUTPATIENT
Start: 2021-08-02 | End: 2021-08-12 | Stop reason: HOSPADM

## 2021-08-01 RX ORDER — IPRATROPIUM BROMIDE AND ALBUTEROL SULFATE 2.5; .5 MG/3ML; MG/3ML
3 SOLUTION RESPIRATORY (INHALATION) ONCE
Status: COMPLETED | OUTPATIENT
Start: 2021-08-01 | End: 2021-08-01

## 2021-08-01 RX ORDER — CARVEDILOL 25 MG/1
25 TABLET ORAL 2 TIMES DAILY WITH MEALS
COMMUNITY

## 2021-08-01 RX ORDER — KETOROLAC TROMETHAMINE 30 MG/ML
15 INJECTION, SOLUTION INTRAMUSCULAR; INTRAVENOUS EVERY 6 HOURS PRN
Status: DISCONTINUED | OUTPATIENT
Start: 2021-08-01 | End: 2021-08-01

## 2021-08-01 RX ORDER — BUDESONIDE AND FORMOTEROL FUMARATE DIHYDRATE 160; 4.5 UG/1; UG/1
2 AEROSOL RESPIRATORY (INHALATION)
COMMUNITY
End: 2021-08-12 | Stop reason: HOSPADM

## 2021-08-01 RX ORDER — FUROSEMIDE 40 MG/1
40 TABLET ORAL 2 TIMES DAILY
COMMUNITY
End: 2021-08-12 | Stop reason: HOSPADM

## 2021-08-01 RX ORDER — PREDNISONE 20 MG/1
60 TABLET ORAL DAILY
Status: CANCELLED | OUTPATIENT
Start: 2021-08-01 | End: 2021-08-03

## 2021-08-01 RX ORDER — AMOXICILLIN AND CLAVULANATE POTASSIUM 875; 125 MG/1; MG/1
1 TABLET, FILM COATED ORAL 2 TIMES DAILY
COMMUNITY
End: 2021-08-12 | Stop reason: HOSPADM

## 2021-08-01 RX ORDER — PANTOPRAZOLE SODIUM 40 MG/1
40 TABLET, DELAYED RELEASE ORAL DAILY
Status: DISCONTINUED | OUTPATIENT
Start: 2021-08-01 | End: 2021-08-12 | Stop reason: HOSPADM

## 2021-08-01 RX ORDER — DEXTROSE MONOHYDRATE 25 G/50ML
25 INJECTION, SOLUTION INTRAVENOUS
Status: DISCONTINUED | OUTPATIENT
Start: 2021-08-01 | End: 2021-08-12 | Stop reason: HOSPADM

## 2021-08-01 RX ORDER — PREDNISONE 20 MG/1
60 TABLET ORAL DAILY
COMMUNITY
End: 2021-08-12 | Stop reason: HOSPADM

## 2021-08-01 RX ORDER — IPRATROPIUM BROMIDE AND ALBUTEROL SULFATE 2.5; .5 MG/3ML; MG/3ML
3 SOLUTION RESPIRATORY (INHALATION)
Status: DISCONTINUED | OUTPATIENT
Start: 2021-08-01 | End: 2021-08-11

## 2021-08-01 RX ORDER — IPRATROPIUM BROMIDE AND ALBUTEROL SULFATE 2.5; .5 MG/3ML; MG/3ML
3 SOLUTION RESPIRATORY (INHALATION)
Status: DISCONTINUED | OUTPATIENT
Start: 2021-08-01 | End: 2021-08-01 | Stop reason: ALTCHOICE

## 2021-08-01 RX ORDER — HYDRALAZINE HYDROCHLORIDE 20 MG/ML
10 INJECTION INTRAMUSCULAR; INTRAVENOUS EVERY 6 HOURS PRN
Status: DISCONTINUED | OUTPATIENT
Start: 2021-08-01 | End: 2021-08-12 | Stop reason: HOSPADM

## 2021-08-01 RX ADMIN — FLUTICASONE PROPIONATE 2 SPRAY: 50 SPRAY, METERED NASAL at 20:25

## 2021-08-01 RX ADMIN — SODIUM CHLORIDE, PRESERVATIVE FREE 10 ML: 5 INJECTION INTRAVENOUS at 08:09

## 2021-08-01 RX ADMIN — FAMOTIDINE 20 MG: 10 INJECTION INTRAVENOUS at 08:09

## 2021-08-01 RX ADMIN — PIPERACILLIN SODIUM AND TAZOBACTAM SODIUM 3.38 G: 3; .375 INJECTION, POWDER, LYOPHILIZED, FOR SOLUTION INTRAVENOUS at 08:08

## 2021-08-01 RX ADMIN — PREDNISONE 40 MG: 20 TABLET ORAL at 17:03

## 2021-08-01 RX ADMIN — CARVEDILOL 25 MG: 25 TABLET, FILM COATED ORAL at 20:24

## 2021-08-01 RX ADMIN — HYDRALAZINE HYDROCHLORIDE 10 MG: 20 INJECTION INTRAMUSCULAR; INTRAVENOUS at 17:10

## 2021-08-01 RX ADMIN — METHYLPREDNISOLONE SODIUM SUCCINATE 125 MG: 125 INJECTION, POWDER, FOR SOLUTION INTRAMUSCULAR; INTRAVENOUS at 00:47

## 2021-08-01 RX ADMIN — INSULIN ASPART 4 UNITS: 100 INJECTION, SOLUTION INTRAVENOUS; SUBCUTANEOUS at 08:08

## 2021-08-01 RX ADMIN — IPRATROPIUM BROMIDE AND ALBUTEROL SULFATE 3 ML: .5; 3 SOLUTION RESPIRATORY (INHALATION) at 06:54

## 2021-08-01 RX ADMIN — LIDOCAINE 1 PATCH: 50 PATCH CUTANEOUS at 20:25

## 2021-08-01 RX ADMIN — APIXABAN 5 MG: 5 TABLET, FILM COATED ORAL at 17:03

## 2021-08-01 RX ADMIN — IPRATROPIUM BROMIDE AND ALBUTEROL SULFATE 3 ML: .5; 3 SOLUTION RESPIRATORY (INHALATION) at 04:21

## 2021-08-01 RX ADMIN — TAMSULOSIN HYDROCHLORIDE 0.4 MG: 0.4 CAPSULE ORAL at 20:24

## 2021-08-01 RX ADMIN — PANTOPRAZOLE SODIUM 40 MG: 40 TABLET, DELAYED RELEASE ORAL at 20:24

## 2021-08-01 RX ADMIN — INSULIN ASPART 4 UNITS: 100 INJECTION, SOLUTION INTRAVENOUS; SUBCUTANEOUS at 11:11

## 2021-08-01 RX ADMIN — METRONIDAZOLE 500 MG: 500 INJECTION, SOLUTION INTRAVENOUS at 08:09

## 2021-08-01 RX ADMIN — INSULIN DETEMIR 30 UNITS: 100 INJECTION, SOLUTION SUBCUTANEOUS at 17:02

## 2021-08-01 RX ADMIN — HYDROCODONE BITARTRATE AND ACETAMINOPHEN 1 TABLET: 7.5; 325 TABLET ORAL at 20:24

## 2021-08-01 RX ADMIN — INSULIN ASPART 2 UNITS: 100 INJECTION, SOLUTION INTRAVENOUS; SUBCUTANEOUS at 17:03

## 2021-08-01 RX ADMIN — GABAPENTIN 600 MG: 300 CAPSULE ORAL at 20:24

## 2021-08-01 RX ADMIN — BUDESONIDE AND FORMOTEROL FUMARATE DIHYDRATE 2 PUFF: 160; 4.5 AEROSOL RESPIRATORY (INHALATION) at 22:25

## 2021-08-01 RX ADMIN — DOCUSATE SODIUM 50 MG AND SENNOSIDES 8.6 MG 2 TABLET: 8.6; 5 TABLET, FILM COATED ORAL at 20:24

## 2021-08-01 RX ADMIN — IPRATROPIUM BROMIDE AND ALBUTEROL SULFATE 3 ML: .5; 3 SOLUTION RESPIRATORY (INHALATION) at 19:16

## 2021-08-01 RX ADMIN — PIPERACILLIN SODIUM AND TAZOBACTAM SODIUM 3.38 G: 3; .375 INJECTION, POWDER, LYOPHILIZED, FOR SOLUTION INTRAVENOUS at 23:28

## 2021-08-01 RX ADMIN — LEVOFLOXACIN 750 MG: 5 INJECTION, SOLUTION INTRAVENOUS at 04:30

## 2021-08-01 RX ADMIN — BUMETANIDE 1 MG: 0.25 INJECTION, SOLUTION INTRAMUSCULAR; INTRAVENOUS at 11:11

## 2021-08-01 RX ADMIN — IPRATROPIUM BROMIDE AND ALBUTEROL SULFATE 3 ML: .5; 3 SOLUTION RESPIRATORY (INHALATION) at 13:21

## 2021-08-01 RX ADMIN — PIPERACILLIN SODIUM AND TAZOBACTAM SODIUM 3.38 G: 3; .375 INJECTION, POWDER, LYOPHILIZED, FOR SOLUTION INTRAVENOUS at 14:19

## 2021-08-01 RX ADMIN — INSULIN ASPART 4 UNITS: 100 INJECTION, SOLUTION INTRAVENOUS; SUBCUTANEOUS at 20:29

## 2021-08-01 RX ADMIN — SODIUM CHLORIDE, PRESERVATIVE FREE 10 ML: 5 INJECTION INTRAVENOUS at 20:25

## 2021-08-01 NOTE — ED PROVIDER NOTES
Subjective   65-year-old male with past medical history of COPD, diabetes, hypertension, atrial fibrillation presented to the ER with a complaint of increasing shortness of breath and cough over the last 2 to 3 days.  Patient is also a paraplegic after a traumatic injury that occurred separators prior to his presentation.  Patient does require supplemental oxygen at home.  Patient denied chest pain.  Noted cough without significant sputum production or hemoptysis.  Denied obvious signs of fever or chills.  Patient noted significant fatigue and malaise.  Denied obvious alleviating factors.  Denied obvious aggravating factors.  Vitals stable          Review of Systems   Constitutional: Positive for chills and fatigue.   Respiratory: Positive for cough and shortness of breath.    Musculoskeletal: Positive for myalgias.   All other systems reviewed and are negative.      Past Medical History:   Diagnosis Date   • Atrial fibrillation (CMS/HCC)    • COPD (chronic obstructive pulmonary disease) (CMS/HCC)    • Diabetes mellitus (CMS/HCC)    • GERD (gastroesophageal reflux disease)    • Hyperlipidemia    • Hypertension    • Myocardial infarction (CMS/HCC)    • Neuropathy        Allergies   Allergen Reactions   • Codeine Nausea And Vomiting   • Ciprofloxacin Diarrhea       Past Surgical History:   Procedure Laterality Date   • ABDOMINAL SURGERY     • APPENDECTOMY     • CARDIAC CATHETERIZATION     • CORONARY STENT PLACEMENT         Family History   Problem Relation Age of Onset   • Hypertension Mother    • Heart attack Mother    • Hypertension Father        Social History     Socioeconomic History   • Marital status:      Spouse name: Not on file   • Number of children: Not on file   • Years of education: Not on file   • Highest education level: Not on file   Tobacco Use   • Smoking status: Former Smoker     Packs/day: 2.00     Years: 30.00     Pack years: 60.00     Types: Cigarettes   • Smokeless tobacco: Current User      Types: Snuff   Substance and Sexual Activity   • Alcohol use: No   • Drug use: No   • Sexual activity: Defer           Objective   Physical Exam  Constitutional:       General: He is not in acute distress.     Appearance: He is well-developed. He is not ill-appearing.   HENT:      Head: Normocephalic and atraumatic.   Eyes:      Extraocular Movements: Extraocular movements intact.      Pupils: Pupils are equal, round, and reactive to light.   Neck:      Vascular: No JVD.   Cardiovascular:      Rate and Rhythm: Normal rate and regular rhythm.      Heart sounds: Normal heart sounds. No murmur heard.     Pulmonary:      Effort: No tachypnea, accessory muscle usage or respiratory distress.      Breath sounds: No stridor. Examination of the right-upper field reveals decreased breath sounds and wheezing. Examination of the left-upper field reveals decreased breath sounds and wheezing. Examination of the right-middle field reveals decreased breath sounds. Examination of the left-middle field reveals decreased breath sounds. Examination of the right-lower field reveals decreased breath sounds. Examination of the left-lower field reveals decreased breath sounds. Decreased breath sounds and wheezing present. No rhonchi or rales.   Chest:      Chest wall: No deformity, tenderness or crepitus.   Abdominal:      General: Bowel sounds are normal.      Palpations: Abdomen is soft.      Tenderness: There is no abdominal tenderness. There is no guarding or rebound.   Musculoskeletal:         General: Normal range of motion.      Cervical back: Normal range of motion and neck supple.      Right lower leg: No tenderness. No edema.      Left lower leg: No tenderness. No edema.   Lymphadenopathy:      Cervical: No cervical adenopathy.   Skin:     General: Skin is warm and dry.      Coloration: Skin is not cyanotic.      Findings: No ecchymosis or erythema.   Neurological:      General: No focal deficit present.      Mental Status:  He is alert and oriented to person, place, and time.      Cranial Nerves: No cranial nerve deficit.      Motor: No weakness.   Psychiatric:         Mood and Affect: Mood normal. Mood is not anxious.         Behavior: Behavior normal. Behavior is not agitated.         Procedures           ED Course  ED Course as of Aug 24 0625   Sat Jul 31, 2021   2332 EKG: Sinus rhythm, rate 92, , QRS 94, QTc 462, frequent PVCs, NSST, no STEMI.    [DH]      ED Course User Index  [DH] Tito Castillo MD           CBC and CCP unremarkable.  Covid testing negative.  EKG no no acute abnormality.  Troponin within normal limits.  Chest x-ray noted concerns for possible left lower lobe pneumonia.  CT chest without contrast was ordered to confirm possible pneumonia findings.  CT chest results noted a bilateral pneumonia.  Patient was started on Levaquin IV.  Care was transitioned to night shift physician                                MDM    Final diagnoses:   Pneumonia of both lungs due to infectious organism, unspecified part of lung   Acute on chronic respiratory failure with hypoxia and hypercapnia (CMS/HCC)       ED Disposition  ED Disposition     ED Disposition Condition Comment    Decision to Admit  Level of Care: Progressive Care [20]   Diagnosis: Pneumonia of both lungs due to infectious organism, unspecified part of lung [[id number]]   Admitting Physician: FANNIE HERNANDEZ [[id number]]   Certification: I Certify That Inpatient Hospital Services Are Medically Necessary For Greater Than 2 Midnights            Rosi Thomas, OSVALDO  [address] [facility] [address]  [phone]      1 week of hospital follow up    Rosi Thomas APRN  [address] [facility] [address]  [phone]               Medication List      New Prescriptions    empagliflozin 10 MG tablet tablet  Commonly known as: Jardiance  Take 2 tablets by mouth Daily for 30 days.     lactulose 10 GM/15ML solution  Commonly known as: CHRONULAC  Take 30 mL by mouth  Daily for 30 days.     lisinopril 5 MG tablet  Commonly known as: PRINIVIL,ZESTRIL  Take 1 tablet by mouth Daily for 30 days.     polyethylene glycol 17 g packet  Commonly known as: MIRALAX  Take 17 g by mouth Daily for 30 days.        Stop    amoxicillin-clavulanate 875-125 MG per tablet  Commonly known as: AUGMENTIN     Anoro Ellipta 62.5-25 MCG/INH aerosol powder  inhaler  Generic drug: umeclidinium-vilanterol     Budeson-Glycopyrrol-Formoterol 160-9-4.8 MCG/ACT aerosol inhaler  Commonly known as: BREZTRI     budesonide-formoterol 160-4.5 MCG/ACT inhaler  Commonly known as: SYMBICORT     Farxiga 10 MG tablet  Generic drug: Dapagliflozin Propanediol     furosemide 40 MG tablet  Commonly known as: LASIX     predniSONE 20 MG tablet  Commonly known as: DELTASONE        ASK your doctor about these medications    doxycycline 100 MG capsule  Commonly known as: MONODOX  Take 1 capsule by mouth Every 12 (Twelve) Hours for 7 doses. Indications: Upper Respiratory Tract Infection  Ask about: Should I take this medication?           Where to Get Your Medications      These medications were sent to [facility] [address]  - [phone] [facility] [phone] FX  [address] [place] [address]    Phone: [phone]   · doxycycline 100 MG capsule  · empagliflozin 10 MG tablet tablet  · lactulose 10 GM/15ML solution  · lisinopril 5 MG tablet  · polyethylene glycol 17 g packet          Timi Greco DO  08/01/21 8164       Timi Greco DO  08/24/21 3851

## 2021-08-01 NOTE — ED PROVIDER NOTES
Patient received in signout from Dr Greco.  Pending admission.  He has had worsening in his respiratory status, repeat ABG with increasing hypercapnia.  Placed on BiPAP.  Broadened antibiotics with Flagyl to cover for possible aspiration.  Plan for admission.  Discussed with Dr. Flores.     Tito Castillo MD  08/01/21 5099

## 2021-08-01 NOTE — PLAN OF CARE
Goal Outcome Evaluation:      Pt resting in bed. VSS. NPPV/NIV. Afebrile. A&O. Call light in reach.

## 2021-08-01 NOTE — PLAN OF CARE
Problem: Fall Injury Risk  Goal: Absence of Fall and Fall-Related Injury  Outcome: Ongoing, Progressing  Intervention: Identify and Manage Contributors to Fall Injury Risk  Recent Flowsheet Documentation  Taken 8/1/2021 1700 by Faviola Garcia RN  Medication Review/Management: medications reviewed  Taken 8/1/2021 1504 by Faviola Garcia RN  Medication Review/Management: medications reviewed  Taken 8/1/2021 1303 by Faviola Garcia RN  Medication Review/Management: medications reviewed  Taken 8/1/2021 1102 by Faviola Garcia RN  Medication Review/Management: medications reviewed  Taken 8/1/2021 0903 by Faviola Garcia RN  Medication Review/Management: medications reviewed  Intervention: Promote Injury-Free Environment  Recent Flowsheet Documentation  Taken 8/1/2021 1700 by Faviola Garcia RN  Safety Promotion/Fall Prevention:  • activity supervised  • assistive device/personal items within reach  • clutter free environment maintained  Taken 8/1/2021 1504 by Faviola Garcia RN  Safety Promotion/Fall Prevention:  • activity supervised  • assistive device/personal items within reach  • clutter free environment maintained  Taken 8/1/2021 1303 by Faviola Garcia RN  Safety Promotion/Fall Prevention:  • activity supervised  • assistive device/personal items within reach  • clutter free environment maintained  Taken 8/1/2021 1102 by Faviola Garcia RN  Safety Promotion/Fall Prevention:  • activity supervised  • assistive device/personal items within reach  • clutter free environment maintained  Taken 8/1/2021 0903 by Faviola Garcia RN  Safety Promotion/Fall Prevention:  • activity supervised  • assistive device/personal items within reach  • clutter free environment maintained     Problem: Skin Injury Risk Increased  Goal: Skin Health and Integrity  Outcome: Ongoing, Progressing  Intervention: Optimize Skin Protection  Recent Flowsheet Documentation  Taken 8/1/2021 1400 by Faviola Garcia RN  Pressure Reduction Techniques: weight shift  assistance provided  Pressure Reduction Devices: pressure-redistributing mattress utilized  Skin Protection:  • adhesive use limited  • incontinence pads utilized  • tubing/devices free from skin contact  Taken 8/1/2021 0800 by Faviola Garcia, RN  Pressure Reduction Techniques: weight shift assistance provided  Pressure Reduction Devices: pressure-redistributing mattress utilized  Skin Protection:  • adhesive use limited  • incontinence pads utilized  • tubing/devices free from skin contact     Problem: Adult Inpatient Plan of Care  Goal: Plan of Care Review  Outcome: Ongoing, Progressing  Flowsheets (Taken 8/1/2021 1743)  Progress: improving  Plan of Care Reviewed With: patient  Outcome Summary: /109, otherwise VSS. Pt given PRN hydralazine. Pt came off bipap this morning and has since been on 2.5L humidified NC and is tolerating well. Pt c/o chronic pain in neck and back, MD aware, waiting on pharmacy to verify home med list. Pt passed bedside swallow eval and is now on reg consistent carb diet. Otherwise, no further complaints. Call bell within reach. Will continue to monitor.  Goal: Patient-Specific Goal (Individualized)  Outcome: Ongoing, Progressing  Goal: Absence of Hospital-Acquired Illness or Injury  Outcome: Ongoing, Progressing  Intervention: Identify and Manage Fall Risk  Recent Flowsheet Documentation  Taken 8/1/2021 1700 by Faviola Garcia, MARCELO  Safety Promotion/Fall Prevention:  • activity supervised  • assistive device/personal items within reach  • clutter free environment maintained  Taken 8/1/2021 1504 by Faviola Garcia, MARCELO  Safety Promotion/Fall Prevention:  • activity supervised  • assistive device/personal items within reach  • clutter free environment maintained  Taken 8/1/2021 1303 by Faviola Garcia, MARCELO  Safety Promotion/Fall Prevention:  • activity supervised  • assistive device/personal items within reach  • clutter free environment maintained  Taken 8/1/2021 1102 by Faviola Garcia, MARCELO  Safety  Promotion/Fall Prevention:  • activity supervised  • assistive device/personal items within reach  • clutter free environment maintained  Taken 8/1/2021 0903 by Faviola Garcia RN  Safety Promotion/Fall Prevention:  • activity supervised  • assistive device/personal items within reach  • clutter free environment maintained  Intervention: Prevent Skin Injury  Recent Flowsheet Documentation  Taken 8/1/2021 1400 by Faviola Garcia RN  Skin Protection:  • adhesive use limited  • incontinence pads utilized  • tubing/devices free from skin contact  Taken 8/1/2021 0800 by Faviola Garcia RN  Skin Protection:  • adhesive use limited  • incontinence pads utilized  • tubing/devices free from skin contact  Intervention: Prevent and Manage VTE (venous thromboembolism) Risk  Recent Flowsheet Documentation  Taken 8/1/2021 1400 by Faviola Garcia RN  VTE Prevention/Management: (eliquis) bleeding risk factor(s) identified  Taken 8/1/2021 0800 by Faviola Garcia RN  VTE Prevention/Management: (eliquis) bleeding risk factor(s) identified  Goal: Optimal Comfort and Wellbeing  Outcome: Ongoing, Progressing  Intervention: Provide Person-Centered Care  Recent Flowsheet Documentation  Taken 8/1/2021 1400 by Faviola Garcia RN  Trust Relationship/Rapport:  • care explained  • questions answered  • thoughts/feelings acknowledged  Taken 8/1/2021 0800 by Faviola Garcia RN  Trust Relationship/Rapport:  • care explained  • questions encouraged  • thoughts/feelings acknowledged  Goal: Readiness for Transition of Care  Outcome: Ongoing, Progressing       Goal Outcome Evaluation:  Plan of Care Reviewed With: patient        Progress: improving  Outcome Summary: /109, otherwise VSS. Pt given PRN hydralazine. Pt came off bipap this morning and has since been on 2.5L humidified NC and is tolerating well. Pt c/o chronic pain in neck and back, MD aware, waiting on pharmacy to verify home med list. Pt passed bedside swallow eval and is now on reg consistent  carb diet. Otherwise, no further complaints. Call bell within reach. Will continue to monitor.   Increased O2 to 3L per patient request. 96%. Called respiratory to put bipap back on patient also per request.

## 2021-08-01 NOTE — ED NOTES
I went to obtain patient's second troponin, he is gone to CT at this time.     Solitario Arriaga  08/01/21 0232

## 2021-08-01 NOTE — PROGRESS NOTES
Subjective     History:   Иван Miller is a 65 y.o. male admitted on 7/31/2021 secondary to Pneumonia of both lungs due to infectious organism     Procedures: None    CC: Follow up pneumonia, resp failure    Patient seen and examined with MARCELO Loza. Awake and alert. Transitioned off BiPAP to NC this AM. Reports cough and dyspnea. Reports chronic back and neck pain. No reported CP. No reported nausea or vomiting. No acute events overnight per RN.     History taken from: patient, chart, and RN.      Objective     Vital Signs  Temp:  [97.1 °F (36.2 °C)-98.6 °F (37 °C)] 97.8 °F (36.6 °C)  Heart Rate:  [70-88] 82  Resp:  [18-24] 22  BP: (148-190)/() 169/90    Intake/Output Summary (Last 24 hours) at 8/1/2021 1421  Last data filed at 8/1/2021 1419  Gross per 24 hour   Intake 1140 ml   Output --   Net 1140 ml         Physical Exam:  General:    Awake, alert, in no acute distress   Heart:      Normal S1 and S2. Regular rate and rhythm. No significant murmur, rubs or gallops appreciated.   Lungs:     Respirations regular, even and unlabored. Diminished breath sounds throughout. No wheezes, rales or rhonchi.   Abdomen:   Soft and nontender. No guarding, rebound tenderness or  organomegaly noted. Bowel sounds present x 4.   Extremities:  Trace-1+ bilateral lower extremity edema. Moves UE and LE equally B/L.     Results Review:    Results from last 7 days   Lab Units 08/01/21  0805 08/01/21  0030   WBC 10*3/mm3 8.25 8.12   HEMOGLOBIN g/dL 12.4* 12.2*   PLATELETS 10*3/mm3 247 231     Results from last 7 days   Lab Units 08/01/21  0805 08/01/21  0030   SODIUM mmol/L 142 144   POTASSIUM mmol/L 3.9 4.1   CHLORIDE mmol/L 98 99   CO2 mmol/L 34.4* 37.4*   BUN mg/dL 26* 22   CREATININE mg/dL 0.77 0.82   CALCIUM mg/dL 9.3 9.3   GLUCOSE mg/dL 238* 271*     Results from last 7 days   Lab Units 08/01/21  0805 08/01/21  0030   BILIRUBIN mg/dL 0.3 0.2   ALK PHOS U/L 72 76   AST (SGOT) U/L 10 13   ALT (SGPT) U/L 15 18              Results from last 7 days   Lab Units 08/01/21 0310 08/01/21  0030   TROPONIN T ng/mL <0.010 0.013       Imaging Results (Last 24 Hours)     Procedure Component Value Units Date/Time    CT Chest Without Contrast Diagnostic [[id number]] Collected: 08/01/21 0311     Updated: 08/01/21 0313    Narrative:      CT Chest WO    INDICATION:   Dyspnea. Chronic dyspnea.    TECHNIQUE:   CT of the thorax without IV contrast. Coronal and sagittal reconstructions were obtained.  Radiation dose reduction techniques included automated exposure control or exposure modulation based on body size. Count of known CT and cardiac nuc med studies  performed in previous 12 months: 5.     COMPARISON:   5/22/2021    FINDINGS:  There is new patchy atelectasis and probable curvilinear atelectasis in the left lower lobe favored over pneumonia. There is new consolidation in the posterior left upper lobe suggestive of pneumonia however.  Trace pleural thickening/pleural fluid left lung base. New left-sided lower lobe and to a lesser extent upper lobe volume loss. On the right, there is new right basilar consolidation suspicious for pneumonia and a trace amount of pleural  fluid/thickening. No new suspicious pulmonary nodule. Small amount of secretions in the trachea. Correlate with potential for aspiration pneumonia. No pneumothorax.    There is no pericardial effusion. No threshold adenopathy. Normal caliber aorta and atherosclerotic change. Advanced atherosclerotic change of the coronary arteries. Evidence of pulmonary arterial hypertension.    Included upper abdomen demonstrates no acute finding. Chronic advanced degenerative or postinflammatory change in the midthoracic spine.  negative.      Impression:        1. Chronic interstitial changes with new areas of superimposed consolidation suspicious for pneumonia in the left upper lobe and right lower lobe. Atelectasis favored over pneumonia in the left base. Follow-up to clearing  recommended. Consider the  possibility of aspiration event.  2. Pulmonary arterial hypertension.  3. Atherosclerotic disease.    Signer Name: Basilio Deng MD   Signed: 8/1/2021 3:11 AM   [facility]: [facility]    [facility]    XR Chest 1 View [[id number]] Collected: 08/01/21 0122     Updated: 08/01/21 0124    Narrative:      CR Chest 1 Vw    INDICATION:   Chest pain.     COMPARISON:    6/7/2021    FINDINGS:  Single portable AP view(s) of the chest.  Globular cardiomegaly. Pulmonary hyperinflation. Chronic appearing atelectasis/scarring in the left base. New interstitial prominence and streaky opacities in the right lung may reflect asymmetric edema  atelectasis or pneumonia. No pneumothorax. No new effusion.      Impression:        1. Cardiomegaly with new asymmetric interstitial prominence in the right lung that is nonspecific. No pneumothorax.    Signer Name: Basilio Deng MD   Signed: 8/1/2021 1:22 AM   [facility]: [facility]    [facility]            Medications:  famotidine, 20 mg, Intravenous, Q12H  insulin aspart, 0-9 Units, Subcutaneous, 4x Daily AC & at Bedtime  ipratropium-albuterol, 3 mL, Nebulization, 4x Daily - RT  piperacillin-tazobactam, 3.375 g, Intravenous, Q8H  sodium chloride, 10 mL, Intravenous, Q12H               Assessment/Plan   Bilateral pneumonia: CT chest reveals LUZ ELENA and RLL consolidation with concern for aspiration. Blood and sputum cultures pending. Speech therapy consulted. Cont Zosyn and nebs.     Acute exacerbation of COPD: Cont nebs. Start Prednisone.     Acute hypercapnic on chronic hypoxic respiratory failure: Likely 2/2 above. Improved with BiPAP. Cont treatment as outlined above. Cont supplemental O2.     Essential HTN, uncontrolled: Restart home meds once confirmed by pharmacy. Add PRN IV hydralazine.     DM II, inulin dependent with hyperglycemia: Restart basal insulin. Cont SSI with Accuchecks.     Recent right  ankle avulsion fracture with reported hx of paraplegia: PT/OT. Supportive treatment.     Hx of chronic thoracic osteomyelitis: Cont to monitor.     Hx of Afib: Currently in sinus rhythm. Restart home metoprolol and Eliquis once confirmed by pharmacy. Monitor on telemetry.     DVT PPX: Resume home Eliquis.     Disposition: May need rehab placement.       Mandeep Dunlap DO  08/01/21  14:21 EDT

## 2021-08-01 NOTE — ED NOTES
Pt's daughter wishes to be updated if there are any changes.     Annette   [phone]     Maureen Tran RN  08/01/21 2713

## 2021-08-01 NOTE — ED NOTES
[place]peranza-care performed and linens changed at this time. Pt tolerated well.     Maureen Tran, RN  08/01/21 0402

## 2021-08-01 NOTE — H&P
Hospitalist History and Physical    Patient Identification:  Name: Иван Miller  Age/Sex: 65 y.o. male  :  1955  MRN: [id number]        Admit Date: 2021   Primary Care Physician: Rosi Thomas APRN    Chief Complaint   Patient presents with   • Shortness of Breath   • Edema       History of Present Illness  Patient is a 65 y.o. male presents with the following: Cough, Shortness of breath    The patient is a 65-year-old male with past medical history significant for chronic respiratory failure currently on 3 L/min nasal cannula, COPD, morbid obesity, paraplegia after previous traumatic injury, diabetes mellitus and hypertension who presents to the emergency department complaining of progressive shortness of breath and cough.    The patient is currently on BiPAP also is somewhat difficult to obtain a complete history, however, he states that for the past 5 days or so he has had a productive cough with yellow sputum production and worsening shortness of air.  The patient reports chills but denies fever.  He reports nausea but denies vomiting.  He does report occasional dysphagia and states that he did have an possible aspiration event a few days ago but cannot at this time give me specifics regarding that.  The patient states that he does use CPAP at home and is currently using 3 L/min nasal cannula.  Patient states that yesterday he had had chest discomfort that was localized to the mid and left chest area.  He states that it was more of a soreness and did not radiate to the shoulders or his jaw area.  Patient is currently chest pain-free.    Patient's most recent hospitalization here was from 2021 through 2021 where the patient was treated for acute on chronic hypoxic and hypercapnic respiratory failure, diuretic induced NICKY and history of toxic encephalopathy in the setting of methamphetamine abuse and lethargy thought to be due to opioids and gabapentin. Patient also with a right ankle  "fracture at that time.  There was also question of chronic osteomyelitis; MRIs of his back were obtained at that time and did not reveal evidence of acute inflammatory change.  There was mainly question of osteomyelitis in the thoracic spine.  During that hospitalization, the case was discussed with neurosurgery in [place] and he felt that patient most likely had \"burnt out osteomyelitis\" and did not feel that it was acute.    CT chest without contrast revealed right lower lobe pneumonia and atelectasis favored in the left base.  Patient with pulmonary arterial hypertension.  Arterial blood gas on 3 L/min nasal cannula revealed a pH of 7.299, PCO2 81.6, PO2 92.4 and bicarbonate 40.0 with an oxygen saturation of 96.9%.  proBNP was minimally elevated at 956.2.  C-RP was within normal limits at less than 0.30.  Troponin T also negative.  CMP revealed 84.9% neutrophils but white blood cell count was normal at 8.12.    Patient was placed on BiPAP in the emergency department has been admitted to the progressive care unit for further evaluation and management.    Past History:  Past Medical History:   Diagnosis Date   • Atrial fibrillation (CMS/HCC)    • COPD (chronic obstructive pulmonary disease) (CMS/HCC)    • Diabetes mellitus (CMS/HCC)    • GERD (gastroesophageal reflux disease)    • Hyperlipidemia    • Hypertension    • Myocardial infarction (CMS/HCC)    • Neuropathy      Past Surgical History:   Procedure Laterality Date   • ABDOMINAL SURGERY     • APPENDECTOMY     • CARDIAC CATHETERIZATION     • CORONARY STENT PLACEMENT       Family History   Problem Relation Age of Onset   • Hypertension Mother    • Heart attack Mother    • Hypertension Father      Social History     Tobacco Use   • Smoking status: Former Smoker     Packs/day: 2.00     Years: 30.00     Pack years: 60.00     Types: Cigarettes   • Smokeless tobacco: Current User     Types: Snuff   Substance Use Topics   • Alcohol use: No   • Drug use: No "     Medications Prior to Admission   Medication Sig Dispense Refill Last Dose   • albuterol (PROVENTIL HFA;VENTOLIN HFA) 108 (90 Base) MCG/ACT inhaler Inhale 2 puffs Every 6 (Six) Hours As Needed for Wheezing.      • apixaban (ELIQUIS) 5 MG tablet tablet Take 5 mg by mouth 2 (Two) Times a Day.      • atorvastatin (LIPITOR) 20 MG tablet Take 20 mg by mouth Daily.      • Budeson-Glycopyrrol-Formoterol (BREZTRI) 160-9-4.8 MCG/ACT aerosol inhaler Inhale 2 puffs 2 (Two) Times a Day.      • bumetanide (BUMEX) 2 MG tablet Take 1 tablet by mouth Daily for 30 days. 30 tablet 0    • clopidogrel (PLAVIX) 75 MG tablet Take 1 tablet by mouth Daily. 30 tablet     • Dapagliflozin Propanediol (Farxiga) 10 MG tablet Take 10 mg by mouth Daily.      • fluticasone (FLONASE) 50 MCG/ACT nasal spray 2 sprays into the nostril(s) as directed by provider Daily.      • gabapentin (NEURONTIN) 100 MG capsule Take 1 capsule by mouth Every 12 (Twelve) Hours for 3 days. 6 capsule 0    • hydrOXYzine (ATARAX) 25 MG tablet Take 1 tablet by mouth 3 (Three) Times a Day As Needed for Anxiety.      • insulin aspart (novoLOG) 100 UNIT/ML injection Inject 12 Units under the skin into the appropriate area as directed 3 (Three) Times a Day With Meals.  12    • insulin detemir (LEVEMIR) 100 UNIT/ML injection Inject 60 Units under the skin into the appropriate area as directed Daily.  12    • ipratropium-albuterol (DUO-NEB) 0.5-2.5 mg/3 ml nebulizer Take 3 mL by nebulization Every 4 (Four) Hours As Needed for Wheezing.      • lidocaine (LIDODERM) 5 % Place 1 patch on the skin as directed by provider Daily. Prior to [facility] Admission, Patient was on:  Place on Shoulder or back and Remove & Discard patch within 12 hours or as directed by MD      • magnesium oxide (MAG-OX) 400 MG tablet Take 400 mg by mouth Daily.      • metFORMIN (GLUCOPHAGE) 1000 MG tablet Take 1,000 mg by mouth 2 (Two) Times a Day With Meals.      • metoprolol succinate XL (TOPROL-XL) 50 MG 24  hr tablet Take 3 tablets by mouth Daily.      • pantoprazole (PROTONIX) 40 MG EC tablet Take 40 mg by mouth Daily.      • sennosides-docusate (PERICOLACE) 8.6-50 MG per tablet Take 2 tablets by mouth 2 (Two) Times a Day.      • tamsulosin (FLOMAX) 0.4 MG capsule 24 hr capsule Take 1 capsule by mouth Every Night.        Allergies: Codeine and Ciprofloxacin    Review of Systems:  Review of Systems   HENT: Positive for trouble swallowing.    Respiratory: Positive for shortness of breath and wheezing.    Gastrointestinal: Positive for nausea and vomiting.   Skin: Negative for rash and wound.      Vital Signs  Temp:  [97.1 °F (36.2 °C)-98.6 °F (37 °C)] 97.1 °F (36.2 °C)  Heart Rate:  [70-88] 71  Resp:  [18-24] 21  BP: (150-190)/() 177/101  Body mass index is 47.84 kg/m².    Physical Exam:  Physical Exam  Constitutional:       General: He is not in acute distress.     Appearance: He is well-developed. He is morbidly obese.      Interventions: Face mask in place.   HENT:      Head: Normocephalic and atraumatic.   Eyes:      Conjunctiva/sclera: Conjunctivae normal.   Neck:      Trachea: No tracheal deviation.   Cardiovascular:      Rate and Rhythm: Normal rate and regular rhythm.      Pulses:           Posterior tibial pulses are 2+ on the right side and 2+ on the left side.      Heart sounds: No murmur heard.   No friction rub. No gallop.       Comments: Trace edema bilateral lower extremities  Pulmonary:      Effort: No respiratory distress.      Breath sounds: Examination of the right-lower field reveals decreased breath sounds. Examination of the left-lower field reveals decreased breath sounds. Decreased breath sounds present. No wheezing or rales.   Abdominal:      General: Bowel sounds are normal. There is no distension.      Palpations: Abdomen is soft.      Tenderness: There is no abdominal tenderness. There is no guarding.   Skin:     General: Skin is warm and dry.      Findings: No erythema or rash.    Neurological:      Mental Status: He is alert and oriented to person, place, and time.      Cranial Nerves: No cranial nerve deficit.         Results Review:    Results from last 7 days   Lab Units 08/01/21  0436   PH, ARTERIAL pH units 7.299*   PO2 ART mm Hg 92.4   PCO2, ARTERIAL mm Hg 81.6*   HCO3 ART mmol/L 40.0*       Results from last 7 days   Lab Units 08/01/21  0030   WBC 10*3/mm3 8.12   HEMOGLOBIN g/dL 12.2*   HEMATOCRIT % 43.3   PLATELETS 10*3/mm3 231     Results from last 7 days   Lab Units 08/01/21  0030   SODIUM mmol/L 144   POTASSIUM mmol/L 4.1   CHLORIDE mmol/L 99   CO2 mmol/L 37.4*   BUN mg/dL 22   CREATININE mg/dL 0.82   CALCIUM mg/dL 9.3   GLUCOSE mg/dL 271*     Results from last 7 days   Lab Units 08/01/21  0030   BILIRUBIN mg/dL 0.2   ALK PHOS U/L 76   AST (SGOT) U/L 13   ALT (SGPT) U/L 18     Results from last 7 days   Lab Units 08/01/21  0310   CRP mg/dL <0.30         Results from last 7 days   Lab Units 08/01/21  0310 08/01/21  0030   TROPONIN T ng/mL <0.010 0.013       Imaging:    I have personally reviewed the EKG; pending official cardiology interpretation, however, per my view the patient has a normal sinus rhythm with frequent PVCs;     CT chest images have been reviewed and per my view the patient does appear to have a right lower lobe pneumonia    Imaging Results (Most Recent)     Procedure Component Value Units Date/Time    CT Chest Without Contrast Diagnostic [[id number]] Collected: 08/01/21 0311     Updated: 08/01/21 0313    Narrative:      CT Chest WO    INDICATION:   Dyspnea. Chronic dyspnea.    TECHNIQUE:   CT of the thorax without IV contrast. Coronal and sagittal reconstructions were obtained.  Radiation dose reduction techniques included automated exposure control or exposure modulation based on body size. Count of known CT and cardiac nuc med studies  performed in previous 12 months: 5.     COMPARISON:   5/22/2021    FINDINGS:  There is new patchy atelectasis and  probable curvilinear atelectasis in the left lower lobe favored over pneumonia. There is new consolidation in the posterior left upper lobe suggestive of pneumonia however.  Trace pleural thickening/pleural fluid left lung base. New left-sided lower lobe and to a lesser extent upper lobe volume loss. On the right, there is new right basilar consolidation suspicious for pneumonia and a trace amount of pleural  fluid/thickening. No new suspicious pulmonary nodule. Small amount of secretions in the trachea. Correlate with potential for aspiration pneumonia. No pneumothorax.    There is no pericardial effusion. No threshold adenopathy. Normal caliber aorta and atherosclerotic change. Advanced atherosclerotic change of the coronary arteries. Evidence of pulmonary arterial hypertension.    Included upper abdomen demonstrates no acute finding. Chronic advanced degenerative or postinflammatory change in the midthoracic spine.  negative.      Impression:        1. Chronic interstitial changes with new areas of superimposed consolidation suspicious for pneumonia in the left upper lobe and right lower lobe. Atelectasis favored over pneumonia in the left base. Follow-up to clearing recommended. Consider the  possibility of aspiration event.  2. Pulmonary arterial hypertension.  3. Atherosclerotic disease.    Signer Name: Basilio Deng MD   Signed: 8/1/2021 3:11 AM   Workstation Name: [email or web address]    [facility] Chest 1 View [[id number]] Collected: 08/01/21 0122     Updated: 08/01/21 0124    Narrative:      CR Chest 1 Vw    INDICATION:   Chest pain.     COMPARISON:    6/7/2021    FINDINGS:  Single portable AP view(s) of the chest.  Globular cardiomegaly. Pulmonary hyperinflation. Chronic appearing atelectasis/scarring in the left base. New interstitial prominence and streaky opacities in the right lung may reflect asymmetric edema  atelectasis or pneumonia. No pneumothorax. No new effusion.       Impression:        1. Cardiomegaly with new asymmetric interstitial prominence in the right lung that is nonspecific. No pneumothorax.    Signer Name: Basilio Deng MD   Signed: 8/1/2021 1:22 AM   Workstation Name: [email or web address]-    [facility]          Assessment/Plan     -Acute hypercapnic on chronic hypoxic respiratory failure, present upon admission and secondary to right lower lobe aspiration pneumonia  -Acute COPD exacerbation  -Uncontrolled essential hypertension  -Morbid obesity  -Diabetes mellitus type 2 with hyperglycemia  -History of atrial fibrillation on chronic anticoagulation with Eliquis  -History of recent right ankle avulsion fracture  -Debility  -Reported history of paraplegia  -History of chronic thoracic osteomyelitis  -Gastroesophageal reflux disease    Patient has been admitted to the progressive care unit with aspiration precautions.  I will obtain a repeat ABG as he is currently on BiPAP.  Further management to follow review of ABG.  For now, patient has been started on intravenous Zosyn monotherapy.  I have started the patient on scheduled nebulized inhalants.  I will hold on steroid therapy as the patient is without wheezing on my exam.  I will obtain a respiratory culture.  I have consulted speech therapy for further evaluation and recommendations.    I am currently awaiting the patient's home medication list.  Will make as needed intravenous hydralazine available for now.    For now, the patient will be started on Accu-Cheks and sliding scale insulin as needed.  I have also started the patient on the hypoglycemia protocol if needed.  Plan to resume his home insulin regimen pending availability of his medication list.    I have consulted physical and occupational therapy as patient reports that he is interested in physical rehabilitation.    DVT/GI prophylaxis: Plan to resume home Eliquis/begin famotidine    Estimated Length of Stay: > 2 MNs    Patient is considered  to be high risk patient due to: Acute on chronic respiratory failure, morbid obesity, COPD, history of opiate dependency     Disposition unclear; patient may benefit from skilled nursing placement versus inpatient physical rehab prior to returning home    I discussed the patients findings and my recommendations with patient          Vivienne Flores DO  08/01/21  06:22 EDT

## 2021-08-02 LAB
ANION GAP SERPL CALCULATED.3IONS-SCNC: 11.2 MMOL/L (ref 5–15)
BASOPHILS # BLD AUTO: 0.01 10*3/MM3 (ref 0–0.2)
BASOPHILS NFR BLD AUTO: 0.1 % (ref 0–1.5)
BUN SERPL-MCNC: 31 MG/DL (ref 8–23)
BUN/CREAT SERPL: 35.6 (ref 7–25)
CALCIUM SPEC-SCNC: 9.3 MG/DL (ref 8.6–10.5)
CHLORIDE SERPL-SCNC: 98 MMOL/L (ref 98–107)
CO2 SERPL-SCNC: 31.8 MMOL/L (ref 22–29)
CREAT SERPL-MCNC: 0.87 MG/DL (ref 0.76–1.27)
CRP SERPL-MCNC: <0.3 MG/DL (ref 0–0.5)
DEPRECATED RDW RBC AUTO: 50.4 FL (ref 37–54)
EOSINOPHIL # BLD AUTO: 0 10*3/MM3 (ref 0–0.4)
EOSINOPHIL NFR BLD AUTO: 0 % (ref 0.3–6.2)
ERYTHROCYTE [DISTWIDTH] IN BLOOD BY AUTOMATED COUNT: 16.3 % (ref 12.3–15.4)
GFR SERPL CREATININE-BSD FRML MDRD: 88 ML/MIN/1.73
GLUCOSE BLDC GLUCOMTR-MCNC: 116 MG/DL (ref 70–130)
GLUCOSE BLDC GLUCOMTR-MCNC: 121 MG/DL (ref 70–130)
GLUCOSE BLDC GLUCOMTR-MCNC: 156 MG/DL (ref 70–130)
GLUCOSE BLDC GLUCOMTR-MCNC: 176 MG/DL (ref 70–130)
GLUCOSE SERPL-MCNC: 233 MG/DL (ref 65–99)
HCT VFR BLD AUTO: 40.9 % (ref 37.5–51)
HGB BLD-MCNC: 11.9 G/DL (ref 13–17.7)
IMM GRANULOCYTES # BLD AUTO: 0.07 10*3/MM3 (ref 0–0.05)
IMM GRANULOCYTES NFR BLD AUTO: 1 % (ref 0–0.5)
LYMPHOCYTES # BLD AUTO: 0.57 10*3/MM3 (ref 0.7–3.1)
LYMPHOCYTES NFR BLD AUTO: 8.3 % (ref 19.6–45.3)
MCH RBC QN AUTO: 24.6 PG (ref 26.6–33)
MCHC RBC AUTO-ENTMCNC: 29.1 G/DL (ref 31.5–35.7)
MCV RBC AUTO: 84.5 FL (ref 79–97)
MONOCYTES # BLD AUTO: 0.46 10*3/MM3 (ref 0.1–0.9)
MONOCYTES NFR BLD AUTO: 6.7 % (ref 5–12)
NEUTROPHILS NFR BLD AUTO: 5.79 10*3/MM3 (ref 1.7–7)
NEUTROPHILS NFR BLD AUTO: 83.9 % (ref 42.7–76)
NRBC BLD AUTO-RTO: 0 /100 WBC (ref 0–0.2)
PLATELET # BLD AUTO: 255 10*3/MM3 (ref 140–450)
PMV BLD AUTO: 9.5 FL (ref 6–12)
POTASSIUM SERPL-SCNC: 3.8 MMOL/L (ref 3.5–5.2)
RBC # BLD AUTO: 4.84 10*6/MM3 (ref 4.14–5.8)
SODIUM SERPL-SCNC: 141 MMOL/L (ref 136–145)
WBC # BLD AUTO: 6.9 10*3/MM3 (ref 3.4–10.8)

## 2021-08-02 PROCEDURE — 80048 BASIC METABOLIC PNL TOTAL CA: CPT | Performed by: INTERNAL MEDICINE

## 2021-08-02 PROCEDURE — 97162 PT EVAL MOD COMPLEX 30 MIN: CPT

## 2021-08-02 PROCEDURE — 94799 UNLISTED PULMONARY SVC/PX: CPT

## 2021-08-02 PROCEDURE — 85025 COMPLETE CBC W/AUTO DIFF WBC: CPT | Performed by: INTERNAL MEDICINE

## 2021-08-02 PROCEDURE — 63710000001 INSULIN ASPART PER 5 UNITS: Performed by: INTERNAL MEDICINE

## 2021-08-02 PROCEDURE — 86140 C-REACTIVE PROTEIN: CPT | Performed by: INTERNAL MEDICINE

## 2021-08-02 PROCEDURE — 82962 GLUCOSE BLOOD TEST: CPT

## 2021-08-02 PROCEDURE — 63710000001 INSULIN DETEMIR PER 5 UNITS: Performed by: INTERNAL MEDICINE

## 2021-08-02 PROCEDURE — 92610 EVALUATE SWALLOWING FUNCTION: CPT

## 2021-08-02 PROCEDURE — 25010000002 PIPERACILLIN SOD-TAZOBACTAM PER 1 G: Performed by: INTERNAL MEDICINE

## 2021-08-02 PROCEDURE — 99232 SBSQ HOSP IP/OBS MODERATE 35: CPT | Performed by: INTERNAL MEDICINE

## 2021-08-02 PROCEDURE — 63710000001 PREDNISONE PER 1 MG: Performed by: INTERNAL MEDICINE

## 2021-08-02 PROCEDURE — 97166 OT EVAL MOD COMPLEX 45 MIN: CPT

## 2021-08-02 RX ORDER — ACETAMINOPHEN 325 MG/1
650 TABLET ORAL EVERY 8 HOURS PRN
Status: DISCONTINUED | OUTPATIENT
Start: 2021-08-02 | End: 2021-08-12 | Stop reason: HOSPADM

## 2021-08-02 RX ORDER — GUAIFENESIN 600 MG/1
1200 TABLET, EXTENDED RELEASE ORAL EVERY 12 HOURS SCHEDULED
Status: DISCONTINUED | OUTPATIENT
Start: 2021-08-02 | End: 2021-08-12 | Stop reason: HOSPADM

## 2021-08-02 RX ORDER — LISINOPRIL 10 MG/1
10 TABLET ORAL
Status: DISCONTINUED | OUTPATIENT
Start: 2021-08-02 | End: 2021-08-03

## 2021-08-02 RX ADMIN — HYDROCODONE BITARTRATE AND ACETAMINOPHEN 1 TABLET: 7.5; 325 TABLET ORAL at 22:02

## 2021-08-02 RX ADMIN — DOCUSATE SODIUM 50 MG AND SENNOSIDES 8.6 MG 2 TABLET: 8.6; 5 TABLET, FILM COATED ORAL at 22:02

## 2021-08-02 RX ADMIN — GABAPENTIN 600 MG: 300 CAPSULE ORAL at 08:37

## 2021-08-02 RX ADMIN — INSULIN DETEMIR 45 UNITS: 100 INJECTION, SOLUTION SUBCUTANEOUS at 22:07

## 2021-08-02 RX ADMIN — ACETAMINOPHEN 650 MG: 325 TABLET ORAL at 02:56

## 2021-08-02 RX ADMIN — SODIUM CHLORIDE, PRESERVATIVE FREE 10 ML: 5 INJECTION INTRAVENOUS at 10:00

## 2021-08-02 RX ADMIN — CARVEDILOL 25 MG: 25 TABLET, FILM COATED ORAL at 18:03

## 2021-08-02 RX ADMIN — APIXABAN 5 MG: 5 TABLET, FILM COATED ORAL at 22:02

## 2021-08-02 RX ADMIN — BUDESONIDE AND FORMOTEROL FUMARATE DIHYDRATE 2 PUFF: 160; 4.5 AEROSOL RESPIRATORY (INHALATION) at 06:55

## 2021-08-02 RX ADMIN — DOCUSATE SODIUM 50 MG AND SENNOSIDES 8.6 MG 2 TABLET: 8.6; 5 TABLET, FILM COATED ORAL at 08:37

## 2021-08-02 RX ADMIN — INSULIN ASPART 2 UNITS: 100 INJECTION, SOLUTION INTRAVENOUS; SUBCUTANEOUS at 17:21

## 2021-08-02 RX ADMIN — ATORVASTATIN CALCIUM 20 MG: 20 TABLET, FILM COATED ORAL at 08:39

## 2021-08-02 RX ADMIN — SODIUM CHLORIDE, PRESERVATIVE FREE 10 ML: 5 INJECTION INTRAVENOUS at 22:03

## 2021-08-02 RX ADMIN — IPRATROPIUM BROMIDE AND ALBUTEROL SULFATE 3 ML: .5; 3 SOLUTION RESPIRATORY (INHALATION) at 19:49

## 2021-08-02 RX ADMIN — LIDOCAINE 1 PATCH: 50 PATCH CUTANEOUS at 22:01

## 2021-08-02 RX ADMIN — APIXABAN 5 MG: 5 TABLET, FILM COATED ORAL at 08:39

## 2021-08-02 RX ADMIN — BUMETANIDE 2 MG: 1 TABLET ORAL at 08:38

## 2021-08-02 RX ADMIN — INSULIN ASPART 2 UNITS: 100 INJECTION, SOLUTION INTRAVENOUS; SUBCUTANEOUS at 11:09

## 2021-08-02 RX ADMIN — IPRATROPIUM BROMIDE AND ALBUTEROL SULFATE 3 ML: .5; 3 SOLUTION RESPIRATORY (INHALATION) at 13:48

## 2021-08-02 RX ADMIN — TAMSULOSIN HYDROCHLORIDE 0.4 MG: 0.4 CAPSULE ORAL at 22:02

## 2021-08-02 RX ADMIN — IPRATROPIUM BROMIDE AND ALBUTEROL SULFATE 3 ML: .5; 3 SOLUTION RESPIRATORY (INHALATION) at 06:45

## 2021-08-02 RX ADMIN — IPRATROPIUM BROMIDE AND ALBUTEROL SULFATE 3 ML: .5; 3 SOLUTION RESPIRATORY (INHALATION) at 00:22

## 2021-08-02 RX ADMIN — PIPERACILLIN SODIUM AND TAZOBACTAM SODIUM 3.38 G: 3; .375 INJECTION, POWDER, LYOPHILIZED, FOR SOLUTION INTRAVENOUS at 22:13

## 2021-08-02 RX ADMIN — LISINOPRIL 10 MG: 10 TABLET ORAL at 10:40

## 2021-08-02 RX ADMIN — EMPAGLIFLOZIN 20 MG: 10 TABLET, FILM COATED ORAL at 14:02

## 2021-08-02 RX ADMIN — INSULIN ASPART 35 UNITS: 100 INJECTION, SOLUTION INTRAVENOUS; SUBCUTANEOUS at 08:39

## 2021-08-02 RX ADMIN — PIPERACILLIN SODIUM AND TAZOBACTAM SODIUM 3.38 G: 3; .375 INJECTION, POWDER, LYOPHILIZED, FOR SOLUTION INTRAVENOUS at 06:14

## 2021-08-02 RX ADMIN — GUAIFENESIN 1200 MG: 600 TABLET, EXTENDED RELEASE ORAL at 22:07

## 2021-08-02 RX ADMIN — BUDESONIDE AND FORMOTEROL FUMARATE DIHYDRATE 2 PUFF: 160; 4.5 AEROSOL RESPIRATORY (INHALATION) at 19:49

## 2021-08-02 RX ADMIN — INSULIN ASPART 35 UNITS: 100 INJECTION, SOLUTION INTRAVENOUS; SUBCUTANEOUS at 13:11

## 2021-08-02 RX ADMIN — FLUTICASONE PROPIONATE 2 SPRAY: 50 SPRAY, METERED NASAL at 08:42

## 2021-08-02 RX ADMIN — POTASSIUM CHLORIDE 10 MEQ: 750 TABLET, FILM COATED, EXTENDED RELEASE ORAL at 08:38

## 2021-08-02 RX ADMIN — PREDNISONE 40 MG: 20 TABLET ORAL at 08:38

## 2021-08-02 RX ADMIN — GABAPENTIN 600 MG: 300 CAPSULE ORAL at 22:02

## 2021-08-02 RX ADMIN — PIPERACILLIN SODIUM AND TAZOBACTAM SODIUM 3.38 G: 3; .375 INJECTION, POWDER, LYOPHILIZED, FOR SOLUTION INTRAVENOUS at 13:48

## 2021-08-02 RX ADMIN — INSULIN ASPART 35 UNITS: 100 INJECTION, SOLUTION INTRAVENOUS; SUBCUTANEOUS at 18:03

## 2021-08-02 RX ADMIN — HYDROCODONE BITARTRATE AND ACETAMINOPHEN 1 TABLET: 7.5; 325 TABLET ORAL at 08:39

## 2021-08-02 RX ADMIN — CARVEDILOL 25 MG: 25 TABLET, FILM COATED ORAL at 08:38

## 2021-08-02 RX ADMIN — PANTOPRAZOLE SODIUM 40 MG: 40 TABLET, DELAYED RELEASE ORAL at 08:39

## 2021-08-02 NOTE — DISCHARGE PLACEMENT REQUEST
"Иван Blanco (65 y.o. Male)     Date of Birth Social Security Number Address Home Phone MRN    1955  [address]  [place] [address] [phone] [id number]    Latter day Marital Status          None        Admission Date Admission Type Admitting Provider Attending Provider Department, Room/Bed    7/31/21 Emergency Vivienne Flores DO Troxell, Christopher A, DO [facility], P205/1P    Discharge Date Discharge Disposition Discharge Destination                       Attending Provider: Mandeep Dunlap DO    Allergies: Codeine, Ciprofloxacin    Isolation: None   Infection: None   Code Status: CPR    Ht: 175.3 cm (69.02\")   Wt: 148 kg (325 lb 4.8 oz)    Admission Cmt: None   Principal Problem: Pneumonia of both lungs due to infectious organism [J18.9]                 Active Insurance as of 7/31/2021     Primary Coverage     Payor Plan Insurance Group Employer/Plan Group    [facility] MEDICARE REPLACEMENT [facility] DUAL COMPLETE MEDICARE REPLACEMENT [id number]     Payor Plan Address Payor Plan Phone Number Payor Plan Fax Number Effective Dates    [address] [phone]  1/1/2021 - None Entered    [facility] [address]       Subscriber Name Subscriber Birth Date Member ID       ИВАН BLANCO 1955 [id number]           Secondary Coverage     Payor Plan Insurance Group Employer/Plan Group    [place]      Payor Plan Address Payor Plan Phone Number Payor Plan Fax Number Effective Dates    [address] [phone]  7/31/2021 - None Entered    [facility] [id number]       Subscriber Name Subscriber Birth Date Member ID       ИВАН BLANCO 1955 [id number]                 Emergency Contacts      (Rel.) Home Phone Work Phone Mobile Phone    PaulLamar (Spouse) [phone] -- [phone]    GARCIA MALDONADO (Daughter) [phone] -- [phone]               History & Physical      Vivienne Flores DO at " 21 0622          Hospitalist History and Physical    Patient Identification:  Name: Иван Miller  Age/Sex: 65 y.o. male  :  1955  MRN: [id number]        Admit Date: 2021   Primary Care Physician: Rosi Thomas APRN    Chief Complaint   Patient presents with   • Shortness of Breath   • Edema       History of Present Illness  Patient is a 65 y.o. male presents with the following: Cough, Shortness of breath    The patient is a 65-year-old male with past medical history significant for chronic respiratory failure currently on 3 L/min nasal cannula, COPD, morbid obesity, paraplegia after previous traumatic injury, diabetes mellitus and hypertension who presents to the emergency department complaining of progressive shortness of breath and cough.    The patient is currently on BiPAP also is somewhat difficult to obtain a complete history, however, he states that for the past 5 days or so he has had a productive cough with yellow sputum production and worsening shortness of air.  The patient reports chills but denies fever.  He reports nausea but denies vomiting.  He does report occasional dysphagia and states that he did have an possible aspiration event a few days ago but cannot at this time give me specifics regarding that.  The patient states that he does use CPAP at home and is currently using 3 L/min nasal cannula.  Patient states that yesterday he had had chest discomfort that was localized to the mid and left chest area.  He states that it was more of a soreness and did not radiate to the shoulders or his jaw area.  Patient is currently chest pain-free.    Patient's most recent hospitalization here was from 2021 through 2021 where the patient was treated for acute on chronic hypoxic and hypercapnic respiratory failure, diuretic induced NICKY and history of toxic encephalopathy in the setting of methamphetamine abuse and lethargy thought to be due to opioids and gabapentin. Patient also  "with a right ankle fracture at that time.  There was also question of chronic osteomyelitis; MRIs of his back were obtained at that time and did not reveal evidence of acute inflammatory change.  There was mainly question of osteomyelitis in the thoracic spine.  During that hospitalization, the case was discussed with neurosurgery in [place] and he felt that patient most likely had \"burnt out osteomyelitis\" and did not feel that it was acute.    CT chest without contrast revealed right lower lobe pneumonia and atelectasis favored in the left base.  Patient with pulmonary arterial hypertension.  Arterial blood gas on 3 L/min nasal cannula revealed a pH of 7.299, PCO2 81.6, PO2 92.4 and bicarbonate 40.0 with an oxygen saturation of 96.9%.  proBNP was minimally elevated at 956.2.  C-RP was within normal limits at less than 0.30.  Troponin T also negative.  CMP revealed 84.9% neutrophils but white blood cell count was normal at 8.12.    Patient was placed on BiPAP in the emergency department has been admitted to the progressive care unit for further evaluation and management.    Past History:  Past Medical History:   Diagnosis Date   • Atrial fibrillation (CMS/HCC)    • COPD (chronic obstructive pulmonary disease) (CMS/HCC)    • Diabetes mellitus (CMS/HCC)    • GERD (gastroesophageal reflux disease)    • Hyperlipidemia    • Hypertension    • Myocardial infarction (CMS/HCC)    • Neuropathy      Past Surgical History:   Procedure Laterality Date   • ABDOMINAL SURGERY     • APPENDECTOMY     • CARDIAC CATHETERIZATION     • CORONARY STENT PLACEMENT       Family History   Problem Relation Age of Onset   • Hypertension Mother    • Heart attack Mother    • Hypertension Father      Social History     Tobacco Use   • Smoking status: Former Smoker     Packs/day: 2.00     Years: 30.00     Pack years: 60.00     Types: Cigarettes   • Smokeless tobacco: Current User     Types: Snuff   Substance Use Topics   • Alcohol use: No   • " Drug use: No     Medications Prior to Admission   Medication Sig Dispense Refill Last Dose   • albuterol (PROVENTIL HFA;VENTOLIN HFA) 108 (90 Base) MCG/ACT inhaler Inhale 2 puffs Every 6 (Six) Hours As Needed for Wheezing.      • apixaban (ELIQUIS) 5 MG tablet tablet Take 5 mg by mouth 2 (Two) Times a Day.      • atorvastatin (LIPITOR) 20 MG tablet Take 20 mg by mouth Daily.      • Budeson-Glycopyrrol-Formoterol (BREZTRI) 160-9-4.8 MCG/ACT aerosol inhaler Inhale 2 puffs 2 (Two) Times a Day.      • bumetanide (BUMEX) 2 MG tablet Take 1 tablet by mouth Daily for 30 days. 30 tablet 0    • clopidogrel (PLAVIX) 75 MG tablet Take 1 tablet by mouth Daily. 30 tablet     • Dapagliflozin Propanediol (Farxiga) 10 MG tablet Take 10 mg by mouth Daily.      • fluticasone (FLONASE) 50 MCG/ACT nasal spray 2 sprays into the nostril(s) as directed by provider Daily.      • gabapentin (NEURONTIN) 100 MG capsule Take 1 capsule by mouth Every 12 (Twelve) Hours for 3 days. 6 capsule 0    • hydrOXYzine (ATARAX) 25 MG tablet Take 1 tablet by mouth 3 (Three) Times a Day As Needed for Anxiety.      • insulin aspart (novoLOG) 100 UNIT/ML injection Inject 12 Units under the skin into the appropriate area as directed 3 (Three) Times a Day With Meals.  12    • insulin detemir (LEVEMIR) 100 UNIT/ML injection Inject 60 Units under the skin into the appropriate area as directed Daily.  12    • ipratropium-albuterol (DUO-NEB) 0.5-2.5 mg/3 ml nebulizer Take 3 mL by nebulization Every 4 (Four) Hours As Needed for Wheezing.      • lidocaine (LIDODERM) 5 % Place 1 patch on the skin as directed by provider Daily. Prior to [facility] Admission, Patient was on:  Place on Shoulder or back and Remove & Discard patch within 12 hours or as directed by MD      • magnesium oxide (MAG-OX) 400 MG tablet Take 400 mg by mouth Daily.      • metFORMIN (GLUCOPHAGE) 1000 MG tablet Take 1,000 mg by mouth 2 (Two) Times a Day With Meals.      • metoprolol succinate XL  (TOPROL-XL) 50 MG 24 hr tablet Take 3 tablets by mouth Daily.      • pantoprazole (PROTONIX) 40 MG EC tablet Take 40 mg by mouth Daily.      • sennosides-docusate (PERICOLACE) 8.6-50 MG per tablet Take 2 tablets by mouth 2 (Two) Times a Day.      • tamsulosin (FLOMAX) 0.4 MG capsule 24 hr capsule Take 1 capsule by mouth Every Night.        Allergies: Codeine and Ciprofloxacin    Review of Systems:  Review of Systems   HENT: Positive for trouble swallowing.    Respiratory: Positive for shortness of breath and wheezing.    Gastrointestinal: Positive for nausea and vomiting.   Skin: Negative for rash and wound.      Vital Signs  Temp:  [97.1 °F (36.2 °C)-98.6 °F (37 °C)] 97.1 °F (36.2 °C)  Heart Rate:  [70-88] 71  Resp:  [18-24] 21  BP: (150-190)/() 177/101  Body mass index is 47.84 kg/m².    Physical Exam:  Physical Exam  Constitutional:       General: He is not in acute distress.     Appearance: He is well-developed. He is morbidly obese.      Interventions: Face mask in place.   HENT:      Head: Normocephalic and atraumatic.   Eyes:      Conjunctiva/sclera: Conjunctivae normal.   Neck:      Trachea: No tracheal deviation.   Cardiovascular:      Rate and Rhythm: Normal rate and regular rhythm.      Pulses:           Posterior tibial pulses are 2+ on the right side and 2+ on the left side.      Heart sounds: No murmur heard.   No friction rub. No gallop.       Comments: Trace edema bilateral lower extremities  Pulmonary:      Effort: No respiratory distress.      Breath sounds: Examination of the right-lower field reveals decreased breath sounds. Examination of the left-lower field reveals decreased breath sounds. Decreased breath sounds present. No wheezing or rales.   Abdominal:      General: Bowel sounds are normal. There is no distension.      Palpations: Abdomen is soft.      Tenderness: There is no abdominal tenderness. There is no guarding.   Skin:     General: Skin is warm and dry.      Findings: No  erythema or rash.   Neurological:      Mental Status: He is alert and oriented to person, place, and time.      Cranial Nerves: No cranial nerve deficit.         Results Review:    Results from last 7 days   Lab Units 08/01/21  0436   PH, ARTERIAL pH units 7.299*   PO2 ART mm Hg 92.4   PCO2, ARTERIAL mm Hg 81.6*   HCO3 ART mmol/L 40.0*       Results from last 7 days   Lab Units 08/01/21  0030   WBC 10*3/mm3 8.12   HEMOGLOBIN g/dL 12.2*   HEMATOCRIT % 43.3   PLATELETS 10*3/mm3 231     Results from last 7 days   Lab Units 08/01/21  0030   SODIUM mmol/L 144   POTASSIUM mmol/L 4.1   CHLORIDE mmol/L 99   CO2 mmol/L 37.4*   BUN mg/dL 22   CREATININE mg/dL 0.82   CALCIUM mg/dL 9.3   GLUCOSE mg/dL 271*     Results from last 7 days   Lab Units 08/01/21  0030   BILIRUBIN mg/dL 0.2   ALK PHOS U/L 76   AST (SGOT) U/L 13   ALT (SGPT) U/L 18     Results from last 7 days   Lab Units 08/01/21  0310   CRP mg/dL <0.30         Results from last 7 days   Lab Units 08/01/21  0310 08/01/21  0030   TROPONIN T ng/mL <0.010 0.013       Imaging:    I have personally reviewed the EKG; pending official cardiology interpretation, however, per my view the patient has a normal sinus rhythm with frequent PVCs;     CT chest images have been reviewed and per my view the patient does appear to have a right lower lobe pneumonia    Imaging Results (Most Recent)     Procedure Component Value Units Date/Time    CT Chest Without Contrast Diagnostic [[id number]] Collected: 08/01/21 0311     Updated: 08/01/21 0313    Narrative:      CT Chest WO    INDICATION:   Dyspnea. Chronic dyspnea.    TECHNIQUE:   CT of the thorax without IV contrast. Coronal and sagittal reconstructions were obtained.  Radiation dose reduction techniques included automated exposure control or exposure modulation based on body size. Count of known CT and cardiac nuc med studies  performed in previous 12 months: 5.     COMPARISON:   5/22/2021    FINDINGS:  There is new patchy  atelectasis and probable curvilinear atelectasis in the left lower lobe favored over pneumonia. There is new consolidation in the posterior left upper lobe suggestive of pneumonia however.  Trace pleural thickening/pleural fluid left lung base. New left-sided lower lobe and to a lesser extent upper lobe volume loss. On the right, there is new right basilar consolidation suspicious for pneumonia and a trace amount of pleural  fluid/thickening. No new suspicious pulmonary nodule. Small amount of secretions in the trachea. Correlate with potential for aspiration pneumonia. No pneumothorax.    There is no pericardial effusion. No threshold adenopathy. Normal caliber aorta and atherosclerotic change. Advanced atherosclerotic change of the coronary arteries. Evidence of pulmonary arterial hypertension.    Included upper abdomen demonstrates no acute finding. Chronic advanced degenerative or postinflammatory change in the midthoracic spine.  negative.      Impression:        1. Chronic interstitial changes with new areas of superimposed consolidation suspicious for pneumonia in the left upper lobe and right lower lobe. Atelectasis favored over pneumonia in the left base. Follow-up to clearing recommended. Consider the  possibility of aspiration event.  2. Pulmonary arterial hypertension.  3. Atherosclerotic disease.    Signer Name: Basilio Deng MD   Signed: 8/1/2021 3:11 AM   Workstation [facility]: [email or web address]    [facility] Chest 1 View [[id number]] Collected: 08/01/21 0122     Updated: 08/01/21 0124    Narrative:      CR Chest 1 Vw    INDICATION:   Chest pain.     COMPARISON:    6/7/2021    FINDINGS:  Single portable AP view(s) of the chest.  Globular cardiomegaly. Pulmonary hyperinflation. Chronic appearing atelectasis/scarring in the left base. New interstitial prominence and streaky opacities in the right lung may reflect asymmetric edema  atelectasis or pneumonia. No pneumothorax. No  new effusion.      Impression:        1. Cardiomegaly with new asymmetric interstitial prominence in the right lung that is nonspecific. No pneumothorax.    Signer Name: Basilio Deng MD   Signed: 8/1/2021 1:22 AM   Workstation Name: [email or web address]-    [facility]          Assessment/Plan     -Acute hypercapnic on chronic hypoxic respiratory failure, present upon admission and secondary to right lower lobe aspiration pneumonia  -Acute COPD exacerbation  -Uncontrolled essential hypertension  -Morbid obesity  -Diabetes mellitus type 2 with hyperglycemia  -History of atrial fibrillation on chronic anticoagulation with Eliquis  -History of recent right ankle avulsion fracture  -Debility  -Reported history of paraplegia  -History of chronic thoracic osteomyelitis  -Gastroesophageal reflux disease    Patient has been admitted to the progressive care unit with aspiration precautions.  I will obtain a repeat ABG as he is currently on BiPAP.  Further management to follow review of ABG.  For now, patient has been started on intravenous Zosyn monotherapy.  I have started the patient on scheduled nebulized inhalants.  I will hold on steroid therapy as the patient is without wheezing on my exam.  I will obtain a respiratory culture.  I have consulted speech therapy for further evaluation and recommendations.    I am currently awaiting the patient's home medication list.  Will make as needed intravenous hydralazine available for now.    For now, the patient will be started on Accu-Cheks and sliding scale insulin as needed.  I have also started the patient on the hypoglycemia protocol if needed.  Plan to resume his home insulin regimen pending availability of his medication list.    I have consulted physical and occupational therapy as patient reports that he is interested in physical rehabilitation.    DVT/GI prophylaxis: Plan to resume home Eliquis/begin famotidine    Estimated Length of Stay: > 2  MNs    Patient is considered to be high risk patient due to: Acute on chronic respiratory failure, morbid obesity, COPD, history of opiate dependency     Disposition unclear; patient may benefit from skilled nursing placement versus inpatient physical rehab prior to returning home    I discussed the patients findings and my recommendations with patient          Vivienne Flores DO  08/01/21  06:22 EDT    Electronically signed by Vivienne Flores DO at 08/01/21 0704       Vital Signs (last day)     Date/Time   Temp   Temp src   Pulse   Resp   BP   Patient Position   SpO2    08/02/21 1102   --   --   70   --   137/90   --   96    08/02/21 1002   --   --   75   18   139/87   --   95    08/02/21 0902   --   --   75   --   118/69   --   98    08/02/21 0802   97.9 (36.6)   Oral   80   21   141/93   --   95    08/02/21 0645   --   --   70   21   --   --   99    08/02/21 0432   --   --   68   --   (!) 164/103   --   99    08/02/21 0300   --   --   74   10   148/88   --   95    08/02/21 0210   --   --   --   --   --   --   98    08/02/21 0203   --   --   111   --   (!) 146/119   --   94    08/02/21 0034   --   --   71   20   --   --   99    08/02/21 0022   --   --   65   19   --   --   95    08/02/21 0004   --   --   89   --   150/81   --   95    08/01/21 2338   97.2 (36.2)   Axillary   73   --   --   --   96    08/01/21 2225   --   --   80   20   --   --   99    08/01/21 2204   --   --   76   --   163/98   --   98    08/01/21 2025   97.1 (36.2)   Axillary   86   --   --   --   99    08/01/21 1933   --   --   92   --   (!) 207/105   --   100    08/01/21 1925   --   --   84   24   --   --   100    08/01/21 1916   --   --   117   26   --   --   99    08/01/21 1915   --   --   89   --   (!) 199/112   --   99    08/01/21 1803   --   --   91   20   170/94   --   94    08/01/21 1710   --   --   78   --   (!) 176/109   --   --    08/01/21 1652   97.5 (36.4)   Oral   80   16   154/100   --   93    08/01/21 1504   --   --    79   18   168/87   --   97    08/01/21 1331   --   --   82   22   --   --   100    08/01/21 1321   --   --   75   22   --   --   96    08/01/21 1303   --   --   84   20   169/90   --   93    08/01/21 1219   97.8 (36.6)   Axillary   83   --   --   --   93    08/01/21 1111   --   --   80   --   169/89   --   --    08/01/21 1102   --   --   75   20   (!) 169/108   --   93    08/01/21 1003   --   --   83   24   (!) 167/103   --   96    08/01/21 0903   --   --   80   22   165/100   --   93    08/01/21 0900   97.9 (36.6)   Oral   80   --   --   --   96 08/01/21 0844   --   --   --   --   --   --   96    08/01/21 0803   --   --   82   20   148/89   --   (!) 88    08/01/21 0802   --   --   --   --   --   Lying   --    08/01/21 0702   --   --   72   --   (!) 179/105   --   96    08/01/21 0700   --   --   73   20   --   --   96    08/01/21 0654   --   --   72   23   --   --   94    08/01/21 0617   --   --   71   21   --   --   94    08/01/21 0552   97.1 (36.2)   Axillary   70   20   (!) 177/101   Lying   94    08/01/21 05:47:47   --   --   80   20   176/86   Lying   100    08/01/21 0503   --   --   76   --   150/90   --   94    08/01/21 0452   --   --   82   24   --   --   96    08/01/21 04:22:54   --   --   82   20   172/88   Lying   100    08/01/21 0421   --   --   88   22   --   --   100    08/01/21 0322   --   --   80   22   --   --   99    08/01/21 0222   --   --   84   20   --   --   100    08/01/21 0122   --   --   80   20   179/86   Lying   100    08/01/21 0030   --   --   80   --   --   --   100    08/01/21 0022   --   --   86   20   180/90   Lying   100              Intake & Output (last day)       08/01 0701 - 08/02 0700 08/02 0701 - 08/03 0700    P.O. 1080 240    I.V. (mL/kg) 94.9 (0.6)     IV Piggyback 300     Total Intake(mL/kg) 1474.9 (10) 240 (1.6)    Urine (mL/kg/hr)  525 (0.6)    Total Output  525    Net +1474.9 -285          Urine Unmeasured Occurrence 6 x 1 x          Current Facility-Administered  Medications   Medication Dose Route Frequency Provider Last Rate Last Admin   • acetaminophen (TYLENOL) tablet 650 mg  650 mg Oral Q8H PRN Vivienne Flores DO   650 mg at 08/02/21 0256   • albuterol (PROVENTIL) nebulizer solution 0.083% 2.5 mg/3mL  2.5 mg Nebulization Q6H PRN Mandeep Dunlap DO       • apixaban (ELIQUIS) tablet 5 mg  5 mg Oral Q12H Mandeep Dunlap DO   5 mg at 08/02/21 0839   • atorvastatin (LIPITOR) tablet 20 mg  20 mg Oral Daily Mandeep Dunlap DO   20 mg at 08/02/21 0839   • budesonide-formoterol (SYMBICORT) 160-4.5 MCG/ACT inhaler 2 puff  2 puff Inhalation BID - RT Mandeep Dunlap DO   2 puff at 08/02/21 0655   • bumetanide (BUMEX) tablet 2 mg  2 mg Oral Daily Mandeep Dunlap DO   2 mg at 08/02/21 0838   • carvedilol (COREG) tablet 25 mg  25 mg Oral BID With Meals Mandeep Dunlap DO   25 mg at 08/02/21 0838   • dextrose (D50W) 25 g/ 50mL Intravenous Solution 25 g  25 g Intravenous Q15 Min PRN Vivienne Flores, DO       • dextrose (GLUTOSE) oral gel 15 g  15 g Oral Q15 Min PRN Vivienne Flores, DO       • empagliflozin (JARDIANCE) tablet 25 mg  25 mg Oral Daily Mandeep Dunlap DO       • fluticasone (FLONASE) 50 MCG/ACT nasal spray 2 spray  2 spray Nasal Daily Mandeep Dunlap DO   2 spray at 08/02/21 0842   • gabapentin (NEURONTIN) capsule 600 mg  600 mg Oral Q12H Mandeep Dunlap DO   600 mg at 08/02/21 0837   • glucagon (human recombinant) (GLUCAGEN DIAGNOSTIC) injection 1 mg  1 mg Subcutaneous Q15 Min PRN Vivienne Flores, DO       • hydrALAZINE (APRESOLINE) injection 10 mg  10 mg Intravenous Q6H PRN FabiMandeep eddy DO   10 mg at 08/01/21 1710   • HYDROcodone-acetaminophen (NORCO) 7.5-325 MG per tablet 1 tablet  1 tablet Oral BID Mandeep Dunlap DO   1 tablet at 08/02/21 0839   • insulin aspart (novoLOG) injection 0-9 Units  0-9 Units Subcutaneous 4x Daily AC & at Bedtime Vivienne Flores  DO Branden   2 Units at 08/02/21 1109   • insulin aspart (novoLOG) injection 35 Units  35 Units Subcutaneous TID With Meals Mandeep Dunlap DO   35 Units at 08/02/21 0839   • insulin detemir (LEVEMIR) injection 45 Units  45 Units Subcutaneous Nightly Mandeep Dunlap DO       • ipratropium-albuterol (DUO-NEB) nebulizer solution 3 mL  3 mL Nebulization 4x Daily - RT Vivienne Flores DO   3 mL at 08/02/21 0645   • ipratropium-albuterol (DUO-NEB) nebulizer solution 3 mL  3 mL Nebulization Q4H PRN Mandeep Dunlap DO       • lidocaine (LIDODERM) 5 % 1 patch  1 patch Transdermal Nightly Mandeep Dunlap DO   1 patch at 08/01/21 2025   • lisinopril (PRINIVIL,ZESTRIL) tablet 10 mg  10 mg Oral Q24H Mandeep Dunlap DO   10 mg at 08/02/21 1040   • pantoprazole (PROTONIX) EC tablet 40 mg  40 mg Oral Daily Mandeep Dunlap DO   40 mg at 08/02/21 0839   • piperacillin-tazobactam (ZOSYN) IVPB 3.375 g in 100 mL NS VTB  3.375 g Intravenous Q8H Vivienne Flores DO   3.375 g at 08/02/21 0614   • potassium chloride (K-DUR,KLOR-CON) ER tablet 10 mEq  10 mEq Oral Daily Mandeep Dunlap DO   10 mEq at 08/02/21 0838   • predniSONE (DELTASONE) tablet 40 mg  40 mg Oral Daily With Breakfast Mandeep Dunlap DO   40 mg at 08/02/21 0838   • sennosides-docusate (PERICOLACE) 8.6-50 MG per tablet 2 tablet  2 tablet Oral BID Mandeep Dunlap DO   2 tablet at 08/02/21 0837   • sodium chloride 0.9 % flush 10 mL  10 mL Intravenous PRN Vivienne Flores DO       • sodium chloride 0.9 % flush 10 mL  10 mL Intravenous Q12H Vivienne Flores DO   10 mL at 08/02/21 1000   • sodium chloride 0.9 % flush 10 mL  10 mL Intravenous PRN Vivienne Flores DO       • sodium chloride nasal spray 2 spray  2 spray Each Nare PRN Mandeep Dunlap DO       • tamsulosin (FLOMAX) 24 hr capsule 0.4 mg  0.4 mg Oral Nightly Mandeep Dunlap DO   0.4 mg at 08/01/21 2024      Lab Results (most recent)     Procedure Component Value Units Date/Time    POC Glucose Once [[id number]]  (Abnormal) Collected: 08/02/21 1044    Specimen: Blood Updated: 08/02/21 1050     Glucose 156 mg/dL      Comment: Meter: [id number] : [id number] NATIVIDAD [facility]       Blood Culture - Blood, Arm, Left [[id number]] Collected: 08/01/21 0559    Specimen: Blood from Arm, Left Updated: 08/02/21 0715     Blood Culture No growth at 24 hours    POC Glucose Once [[id number]]  (Normal) Collected: 08/02/21 0615    Specimen: Blood Updated: 08/02/21 0621     Glucose 121 mg/dL      Comment: Meter: [id number] : [id number] DOC JOSIE       Blood Culture - Blood, Hand, Left [[id number]] Collected: 08/01/21 0511    Specimen: Blood from Hand, Left Updated: 08/02/21 0516     Blood Culture No growth at 24 hours    Basic Metabolic Panel [[id number]]  (Abnormal) Collected: 08/02/21 0032    Specimen: Blood Updated: 08/02/21 0339     Glucose 233 mg/dL      BUN 31 mg/dL      Creatinine 0.87 mg/dL      Sodium 141 mmol/L      Potassium 3.8 mmol/L      Chloride 98 mmol/L      CO2 31.8 mmol/L      Calcium 9.3 mg/dL      eGFR Non African Amer 88 mL/min/1.73      BUN/Creatinine Ratio 35.6     Anion Gap 11.2 mmol/L     Narrative:      GFR Normal >60  Chronic Kidney Disease <60  Kidney Failure <15      C-reactive Protein [[id number]]  (Normal) Collected: 08/02/21 0032    Specimen: Blood Updated: 08/02/21 0339     C-Reactive Protein <0.30 mg/dL     CBC & Differential [[id number]]  (Abnormal) Collected: 08/02/21 0032    Specimen: Blood Updated: 08/02/21 0154    Narrative:      The following orders were created for panel order CBC & Differential.  Procedure                               Abnormality         Status                     ---------                               -----------         ------                     CBC Auto Differential[[id number]]        Abnormal            Final result                 Please view results for these tests on  the individual orders.    CBC Auto Differential [[id number]]  (Abnormal) Collected: 08/02/21 0032    Specimen: Blood Updated: 08/02/21 0154     WBC 6.90 10*3/mm3      RBC 4.84 10*6/mm3      Hemoglobin 11.9 g/dL      Hematocrit 40.9 %      MCV 84.5 fL      MCH 24.6 pg      MCHC 29.1 g/dL      RDW 16.3 %      RDW-SD 50.4 fl      MPV 9.5 fL      Platelets 255 10*3/mm3      Neutrophil % 83.9 %      Lymphocyte % 8.3 %      Monocyte % 6.7 %      Eosinophil % 0.0 %      Basophil % 0.1 %      Immature Grans % 1.0 %      Neutrophils, Absolute 5.79 10*3/mm3      Lymphocytes, Absolute 0.57 10*3/mm3      Monocytes, Absolute 0.46 10*3/mm3      Eosinophils, Absolute 0.00 10*3/mm3      Basophils, Absolute 0.01 10*3/mm3      Immature Grans, Absolute 0.07 10*3/mm3      nRBC 0.0 /100 WBC     Respiratory Culture - Sputum, Cough [[id number]] Collected: 08/01/21 1036    Specimen: Sputum from Cough Updated: 08/01/21 1219     Gram Stain Rare (1+) Epithelial cells seen      Few (2+) WBCs seen      Rare (1+) Gram positive cocci in pairs    CBC & Differential [[id number]]  (Abnormal) Collected: 08/01/21 0805    Specimen: Blood Updated: 08/01/21 0859    Narrative:      The following orders were created for panel order CBC & Differential.  Procedure                               Abnormality         Status                     ---------                               -----------         ------                     CBC Auto Differential[[id number]]        Abnormal            Final result                 Please view results for these tests on the individual orders.    CBC Auto Differential [[id number]]  (Abnormal) Collected: 08/01/21 0805    Specimen: Blood Updated: 08/01/21 0859     WBC 8.25 10*3/mm3      RBC 5.03 10*6/mm3      Hemoglobin 12.4 g/dL      Hematocrit 42.7 %      MCV 84.9 fL      MCH 24.7 pg      MCHC 29.0 g/dL      RDW 16.3 %      RDW-SD 50.3 fl      MPV 9.1 fL      Platelets 247 10*3/mm3      Neutrophil % 90.7 %      Lymphocyte % 6.8 %       Monocyte % 1.2 %      Eosinophil % 0.0 %      Basophil % 0.1 %      Immature Grans % 1.2 %      Neutrophils, Absolute 7.48 10*3/mm3      Lymphocytes, Absolute 0.56 10*3/mm3      Monocytes, Absolute 0.10 10*3/mm3      Eosinophils, Absolute 0.00 10*3/mm3      Basophils, Absolute 0.01 10*3/mm3      Immature Grans, Absolute 0.10 10*3/mm3      nRBC 0.0 /100 WBC     Comprehensive Metabolic Panel [[id number]]  (Abnormal) Collected: 08/01/21 0805    Specimen: Blood Updated: 08/01/21 0840     Glucose 238 mg/dL      BUN 26 mg/dL      Creatinine 0.77 mg/dL      Sodium 142 mmol/L      Potassium 3.9 mmol/L      Comment: Slight hemolysis detected by analyzer. Results may be affected.        Chloride 98 mmol/L      CO2 34.4 mmol/L      Calcium 9.3 mg/dL      Total Protein 6.3 g/dL      Albumin 3.54 g/dL      ALT (SGPT) 15 U/L      AST (SGOT) 10 U/L      Alkaline Phosphatase 72 U/L      Total Bilirubin 0.3 mg/dL      eGFR Non African Amer 101 mL/min/1.73      Globulin 2.8 gm/dL      A/G Ratio 1.3 g/dL      BUN/Creatinine Ratio 33.8     Anion Gap 9.6 mmol/L     Narrative:      GFR Normal >60  Chronic Kidney Disease <60  Kidney Failure <15      Blood Gas, Arterial With Co-Ox [[id number]]  (Abnormal) Collected: 08/01/21 0702    Specimen: Arterial Blood Updated: 08/01/21 0707     Site Right Radial     Jayjay's Test Positive     pH, Arterial 7.372 pH units      pCO2, Arterial 68.5 mm Hg      Comment: 86 Value above critical limit        pO2, Arterial 77.7 mm Hg      Comment: 84 Value below reference range        HCO3, Arterial 39.8 mmol/L      Comment: 83 Value above reference range        Base Excess, Arterial 11.6 mmol/L      O2 Saturation, Arterial 95.5 %      Hemoglobin, Blood Gas 13.2 g/dL      Comment: 84 Value below reference range        Hematocrit, Blood Gas 40.4 %      Oxyhemoglobin 94.1 %      Methemoglobin 0.20 %      Carboxyhemoglobin 1.3 %      A-a Gradiant 49.5 mmHg      CO2 Content 41.9 mmol/L      Temperature 0.0 C       Barometric Pressure for Blood Gas 725 mmHg      Modality BiPap     FIO2 30 %      Ventilator Mode NA     Set [facility] Resp Rate 18.0     IPAP 16     Comment: Meter: [id number]     :  [id number]        EPAP 8     Note --     Notified [facility] christina archuleta     Notified By [id number]     Notified Time 08/01/2021 07:08     Collected by [id number]     pH, Temp Corrected --     pCO2, Temperature Corrected --     pO2, Temperature Corrected --    Blood Gas, Arterial With Co-Ox [[id number]]  (Abnormal) Collected: 08/01/21 0436    Specimen: Arterial Blood Updated: 08/01/21 0439     Site Left Radial     Jayjay's Test N/A     pH, Arterial 7.299 pH units      Comment: 84 Value below reference range        pCO2, Arterial 81.6 mm Hg      Comment: 86 Value above critical limit        pO2, Arterial 92.4 mm Hg      HCO3, Arterial 40.0 mmol/L      Comment: 83 Value above reference range        Base Excess, Arterial 10.1 mmol/L      O2 Saturation, Arterial 96.9 %      Hemoglobin, Blood Gas 13.5 g/dL      Hematocrit, Blood Gas 41.5 %      Oxyhemoglobin 95.8 %      Methemoglobin 0.10 %      Carboxyhemoglobin 1.0 %      A-a Gradiant 33.6 mmHg      CO2 Content 42.5 mmol/L      Temperature 0.0 C      Barometric Pressure for Blood Gas 724 mmHg      Modality Nasal Cannula     FIO2 32 %      Flow Rate 3.0 lpm      Ventilator Mode NA     Note --     Notified Who DR LEONARD     Notified By [id number]     Notified Time 08/01/2021 04:40     Collected by [id number]     Comment: Meter: [id number]     :  [id number]        pH, Temp Corrected --     pCO2, Temperature Corrected --     pO2, Temperature Corrected --    C-reactive Protein [[id number]]  (Normal) Collected: 08/01/21 0310    Specimen: Blood from Hand, Left Updated: 08/01/21 0430     C-Reactive Protein <0.30 mg/dL     Troponin [[id number]]  (Normal) Collected: 08/01/21 0310    Specimen: Blood from Hand, Left Updated: 08/01/21 0332     Troponin T <0.010 ng/mL     Narrative:      Troponin T Reference  Range:  <= 0.03 ng/mL-   Negative for AMI  >0.03 ng/mL-     Abnormal for myocardial necrosis.  Clinicians would have to utilize clinical acumen, EKG, Troponin and serial changes to determine if it is an Acute Myocardial Infarction or myocardial injury due to an underlying chronic condition.       Results may be falsely decreased if patient taking Biotin.      COVID-19 and FLU A/B PCR - Swab, Nasopharynx [[id number]]  (Normal) Collected: 08/01/21 0128    Specimen: Swab from Nasopharynx Updated: 08/01/21 0157     COVID19 Not Detected     Influenza A PCR Not Detected     Influenza B PCR Not Detected    Narrative:      Fact sheet for providers: https://www.fda.gov/media/409604/download    Fact sheet for patients: https://www.fda.gov/media/770318/download    Test performed by PCR.    [place] [facility] [[id number]] Collected: 08/01/21 0030    Specimen: Blood from Hand, Left Updated: 08/01/21 0130    Narrative:      The following orders were created for panel order [place] [facility].  Procedure                               Abnormality         Status                     ---------                               -----------         ------                     Green Top (Gel)[[id number]]                                  Final result               Lavender Top[[id number]]                                     Final result               Gold Top - SST[[id number]]                                   Final result                 Please view results for these tests on the individual orders.    Gold Top - SST [[id number]] Collected: 08/01/21 0030    Specimen: Blood from Hand, Left Updated: 08/01/21 0130     Extra Tube Hold for add-ons.     Comment: Auto resulted.       Green Top (Gel) [[id number]] Collected: 08/01/21 0030    Specimen: Blood from Hand, Left Updated: 08/01/21 0130     Extra Tube Hold for add-ons.     Comment: Auto resulted.       Lavender Top [[id number]] Collected: 08/01/21 0030    Specimen: Blood from Hand, Left Updated: 08/01/21 0130      Extra Tube hold for add-on     Comment: Auto resulted       Comprehensive Metabolic Panel [[id number]]  (Abnormal) Collected: 08/01/21 0030    Specimen: Blood from Hand, Left Updated: 08/01/21 0100     Glucose 271 mg/dL      BUN 22 mg/dL      Creatinine 0.82 mg/dL      Sodium 144 mmol/L      Potassium 4.1 mmol/L      Chloride 99 mmol/L      CO2 37.4 mmol/L      Calcium 9.3 mg/dL      Total Protein 6.6 g/dL      Albumin 3.86 g/dL      ALT (SGPT) 18 U/L      AST (SGOT) 13 U/L      Alkaline Phosphatase 76 U/L      Total Bilirubin 0.2 mg/dL      eGFR Non African Amer 94 mL/min/1.73      Globulin 2.7 gm/dL      A/G Ratio 1.4 g/dL      BUN/Creatinine Ratio 26.8     Anion Gap 7.6 mmol/L     Narrative:      GFR Normal >60  Chronic Kidney Disease <60  Kidney Failure <15      Troponin [[id number]]  (Normal) Collected: 08/01/21 0030    Specimen: Blood from Hand, Left Updated: 08/01/21 0100     Troponin T 0.013 ng/mL     Narrative:      Troponin T Reference Range:  <= 0.03 ng/mL-   Negative for AMI  >0.03 ng/mL-     Abnormal for myocardial necrosis.  Clinicians would have to utilize clinical acumen, EKG, Troponin and serial changes to determine if it is an Acute Myocardial Infarction or myocardial injury due to an underlying chronic condition.       Results may be falsely decreased if patient taking Biotin.      BNP [[id number]]  (Abnormal) Collected: 08/01/21 0030    Specimen: Blood from Hand, Left Updated: 08/01/21 0058     proBNP 956.2 pg/mL     Narrative:      Among patients with dyspnea, NT-proBNP is highly sensitive for the detection of acute congestive heart failure. In addition NT-proBNP of <300 pg/ml effectively rules out acute congestive heart failure with 99% negative predictive value.    Results may be falsely decreased if patient taking Biotin.      Scan Slide [[id number]] Collected: 08/01/21 0030    Specimen: Blood from Hand, Left Updated: 08/01/21 0045     Anisocytosis Slight/1+     Hypochromia Mod/2+     Platelet  Morphology Normal          Imaging Results (Most Recent)     Procedure Component Value Units Date/Time    CT Chest Without Contrast Diagnostic [[id number]] Collected: 08/01/21 0311     Updated: 08/01/21 0313    Narrative:      CT Chest WO    INDICATION:   Dyspnea. Chronic dyspnea.    TECHNIQUE:   CT of the thorax without IV contrast. Coronal and sagittal reconstructions were obtained.  Radiation dose reduction techniques included automated exposure control or exposure modulation based on body size. Count of known CT and cardiac nuc med studies  performed in previous 12 months: 5.     COMPARISON:   5/22/2021    FINDINGS:  There is new patchy atelectasis and probable curvilinear atelectasis in the left lower lobe favored over pneumonia. There is new consolidation in the posterior left upper lobe suggestive of pneumonia however.  Trace pleural thickening/pleural fluid left lung base. New left-sided lower lobe and to a lesser extent upper lobe volume loss. On the right, there is new right basilar consolidation suspicious for pneumonia and a trace amount of pleural  fluid/thickening. No new suspicious pulmonary nodule. Small amount of secretions in the trachea. Correlate with potential for aspiration pneumonia. No pneumothorax.    There is no pericardial effusion. No threshold adenopathy. Normal caliber aorta and atherosclerotic change. Advanced atherosclerotic change of the coronary arteries. Evidence of pulmonary arterial hypertension.    Included upper abdomen demonstrates no acute finding. Chronic advanced degenerative or postinflammatory change in the midthoracic spine.  negative.      Impression:        1. Chronic interstitial changes with new areas of superimposed consolidation suspicious for pneumonia in the left upper lobe and right lower lobe. Atelectasis favored over pneumonia in the left base. Follow-up to clearing recommended. Consider the  possibility of aspiration event.  2. Pulmonary arterial  hypertension.  3. Atherosclerotic disease.    Signer Name: Basilio Deng MD   Signed: 8/1/2021 3:11 AM   Workstation Name: [facility]    [facility]    XR Chest 1 View [[id number]] Collected: 08/01/21 0122     Updated: 08/01/21 0124    Narrative:      CR Chest 1 Vw    INDICATION:   Chest pain.     COMPARISON:    6/7/2021    FINDINGS:  Single portable AP view(s) of the chest.  Globular cardiomegaly. Pulmonary hyperinflation. Chronic appearing atelectasis/scarring in the left base. New interstitial prominence and streaky opacities in the right lung may reflect asymmetric edema  atelectasis or pneumonia. No pneumothorax. No new effusion.      Impression:        1. Cardiomegaly with new asymmetric interstitial prominence in the right lung that is nonspecific. No pneumothorax.    Signer Name: Basilio Deng MD   Signed: 8/1/2021 1:22 AM   Workstation Name: [facility]        Operative/Procedure Notes (most recent note)    No notes of this type exist for this encounter.            Physician Progress Notes (most recent note)      Mandeep Dunlap DO at 08/01/21 1421          Subjective     History:   Иван Miller is a 65 y.o. male admitted on 7/31/2021 secondary to Pneumonia of both lungs due to infectious organism     Procedures: None    CC: Follow up pneumonia, resp failure    Patient seen and examined with MARCELO Loza. Awake and alert. Transitioned off BiPAP to NC this AM. Reports cough and dyspnea. Reports chronic back and neck pain. No reported CP. No reported nausea or vomiting. No acute events overnight per RN.     History taken from: patient, chart, and RN.      Objective     Vital Signs  Temp:  [97.1 °F (36.2 °C)-98.6 °F (37 °C)] 97.8 °F (36.6 °C)  Heart Rate:  [70-88] 82  Resp:  [18-24] 22  BP: (148-190)/() 169/90    Intake/Output Summary (Last 24 hours) at 8/1/2021 1421  Last data filed at 8/1/2021 1419  Gross per 24 hour   Intake 1140  ml   Output --   Net 1140 ml         Physical Exam:  General:    Awake, alert, in no acute distress   Heart:      Normal S1 and S2. Regular rate and rhythm. No significant murmur, rubs or gallops appreciated.   Lungs:     Respirations regular, even and unlabored. Diminished breath sounds throughout. No wheezes, rales or rhonchi.   Abdomen:   Soft and nontender. No guarding, rebound tenderness or  organomegaly noted. Bowel sounds present x 4.   Extremities:  Trace-1+ bilateral lower extremity edema. Moves UE and LE equally B/L.     Results Review:    Results from last 7 days   Lab Units 08/01/21  0805 08/01/21  0030   WBC 10*3/mm3 8.25 8.12   HEMOGLOBIN g/dL 12.4* 12.2*   PLATELETS 10*3/mm3 247 231     Results from last 7 days   Lab Units 08/01/21  0805 08/01/21  0030   SODIUM mmol/L 142 144   POTASSIUM mmol/L 3.9 4.1   CHLORIDE mmol/L 98 99   CO2 mmol/L 34.4* 37.4*   BUN mg/dL 26* 22   CREATININE mg/dL 0.77 0.82   CALCIUM mg/dL 9.3 9.3   GLUCOSE mg/dL 238* 271*     Results from last 7 days   Lab Units 08/01/21  0805 08/01/21  0030   BILIRUBIN mg/dL 0.3 0.2   ALK PHOS U/L 72 76   AST (SGOT) U/L 10 13   ALT (SGPT) U/L 15 18             Results from last 7 days   Lab Units 08/01/21  0310 08/01/21  0030   TROPONIN T ng/mL <0.010 0.013       Imaging Results (Last 24 Hours)     Procedure Component Value Units Date/Time    CT Chest Without Contrast Diagnostic [[id number]] Collected: 08/01/21 0311     Updated: 08/01/21 0313    Narrative:      CT Chest WO    INDICATION:   Dyspnea. Chronic dyspnea.    TECHNIQUE:   CT of the thorax without IV contrast. Coronal and sagittal reconstructions were obtained.  Radiation dose reduction techniques included automated exposure control or exposure modulation based on body size. Count of known CT and cardiac nuc med studies  performed in previous 12 months: 5.     COMPARISON:   5/22/2021    FINDINGS:  There is new patchy atelectasis and probable curvilinear atelectasis in the left lower  lobe favored over pneumonia. There is new consolidation in the posterior left upper lobe suggestive of pneumonia however.  Trace pleural thickening/pleural fluid left lung base. New left-sided lower lobe and to a lesser extent upper lobe volume loss. On the right, there is new right basilar consolidation suspicious for pneumonia and a trace amount of pleural  fluid/thickening. No new suspicious pulmonary nodule. Small amount of secretions in the trachea. Correlate with potential for aspiration pneumonia. No pneumothorax.    There is no pericardial effusion. No threshold adenopathy. Normal caliber aorta and atherosclerotic change. Advanced atherosclerotic change of the coronary arteries. Evidence of pulmonary arterial hypertension.    Included upper abdomen demonstrates no acute finding. Chronic advanced degenerative or postinflammatory change in the midthoracic spine.  negative.      Impression:        1. Chronic interstitial changes with new areas of superimposed consolidation suspicious for pneumonia in the left upper lobe and right lower lobe. Atelectasis favored over pneumonia in the left base. Follow-up to clearing recommended. Consider the  possibility of aspiration event.  2. Pulmonary arterial hypertension.  3. Atherosclerotic disease.    Signer Name: Basilio Deng MD   Signed: 8/1/2021 3:11 AM   Workstation Name: [email or web address]    [facility] Chest 1 View [[id number]] Collected: 08/01/21 0122     Updated: 08/01/21 0124    Narrative:      CR Chest 1 Vw    INDICATION:   Chest pain.     COMPARISON:    6/7/2021    FINDINGS:  Single portable AP view(s) of the chest.  Globular cardiomegaly. Pulmonary hyperinflation. Chronic appearing atelectasis/scarring in the left base. New interstitial prominence and streaky opacities in the right lung may reflect asymmetric edema  atelectasis or pneumonia. No pneumothorax. No new effusion.      Impression:        1. Cardiomegaly with new  asymmetric interstitial prominence in the right lung that is nonspecific. No pneumothorax.    Signer Name: Basilio Deng MD   Signed: 8/1/2021 1:22 AM   Workstation Name: [email or web address]    [facility]            Medications:  famotidine, 20 mg, Intravenous, Q12H  insulin aspart, 0-9 Units, Subcutaneous, 4x Daily AC & at Bedtime  ipratropium-albuterol, 3 mL, Nebulization, 4x Daily - RT  piperacillin-tazobactam, 3.375 g, Intravenous, Q8H  sodium chloride, 10 mL, Intravenous, Q12H               Assessment/Plan   Bilateral pneumonia: CT chest reveals LUZ ELENA and RLL consolidation with concern for aspiration. Blood and sputum cultures pending. Speech therapy consulted. Cont Zosyn and nebs.     Acute exacerbation of COPD: Cont nebs. Start Prednisone.     Acute hypercapnic on chronic hypoxic respiratory failure: Likely 2/2 above. Improved with BiPAP. Cont treatment as outlined above. Cont supplemental O2.     Essential HTN, uncontrolled: Restart home meds once confirmed by pharmacy. Add PRN IV hydralazine.     DM II, inulin dependent with hyperglycemia: Restart basal insulin. Cont SSI with Accuchecks.     Recent right ankle avulsion fracture with reported hx of paraplegia: PT/OT. Supportive treatment.     Hx of chronic thoracic osteomyelitis: Cont to monitor.     Hx of Afib: Currently in sinus rhythm. Restart home metoprolol and Eliquis once confirmed by pharmacy. Monitor on telemetry.     DVT PPX: Resume home Eliquis.     Disposition: May need rehab placement.       Mandeep Dunlap DO  08/01/21  14:21 EDT    Electronically signed by Mandeep Dunlap DO at 08/01/21 1439       Consult Notes (most recent note)    No notes of this type exist for this encounter.         Nutrition Notes (most recent note)    No notes exist for this encounter.            Physical Therapy Notes (most recent note)      Joceline Trejo, PT at 08/02/21 1123  Version 1 of 1         Acute Care - Physical Therapy  Initial Evaluation   George     Patient Name: Иван Miller  : 1955  MRN: [id number]  Today's Date: 2021   Onset of Illness/Injury or Date of Surgery: 21 (admit date)  Visit Dx:     ICD-10-CM ICD-9-CM   1. Pneumonia of both lungs due to infectious organism, unspecified part of lung  J18.9 483.8   2. Acute on chronic respiratory failure with hypoxia and hypercapnia (CMS/HCC)  J96.21 518.84    J96.22 786.09     799.02     Patient Active Problem List   Diagnosis   • Acute respiratory failure with hypoxemia (CMS/HCC)   • Coronary artery disease status post multiple stents   • Non-ischemic cardiomyopathy (CMS/HCC LV ejection fraction 40 to 45%   • Chronic systolic congestive heart failure (CMS/HCC)   • Essential hypertension   • Hyperlipidemia LDL goal <70   • Insulin dependent diabetes mellitus   • Respiratory failure (CMS/HCC)   • Finger infection   • Pneumonia of both lungs due to infectious organism     Past Medical History:   Diagnosis Date   • Atrial fibrillation (CMS/HCC)    • COPD (chronic obstructive pulmonary disease) (CMS/HCC)    • Diabetes mellitus (CMS/HCC)    • GERD (gastroesophageal reflux disease)    • Hyperlipidemia    • Hypertension    • Myocardial infarction (CMS/HCC)    • Neuropathy      Past Surgical History:   Procedure Laterality Date   • ABDOMINAL SURGERY     • APPENDECTOMY     • CARDIAC CATHETERIZATION     • CORONARY STENT PLACEMENT          PT Assessment (last 12 hours)      PT Evaluation and Treatment     Row Name 21 1102          Physical Therapy Time and Intention    Subjective Information  complains of;weakness;fatigue  -CT     Document Type  evaluation  -CT     Mode of Treatment  physical therapy  -CT     Patient Effort  good  -CT     Symptoms Noted During/After Treatment  fatigue  -CT     Comment  Pt tolerated evaluation well with rest breaks provided as needed. Pt reports a spinal cord injury 3 months ago with a fall. Pt was independent PLOF and now uses a w/c and  mechanical lift device. Pt is unable to stand. Pt is requesting a rehab facility to improve overall functional mobility.   -CT     Row Name 08/02/21 1102          General Information    Patient Profile Reviewed  yes  -CT     Onset of Illness/Injury or Date of Surgery  07/31/21 admit date  -CT     Referring Physician  Sandra  -CT     Patient Observations  alert;agree to therapy;cooperative  -CT     Prior Level of Function  dependent: just prior to hospital admit  -CT     Equipment Currently Used at Home  wheelchair;oxygen;lift device;walker, rolling  -CT     Existing Precautions/Restrictions  fall  -CT     Risks Reviewed  patient:;LOB;nausea/vomiting;dizziness;increased discomfort;change in vital signs;increased drainage;lines disloged  -CT     Benefits Reviewed  patient:;improve function;increase independence;increase strength;increase balance;decrease pain;decrease risk of DVT;improve skin integrity;increase knowledge  -CT     Barriers to Rehab  previous functional deficit  -CT     Row Name 08/02/21 1102          Previous Level of Function/Home Environm    Household Ambulation, Premorbid Functional Level  unable to perform  -CT     Community Ambulation, Premorbid Functional Level  unable to perform  -CT     Row Name 08/02/21 1102          Living Environment    Current Living Arrangements  home/apartment/condo  -CT     Lives With  child(ara), adult;spouse  -CT     Row Name 08/02/21 1102          Cognition    Affect/Mental Status (Cognitive)  WFL  -CT     Orientation Status (Cognition)  oriented x 3  -CT     Follows Commands (Cognition)  WFL  -CT     Row Name 08/02/21 1102          Range of Motion Comprehensive    Comment, General Range of Motion  BLE AROM grossly <10%, AAROM grossly 75%  -CT     Row Name 08/02/21 1102          Strength Comprehensive (MMT)    Comment, General Manual Muscle Testing (MMT) Assessment  BLE grossly 1-2+/5   -CT     Row Name 08/02/21 1102          Bed Mobility    Bed Mobility  rolling  left;rolling right  -CT     Rolling Left [place] (Bed Mobility)  maximum assist (25% patient effort)  -CT     Rolling Right [place] (Bed Mobility)  maximum assist (25% patient effort)  -CT     Bed Mobility, Safety Issues  decreased use of arms for pushing/pulling;decreased use of legs for bridging/pushing;impaired trunk control for bed mobility  -CT     Assistive Device (Bed Mobility)  bed rails;draw sheet  -CT     Row Name 08/02/21 1102          Transfers    Comment (Transfers)  unable to assess  -CT     Row Name 08/02/21 1102          Gait/Stairs (Locomotion)    Comment (Gait/Stairs)  unable to assess  -CT     Row Name 08/02/21 1102          Coping    Observed Emotional State  calm;cooperative  -CT     Verbalized Emotional State  acceptance  -CT     Row Name 08/02/21 1102          Plan of Care Review    Plan of Care Reviewed With  patient  -CT     Row Name 08/02/21 1102          Physical Therapy Goals    Bed Mobility Goal Selection (PT)  bed mobility, PT goal 1  -CT     Balance Goal Selection (PT)  balance, PT goal 1  -CT     Row Name 08/02/21 1102          Bed Mobility Goal 1 (PT)    Activity/Assistive Device (Bed Mobility Goal 1, PT)  bed mobility activities, all  -CT     [place] Level/Cues Needed (Bed Mobility Goal 1, PT)  moderate assist (50-74% patient effort)  -CT     Time Frame (Bed Mobility Goal 1, PT)  by discharge  -CT     Row Name 08/02/21 1102          Balance Goal 1 (PT)    Activity/Assistive Device (Balance Goal 1, PT)  sitting, static  -CT     [place] Level/Cues Needed (Balance Goal 1, PT)  maximum assist (25-49% patient effort);2 person assist  -CT     Time Frame (Balance Goal 1, PT)  by discharge  -CT     Row Name 08/02/21 1102          Positioning and Restraints    Pre-Treatment Position  in bed  -CT     Post Treatment Position  bed  -CT     In Bed  supine;call light within reach;encouraged to call for assist;exit alarm on;side rails up x3  -CT     Row Name 08/02/21 1102           Therapy Assessment/Plan (PT)    Patient/Family Therapy Goals Statement (PT)  Pt goals are to go to a rehab facility to improve his overall mobility  -CT     Functional Level at Time of Evaluation (PT)  Max A bed mobility  -CT     PT Diagnosis (PT)  decreased functional mobility  -CT     Rehab Potential (PT)  good, to achieve stated therapy goals  -CT     Criteria for Skilled Interventions Met (PT)  yes;skilled treatment is necessary  -CT     Predicted Duration of Therapy Intervention (PT)  length of stay  -CT     Row Name 08/02/21 1102          Therapy Plan Review/Discharge Plan (PT)    Therapy Plan Review (PT)  evaluation/treatment results reviewed;care plan/treatment goals reviewed;risks/benefits reviewed;current/potential barriers reviewed;participants voiced agreement with care plan;participants included;patient  -CT       User Key  (r) = Recorded By, (t) = Taken By, (c) = Cosigned By    Initials Name Provider Type    CT Joceline Trejo PT Physical Therapist        Physical Therapy Education                 Title: PT OT SLP Therapies (Done)     Topic: Physical Therapy (Done)     Point: Mobility training (Done)     Learning Progress Summary           Patient Acceptance, E,TB, VU by CT at 8/2/2021 1122                   Point: Home exercise program (Done)     Learning Progress Summary           Patient Acceptance, E,TB, VU by CT at 8/2/2021 1122                   Point: Body mechanics (Done)     Learning Progress Summary           Patient Acceptance, E,TB, VU by CT at 8/2/2021 1122                   Point: Precautions (Done)     Learning Progress Summary           Patient Acceptance, E,TB, VU by CT at 8/2/2021 1122                               User Key     Initials Effective Dates Name Provider Type Discipline    CT 06/16/21 -  Joceline Trejo PT Physical Therapist PT              PT Recommendation and Plan  Anticipated Discharge Disposition (PT):  (tbd)  Planned Therapy Interventions (PT): balance training,  bed mobility training, home exercise program, manual therapy techniques, motor coordination training, neuromuscular re-education, patient/family education, postural re-education, strengthening, transfer training  Therapy Frequency (PT): 2 times/wk (2-5 times/wk )  Plan of Care Reviewed With: patient       Time Calculation:   PT Charges     Row Name 08/02/21 1122             Time Calculation    PT Received On  08/02/21  -CT      PT Goal Re-Cert Due Date  08/16/21  -CT        User Key  (r) = Recorded By, (t) = Taken By, (c) = Cosigned By    Initials Name Provider Type    CT Joceline Trejo, MEAGHAN Physical Therapist        Therapy Charges for Today     Code Description Service Date Service Provider Modifiers Qty    41505496972 HC PT EVAL MOD COMPLEXITY 4 8/2/2021 Joceline Trejo, MEAGHAN GP 1               Joceline Trejo PT  8/2/2021      Electronically signed by Joceline Trejo PT at 08/02/21 1123       Occupational Therapy Notes (most recent note)    No notes exist for this encounter.         Speech Language Pathology Notes (most recent note)    No notes exist for this encounter.         Respiratory Therapy Notes (most recent note)    No notes exist for this encounter.         ADL Documentation (most recent)      Most Recent Value   Transferring  3 - assistive equipment and person   Toileting  3 - assistive equipment and person   Bathing  3 - assistive equipment and person   Dressing  2 - assistive person   Eating  0 - independent   Communication  0 - understands/communicates without difficulty   Swallowing  0 - swallows foods/liquids without difficulty   Equipment Currently Used at Home  cane, straight, wheelchair, walker, standard

## 2021-08-02 NOTE — PLAN OF CARE
Goal Outcome Evaluation:   Pt alert and oriented x 4. VSS and afebrile during shift. Pt wore bipap during the beginning of shift and now has on 3L NC. No complaints at this time. Bed locked in low position with call light in reach.

## 2021-08-02 NOTE — THERAPY EVALUATION
Acute Care - Physical Therapy Initial Evaluation   [facility]     Patient Name: Иван Miller  : 1955  MRN: [id number]  Today's Date: 2021   Onset of Illness/Injury or Date of Surgery: 21 (admit date)  Visit Dx:     ICD-10-CM ICD-9-CM   1. Pneumonia of both lungs due to infectious organism, unspecified part of lung  J18.9 483.8   2. Acute on chronic respiratory failure with hypoxia and hypercapnia (CMS/HCC)  J96.21 518.84    J96.22 786.09     799.02     Patient Active Problem List   Diagnosis   • Acute respiratory failure with hypoxemia (CMS/HCC)   • Coronary artery disease status post multiple stents   • Non-ischemic cardiomyopathy (CMS/HCC LV ejection fraction 40 to 45%   • Chronic systolic congestive heart failure (CMS/HCC)   • Essential hypertension   • Hyperlipidemia LDL goal <70   • Insulin dependent diabetes mellitus   • Respiratory failure (CMS/HCC)   • Finger infection   • Pneumonia of both lungs due to infectious organism     Past Medical History:   Diagnosis Date   • Atrial fibrillation (CMS/HCC)    • COPD (chronic obstructive pulmonary disease) (CMS/HCC)    • Diabetes mellitus (CMS/HCC)    • GERD (gastroesophageal reflux disease)    • Hyperlipidemia    • Hypertension    • Myocardial infarction (CMS/HCC)    • Neuropathy      Past Surgical History:   Procedure Laterality Date   • ABDOMINAL SURGERY     • APPENDECTOMY     • CARDIAC CATHETERIZATION     • CORONARY STENT PLACEMENT          PT Assessment (last 12 hours)      PT Evaluation and Treatment     Row Name 21 1102          Physical Therapy Time and Intention    Subjective Information  complains of;weakness;fatigue  -CT     Document Type  evaluation  -CT     Mode of Treatment  physical therapy  -CT     Patient Effort  good  -CT     Symptoms Noted During/After Treatment  fatigue  -CT     Comment  Pt tolerated evaluation well with rest breaks provided as needed. Pt reports a spinal cord injury 3 months ago with a fall. Pt was  independent PLOF and now uses a w/c and mechanical lift device. Pt is unable to stand. Pt is requesting a rehab facility to improve overall functional mobility.   -CT     Row Name 08/02/21 1102          General Information    Patient Profile Reviewed  yes  -CT     Onset of Illness/Injury or Date of Surgery  07/31/21 admit date  -CT     Referring Physician  Sandra  -CT     Patient Observations  alert;agree to therapy;cooperative  -CT     Prior Level of Function  dependent: just prior to hospital admit  -CT     Equipment Currently Used at Home  wheelchair;oxygen;lift device;walker, rolling  -CT     Existing Precautions/Restrictions  fall  -CT     Risks Reviewed  patient:;LOB;nausea/vomiting;dizziness;increased discomfort;change in vital signs;increased drainage;lines disloged  -CT     Benefits Reviewed  patient:;improve function;increase independence;increase strength;increase balance;decrease pain;decrease risk of DVT;improve skin integrity;increase knowledge  -CT     Barriers to Rehab  previous functional deficit  -CT     Row Name 08/02/21 1102          Previous Level of Function/Home Environm    Household Ambulation, Premorbid Functional Level  unable to perform  -CT     Community Ambulation, Premorbid Functional Level  unable to perform  -CT     Row Name 08/02/21 1102          Living Environment    Current Living Arrangements  home/apartment/condo  -CT     Lives With  child(ara), adult;spouse  -CT     Row Name 08/02/21 1102          Cognition    Affect/Mental Status (Cognitive)  WFL  -CT     Orientation Status (Cognition)  oriented x 3  -CT     Follows Commands (Cognition)  WFL  -CT     Row Name 08/02/21 1102          Range of Motion Comprehensive    Comment, General Range of Motion  BLE AROM grossly <10%, AAROM grossly 75%  -CT     Row Name 08/02/21 1102          Strength Comprehensive (MMT)    Comment, General Manual Muscle Testing (MMT) Assessment  BLE grossly 1-2+/5   -CT     Row Name 08/02/21 1102          Bed  Mobility    Bed Mobility  rolling left;rolling right  -CT     Rolling Left [place] (Bed Mobility)  maximum assist (25% patient effort)  -CT     Rolling Right [place] (Bed Mobility)  maximum assist (25% patient effort)  -CT     Bed Mobility, Safety Issues  decreased use of arms for pushing/pulling;decreased use of legs for bridging/pushing;impaired trunk control for bed mobility  -CT     Assistive Device (Bed Mobility)  bed rails;draw sheet  -CT     Row Name 08/02/21 1102          Transfers    Comment (Transfers)  unable to assess  -CT     Row Name 08/02/21 1102          Gait/Stairs (Locomotion)    Comment (Gait/Stairs)  unable to assess  -CT     Row Name 08/02/21 1102          Coping    Observed Emotional State  calm;cooperative  -CT     Verbalized Emotional State  acceptance  -CT     Row Name 08/02/21 1102          Plan of Care Review    Plan of Care Reviewed With  patient  -CT     Row Name 08/02/21 1102          Physical Therapy Goals    Bed Mobility Goal Selection (PT)  bed mobility, PT goal 1  -CT     Balance Goal Selection (PT)  balance, PT goal 1  -CT     Row Name 08/02/21 1102          Bed Mobility Goal 1 (PT)    Activity/Assistive Device (Bed Mobility Goal 1, PT)  bed mobility activities, all  -CT     [place] Level/Cues Needed (Bed Mobility Goal 1, PT)  moderate assist (50-74% patient effort)  -CT     Time Frame (Bed Mobility Goal 1, PT)  by discharge  -CT     Row Name 08/02/21 1102          Balance Goal 1 (PT)    Activity/Assistive Device (Balance Goal 1, PT)  sitting, static  -CT     [place] Level/Cues Needed (Balance Goal 1, PT)  maximum assist (25-49% patient effort);2 person assist  -CT     Time Frame (Balance Goal 1, PT)  by discharge  -CT     Row Name 08/02/21 1102          Positioning and Restraints    Pre-Treatment Position  in bed  -CT     Post Treatment Position  bed  -CT     In Bed  supine;call light within reach;encouraged to call for assist;exit alarm on;side rails up x3   -CT     Row Name 08/02/21 1102          Therapy Assessment/Plan (PT)    Patient/Family Therapy Goals Statement (PT)  Pt goals are to go to a rehab facility to improve his overall mobility  -CT     Functional Level at Time of Evaluation (PT)  Max A bed mobility  -CT     PT Diagnosis (PT)  decreased functional mobility  -CT     Rehab Potential (PT)  good, to achieve stated therapy goals  -CT     Criteria for Skilled Interventions Met (PT)  yes;skilled treatment is necessary  -CT     Predicted Duration of Therapy Intervention (PT)  length of stay  -CT     Row Name 08/02/21 1102          Therapy Plan Review/Discharge Plan (PT)    Therapy Plan Review (PT)  evaluation/treatment results reviewed;care plan/treatment goals reviewed;risks/benefits reviewed;current/potential barriers reviewed;participants voiced agreement with care plan;participants included;patient  -CT       User Key  (r) = Recorded By, (t) = Taken By, (c) = Cosigned By    Initials Name Provider Type    CT Joceline Trejo PT Physical Therapist        Physical Therapy Education                 Title: PT OT SLP Therapies (Done)     Topic: Physical Therapy (Done)     Point: Mobility training (Done)     Learning Progress Summary           Patient Acceptance, E,TB, VU by CT at 8/2/2021 1122                   Point: Home exercise program (Done)     Learning Progress Summary           Patient Acceptance, E,TB, VU by CT at 8/2/2021 1122                   Point: Body mechanics (Done)     Learning Progress Summary           Patient Acceptance, E,TB, VU by CT at 8/2/2021 1122                   Point: Precautions (Done)     Learning Progress Summary           Patient Acceptance, E,TB, VU by CT at 8/2/2021 1122                               User Key     Initials Effective Dates Name Provider Type Discipline    CT 06/16/21 -  Joceline Trejo PT Physical Therapist PT              PT Recommendation and Plan  Anticipated Discharge Disposition (PT):  (tbd)  Planned Therapy  Interventions (PT): balance training, bed mobility training, home exercise program, manual therapy techniques, motor coordination training, neuromuscular re-education, patient/family education, postural re-education, strengthening, transfer training  Therapy Frequency (PT): 2 times/wk (2-5 times/wk )  Plan of Care Reviewed With: patient       Time Calculation:   PT Charges     Row Name 08/02/21 1122             Time Calculation    PT Received On  08/02/21  -CT      PT Goal Re-Cert Due Date  08/16/21  -CT        User Key  (r) = Recorded By, (t) = Taken By, (c) = Cosigned By    Initials Name Provider Type    CT Joceline Trejo, PT Physical Therapist        Therapy Charges for Today     Code Description Service Date Service Provider Modifiers Qty    21849988566 HC PT EVAL MOD COMPLEXITY 4 8/2/2021 Joceline Trejo, PT GP 1               Joceline Trejo, PT  8/2/2021

## 2021-08-02 NOTE — THERAPY EVALUATION
Acute Care - Speech Language Pathology   Swallow Initial Evaluation [facility]   Clinical Dysphagia Assessment     Patient Name: Иван Miller  : 1955  MRN: [id number]  Today's Date: 2021  Onset of Illness/Injury or Date of Surgery: 21 (admit date)     Referring Physician: Sandra      Admit Date: 2021    Иван Miller  is seen at bedside this am on PCU to assess safety/efficacy of swallowing fnx, determine safest/least restrictive diet tolerance.     Mr. Miller presented to [facility] ED 21 2/2 cough, SOB. He presented w/ bilateral PNA. PMH is significant for ARF w/ hypoxemia, CAD, cardiomyopathy, CHF, HTN, HLD, DM, ARF. He is referred to r/o dysphagia, aspiration.     Social History     Socioeconomic History   • Marital status:      Spouse name: Not on file   • Number of children: Not on file   • Years of education: Not on file   • Highest education level: Not on file   Tobacco Use   • Smoking status: Former Smoker     Packs/day: 2.00     Years: 30.00     Pack years: 60.00     Types: Cigarettes   • Smokeless tobacco: Current User     Types: Snuff   Substance and Sexual Activity   • Alcohol use: No   • Drug use: No   • Sexual activity: Defer      Imaging:   CT Chest WO     INDICATION:   Dyspnea. Chronic dyspnea.     TECHNIQUE:   CT of the thorax without IV contrast. Coronal and sagittal reconstructions were obtained.  Radiation dose reduction techniques included automated exposure control or exposure modulation based on body size. Count of known CT and cardiac nuc med studies  performed in previous 12 months: 5.      COMPARISON:   2021     FINDINGS:  There is new patchy atelectasis and probable curvilinear atelectasis in the left lower lobe favored over pneumonia. There is new consolidation in the posterior left upper lobe suggestive of pneumonia however.  Trace pleural thickening/pleural fluid left lung base. New left-sided lower lobe and to a lesser extent upper lobe volume loss. On  the right, there is new right basilar consolidation suspicious for pneumonia and a trace amount of pleural  fluid/thickening. No new suspicious pulmonary nodule. Small amount of secretions in the trachea. Correlate with potential for aspiration pneumonia. No pneumothorax.     There is no pericardial effusion. No threshold adenopathy. Normal caliber aorta and atherosclerotic change. Advanced atherosclerotic change of the coronary arteries. Evidence of pulmonary arterial hypertension.     Included upper abdomen demonstrates no acute finding. Chronic advanced degenerative or postinflammatory change in the midthoracic spine.  negative.     IMPRESSION:     1. Chronic interstitial changes with new areas of superimposed consolidation suspicious for pneumonia in the left upper lobe and right lower lobe. Atelectasis favored over pneumonia in the left base. Follow-up to clearing recommended. Consider the  possibility of aspiration event.  2. Pulmonary arterial hypertension.  3. Atherosclerotic disease.     Signer Name: Basilio Deng MD   Signed: 8/1/2021 3:11 AM   Workstation Name: [email or web address]    [facility]    Labs:  08/02/21 1050  POC Glucose Once   Collected: 08/02/21 1044  Final result  Specimen: Blood    Glucose 156High  mg/dL             08/02/21 0621  POC Glucose Once   Collected: 08/02/21 0615  Final result  Specimen: Blood    Glucose 121 mg/dL             08/02/21 0339  Basic Metabolic Panel   Collected: 08/02/21 0032  Final result  Specimen: Blood    Glucose 233High  mg/dL CO2 31.8High  mmol/L   BUN 31High  mg/dL Calcium 9.3 mg/dL   Creatinine 0.87 mg/dL eGFR Non African Amer 88 mL/min/1.73   Sodium 141 mmol/L BUN/Creatinine Ratio 35.6High    Potassium 3.8 mmol/L Anion Gap 11.2 mmol/L   Chloride 98 mmol/L            08/02/21 0339  C-reactive Protein   Collected: 08/02/21 0032  Final result  Specimen: Blood    C-Reactive Protein <0.30 mg/dL            08/02/21 0154  CBC &  Differential   Collected: 08/02/21 0032  Final result  Specimen: Blood           08/02/21 0154  CBC Auto Differential   Collected: 08/02/21 0032  Final result  Specimen: Blood    WBC 6.90 10*3/mm3 Lymphocyte % 8.3Low  %   RBC 4.84 10*6/mm3 Monocyte % 6.7 %   Hemoglobin 11.9Low  g/dL Eosinophil % 0.0Low  %   Hematocrit 40.9 % Basophil % 0.1 %   MCV 84.5 fL Immature Grans % 1.0High  %   MCH 24.6Low  pg Neutrophils, Absolute 5.79 10*3/mm3   MCHC 29.1Low  g/dL Lymphocytes, Absolute 0.57Low  10*3/mm3   RDW 16.3High  % Monocytes, Absolute 0.46 10*3/mm3   RDW-SD 50.4 fl Eosinophils, Absolute 0.00 10*3/mm3   MPV 9.5 fL Basophils, Absolute 0.01 10*3/mm3   Platelets 255 10*3/mm3 Immature Grans, Absolute 0.07High  10*3/mm3   Neutrophil % 83.9High  % nRBC 0.0 /100 WBC        Diet Orders (active) (From admission, onward)     Start     Ordered    08/01/21 1004  Diet Regular; Consistent Carbohydrate  Diet Effective Now      08/01/21 1004              Observed on O2 via NC 3L.     Mr. Miller is positioned upright and centered in bed to accept multiple po presentations of ice chips solid cracker, puree and thin liquids via spoon, cup and straw.  He is able to self feed.     Facial/oral structures are symmetrical upon observation. Lingual protrusion reveals no deviation. Oral mucosa are moist, pink, and clean. Secretions are clear, thin, and well controlled. OROM/HARSHIL is wfl to imitate oral postures. Gag is not assessed. Volitional cough is intact w/ adequate  intensity, clear in quality, non-productive. Voice is adequate in intensity, clear in quality w/ intelligible speech. He is a/a and pleasant, interactive, oriented.     Upon po presentations, adequate bolus anticipation and acceptance w/ good labial seal for bolus clearance via spoon bowl, cup rim stability and suction via straw. Bolus formation, manipulation and control are wfl w/ rotary mastication pattern. A-p transit is timely w/o oral residue. No overt s/s aspiration  before the swallow.     Pharyngeal swallow is timely w/ adequate hyolaryngeal elevation per palpation. No overt s/s aspiration evidenced across this evaluation. No silent aspiration suspected. He denies odynophagia.    Visit Dx:     ICD-10-CM ICD-9-CM   1. Pneumonia of both lungs due to infectious organism, unspecified part of lung  J18.9 483.8   2. Acute on chronic respiratory failure with hypoxia and hypercapnia (CMS/[facility])  J96.21 518.84    J96.22 786.09     799.02     Patient Active Problem List   Diagnosis   • Acute respiratory failure with hypoxemia (CMS/[facility])   • Coronary artery disease status post multiple stents   • Non-ischemic cardiomyopathy (CMS/[facility] LV ejection fraction 40 to 45%   • Chronic systolic congestive heart failure (CMS/[facility])   • Essential hypertension   • Hyperlipidemia LDL goal <70   • Insulin dependent diabetes mellitus   • Respiratory failure (CMS/[facility])   • Finger infection   • Pneumonia of both lungs due to infectious organism     Past Medical History:   Diagnosis Date   • Atrial fibrillation (CMS/[facility])    • COPD (chronic obstructive pulmonary disease) (CMS/[facility])    • Diabetes mellitus (CMS/[facility])    • GERD (gastroesophageal reflux disease)    • Hyperlipidemia    • Hypertension    • Myocardial infarction (CMS/[facility])    • Neuropathy      Past Surgical History:   Procedure Laterality Date   • ABDOMINAL SURGERY     • APPENDECTOMY     • CARDIAC CATHETERIZATION     • CORONARY STENT PLACEMENT       Impression: Mr. Miller presents w/ wfl oropharyngeal swallow w/o s/s aspiration.No s/s indicative of silent aspiration.  No odynophagia reported.      He is felt to most benefit from continued least restrictive regular consistency po diet w/ thin liquids. Medications whole, single pill presentations only, please.     EDUCATION  The patient has been educated in the following areas:   Dysphagia (Swallowing Impairment) Oral Care/Hydration.     SLP Recommendation and Plan    1. Continue least restrictive regular  consistency po diet, thin liquids.    2. Medications whole in puree/thins. Single pill presentations only, please.   3. Upright and centered for all po intake  4. XIOMARA precautions.  5. Oral care protocol.  No further formal SLP f/u warranted at this time.    D/w Mr. Miller results and recommendations w/ verbal agreement.    Thank you for allowing me to participate in the care of your patient-  Janelle Mcgraw M.S., CCC/SLP                                                                              Time Calculation:       Therapy Charges for Today     Code Description Service Date Service Provider Modifiers Qty    [id number] HC ST EVAL ORAL PHARYNG SWALLOW 4 8/2/2021 Janelle Mcgraw, MS CCC-SLP GN 1               Janelle Mcgraw, MS CCC-SLP  8/2/2021

## 2021-08-02 NOTE — THERAPY EVALUATION
Acute Care - Occupational Therapy Initial Evaluation and Treatment Note  BAKARI Vazquez     Patient Name: Иван Miller  : 1955  MRN: [id number]  Today's Date: 2021  Onset of Illness/Injury or Date of Surgery: 21 (admit date)     Referring Physician: Sandra    Admit Date: 2021       ICD-10-CM ICD-9-CM   1. Pneumonia of both lungs due to infectious organism, unspecified part of lung  J18.9 483.8   2. Acute on chronic respiratory failure with hypoxia and hypercapnia (CMS/HCC)  J96.21 518.84    J96.22 786.09     799.02     Patient Active Problem List   Diagnosis   • Acute respiratory failure with hypoxemia (CMS/HCC)   • Coronary artery disease status post multiple stents   • Non-ischemic cardiomyopathy (CMS/HCC LV ejection fraction 40 to 45%   • Chronic systolic congestive heart failure (CMS/HCC)   • Essential hypertension   • Hyperlipidemia LDL goal <70   • Insulin dependent diabetes mellitus   • Respiratory failure (CMS/[facility])   • Finger infection   • Pneumonia of both lungs due to infectious organism     Past Medical History:   Diagnosis Date   • Atrial fibrillation (CMS/HCC)    • COPD (chronic obstructive pulmonary disease) (CMS/HCC)    • Diabetes mellitus (CMS/HCC)    • GERD (gastroesophageal reflux disease)    • Hyperlipidemia    • Hypertension    • Myocardial infarction (CMS/HCC)    • Neuropathy      Past Surgical History:   Procedure Laterality Date   • ABDOMINAL SURGERY     • APPENDECTOMY     • CARDIAC CATHETERIZATION     • CORONARY STENT PLACEMENT              OT ASSESSMENT FLOWSHEET (last 12 hours)      OT Evaluation and Treatment     Row Name 21 1303                   OT Time and Intention    Subjective Information  no complaints;weakness  -HB        Document Type  evaluation  -HB        Mode of Treatment  individual therapy;occupational therapy  -HB        Patient Effort  fair  -HB        Symptoms Noted During/After Treatment  fatigue  -HB        Comment  Patient presents with SOA, PNA,  and weakness. Pt reports a fall approx 3 months ago resulting in a SCI and right ankle fx. Pt has not stood in 3 months and was a mechanical lift used at outside facility   -HB           General Information    Patient Profile Reviewed  yes  -HB        Patient/Family/Caregiver Comments/Observations  Patient agreeable to OTE this date.   -HB        Prior Level of Function  dependent:;ADL's daughter assisting PRN  -HB        Equipment Currently Used at Home  wheelchair;oxygen;lift device;walker, rolling  -HB        Existing Precautions/Restrictions  fall  -HB        Benefits Reviewed  patient:;increase independence;improve function;increase strength;increase balance;increase knowledge;improve skin integrity  -HB           Previous Level of Function/Home Environm    BADLs, Premorbid Functional Level  needs adaptive equipment for safe performance;needs assistive device for safe performance;needs assistance for safe performance  -HB        IADLs, Premorbid Functional Level  needs assistance for safe performance;needs assistive device for safe performance;needs adaptive equipment for safe performance  -HB           Living Environment    Current Living Arrangements  home/apartment/condo  -HB        Lives With  spouse;child(ara), adult  -HB           Cognition    Affect/Mental Status (Cognitive)  WFL  -HB        Orientation Status (Cognition)  oriented x 3  -HB        Follows Commands (Cognition)  WFL  -HB           Range of Motion Comprehensive    General Range of Motion  no range of motion deficits identified  -HB           Strength (Manual Muscle Testing)    Strength (Manual Muscle Testing)  bilateral upper extremities WFL; B hands/wrists 3/5 grossly   -HB           Bed Mobility    Bed Mobility  rolling left;rolling right  -HB        Rolling Left [place] (Bed Mobility)  maximum assist (25% patient effort)  -HB        Rolling Right [place] (Bed Mobility)  maximum assist (25% patient effort)  -HB        Bed  Mobility, Safety Issues  decreased use of arms for pushing/pulling;decreased use of legs for bridging/pushing;impaired trunk control for bed mobility  -HB           Activities of Daily Living    BADL Assessment/Intervention  bathing;upper body dressing;lower body dressing;grooming;feeding;toileting  -HB           Bathing Assessment/Intervention    [place] Level (Bathing)  dependent (less than 25% patient effort);bathing skills  -HB           Upper Body Dressing Assessment/Training    [place] Level (Upper Body Dressing)  upper body dressing skills;moderate assist (50% patient effort)  -HB           Lower Body Dressing Assessment/Training    [place] Level (Lower Body Dressing)  lower body dressing skills;dependent (less than 25% patient effort)  -HB           Grooming Assessment/Training    [place] Level (Grooming)  grooming skills;minimum assist (75% patient effort)  -HB           Self-Feeding Assessment/Training    [place] Level (Feeding)  feeding skills;minimum assist (75% patient effort)  -HB           Toileting Assessment/Training    [place] Level (Toileting)  toileting skills;dependent (less than 25% patient effort)  -           Plan of Care Review    Plan of Care Reviewed With  patient  -HB           OT Goals    Dressing Goal Selection (OT)  dressing, OT goal 1  -HB        Grooming Goal Selection (OT)  grooming, OT goal 1  -HB        Strength Goal Selection (OT)  strength, OT goal 1  -HB           Dressing Goal 1 (OT)    Activity/Device (Dressing Goal 1, OT)  upper body dressing  -HB        [place]/Cues Needed (Dressing Goal 1, OT)  minimum assist (75% or more patient effort)  -HB        Time Frame (Dressing Goal 1, OT)  by discharge  -HB           Grooming Goal 1 (OT)    Activity/Device (Grooming Goal 1, OT)  grooming skills, all  -HB        [place] (Grooming Goal 1, OT)  set-up required  -HB        Time Frame (Grooming Goal 1, OT)  by discharge  -HB           Strength  Goal 1 (OT)    Strength Goal 1 (OT)  Patient will increase BUE m/s WFL empahsis on hands   -HB        Time Frame (Strength Goal 1, OT)  by discharge  -HB           Positioning and Restraints    Pre-Treatment Position  in bed  -HB        Post Treatment Position  bed  -HB        In Bed  call light within reach;encouraged to call for assist  -HB           Therapy Assessment/Plan (OT)    Patient/Family Therapy Goal Statement (OT)  to return to PLOF  -HB        OT Diagnosis  Decreased I/ADL status; functional mobility   -HB        Rehab Potential (OT)  fair, will monitor progress closely  -HB        Criteria for Skilled Therapeutic Interventions Met (OT)  yes;skilled treatment is necessary  -HB        Predicted Duration of Therapy Intervention (OT)  1 week/til goals are met/discharge from [facility]  -HB        Problem List (OT)  problems related to;range of motion (ROM);strength;mobility  -HB        Activity Limitations Related to Problem List (OT)  unable to transfer safely;BADLs not performed adequately or safely;IADLs not performed adequately or safely  -HB        Planned Therapy Interventions (OT)  activity tolerance training;adaptive equipment training;BADL retraining;IADL retraining;functional balance retraining;occupation/activity based interventions;ROM/therapeutic exercise;strengthening exercise;transfer/mobility retraining;patient/caregiver education/training  -HB           Therapy Plan Review/Discharge Plan (OT)    Therapy Plan Review (OT)  evaluation/treatment results reviewed;care plan/treatment goals reviewed  -HB        Equipment Needs Upon Discharge (OT)  -- TBD  -HB        Anticipated Discharge Disposition (OT)  extended care facility;skilled nursing facility  -HB          User Key  (r) = Recorded By, (t) = Taken By, (c) = Cosigned By    Initials Name Effective Dates    HB Leticia Maza, OT 05/25/21 -                  OT Recommendation and Plan  Planned Therapy Interventions (OT): activity tolerance training,  adaptive equipment training, BADL retraining, IADL retraining, functional balance retraining, occupation/activity based interventions, ROM/therapeutic exercise, strengthening exercise, transfer/mobility retraining, patient/caregiver education/training  Plan of Care Review  Plan of Care Reviewed With: patient  Plan of Care Reviewed With: patient        Time Calculation:     Therapy Charges for Today     Code Description Service Date Service Provider Modifiers Qty    [id number]  OT EVAL MOD COMPLEXITY 4 8/2/2021 Leticia Maza OT GO 1               Leticia Maza OT  8/2/2021

## 2021-08-02 NOTE — PROGRESS NOTES
Subjective     History:   Иван Miller is a 65 y.o. male admitted on 7/31/2021 secondary to Pneumonia of both lungs due to infectious organism     Procedures: None    CC: Follow up pneumonia, resp failure    Patient seen and examined with MARCELO Raza. Awake and alert. Continues to report some cough and dyspnea but reports some improvement in his chest congestion. Reports some chest pain/tightness associated with cough. Tolerated BiPAP overnight.  No acute events overnight per RN.     History taken from: patient, chart, and RN.      Objective     Vital Signs  Temp:  [97.1 °F (36.2 °C)-97.9 °F (36.6 °C)] 97.3 °F (36.3 °C)  Heart Rate:  [] 65  Resp:  [10-26] 21  BP: (116-207)/() 116/58    Intake/Output Summary (Last 24 hours) at 8/2/2021 1511  Last data filed at 8/2/2021 1400  Gross per 24 hour   Intake 754.91 ml   Output 925 ml   Net -170.09 ml         Physical Exam:  General:    Awake, alert, in no acute distress   Heart:      Normal S1 and S2. Regular rate and rhythm. No significant murmur, rubs or gallops appreciated.   Lungs:     Respirations regular, even and unlabored. Diminished breath sounds throughout with a few scattered wheezes. Aeration improved from yesterday. No rales or rhonchi.   Abdomen:   Soft and nontender. No guarding, rebound tenderness or  organomegaly noted. Bowel sounds present x 4.   Extremities:  Trace-1+ bilateral lower extremity edema. Moves UE and LE equally B/L.     Results Review:    Results from last 7 days   Lab Units 08/02/21  0032 08/01/21  0805 08/01/21  0030   WBC 10*3/mm3 6.90 8.25 8.12   HEMOGLOBIN g/dL 11.9* 12.4* 12.2*   PLATELETS 10*3/mm3 255 247 231     Results from last 7 days   Lab Units 08/02/21  0032 08/01/21  0805 08/01/21  0030   SODIUM mmol/L 141 142 144   POTASSIUM mmol/L 3.8 3.9 4.1   CHLORIDE mmol/L 98 98 99   CO2 mmol/L 31.8* 34.4* 37.4*   BUN mg/dL 31* 26* 22   CREATININE mg/dL 0.87 0.77 0.82   CALCIUM mg/dL 9.3 9.3 9.3   GLUCOSE mg/dL 233* 238* 271*      Results from last 7 days   Lab Units 08/01/21  0805 08/01/21  0030   BILIRUBIN mg/dL 0.3 0.2   ALK PHOS U/L 72 76   AST (SGOT) U/L 10 13   ALT (SGPT) U/L 15 18             Results from last 7 days   Lab Units 08/01/21  0310 08/01/21  0030   TROPONIN T ng/mL <0.010 0.013       Imaging Results (Last 24 Hours)     ** No results found for the last 24 hours. **            Medications:  apixaban, 5 mg, Oral, Q12H  atorvastatin, 20 mg, Oral, Daily  budesonide-formoterol, 2 puff, Inhalation, BID - RT  bumetanide, 2 mg, Oral, Daily  carvedilol, 25 mg, Oral, BID With Meals  empagliflozin, 20 mg, Oral, Daily  fluticasone, 2 spray, Nasal, Daily  gabapentin, 600 mg, Oral, Q12H  HYDROcodone-acetaminophen, 1 tablet, Oral, BID  insulin aspart, 0-9 Units, Subcutaneous, 4x Daily AC & at Bedtime  insulin aspart, 35 Units, Subcutaneous, TID With Meals  insulin detemir, 45 Units, Subcutaneous, Nightly  ipratropium-albuterol, 3 mL, Nebulization, 4x Daily - RT  lidocaine, 1 patch, Transdermal, Nightly  lisinopril, 10 mg, Oral, Q24H  pantoprazole, 40 mg, Oral, Daily  piperacillin-tazobactam, 3.375 g, Intravenous, Q8H  potassium chloride, 10 mEq, Oral, Daily  predniSONE, 40 mg, Oral, Daily With Breakfast  sennosides-docusate, 2 tablet, Oral, BID  sodium chloride, 10 mL, Intravenous, Q12H  tamsulosin, 0.4 mg, Oral, Nightly               Assessment/Plan   Bilateral pneumonia: CT chest reveals LUZ ELENA and RLL consolidation with concern for aspiration. Blood cultures with NGTD. Sputum culture revealing normal respiratory crystal. No evidence of aspiration on swallow eval. Cont Zosyn and nebs.     Acute exacerbation of COPD: Cont Prednisone and nebs.     Acute hypercapnic on chronic hypoxic respiratory failure: Likely 2/2 above. Improved with BiPAP. Cont treatment as outlined above. Cont supplemental O2.     Essential HTN, uncontrolled: Cont home Coreg. Add lisinopril. Cont PRN IV hydralazine.     DM II, inulin dependent with hyperglycemia:  Improved with restarting home basal insulin. Cont SSI with Accuchecks.     Recent right ankle avulsion fracture with reported hx of paraplegia: PT/OT. Supportive treatment.     Hx of chronic thoracic osteomyelitis: Cont to monitor.     Hx of Afib: Currently in sinus rhythm. Cont home Coreg. Cont Eliquis for stroke prevention. Monitor on telemetry.     DVT PPX: Eliquis.     Transfer to med/surg floor.     Disposition: Will likely need SNF placement.       Mandeep Dunlap DO  08/02/21  15:11 EDT

## 2021-08-02 NOTE — PHARMACY PATIENT ASSISTANCE
Pharmacy checked on cost of eliquis.  According to the patient, he does not have any copays.    Thank you;  Merly Gilmore, PharmD  08/02/21  16:44 EDT

## 2021-08-02 NOTE — CASE MANAGEMENT/SOCIAL WORK
Discharge Planning Assessment  [facility]     Patient Name: Иван Miller  MRN: [id number]  Today's Date: 8/2/2021    Admit Date: 7/31/2021    Discharge Needs Assessment     Row Name 08/02/21 1249       Living Environment    Lives With  child(ara), adult    Name(s) of Who Lives With Patient  Lives w/ dtr, Annette    Current Living Arrangements  home/apartment/condo    Primary Care Provided by  self    Provides Primary Care For  no one    Family Caregiver if Needed  child(ara), adult    Family Caregiver Names  Crystal-dtr    Quality of Family Relationships  helpful;involved;supportive    Able to Return to Prior Arrangements  yes       Resource/Environmental Concerns    Resource/Environmental Concerns  none       Transition Planning    Patient/Family Anticipates Transition to  other (see comments) short term SNF rehab    Patient/Family Anticipated Services at Transition  skilled nursing    Transportation Anticipated  health plan transportation EMS       Discharge Needs Assessment    Equipment Currently Used at Home  walker, standard;wheelchair;cane, straight    Concerns to be Addressed  no discharge needs identified    Anticipated Changes Related to Illness  none    Equipment Needed After Discharge  none    Outpatient/Agency/Support Group Needs  skilled nursing facility    Discharge Facility/Level of Care Needs  nursing facility, skilled        Discharge Plan     Row Name 08/02/21 1250       Plan    Plan Pt lives with daughter, Annette. Pt does not utilize HH at this time. Pt [facility] cane, wheelchair, [facility] PCP is Rosi Thomas. Pt does not have a POA or living will. Pt states he will need EMS arranged for transportation. Pt is requesting short term SNF placement for rehab and would prefer a facility in the [place] area. SS faxed referral to [facility] [phone] and notified Paula of the referral [phone]. SS to follow and assist.    15:21: SS followed up with Paula at [facility] who states  they would not be able to meet pt's needs.     Patient/Family in Agreement with Plan  yes        Demographic Summary     Row Name 08/02/21 6440       General Information    Referral Source  nursing    Reason for Consult  -- assist with safe dc plan. May need Rehab Placement per MD notes        ANISH Pza

## 2021-08-03 LAB
ABSOLUTE LUNG FLUID CONTENT: 44 % (ref 20–35)
ALBUMIN SERPL-MCNC: 3.2 G/DL (ref 3.5–5.2)
ALBUMIN/GLOB SERPL: 1.5 G/DL
ALP SERPL-CCNC: 53 U/L (ref 39–117)
ALT SERPL W P-5'-P-CCNC: 34 U/L (ref 1–41)
ANION GAP SERPL CALCULATED.3IONS-SCNC: 5.8 MMOL/L (ref 5–15)
ANISOCYTOSIS BLD QL: NORMAL
AST SERPL-CCNC: 22 U/L (ref 1–40)
BACTERIA SPEC RESP CULT: NORMAL
BASOPHILS # BLD AUTO: 0.01 10*3/MM3 (ref 0–0.2)
BASOPHILS NFR BLD AUTO: 0.1 % (ref 0–1.5)
BILIRUB SERPL-MCNC: 0.2 MG/DL (ref 0–1.2)
BUN SERPL-MCNC: 37 MG/DL (ref 8–23)
BUN/CREAT SERPL: 32.7 (ref 7–25)
CALCIUM SPEC-SCNC: 8.7 MG/DL (ref 8.6–10.5)
CHLORIDE SERPL-SCNC: 99 MMOL/L (ref 98–107)
CO2 SERPL-SCNC: 37.2 MMOL/L (ref 22–29)
CREAT SERPL-MCNC: 1.13 MG/DL (ref 0.76–1.27)
DEPRECATED RDW RBC AUTO: 52.8 FL (ref 37–54)
EOSINOPHIL # BLD AUTO: 0.01 10*3/MM3 (ref 0–0.4)
EOSINOPHIL NFR BLD AUTO: 0.1 % (ref 0.3–6.2)
ERYTHROCYTE [DISTWIDTH] IN BLOOD BY AUTOMATED COUNT: 16.4 % (ref 12.3–15.4)
GFR SERPL CREATININE-BSD FRML MDRD: 65 ML/MIN/1.73
GLOBULIN UR ELPH-MCNC: 2.2 GM/DL
GLUCOSE BLDC GLUCOMTR-MCNC: 158 MG/DL (ref 70–130)
GLUCOSE BLDC GLUCOMTR-MCNC: 168 MG/DL (ref 70–130)
GLUCOSE BLDC GLUCOMTR-MCNC: 220 MG/DL (ref 70–130)
GLUCOSE BLDC GLUCOMTR-MCNC: 86 MG/DL (ref 70–130)
GLUCOSE SERPL-MCNC: 176 MG/DL (ref 65–99)
GRAM STN SPEC: NORMAL
HCT VFR BLD AUTO: 40.8 % (ref 37.5–51)
HGB BLD-MCNC: 11.4 G/DL (ref 13–17.7)
HYPOCHROMIA BLD QL: NORMAL
IMM GRANULOCYTES # BLD AUTO: 0.06 10*3/MM3 (ref 0–0.05)
IMM GRANULOCYTES NFR BLD AUTO: 0.7 % (ref 0–0.5)
LYMPHOCYTES # BLD AUTO: 1.07 10*3/MM3 (ref 0.7–3.1)
LYMPHOCYTES NFR BLD AUTO: 12.5 % (ref 19.6–45.3)
MCH RBC QN AUTO: 24.6 PG (ref 26.6–33)
MCHC RBC AUTO-ENTMCNC: 27.9 G/DL (ref 31.5–35.7)
MCV RBC AUTO: 87.9 FL (ref 79–97)
MONOCYTES # BLD AUTO: 0.89 10*3/MM3 (ref 0.1–0.9)
MONOCYTES NFR BLD AUTO: 10.4 % (ref 5–12)
NEUTROPHILS NFR BLD AUTO: 6.49 10*3/MM3 (ref 1.7–7)
NEUTROPHILS NFR BLD AUTO: 76.2 % (ref 42.7–76)
NRBC BLD AUTO-RTO: 0 /100 WBC (ref 0–0.2)
PLAT MORPH BLD: NORMAL
PLATELET # BLD AUTO: 224 10*3/MM3 (ref 140–450)
PMV BLD AUTO: 9.4 FL (ref 6–12)
POTASSIUM SERPL-SCNC: 3.9 MMOL/L (ref 3.5–5.2)
PROT SERPL-MCNC: 5.4 G/DL (ref 6–8.5)
RBC # BLD AUTO: 4.64 10*6/MM3 (ref 4.14–5.8)
SODIUM SERPL-SCNC: 142 MMOL/L (ref 136–145)
WBC # BLD AUTO: 8.53 10*3/MM3 (ref 3.4–10.8)

## 2021-08-03 PROCEDURE — 94799 UNLISTED PULMONARY SVC/PX: CPT

## 2021-08-03 PROCEDURE — 97530 THERAPEUTIC ACTIVITIES: CPT

## 2021-08-03 PROCEDURE — 99232 SBSQ HOSP IP/OBS MODERATE 35: CPT | Performed by: INTERNAL MEDICINE

## 2021-08-03 PROCEDURE — 85007 BL SMEAR W/DIFF WBC COUNT: CPT | Performed by: INTERNAL MEDICINE

## 2021-08-03 PROCEDURE — 94660 CPAP INITIATION&MGMT: CPT

## 2021-08-03 PROCEDURE — 82962 GLUCOSE BLOOD TEST: CPT

## 2021-08-03 PROCEDURE — 85025 COMPLETE CBC W/AUTO DIFF WBC: CPT | Performed by: INTERNAL MEDICINE

## 2021-08-03 PROCEDURE — 63710000001 INSULIN ASPART PER 5 UNITS: Performed by: INTERNAL MEDICINE

## 2021-08-03 PROCEDURE — 94726 PLETHYSMOGRAPHY LUNG VOLUMES: CPT

## 2021-08-03 PROCEDURE — 97110 THERAPEUTIC EXERCISES: CPT

## 2021-08-03 PROCEDURE — 25010000002 PIPERACILLIN SOD-TAZOBACTAM PER 1 G: Performed by: INTERNAL MEDICINE

## 2021-08-03 PROCEDURE — 63710000001 PREDNISONE PER 1 MG: Performed by: INTERNAL MEDICINE

## 2021-08-03 PROCEDURE — 63710000001 INSULIN DETEMIR PER 5 UNITS: Performed by: INTERNAL MEDICINE

## 2021-08-03 PROCEDURE — 80053 COMPREHEN METABOLIC PANEL: CPT | Performed by: INTERNAL MEDICINE

## 2021-08-03 RX ORDER — LISINOPRIL 2.5 MG/1
2.5 TABLET ORAL
Status: DISCONTINUED | OUTPATIENT
Start: 2021-08-04 | End: 2021-08-06

## 2021-08-03 RX ORDER — BUMETANIDE 0.25 MG/ML
2 INJECTION INTRAMUSCULAR; INTRAVENOUS ONCE
Status: DISCONTINUED | OUTPATIENT
Start: 2021-08-03 | End: 2021-08-04

## 2021-08-03 RX ADMIN — BUMETANIDE 2 MG: 1 TABLET ORAL at 08:58

## 2021-08-03 RX ADMIN — IPRATROPIUM BROMIDE AND ALBUTEROL SULFATE 3 ML: .5; 3 SOLUTION RESPIRATORY (INHALATION) at 13:51

## 2021-08-03 RX ADMIN — APIXABAN 5 MG: 5 TABLET, FILM COATED ORAL at 08:58

## 2021-08-03 RX ADMIN — INSULIN ASPART 2 UNITS: 100 INJECTION, SOLUTION INTRAVENOUS; SUBCUTANEOUS at 21:15

## 2021-08-03 RX ADMIN — FLUTICASONE PROPIONATE 2 SPRAY: 50 SPRAY, METERED NASAL at 08:59

## 2021-08-03 RX ADMIN — PREDNISONE 40 MG: 20 TABLET ORAL at 08:59

## 2021-08-03 RX ADMIN — INSULIN ASPART 35 UNITS: 100 INJECTION, SOLUTION INTRAVENOUS; SUBCUTANEOUS at 08:57

## 2021-08-03 RX ADMIN — ATORVASTATIN CALCIUM 20 MG: 20 TABLET, FILM COATED ORAL at 08:58

## 2021-08-03 RX ADMIN — GUAIFENESIN 1200 MG: 600 TABLET, EXTENDED RELEASE ORAL at 21:06

## 2021-08-03 RX ADMIN — PIPERACILLIN SODIUM AND TAZOBACTAM SODIUM 3.38 G: 3; .375 INJECTION, POWDER, LYOPHILIZED, FOR SOLUTION INTRAVENOUS at 14:59

## 2021-08-03 RX ADMIN — IPRATROPIUM BROMIDE AND ALBUTEROL SULFATE 3 ML: .5; 3 SOLUTION RESPIRATORY (INHALATION) at 01:30

## 2021-08-03 RX ADMIN — INSULIN ASPART 20 UNITS: 100 INJECTION, SOLUTION INTRAVENOUS; SUBCUTANEOUS at 17:07

## 2021-08-03 RX ADMIN — INSULIN ASPART 2 UNITS: 100 INJECTION, SOLUTION INTRAVENOUS; SUBCUTANEOUS at 08:57

## 2021-08-03 RX ADMIN — PIPERACILLIN SODIUM AND TAZOBACTAM SODIUM 3.38 G: 3; .375 INJECTION, POWDER, LYOPHILIZED, FOR SOLUTION INTRAVENOUS at 21:16

## 2021-08-03 RX ADMIN — GABAPENTIN 600 MG: 300 CAPSULE ORAL at 21:06

## 2021-08-03 RX ADMIN — IPRATROPIUM BROMIDE AND ALBUTEROL SULFATE 3 ML: .5; 3 SOLUTION RESPIRATORY (INHALATION) at 06:47

## 2021-08-03 RX ADMIN — SODIUM CHLORIDE, PRESERVATIVE FREE 10 ML: 5 INJECTION INTRAVENOUS at 09:03

## 2021-08-03 RX ADMIN — INSULIN ASPART 4 UNITS: 100 INJECTION, SOLUTION INTRAVENOUS; SUBCUTANEOUS at 17:07

## 2021-08-03 RX ADMIN — GUAIFENESIN 1200 MG: 600 TABLET, EXTENDED RELEASE ORAL at 08:59

## 2021-08-03 RX ADMIN — PIPERACILLIN SODIUM AND TAZOBACTAM SODIUM 3.38 G: 3; .375 INJECTION, POWDER, LYOPHILIZED, FOR SOLUTION INTRAVENOUS at 05:57

## 2021-08-03 RX ADMIN — APIXABAN 5 MG: 5 TABLET, FILM COATED ORAL at 21:06

## 2021-08-03 RX ADMIN — ACETAMINOPHEN 650 MG: 325 TABLET ORAL at 15:13

## 2021-08-03 RX ADMIN — POTASSIUM CHLORIDE 10 MEQ: 750 TABLET, FILM COATED, EXTENDED RELEASE ORAL at 08:58

## 2021-08-03 RX ADMIN — DOCUSATE SODIUM 50 MG AND SENNOSIDES 8.6 MG 2 TABLET: 8.6; 5 TABLET, FILM COATED ORAL at 21:06

## 2021-08-03 RX ADMIN — LISINOPRIL 10 MG: 10 TABLET ORAL at 08:59

## 2021-08-03 RX ADMIN — PANTOPRAZOLE SODIUM 40 MG: 40 TABLET, DELAYED RELEASE ORAL at 08:58

## 2021-08-03 RX ADMIN — DOCUSATE SODIUM 50 MG AND SENNOSIDES 8.6 MG 2 TABLET: 8.6; 5 TABLET, FILM COATED ORAL at 08:58

## 2021-08-03 RX ADMIN — HYDROCODONE BITARTRATE AND ACETAMINOPHEN 1 TABLET: 7.5; 325 TABLET ORAL at 21:06

## 2021-08-03 RX ADMIN — TAMSULOSIN HYDROCHLORIDE 0.4 MG: 0.4 CAPSULE ORAL at 21:06

## 2021-08-03 RX ADMIN — HYDROCODONE BITARTRATE AND ACETAMINOPHEN 1 TABLET: 7.5; 325 TABLET ORAL at 08:59

## 2021-08-03 RX ADMIN — INSULIN DETEMIR 45 UNITS: 100 INJECTION, SOLUTION SUBCUTANEOUS at 21:27

## 2021-08-03 RX ADMIN — CARVEDILOL 25 MG: 25 TABLET, FILM COATED ORAL at 08:58

## 2021-08-03 RX ADMIN — SODIUM CHLORIDE, PRESERVATIVE FREE 10 ML: 5 INJECTION INTRAVENOUS at 21:06

## 2021-08-03 RX ADMIN — EMPAGLIFLOZIN 20 MG: 10 TABLET, FILM COATED ORAL at 08:59

## 2021-08-03 RX ADMIN — BUDESONIDE AND FORMOTEROL FUMARATE DIHYDRATE 2 PUFF: 160; 4.5 AEROSOL RESPIRATORY (INHALATION) at 19:13

## 2021-08-03 RX ADMIN — BUDESONIDE AND FORMOTEROL FUMARATE DIHYDRATE 2 PUFF: 160; 4.5 AEROSOL RESPIRATORY (INHALATION) at 06:47

## 2021-08-03 RX ADMIN — IPRATROPIUM BROMIDE AND ALBUTEROL SULFATE 3 ML: .5; 3 SOLUTION RESPIRATORY (INHALATION) at 19:14

## 2021-08-03 RX ADMIN — GABAPENTIN 600 MG: 300 CAPSULE ORAL at 08:59

## 2021-08-03 RX ADMIN — LIDOCAINE 1 PATCH: 50 PATCH CUTANEOUS at 21:19

## 2021-08-03 RX ADMIN — CARVEDILOL 25 MG: 25 TABLET, FILM COATED ORAL at 17:07

## 2021-08-03 NOTE — PROGRESS NOTES
ReDS Value     Date: 08/03/21     ReDS Value: 44    Over 41 Hypervolemic Status        Hellen Loving, RRT

## 2021-08-03 NOTE — PROGRESS NOTES
Subjective     History:   Иван Miller is a 65 y.o. male admitted on 7/31/2021 secondary to Pneumonia of both lungs due to infectious organism     Procedures: None    CC: Follow up pneumonia, resp failure    Patient seen and examined with MARCELO Garza. Awake and alert. Continues to report some cough and chest congestion. Dyspnea overall improved. No reported CP. States he could not tolerated BiPAP as long last PM 2/2 cough.  No acute events overnight per RN.     History taken from: patient, chart, and RN.      Objective     Vital Signs  Temp:  [96.5 °F (35.8 °C)-98.2 °F (36.8 °C)] 97.7 °F (36.5 °C)  Heart Rate:  [55-85] 66  Resp:  [18-22] 18  BP: ()/(47-79) 100/49    Intake/Output Summary (Last 24 hours) at 8/3/2021 1421  Last data filed at 8/3/2021 1002  Gross per 24 hour   Intake 1071 ml   Output 1450 ml   Net -379 ml         Physical Exam:  General:    Awake, alert, in no acute distress   Heart:      Normal S1 and S2. Regular rate and rhythm. No significant murmur, rubs or gallops appreciated.   Lungs:     Respirations regular, even and unlabored. Diminished breath sounds throughout with a few scattered rales. Wheezing improved.  No rhonchi.   Abdomen:   Soft and nontender. No guarding, rebound tenderness or  organomegaly noted. Bowel sounds present x 4.   Extremities:  1+ bilateral lower extremity edema. Moves UE and LE equally B/L.     Results Review:    Results from last 7 days   Lab Units 08/03/21  0033 08/02/21  0032 08/01/21  0805 08/01/21  0030   WBC 10*3/mm3 8.53 6.90 8.25 8.12   HEMOGLOBIN g/dL 11.4* 11.9* 12.4* 12.2*   PLATELETS 10*3/mm3 224 255 247 231     Results from last 7 days   Lab Units 08/03/21  0033 08/02/21  0032 08/01/21  0805 08/01/21  0030   SODIUM mmol/L 142 141 142 144   POTASSIUM mmol/L 3.9 3.8 3.9 4.1   CHLORIDE mmol/L 99 98 98 99   CO2 mmol/L 37.2* 31.8* 34.4* 37.4*   BUN mg/dL 37* 31* 26* 22   CREATININE mg/dL 1.13 0.87 0.77 0.82   CALCIUM mg/dL 8.7 9.3 9.3 9.3   GLUCOSE mg/dL 176*  233* 238* 271*     Results from last 7 days   Lab Units 08/03/21  0033 08/01/21  0805 08/01/21  0030   BILIRUBIN mg/dL 0.2 0.3 0.2   ALK PHOS U/L 53 72 76   AST (SGOT) U/L 22 10 13   ALT (SGPT) U/L 34 15 18             Results from last 7 days   Lab Units 08/01/21  0310 08/01/21  0030   TROPONIN T ng/mL <0.010 0.013       Imaging Results (Last 24 Hours)     ** No results found for the last 24 hours. **            Medications:  apixaban, 5 mg, Oral, Q12H  atorvastatin, 20 mg, Oral, Daily  budesonide-formoterol, 2 puff, Inhalation, BID - RT  bumetanide, 2 mg, Intravenous, Once  bumetanide, 2 mg, Oral, Daily  carvedilol, 25 mg, Oral, BID With Meals  empagliflozin, 20 mg, Oral, Daily  fluticasone, 2 spray, Nasal, Daily  gabapentin, 600 mg, Oral, Q12H  guaiFENesin, 1,200 mg, Oral, Q12H  HYDROcodone-acetaminophen, 1 tablet, Oral, BID  insulin aspart, 0-9 Units, Subcutaneous, 4x Daily AC & at Bedtime  insulin aspart, 35 Units, Subcutaneous, TID With Meals  insulin detemir, 45 Units, Subcutaneous, Nightly  ipratropium-albuterol, 3 mL, Nebulization, 4x Daily - RT  lidocaine, 1 patch, Transdermal, Nightly  lisinopril, 10 mg, Oral, Q24H  pantoprazole, 40 mg, Oral, Daily  piperacillin-tazobactam, 3.375 g, Intravenous, Q8H  potassium chloride, 10 mEq, Oral, Daily  predniSONE, 40 mg, Oral, Daily With Breakfast  sennosides-docusate, 2 tablet, Oral, BID  sodium chloride, 10 mL, Intravenous, Q12H  tamsulosin, 0.4 mg, Oral, Nightly               Assessment/Plan   Bilateral pneumonia: CT chest reveals LUZ ELENA and RLL consolidation with concern for aspiration. Blood cultures with NGTD. Sputum culture revealing normal respiratory crystal. No evidence of aspiration on swallow eval. Cont Zosyn and nebs.     Acute exacerbation of COPD: Cont Prednisone and nebs.     Acute hypercapnic on chronic hypoxic respiratory failure: Likely 2/2 above. Improved with BiPAP. Cont treatment as outlined above. Cont supplemental O2.     Essential HTN,  uncontrolled: Cont home Coreg. Added lisinopril. BP now borderline low intermittently. Will decrease dose of lisinopril. Cont PRN IV hydralazine.     DM II, inulin dependent with hyperglycemia: Improved with restarting home insulin. Decrease meal time insulin. Cont SSI with Accuchecks.     Recent right ankle avulsion fracture with reported hx of paraplegia: PT/OT. Supportive treatment.     Hx of chronic thoracic osteomyelitis: Cont to monitor.     Hx of Afib: Currently in sinus rhythm. Cont home Coreg. Cont Eliquis for stroke prevention. Monitor on telemetry.     Lower extremity edema: Order additional Bumex today.     DVT PPX: Eliquis.       Disposition: SNF placement when medically stable.       Mandeep Dunlap DO  08/03/21  14:21 EDT

## 2021-08-03 NOTE — PLAN OF CARE
Goal Outcome Evaluation:   Pt A&O x 4. VSS and afebrile during shift. Pt remains on 3L NC. Pt wears bipap for short amount of time during shift and takes it off. No complaints at this time. Safety maintained, will continue to monitor.

## 2021-08-03 NOTE — PLAN OF CARE
Goal Outcome Evaluation:   Patient was a transfer from PCU. Patient c/o neck pain. He has received his prn Tylenol. No other issues at this time.

## 2021-08-03 NOTE — THERAPY TREATMENT NOTE
Acute Care - Physical Therapy Treatment Note   [place]     Patient Name: Иван Miller  : 1955  MRN: [id number]  Today's Date: 8/3/2021   Onset of Illness/Injury or Date of Surgery: 21 (admit date)  Visit Dx:     ICD-10-CM ICD-9-CM   1. Pneumonia of both lungs due to infectious organism, unspecified part of lung  J18.9 483.8   2. Acute on chronic respiratory failure with hypoxia and hypercapnia (CMS/HCC)  J96.21 518.84    J96.22 786.09     799.02     Patient Active Problem List   Diagnosis   • Acute respiratory failure with hypoxemia (CMS/HCC)   • Coronary artery disease status post multiple stents   • Non-ischemic cardiomyopathy (CMS/HCC LV ejection fraction 40 to 45%   • Chronic systolic congestive heart failure (CMS/HCC)   • Essential hypertension   • Hyperlipidemia LDL goal <70   • Insulin dependent diabetes mellitus   • Respiratory failure (CMS/HCC)   • Finger infection   • Pneumonia of both lungs due to infectious organism     Past Medical History:   Diagnosis Date   • Atrial fibrillation (CMS/HCC)    • COPD (chronic obstructive pulmonary disease) (CMS/HCC)    • Diabetes mellitus (CMS/HCC)    • GERD (gastroesophageal reflux disease)    • Hyperlipidemia    • Hypertension    • Myocardial infarction (CMS/HCC)    • Neuropathy      Past Surgical History:   Procedure Laterality Date   • ABDOMINAL SURGERY     • APPENDECTOMY     • CARDIAC CATHETERIZATION     • CORONARY STENT PLACEMENT          PT Assessment (last 12 hours)      PT Evaluation and Treatment     Row Name 21 1441          Physical Therapy Time and Intention    Subjective Information  complains of;weakness  -CT     Document Type  therapy note (daily note)  -CT     Mode of Treatment  physical therapy  -CT     Patient Effort  good  -CT     Comment  Pt tolerated treatment session well. Pt performed AAROM BLE ther ex in supine.   -CT     Row Name 21 1441          General Information    Patient Profile Reviewed  yes  -CT     Equipment  Currently Used at Home  wheelchair;oxygen;lift device;walker, rolling  -CT     Existing Precautions/Restrictions  fall  -CT     Row Name 08/03/21 1441          Cognition    Affect/Mental Status (Cognitive)  WFL  -CT     Orientation Status (Cognition)  oriented x 3  -CT     Follows Commands (Cognition)  WFL  -CT     Row Name 08/03/21 1441          Bed Mobility    Bed Mobility  rolling left;rolling right  -CT     Rolling Left [place] (Bed Mobility)  maximum assist (25% patient effort)  -CT     Rolling Right [place] (Bed Mobility)  maximum assist (25% patient effort)  -CT     Bed Mobility, Safety Issues  decreased use of arms for pushing/pulling;decreased use of legs for bridging/pushing;impaired trunk control for bed mobility  -CT     Assistive Device (Bed Mobility)  bed rails;draw sheet  -CT     Row Name 08/03/21 1441          Motor Skills    Therapeutic Exercise  -- BLE AAROM supine ther ex   -CT     Row Name 08/03/21 1441          Coping    Observed Emotional State  calm;cooperative  -CT     Verbalized Emotional State  acceptance  -CT     Row Name 08/03/21 1441          Plan of Care Review    Plan of Care Reviewed With  patient  -CT     Row Name 08/03/21 1441          Positioning and Restraints    Pre-Treatment Position  in bed  -CT     Post Treatment Position  bed  -CT     In Bed  supine;call light within reach;encouraged to call for assist;exit alarm on;side rails up x3;notified nsg  -CT     Row Name 08/03/21 1441          Therapy Assessment/Plan (PT)    Rehab Potential (PT)  good, to achieve stated therapy goals  -CT     Criteria for Skilled Interventions Met (PT)  yes;skilled treatment is necessary  -CT       User Key  (r) = Recorded By, (t) = Taken By, (c) = Cosigned By    Initials Name Provider Type    CT Joceline Trejo PT Physical Therapist        Physical Therapy Education                 Title: PT OT SLP Therapies (Done)     Topic: Physical Therapy (Done)     Point: Mobility training (Done)      Learning Progress Summary           Patient Acceptance, E,TB, VU by CT at 8/3/2021 1444    Acceptance, E,TB, VU by CT at 8/2/2021 1122                   Point: Home exercise program (Done)     Learning Progress Summary           Patient Acceptance, E,TB, VU by CT at 8/3/2021 1444    Acceptance, E,TB, VU by CT at 8/2/2021 1122                   Point: Body mechanics (Done)     Learning Progress Summary           Patient Acceptance, E,TB, VU by CT at 8/3/2021 1444    Acceptance, E,TB, VU by CT at 8/2/2021 1122                   Point: Precautions (Done)     Learning Progress Summary           Patient Acceptance, E,TB, VU by CT at 8/3/2021 1444    Acceptance, E,TB, VU by CT at 8/2/2021 1122                               User Key     Initials Effective Dates Name Provider Type Discipline    CT 06/16/21 -  Joceline Trejo PT Physical Therapist PT              PT Recommendation and Plan  Anticipated Discharge Disposition (PT):  (tbd)  Planned Therapy Interventions (PT): balance training, bed mobility training, home exercise program, manual therapy techniques, motor coordination training, neuromuscular re-education, patient/family education, postural re-education, strengthening, transfer training  Therapy Frequency (PT): 2 times/wk (2-5 times/wk )  Plan of Care Reviewed With: patient       Time Calculation:   PT Charges     Row Name 08/03/21 1445             Time Calculation    PT Received On  08/03/21  -CT      PT Goal Re-Cert Due Date  08/16/21  -CT         Time Calculation- PT    Total Timed Code Minutes- PT  30 minute(s)  -CT        User Key  (r) = Recorded By, (t) = Taken By, (c) = Cosigned By    Initials Name Provider Type    CT Joceline Trejo PT Physical Therapist        Therapy Charges for Today     Code Description Service Date Service Provider Modifiers Qty    24901780682 HC PT EVAL MOD COMPLEXITY 4 8/2/2021 Joceline Trejo, PT GP 1    20258004100 HC PT THER PROC EA 15 MIN 8/3/2021 Joceline Trejo, PT GP 1     [id number]  PT THERAPEUTIC ACT EA 15 MIN 8/3/2021 Joceline Trejo, PT GP 1               Joceline Trejo, PT  8/3/2021

## 2021-08-03 NOTE — CASE MANAGEMENT/SOCIAL WORK
Discharge Planning Assessment   [place]     Patient Name: Иван Miller  MRN: [id number]  Today's Date: 8/3/2021    Admit Date: 7/31/2021    Discharge Plan     Row Name 08/03/21 1309       Plan    Plan SS faxed referral to [facility] [phone] and notified Elvia [phone] of referral. SS following.    15:36: SS received call from Elvia at [facility] denying pt due to pt's weight. SS faxed referral to the [facility] [phone] and notified Veronica [phone].         ANISH Paz

## 2021-08-03 NOTE — DISCHARGE PLACEMENT REQUEST
"Иван Blanco (65 y.o. Male)     Date of Birth Social Security Number Address Home Phone MRN    1955  [address]  [place] [address] [phone] [id number]    [employer] Marital Status          None        Admission Date Admission Type Admitting Provider Attending Provider Department, Room/Bed    7/31/21 Emergency Vivienne Flores DO Troxell, Christopher A, DO [address], 3348/2S    Discharge Date Discharge Disposition Discharge Destination                       Attending Provider: Mandeep Dunlap DO    Allergies: Codeine, Ciprofloxacin    Isolation: None   Infection: None   Code Status: CPR    Ht: 175.3 cm (69\")   Wt: 155 kg (340 lb 11.2 oz)    Admission Cmt: None   Principal Problem: Pneumonia of both lungs due to infectious organism [J18.9]                 Active Insurance as of 7/31/2021     Primary Coverage     Payor Plan Insurance Group Employer/Plan Group    [facility] MEDICARE REPLACEMENT [facility] DUAL COMPLETE MEDICARE REPLACEMENT [id number]     Payor Plan Address Payor Plan Phone Number Payor Plan Fax Number Effective Dates    [address] [phone]  1/1/2021 - None Entered    [facility] [address]       Subscriber Name Subscriber Birth Date Member ID       ИВАН BLANCO 1955 [id number]           Secondary Coverage     Payor Plan Insurance Group Employer/Plan Group    [place]      Payor Plan Address Payor Plan Phone Number Payor Plan Fax Number Effective Dates    [address] [phone]  7/31/2021 - None Entered    [facility] [address]       Subscriber Name Subscriber Birth Date Member ID       ИВАН BLANCO 1955 [id number]                 Emergency Contacts      (Rel.) Home Phone Work Phone Mobile Phone    PaulLamar (Spouse) [phone] -- [phone]    GARCIA MALDONADO (Daughter) [phone] -- [phone]               History & Physical      Vivienne Flores DO at 08/01/21 " 0622          Hospitalist History and Physical    Patient Identification:  Name: Иван Miller  Age/Sex: 65 y.o. male  :  1955  MRN: [id number]        Admit Date: 2021   Primary Care Physician: Rosi Thomas APRN    Chief Complaint   Patient presents with   • Shortness of Breath   • Edema       History of Present Illness  Patient is a 65 y.o. male presents with the following: Cough, Shortness of breath    The patient is a 65-year-old male with past medical history significant for chronic respiratory failure currently on 3 L/min nasal cannula, COPD, morbid obesity, paraplegia after previous traumatic injury, diabetes mellitus and hypertension who presents to the emergency department complaining of progressive shortness of breath and cough.    The patient is currently on BiPAP also is somewhat difficult to obtain a complete history, however, he states that for the past 5 days or so he has had a productive cough with yellow sputum production and worsening shortness of air.  The patient reports chills but denies fever.  He reports nausea but denies vomiting.  He does report occasional dysphagia and states that he did have an possible aspiration event a few days ago but cannot at this time give me specifics regarding that.  The patient states that he does use CPAP at home and is currently using 3 L/min nasal cannula.  Patient states that yesterday he had had chest discomfort that was localized to the mid and left chest area.  He states that it was more of a soreness and did not radiate to the shoulders or his jaw area.  Patient is currently chest pain-free.    Patient's most recent hospitalization here was from 2021 through 2021 where the patient was treated for acute on chronic hypoxic and hypercapnic respiratory failure, diuretic induced NICKY and history of toxic encephalopathy in the setting of methamphetamine abuse and lethargy thought to be due to opioids and gabapentin. Patient also with a  "right ankle fracture at that time.  There was also question of chronic osteomyelitis; MRIs of his back were obtained at that time and did not reveal evidence of acute inflammatory change.  There was mainly question of osteomyelitis in the thoracic spine.  During that hospitalization, the case was discussed with neurosurgery in [place] and he felt that patient most likely had \"burnt out osteomyelitis\" and did not feel that it was acute.    CT chest without contrast revealed right lower lobe pneumonia and atelectasis favored in the left base.  Patient with pulmonary arterial hypertension.  Arterial blood gas on 3 L/min nasal cannula revealed a pH of 7.299, PCO2 81.6, PO2 92.4 and bicarbonate 40.0 with an oxygen saturation of 96.9%.  proBNP was minimally elevated at 956.2.  C-RP was within normal limits at less than 0.30.  Troponin T also negative.  CMP revealed 84.9% neutrophils but white blood cell count was normal at 8.12.    Patient was placed on BiPAP in the emergency department has been admitted to the progressive care unit for further evaluation and management.    Past History:  Past Medical History:   Diagnosis Date   • Atrial fibrillation (CMS/HCC)    • COPD (chronic obstructive pulmonary disease) (CMS/HCC)    • Diabetes mellitus (CMS/HCC)    • GERD (gastroesophageal reflux disease)    • Hyperlipidemia    • Hypertension    • Myocardial infarction (CMS/HCC)    • Neuropathy      Past Surgical History:   Procedure Laterality Date   • ABDOMINAL SURGERY     • APPENDECTOMY     • CARDIAC CATHETERIZATION     • CORONARY STENT PLACEMENT       Family History   Problem Relation Age of Onset   • Hypertension Mother    • Heart attack Mother    • Hypertension Father      Social History     Tobacco Use   • Smoking status: Former Smoker     Packs/day: 2.00     Years: 30.00     Pack years: 60.00     Types: Cigarettes   • Smokeless tobacco: Current User     Types: Snuff   Substance Use Topics   • Alcohol use: No   • Drug use: " No     Medications Prior to Admission   Medication Sig Dispense Refill Last Dose   • albuterol (PROVENTIL HFA;VENTOLIN HFA) 108 (90 Base) MCG/ACT inhaler Inhale 2 puffs Every 6 (Six) Hours As Needed for Wheezing.      • apixaban (ELIQUIS) 5 MG tablet tablet Take 5 mg by mouth 2 (Two) Times a Day.      • atorvastatin (LIPITOR) 20 MG tablet Take 20 mg by mouth Daily.      • Budeson-Glycopyrrol-Formoterol (BREZTRI) 160-9-4.8 MCG/ACT aerosol inhaler Inhale 2 puffs 2 (Two) Times a Day.      • bumetanide (BUMEX) 2 MG tablet Take 1 tablet by mouth Daily for 30 days. 30 tablet 0    • clopidogrel (PLAVIX) 75 MG tablet Take 1 tablet by mouth Daily. 30 tablet     • Dapagliflozin Propanediol (Farxiga) 10 MG tablet Take 10 mg by mouth Daily.      • fluticasone (FLONASE) 50 MCG/ACT nasal spray 2 sprays into the nostril(s) as directed by provider Daily.      • gabapentin (NEURONTIN) 100 MG capsule Take 1 capsule by mouth Every 12 (Twelve) Hours for 3 days. 6 capsule 0    • hydrOXYzine (ATARAX) 25 MG tablet Take 1 tablet by mouth 3 (Three) Times a Day As Needed for Anxiety.      • insulin aspart (novoLOG) 100 UNIT/ML injection Inject 12 Units under the skin into the appropriate area as directed 3 (Three) Times a Day With Meals.  12    • insulin detemir (LEVEMIR) 100 UNIT/ML injection Inject 60 Units under the skin into the appropriate area as directed Daily.  12    • ipratropium-albuterol (DUO-NEB) 0.5-2.5 mg/3 ml nebulizer Take 3 mL by nebulization Every 4 (Four) Hours As Needed for Wheezing.      • lidocaine (LIDODERM) 5 % Place 1 patch on the skin as directed by provider Daily. Prior to [facility] Admission, Patient was on:  Place on Shoulder or back and Remove & Discard patch within 12 hours or as directed by MD      • magnesium oxide (MAG-OX) 400 MG tablet Take 400 mg by mouth Daily.      • metFORMIN (GLUCOPHAGE) 1000 MG tablet Take 1,000 mg by mouth 2 (Two) Times a Day With Meals.      • metoprolol succinate XL (TOPROL-XL) 50 MG  24 hr tablet Take 3 tablets by mouth Daily.      • pantoprazole (PROTONIX) 40 MG EC tablet Take 40 mg by mouth Daily.      • sennosides-docusate (PERICOLACE) 8.6-50 MG per tablet Take 2 tablets by mouth 2 (Two) Times a Day.      • tamsulosin (FLOMAX) 0.4 MG capsule 24 hr capsule Take 1 capsule by mouth Every Night.        Allergies: Codeine and Ciprofloxacin    Review of Systems:  Review of Systems   HENT: Positive for trouble swallowing.    Respiratory: Positive for shortness of breath and wheezing.    Gastrointestinal: Positive for nausea and vomiting.   Skin: Negative for rash and wound.      Vital Signs  Temp:  [97.1 °F (36.2 °C)-98.6 °F (37 °C)] 97.1 °F (36.2 °C)  Heart Rate:  [70-88] 71  Resp:  [18-24] 21  BP: (150-190)/() 177/101  Body mass index is 47.84 kg/m².    Physical Exam:  Physical Exam  Constitutional:       General: He is not in acute distress.     Appearance: He is well-developed. He is morbidly obese.      Interventions: Face mask in place.   HENT:      Head: Normocephalic and atraumatic.   Eyes:      Conjunctiva/sclera: Conjunctivae normal.   Neck:      Trachea: No tracheal deviation.   Cardiovascular:      Rate and Rhythm: Normal rate and regular rhythm.      Pulses:           Posterior tibial pulses are 2+ on the right side and 2+ on the left side.      Heart sounds: No murmur heard.   No friction rub. No gallop.       Comments: Trace edema bilateral lower extremities  Pulmonary:      Effort: No respiratory distress.      Breath sounds: Examination of the right-lower field reveals decreased breath sounds. Examination of the left-lower field reveals decreased breath sounds. Decreased breath sounds present. No wheezing or rales.   Abdominal:      General: Bowel sounds are normal. There is no distension.      Palpations: Abdomen is soft.      Tenderness: There is no abdominal tenderness. There is no guarding.   Skin:     General: Skin is warm and dry.      Findings: No erythema or rash.    Neurological:      Mental Status: He is alert and oriented to person, place, and time.      Cranial Nerves: No cranial nerve deficit.         Results Review:    Results from last 7 days   Lab Units 08/01/21  0436   PH, ARTERIAL pH units 7.299*   PO2 ART mm Hg 92.4   PCO2, ARTERIAL mm Hg 81.6*   HCO3 ART mmol/L 40.0*       Results from last 7 days   Lab Units 08/01/21  0030   WBC 10*3/mm3 8.12   HEMOGLOBIN g/dL 12.2*   HEMATOCRIT % 43.3   PLATELETS 10*3/mm3 231     Results from last 7 days   Lab Units 08/01/21  0030   SODIUM mmol/L 144   POTASSIUM mmol/L 4.1   CHLORIDE mmol/L 99   CO2 mmol/L 37.4*   BUN mg/dL 22   CREATININE mg/dL 0.82   CALCIUM mg/dL 9.3   GLUCOSE mg/dL 271*     Results from last 7 days   Lab Units 08/01/21  0030   BILIRUBIN mg/dL 0.2   ALK PHOS U/L 76   AST (SGOT) U/L 13   ALT (SGPT) U/L 18     Results from last 7 days   Lab Units 08/01/21  0310   CRP mg/dL <0.30         Results from last 7 days   Lab Units 08/01/21  0310 08/01/21  0030   TROPONIN T ng/mL <0.010 0.013       Imaging:    I have personally reviewed the EKG; pending official cardiology interpretation, however, per my view the patient has a normal sinus rhythm with frequent PVCs;     CT chest images have been reviewed and per my view the patient does appear to have a right lower lobe pneumonia    Imaging Results (Most Recent)     Procedure Component Value Units Date/Time    CT Chest Without Contrast Diagnostic [[id number]] Collected: 08/01/21 0311     Updated: 08/01/21 0313    Narrative:      CT Chest WO    INDICATION:   Dyspnea. Chronic dyspnea.    TECHNIQUE:   CT of the thorax without IV contrast. Coronal and sagittal reconstructions were obtained.  Radiation dose reduction techniques included automated exposure control or exposure modulation based on body size. Count of known CT and cardiac nuc med studies  performed in previous 12 months: 5.     COMPARISON:   5/22/2021    FINDINGS:  There is new patchy atelectasis and  probable curvilinear atelectasis in the left lower lobe favored over pneumonia. There is new consolidation in the posterior left upper lobe suggestive of pneumonia however.  Trace pleural thickening/pleural fluid left lung base. New left-sided lower lobe and to a lesser extent upper lobe volume loss. On the right, there is new right basilar consolidation suspicious for pneumonia and a trace amount of pleural  fluid/thickening. No new suspicious pulmonary nodule. Small amount of secretions in the trachea. Correlate with potential for aspiration pneumonia. No pneumothorax.    There is no pericardial effusion. No threshold adenopathy. Normal caliber aorta and atherosclerotic change. Advanced atherosclerotic change of the coronary arteries. Evidence of pulmonary arterial hypertension.    Included upper abdomen demonstrates no acute finding. Chronic advanced degenerative or postinflammatory change in the midthoracic spine.  negative.      Impression:        1. Chronic interstitial changes with new areas of superimposed consolidation suspicious for pneumonia in the left upper lobe and right lower lobe. Atelectasis favored over pneumonia in the left base. Follow-up to clearing recommended. Consider the  possibility of aspiration event.  2. Pulmonary arterial hypertension.  3. Atherosclerotic disease.    Signer Name: Basilio Deng MD   Signed: 8/1/2021 3:11 AM   Workstation Name: [id number]    [facility] Chest 1 View [[id number]] Collected: 08/01/21 0122     Updated: 08/01/21 0124    Narrative:      CR Chest 1 Vw    INDICATION:   Chest pain.     COMPARISON:    6/7/2021    FINDINGS:  Single portable AP view(s) of the chest.  Globular cardiomegaly. Pulmonary hyperinflation. Chronic appearing atelectasis/scarring in the left base. New interstitial prominence and streaky opacities in the right lung may reflect asymmetric edema  atelectasis or pneumonia. No pneumothorax. No new effusion.       Impression:        1. Cardiomegaly with new asymmetric interstitial prominence in the right lung that is nonspecific. No pneumothorax.    Signer Name: Basilio Deng MD   Signed: 8/1/2021 1:22 AM   Workstation Name: [email or web address]    [facility]          Assessment/Plan     -Acute hypercapnic on chronic hypoxic respiratory failure, present upon admission and secondary to right lower lobe aspiration pneumonia  -Acute COPD exacerbation  -Uncontrolled essential hypertension  -Morbid obesity  -Diabetes mellitus type 2 with hyperglycemia  -History of atrial fibrillation on chronic anticoagulation with Eliquis  -History of recent right ankle avulsion fracture  -Debility  -Reported history of paraplegia  -History of chronic thoracic osteomyelitis  -Gastroesophageal reflux disease    Patient has been admitted to the progressive care unit with aspiration precautions.  I will obtain a repeat ABG as he is currently on BiPAP.  Further management to follow review of ABG.  For now, patient has been started on intravenous Zosyn monotherapy.  I have started the patient on scheduled nebulized inhalants.  I will hold on steroid therapy as the patient is without wheezing on my exam.  I will obtain a respiratory culture.  I have consulted speech therapy for further evaluation and recommendations.    I am currently awaiting the patient's home medication list.  Will make as needed intravenous hydralazine available for now.    For now, the patient will be started on Accu-Cheks and sliding scale insulin as needed.  I have also started the patient on the hypoglycemia protocol if needed.  Plan to resume his home insulin regimen pending availability of his medication list.    I have consulted physical and occupational therapy as patient reports that he is interested in physical rehabilitation.    DVT/GI prophylaxis: Plan to resume home Eliquis/begin famotidine    Estimated Length of Stay: > 2 MNs    Patient is considered  to be high risk patient due to: Acute on chronic respiratory failure, morbid obesity, COPD, history of opiate dependency     Disposition unclear; patient may benefit from skilled nursing placement versus inpatient physical rehab prior to returning home    I discussed the patients findings and my recommendations with patient          Vivienne Flores DO  08/01/21  06:22 EDT    Electronically signed by Vivienne Flores DO at 08/01/21 0704       Vital Signs (last day)     Date/Time   Temp   Temp src   Pulse   Resp   BP   Patient Position   SpO2    08/03/21 1500   99.2 (37.3)   Axillary   83   20   102/63   Lying   96    08/03/21 1400   --   --   66   20   --   Lying   96    08/03/21 1351   --   --   61   18   --   Lying   95    08/03/21 1227   97.7 (36.5)   Oral   66   18   100/49   Lying   92    08/03/21 1124   --   --   56   22   95/47   --   99    08/03/21 1002   --   --   60   --   121/79   --   97    08/03/21 0858   97.5 (36.4)   --   67   --   124/73   --   --    08/03/21 0802   --   --   62   21   124/73   --   97    08/03/21 0657   --   --   66   20   --   Lying   100    08/03/21 0647   --   --   60   20   --   Lying   98    08/03/21 0604   --   --   58   --   128/65   --   98    08/03/21 0504   --   --   67   --   114/68   --   98    08/03/21 0400   97.2 (36.2)   Axillary   56   --   --   --   99    08/03/21 0339   --   --   --   --   --   --   98    08/03/21 0305   --   --   62   20   119/75   --   98    08/03/21 0205   --   --   59   --   111/65   --   90    08/03/21 0142   --   --   56   20   --   --   98    08/03/21 0130   --   --   59   19   --   --   93    08/03/21 0105   --   --   55   21   108/66   --   97    08/03/21 0003   96.5 (35.8)   Axillary   62   --   (!) 78/59   --   100    08/03/21 0002   --   --   --   --   --   --   100    08/02/21 2305   --   --   59   --   112/57   --   97    08/02/21 2205   --   --   63   --   120/69   --   100 08/02/21 2104   --   --   58   --   104/69   --    98    08/02/21 2004   98.2 (36.8)   Oral   60   --   126/72   --   98    08/02/21 2000   --   --   63   20   --   --   100    08/02/21 1949   --   --   85   20   --   --   100    08/02/21 1920   --   --   66   20   114/72   --   99    08/02/21 1802   --   --   73   --   132/78   --   95    08/02/21 1602   98.2 (36.8)   --   66   --   103/53   --   96    08/02/21 1502   --   --   62   --   122/59   --   100    08/02/21 1402   --   --   65   --   116/58   --   --    08/02/21 1359   --   --   68   --   --   --   --    08/02/21 1348   --   --   70   21   --   --   96    08/02/21 1302   --   --   73   --   124/79   --   --    08/02/21 1202   97.3 (36.3)   Oral   71   --   122/76   --   --    08/02/21 1102   --   --   70   --   137/90   --   96    08/02/21 1002   --   --   75   18   139/87   --   95    08/02/21 0902   --   --   75   --   118/69   --   98    08/02/21 0802   97.9 (36.6)   Oral   80   21   141/93   --   95    08/02/21 0645   --   --   70   21   --   --   99    08/02/21 0432   --   --   68   --   (!) 164/103   --   99    08/02/21 0300   --   --   74   10   148/88   --   95    08/02/21 0210   --   --   --   --   --   --   98    08/02/21 0203   --   --   111   --   (!) 146/119   --   94    08/02/21 0034   --   --   71   20   --   --   99    08/02/21 0022   --   --   65   19   --   --   95    08/02/21 0004   --   --   89   --   150/81   --   95              Intake & Output (last day)       08/02 0701 - 08/03 0700 08/03 0701 - 08/04 0700    P.O. 960 360    I.V. (mL/kg) 291 (1.9)     IV Piggyback      Total Intake(mL/kg) 1251 (8.3) 360 (2.3)    Urine (mL/kg/hr) 2125 (0.6) 1650 (1)    Total Output 2125 1650    Net -874 -1290          Urine Unmeasured Occurrence 4 x 1 x          Current Facility-Administered Medications   Medication Dose Route Frequency Provider Last Rate Last Admin   • acetaminophen (TYLENOL) tablet 650 mg  650 mg Oral Q8H PRN Mandeep Dunlap DO   650 mg at 08/03/21 1513   • albuterol  (PROVENTIL) nebulizer solution 0.083% 2.5 mg/3mL  2.5 mg Nebulization Q6H PRN Mandeep Dunlap DO       • apixaban (ELIQUIS) tablet 5 mg  5 mg Oral Q12H FabiMandeep eddy, DO   5 mg at 08/03/21 0858   • atorvastatin (LIPITOR) tablet 20 mg  20 mg Oral Daily Mandeep Dunlap DO   20 mg at 08/03/21 0858   • budesonide-formoterol (SYMBICORT) 160-4.5 MCG/ACT inhaler 2 puff  2 puff Inhalation BID - RT Mandeep Dunlap DO   2 puff at 08/03/21 0647   • bumetanide (BUMEX) injection 2 mg  2 mg Intravenous Once Mandeep Dunlap DO       • bumetanide (BUMEX) tablet 2 mg  2 mg Oral Daily Mandeep Dunlap DO   2 mg at 08/03/21 0858   • carvedilol (COREG) tablet 25 mg  25 mg Oral BID With Meals Mandeep Dunlap DO   25 mg at 08/03/21 1707   • dextrose (D50W) 25 g/ 50mL Intravenous Solution 25 g  25 g Intravenous Q15 Min PRN Mandeep Dunlap DO       • dextrose (GLUTOSE) oral gel 15 g  15 g Oral Q15 Min PRN Mandeep Dunlap DO       • empagliflozin (JARDIANCE) tablet 20 mg  20 mg Oral Daily Mandeep Dunlap DO   20 mg at 08/03/21 0859   • fluticasone (FLONASE) 50 MCG/ACT nasal spray 2 spray  2 spray Nasal Daily Mandeep Dunlap DO   2 spray at 08/03/21 0859   • gabapentin (NEURONTIN) capsule 600 mg  600 mg Oral Q12H Mandeep Dunlap DO   600 mg at 08/03/21 0859   • glucagon (human recombinant) (GLUCAGEN DIAGNOSTIC) injection 1 mg  1 mg Subcutaneous Q15 Min PRN Mandeep Dunlap, DO       • guaiFENesin (MUCINEX) 12 hr tablet 1,200 mg  1,200 mg Oral Q12H Mandeep Dunlap DO   1,200 mg at 08/03/21 0859   • hydrALAZINE (APRESOLINE) injection 10 mg  10 mg Intravenous Q6H PRN Mandeep Dunlap DO   10 mg at 08/01/21 1710   • HYDROcodone-acetaminophen (NORCO) 7.5-325 MG per tablet 1 tablet  1 tablet Oral BID Mandeep Dunlap DO   1 tablet at 08/03/21 0859   • insulin aspart (novoLOG) injection 0-9 Units  0-9 Units Subcutaneous 4x  Daily AC & at Bedtime Mandeep Dunlap DO   4 Units at 08/03/21 1707   • insulin aspart (novoLOG) injection 20 Units  20 Units Subcutaneous TID With Meals Mandeep Dunlap DO   20 Units at 08/03/21 1707   • insulin detemir (LEVEMIR) injection 45 Units  45 Units Subcutaneous Nightly Mandeep Dunlap DO   45 Units at 08/02/21 2207   • ipratropium-albuterol (DUO-NEB) nebulizer solution 3 mL  3 mL Nebulization 4x Daily - RT Mandeep Dunlap DO   3 mL at 08/03/21 1351   • ipratropium-albuterol (DUO-NEB) nebulizer solution 3 mL  3 mL Nebulization Q4H PRN Mandeep Dunlap DO       • lidocaine (LIDODERM) 5 % 1 patch  1 patch Transdermal Nightly Mandeep Dunlap DO   1 patch at 08/02/21 2201   • [START ON 8/4/2021] lisinopril (PRINIVIL,ZESTRIL) tablet 2.5 mg  2.5 mg Oral Q24H Mandeep Dunlap DO       • pantoprazole (PROTONIX) EC tablet 40 mg  40 mg Oral Daily Mandeep Dunlap DO   40 mg at 08/03/21 0858   • piperacillin-tazobactam (ZOSYN) IVPB 3.375 g in 100 mL NS VTB  3.375 g Intravenous Q8H Mandeep Dunlap DO   3.375 g at 08/03/21 1459   • potassium chloride (K-DUR,KLOR-CON) ER tablet 10 mEq  10 mEq Oral Daily Mandeep Dunlap DO   10 mEq at 08/03/21 0858   • predniSONE (DELTASONE) tablet 40 mg  40 mg Oral Daily With Breakfast Mandeep Dunlap DO   40 mg at 08/03/21 0859   • sennosides-docusate (PERICOLACE) 8.6-50 MG per tablet 2 tablet  2 tablet Oral BID Mandeep Dunlap DO   2 tablet at 08/03/21 0858   • sodium chloride 0.9 % flush 10 mL  10 mL Intravenous PRN Mandeep Dunlap, DO       • sodium chloride 0.9 % flush 10 mL  10 mL Intravenous Q12H Mandeep Dunlap DO   10 mL at 08/03/21 0903   • sodium chloride 0.9 % flush 10 mL  10 mL Intravenous PRN Mandeep Dunlap, DO       • sodium chloride nasal spray 2 spray  2 spray Each Nare PRN Mandeep Dunlap, DO       • tamsulosin (FLOMAX) 24 hr capsule 0.4 mg  0.4 mg  Oral Nightly Fabi Jordenclareray JITENDRA, DO   0.4 mg at 08/02/21 2202     Lab Results (most recent)     Procedure Component Value Units Date/Time    POC Glucose Once [[id number]]  (Abnormal) Collected: 08/03/21 1631    Specimen: Blood Updated: 08/03/21 1638     Glucose 220 mg/dL      Comment: Meter: [id number] : [id number] dariana corina       POC Glucose Once [[id number]]  (Normal) Collected: 08/03/21 1109    Specimen: Blood Updated: 08/03/21 1122     Glucose 86 mg/dL      Comment: Meter: [id number] : [id number] Ar VINCENT       Respiratory Culture - Sputum, Cough [[id number]] Collected: 08/01/21 1036    Specimen: Sputum from Cough Updated: 08/03/21 1057     Respiratory Culture Scant growth (1+) Normal Respiratory Radha: NO S.aureus/MRSA or Pseudomonas aeruginosa     Gram Stain Rare (1+) Epithelial cells seen      Few (2+) WBCs seen      Rare (1+) Gram positive cocci in pairs    Blood Culture - Blood, Arm, Left [[id number]] Collected: 08/01/21 0559    Specimen: Blood from Arm, Left Updated: 08/03/21 0716     Blood Culture No growth at 2 days    Blood Culture - Blood, Hand, Left [[id number]] Collected: 08/01/21 0511    Specimen: Blood from Hand, Left Updated: 08/03/21 0516     Blood Culture No growth at 2 days    CBC & Differential [[id number]]  (Abnormal) Collected: 08/03/21 0033    Specimen: Blood Updated: 08/03/21 0309    Narrative:      The following orders were created for panel order CBC & Differential.  Procedure                               Abnormality         Status                     ---------                               -----------         ------                     CBC Auto Differential[[id number]]        Abnormal            Final result               Scan Slide[[id number]]                                       Final result                 Please view results for these tests on the individual orders.    Scan Slide [[id number]] Collected: 08/03/21 0033    Specimen: Blood Updated: 08/03/21 0309      Anisocytosis Slight/1+     Hypochromia Mod/2+     Platelet Morphology Normal    CBC Auto Differential [[id number]]  (Abnormal) Collected: 08/03/21 0033    Specimen: Blood Updated: 08/03/21 0309     WBC 8.53 10*3/mm3      RBC 4.64 10*6/mm3      Hemoglobin 11.4 g/dL      Hematocrit 40.8 %      MCV 87.9 fL      MCH 24.6 pg      MCHC 27.9 g/dL      RDW 16.4 %      RDW-SD 52.8 fl      MPV 9.4 fL      Platelets 224 10*3/mm3      Neutrophil % 76.2 %      Lymphocyte % 12.5 %      Monocyte % 10.4 %      Eosinophil % 0.1 %      Basophil % 0.1 %      Immature Grans % 0.7 %      Neutrophils, Absolute 6.49 10*3/mm3      Lymphocytes, Absolute 1.07 10*3/mm3      Monocytes, Absolute 0.89 10*3/mm3      Eosinophils, Absolute 0.01 10*3/mm3      Basophils, Absolute 0.01 10*3/mm3      Immature Grans, Absolute 0.06 10*3/mm3      nRBC 0.0 /100 WBC     Comprehensive Metabolic Panel [[id number]]  (Abnormal) Collected: 08/03/21 0033    Specimen: Blood Updated: 08/03/21 0219     Glucose 176 mg/dL      BUN 37 mg/dL      Creatinine 1.13 mg/dL      Sodium 142 mmol/L      Potassium 3.9 mmol/L      Chloride 99 mmol/L      CO2 37.2 mmol/L      Calcium 8.7 mg/dL      Total Protein 5.4 g/dL      Albumin 3.20 g/dL      ALT (SGPT) 34 U/L      AST (SGOT) 22 U/L      Alkaline Phosphatase 53 U/L      Total Bilirubin 0.2 mg/dL      eGFR Non African Amer 65 mL/min/1.73      Globulin 2.2 gm/dL      A/G Ratio 1.5 g/dL      BUN/Creatinine Ratio 32.7     Anion Gap 5.8 mmol/L     Narrative:      GFR Normal >60  Chronic Kidney Disease <60  Kidney Failure <15      Basic Metabolic Panel [[id number]]  (Abnormal) Collected: 08/02/21 0032    Specimen: Blood Updated: 08/02/21 0339     Glucose 233 mg/dL      BUN 31 mg/dL      Creatinine 0.87 mg/dL      Sodium 141 mmol/L      Potassium 3.8 mmol/L      Chloride 98 mmol/L      CO2 31.8 mmol/L      Calcium 9.3 mg/dL      eGFR Non African Amer 88 mL/min/1.73      BUN/Creatinine Ratio 35.6     Anion Gap 11.2 mmol/L      Narrative:      GFR Normal >60  Chronic Kidney Disease <60  Kidney Failure <15      C-reactive Protein [[id number]]  (Normal) Collected: 08/02/21 0032    Specimen: Blood Updated: 08/02/21 0339     C-Reactive Protein <0.30 mg/dL     CBC & Differential [[id number]]  (Abnormal) Collected: 08/02/21 0032    Specimen: Blood Updated: 08/02/21 0154    Narrative:      The following orders were created for panel order CBC & Differential.  Procedure                               Abnormality         Status                     ---------                               -----------         ------                     CBC Auto Differential[[id number]]        Abnormal            Final result                 Please view results for these tests on the individual orders.    CBC Auto Differential [[id number]]  (Abnormal) Collected: 08/02/21 0032    Specimen: Blood Updated: 08/02/21 0154     WBC 6.90 10*3/mm3      RBC 4.84 10*6/mm3      Hemoglobin 11.9 g/dL      Hematocrit 40.9 %      MCV 84.5 fL      MCH 24.6 pg      MCHC 29.1 g/dL      RDW 16.3 %      RDW-SD 50.4 fl      MPV 9.5 fL      Platelets 255 10*3/mm3      Neutrophil % 83.9 %      Lymphocyte % 8.3 %      Monocyte % 6.7 %      Eosinophil % 0.0 %      Basophil % 0.1 %      Immature Grans % 1.0 %      Neutrophils, Absolute 5.79 10*3/mm3      Lymphocytes, Absolute 0.57 10*3/mm3      Monocytes, Absolute 0.46 10*3/mm3      Eosinophils, Absolute 0.00 10*3/mm3      Basophils, Absolute 0.01 10*3/mm3      Immature Grans, Absolute 0.07 10*3/mm3      nRBC 0.0 /100 WBC     Comprehensive Metabolic Panel [[id number]]  (Abnormal) Collected: 08/01/21 0805    Specimen: Blood Updated: 08/01/21 0840     Glucose 238 mg/dL      BUN 26 mg/dL      Creatinine 0.77 mg/dL      Sodium 142 mmol/L      Potassium 3.9 mmol/L      Comment: Slight hemolysis detected by analyzer. Results may be affected.        Chloride 98 mmol/L      CO2 34.4 mmol/L      Calcium 9.3 mg/dL      Total Protein 6.3 g/dL      Albumin 3.54  g/dL      ALT (SGPT) 15 U/L      AST (SGOT) 10 U/L      Alkaline Phosphatase 72 U/L      Total Bilirubin 0.3 mg/dL      eGFR Non African Amer 101 mL/min/1.73      Globulin 2.8 gm/dL      A/G Ratio 1.3 g/dL      BUN/Creatinine Ratio 33.8     Anion Gap 9.6 mmol/L     Narrative:      GFR Normal >60  Chronic Kidney Disease <60  Kidney Failure <15      Blood Gas, Arterial With Co-Ox [[id number]]  (Abnormal) Collected: 08/01/21 0702    Specimen: Arterial Blood Updated: 08/01/21 0707     Site Right Radial     Jayjay's Test Positive     pH, Arterial 7.372 pH units      pCO2, Arterial 68.5 mm Hg      Comment: 86 Value above critical limit        pO2, Arterial 77.7 mm Hg      Comment: 84 Value below reference range        HCO3, Arterial 39.8 mmol/L      Comment: 83 Value above reference range        Base Excess, Arterial 11.6 mmol/L      O2 Saturation, Arterial 95.5 %      Hemoglobin, Blood Gas 13.2 g/dL      Comment: 84 Value below reference range        Hematocrit, Blood Gas 40.4 %      Oxyhemoglobin 94.1 %      Methemoglobin 0.20 %      Carboxyhemoglobin 1.3 %      A-a Gradiant 49.5 mmHg      CO2 Content 41.9 mmol/L      Temperature 0.0 C      Barometric Pressure for Blood Gas 725 mmHg      Modality BiPap     FIO2 30 %      Ventilator Mode NA     Set [facility] Resp Rate 18.0     IPAP 16     Comment: Meter: [id number]     :  [id number]        [facility] 8     Note --     Notified [facility] christina archuleta     Notified By [id number]     Notified Time 08/01/2021 07:08     Collected by [id number]     pH, Temp Corrected --     pCO2, Temperature Corrected --     pO2, Temperature Corrected --    Blood Gas, Arterial With Co-Ox [[id number]]  (Abnormal) Collected: 08/01/21 0436    Specimen: Arterial Blood Updated: 08/01/21 0439     Site Left Radial     Jayjay's Test N/A     pH, Arterial 7.299 pH units      Comment: 84 Value below reference range        pCO2, Arterial 81.6 mm Hg      Comment: 86 Value above critical limit        pO2, Arterial 92.4 mm Hg       HCO3, Arterial 40.0 mmol/L      Comment: 83 Value above reference range        Base Excess, Arterial 10.1 mmol/L      O2 Saturation, Arterial 96.9 %      Hemoglobin, Blood Gas 13.5 g/dL      Hematocrit, Blood Gas 41.5 %      Oxyhemoglobin 95.8 %      Methemoglobin 0.10 %      Carboxyhemoglobin 1.0 %      A-a Gradiant 33.6 mmHg      CO2 Content 42.5 mmol/L      Temperature 0.0 C      Barometric Pressure for Blood Gas 724 mmHg      Modality Nasal Cannula     FIO2 32 %      Flow Rate 3.0 lpm      Ventilator Mode NA     Note --     Notified Who DR LEONARD     Notified By [id number]     Notified Time 08/01/2021 04:40     Collected by [id number]     Comment: Meter: [id number]     :  [id number]        pH, Temp Corrected --     pCO2, Temperature Corrected --     pO2, Temperature Corrected --    C-reactive Protein [[id number]]  (Normal) Collected: 08/01/21 0310    Specimen: Blood from Hand, Left Updated: 08/01/21 0430     C-Reactive Protein <0.30 mg/dL     Troponin [[id number]]  (Normal) Collected: 08/01/21 0310    Specimen: Blood from Hand, Left Updated: 08/01/21 0332     Troponin T <0.010 ng/mL     Narrative:      Troponin T Reference Range:  <= 0.03 ng/mL-   Negative for AMI  >0.03 ng/mL-     Abnormal for myocardial necrosis.  Clinicians would have to utilize clinical acumen, EKG, Troponin and serial changes to determine if it is an Acute Myocardial Infarction or myocardial injury due to an underlying chronic condition.       Results may be falsely decreased if patient taking Biotin.      COVID-19 and FLU A/B PCR - Swab, Nasopharynx [[id number]]  (Normal) Collected: 08/01/21 0128    Specimen: Swab from Nasopharynx Updated: 08/01/21 0157     COVID19 Not Detected     Influenza A PCR Not Detected     Influenza B PCR Not Detected    Narrative:      Fact sheet for providers: https://www.fda.gov/media/316315/download    Fact sheet for patients: https://www.fda.gov/media/590066/download    Test performed by PCR.    [place]  Draw [[id number]] Collected: 08/01/21 0030    Specimen: Blood from Hand, Left Updated: 08/01/21 0130    Narrative:      The following orders were created for panel order [place] Draw.  Procedure                               Abnormality         Status                     ---------                               -----------         ------                     Green Top (Gel)[[id number]]                                  Final result               Lavender Top[[id number]]                                     Final result               Gold Top - SST[[id number]]                                   Final result                 Please view results for these tests on the individual orders.    Gold Top - SST [[id number]] Collected: 08/01/21 0030    Specimen: Blood from Hand, Left Updated: 08/01/21 0130     Extra Tube Hold for add-ons.     Comment: Auto resulted.       Green Top (Gel) [[id number]] Collected: 08/01/21 0030    Specimen: Blood from Hand, Left Updated: 08/01/21 0130     Extra Tube Hold for add-ons.     Comment: Auto resulted.       Lavender Top [[id number]] Collected: 08/01/21 0030    Specimen: Blood from Hand, Left Updated: 08/01/21 0130     Extra Tube hold for add-on     Comment: Auto resulted       Troponin [[id number]]  (Normal) Collected: 08/01/21 0030    Specimen: Blood from Hand, Left Updated: 08/01/21 0100     Troponin T 0.013 ng/mL     Narrative:      Troponin T Reference Range:  <= 0.03 ng/mL-   Negative for AMI  >0.03 ng/mL-     Abnormal for myocardial necrosis.  Clinicians would have to utilize clinical acumen, EKG, Troponin and serial changes to determine if it is an Acute Myocardial Infarction or myocardial injury due to an underlying chronic condition.       Results may be falsely decreased if patient taking Biotin.      BNP [[id number]]  (Abnormal) Collected: 08/01/21 0030    Specimen: Blood from Hand, Left Updated: 08/01/21 0058     proBNP 956.2 pg/mL     Narrative:      Among patients with dyspnea, NT-proBNP  is highly sensitive for the detection of acute congestive heart failure. In addition NT-proBNP of <300 pg/ml effectively rules out acute congestive heart failure with 99% negative predictive value.    Results may be falsely decreased if patient taking Biotin.      Scan Slide [[id number]] Collected: 08/01/21 0030    Specimen: Blood from Hand, Left Updated: 08/01/21 0045     Anisocytosis Slight/1+     Hypochromia Mod/2+     Platelet Morphology Normal          Imaging Results (Most Recent)     Procedure Component Value Units Date/Time    CT Chest Without Contrast Diagnostic [[id number]] Collected: 08/01/21 0311     Updated: 08/01/21 0313    Narrative:      CT Chest WO    INDICATION:   Dyspnea. Chronic dyspnea.    TECHNIQUE:   CT of the thorax without IV contrast. Coronal and sagittal reconstructions were obtained.  Radiation dose reduction techniques included automated exposure control or exposure modulation based on body size. Count of known CT and cardiac nuc med studies  performed in previous 12 months: 5.     COMPARISON:   5/22/2021    FINDINGS:  There is new patchy atelectasis and probable curvilinear atelectasis in the left lower lobe favored over pneumonia. There is new consolidation in the posterior left upper lobe suggestive of pneumonia however.  Trace pleural thickening/pleural fluid left lung base. New left-sided lower lobe and to a lesser extent upper lobe volume loss. On the right, there is new right basilar consolidation suspicious for pneumonia and a trace amount of pleural  fluid/thickening. No new suspicious pulmonary nodule. Small amount of secretions in the trachea. Correlate with potential for aspiration pneumonia. No pneumothorax.    There is no pericardial effusion. No threshold adenopathy. Normal caliber aorta and atherosclerotic change. Advanced atherosclerotic change of the coronary arteries. Evidence of pulmonary arterial hypertension.    Included upper abdomen demonstrates no acute finding.  Chronic advanced degenerative or postinflammatory change in the midthoracic spine.  negative.      Impression:        1. Chronic interstitial changes with new areas of superimposed consolidation suspicious for pneumonia in the left upper lobe and right lower lobe. Atelectasis favored over pneumonia in the left base. Follow-up to clearing recommended. Consider the  possibility of aspiration event.  2. Pulmonary arterial hypertension.  3. Atherosclerotic disease.    Signer Name: Basilio Deng MD   Signed: 8/1/2021 3:11 AM   Workstation Name: [facility]    Radiology [facility]    XR Chest 1 View [[id number]] Collected: 08/01/21 0122     Updated: 08/01/21 0124    Narrative:      CR Chest 1 Vw    INDICATION:   Chest pain.     COMPARISON:    6/7/2021    FINDINGS:  Single portable AP view(s) of the chest.  Globular cardiomegaly. Pulmonary hyperinflation. Chronic appearing atelectasis/scarring in the left base. New interstitial prominence and streaky opacities in the right lung may reflect asymmetric edema  atelectasis or pneumonia. No pneumothorax. No new effusion.      Impression:        1. Cardiomegaly with new asymmetric interstitial prominence in the right lung that is nonspecific. No pneumothorax.    Signer Name: Basilio Deng MD   Signed: 8/1/2021 1:22 AM   Workstation Name: [facility]        Operative/Procedure Notes (most recent note)    No notes of this type exist for this encounter.            Physician Progress Notes (most recent note)      Mandeep Dunlap DO at 08/03/21 1421          Subjective     History:   Иван Miller is a 65 y.o. male admitted on 7/31/2021 secondary to Pneumonia of both lungs due to infectious organism     Procedures: None    CC: Follow up pneumonia, resp failure    Patient seen and examined with MARCELO Garza. Awake and alert. Continues to report some cough and chest congestion. Dyspnea overall improved. No reported CP.  States he could not tolerated BiPAP as long last PM 2/2 cough.  No acute events overnight per RN.     History taken from: patient, chart, and RN.      Objective     Vital Signs  Temp:  [96.5 °F (35.8 °C)-98.2 °F (36.8 °C)] 97.7 °F (36.5 °C)  Heart Rate:  [55-85] 66  Resp:  [18-22] 18  BP: ()/(47-79) 100/49    Intake/Output Summary (Last 24 hours) at 8/3/2021 1421  Last data filed at 8/3/2021 1002  Gross per 24 hour   Intake 1071 ml   Output 1450 ml   Net -379 ml         Physical Exam:  General:    Awake, alert, in no acute distress   Heart:      Normal S1 and S2. Regular rate and rhythm. No significant murmur, rubs or gallops appreciated.   Lungs:     Respirations regular, even and unlabored. Diminished breath sounds throughout with a few scattered rales. Wheezing improved.  No rhonchi.   Abdomen:   Soft and nontender. No guarding, rebound tenderness or  organomegaly noted. Bowel sounds present x 4.   Extremities:  1+ bilateral lower extremity edema. Moves UE and LE equally B/L.     Results Review:    Results from last 7 days   Lab Units 08/03/21  0033 08/02/21  0032 08/01/21  0805 08/01/21  0030   WBC 10*3/mm3 8.53 6.90 8.25 8.12   HEMOGLOBIN g/dL 11.4* 11.9* 12.4* 12.2*   PLATELETS 10*3/mm3 224 255 247 231     Results from last 7 days   Lab Units 08/03/21  0033 08/02/21  0032 08/01/21  0805 08/01/21  0030   SODIUM mmol/L 142 141 142 144   POTASSIUM mmol/L 3.9 3.8 3.9 4.1   CHLORIDE mmol/L 99 98 98 99   CO2 mmol/L 37.2* 31.8* 34.4* 37.4*   BUN mg/dL 37* 31* 26* 22   CREATININE mg/dL 1.13 0.87 0.77 0.82   CALCIUM mg/dL 8.7 9.3 9.3 9.3   GLUCOSE mg/dL 176* 233* 238* 271*     Results from last 7 days   Lab Units 08/03/21  0033 08/01/21  0805 08/01/21  0030   BILIRUBIN mg/dL 0.2 0.3 0.2   ALK PHOS U/L 53 72 76   AST (SGOT) U/L 22 10 13   ALT (SGPT) U/L 34 15 18             Results from last 7 days   Lab Units 08/01/21  0310 08/01/21  0030   TROPONIN T ng/mL <0.010 0.013       Imaging Results (Last 24 Hours)      ** No results found for the last 24 hours. **            Medications:  apixaban, 5 mg, Oral, Q12H  atorvastatin, 20 mg, Oral, Daily  budesonide-formoterol, 2 puff, Inhalation, BID - RT  bumetanide, 2 mg, Intravenous, Once  bumetanide, 2 mg, Oral, Daily  carvedilol, 25 mg, Oral, BID With Meals  empagliflozin, 20 mg, Oral, Daily  fluticasone, 2 spray, Nasal, Daily  gabapentin, 600 mg, Oral, Q12H  guaiFENesin, 1,200 mg, Oral, Q12H  HYDROcodone-acetaminophen, 1 tablet, Oral, BID  insulin aspart, 0-9 Units, Subcutaneous, 4x Daily AC & at Bedtime  insulin aspart, 35 Units, Subcutaneous, TID With Meals  insulin detemir, 45 Units, Subcutaneous, Nightly  ipratropium-albuterol, 3 mL, Nebulization, 4x Daily - RT  lidocaine, 1 patch, Transdermal, Nightly  lisinopril, 10 mg, Oral, Q24H  pantoprazole, 40 mg, Oral, Daily  piperacillin-tazobactam, 3.375 g, Intravenous, Q8H  potassium chloride, 10 mEq, Oral, Daily  predniSONE, 40 mg, Oral, Daily With Breakfast  sennosides-docusate, 2 tablet, Oral, BID  sodium chloride, 10 mL, Intravenous, Q12H  tamsulosin, 0.4 mg, Oral, Nightly               Assessment/Plan   Bilateral pneumonia: CT chest reveals LUZ ELENA and RLL consolidation with concern for aspiration. Blood cultures with NGTD. Sputum culture revealing normal respiratory crystal. No evidence of aspiration on swallow eval. Cont Zosyn and nebs.     Acute exacerbation of COPD: Cont Prednisone and nebs.     Acute hypercapnic on chronic hypoxic respiratory failure: Likely 2/2 above. Improved with BiPAP. Cont treatment as outlined above. Cont supplemental O2.     Essential HTN, uncontrolled: Cont home Coreg. Added lisinopril. BP now borderline low intermittently. Will decrease dose of lisinopril. Cont PRN IV hydralazine.     DM II, inulin dependent with hyperglycemia: Improved with restarting home insulin. Decrease meal time insulin. Cont SSI with Accuchecks.     Recent right ankle avulsion fracture with reported hx of paraplegia: PT/OT.  Supportive treatment.     Hx of chronic thoracic osteomyelitis: Cont to monitor.     Hx of Afib: Currently in sinus rhythm. Cont home Coreg. Cont Eliquis for stroke prevention. Monitor on telemetry.     Lower extremity edema: Order additional Bumex today.     DVT PPX: Eliquis.       Disposition: SNF placement when medically stable.       Mandeep Dunlap DO  21  14:21 EDT    Electronically signed by Mandeep Dunlap DO at 21 1427       Consult Notes (most recent note)    No notes of this type exist for this encounter.         Nutrition Notes (most recent note)    No notes exist for this encounter.            Physical Therapy Notes (most recent note)      Joceline Trejo, PT at 21 1446  Version 1 of 1         Acute Care - Physical Therapy Treatment Note  BAKARI Vazquez     Patient Name: Иван Miller  : 1955  MRN: [id number]  Today's Date: 8/3/2021   Onset of Illness/Injury or Date of Surgery: 21 (admit date)  Visit Dx:     ICD-10-CM ICD-9-CM   1. Pneumonia of both lungs due to infectious organism, unspecified part of lung  J18.9 483.8   2. Acute on chronic respiratory failure with hypoxia and hypercapnia (CMS/HCC)  J96.21 518.84    J96.22 786.09     799.02     Patient Active Problem List   Diagnosis   • Acute respiratory failure with hypoxemia (CMS/HCC)   • Coronary artery disease status post multiple stents   • Non-ischemic cardiomyopathy (CMS/HCC LV ejection fraction 40 to 45%   • Chronic systolic congestive heart failure (CMS/HCC)   • Essential hypertension   • Hyperlipidemia LDL goal <70   • Insulin dependent diabetes mellitus   • Respiratory failure (CMS/HCC)   • Finger infection   • Pneumonia of both lungs due to infectious organism     Past Medical History:   Diagnosis Date   • Atrial fibrillation (CMS/HCC)    • COPD (chronic obstructive pulmonary disease) (CMS/HCC)    • Diabetes mellitus (CMS/HCC)    • GERD (gastroesophageal reflux disease)    • Hyperlipidemia    •  Hypertension    • Myocardial infarction (CMS/HCC)    • Neuropathy      Past Surgical History:   Procedure Laterality Date   • ABDOMINAL SURGERY     • APPENDECTOMY     • CARDIAC CATHETERIZATION     • CORONARY STENT PLACEMENT          PT Assessment (last 12 hours)      PT Evaluation and Treatment     Row Name 08/03/21 1441          Physical Therapy Time and Intention    Subjective Information  complains of;weakness  -CT     Document Type  therapy note (daily note)  -CT     Mode of Treatment  physical therapy  -CT     Patient Effort  good  -CT     Comment  Pt tolerated treatment session well. Pt performed AAROM BLE ther ex in supine.   -CT     Row Name 08/03/21 1441          General Information    Patient Profile Reviewed  yes  -CT     Equipment Currently Used at Home  wheelchair;oxygen;lift device;walker, rolling  -CT     Existing Precautions/Restrictions  fall  -CT     Row Name 08/03/21 1441          Cognition    Affect/Mental Status (Cognitive)  WFL  -CT     Orientation Status (Cognition)  oriented x 3  -CT     Follows Commands (Cognition)  WFL  -CT     Row Name 08/03/21 1441          Bed Mobility    Bed Mobility  rolling left;rolling right  -CT     Rolling Left [place] (Bed Mobility)  maximum assist (25% patient effort)  -CT     Rolling Right [place] (Bed Mobility)  maximum assist (25% patient effort)  -CT     Bed Mobility, Safety Issues  decreased use of arms for pushing/pulling;decreased use of legs for bridging/pushing;impaired trunk control for bed mobility  -CT     Assistive Device (Bed Mobility)  bed rails;draw sheet  -CT     Row Name 08/03/21 1441          Motor Skills    Therapeutic Exercise  -- BLE AAROM supine ther ex   -CT     Row Name 08/03/21 1441          Coping    Observed Emotional State  calm;cooperative  -CT     Verbalized Emotional State  acceptance  -CT     Row Name 08/03/21 1441          Plan of Care Review    Plan of Care Reviewed With  patient  -CT     Row Name 08/03/21 1441           Positioning and Restraints    Pre-Treatment Position  in bed  -CT     Post Treatment Position  bed  -CT     In Bed  supine;call light within reach;encouraged to call for assist;exit alarm on;side rails up x3;notified nsg  -CT     Row Name 08/03/21 1441          Therapy Assessment/Plan (PT)    Rehab Potential (PT)  good, to achieve stated therapy goals  -CT     Criteria for Skilled Interventions Met (PT)  yes;skilled treatment is necessary  -CT       User Key  (r) = Recorded By, (t) = Taken By, (c) = Cosigned By    Initials Name Provider Type    CT Joceline Trejo PT Physical Therapist        Physical Therapy Education                 Title: PT OT SLP Therapies (Done)     Topic: Physical Therapy (Done)     Point: Mobility training (Done)     Learning Progress Summary           Patient Acceptance, E,TB, VU by CT at 8/3/2021 1444    Acceptance, E,TB, VU by CT at 8/2/2021 1122                   Point: Home exercise program (Done)     Learning Progress Summary           Patient Acceptance, E,TB, VU by CT at 8/3/2021 1444    Acceptance, E,TB, VU by CT at 8/2/2021 1122                   Point: Body mechanics (Done)     Learning Progress Summary           Patient Acceptance, E,TB, VU by CT at 8/3/2021 1444    Acceptance, E,TB, VU by CT at 8/2/2021 1122                   Point: Precautions (Done)     Learning Progress Summary           Patient Acceptance, E,TB, VU by CT at 8/3/2021 1444    Acceptance, E,TB, VU by CT at 8/2/2021 1122                               User Key     Initials Effective Dates Name Provider Type Discipline    CT 06/16/21 -  Joceline Trejo PT Physical Therapist PT              PT Recommendation and Plan  Anticipated Discharge Disposition (PT):  (tbd)  Planned Therapy Interventions (PT): balance training, bed mobility training, home exercise program, manual therapy techniques, motor coordination training, neuromuscular re-education, patient/family education, postural re-education, strengthening,  transfer training  Therapy Frequency (PT): 2 times/wk (2-5 times/wk )  Plan of Care Reviewed With: patient       Time Calculation:   PT Charges     Row Name 21 1445             Time Calculation    PT Received On  21  -CT      PT Goal Re-Cert Due Date  21  -CT         Time Calculation- PT    Total Timed Code Minutes- PT  30 minute(s)  -CT        User Key  (r) = Recorded By, (t) = Taken By, (c) = Cosigned By    Initials Name Provider Type    CT Joceline Trejo PT Physical Therapist        Therapy Charges for Today     Code Description Service Date Service Provider Modifiers Qty    69103634201 HC PT EVAL MOD COMPLEXITY 4 2021 Joceline Trejo, PT GP 1    94852842205 HC PT THER PROC EA 15 MIN 8/3/2021 Joceline Trejo PT GP 1    49433361439 HC PT THERAPEUTIC ACT EA 15 MIN 8/3/2021 Joceline Trejo, PT GP 1               Joceline Trejo PT  8/3/2021      Electronically signed by Joceline Trejo PT at 21 1446          Occupational Therapy Notes (most recent note)      Leticia Maza, OT at 21 1317          Acute Care - Occupational Therapy Initial Evaluation and Treatment Note  BAKARI Vazquez     Patient Name: Иван Miller  : 1955  MRN: [id number]  Today's Date: 2021  Onset of Illness/Injury or Date of Surgery: 21 (admit date)     Referring Physician: Sandra    Admit Date: 2021       ICD-10-CM ICD-9-CM   1. Pneumonia of both lungs due to infectious organism, unspecified part of lung  J18.9 483.8   2. Acute on chronic respiratory failure with hypoxia and hypercapnia (CMS/HCC)  J96.21 518.84    J96.22 786.09     799.02     Patient Active Problem List   Diagnosis   • Acute respiratory failure with hypoxemia (CMS/HCC)   • Coronary artery disease status post multiple stents   • Non-ischemic cardiomyopathy (CMS/HCC LV ejection fraction 40 to 45%   • Chronic systolic congestive heart failure (CMS/HCC)   • Essential hypertension   • Hyperlipidemia LDL goal <70   • Insulin  dependent diabetes mellitus   • Respiratory failure (CMS/HCC)   • Finger infection   • Pneumonia of both lungs due to infectious organism     Past Medical History:   Diagnosis Date   • Atrial fibrillation (CMS/HCC)    • COPD (chronic obstructive pulmonary disease) (CMS/HCC)    • Diabetes mellitus (CMS/HCC)    • GERD (gastroesophageal reflux disease)    • Hyperlipidemia    • Hypertension    • Myocardial infarction (CMS/HCC)    • Neuropathy      Past Surgical History:   Procedure Laterality Date   • ABDOMINAL SURGERY     • APPENDECTOMY     • CARDIAC CATHETERIZATION     • CORONARY STENT PLACEMENT              OT ASSESSMENT FLOWSHEET (last 12 hours)      OT Evaluation and Treatment     Row Name 08/02/21 4803                   OT Time and Intention    Subjective Information  no complaints;weakness  -HB        Document Type  evaluation  -HB        Mode of Treatment  individual therapy;occupational therapy  -HB        Patient Effort  fair  -HB        Symptoms Noted During/After Treatment  fatigue  -HB        Comment  Patient presents with SOA, PNA, and weakness. Pt reports a fall approx 3 months ago resulting in a SCI and right ankle fx. Pt has not stood in 3 months and was a mechanical lift used at outside facility   -HB           General Information    Patient Profile Reviewed  yes  -HB        Patient/Family/Caregiver Comments/Observations  Patient agreeable to OTE this date.   -HB        Prior Level of Function  dependent:;ADL's daughter assisting PRN  -HB        Equipment Currently Used at Home  wheelchair;oxygen;lift device;walker, rolling  -HB        Existing Precautions/Restrictions  fall  -HB        Benefits Reviewed  patient:;increase independence;improve function;increase strength;increase balance;increase knowledge;improve skin integrity  -HB           Previous Level of Function/Home Environm    BADLs, Premorbid Functional Level  needs adaptive equipment for safe performance;needs assistive device for safe  performance;needs assistance for safe performance  -        IADLs, Premorbid Functional Level  needs assistance for safe performance;needs assistive device for safe performance;needs adaptive equipment for safe performance  -           Living Environment    Current Living Arrangements  home/apartment/condo  -        Lives With  spouse;child(ara), adult  -HB           Cognition    Affect/Mental Status (Cognitive)  WFL  -HB        Orientation Status (Cognition)  oriented x 3  -HB        Follows Commands (Cognition)  WFL  -HB           Range of Motion Comprehensive    General Range of Motion  no range of motion deficits identified  -           Strength (Manual Muscle Testing)    Strength (Manual Muscle Testing)  bilateral upper extremities WFL; B hands/wrists 3/5 grossly   -HB           Bed Mobility    Bed Mobility  rolling left;rolling right  -HB        Rolling Left [place] (Bed Mobility)  maximum assist (25% patient effort)  -HB        Rolling Right [place] (Bed Mobility)  maximum assist (25% patient effort)  -        Bed Mobility, Safety Issues  decreased use of arms for pushing/pulling;decreased use of legs for bridging/pushing;impaired trunk control for bed mobility  -           Activities of Daily Living    BADL Assessment/Intervention  bathing;upper body dressing;lower body dressing;grooming;feeding;toileting  -           Bathing Assessment/Intervention    [place] Level (Bathing)  dependent (less than 25% patient effort);bathing skills  -           Upper Body Dressing Assessment/Training    [place] Level (Upper Body Dressing)  upper body dressing skills;moderate assist (50% patient effort)  -           Lower Body Dressing Assessment/Training    [place] Level (Lower Body Dressing)  lower body dressing skills;dependent (less than 25% patient effort)  -           Grooming Assessment/Training    [place] Level (Grooming)  grooming skills;minimum assist (75% patient  effort)  -HB           Self-Feeding Assessment/Training    [place] Level (Feeding)  feeding skills;minimum assist (75% patient effort)  -HB           Toileting Assessment/Training    [place] Level (Toileting)  toileting skills;dependent (less than 25% patient effort)  -HB           Plan of Care Review    Plan of Care Reviewed With  patient  -HB           OT Goals    Dressing Goal Selection (OT)  dressing, OT goal 1  -HB        Grooming Goal Selection (OT)  grooming, OT goal 1  -HB        Strength Goal Selection (OT)  strength, OT goal 1  -HB           Dressing Goal 1 (OT)    Activity/Device (Dressing Goal 1, OT)  upper body dressing  -HB        [place]/Cues Needed (Dressing Goal 1, OT)  minimum assist (75% or more patient effort)  -HB        Time Frame (Dressing Goal 1, OT)  by discharge  -HB           Grooming Goal 1 (OT)    Activity/Device (Grooming Goal 1, OT)  grooming skills, all  -HB        [place] (Grooming Goal 1, OT)  set-up required  -HB        Time Frame (Grooming Goal 1, OT)  by discharge  -HB           Strength Goal 1 (OT)    Strength Goal 1 (OT)  Patient will increase BUE m/s WFL empahsis on hands   -HB        Time Frame (Strength Goal 1, OT)  by discharge  -HB           Positioning and Restraints    Pre-Treatment Position  in bed  -HB        Post Treatment Position  bed  -HB        In Bed  call light within reach;encouraged to call for assist  -HB           Therapy Assessment/Plan (OT)    Patient/Family Therapy Goal Statement (OT)  to return to [facility]  -        OT Diagnosis  Decreased I/ADL status; functional mobility   -HB        Rehab Potential (OT)  fair, will monitor progress closely  -HB        Criteria for Skilled Therapeutic Interventions Met (OT)  yes;skilled treatment is necessary  -HB        Predicted Duration of Therapy Intervention (OT)  1 week/til goals are met/discharge from [facility]  -        Problem List (OT)  problems related to;range of motion (ROM);strength;mobility   -HB        Activity Limitations Related to Problem List (OT)  unable to transfer safely;BADLs not performed adequately or safely;IADLs not performed adequately or safely  -HB        Planned Therapy Interventions (OT)  activity tolerance training;adaptive equipment training;BADL retraining;IADL retraining;functional balance retraining;occupation/activity based interventions;ROM/therapeutic exercise;strengthening exercise;transfer/mobility retraining;patient/caregiver education/training  -HB           Therapy Plan Review/Discharge Plan (OT)    Therapy Plan Review (OT)  evaluation/treatment results reviewed;care plan/treatment goals reviewed  -HB        Equipment Needs Upon Discharge (OT)  -- TBD  -HB        Anticipated Discharge Disposition (OT)  extended care facility;skilled nursing facility  -HB          User Key  (r) = Recorded By, (t) = Taken By, (c) = Cosigned By    Initials Name Effective Dates     Leticia Maza OT 05/25/21 -                  OT Recommendation and Plan  Planned Therapy Interventions (OT): activity tolerance training, adaptive equipment training, BADL retraining, IADL retraining, functional balance retraining, occupation/activity based interventions, ROM/therapeutic exercise, strengthening exercise, transfer/mobility retraining, patient/caregiver education/training  Plan of Care Review  Plan of Care Reviewed With: patient  Plan of Care Reviewed With: patient        Time Calculation:     Therapy Charges for Today     Code Description Service Date Service Provider Modifiers Qty    34858508302  OT EVAL MOD COMPLEXITY 4 8/2/2021 Leticia Maza OT GO 1               Leticia Maza OT  8/2/2021    Electronically signed by Leticia Maza OT at 08/02/21 1318          Speech Language Pathology Notes (most recent note)      Janelle Mcgraw MS CCC-SLP at 08/02/21 1241          Acute Care - Speech Language Pathology   Swallow Initial Evaluation BAKARI Vazquez   Clinical Dysphagia Assessment      Patient Name: Иван Miller  : 1955  MRN: [id number]  Today's Date: 2021  Onset of Illness/Injury or Date of Surgery: 21 (admit date)     Referring Physician: Sandra      Admit Date: 2021    Иван Miller  is seen at bedside this am on PCU to assess safety/efficacy of swallowing fnx, determine safest/least restrictive diet tolerance.     Mr. Miller presented to [facility] ED 21 2/2 cough, SOB. He presented w/ bilateral PNA. PMH is significant for ARF w/ hypoxemia, CAD, cardiomyopathy, CHF, HTN, HLD, DM, ARF. He is referred to r/o dysphagia, aspiration.     Social History     Socioeconomic History   • Marital status:      Spouse name: Not on file   • Number of children: Not on file   • Years of education: Not on file   • Highest education level: Not on file   Tobacco Use   • Smoking status: Former Smoker     Packs/day: 2.00     Years: 30.00     Pack years: 60.00     Types: Cigarettes   • Smokeless tobacco: Current User     Types: Snuff   Substance and Sexual Activity   • Alcohol use: No   • Drug use: No   • Sexual activity: Defer      Imaging:   CT Chest WO     INDICATION:   Dyspnea. Chronic dyspnea.     TECHNIQUE:   CT of the thorax without IV contrast. Coronal and sagittal reconstructions were obtained.  Radiation dose reduction techniques included automated exposure control or exposure modulation based on body size. Count of known CT and cardiac nuc med studies  performed in previous 12 months: 5.      COMPARISON:   2021     FINDINGS:  There is new patchy atelectasis and probable curvilinear atelectasis in the left lower lobe favored over pneumonia. There is new consolidation in the posterior left upper lobe suggestive of pneumonia however.  Trace pleural thickening/pleural fluid left lung base. New left-sided lower lobe and to a lesser extent upper lobe volume loss. On the right, there is new right basilar consolidation suspicious for pneumonia and a trace amount of  pleural  fluid/thickening. No new suspicious pulmonary nodule. Small amount of secretions in the trachea. Correlate with potential for aspiration pneumonia. No pneumothorax.     There is no pericardial effusion. No threshold adenopathy. Normal caliber aorta and atherosclerotic change. Advanced atherosclerotic change of the coronary arteries. Evidence of pulmonary arterial hypertension.     Included upper abdomen demonstrates no acute finding. Chronic advanced degenerative or postinflammatory change in the midthoracic spine.  negative.     IMPRESSION:     1. Chronic interstitial changes with new areas of superimposed consolidation suspicious for pneumonia in the left upper lobe and right lower lobe. Atelectasis favored over pneumonia in the left base. Follow-up to clearing recommended. Consider the  possibility of aspiration event.  2. Pulmonary arterial hypertension.  3. Atherosclerotic disease.     Signer Name: Basilio Deng MD   Signed: 8/1/2021 3:11 AM   Workstation Name: [email or web address]    [facility]    Labs:  08/02/21 1050  POC Glucose Once   Collected: 08/02/21 1044  Final result  Specimen: Blood    Glucose 156High  mg/dL             08/02/21 0621  POC Glucose Once   Collected: 08/02/21 0615  Final result  Specimen: Blood    Glucose 121 mg/dL             08/02/21 0339  Basic Metabolic Panel   Collected: 08/02/21 0032  Final result  Specimen: Blood    Glucose 233High  mg/dL CO2 31.8High  mmol/L   BUN 31High  mg/dL Calcium 9.3 mg/dL   Creatinine 0.87 mg/dL eGFR Non African Amer 88 mL/min/1.73   Sodium 141 mmol/L BUN/Creatinine Ratio 35.6High    Potassium 3.8 mmol/L Anion Gap 11.2 mmol/L   Chloride 98 mmol/L            08/02/21 0339  C-reactive Protein   Collected: 08/02/21 0032  Final result  Specimen: Blood    C-Reactive Protein <0.30 mg/dL            08/02/21 0154  CBC & Differential   Collected: 08/02/21 0032  Final result  Specimen: Blood           08/02/21 0154  CBC Auto  Differential   Collected: 08/02/21 0032  Final result  Specimen: Blood    WBC 6.90 10*3/mm3 Lymphocyte % 8.3Low  %   RBC 4.84 10*6/mm3 Monocyte % 6.7 %   Hemoglobin 11.9Low  g/dL Eosinophil % 0.0Low  %   Hematocrit 40.9 % Basophil % 0.1 %   MCV 84.5 fL Immature Grans % 1.0High  %   MCH 24.6Low  pg Neutrophils, Absolute 5.79 10*3/mm3   MCHC 29.1Low  g/dL Lymphocytes, Absolute 0.57Low  10*3/mm3   RDW 16.3High  % Monocytes, Absolute 0.46 10*3/mm3   RDW-SD 50.4 fl Eosinophils, Absolute 0.00 10*3/mm3   MPV 9.5 fL Basophils, Absolute 0.01 10*3/mm3   Platelets 255 10*3/mm3 Immature Grans, Absolute 0.07High  10*3/mm3   Neutrophil % 83.9High  % nRBC 0.0 /100 WBC        Diet Orders (active) (From admission, onward)     Start     Ordered    08/01/21 1004  Diet Regular; Consistent Carbohydrate  Diet Effective Now      08/01/21 1004              Observed on O2 via NC 3L.     Mr. Miller is positioned upright and centered in bed to accept multiple po presentations of ice chips solid cracker, puree and thin liquids via spoon, cup and straw.  He is able to self feed.     Facial/oral structures are symmetrical upon observation. Lingual protrusion reveals no deviation. Oral mucosa are moist, pink, and clean. Secretions are clear, thin, and well controlled. OROM/HARSHIL is wfl to imitate oral postures. Gag is not assessed. Volitional cough is intact w/ adequate  intensity, clear in quality, non-productive. Voice is adequate in intensity, clear in quality w/ intelligible speech. He is a/a and pleasant, interactive, oriented.     Upon po presentations, adequate bolus anticipation and acceptance w/ good labial seal for bolus clearance via spoon bowl, cup rim stability and suction via straw. Bolus formation, manipulation and control are wfl w/ rotary mastication pattern. A-p transit is timely w/o oral residue. No overt s/s aspiration before the swallow.     Pharyngeal swallow is timely w/ adequate hyolaryngeal elevation per palpation. No  overt s/s aspiration evidenced across this evaluation. No silent aspiration suspected. He denies odynophagia.    Visit Dx:     ICD-10-CM ICD-9-CM   1. Pneumonia of both lungs due to infectious organism, unspecified part of lung  J18.9 483.8   2. Acute on chronic respiratory failure with hypoxia and hypercapnia (CMS/[facility])  J96.21 518.84    J96.22 786.09     799.02     Patient Active Problem List   Diagnosis   • Acute respiratory failure with hypoxemia (CMS/[facility])   • Coronary artery disease status post multiple stents   • Non-ischemic cardiomyopathy (CMS/[facility] LV ejection fraction 40 to 45%   • Chronic systolic congestive heart failure (CMS/[facility])   • Essential hypertension   • Hyperlipidemia LDL goal <70   • Insulin dependent diabetes mellitus   • Respiratory failure (CMS/[facility])   • Finger infection   • Pneumonia of both lungs due to infectious organism     Past Medical History:   Diagnosis Date   • Atrial fibrillation (CMS/[facility])    • COPD (chronic obstructive pulmonary disease) (CMS/[facility])    • Diabetes mellitus (CMS/[facility])    • GERD (gastroesophageal reflux disease)    • Hyperlipidemia    • Hypertension    • Myocardial infarction (CMS/[facility])    • Neuropathy      Past Surgical History:   Procedure Laterality Date   • ABDOMINAL SURGERY     • APPENDECTOMY     • CARDIAC CATHETERIZATION     • CORONARY STENT PLACEMENT       Impression: Mr. Miller presents w/ wfl oropharyngeal swallow w/o s/s aspiration.No s/s indicative of silent aspiration.  No odynophagia reported.      He is felt to most benefit from continued least restrictive regular consistency po diet w/ thin liquids. Medications whole, single pill presentations only, please.     EDUCATION  The patient has been educated in the following areas:   Dysphagia (Swallowing Impairment) Oral Care/Hydration.     SLP Recommendation and Plan    1. Continue least restrictive regular consistency po diet, thin liquids.    2. Medications whole in puree/thins. Single pill presentations only,  please.   3. Upright and centered for all po intake  4. XIOMARA precautions.  5. Oral care protocol.  No further formal SLP f/u warranted at this time.    D/w Mr. Miller results and recommendations w/ verbal agreement.    Thank you for allowing me to participate in the care of your patient-  Janelle Mcgraw M.S., CCC/SLP                                                                              Time Calculation:       Therapy Charges for Today     Code Description Service Date Service Provider Modifiers Qty    94581198841 HC ST EVAL ORAL PHARYNG SWALLOW 4 8/2/2021 Janelle Mcgraw, MS CCC-SLP GN 1               Janelle Mcgraw MS CCC-SLP  8/2/2021    Electronically signed by Janelle Mcgraw, MS CCC-SLP at 08/02/21 1248       Respiratory Therapy Notes (most recent note)    No notes exist for this encounter.         ADL Documentation (most recent)      Most Recent Value   Transferring  4 - completely dependent   Toileting  3 - assistive equipment and person   Bathing  2 - assistive person   Dressing  2 - assistive person   Eating  0 - independent   Communication  0 - understands/communicates without difficulty   Swallowing  0 - swallows foods/liquids without difficulty   Equipment Currently Used at Home  walker, standard, wheelchair, cane, straight

## 2021-08-04 LAB
ANION GAP SERPL CALCULATED.3IONS-SCNC: 5.9 MMOL/L (ref 5–15)
ANISOCYTOSIS BLD QL: NORMAL
BASOPHILS # BLD AUTO: 0.01 10*3/MM3 (ref 0–0.2)
BASOPHILS NFR BLD AUTO: 0.1 % (ref 0–1.5)
BUN SERPL-MCNC: 36 MG/DL (ref 8–23)
BUN/CREAT SERPL: 38.3 (ref 7–25)
CALCIUM SPEC-SCNC: 8.7 MG/DL (ref 8.6–10.5)
CHLORIDE SERPL-SCNC: 100 MMOL/L (ref 98–107)
CO2 SERPL-SCNC: 35.1 MMOL/L (ref 22–29)
CREAT SERPL-MCNC: 0.94 MG/DL (ref 0.76–1.27)
DEPRECATED RDW RBC AUTO: 52.7 FL (ref 37–54)
EOSINOPHIL # BLD AUTO: 0.03 10*3/MM3 (ref 0–0.4)
EOSINOPHIL NFR BLD AUTO: 0.4 % (ref 0.3–6.2)
ERYTHROCYTE [DISTWIDTH] IN BLOOD BY AUTOMATED COUNT: 16.5 % (ref 12.3–15.4)
GFR SERPL CREATININE-BSD FRML MDRD: 80 ML/MIN/1.73
GLUCOSE BLDC GLUCOMTR-MCNC: 107 MG/DL (ref 70–130)
GLUCOSE BLDC GLUCOMTR-MCNC: 192 MG/DL (ref 70–130)
GLUCOSE BLDC GLUCOMTR-MCNC: 194 MG/DL (ref 70–130)
GLUCOSE BLDC GLUCOMTR-MCNC: 99 MG/DL (ref 70–130)
GLUCOSE SERPL-MCNC: 140 MG/DL (ref 65–99)
HCT VFR BLD AUTO: 42.4 % (ref 37.5–51)
HGB BLD-MCNC: 12 G/DL (ref 13–17.7)
HYPOCHROMIA BLD QL: NORMAL
IMM GRANULOCYTES # BLD AUTO: 0.09 10*3/MM3 (ref 0–0.05)
IMM GRANULOCYTES NFR BLD AUTO: 1.1 % (ref 0–0.5)
LYMPHOCYTES # BLD AUTO: 1.18 10*3/MM3 (ref 0.7–3.1)
LYMPHOCYTES NFR BLD AUTO: 13.9 % (ref 19.6–45.3)
MCH RBC QN AUTO: 24.6 PG (ref 26.6–33)
MCHC RBC AUTO-ENTMCNC: 28.3 G/DL (ref 31.5–35.7)
MCV RBC AUTO: 86.9 FL (ref 79–97)
MONOCYTES # BLD AUTO: 0.79 10*3/MM3 (ref 0.1–0.9)
MONOCYTES NFR BLD AUTO: 9.3 % (ref 5–12)
NEUTROPHILS NFR BLD AUTO: 6.38 10*3/MM3 (ref 1.7–7)
NEUTROPHILS NFR BLD AUTO: 75.2 % (ref 42.7–76)
NRBC BLD AUTO-RTO: 0 /100 WBC (ref 0–0.2)
PLAT MORPH BLD: NORMAL
PLATELET # BLD AUTO: 246 10*3/MM3 (ref 140–450)
PMV BLD AUTO: 9.4 FL (ref 6–12)
POTASSIUM SERPL-SCNC: 4 MMOL/L (ref 3.5–5.2)
RBC # BLD AUTO: 4.88 10*6/MM3 (ref 4.14–5.8)
SODIUM SERPL-SCNC: 141 MMOL/L (ref 136–145)
WBC # BLD AUTO: 8.48 10*3/MM3 (ref 3.4–10.8)

## 2021-08-04 PROCEDURE — 63710000001 INSULIN ASPART PER 5 UNITS: Performed by: INTERNAL MEDICINE

## 2021-08-04 PROCEDURE — 63710000001 PREDNISONE PER 1 MG: Performed by: INTERNAL MEDICINE

## 2021-08-04 PROCEDURE — 85007 BL SMEAR W/DIFF WBC COUNT: CPT | Performed by: INTERNAL MEDICINE

## 2021-08-04 PROCEDURE — 85025 COMPLETE CBC W/AUTO DIFF WBC: CPT | Performed by: INTERNAL MEDICINE

## 2021-08-04 PROCEDURE — 80048 BASIC METABOLIC PNL TOTAL CA: CPT | Performed by: INTERNAL MEDICINE

## 2021-08-04 PROCEDURE — 99232 SBSQ HOSP IP/OBS MODERATE 35: CPT | Performed by: INTERNAL MEDICINE

## 2021-08-04 PROCEDURE — 82962 GLUCOSE BLOOD TEST: CPT

## 2021-08-04 PROCEDURE — 63710000001 INSULIN DETEMIR PER 5 UNITS: Performed by: INTERNAL MEDICINE

## 2021-08-04 PROCEDURE — 94799 UNLISTED PULMONARY SVC/PX: CPT

## 2021-08-04 PROCEDURE — 94660 CPAP INITIATION&MGMT: CPT

## 2021-08-04 PROCEDURE — 25010000002 PIPERACILLIN SOD-TAZOBACTAM PER 1 G: Performed by: INTERNAL MEDICINE

## 2021-08-04 RX ORDER — CHOLECALCIFEROL (VITAMIN D3) 125 MCG
5 CAPSULE ORAL NIGHTLY PRN
Status: DISCONTINUED | OUTPATIENT
Start: 2021-08-04 | End: 2021-08-12 | Stop reason: HOSPADM

## 2021-08-04 RX ORDER — POLYETHYLENE GLYCOL 3350 17 G/17G
17 POWDER, FOR SOLUTION ORAL DAILY
Status: DISCONTINUED | OUTPATIENT
Start: 2021-08-05 | End: 2021-08-12 | Stop reason: HOSPADM

## 2021-08-04 RX ORDER — DOCUSATE SODIUM 100 MG/1
100 CAPSULE, LIQUID FILLED ORAL 2 TIMES DAILY
Status: DISCONTINUED | OUTPATIENT
Start: 2021-08-04 | End: 2021-08-12 | Stop reason: HOSPADM

## 2021-08-04 RX ORDER — BUMETANIDE 0.25 MG/ML
2 INJECTION INTRAMUSCULAR; INTRAVENOUS ONCE
Status: COMPLETED | OUTPATIENT
Start: 2021-08-04 | End: 2021-08-04

## 2021-08-04 RX ADMIN — SODIUM CHLORIDE, PRESERVATIVE FREE 10 ML: 5 INJECTION INTRAVENOUS at 21:30

## 2021-08-04 RX ADMIN — INSULIN ASPART 20 UNITS: 100 INJECTION, SOLUTION INTRAVENOUS; SUBCUTANEOUS at 09:00

## 2021-08-04 RX ADMIN — PANTOPRAZOLE SODIUM 40 MG: 40 TABLET, DELAYED RELEASE ORAL at 08:52

## 2021-08-04 RX ADMIN — PIPERACILLIN SODIUM AND TAZOBACTAM SODIUM 3.38 G: 3; .375 INJECTION, POWDER, LYOPHILIZED, FOR SOLUTION INTRAVENOUS at 05:32

## 2021-08-04 RX ADMIN — INSULIN DETEMIR 45 UNITS: 100 INJECTION, SOLUTION SUBCUTANEOUS at 21:44

## 2021-08-04 RX ADMIN — SODIUM CHLORIDE, PRESERVATIVE FREE 10 ML: 5 INJECTION INTRAVENOUS at 08:55

## 2021-08-04 RX ADMIN — BUDESONIDE AND FORMOTEROL FUMARATE DIHYDRATE 2 PUFF: 160; 4.5 AEROSOL RESPIRATORY (INHALATION) at 07:32

## 2021-08-04 RX ADMIN — DOCUSATE SODIUM 50 MG AND SENNOSIDES 8.6 MG 2 TABLET: 8.6; 5 TABLET, FILM COATED ORAL at 21:25

## 2021-08-04 RX ADMIN — HYDROCODONE BITARTRATE AND ACETAMINOPHEN 1 TABLET: 7.5; 325 TABLET ORAL at 09:00

## 2021-08-04 RX ADMIN — APIXABAN 5 MG: 5 TABLET, FILM COATED ORAL at 08:54

## 2021-08-04 RX ADMIN — EMPAGLIFLOZIN 20 MG: 10 TABLET, FILM COATED ORAL at 08:52

## 2021-08-04 RX ADMIN — HYDROCODONE BITARTRATE AND ACETAMINOPHEN 1 TABLET: 7.5; 325 TABLET ORAL at 21:29

## 2021-08-04 RX ADMIN — PIPERACILLIN SODIUM AND TAZOBACTAM SODIUM 3.38 G: 3; .375 INJECTION, POWDER, LYOPHILIZED, FOR SOLUTION INTRAVENOUS at 15:07

## 2021-08-04 RX ADMIN — DOCUSATE SODIUM 50 MG AND SENNOSIDES 8.6 MG 2 TABLET: 8.6; 5 TABLET, FILM COATED ORAL at 08:53

## 2021-08-04 RX ADMIN — IPRATROPIUM BROMIDE AND ALBUTEROL SULFATE 3 ML: .5; 3 SOLUTION RESPIRATORY (INHALATION) at 07:33

## 2021-08-04 RX ADMIN — CARVEDILOL 25 MG: 25 TABLET, FILM COATED ORAL at 19:11

## 2021-08-04 RX ADMIN — INSULIN ASPART 2 UNITS: 100 INJECTION, SOLUTION INTRAVENOUS; SUBCUTANEOUS at 17:03

## 2021-08-04 RX ADMIN — CARVEDILOL 25 MG: 25 TABLET, FILM COATED ORAL at 08:53

## 2021-08-04 RX ADMIN — GUAIFENESIN 1200 MG: 600 TABLET, EXTENDED RELEASE ORAL at 21:24

## 2021-08-04 RX ADMIN — IPRATROPIUM BROMIDE AND ALBUTEROL SULFATE 3 ML: .5; 3 SOLUTION RESPIRATORY (INHALATION) at 18:37

## 2021-08-04 RX ADMIN — GABAPENTIN 600 MG: 300 CAPSULE ORAL at 09:00

## 2021-08-04 RX ADMIN — PIPERACILLIN SODIUM AND TAZOBACTAM SODIUM 3.38 G: 3; .375 INJECTION, POWDER, LYOPHILIZED, FOR SOLUTION INTRAVENOUS at 21:44

## 2021-08-04 RX ADMIN — IPRATROPIUM BROMIDE AND ALBUTEROL SULFATE 3 ML: .5; 3 SOLUTION RESPIRATORY (INHALATION) at 13:14

## 2021-08-04 RX ADMIN — APIXABAN 5 MG: 5 TABLET, FILM COATED ORAL at 21:29

## 2021-08-04 RX ADMIN — PREDNISONE 40 MG: 20 TABLET ORAL at 08:53

## 2021-08-04 RX ADMIN — BUMETANIDE 2 MG: 1 TABLET ORAL at 08:52

## 2021-08-04 RX ADMIN — LIDOCAINE 1 PATCH: 50 PATCH CUTANEOUS at 21:31

## 2021-08-04 RX ADMIN — LISINOPRIL 2.5 MG: 2.5 TABLET ORAL at 08:53

## 2021-08-04 RX ADMIN — POTASSIUM CHLORIDE 10 MEQ: 750 TABLET, FILM COATED, EXTENDED RELEASE ORAL at 08:52

## 2021-08-04 RX ADMIN — INSULIN ASPART 2 UNITS: 100 INJECTION, SOLUTION INTRAVENOUS; SUBCUTANEOUS at 21:44

## 2021-08-04 RX ADMIN — GUAIFENESIN 1200 MG: 600 TABLET, EXTENDED RELEASE ORAL at 08:53

## 2021-08-04 RX ADMIN — BUMETANIDE 2 MG: 0.25 INJECTION, SOLUTION INTRAMUSCULAR; INTRAVENOUS at 09:58

## 2021-08-04 RX ADMIN — IPRATROPIUM BROMIDE AND ALBUTEROL SULFATE 3 ML: .5; 3 SOLUTION RESPIRATORY (INHALATION) at 00:42

## 2021-08-04 RX ADMIN — LIDOCAINE 1 PATCH: 50 PATCH CUTANEOUS at 09:01

## 2021-08-04 RX ADMIN — BUDESONIDE AND FORMOTEROL FUMARATE DIHYDRATE 2 PUFF: 160; 4.5 AEROSOL RESPIRATORY (INHALATION) at 18:37

## 2021-08-04 RX ADMIN — GABAPENTIN 600 MG: 300 CAPSULE ORAL at 21:29

## 2021-08-04 RX ADMIN — INSULIN ASPART 20 UNITS: 100 INJECTION, SOLUTION INTRAVENOUS; SUBCUTANEOUS at 17:03

## 2021-08-04 RX ADMIN — DOCUSATE SODIUM 100 MG: 100 CAPSULE, LIQUID FILLED ORAL at 21:31

## 2021-08-04 RX ADMIN — FLUTICASONE PROPIONATE 2 SPRAY: 50 SPRAY, METERED NASAL at 09:58

## 2021-08-04 RX ADMIN — TAMSULOSIN HYDROCHLORIDE 0.4 MG: 0.4 CAPSULE ORAL at 21:25

## 2021-08-04 RX ADMIN — ATORVASTATIN CALCIUM 20 MG: 20 TABLET, FILM COATED ORAL at 08:53

## 2021-08-04 RX ADMIN — INSULIN ASPART 20 UNITS: 100 INJECTION, SOLUTION INTRAVENOUS; SUBCUTANEOUS at 11:14

## 2021-08-04 NOTE — PLAN OF CARE
Goal Outcome Evaluation:           Progress: improving  Outcome Summary: Patient resting in bed. No complaints at this time. No acute changes noted.

## 2021-08-04 NOTE — CASE MANAGEMENT/SOCIAL WORK
Discharge Planning Assessment  [facility]     Patient Name: Иван Miller  MRN: [id number]  Today's Date: 8/4/2021    Admit Date: 7/31/2021    Discharge Plan     Row Name 08/04/21 1837       Plan    Plan SS spoke to pt who is agreeable for SNF placement at [facility], [facility], or [facility]. SS will f/u with SNF's in the am for bed availability and send referrals. SS to follow.        Continued Care and Services - Admitted Since 7/31/2021     Destination     Service Provider Request Status Selected Services Address Phone Fax Patient Preferred    [facility]  Declined  Unable to Meet Patient Needs N/A [address] [place] [id number] [phone] --    [facility]  Declined  No Bariatric Bed Available N/A [address] [facility] [address] [phone] --    [facility]  Declined  No Bariatric Bed Available N/A [address] [place] [id number] [phone] --            Selected Continued Care - Prior Encounters Includes selections from prior encounters from 5/2/2021 to 8/4/2021    Discharged on 6/16/2021 Admission date: 5/21/2021 - Discharge disposition: Skilled Nursing Facility (DC - External)    Destination     Service Provider Selected Services Address Phone Fax Patient Preferred    [facility]  Skilled Nursing [address] [facility] [address] [phone] --                    ANISH Gustafson

## 2021-08-04 NOTE — CASE MANAGEMENT/SOCIAL WORK
Discharge Planning Assessment  BAKARI Vazquez     Patient Name: Иван Miller  MRN: [id number]  Today's Date: 8/4/2021    Admit Date: 7/31/2021    Discharge Plan     Row Name 08/04/21 1254       Plan    Plan SS was notified by Veronica at the [facility] that pt has been denied due to weight. SS following.        ANISH Paz

## 2021-08-04 NOTE — PLAN OF CARE
Goal Outcome Evaluation:           Progress: improving  Outcome Summary: Pt resting in bed at this time. Complaints of neck and back pain, PRN pain meds given. VSS. No acut changes. Will continue to monitor.

## 2021-08-05 LAB
A-A DO2: 33.8 MMHG (ref 0–300)
A-A DO2: 50.1 MMHG (ref 0–300)
ABSOLUTE LUNG FLUID CONTENT: 40 % (ref 20–35)
ANION GAP SERPL CALCULATED.3IONS-SCNC: 4.8 MMOL/L (ref 5–15)
ANISOCYTOSIS BLD QL: NORMAL
ARTERIAL PATENCY WRIST A: ABNORMAL
ARTERIAL PATENCY WRIST A: POSITIVE
ATMOSPHERIC PRESS: 729 MMHG
ATMOSPHERIC PRESS: 729 MMHG
BASE EXCESS BLDA CALC-SCNC: 13.4 MMOL/L (ref 0–2)
BASE EXCESS BLDA CALC-SCNC: 15.8 MMOL/L (ref 0–2)
BASOPHILS # BLD AUTO: 0.01 10*3/MM3 (ref 0–0.2)
BASOPHILS NFR BLD AUTO: 0.1 % (ref 0–1.5)
BDY SITE: ABNORMAL
BDY SITE: ABNORMAL
BODY TEMPERATURE: 0 C
BODY TEMPERATURE: 0 C
BUN SERPL-MCNC: 29 MG/DL (ref 8–23)
BUN/CREAT SERPL: 33.7 (ref 7–25)
CALCIUM SPEC-SCNC: 8.9 MG/DL (ref 8.6–10.5)
CHLORIDE SERPL-SCNC: 100 MMOL/L (ref 98–107)
CO2 BLDA-SCNC: 45.1 MMOL/L (ref 22–33)
CO2 BLDA-SCNC: 46.9 MMOL/L (ref 22–33)
CO2 SERPL-SCNC: 38.2 MMOL/L (ref 22–29)
COHGB MFR BLD: 1.5 % (ref 0–5)
COHGB MFR BLD: 1.7 % (ref 0–5)
CREAT SERPL-MCNC: 0.86 MG/DL (ref 0.76–1.27)
DEPRECATED RDW RBC AUTO: 52 FL (ref 37–54)
EOSINOPHIL # BLD AUTO: 0.04 10*3/MM3 (ref 0–0.4)
EOSINOPHIL NFR BLD AUTO: 0.5 % (ref 0.3–6.2)
ERYTHROCYTE [DISTWIDTH] IN BLOOD BY AUTOMATED COUNT: 16.3 % (ref 12.3–15.4)
GFR SERPL CREATININE-BSD FRML MDRD: 89 ML/MIN/1.73
GLUCOSE BLDC GLUCOMTR-MCNC: 113 MG/DL (ref 70–130)
GLUCOSE BLDC GLUCOMTR-MCNC: 148 MG/DL (ref 70–130)
GLUCOSE BLDC GLUCOMTR-MCNC: 163 MG/DL (ref 70–130)
GLUCOSE BLDC GLUCOMTR-MCNC: 198 MG/DL (ref 70–130)
GLUCOSE SERPL-MCNC: 190 MG/DL (ref 65–99)
HCO3 BLDA-SCNC: 42.6 MMOL/L (ref 20–26)
HCO3 BLDA-SCNC: 44.5 MMOL/L (ref 20–26)
HCT VFR BLD AUTO: 40.4 % (ref 37.5–51)
HCT VFR BLD CALC: 35.7 % (ref 38–51)
HCT VFR BLD CALC: 35.8 % (ref 38–51)
HGB BLD-MCNC: 11.4 G/DL (ref 13–17.7)
HGB BLDA-MCNC: 11.7 G/DL (ref 14–18)
HGB BLDA-MCNC: 11.7 G/DL (ref 14–18)
HYPOCHROMIA BLD QL: NORMAL
IMM GRANULOCYTES # BLD AUTO: 0.12 10*3/MM3 (ref 0–0.05)
IMM GRANULOCYTES NFR BLD AUTO: 1.5 % (ref 0–0.5)
INHALED O2 CONCENTRATION: 32 %
INHALED O2 CONCENTRATION: 32 %
LYMPHOCYTES # BLD AUTO: 1.13 10*3/MM3 (ref 0.7–3.1)
LYMPHOCYTES NFR BLD AUTO: 13.8 % (ref 19.6–45.3)
Lab: ABNORMAL
MCH RBC QN AUTO: 24.7 PG (ref 26.6–33)
MCHC RBC AUTO-ENTMCNC: 28.2 G/DL (ref 31.5–35.7)
MCV RBC AUTO: 87.4 FL (ref 79–97)
METHGB BLD QL: 0.3 % (ref 0–3)
METHGB BLD QL: 0.5 % (ref 0–3)
MODALITY: ABNORMAL
MODALITY: ABNORMAL
MONOCYTES # BLD AUTO: 0.78 10*3/MM3 (ref 0.1–0.9)
MONOCYTES NFR BLD AUTO: 9.5 % (ref 5–12)
NEUTROPHILS NFR BLD AUTO: 6.12 10*3/MM3 (ref 1.7–7)
NEUTROPHILS NFR BLD AUTO: 74.6 % (ref 42.7–76)
NOTE: ABNORMAL
NOTE: ABNORMAL
NOTIFIED BY: ABNORMAL
NOTIFIED BY: ABNORMAL
NOTIFIED WHO: ABNORMAL
NOTIFIED WHO: ABNORMAL
NRBC BLD AUTO-RTO: 0 /100 WBC (ref 0–0.2)
OXYHGB MFR BLDV: 94.2 % (ref 94–99)
OXYHGB MFR BLDV: 95.5 % (ref 94–99)
PCO2 BLDA: 77.4 MM HG (ref 35–45)
PCO2 BLDA: 81.4 MM HG (ref 35–45)
PCO2 TEMP ADJ BLD: ABNORMAL MM[HG]
PCO2 TEMP ADJ BLD: ABNORMAL MM[HG]
PH BLDA: 7.33 PH UNITS (ref 7.35–7.45)
PH BLDA: 7.37 PH UNITS (ref 7.35–7.45)
PH, TEMP CORRECTED: ABNORMAL
PH, TEMP CORRECTED: ABNORMAL
PLAT MORPH BLD: NORMAL
PLATELET # BLD AUTO: 223 10*3/MM3 (ref 140–450)
PMV BLD AUTO: 9.7 FL (ref 6–12)
PO2 BLDA: 82.1 MM HG (ref 83–108)
PO2 BLDA: 93.9 MM HG (ref 83–108)
PO2 TEMP ADJ BLD: ABNORMAL MM[HG]
PO2 TEMP ADJ BLD: ABNORMAL MM[HG]
POTASSIUM SERPL-SCNC: 4.4 MMOL/L (ref 3.5–5.2)
RBC # BLD AUTO: 4.62 10*6/MM3 (ref 4.14–5.8)
SAO2 % BLDCOA: 96.1 % (ref 94–99)
SAO2 % BLDCOA: 97.4 % (ref 94–99)
SODIUM SERPL-SCNC: 143 MMOL/L (ref 136–145)
VENTILATOR MODE: ABNORMAL
VENTILATOR MODE: ABNORMAL
WBC # BLD AUTO: 8.2 10*3/MM3 (ref 3.4–10.8)

## 2021-08-05 PROCEDURE — 94799 UNLISTED PULMONARY SVC/PX: CPT

## 2021-08-05 PROCEDURE — 80048 BASIC METABOLIC PNL TOTAL CA: CPT | Performed by: INTERNAL MEDICINE

## 2021-08-05 PROCEDURE — 99232 SBSQ HOSP IP/OBS MODERATE 35: CPT | Performed by: INTERNAL MEDICINE

## 2021-08-05 PROCEDURE — 25010000002 PIPERACILLIN SOD-TAZOBACTAM PER 1 G: Performed by: INTERNAL MEDICINE

## 2021-08-05 PROCEDURE — 97535 SELF CARE MNGMENT TRAINING: CPT

## 2021-08-05 PROCEDURE — 63710000001 INSULIN ASPART PER 5 UNITS: Performed by: INTERNAL MEDICINE

## 2021-08-05 PROCEDURE — 85007 BL SMEAR W/DIFF WBC COUNT: CPT | Performed by: INTERNAL MEDICINE

## 2021-08-05 PROCEDURE — 82375 ASSAY CARBOXYHB QUANT: CPT

## 2021-08-05 PROCEDURE — 94660 CPAP INITIATION&MGMT: CPT

## 2021-08-05 PROCEDURE — 36600 WITHDRAWAL OF ARTERIAL BLOOD: CPT

## 2021-08-05 PROCEDURE — 63710000001 PREDNISONE PER 1 MG: Performed by: INTERNAL MEDICINE

## 2021-08-05 PROCEDURE — 97110 THERAPEUTIC EXERCISES: CPT

## 2021-08-05 PROCEDURE — 85025 COMPLETE CBC W/AUTO DIFF WBC: CPT | Performed by: INTERNAL MEDICINE

## 2021-08-05 PROCEDURE — 82805 BLOOD GASES W/O2 SATURATION: CPT

## 2021-08-05 PROCEDURE — 63710000001 INSULIN DETEMIR PER 5 UNITS: Performed by: INTERNAL MEDICINE

## 2021-08-05 PROCEDURE — 94726 PLETHYSMOGRAPHY LUNG VOLUMES: CPT

## 2021-08-05 PROCEDURE — 83050 HGB METHEMOGLOBIN QUAN: CPT

## 2021-08-05 PROCEDURE — 82962 GLUCOSE BLOOD TEST: CPT

## 2021-08-05 PROCEDURE — 97530 THERAPEUTIC ACTIVITIES: CPT

## 2021-08-05 RX ORDER — BUMETANIDE 0.25 MG/ML
2 INJECTION INTRAMUSCULAR; INTRAVENOUS ONCE
Status: COMPLETED | OUTPATIENT
Start: 2021-08-05 | End: 2021-08-05

## 2021-08-05 RX ORDER — AMOXICILLIN AND CLAVULANATE POTASSIUM 875; 125 MG/1; MG/1
1 TABLET, FILM COATED ORAL EVERY 12 HOURS SCHEDULED
Status: COMPLETED | OUTPATIENT
Start: 2021-08-05 | End: 2021-08-08

## 2021-08-05 RX ADMIN — ATORVASTATIN CALCIUM 20 MG: 20 TABLET, FILM COATED ORAL at 08:42

## 2021-08-05 RX ADMIN — AMOXICILLIN AND CLAVULANATE POTASSIUM 1 TABLET: 875; 125 TABLET, FILM COATED ORAL at 20:50

## 2021-08-05 RX ADMIN — GUAIFENESIN 1200 MG: 600 TABLET, EXTENDED RELEASE ORAL at 20:02

## 2021-08-05 RX ADMIN — INSULIN ASPART 20 UNITS: 100 INJECTION, SOLUTION INTRAVENOUS; SUBCUTANEOUS at 17:16

## 2021-08-05 RX ADMIN — PIPERACILLIN SODIUM AND TAZOBACTAM SODIUM 3.38 G: 3; .375 INJECTION, POWDER, LYOPHILIZED, FOR SOLUTION INTRAVENOUS at 14:14

## 2021-08-05 RX ADMIN — INSULIN ASPART 2 UNITS: 100 INJECTION, SOLUTION INTRAVENOUS; SUBCUTANEOUS at 20:50

## 2021-08-05 RX ADMIN — DOCUSATE SODIUM 100 MG: 100 CAPSULE, LIQUID FILLED ORAL at 20:02

## 2021-08-05 RX ADMIN — POLYETHYLENE GLYCOL (3350) 17 G: 17 POWDER, FOR SOLUTION ORAL at 08:44

## 2021-08-05 RX ADMIN — CARVEDILOL 25 MG: 25 TABLET, FILM COATED ORAL at 17:16

## 2021-08-05 RX ADMIN — LISINOPRIL 2.5 MG: 2.5 TABLET ORAL at 08:42

## 2021-08-05 RX ADMIN — DOCUSATE SODIUM 100 MG: 100 CAPSULE, LIQUID FILLED ORAL at 08:42

## 2021-08-05 RX ADMIN — INSULIN ASPART 20 UNITS: 100 INJECTION, SOLUTION INTRAVENOUS; SUBCUTANEOUS at 08:41

## 2021-08-05 RX ADMIN — IPRATROPIUM BROMIDE AND ALBUTEROL SULFATE 3 ML: .5; 3 SOLUTION RESPIRATORY (INHALATION) at 00:20

## 2021-08-05 RX ADMIN — HYDROCODONE BITARTRATE AND ACETAMINOPHEN 1 TABLET: 7.5; 325 TABLET ORAL at 08:41

## 2021-08-05 RX ADMIN — INSULIN ASPART 2 UNITS: 100 INJECTION, SOLUTION INTRAVENOUS; SUBCUTANEOUS at 17:16

## 2021-08-05 RX ADMIN — POTASSIUM CHLORIDE 10 MEQ: 750 TABLET, FILM COATED, EXTENDED RELEASE ORAL at 08:42

## 2021-08-05 RX ADMIN — TAMSULOSIN HYDROCHLORIDE 0.4 MG: 0.4 CAPSULE ORAL at 20:02

## 2021-08-05 RX ADMIN — GABAPENTIN 600 MG: 300 CAPSULE ORAL at 08:41

## 2021-08-05 RX ADMIN — BUDESONIDE AND FORMOTEROL FUMARATE DIHYDRATE 2 PUFF: 160; 4.5 AEROSOL RESPIRATORY (INHALATION) at 07:29

## 2021-08-05 RX ADMIN — SODIUM CHLORIDE, PRESERVATIVE FREE 10 ML: 5 INJECTION INTRAVENOUS at 20:04

## 2021-08-05 RX ADMIN — INSULIN DETEMIR 45 UNITS: 100 INJECTION, SOLUTION SUBCUTANEOUS at 22:10

## 2021-08-05 RX ADMIN — PANTOPRAZOLE SODIUM 40 MG: 40 TABLET, DELAYED RELEASE ORAL at 08:42

## 2021-08-05 RX ADMIN — IPRATROPIUM BROMIDE AND ALBUTEROL SULFATE 3 ML: .5; 3 SOLUTION RESPIRATORY (INHALATION) at 18:40

## 2021-08-05 RX ADMIN — DOCUSATE SODIUM 50 MG AND SENNOSIDES 8.6 MG 2 TABLET: 8.6; 5 TABLET, FILM COATED ORAL at 08:42

## 2021-08-05 RX ADMIN — PIPERACILLIN SODIUM AND TAZOBACTAM SODIUM 3.38 G: 3; .375 INJECTION, POWDER, LYOPHILIZED, FOR SOLUTION INTRAVENOUS at 06:08

## 2021-08-05 RX ADMIN — PREDNISONE 40 MG: 20 TABLET ORAL at 08:42

## 2021-08-05 RX ADMIN — IPRATROPIUM BROMIDE AND ALBUTEROL SULFATE 3 ML: .5; 3 SOLUTION RESPIRATORY (INHALATION) at 12:39

## 2021-08-05 RX ADMIN — BUDESONIDE AND FORMOTEROL FUMARATE DIHYDRATE 2 PUFF: 160; 4.5 AEROSOL RESPIRATORY (INHALATION) at 18:40

## 2021-08-05 RX ADMIN — EMPAGLIFLOZIN 20 MG: 10 TABLET, FILM COATED ORAL at 10:44

## 2021-08-05 RX ADMIN — BUMETANIDE 2 MG: 1 TABLET ORAL at 08:42

## 2021-08-05 RX ADMIN — IPRATROPIUM BROMIDE AND ALBUTEROL SULFATE 3 ML: .5; 3 SOLUTION RESPIRATORY (INHALATION) at 07:30

## 2021-08-05 RX ADMIN — BUMETANIDE 2 MG: 0.25 INJECTION, SOLUTION INTRAMUSCULAR; INTRAVENOUS at 15:06

## 2021-08-05 RX ADMIN — INSULIN ASPART 20 UNITS: 100 INJECTION, SOLUTION INTRAVENOUS; SUBCUTANEOUS at 12:04

## 2021-08-05 RX ADMIN — SODIUM CHLORIDE, PRESERVATIVE FREE 10 ML: 5 INJECTION INTRAVENOUS at 08:44

## 2021-08-05 RX ADMIN — APIXABAN 5 MG: 5 TABLET, FILM COATED ORAL at 08:42

## 2021-08-05 RX ADMIN — APIXABAN 5 MG: 5 TABLET, FILM COATED ORAL at 20:02

## 2021-08-05 RX ADMIN — CARVEDILOL 25 MG: 25 TABLET, FILM COATED ORAL at 08:42

## 2021-08-05 RX ADMIN — DOCUSATE SODIUM 50 MG AND SENNOSIDES 8.6 MG 2 TABLET: 8.6; 5 TABLET, FILM COATED ORAL at 20:02

## 2021-08-05 RX ADMIN — HYDROCODONE BITARTRATE AND ACETAMINOPHEN 1 TABLET: 7.5; 325 TABLET ORAL at 20:02

## 2021-08-05 RX ADMIN — FLUTICASONE PROPIONATE 2 SPRAY: 50 SPRAY, METERED NASAL at 08:41

## 2021-08-05 RX ADMIN — GABAPENTIN 600 MG: 300 CAPSULE ORAL at 20:02

## 2021-08-05 RX ADMIN — GUAIFENESIN 1200 MG: 600 TABLET, EXTENDED RELEASE ORAL at 08:42

## 2021-08-05 NOTE — PROGRESS NOTES
ReDS Value     Date: 08/05/21     ReDS Value: 40    36-41 Possible Hypervolemic Status        Hellen Loving, RRT

## 2021-08-05 NOTE — THERAPY TREATMENT NOTE
Acute Care - Physical Therapy Treatment Note   George     Patient Name: Иван Miller  : 1955  MRN: [id number]  Today's Date: 2021   Onset of Illness/Injury or Date of Surgery: 21 (admit date)  Visit Dx:     ICD-10-CM ICD-9-CM   1. Pneumonia of both lungs due to infectious organism, unspecified part of lung  J18.9 483.8   2. Acute on chronic respiratory failure with hypoxia and hypercapnia (CMS/HCC)  J96.21 518.84    J96.22 786.09     799.02     Patient Active Problem List   Diagnosis   • Acute respiratory failure with hypoxemia (CMS/[facility])   • Coronary artery disease status post multiple stents   • Non-ischemic cardiomyopathy (CMS/HCC LV ejection fraction 40 to 45%   • Chronic systolic congestive heart failure (CMS/[facility])   • Essential hypertension   • Hyperlipidemia LDL goal <70   • Insulin dependent diabetes mellitus   • Respiratory failure (CMS/[facility])   • Finger infection   • Pneumonia of both lungs due to infectious organism     Past Medical History:   Diagnosis Date   • Atrial fibrillation (CMS/[facility])    • COPD (chronic obstructive pulmonary disease) (CMS/[facility])    • Diabetes mellitus (CMS/[facility])    • GERD (gastroesophageal reflux disease)    • Hyperlipidemia    • Hypertension    • Myocardial infarction (CMS/[facility])    • Neuropathy      Past Surgical History:   Procedure Laterality Date   • ABDOMINAL SURGERY     • APPENDECTOMY     • CARDIAC CATHETERIZATION     • CORONARY STENT PLACEMENT          PT Assessment (last 12 hours)      PT Evaluation and Treatment     Row Name 21 1277          Physical Therapy Time and Intention    Subjective Information  no complaints  -RF     Document Type  therapy note (daily note)  -RF     Mode of Treatment  physical therapy  -RF     Patient Effort  good  -RF     Comment  Pt supine in bed in no apparent distress; agreeable to treatment session.  -RF     Row Name 21 6021          General Information    Patient Profile Reviewed  yes  -RF     Equipment Currently Used  at Home  wheelchair;oxygen;lift device;walker, rolling  -RF     Existing Precautions/Restrictions  fall  -RF     Row Name 08/05/21 1550          Cognition    Affect/Mental Status (Cognitive)  WFL  -RF     Orientation Status (Cognition)  oriented x 3  -RF     Follows Commands (Cognition)  WFL  -     Row Name 08/05/21 1550          Bed Mobility    Bed Mobility  rolling left;rolling right  -RF     Rolling Left Broussard (Bed Mobility)  maximum assist (25% patient effort)  -RF     Rolling Right Broussard (Bed Mobility)  maximum assist (25% patient effort)  -RF     Bed Mobility, Safety Issues  decreased use of arms for pushing/pulling;decreased use of legs for bridging/pushing;impaired trunk control for bed mobility  -RF     Assistive Device (Bed Mobility)  bed rails;draw sheet  -     Row Name 08/05/21 1550          Motor Skills    Therapeutic Exercise  hip;knee;ankle  -     Row Name 08/05/21 1550          Hip (Therapeutic Exercise)    Hip (Therapeutic Exercise)  strengthening exercise;AAROM (active assistive range of motion)  -RF     Hip AAROM (Therapeutic Exercise)  bilateral;flexion;extension;aBduction;aDduction;external rotation;internal rotation;supine;2 sets;10 repetitions  -     Row Name 08/05/21 1550          Knee (Therapeutic Exercise)    Knee (Therapeutic Exercise)  strengthening exercise;AAROM (active assistive range of motion)  -RF     Knee AAROM (Therapeutic Exercise)  bilateral;flexion;extension;supine;2 sets;10 repetitions  -     Row Name 08/05/21 1550          Ankle (Therapeutic Exercise)    Ankle (Therapeutic Exercise)  strengthening exercise;AAROM (active assistive range of motion)  -RF     Ankle AAROM (Therapeutic Exercise)  bilateral;dorsiflexion;plantarflexion;supine;2 sets;10 repetitions  -     Row Name 08/05/21 1550          Coping    Observed Emotional State  calm;cooperative  -RF     Verbalized Emotional State  acceptance  -     Row Name 08/05/21 1550          Positioning and  Restraints    Pre-Treatment Position  in bed  -RF     Post Treatment Position  bed  -RF     In Bed  supine;call light within reach;encouraged to call for assist;exit alarm on  -RF     Row Name 08/05/21 1550          Therapy Assessment/Plan (PT)    Rehab Potential (PT)  good, to achieve stated therapy goals  -RF     Criteria for Skilled Interventions Met (PT)  yes;skilled treatment is necessary  -RF     Row Name 08/05/21 1550          Progress Summary (PT)    Daily Progress Summary (PT)  Patient continues to demonstrate poor functional mobility and decreased LE neuromuscular activation. Significant BLE edema noted. Patient educated to perform HEP frequently for continued improvements.   -RF       User Key  (r) = Recorded By, (t) = Taken By, (c) = Cosigned By    Initials Name Provider Type    RF Libertad Ruiz, PTA Physical Therapy Assistant        Physical Therapy Education                 Title: PT OT SLP Therapies (Done)     Topic: Physical Therapy (Done)     Point: Mobility training (Done)     Learning Progress Summary           Patient Acceptance, E,TB, VU by RF at 8/5/2021 1555    Acceptance, E,TB, VU by CT at 8/3/2021 1444    Acceptance, E,TB, VU by CT at 8/2/2021 1122                   Point: Home exercise program (Done)     Learning Progress Summary           Patient Acceptance, E,TB, VU by RF at 8/5/2021 1555    Acceptance, E,TB, VU by CT at 8/3/2021 1444    Acceptance, E,TB, VU by CT at 8/2/2021 1122                   Point: Body mechanics (Done)     Learning Progress Summary           Patient Acceptance, E,TB, VU by RF at 8/5/2021 1555    Acceptance, E,TB, VU by CT at 8/3/2021 1444    Acceptance, E,TB, VU by CT at 8/2/2021 1122                   Point: Precautions (Done)     Learning Progress Summary           Patient Acceptance, E,TB, VU by RF at 8/5/2021 1555    Acceptance, E,TB, VU by CT at 8/3/2021 1444    Acceptance, E,TB, VU by CT at 8/2/2021 1122                               User Key      Initials Effective Dates Name Provider Type Discipline    CT 06/16/21 -  Joceline Trejo, PT Physical Therapist PT    RF 06/16/21 -  Libertad Ruiz PTA Physical Therapy Assistant PT              PT Recommendation and Plan  Anticipated Discharge Disposition (PT):  (tbd)  Therapy Frequency (PT): 2 times/wk (2-5 times/wk )  Progress Summary (PT)  Daily Progress Summary (PT): Patient continues to demonstrate poor functional mobility and decreased LE neuromuscular activation. Significant BLE edema noted. Patient educated to perform HEP frequently for continued improvements.        Time Calculation:   PT Charges     Row Name 08/05/21 1555             Time Calculation    PT Received On  08/05/21  -RF      PT Goal Re-Cert Due Date  08/16/21  -RF         Time Calculation- PT    Total Timed Code Minutes- PT  30 minute(s)  -RF        User Key  (r) = Recorded By, (t) = Taken By, (c) = Cosigned By    Initials Name Provider Type    RF Libertad Ruiz PTA Physical Therapy Assistant        Therapy Charges for Today     Code Description Service Date Service Provider Modifiers Qty    98940955569 HC PT THER PROC EA 15 MIN 8/5/2021 Libertad Ruiz PTA GP 1    33033232165 HC PT THERAPEUTIC ACT EA 15 MIN 8/5/2021 Libertad Ruiz PTA GP 1               Libertad Ruiz PTA  8/5/2021

## 2021-08-05 NOTE — PROGRESS NOTES
Subjective     History:   Иван Miller is a 65 y.o. male admitted on 7/31/2021 secondary to Pneumonia of both lungs due to infectious organism     Procedures: None    CC: Follow up pneumonia, resp failure    Patient seen and examined with MARCELO Rangel. Awake and alert. States he feels improved today with improvement in his cough and chest congestion. Denies CP. No reported nausea or vomiting. Brief episode of confusion overnight with hypercapnia improving with BiPAP.     History taken from: patient, chart, and RN.      Objective     Vital Signs  Temp:  [97.6 °F (36.4 °C)-98.5 °F (36.9 °C)] 98.5 °F (36.9 °C)  Heart Rate:  [55-84] 84  Resp:  [18-23] 18  BP: (117-169)/(60-97) 169/97    Intake/Output Summary (Last 24 hours) at 8/5/2021 1949  Last data filed at 8/5/2021 1823  Gross per 24 hour   Intake 1747.2 ml   Output 2400 ml   Net -652.8 ml         Physical Exam:  General:    Awake, alert, in no acute distress   Heart:      Normal S1 and S2. Regular rate and rhythm. No significant murmur, rubs or gallops appreciated.   Lungs:     Respirations regular, even and unlabored. Scattered rales. No wheezes appreciated today.  No rhonchi.   Abdomen:   Soft and nontender. No guarding, rebound tenderness or  organomegaly noted. Bowel sounds present x 4.   Extremities:  1-2+ bilateral lower extremity edema. Moves UE and LE equally B/L.     Results Review:    Results from last 7 days   Lab Units 08/05/21  0102 08/04/21 0143 08/03/21  0033 08/02/21  0032 08/01/21  0805 08/01/21  0030   WBC 10*3/mm3 8.20 8.48 8.53 6.90 8.25 8.12   HEMOGLOBIN g/dL 11.4* 12.0* 11.4* 11.9* 12.4* 12.2*   PLATELETS 10*3/mm3 223 246 224 255 247 231     Results from last 7 days   Lab Units 08/05/21  0102 08/04/21  0143 08/03/21  0033 08/02/21  0032 08/01/21  0805 08/01/21  0030   SODIUM mmol/L 143 141 142 141 142 144   POTASSIUM mmol/L 4.4 4.0 3.9 3.8 3.9 4.1   CHLORIDE mmol/L 100 100 99 98 98 99   CO2 mmol/L 38.2* 35.1* 37.2* 31.8* 34.4* 37.4*   BUN  mg/dL 29* 36* 37* 31* 26* 22   CREATININE mg/dL 0.86 0.94 1.13 0.87 0.77 0.82   CALCIUM mg/dL 8.9 8.7 8.7 9.3 9.3 9.3   GLUCOSE mg/dL 190* 140* 176* 233* 238* 271*     Results from last 7 days   Lab Units 08/03/21  0033 08/01/21  0805 08/01/21  0030   BILIRUBIN mg/dL 0.2 0.3 0.2   ALK PHOS U/L 53 72 76   AST (SGOT) U/L 22 10 13   ALT (SGPT) U/L 34 15 18             Results from last 7 days   Lab Units 08/01/21  0310 08/01/21  0030   TROPONIN T ng/mL <0.010 0.013       Imaging Results (Last 24 Hours)     ** No results found for the last 24 hours. **            Medications:  amoxicillin-clavulanate, 1 tablet, Oral, Q12H  apixaban, 5 mg, Oral, Q12H  atorvastatin, 20 mg, Oral, Daily  budesonide-formoterol, 2 puff, Inhalation, BID - RT  bumetanide, 2 mg, Oral, Daily  carvedilol, 25 mg, Oral, BID With Meals  docusate sodium, 100 mg, Oral, BID  empagliflozin, 20 mg, Oral, Daily  fluticasone, 2 spray, Nasal, Daily  gabapentin, 600 mg, Oral, Q12H  guaiFENesin, 1,200 mg, Oral, Q12H  HYDROcodone-acetaminophen, 1 tablet, Oral, BID  insulin aspart, 0-9 Units, Subcutaneous, 4x Daily AC & at Bedtime  insulin aspart, 20 Units, Subcutaneous, TID With Meals  insulin detemir, 45 Units, Subcutaneous, Nightly  ipratropium-albuterol, 3 mL, Nebulization, 4x Daily - RT  lidocaine, 1 patch, Transdermal, Nightly  lisinopril, 2.5 mg, Oral, Q24H  pantoprazole, 40 mg, Oral, Daily  polyethylene glycol, 17 g, Oral, Daily  potassium chloride, 10 mEq, Oral, Daily  sennosides-docusate, 2 tablet, Oral, BID  sodium chloride, 10 mL, Intravenous, Q12H  tamsulosin, 0.4 mg, Oral, Nightly               Assessment/Plan   Bilateral pneumonia: CT chest reveals LUZ ELENA and RLL consolidation with concern for aspiration. Blood cultures with NGTD. Sputum culture revealing normal respiratory crystal. No evidence of aspiration on swallow eval. Transition from Zosyn to Augmentin. Cont nebs.     Acute exacerbation of COPD: Completed course of Prednisone. Cont Symbicort and  nebs.     Acute hypercapnic on chronic hypoxic respiratory failure: Likely 2/2 above. Improved with BiPAP. Cont treatment as outlined above. Cont supplemental O2. Cont to encourage compliance with BiPAP.     Essential HTN, uncontrolled upon admission: Cont home Coreg. Added lisinopril. BP later borderline low intermittently and decreased lisinopril. BP overall stable today. Cont PRN IV hydralazine.     DM II, inulin dependent with hyperglycemia: Improved with restarting home insulin. Decreased meal time insulin 2/2 subsequent hypoglycemia. Stable today. Cont SSI with Accuchecks.     Recent right ankle avulsion fracture with reported hx of paraplegia: PT/OT. Supportive treatment.     Hx of chronic thoracic osteomyelitis: Cont to monitor.     Hx of Afib: Currently in sinus rhythm. Cont home Coreg. Cont Eliquis for stroke prevention. Monitor on telemetry.     Lower extremity edema: Normal EF on recent echo. ReDs Vest reading remains elevated but slightly improved. Order additional Bumex today. Order 1500 fluid restriction.     Constipation: Cont bowel regimen.     DVT PPX: Eliquis.     Disposition:  working to arrange SNF placement.       Mandeep Dunlap DO  08/05/21  19:49 EDT

## 2021-08-05 NOTE — THERAPY TREATMENT NOTE
Acute Care - Occupational Therapy Treatment Note  BAKARI Vazquez     Patient Name: Иван Miller  : 1955  MRN: [id number]  Today's Date: 2021  Onset of Illness/Injury or Date of Surgery: 21 (admit date)     Referring Physician: Sandra    Admit Date: 2021       ICD-10-CM ICD-9-CM   1. Pneumonia of both lungs due to infectious organism, unspecified part of lung  J18.9 483.8   2. Acute on chronic respiratory failure with hypoxia and hypercapnia (CMS/HCC)  J96.21 518.84    J96.22 786.09     799.02     Patient Active Problem List   Diagnosis   • Acute respiratory failure with hypoxemia (CMS/HCC)   • Coronary artery disease status post multiple stents   • Non-ischemic cardiomyopathy (CMS/HCC LV ejection fraction 40 to 45%   • Chronic systolic congestive heart failure (CMS/HCC)   • Essential hypertension   • Hyperlipidemia LDL goal <70   • Insulin dependent diabetes mellitus   • Respiratory failure (CMS/HCC)   • Finger infection   • Pneumonia of both lungs due to infectious organism     Past Medical History:   Diagnosis Date   • Atrial fibrillation (CMS/HCC)    • COPD (chronic obstructive pulmonary disease) (CMS/HCC)    • Diabetes mellitus (CMS/HCC)    • GERD (gastroesophageal reflux disease)    • Hyperlipidemia    • Hypertension    • Myocardial infarction (CMS/HCC)    • Neuropathy      Past Surgical History:   Procedure Laterality Date   • ABDOMINAL SURGERY     • APPENDECTOMY     • CARDIAC CATHETERIZATION     • CORONARY STENT PLACEMENT              OT ASSESSMENT FLOWSHEET (last 12 hours)      OT Evaluation and Treatment     Row Name 21 1426                   OT Time and Intention    Subjective Information  complains of;fatigue;weakness  -LM        Document Type  therapy note (daily note)  -LM        Mode of Treatment  occupational therapy  -LM        Patient Effort  adequate  -LM        Comment  Patient seen this date for adl retraining/education.  Patient continues to require max/total assist with  badl and fxl mobility.  Poor activity tolerance impairing fxl performance.  Light bue arom and adl tasks only while in bed at this time.  Frequent rest breaks.  -LM           General Information    Existing Precautions/Restrictions  fall;oxygen therapy device and L/min  -LM           Cognition    Affect/Mental Status (Cognitive)  WFL  -LM        Orientation Status (Cognition)  oriented x 3  -LM           Positioning and Restraints    Post Treatment Position  bed  -LM        In Bed  call light within reach;encouraged to call for assist  -LM          User Key  (r) = Recorded By, (t) = Taken By, (c) = Cosigned By    Initials Name Effective Dates    LM Monica Serrano OT 06/16/21 -                  OT Recommendation and Plan              Time Calculation:     Therapy Charges for Today     Code Description Service Date Service Provider Modifiers Qty    26752905436  OT SELF CARE/MGMT/TRAIN EA 15 MIN 8/5/2021 Monica Serrano OT GO 1               Monica Serrano OT  8/5/2021

## 2021-08-05 NOTE — DISCHARGE PLACEMENT REQUEST
"Иван Blanco (65 y.o. Male)     Date of Birth Social Security Number Address Home Phone MRN    1955  [address]  [place] [address] [phone] [id number]    Buddhist Marital Status          None        Admission Date Admission Type Admitting Provider Attending Provider Department, Room/Bed    7/31/21 Emergency Vivienne Flores DO Troxell, Christopher A, DO [address], 3348/2S    Discharge Date Discharge Disposition Discharge Destination                       Attending Provider: Mandeep Dunlap DO    Allergies: Codeine, Ciprofloxacin    Isolation: None   Infection: None   Code Status: CPR    Ht: 175.3 cm (69\")   Wt: 153 kg (336 lb 4.8 oz)    Admission Cmt: None   Principal Problem: Pneumonia of both lungs due to infectious organism [J18.9]                 Active Insurance as of 7/31/2021     Primary Coverage     Payor Plan Insurance Group Employer/Plan Group    [facility] MEDICARE REPLACEMENT [facility] DUAL COMPLETE MEDICARE REPLACEMENT [id number]     Payor Plan Address Payor Plan Phone Number Payor Plan Fax Number Effective Dates    [address] [phone]  1/1/2021 - None Entered    [facility] [id number]       Subscriber Name Subscriber Birth Date Member ID       ИВАН BLANCO 1955 [id number]           Secondary Coverage     Payor Plan Insurance Group Employer/Plan Group    [place]      Payor Plan Address Payor Plan Phone Number Payor Plan Fax Number Effective Dates    [address] [phone]  7/31/2021 - None Entered    [facility] [id number]       Subscriber Name Subscriber Birth Date Member ID       ИВАН BLANCO 1955 [id number]                 Emergency Contacts      (Rel.) Home Phone Work Phone Mobile Phone    PaulLamar (Spouse) [phone] -- [phone]    GARCIA MALDONADO (Daughter) [phone] -- [phone]               History & Physical      Vivienne Flores DO at 08/01/21 " 0622          Hospitalist History and Physical    Patient Identification:  Name: Иван Miller  Age/Sex: 65 y.o. male  :  1955  MRN: [id number]        Admit Date: 2021   Primary Care Physician: Rosi Thomas APRN    Chief Complaint   Patient presents with   • Shortness of Breath   • Edema       History of Present Illness  Patient is a 65 y.o. male presents with the following: Cough, Shortness of breath    The patient is a 65-year-old male with past medical history significant for chronic respiratory failure currently on 3 L/min nasal cannula, COPD, morbid obesity, paraplegia after previous traumatic injury, diabetes mellitus and hypertension who presents to the emergency department complaining of progressive shortness of breath and cough.    The patient is currently on BiPAP also is somewhat difficult to obtain a complete history, however, he states that for the past 5 days or so he has had a productive cough with yellow sputum production and worsening shortness of air.  The patient reports chills but denies fever.  He reports nausea but denies vomiting.  He does report occasional dysphagia and states that he did have an possible aspiration event a few days ago but cannot at this time give me specifics regarding that.  The patient states that he does use CPAP at home and is currently using 3 L/min nasal cannula.  Patient states that yesterday he had had chest discomfort that was localized to the mid and left chest area.  He states that it was more of a soreness and did not radiate to the shoulders or his jaw area.  Patient is currently chest pain-free.    Patient's most recent hospitalization here was from 2021 through 2021 where the patient was treated for acute on chronic hypoxic and hypercapnic respiratory failure, diuretic induced NICKY and history of toxic encephalopathy in the setting of methamphetamine abuse and lethargy thought to be due to opioids and gabapentin. Patient also with a  "right ankle fracture at that time.  There was also question of chronic osteomyelitis; MRIs of his back were obtained at that time and did not reveal evidence of acute inflammatory change.  There was mainly question of osteomyelitis in the thoracic spine.  During that hospitalization, the case was discussed with neurosurgery in [place] and he felt that patient most likely had \"burnt out osteomyelitis\" and did not feel that it was acute.    CT chest without contrast revealed right lower lobe pneumonia and atelectasis favored in the left base.  Patient with pulmonary arterial hypertension.  Arterial blood gas on 3 L/min nasal cannula revealed a pH of 7.299, PCO2 81.6, PO2 92.4 and bicarbonate 40.0 with an oxygen saturation of 96.9%.  proBNP was minimally elevated at 956.2.  C-RP was within normal limits at less than 0.30.  Troponin T also negative.  CMP revealed 84.9% neutrophils but white blood cell count was normal at 8.12.    Patient was placed on BiPAP in the emergency department has been admitted to the progressive care unit for further evaluation and management.    Past History:  Past Medical History:   Diagnosis Date   • Atrial fibrillation (CMS/HCC)    • COPD (chronic obstructive pulmonary disease) (CMS/HCC)    • Diabetes mellitus (CMS/HCC)    • GERD (gastroesophageal reflux disease)    • Hyperlipidemia    • Hypertension    • Myocardial infarction (CMS/HCC)    • Neuropathy      Past Surgical History:   Procedure Laterality Date   • ABDOMINAL SURGERY     • APPENDECTOMY     • CARDIAC CATHETERIZATION     • CORONARY STENT PLACEMENT       Family History   Problem Relation Age of Onset   • Hypertension Mother    • Heart attack Mother    • Hypertension Father      Social History     Tobacco Use   • Smoking status: Former Smoker     Packs/day: 2.00     Years: 30.00     Pack years: 60.00     Types: Cigarettes   • Smokeless tobacco: Current User     Types: Snuff   Substance Use Topics   • Alcohol use: No   • Drug use: " No     Medications Prior to Admission   Medication Sig Dispense Refill Last Dose   • albuterol (PROVENTIL HFA;VENTOLIN HFA) 108 (90 Base) MCG/ACT inhaler Inhale 2 puffs Every 6 (Six) Hours As Needed for Wheezing.      • apixaban (ELIQUIS) 5 MG tablet tablet Take 5 mg by mouth 2 (Two) Times a Day.      • atorvastatin (LIPITOR) 20 MG tablet Take 20 mg by mouth Daily.      • Budeson-Glycopyrrol-Formoterol (BREZTRI) 160-9-4.8 MCG/ACT aerosol inhaler Inhale 2 puffs 2 (Two) Times a Day.      • bumetanide (BUMEX) 2 MG tablet Take 1 tablet by mouth Daily for 30 days. 30 tablet 0    • clopidogrel (PLAVIX) 75 MG tablet Take 1 tablet by mouth Daily. 30 tablet     • Dapagliflozin Propanediol (Farxiga) 10 MG tablet Take 10 mg by mouth Daily.      • fluticasone (FLONASE) 50 MCG/ACT nasal spray 2 sprays into the nostril(s) as directed by provider Daily.      • gabapentin (NEURONTIN) 100 MG capsule Take 1 capsule by mouth Every 12 (Twelve) Hours for 3 days. 6 capsule 0    • hydrOXYzine (ATARAX) 25 MG tablet Take 1 tablet by mouth 3 (Three) Times a Day As Needed for Anxiety.      • insulin aspart (novoLOG) 100 UNIT/ML injection Inject 12 Units under the skin into the appropriate area as directed 3 (Three) Times a Day With Meals.  12    • insulin detemir (LEVEMIR) 100 UNIT/ML injection Inject 60 Units under the skin into the appropriate area as directed Daily.  12    • ipratropium-albuterol (DUO-NEB) 0.5-2.5 mg/3 ml nebulizer Take 3 mL by nebulization Every 4 (Four) Hours As Needed for Wheezing.      • lidocaine (LIDODERM) 5 % Place 1 patch on the skin as directed by provider Daily. Prior to [facility] [place] Admission, Patient was on:  Place on Shoulder or back and Remove & Discard patch within 12 hours or as directed by MD      • magnesium oxide (MAG-OX) 400 MG tablet Take 400 mg by mouth Daily.      • metFORMIN (GLUCOPHAGE) 1000 MG tablet Take 1,000 mg by mouth 2 (Two) Times a Day With Meals.      • metoprolol succinate XL (TOPROL-XL) 50 MG  24 hr tablet Take 3 tablets by mouth Daily.      • pantoprazole (PROTONIX) 40 MG EC tablet Take 40 mg by mouth Daily.      • sennosides-docusate (PERICOLACE) 8.6-50 MG per tablet Take 2 tablets by mouth 2 (Two) Times a Day.      • tamsulosin (FLOMAX) 0.4 MG capsule 24 hr capsule Take 1 capsule by mouth Every Night.        Allergies: Codeine and Ciprofloxacin    Review of Systems:  Review of Systems   HENT: Positive for trouble swallowing.    Respiratory: Positive for shortness of breath and wheezing.    Gastrointestinal: Positive for nausea and vomiting.   Skin: Negative for rash and wound.      Vital Signs  Temp:  [97.1 °F (36.2 °C)-98.6 °F (37 °C)] 97.1 °F (36.2 °C)  Heart Rate:  [70-88] 71  Resp:  [18-24] 21  BP: (150-190)/() 177/101  Body mass index is 47.84 kg/m².    Physical Exam:  Physical Exam  Constitutional:       General: He is not in acute distress.     Appearance: He is well-developed. He is morbidly obese.      Interventions: Face mask in place.   HENT:      Head: Normocephalic and atraumatic.   Eyes:      Conjunctiva/sclera: Conjunctivae normal.   Neck:      Trachea: No tracheal deviation.   Cardiovascular:      Rate and Rhythm: Normal rate and regular rhythm.      Pulses:           Posterior tibial pulses are 2+ on the right side and 2+ on the left side.      Heart sounds: No murmur heard.   No friction rub. No gallop.       Comments: Trace edema bilateral lower extremities  Pulmonary:      Effort: No respiratory distress.      Breath sounds: Examination of the right-lower field reveals decreased breath sounds. Examination of the left-lower field reveals decreased breath sounds. Decreased breath sounds present. No wheezing or rales.   Abdominal:      General: Bowel sounds are normal. There is no distension.      Palpations: Abdomen is soft.      Tenderness: There is no abdominal tenderness. There is no guarding.   Skin:     General: Skin is warm and dry.      Findings: No erythema or rash.    Neurological:      Mental Status: He is alert and oriented to person, place, and time.      Cranial Nerves: No cranial nerve deficit.         Results Review:    Results from last 7 days   Lab Units 08/01/21  0436   PH, ARTERIAL pH units 7.299*   PO2 ART mm Hg 92.4   PCO2, ARTERIAL mm Hg 81.6*   HCO3 ART mmol/L 40.0*       Results from last 7 days   Lab Units 08/01/21  0030   WBC 10*3/mm3 8.12   HEMOGLOBIN g/dL 12.2*   HEMATOCRIT % 43.3   PLATELETS 10*3/mm3 231     Results from last 7 days   Lab Units 08/01/21  0030   SODIUM mmol/L 144   POTASSIUM mmol/L 4.1   CHLORIDE mmol/L 99   CO2 mmol/L 37.4*   BUN mg/dL 22   CREATININE mg/dL 0.82   CALCIUM mg/dL 9.3   GLUCOSE mg/dL 271*     Results from last 7 days   Lab Units 08/01/21  0030   BILIRUBIN mg/dL 0.2   ALK PHOS U/L 76   AST (SGOT) U/L 13   ALT (SGPT) U/L 18     Results from last 7 days   Lab Units 08/01/21  0310   CRP mg/dL <0.30         Results from last 7 days   Lab Units 08/01/21  0310 08/01/21  0030   TROPONIN T ng/mL <0.010 0.013       Imaging:    I have personally reviewed the EKG; pending official cardiology interpretation, however, per my view the patient has a normal sinus rhythm with frequent PVCs;     CT chest images have been reviewed and per my view the patient does appear to have a right lower lobe pneumonia    Imaging Results (Most Recent)     Procedure Component Value Units Date/Time    CT Chest Without Contrast Diagnostic [[id number]] Collected: 08/01/21 0311     Updated: 08/01/21 0313    Narrative:      CT Chest WO    INDICATION:   Dyspnea. Chronic dyspnea.    TECHNIQUE:   CT of the thorax without IV contrast. Coronal and sagittal reconstructions were obtained.  Radiation dose reduction techniques included automated exposure control or exposure modulation based on body size. Count of known CT and cardiac nuc med studies  performed in previous 12 months: 5.     COMPARISON:   5/22/2021    FINDINGS:  There is new patchy atelectasis and  probable curvilinear atelectasis in the left lower lobe favored over pneumonia. There is new consolidation in the posterior left upper lobe suggestive of pneumonia however.  Trace pleural thickening/pleural fluid left lung base. New left-sided lower lobe and to a lesser extent upper lobe volume loss. On the right, there is new right basilar consolidation suspicious for pneumonia and a trace amount of pleural  fluid/thickening. No new suspicious pulmonary nodule. Small amount of secretions in the trachea. Correlate with potential for aspiration pneumonia. No pneumothorax.    There is no pericardial effusion. No threshold adenopathy. Normal caliber aorta and atherosclerotic change. Advanced atherosclerotic change of the coronary arteries. Evidence of pulmonary arterial hypertension.    Included upper abdomen demonstrates no acute finding. Chronic advanced degenerative or postinflammatory change in the midthoracic spine.  negative.      Impression:        1. Chronic interstitial changes with new areas of superimposed consolidation suspicious for pneumonia in the left upper lobe and right lower lobe. Atelectasis favored over pneumonia in the left base. Follow-up to clearing recommended. Consider the  possibility of aspiration event.  2. Pulmonary arterial hypertension.  3. Atherosclerotic disease.    Signer Name: Basilio Deng MD   Signed: 8/1/2021 3:11 AM   Workstation Name: [id number]    [facility] Chest 1 View [[id number]] Collected: 08/01/21 0122     Updated: 08/01/21 0124    Narrative:      CR Chest 1 Vw    INDICATION:   Chest pain.     COMPARISON:    6/7/2021    FINDINGS:  Single portable AP view(s) of the chest.  Globular cardiomegaly. Pulmonary hyperinflation. Chronic appearing atelectasis/scarring in the left base. New interstitial prominence and streaky opacities in the right lung may reflect asymmetric edema  atelectasis or pneumonia. No pneumothorax. No new effusion.       Impression:        1. Cardiomegaly with new asymmetric interstitial prominence in the right lung that is nonspecific. No pneumothorax.    Signer Name: Basilio Deng MD   Signed: 8/1/2021 1:22 AM   Workstation Name: [email or web address]-    [facility]          Assessment/Plan     -Acute hypercapnic on chronic hypoxic respiratory failure, present upon admission and secondary to right lower lobe aspiration pneumonia  -Acute COPD exacerbation  -Uncontrolled essential hypertension  -Morbid obesity  -Diabetes mellitus type 2 with hyperglycemia  -History of atrial fibrillation on chronic anticoagulation with Eliquis  -History of recent right ankle avulsion fracture  -Debility  -Reported history of paraplegia  -History of chronic thoracic osteomyelitis  -Gastroesophageal reflux disease    Patient has been admitted to the progressive care unit with aspiration precautions.  I will obtain a repeat ABG as he is currently on BiPAP.  Further management to follow review of ABG.  For now, patient has been started on intravenous Zosyn monotherapy.  I have started the patient on scheduled nebulized inhalants.  I will hold on steroid therapy as the patient is without wheezing on my exam.  I will obtain a respiratory culture.  I have consulted speech therapy for further evaluation and recommendations.    I am currently awaiting the patient's home medication list.  Will make as needed intravenous hydralazine available for now.    For now, the patient will be started on Accu-Cheks and sliding scale insulin as needed.  I have also started the patient on the hypoglycemia protocol if needed.  Plan to resume his home insulin regimen pending availability of his medication list.    I have consulted physical and occupational therapy as patient reports that he is interested in physical rehabilitation.    DVT/GI prophylaxis: Plan to resume home Eliquis/begin famotidine    Estimated Length of Stay: > 2 MNs    Patient is considered  to be high risk patient due to: Acute on chronic respiratory failure, morbid obesity, COPD, history of opiate dependency     Disposition unclear; patient may benefit from skilled nursing placement versus inpatient physical rehab prior to returning home    I discussed the patients findings and my recommendations with patient          Vivienne Flores DO  08/01/21  06:22 EDT    Electronically signed by Vivienne Flores DO at 08/01/21 0704       Intake & Output (last day)       08/04 0701 - 08/05 0700 08/05 0701 - 08/06 0700    P.O. 1560 360    Total Intake(mL/kg) 1560 (10.2) 360 (2.4)    Urine (mL/kg/hr) 2700 (0.7)     Stool 0     Total Output 2700     Net -1140 +360          Urine Unmeasured Occurrence 5 x     Stool Unmeasured Occurrence 2 x           Current Facility-Administered Medications   Medication Dose Route Frequency Provider Last Rate Last Admin   • acetaminophen (TYLENOL) tablet 650 mg  650 mg Oral Q8H PRN Mandeep Dunlap DO   650 mg at 08/03/21 1513   • albuterol (PROVENTIL) nebulizer solution 0.083% 2.5 mg/3mL  2.5 mg Nebulization Q6H PRN Mandeep Dunlap, DO       • apixaban (ELIQUIS) tablet 5 mg  5 mg Oral Q12H Mandeep Dunlap, DO   5 mg at 08/05/21 0842   • atorvastatin (LIPITOR) tablet 20 mg  20 mg Oral Daily Mandeep Dunlap, DO   20 mg at 08/05/21 0842   • budesonide-formoterol (SYMBICORT) 160-4.5 MCG/ACT inhaler 2 puff  2 puff Inhalation BID - RT Mandeep Dunlap, DO   2 puff at 08/05/21 0729   • bumetanide (BUMEX) tablet 2 mg  2 mg Oral Daily Mandeep Dunlap, DO   2 mg at 08/05/21 0842   • carvedilol (COREG) tablet 25 mg  25 mg Oral BID With Meals Mandeep Dunlap, DO   25 mg at 08/05/21 0842   • dextrose (D50W) 25 g/ 50mL Intravenous Solution 25 g  25 g Intravenous Q15 Min PRN Mandeep Dunlap, DO       • dextrose (GLUTOSE) oral gel 15 g  15 g Oral Q15 Min PRN Mandeep Dunlap, DO       • docusate sodium (COLACE) capsule 100 mg   100 mg Oral BID Mandeep Dunlap DO   100 mg at 08/05/21 0842   • empagliflozin (JARDIANCE) tablet 20 mg  20 mg Oral Daily Mandeep Dunlap DO   20 mg at 08/05/21 1044   • fluticasone (FLONASE) 50 MCG/ACT nasal spray 2 spray  2 spray Nasal Daily Mandeep Dunlap DO   2 spray at 08/05/21 0841   • gabapentin (NEURONTIN) capsule 600 mg  600 mg Oral Q12H Mandeep Dunlap DO   600 mg at 08/05/21 0841   • glucagon (human recombinant) (GLUCAGEN DIAGNOSTIC) injection 1 mg  1 mg Subcutaneous Q15 Min PRN Mandeep Dunlap DO       • guaiFENesin (MUCINEX) 12 hr tablet 1,200 mg  1,200 mg Oral Q12H FabiMandeep eddy DO   1,200 mg at 08/05/21 0842   • hydrALAZINE (APRESOLINE) injection 10 mg  10 mg Intravenous Q6H PRN Mandeep Dunlap DO   10 mg at 08/01/21 1710   • HYDROcodone-acetaminophen (NORCO) 7.5-325 MG per tablet 1 tablet  1 tablet Oral BID Mandeep Dunlap DO   1 tablet at 08/05/21 0841   • insulin aspart (novoLOG) injection 0-9 Units  0-9 Units Subcutaneous 4x Daily AC & at Bedtime Mandeep Dunlap DO   2 Units at 08/04/21 2144   • insulin aspart (novoLOG) injection 20 Units  20 Units Subcutaneous TID With Meals Mandeep Dunlap DO   20 Units at 08/05/21 0841   • insulin detemir (LEVEMIR) injection 45 Units  45 Units Subcutaneous Nightly Mandeep Dunlap DO   45 Units at 08/04/21 2144   • ipratropium-albuterol (DUO-NEB) nebulizer solution 3 mL  3 mL Nebulization 4x Daily - RT Mandeep Dunlap DO   3 mL at 08/05/21 0730   • ipratropium-albuterol (DUO-NEB) nebulizer solution 3 mL  3 mL Nebulization Q4H PRN Mandeep Dunlap DO       • lidocaine (LIDODERM) 5 % 1 patch  1 patch Transdermal Nightly Mandeep Dunlap DO   1 patch at 08/04/21 2131   • lisinopril (PRINIVIL,ZESTRIL) tablet 2.5 mg  2.5 mg Oral Q24H Mandeep Dunlap DO   2.5 mg at 08/05/21 0842   • melatonin tablet 5 mg  5 mg Oral Nightly PRN Mandeep Dunlap  A, DO       • pantoprazole (PROTONIX) EC tablet 40 mg  40 mg Oral Daily FabiLacho byrder A, DO   40 mg at 08/05/21 0842   • piperacillin-tazobactam (ZOSYN) IVPB 3.375 g in 100 mL NS VTB  3.375 g Intravenous Q8H FabiMandeep byrd A, DO   3.375 g at 08/05/21 0608   • polyethylene glycol (MIRALAX) packet 17 g  17 g Oral Daily FabiLacho byrder A, DO   17 g at 08/05/21 0844   • potassium chloride (K-DUR,KLOR-CON) ER tablet 10 mEq  10 mEq Oral Daily FabiLacho byrder A, DO   10 mEq at 08/05/21 0842   • sennosides-docusate (PERICOLACE) 8.6-50 MG per tablet 2 tablet  2 tablet Oral BID FabiLacho byrder A, DO   2 tablet at 08/05/21 0842   • sodium chloride 0.9 % flush 10 mL  10 mL Intravenous PRN FabiLacho byrder A, DO       • sodium chloride 0.9 % flush 10 mL  10 mL Intravenous Q12H Fabi, Lachoer A, DO   10 mL at 08/05/21 0844   • sodium chloride 0.9 % flush 10 mL  10 mL Intravenous PRN Fabi, Lachoer A, DO       • sodium chloride nasal spray 2 spray  2 spray Each Nare PRN FabiMandeep byrd A, DO       • tamsulosin (FLOMAX) 24 hr capsule 0.4 mg  0.4 mg Oral Nightly FabiLacho byrder A, DO   0.4 mg at 08/04/21 2125     Lab Results (most recent)     Procedure Component Value Units Date/Time    POC Glucose Once [[id number]]  (Abnormal) Collected: 08/05/21 1047    Specimen: Blood Updated: 08/05/21 1053     Glucose 148 mg/dL      Comment: Meter: [id number] : [id number] Ashley Avilez       Blood Culture - Blood, Arm, Left [[id number]] Collected: 08/01/21 0559    Specimen: Blood from Arm, Left Updated: 08/05/21 0715     Blood Culture No growth at 4 days    POC Glucose Once [[id number]]  (Normal) Collected: 08/05/21 0634    Specimen: Blood Updated: 08/05/21 0640     Glucose 113 mg/dL      Comment: Meter: [id number] : [id number] BETY FORD       Blood Gas, Arterial With Co-Ox [[id number]]  (Abnormal) Collected: 08/05/21 0546    Specimen: Arterial Blood Updated: 08/05/21 0550     Site  Right Radial     Jayjay's Test Positive     pH, Arterial 7.368 pH units      pCO2, Arterial 77.4 mm Hg      Comment: 86 Value above critical limit        pO2, Arterial 82.1 mm Hg      HCO3, Arterial 44.5 mmol/L      Comment: 83 Value above reference range        Base Excess, Arterial 15.8 mmol/L      O2 Saturation, Arterial 96.1 %      Hemoglobin, Blood Gas 11.7 g/dL      Comment: 84 Value below reference range        Hematocrit, Blood Gas 35.8 %      Comment: 84 Value below reference range        Oxyhemoglobin 94.2 %      Methemoglobin 0.30 %      Carboxyhemoglobin 1.7 %      A-a Gradiant 50.1 mmHg      CO2 Content 46.9 mmol/L      Temperature 0.0 C      Barometric Pressure for Blood Gas 729 mmHg      Modality Nasal Cannula     FIO2 32 %      Ventilator Mode NA     Note --     Notified Who Zulma ROBERTSON     Notified By [id number]     Notified Time 08/05/2021 05:51     Collected by [id number]     Comment: Meter: [id number]     :  [id number]        pH, Temp Corrected --     pCO2, Temperature Corrected --     pO2, Temperature Corrected --    Blood Culture - Blood, Hand, Left [[id number]] Collected: 08/01/21 0511    Specimen: Blood from Hand, Left Updated: 08/05/21 0515     Blood Culture No growth at 4 days    CBC & Differential [[id number]]  (Abnormal) Collected: 08/05/21 0102    Specimen: Blood Updated: 08/05/21 0216    Narrative:      The following orders were created for panel order CBC & Differential.  Procedure                               Abnormality         Status                     ---------                               -----------         ------                     CBC Auto Differential[[id number]]        Abnormal            Final result               Scan Slide[[id number]]                                       Final result                 Please view results for these tests on the individual orders.    Scan Slide [[id number]] Collected: 08/05/21 0102    Specimen: Blood Updated: 08/05/21 0216     Anisocytosis  Slight/1+     Hypochromia Mod/2+     Platelet Morphology Normal    CBC Auto Differential [[id number]]  (Abnormal) Collected: 08/05/21 0102    Specimen: Blood Updated: 08/05/21 0216     WBC 8.20 10*3/mm3      RBC 4.62 10*6/mm3      Hemoglobin 11.4 g/dL      Hematocrit 40.4 %      MCV 87.4 fL      MCH 24.7 pg      MCHC 28.2 g/dL      RDW 16.3 %      RDW-SD 52.0 fl      MPV 9.7 fL      Platelets 223 10*3/mm3      Neutrophil % 74.6 %      Lymphocyte % 13.8 %      Monocyte % 9.5 %      Eosinophil % 0.5 %      Basophil % 0.1 %      Immature Grans % 1.5 %      Neutrophils, Absolute 6.12 10*3/mm3      Lymphocytes, Absolute 1.13 10*3/mm3      Monocytes, Absolute 0.78 10*3/mm3      Eosinophils, Absolute 0.04 10*3/mm3      Basophils, Absolute 0.01 10*3/mm3      Immature Grans, Absolute 0.12 10*3/mm3      nRBC 0.0 /100 WBC     Basic Metabolic Panel [[id number]]  (Abnormal) Collected: 08/05/21 0102    Specimen: Blood Updated: 08/05/21 0215     Glucose 190 mg/dL      BUN 29 mg/dL      Creatinine 0.86 mg/dL      Sodium 143 mmol/L      Potassium 4.4 mmol/L      Comment: Slight hemolysis detected by analyzer. Results may be affected.        Chloride 100 mmol/L      CO2 38.2 mmol/L      Calcium 8.9 mg/dL      eGFR Non African Amer 89 mL/min/1.73      BUN/Creatinine Ratio 33.7     Anion Gap 4.8 mmol/L     Narrative:      GFR Normal >60  Chronic Kidney Disease <60  Kidney Failure <15      Blood Gas, Arterial With Co-Ox [[id number]]  (Abnormal) Collected: 08/05/21 0125    Specimen: Arterial Blood Updated: 08/05/21 0130     Site Right Radial     Jayjay's Test N/A     pH, Arterial 7.327 pH units      Comment: 84 Value below reference range        pCO2, Arterial 81.4 mm Hg      Comment: 86 Value above critical limit        pO2, Arterial 93.9 mm Hg      HCO3, Arterial 42.6 mmol/L      Comment: 83 Value above reference range        Base Excess, Arterial 13.4 mmol/L      O2 Saturation, Arterial 97.4 %      Hemoglobin, Blood Gas 11.7 g/dL       Comment: 84 Value below reference range        Hematocrit, Blood Gas 35.7 %      Comment: 84 Value below reference range        Oxyhemoglobin 95.5 %      Methemoglobin 0.50 %      Carboxyhemoglobin 1.5 %      A-a Gradiant 33.8 mmHg      CO2 Content 45.1 mmol/L      Temperature 0.0 C      Barometric Pressure for Blood Gas 729 mmHg      Modality Nasal Cannula     FIO2 32 %      Ventilator Mode NA     Note --     Notified Who Zulma RN     Notified By [id number]     Notified Time 08/05/2021 01:31     Collected by [id number]     Comment: Meter: [id number]     :  [id number]        pH, Temp Corrected --     pCO2, Temperature Corrected --     pO2, Temperature Corrected --    Basic Metabolic Panel [[id number]]  (Abnormal) Collected: 08/04/21 0143    Specimen: Blood Updated: 08/04/21 0245     Glucose 140 mg/dL      BUN 36 mg/dL      Creatinine 0.94 mg/dL      Sodium 141 mmol/L      Potassium 4.0 mmol/L      Comment: Slight hemolysis detected by analyzer. Results may be affected.        Chloride 100 mmol/L      CO2 35.1 mmol/L      Calcium 8.7 mg/dL      eGFR Non African Amer 80 mL/min/1.73      BUN/Creatinine Ratio 38.3     Anion Gap 5.9 mmol/L     Narrative:      GFR Normal >60  Chronic Kidney Disease <60  Kidney Failure <15      CBC & Differential [[id number]]  (Abnormal) Collected: 08/04/21 0143    Specimen: Blood Updated: 08/04/21 0239    Narrative:      The following orders were created for panel order CBC & Differential.  Procedure                               Abnormality         Status                     ---------                               -----------         ------                     CBC Auto Differential[[id number]]        Abnormal            Final result               Scan Slide[[id number]]                                       Final result                 Please view results for these tests on the individual orders.    Scan Slide [[id number]] Collected: 08/04/21 0143    Specimen: Blood Updated: 08/04/21 0239      Anisocytosis Slight/1+     Hypochromia Mod/2+     Platelet Morphology Normal    CBC Auto Differential [[id number]]  (Abnormal) Collected: 08/04/21 0143    Specimen: Blood Updated: 08/04/21 0239     WBC 8.48 10*3/mm3      RBC 4.88 10*6/mm3      Hemoglobin 12.0 g/dL      Hematocrit 42.4 %      MCV 86.9 fL      MCH 24.6 pg      MCHC 28.3 g/dL      RDW 16.5 %      RDW-SD 52.7 fl      MPV 9.4 fL      Platelets 246 10*3/mm3      Neutrophil % 75.2 %      Lymphocyte % 13.9 %      Monocyte % 9.3 %      Eosinophil % 0.4 %      Basophil % 0.1 %      Immature Grans % 1.1 %      Neutrophils, Absolute 6.38 10*3/mm3      Lymphocytes, Absolute 1.18 10*3/mm3      Monocytes, Absolute 0.79 10*3/mm3      Eosinophils, Absolute 0.03 10*3/mm3      Basophils, Absolute 0.01 10*3/mm3      Immature Grans, Absolute 0.09 10*3/mm3      nRBC 0.0 /100 WBC     Respiratory Culture - Sputum, Cough [[id number]] Collected: 08/01/21 1036    Specimen: Sputum from Cough Updated: 08/03/21 1057     Respiratory Culture Scant growth (1+) Normal Respiratory Radha: NO S.aureus/MRSA or Pseudomonas aeruginosa     Gram Stain Rare (1+) Epithelial cells seen      Few (2+) WBCs seen      Rare (1+) Gram positive cocci in pairs    Comprehensive Metabolic Panel [[id number]]  (Abnormal) Collected: 08/03/21 0033    Specimen: Blood Updated: 08/03/21 0219     Glucose 176 mg/dL      BUN 37 mg/dL      Creatinine 1.13 mg/dL      Sodium 142 mmol/L      Potassium 3.9 mmol/L      Chloride 99 mmol/L      CO2 37.2 mmol/L      Calcium 8.7 mg/dL      Total Protein 5.4 g/dL      Albumin 3.20 g/dL      ALT (SGPT) 34 U/L      AST (SGOT) 22 U/L      Alkaline Phosphatase 53 U/L      Total Bilirubin 0.2 mg/dL      eGFR Non African Amer 65 mL/min/1.73      Globulin 2.2 gm/dL      A/G Ratio 1.5 g/dL      BUN/Creatinine Ratio 32.7     Anion Gap 5.8 mmol/L     Narrative:      GFR Normal >60  Chronic Kidney Disease <60  Kidney Failure <15      C-reactive Protein [[id number]]  (Normal)  Collected: 08/02/21 0032    Specimen: Blood Updated: 08/02/21 0339     C-Reactive Protein <0.30 mg/dL     Comprehensive Metabolic Panel [[id number]]  (Abnormal) Collected: 08/01/21 0805    Specimen: Blood Updated: 08/01/21 0840     Glucose 238 mg/dL      BUN 26 mg/dL      Creatinine 0.77 mg/dL      Sodium 142 mmol/L      Potassium 3.9 mmol/L      Comment: Slight hemolysis detected by analyzer. Results may be affected.        Chloride 98 mmol/L      CO2 34.4 mmol/L      Calcium 9.3 mg/dL      Total Protein 6.3 g/dL      Albumin 3.54 g/dL      ALT (SGPT) 15 U/L      AST (SGOT) 10 U/L      Alkaline Phosphatase 72 U/L      Total Bilirubin 0.3 mg/dL      eGFR Non African Amer 101 mL/min/1.73      Globulin 2.8 gm/dL      A/G Ratio 1.3 g/dL      BUN/Creatinine Ratio 33.8     Anion Gap 9.6 mmol/L     Narrative:      GFR Normal >60  Chronic Kidney Disease <60  Kidney Failure <15      C-reactive Protein [[id number]]  (Normal) Collected: 08/01/21 0310    Specimen: Blood from Hand, Left Updated: 08/01/21 0430     C-Reactive Protein <0.30 mg/dL     Troponin [[id number]]  (Normal) Collected: 08/01/21 0310    Specimen: Blood from Hand, Left Updated: 08/01/21 0332     Troponin T <0.010 ng/mL     Narrative:      Troponin T Reference Range:  <= 0.03 ng/mL-   Negative for AMI  >0.03 ng/mL-     Abnormal for myocardial necrosis.  Clinicians would have to utilize clinical acumen, EKG, Troponin and serial changes to determine if it is an Acute Myocardial Infarction or myocardial injury due to an underlying chronic condition.       Results may be falsely decreased if patient taking Biotin.      COVID-19 and FLU A/B PCR - Swab, Nasopharynx [[id number]]  (Normal) Collected: 08/01/21 0128    Specimen: Swab from Nasopharynx Updated: 08/01/21 0157     COVID19 Not Detected     Influenza A PCR Not Detected     Influenza B PCR Not Detected    Narrative:      Fact sheet for providers: https://www.fda.gov/media/305687/download    Fact sheet for  patients: https://www.[employer].gov/media/648126/download    Test performed by PCR.    [place] Draw [[id number]] Collected: 08/01/21 0030    Specimen: Blood from Hand, Left Updated: 08/01/21 0130    Narrative:      The following orders were created for panel order [place] Draw.  Procedure                               Abnormality         Status                     ---------                               -----------         ------                     Green Top (Gel)[[id number]]                                  Final result               Lavender Top[[id number]]                                     Final result               Gold Top - SST[[id number]]                                   Final result                 Please view results for these tests on the individual orders.    Gold Top - SST [[id number]] Collected: 08/01/21 0030    Specimen: Blood from Hand, Left Updated: 08/01/21 0130     Extra Tube Hold for add-ons.     Comment: Auto resulted.       Green Top (Gel) [[id number]] Collected: 08/01/21 0030    Specimen: Blood from Hand, Left Updated: 08/01/21 0130     Extra Tube Hold for add-ons.     Comment: Auto resulted.       Lavender Top [[id number]] Collected: 08/01/21 0030    Specimen: Blood from Hand, Left Updated: 08/01/21 0130     Extra Tube hold for add-on     Comment: Auto resulted       Troponin [[id number]]  (Normal) Collected: 08/01/21 0030    Specimen: Blood from Hand, Left Updated: 08/01/21 0100     Troponin T 0.013 ng/mL     Narrative:      Troponin T Reference Range:  <= 0.03 ng/mL-   Negative for AMI  >0.03 ng/mL-     Abnormal for myocardial necrosis.  Clinicians would have to utilize clinical acumen, EKG, Troponin and serial changes to determine if it is an Acute Myocardial Infarction or myocardial injury due to an underlying chronic condition.       Results may be falsely decreased if patient taking Biotin.      BNP [[id number]]  (Abnormal) Collected: 08/01/21 0030    Specimen: Blood from Hand, Left Updated:  08/01/21 0058     proBNP 956.2 pg/mL     Narrative:      Among patients with dyspnea, NT-proBNP is highly sensitive for the detection of acute congestive heart failure. In addition NT-proBNP of <300 pg/ml effectively rules out acute congestive heart failure with 99% negative predictive value.    Results may be falsely decreased if patient taking Biotin.            Imaging Results (Most Recent)     Procedure Component Value Units Date/Time    CT Chest Without Contrast Diagnostic [[id number]] Collected: 08/01/21 0311     Updated: 08/01/21 0313    Narrative:      CT Chest WO    INDICATION:   Dyspnea. Chronic dyspnea.    TECHNIQUE:   CT of the thorax without IV contrast. Coronal and sagittal reconstructions were obtained.  Radiation dose reduction techniques included automated exposure control or exposure modulation based on body size. Count of known CT and cardiac nuc med studies  performed in previous 12 months: 5.     COMPARISON:   5/22/2021    FINDINGS:  There is new patchy atelectasis and probable curvilinear atelectasis in the left lower lobe favored over pneumonia. There is new consolidation in the posterior left upper lobe suggestive of pneumonia however.  Trace pleural thickening/pleural fluid left lung base. New left-sided lower lobe and to a lesser extent upper lobe volume loss. On the right, there is new right basilar consolidation suspicious for pneumonia and a trace amount of pleural  fluid/thickening. No new suspicious pulmonary nodule. Small amount of secretions in the trachea. Correlate with potential for aspiration pneumonia. No pneumothorax.    There is no pericardial effusion. No threshold adenopathy. Normal caliber aorta and atherosclerotic change. Advanced atherosclerotic change of the coronary arteries. Evidence of pulmonary arterial hypertension.    Included upper abdomen demonstrates no acute finding. Chronic advanced degenerative or postinflammatory change in the midthoracic spine.   negative.      Impression:        1. Chronic interstitial changes with new areas of superimposed consolidation suspicious for pneumonia in the left upper lobe and right lower lobe. Atelectasis favored over pneumonia in the left base. Follow-up to clearing recommended. Consider the  possibility of aspiration event.  2. Pulmonary arterial hypertension.  3. Atherosclerotic disease.    Signer Name: Basilio Deng MD   Signed: 8/1/2021 3:11 AM   Workstation Name: [facility]    Radiology [facility]    XR Chest 1 View [[id number]] Collected: 08/01/21 0122     Updated: 08/01/21 0124    Narrative:      CR Chest 1 Vw    INDICATION:   Chest pain.     COMPARISON:    6/7/2021    FINDINGS:  Single portable AP view(s) of the chest.  Globular cardiomegaly. Pulmonary hyperinflation. Chronic appearing atelectasis/scarring in the left base. New interstitial prominence and streaky opacities in the right lung may reflect asymmetric edema  atelectasis or pneumonia. No pneumothorax. No new effusion.      Impression:        1. Cardiomegaly with new asymmetric interstitial prominence in the right lung that is nonspecific. No pneumothorax.    Signer Name: Basilio Deng MD   Signed: 8/1/2021 1:22 AM   Workstation Name: [facility]        Operative/Procedure Notes (most recent note)    No notes of this type exist for this encounter.            Physician Progress Notes (most recent note)      Mandeep Dunlap DO at 08/04/21 2006          Subjective     History:   Иван Miller is a 65 y.o. male admitted on 7/31/2021 secondary to Pneumonia of both lungs due to infectious organism     Procedures: None    CC: Follow up pneumonia, resp failure    Patient seen and examined with MARCELO Tsai. Awake and alert. Continues to report some cough and chest congestion. Dyspnea slowly improving. No reported CP. Reports constipation with no BM in a few days. States he did not sleep well last PM. No  acute events overnight per RN.     History taken from: patient, chart, and RN.      Objective     Vital Signs  Temp:  [97.7 °F (36.5 °C)-98.1 °F (36.7 °C)] 97.9 °F (36.6 °C)  Heart Rate:  [63-88] 82  Resp:  [16-20] 16  BP: (113-150)/() 139/105    Intake/Output Summary (Last 24 hours) at 8/4/2021 2006  Last data filed at 8/4/2021 1748  Gross per 24 hour   Intake 2040 ml   Output 3050 ml   Net -1010 ml         Physical Exam:  General:    Awake, alert, in no acute distress   Heart:      Normal S1 and S2. Regular rate and rhythm. No significant murmur, rubs or gallops appreciated.   Lungs:     Respirations regular, even and unlabored. Scattered rales with a few scattered wheezes.  No rhonchi.   Abdomen:   Soft and nontender. No guarding, rebound tenderness or  organomegaly noted. Bowel sounds present x 4.   Extremities:  1+ bilateral lower extremity edema. Moves UE and LE equally B/L.     Results Review:    Results from last 7 days   Lab Units 08/04/21  0143 08/03/21  0033 08/02/21  0032 08/01/21  0805 08/01/21  0030   WBC 10*3/mm3 8.48 8.53 6.90 8.25 8.12   HEMOGLOBIN g/dL 12.0* 11.4* 11.9* 12.4* 12.2*   PLATELETS 10*3/mm3 246 224 255 247 231     Results from last 7 days   Lab Units 08/04/21  0143 08/03/21  0033 08/02/21  0032 08/01/21  0805 08/01/21  0030   SODIUM mmol/L 141 142 141 142 144   POTASSIUM mmol/L 4.0 3.9 3.8 3.9 4.1   CHLORIDE mmol/L 100 99 98 98 99   CO2 mmol/L 35.1* 37.2* 31.8* 34.4* 37.4*   BUN mg/dL 36* 37* 31* 26* 22   CREATININE mg/dL 0.94 1.13 0.87 0.77 0.82   CALCIUM mg/dL 8.7 8.7 9.3 9.3 9.3   GLUCOSE mg/dL 140* 176* 233* 238* 271*     Results from last 7 days   Lab Units 08/03/21  0033 08/01/21  0805 08/01/21  0030   BILIRUBIN mg/dL 0.2 0.3 0.2   ALK PHOS U/L 53 72 76   AST (SGOT) U/L 22 10 13   ALT (SGPT) U/L 34 15 18             Results from last 7 days   Lab Units 08/01/21  0310 08/01/21  0030   TROPONIN T ng/mL <0.010 0.013       Imaging Results (Last 24 Hours)     ** No results found  for the last 24 hours. **            Medications:  apixaban, 5 mg, Oral, Q12H  atorvastatin, 20 mg, Oral, Daily  budesonide-formoterol, 2 puff, Inhalation, BID - RT  bumetanide, 2 mg, Oral, Daily  carvedilol, 25 mg, Oral, BID With Meals  empagliflozin, 20 mg, Oral, Daily  fluticasone, 2 spray, Nasal, Daily  gabapentin, 600 mg, Oral, Q12H  guaiFENesin, 1,200 mg, Oral, Q12H  HYDROcodone-acetaminophen, 1 tablet, Oral, BID  insulin aspart, 0-9 Units, Subcutaneous, 4x Daily AC & at Bedtime  insulin aspart, 20 Units, Subcutaneous, TID With Meals  insulin detemir, 45 Units, Subcutaneous, Nightly  ipratropium-albuterol, 3 mL, Nebulization, 4x Daily - RT  lidocaine, 1 patch, Transdermal, Nightly  lisinopril, 2.5 mg, Oral, Q24H  pantoprazole, 40 mg, Oral, Daily  piperacillin-tazobactam, 3.375 g, Intravenous, Q8H  potassium chloride, 10 mEq, Oral, Daily  predniSONE, 40 mg, Oral, Daily With Breakfast  sennosides-docusate, 2 tablet, Oral, BID  sodium chloride, 10 mL, Intravenous, Q12H  tamsulosin, 0.4 mg, Oral, Nightly               Assessment/Plan   Bilateral pneumonia: CT chest reveals LUZ ELENA and RLL consolidation with concern for aspiration. Blood cultures with NGTD. Sputum culture revealing normal respiratory crystal. No evidence of aspiration on swallow eval. Cont Zosyn and nebs.     Acute exacerbation of COPD: Cont Prednisone and nebs.     Acute hypercapnic on chronic hypoxic respiratory failure: Likely 2/2 above. Improved with BiPAP. Cont treatment as outlined above. Cont supplemental O2.     Essential HTN, uncontrolled upon admission: Cont home Coreg. Added lisinopril. BP later borderline low intermittently and decreased lisinopril. BP stable today. Cont PRN IV hydralazine.     DM II, inulin dependent with hyperglycemia: Improved with restarting home insulin. Decreased meal time insulin 2/2 subsequent hypoglycemia. Stable today. Cont SSI with Accuchecks.     Recent right ankle avulsion fracture with reported hx of paraplegia:  PT/OT. Supportive treatment.     Hx of chronic thoracic osteomyelitis: Cont to monitor.     Hx of Afib: Currently in sinus rhythm. Cont home Coreg. Cont Eliquis for stroke prevention. Monitor on telemetry.     Lower extremity edema: Normal EF on recent echo. ReDs Vest reading elevated. Order additional Bumex today.     Constipation: Add bowel regimen.     DVT PPX: Eliquis.       Disposition: SNF placement when medically stable.       Mandeep Dunlap DO  21  20:06 EDT    Electronically signed by Mandeep Dunlap DO at 21       Consult Notes (most recent note)    No notes of this type exist for this encounter.         Nutrition Notes (most recent note)    No notes exist for this encounter.            Physical Therapy Notes (most recent note)      Joceline Trejo, PT at 21 1446  Version 1 of 1         Acute Care - Physical Therapy Treatment Note  BAKARI Vazquez     Patient Name: Иван Miller  : 1955  MRN: [id number]  Today's Date: 8/3/2021   Onset of Illness/Injury or Date of Surgery: 21 (admit date)  Visit Dx:     ICD-10-CM ICD-9-CM   1. Pneumonia of both lungs due to infectious organism, unspecified part of lung  J18.9 483.8   2. Acute on chronic respiratory failure with hypoxia and hypercapnia (CMS/HCC)  J96.21 518.84    J96.22 786.09     799.02     Patient Active Problem List   Diagnosis   • Acute respiratory failure with hypoxemia (CMS/HCC)   • Coronary artery disease status post multiple stents   • Non-ischemic cardiomyopathy (CMS/HCC LV ejection fraction 40 to 45%   • Chronic systolic congestive heart failure (CMS/HCC)   • Essential hypertension   • Hyperlipidemia LDL goal <70   • Insulin dependent diabetes mellitus   • Respiratory failure (CMS/HCC)   • Finger infection   • Pneumonia of both lungs due to infectious organism     Past Medical History:   Diagnosis Date   • Atrial fibrillation (CMS/HCC)    • COPD (chronic obstructive pulmonary disease) (CMS/HCC)    •  Diabetes mellitus (CMS/HCC)    • GERD (gastroesophageal reflux disease)    • Hyperlipidemia    • Hypertension    • Myocardial infarction (CMS/HCC)    • Neuropathy      Past Surgical History:   Procedure Laterality Date   • ABDOMINAL SURGERY     • APPENDECTOMY     • CARDIAC CATHETERIZATION     • CORONARY STENT PLACEMENT          PT Assessment (last 12 hours)      PT Evaluation and Treatment     Row Name 08/03/21 1441          Physical Therapy Time and Intention    Subjective Information  complains of;weakness  -CT     Document Type  therapy note (daily note)  -CT     Mode of Treatment  physical therapy  -CT     Patient Effort  good  -CT     Comment  Pt tolerated treatment session well. Pt performed AAROM BLE ther ex in supine.   -CT     Row Name 08/03/21 1441          General Information    Patient Profile Reviewed  yes  -CT     Equipment Currently Used at Home  wheelchair;oxygen;lift device;walker, rolling  -CT     Existing Precautions/Restrictions  fall  -CT     Row Name 08/03/21 1441          Cognition    Affect/Mental Status (Cognitive)  WFL  -CT     Orientation Status (Cognition)  oriented x 3  -CT     Follows Commands (Cognition)  WFL  -CT     Row Name 08/03/21 1441          Bed Mobility    Bed Mobility  rolling left;rolling right  -CT     Rolling Left Luna (Bed Mobility)  maximum assist (25% patient effort)  -CT     Rolling Right Luna (Bed Mobility)  maximum assist (25% patient effort)  -CT     Bed Mobility, Safety Issues  decreased use of arms for pushing/pulling;decreased use of legs for bridging/pushing;impaired trunk control for bed mobility  -CT     Assistive Device (Bed Mobility)  bed rails;draw sheet  -CT     Row Name 08/03/21 1441          Motor Skills    Therapeutic Exercise  -- BLE AAROM supine ther ex   -CT     Row Name 08/03/21 1441          Coping    Observed Emotional State  calm;cooperative  -CT     Verbalized Emotional State  acceptance  -CT     Row Name 08/03/21 1441           Plan of Care Review    Plan of Care Reviewed With  patient  -CT     Row Name 08/03/21 1441          Positioning and Restraints    Pre-Treatment Position  in bed  -CT     Post Treatment Position  bed  -CT     In Bed  supine;call light within reach;encouraged to call for assist;exit alarm on;side rails up x3;notified nsg  -CT     Row Name 08/03/21 1441          Therapy Assessment/Plan (PT)    Rehab Potential (PT)  good, to achieve stated therapy goals  -CT     Criteria for Skilled Interventions Met (PT)  yes;skilled treatment is necessary  -CT       User Key  (r) = Recorded By, (t) = Taken By, (c) = Cosigned By    Initials Name Provider Type    CT Joceline Trejo PT Physical Therapist        Physical Therapy Education                 Title: PT OT SLP Therapies (Done)     Topic: Physical Therapy (Done)     Point: Mobility training (Done)     Learning Progress Summary           Patient Acceptance, E,TB, VU by CT at 8/3/2021 1444    Acceptance, E,TB, VU by CT at 8/2/2021 1122                   Point: Home exercise program (Done)     Learning Progress Summary           Patient Acceptance, E,TB, VU by CT at 8/3/2021 1444    Acceptance, E,TB, VU by CT at 8/2/2021 1122                   Point: Body mechanics (Done)     Learning Progress Summary           Patient Acceptance, E,TB, VU by CT at 8/3/2021 1444    Acceptance, E,TB, VU by CT at 8/2/2021 1122                   Point: Precautions (Done)     Learning Progress Summary           Patient Acceptance, E,TB, VU by CT at 8/3/2021 1444    Acceptance, E,TB, VU by CT at 8/2/2021 1122                               User Key     Initials Effective Dates Name Provider Type Discipline    CT 06/16/21 -  Joceline Trejo PT Physical Therapist PT              PT Recommendation and Plan  Anticipated Discharge Disposition (PT):  (tbd)  Planned Therapy Interventions (PT): balance training, bed mobility training, home exercise program, manual therapy techniques, motor coordination  training, neuromuscular re-education, patient/family education, postural re-education, strengthening, transfer training  Therapy Frequency (PT): 2 times/wk (2-5 times/wk )  Plan of Care Reviewed With: patient       Time Calculation:   PT Charges     Row Name 21 1445             Time Calculation    PT Received On  21  -CT      PT Goal Re-Cert Due Date  21  -CT         Time Calculation- PT    Total Timed Code Minutes- PT  30 minute(s)  -CT        User Key  (r) = Recorded By, (t) = Taken By, (c) = Cosigned By    Initials Name Provider Type    CT Joceline Trejo, MEAGHAN Physical Therapist        Therapy Charges for Today     Code Description Service Date Service Provider Modifiers Qty    87325620562 HC PT EVAL MOD COMPLEXITY 4 2021 Joceline Trejo, PT GP 1    90890073162 HC PT THER PROC EA 15 MIN 8/3/2021 Joceline Trejo PT GP 1    47424052211 HC PT THERAPEUTIC ACT EA 15 MIN 8/3/2021 Joceline Trejo PT GP 1               Joceline Trejo PT  8/3/2021      Electronically signed by Joceline Trejo PT at 21 1446          Occupational Therapy Notes (most recent note)      Leticia Maza, OT at 21 1317          Acute Care - Occupational Therapy Initial Evaluation and Treatment Note  BAKARI Vazquze     Patient Name: Иван Miller  : 1955  MRN: [id number]  Today's Date: 2021  Onset of Illness/Injury or Date of Surgery: 21 (admit date)     Referring Physician: Sandra    Admit Date: 2021       ICD-10-CM ICD-9-CM   1. Pneumonia of both lungs due to infectious organism, unspecified part of lung  J18.9 483.8   2. Acute on chronic respiratory failure with hypoxia and hypercapnia (CMS/HCC)  J96.21 518.84    J96.22 786.09     799.02     Patient Active Problem List   Diagnosis   • Acute respiratory failure with hypoxemia (CMS/HCC)   • Coronary artery disease status post multiple stents   • Non-ischemic cardiomyopathy (CMS/HCC LV ejection fraction 40 to 45%   • Chronic systolic congestive  heart failure (CMS/HCC)   • Essential hypertension   • Hyperlipidemia LDL goal <70   • Insulin dependent diabetes mellitus   • Respiratory failure (CMS/HCC)   • Finger infection   • Pneumonia of both lungs due to infectious organism     Past Medical History:   Diagnosis Date   • Atrial fibrillation (CMS/HCC)    • COPD (chronic obstructive pulmonary disease) (CMS/HCC)    • Diabetes mellitus (CMS/HCC)    • GERD (gastroesophageal reflux disease)    • Hyperlipidemia    • Hypertension    • Myocardial infarction (CMS/HCC)    • Neuropathy      Past Surgical History:   Procedure Laterality Date   • ABDOMINAL SURGERY     • APPENDECTOMY     • CARDIAC CATHETERIZATION     • CORONARY STENT PLACEMENT              OT ASSESSMENT FLOWSHEET (last 12 hours)      OT Evaluation and Treatment     Row Name 08/02/21 1300                   OT Time and Intention    Subjective Information  no complaints;weakness  -HB        Document Type  evaluation  -HB        Mode of Treatment  individual therapy;occupational therapy  -HB        Patient Effort  fair  -HB        Symptoms Noted During/After Treatment  fatigue  -HB        Comment  Patient presents with SOA, PNA, and weakness. Pt reports a fall approx 3 months ago resulting in a SCI and right ankle fx. Pt has not stood in 3 months and was a mechanical lift used at outside facility   -HB           General Information    Patient Profile Reviewed  yes  -HB        Patient/Family/Caregiver Comments/Observations  Patient agreeable to OTE this date.   -HB        Prior Level of Function  dependent:;ADL's daughter assisting PRN  -HB        Equipment Currently Used at Home  wheelchair;oxygen;lift device;walker, rolling  -HB        Existing Precautions/Restrictions  fall  -HB        Benefits Reviewed  patient:;increase independence;improve function;increase strength;increase balance;increase knowledge;improve skin integrity  -HB           Previous Level of Function/Home Environm    BADLs, Premorbid  Functional Level  needs adaptive equipment for safe performance;needs assistive device for safe performance;needs assistance for safe performance  -        IADLs, Premorbid Functional Level  needs assistance for safe performance;needs assistive device for safe performance;needs adaptive equipment for safe performance  -           Living Environment    Current Living Arrangements  home/apartment/condo  -        Lives With  spouse;child(ara), adult  -HB           Cognition    Affect/Mental Status (Cognitive)  WFL  -HB        Orientation Status (Cognition)  oriented x 3  -HB        Follows Commands (Cognition)  WFL  -HB           Range of Motion Comprehensive    General Range of Motion  no range of motion deficits identified  -           Strength (Manual Muscle Testing)    Strength (Manual Muscle Testing)  bilateral upper extremities WFL; B hands/wrists 3/5 grossly   -HB           Bed Mobility    Bed Mobility  rolling left;rolling right  -HB        Rolling Left [place] (Bed Mobility)  maximum assist (25% patient effort)  -        Rolling Right [place] (Bed Mobility)  maximum assist (25% patient effort)  -        Bed Mobility, Safety Issues  decreased use of arms for pushing/pulling;decreased use of legs for bridging/pushing;impaired trunk control for bed mobility  -           Activities of Daily Living    BADL Assessment/Intervention  bathing;upper body dressing;lower body dressing;grooming;feeding;toileting  -           Bathing Assessment/Intervention    [place] Level (Bathing)  dependent (less than 25% patient effort);bathing skills  -           Upper Body Dressing Assessment/Training    [place] Level (Upper Body Dressing)  upper body dressing skills;moderate assist (50% patient effort)  -           Lower Body Dressing Assessment/Training    [place] Level (Lower Body Dressing)  lower body dressing skills;dependent (less than 25% patient effort)  -           Grooming  Assessment/Training    [place] Level (Grooming)  grooming skills;minimum assist (75% patient effort)  -HB           Self-Feeding Assessment/Training    [place] Level (Feeding)  feeding skills;minimum assist (75% patient effort)  -HB           Toileting Assessment/Training    [place] Level (Toileting)  toileting skills;dependent (less than 25% patient effort)  -HB           Plan of Care Review    Plan of Care Reviewed With  patient  -HB           OT Goals    Dressing Goal Selection (OT)  dressing, OT goal 1  -HB        Grooming Goal Selection (OT)  grooming, OT goal 1  -HB        Strength Goal Selection (OT)  strength, OT goal 1  -HB           Dressing Goal 1 (OT)    Activity/Device (Dressing Goal 1, OT)  upper body dressing  -HB        [place]/Cues Needed (Dressing Goal 1, OT)  minimum assist (75% or more patient effort)  -HB        Time Frame (Dressing Goal 1, OT)  by discharge  -HB           Grooming Goal 1 (OT)    Activity/Device (Grooming Goal 1, OT)  grooming skills, all  -HB        [place] (Grooming Goal 1, OT)  set-up required  -HB        Time Frame (Grooming Goal 1, OT)  by discharge  -HB           Strength Goal 1 (OT)    Strength Goal 1 (OT)  Patient will increase BUE m/s WFL empahsis on hands   -HB        Time Frame (Strength Goal 1, OT)  by discharge  -HB           Positioning and Restraints    Pre-Treatment Position  in bed  -HB        Post Treatment Position  bed  -HB        In Bed  call light within reach;encouraged to call for assist  -HB           Therapy Assessment/Plan (OT)    Patient/Family Therapy Goal Statement (OT)  to return to PLOF  -HB        OT Diagnosis  Decreased I/ADL status; functional mobility   -HB        Rehab Potential (OT)  fair, will monitor progress closely  -HB        Criteria for Skilled Therapeutic Interventions Met (OT)  yes;skilled treatment is necessary  -HB        Predicted Duration of Therapy Intervention (OT)  1 week/til goals are met/discharge  from [facility]  -        Problem List (OT)  problems related to;range of motion (ROM);strength;mobility  -        Activity Limitations Related to Problem List (OT)  unable to transfer safely;BADLs not performed adequately or safely;IADLs not performed adequately or safely  -        Planned Therapy Interventions (OT)  activity tolerance training;adaptive equipment training;BADL retraining;IADL retraining;functional balance retraining;occupation/activity based interventions;ROM/therapeutic exercise;strengthening exercise;transfer/mobility retraining;patient/caregiver education/training  -           Therapy Plan Review/Discharge Plan (OT)    Therapy Plan Review (OT)  evaluation/treatment results reviewed;care plan/treatment goals reviewed  -        Equipment Needs Upon Discharge (OT)  -- TBD  -        Anticipated Discharge Disposition (OT)  extended care facility;skilled nursing facility  -          User Key  (r) = Recorded By, (t) = Taken By, (c) = Cosigned By    Initials Name Effective Dates     Leticia Maza OT 05/25/21 -                  OT Recommendation and Plan  Planned Therapy Interventions (OT): activity tolerance training, adaptive equipment training, BADL retraining, IADL retraining, functional balance retraining, occupation/activity based interventions, ROM/therapeutic exercise, strengthening exercise, transfer/mobility retraining, patient/caregiver education/training  Plan of Care Review  Plan of Care Reviewed With: patient  Plan of Care Reviewed With: patient        Time Calculation:     Therapy Charges for Today     Code Description Service Date Service Provider Modifiers Qty    [id number]  OT EVAL MOD COMPLEXITY 4 8/2/2021 Leticia Maza OT GO 1               Letciia Maza OT  8/2/2021    Electronically signed by Leticia Maza OT at 08/02/21 1318          Speech Language Pathology Notes (most recent note)      Janelle Mcgraw, MS CCC-SLP at 08/02/21 1241          Acute Care -  Speech Language Pathology   Swallow Initial Evaluation [facility]   Clinical Dysphagia Assessment     Patient Name: Иван Miller  : 1955  MRN: [id number]  Today's Date: 2021  Onset of Illness/Injury or Date of Surgery: 21 (admit date)     Referring Physician: Sandra      Admit Date: 2021    Иван Miller  is seen at bedside this am on PCU to assess safety/efficacy of swallowing fnx, determine safest/least restrictive diet tolerance.     Mr. Miller presented to [facility] ED 21 2/2 cough, SOB. He presented w/ bilateral PNA. PMH is significant for ARF w/ hypoxemia, CAD, cardiomyopathy, CHF, HTN, HLD, DM, ARF. He is referred to r/o dysphagia, aspiration.     Social History     Socioeconomic History   • Marital status:      Spouse name: Not on file   • Number of children: Not on file   • Years of education: Not on file   • Highest education level: Not on file   Tobacco Use   • Smoking status: Former Smoker     Packs/day: 2.00     Years: 30.00     Pack years: 60.00     Types: Cigarettes   • Smokeless tobacco: Current User     Types: Snuff   Substance and Sexual Activity   • Alcohol use: No   • Drug use: No   • Sexual activity: Defer      Imaging:   CT Chest WO     INDICATION:   Dyspnea. Chronic dyspnea.     TECHNIQUE:   CT of the thorax without IV contrast. Coronal and sagittal reconstructions were obtained.  Radiation dose reduction techniques included automated exposure control or exposure modulation based on body size. Count of known CT and cardiac nuc med studies  performed in previous 12 months: 5.      COMPARISON:   2021     FINDINGS:  There is new patchy atelectasis and probable curvilinear atelectasis in the left lower lobe favored over pneumonia. There is new consolidation in the posterior left upper lobe suggestive of pneumonia however.  Trace pleural thickening/pleural fluid left lung base. New left-sided lower lobe and to a lesser extent upper lobe volume loss. On the right,  there is new right basilar consolidation suspicious for pneumonia and a trace amount of pleural  fluid/thickening. No new suspicious pulmonary nodule. Small amount of secretions in the trachea. Correlate with potential for aspiration pneumonia. No pneumothorax.     There is no pericardial effusion. No threshold adenopathy. Normal caliber aorta and atherosclerotic change. Advanced atherosclerotic change of the coronary arteries. Evidence of pulmonary arterial hypertension.     Included upper abdomen demonstrates no acute finding. Chronic advanced degenerative or postinflammatory change in the midthoracic spine.  negative.     IMPRESSION:     1. Chronic interstitial changes with new areas of superimposed consolidation suspicious for pneumonia in the left upper lobe and right lower lobe. Atelectasis favored over pneumonia in the left base. Follow-up to clearing recommended. Consider the  possibility of aspiration event.  2. Pulmonary arterial hypertension.  3. Atherosclerotic disease.     Signer Name: Basilio Deng MD   Signed: 8/1/2021 3:11 AM   Workstation Name: [email or web address]    [facility]    Labs:  08/02/21 1050  POC Glucose Once   Collected: 08/02/21 1044  Final result  Specimen: Blood    Glucose 156High  mg/dL             08/02/21 0621  POC Glucose Once   Collected: 08/02/21 0615  Final result  Specimen: Blood    Glucose 121 mg/dL             08/02/21 0339  Basic Metabolic Panel   Collected: 08/02/21 0032  Final result  Specimen: Blood    Glucose 233High  mg/dL CO2 31.8High  mmol/L   BUN 31High  mg/dL Calcium 9.3 mg/dL   Creatinine 0.87 mg/dL eGFR Non African Amer 88 mL/min/1.73   Sodium 141 mmol/L BUN/Creatinine Ratio 35.6High    Potassium 3.8 mmol/L Anion Gap 11.2 mmol/L   Chloride 98 mmol/L            08/02/21 0339  C-reactive Protein   Collected: 08/02/21 0032  Final result  Specimen: Blood    C-Reactive Protein <0.30 mg/dL            08/02/21 0154  CBC & Differential    Collected: 08/02/21 0032  Final result  Specimen: Blood           08/02/21 0154  CBC Auto Differential   Collected: 08/02/21 0032  Final result  Specimen: Blood    WBC 6.90 10*3/mm3 Lymphocyte % 8.3Low  %   RBC 4.84 10*6/mm3 Monocyte % 6.7 %   Hemoglobin 11.9Low  g/dL Eosinophil % 0.0Low  %   Hematocrit 40.9 % Basophil % 0.1 %   MCV 84.5 fL Immature Grans % 1.0High  %   MCH 24.6Low  pg Neutrophils, Absolute 5.79 10*3/mm3   MCHC 29.1Low  g/dL Lymphocytes, Absolute 0.57Low  10*3/mm3   RDW 16.3High  % Monocytes, Absolute 0.46 10*3/mm3   RDW-SD 50.4 fl Eosinophils, Absolute 0.00 10*3/mm3   MPV 9.5 fL Basophils, Absolute 0.01 10*3/mm3   Platelets 255 10*3/mm3 Immature Grans, Absolute 0.07High  10*3/mm3   Neutrophil % 83.9High  % nRBC 0.0 /100 WBC        Diet Orders (active) (From admission, onward)     Start     Ordered    08/01/21 1004  Diet Regular; Consistent Carbohydrate  Diet Effective Now      08/01/21 1004              Observed on O2 via NC 3L.     Mr. Miller is positioned upright and centered in bed to accept multiple po presentations of ice chips solid cracker, puree and thin liquids via spoon, cup and straw.  He is able to self feed.     Facial/oral structures are symmetrical upon observation. Lingual protrusion reveals no deviation. Oral mucosa are moist, pink, and clean. Secretions are clear, thin, and well controlled. OROM/HARSHIL is wfl to imitate oral postures. Gag is not assessed. Volitional cough is intact w/ adequate  intensity, clear in quality, non-productive. Voice is adequate in intensity, clear in quality w/ intelligible speech. He is a/a and pleasant, interactive, oriented.     Upon po presentations, adequate bolus anticipation and acceptance w/ good labial seal for bolus clearance via spoon bowl, cup rim stability and suction via straw. Bolus formation, manipulation and control are wfl w/ rotary mastication pattern. A-p transit is timely w/o oral residue. No overt s/s aspiration before the  swallow.     Pharyngeal swallow is timely w/ adequate hyolaryngeal elevation per palpation. No overt s/s aspiration evidenced across this evaluation. No silent aspiration suspected. He denies odynophagia.    Visit Dx:     ICD-10-CM ICD-9-CM   1. Pneumonia of both lungs due to infectious organism, unspecified part of lung  J18.9 483.8   2. Acute on chronic respiratory failure with hypoxia and hypercapnia (CMS/[facility])  J96.21 518.84    J96.22 786.09     799.02     Patient Active Problem List   Diagnosis   • Acute respiratory failure with hypoxemia (CMS/[facility])   • Coronary artery disease status post multiple stents   • Non-ischemic cardiomyopathy (CMS/[facility] LV ejection fraction 40 to 45%   • Chronic systolic congestive heart failure (CMS/[facility])   • Essential hypertension   • Hyperlipidemia LDL goal <70   • Insulin dependent diabetes mellitus   • Respiratory failure (CMS/[facility])   • Finger infection   • Pneumonia of both lungs due to infectious organism     Past Medical History:   Diagnosis Date   • Atrial fibrillation (CMS/[facility])    • COPD (chronic obstructive pulmonary disease) (CMS/[facility])    • Diabetes mellitus (CMS/[facility])    • GERD (gastroesophageal reflux disease)    • Hyperlipidemia    • Hypertension    • Myocardial infarction (CMS/[facility])    • Neuropathy      Past Surgical History:   Procedure Laterality Date   • ABDOMINAL SURGERY     • APPENDECTOMY     • CARDIAC CATHETERIZATION     • CORONARY STENT PLACEMENT       Impression: Mr. Miller presents w/ wfl oropharyngeal swallow w/o s/s aspiration.No s/s indicative of silent aspiration.  No odynophagia reported.      He is felt to most benefit from continued least restrictive regular consistency po diet w/ thin liquids. Medications whole, single pill presentations only, please.     EDUCATION  The patient has been educated in the following areas:   Dysphagia (Swallowing Impairment) Oral Care/Hydration.     SLP Recommendation and Plan    1. Continue least restrictive regular consistency po  diet, thin liquids.    2. Medications whole in puree/thins. Single pill presentations only, please.   3. Upright and centered for all po intake  4. XIOMARA precautions.  5. Oral care protocol.  No further formal SLP f/u warranted at this time.    D/w Mr. Miller results and recommendations w/ verbal agreement.    Thank you for allowing me to participate in the care of your patient-  Janelle Mcgraw M.S., CCC/SLP                                                                              Time Calculation:       Therapy Charges for Today     Code Description Service Date Service Provider Modifiers Qty    [id number] HC ST EVAL ORAL PHARYNG SWALLOW 4 8/2/2021 Janelle Mcgraw, MS CCC-SLP GN 1               Janelle Mcgraw, MS CCC-SLP  8/2/2021    Electronically signed by Janelle Mcgraw, MS CCC-SLP at 08/02/21 1248          Respiratory Therapy Notes (most recent note)      Hellen Loving, RRT at 08/05/21 1008      Summary: ReDS Vest                                                                                ReDS Value     Date: 08/05/21     ReDS Value: 40    36-41 Possible Hypervolemic Status        Hellen Loving, MEGA     Electronically signed by Hellen Loving, MEGA at 08/05/21 1008       ADL Documentation (most recent)      Most Recent Value   Transferring  4 - completely dependent   Toileting  3 - assistive equipment and person   Bathing  2 - assistive person   Dressing  2 - assistive person   Eating  0 - independent   Communication  0 - understands/communicates without difficulty   Swallowing  0 - swallows foods/liquids without difficulty   Equipment Currently Used at Home  walker, standard, wheelchair, cane, straight

## 2021-08-05 NOTE — PROGRESS NOTES
Subjective     History:   Иван Miller is a 65 y.o. male admitted on 7/31/2021 secondary to Pneumonia of both lungs due to infectious organism     Procedures: None    CC: Follow up pneumonia, resp failure    Patient seen and examined with MARCELO Tsai. Awake and alert. Continues to report some cough and chest congestion. Dyspnea slowly improving. No reported CP. Reports constipation with no BM in a few days. States he did not sleep well last PM. No acute events overnight per RN.     History taken from: patient, chart, and RN.      Objective     Vital Signs  Temp:  [97.7 °F (36.5 °C)-98.1 °F (36.7 °C)] 97.9 °F (36.6 °C)  Heart Rate:  [63-88] 82  Resp:  [16-20] 16  BP: (113-150)/() 139/105    Intake/Output Summary (Last 24 hours) at 8/4/2021 2006  Last data filed at 8/4/2021 1748  Gross per 24 hour   Intake 2040 ml   Output 3050 ml   Net -1010 ml         Physical Exam:  General:    Awake, alert, in no acute distress   Heart:      Normal S1 and S2. Regular rate and rhythm. No significant murmur, rubs or gallops appreciated.   Lungs:     Respirations regular, even and unlabored. Scattered rales with a few scattered wheezes.  No rhonchi.   Abdomen:   Soft and nontender. No guarding, rebound tenderness or  organomegaly noted. Bowel sounds present x 4.   Extremities:  1+ bilateral lower extremity edema. Moves UE and LE equally B/L.     Results Review:    Results from last 7 days   Lab Units 08/04/21  0143 08/03/21  0033 08/02/21  0032 08/01/21  0805 08/01/21  0030   WBC 10*3/mm3 8.48 8.53 6.90 8.25 8.12   HEMOGLOBIN g/dL 12.0* 11.4* 11.9* 12.4* 12.2*   PLATELETS 10*3/mm3 246 224 255 247 231     Results from last 7 days   Lab Units 08/04/21  0143 08/03/21  0033 08/02/21  0032 08/01/21  0805 08/01/21  0030   SODIUM mmol/L 141 142 141 142 144   POTASSIUM mmol/L 4.0 3.9 3.8 3.9 4.1   CHLORIDE mmol/L 100 99 98 98 99   CO2 mmol/L 35.1* 37.2* 31.8* 34.4* 37.4*   BUN mg/dL 36* 37* 31* 26* 22   CREATININE mg/dL 0.94 1.13 0.87  0.77 0.82   CALCIUM mg/dL 8.7 8.7 9.3 9.3 9.3   GLUCOSE mg/dL 140* 176* 233* 238* 271*     Results from last 7 days   Lab Units 08/03/21  0033 08/01/21  0805 08/01/21  0030   BILIRUBIN mg/dL 0.2 0.3 0.2   ALK PHOS U/L 53 72 76   AST (SGOT) U/L 22 10 13   ALT (SGPT) U/L 34 15 18             Results from last 7 days   Lab Units 08/01/21  0310 08/01/21  0030   TROPONIN T ng/mL <0.010 0.013       Imaging Results (Last 24 Hours)     ** No results found for the last 24 hours. **            Medications:  apixaban, 5 mg, Oral, Q12H  atorvastatin, 20 mg, Oral, Daily  budesonide-formoterol, 2 puff, Inhalation, BID - RT  bumetanide, 2 mg, Oral, Daily  carvedilol, 25 mg, Oral, BID With Meals  empagliflozin, 20 mg, Oral, Daily  fluticasone, 2 spray, Nasal, Daily  gabapentin, 600 mg, Oral, Q12H  guaiFENesin, 1,200 mg, Oral, Q12H  HYDROcodone-acetaminophen, 1 tablet, Oral, BID  insulin aspart, 0-9 Units, Subcutaneous, 4x Daily AC & at Bedtime  insulin aspart, 20 Units, Subcutaneous, TID With Meals  insulin detemir, 45 Units, Subcutaneous, Nightly  ipratropium-albuterol, 3 mL, Nebulization, 4x Daily - RT  lidocaine, 1 patch, Transdermal, Nightly  lisinopril, 2.5 mg, Oral, Q24H  pantoprazole, 40 mg, Oral, Daily  piperacillin-tazobactam, 3.375 g, Intravenous, Q8H  potassium chloride, 10 mEq, Oral, Daily  predniSONE, 40 mg, Oral, Daily With Breakfast  sennosides-docusate, 2 tablet, Oral, BID  sodium chloride, 10 mL, Intravenous, Q12H  tamsulosin, 0.4 mg, Oral, Nightly               Assessment/Plan   Bilateral pneumonia: CT chest reveals LUZ ELENA and RLL consolidation with concern for aspiration. Blood cultures with NGTD. Sputum culture revealing normal respiratory crystal. No evidence of aspiration on swallow eval. Cont Zosyn and nebs.     Acute exacerbation of COPD: Cont Prednisone and nebs.     Acute hypercapnic on chronic hypoxic respiratory failure: Likely 2/2 above. Improved with BiPAP. Cont treatment as outlined above. Cont  supplemental O2.     Essential HTN, uncontrolled upon admission: Cont home Coreg. Added lisinopril. BP later borderline low intermittently and decreased lisinopril. BP stable today. Cont PRN IV hydralazine.     DM II, inulin dependent with hyperglycemia: Improved with restarting home insulin. Decreased meal time insulin 2/2 subsequent hypoglycemia. Stable today. Cont SSI with Accuchecks.     Recent right ankle avulsion fracture with reported hx of paraplegia: PT/OT. Supportive treatment.     Hx of chronic thoracic osteomyelitis: Cont to monitor.     Hx of Afib: Currently in sinus rhythm. Cont home Coreg. Cont Eliquis for stroke prevention. Monitor on telemetry.     Lower extremity edema: Normal EF on recent echo. ReDs Vest reading elevated. Order additional Bumex today.     Constipation: Add bowel regimen.     DVT PPX: Eliquis.       Disposition: SNF placement when medically stable.       Mandeep Dunlap,   08/04/21  20:06 EDT

## 2021-08-05 NOTE — CASE MANAGEMENT/SOCIAL WORK
Discharge Planning Assessment  [facility]     Patient Name: Иван Miller  MRN: [id number]  Today's Date: 8/5/2021    Admit Date: 7/31/2021    Discharge Plan     Row Name 08/05/21 1134       Plan    Plan SS sent referrals to [facility], [facility] [place], and [facility]. SS notified [facility] and Rehab per Rupert, [facility] per Mercedes and [facility] per Ann. SS to follow.    14:20 pm: SS received call from [facility] Rupert and they do not accept pt's insurance. SS to follow.        Continued Care and Services - Admitted Since 7/31/2021     Destination     Service Provider Request Status Selected Services Address Phone Fax Patient Preferred    [facility]  Pending - No Request Sent N/A [address] [facility] [address] [phone] --    [facility]  Pending - No Request Sent N/A [address] [facility] [address] [address] [phone] --    [facility]  Pending - No Request Sent N/A [address] , [facility] [address] [phone] --    [facility]  Pending - No Request Sent N/A [address], [facility] [address] [phone] --    [facility]  Pending - No Request Sent N/A [address] [facility] [address] [phone] --    [facility]  Declined  Unable to Meet Patient Needs N/A [address] [facility] [address] [phone] --    [facility]  Declined  No Bariatric Bed Available N/A [address] [facility] [address] [facility] [address] [phone] --    [facility]  Declined  No Bariatric Bed Available N/A [address], [facility] [address] [phone] --            Selected Continued Care - Prior Encounters Includes selections from prior encounters from 5/2/2021 to 8/5/2021    Discharged on  6/16/2021 Admission date: 5/21/2021 - Discharge disposition: Skilled Nursing Facility (DC - External)    Destination     Service Provider Selected Services Address Phone Fax Patient Preferred    [facility]  Skilled Nursing [address] [place] [address] [phone] --                    ANISH Gustafson

## 2021-08-05 NOTE — PLAN OF CARE
Goal Outcome Evaluation:           Progress: improving  Outcome Summary: Pt emoved his Bipap and began calling out for his significant other. Pt stated he got slightly confused when he woke up but knows where he's at. Hailey ROBLES notified, stat ABG ordered. Pt currently has BiPap on resting in bed.

## 2021-08-05 NOTE — PLAN OF CARE
Goal Outcome Evaluation:  Plan of Care Reviewed With: patient        Progress: no change  Outcome Summary: Pt resting in bed. Has worn Bipap while napping today. Bumex was added to medication list and given. No new complaints or concerns at this time. Will continue to monitor.

## 2021-08-06 LAB
ALBUMIN SERPL-MCNC: 3.26 G/DL (ref 3.5–5.2)
ALBUMIN/GLOB SERPL: 1.4 G/DL
ALP SERPL-CCNC: 49 U/L (ref 39–117)
ALT SERPL W P-5'-P-CCNC: 20 U/L (ref 1–41)
ANION GAP SERPL CALCULATED.3IONS-SCNC: 4 MMOL/L (ref 5–15)
ANISOCYTOSIS BLD QL: NORMAL
AST SERPL-CCNC: 9 U/L (ref 1–40)
BACTERIA SPEC AEROBE CULT: NORMAL
BACTERIA SPEC AEROBE CULT: NORMAL
BASOPHILS # BLD AUTO: 0.02 10*3/MM3 (ref 0–0.2)
BASOPHILS NFR BLD AUTO: 0.2 % (ref 0–1.5)
BILIRUB SERPL-MCNC: 0.2 MG/DL (ref 0–1.2)
BUN SERPL-MCNC: 26 MG/DL (ref 8–23)
BUN/CREAT SERPL: 37.1 (ref 7–25)
CALCIUM SPEC-SCNC: 8.8 MG/DL (ref 8.6–10.5)
CHLORIDE SERPL-SCNC: 100 MMOL/L (ref 98–107)
CO2 SERPL-SCNC: 40 MMOL/L (ref 22–29)
CREAT SERPL-MCNC: 0.7 MG/DL (ref 0.76–1.27)
DEPRECATED RDW RBC AUTO: 52 FL (ref 37–54)
EOSINOPHIL # BLD AUTO: 0.06 10*3/MM3 (ref 0–0.4)
EOSINOPHIL NFR BLD AUTO: 0.7 % (ref 0.3–6.2)
ERYTHROCYTE [DISTWIDTH] IN BLOOD BY AUTOMATED COUNT: 16.4 % (ref 12.3–15.4)
GFR SERPL CREATININE-BSD FRML MDRD: 113 ML/MIN/1.73
GLOBULIN UR ELPH-MCNC: 2.3 GM/DL
GLUCOSE BLDC GLUCOMTR-MCNC: 123 MG/DL (ref 70–130)
GLUCOSE BLDC GLUCOMTR-MCNC: 149 MG/DL (ref 70–130)
GLUCOSE BLDC GLUCOMTR-MCNC: 160 MG/DL (ref 70–130)
GLUCOSE BLDC GLUCOMTR-MCNC: 99 MG/DL (ref 70–130)
GLUCOSE SERPL-MCNC: 125 MG/DL (ref 65–99)
HCT VFR BLD AUTO: 40.9 % (ref 37.5–51)
HGB BLD-MCNC: 11.6 G/DL (ref 13–17.7)
HYPOCHROMIA BLD QL: NORMAL
IMM GRANULOCYTES # BLD AUTO: 0.11 10*3/MM3 (ref 0–0.05)
IMM GRANULOCYTES NFR BLD AUTO: 1.3 % (ref 0–0.5)
LYMPHOCYTES # BLD AUTO: 1.3 10*3/MM3 (ref 0.7–3.1)
LYMPHOCYTES NFR BLD AUTO: 15.7 % (ref 19.6–45.3)
MCH RBC QN AUTO: 24.7 PG (ref 26.6–33)
MCHC RBC AUTO-ENTMCNC: 28.4 G/DL (ref 31.5–35.7)
MCV RBC AUTO: 87 FL (ref 79–97)
MONOCYTES # BLD AUTO: 0.81 10*3/MM3 (ref 0.1–0.9)
MONOCYTES NFR BLD AUTO: 9.8 % (ref 5–12)
NEUTROPHILS NFR BLD AUTO: 5.99 10*3/MM3 (ref 1.7–7)
NEUTROPHILS NFR BLD AUTO: 72.3 % (ref 42.7–76)
NRBC BLD AUTO-RTO: 0 /100 WBC (ref 0–0.2)
PLAT MORPH BLD: NORMAL
PLATELET # BLD AUTO: 216 10*3/MM3 (ref 140–450)
PMV BLD AUTO: 9.4 FL (ref 6–12)
POTASSIUM SERPL-SCNC: 4.2 MMOL/L (ref 3.5–5.2)
PROT SERPL-MCNC: 5.6 G/DL (ref 6–8.5)
RBC # BLD AUTO: 4.7 10*6/MM3 (ref 4.14–5.8)
SODIUM SERPL-SCNC: 144 MMOL/L (ref 136–145)
WBC # BLD AUTO: 8.29 10*3/MM3 (ref 3.4–10.8)

## 2021-08-06 PROCEDURE — 63710000001 INSULIN ASPART PER 5 UNITS: Performed by: INTERNAL MEDICINE

## 2021-08-06 PROCEDURE — 94799 UNLISTED PULMONARY SVC/PX: CPT

## 2021-08-06 PROCEDURE — 63710000001 INSULIN DETEMIR PER 5 UNITS: Performed by: INTERNAL MEDICINE

## 2021-08-06 PROCEDURE — 85025 COMPLETE CBC W/AUTO DIFF WBC: CPT | Performed by: INTERNAL MEDICINE

## 2021-08-06 PROCEDURE — 25010000002 ACETAZOLAMIDE PER 500 MG: Performed by: INTERNAL MEDICINE

## 2021-08-06 PROCEDURE — 85007 BL SMEAR W/DIFF WBC COUNT: CPT | Performed by: INTERNAL MEDICINE

## 2021-08-06 PROCEDURE — 25010000002 HYDRALAZINE PER 20 MG: Performed by: INTERNAL MEDICINE

## 2021-08-06 PROCEDURE — 82962 GLUCOSE BLOOD TEST: CPT

## 2021-08-06 PROCEDURE — 80053 COMPREHEN METABOLIC PANEL: CPT | Performed by: INTERNAL MEDICINE

## 2021-08-06 PROCEDURE — 83735 ASSAY OF MAGNESIUM: CPT | Performed by: PHYSICIAN ASSISTANT

## 2021-08-06 PROCEDURE — 99232 SBSQ HOSP IP/OBS MODERATE 35: CPT | Performed by: INTERNAL MEDICINE

## 2021-08-06 RX ORDER — LISINOPRIL 2.5 MG/1
5 TABLET ORAL
Status: DISCONTINUED | OUTPATIENT
Start: 2021-08-07 | End: 2021-08-12 | Stop reason: HOSPADM

## 2021-08-06 RX ADMIN — IPRATROPIUM BROMIDE AND ALBUTEROL SULFATE 3 ML: .5; 3 SOLUTION RESPIRATORY (INHALATION) at 06:29

## 2021-08-06 RX ADMIN — LISINOPRIL 2.5 MG: 2.5 TABLET ORAL at 08:08

## 2021-08-06 RX ADMIN — GUAIFENESIN 1200 MG: 600 TABLET, EXTENDED RELEASE ORAL at 20:14

## 2021-08-06 RX ADMIN — DOCUSATE SODIUM 100 MG: 100 CAPSULE, LIQUID FILLED ORAL at 20:14

## 2021-08-06 RX ADMIN — GUAIFENESIN 1200 MG: 600 TABLET, EXTENDED RELEASE ORAL at 08:07

## 2021-08-06 RX ADMIN — TAMSULOSIN HYDROCHLORIDE 0.4 MG: 0.4 CAPSULE ORAL at 20:14

## 2021-08-06 RX ADMIN — ACETAZOLAMIDE 250 MG: 500 INJECTION, POWDER, LYOPHILIZED, FOR SOLUTION INTRAVENOUS at 11:59

## 2021-08-06 RX ADMIN — POTASSIUM CHLORIDE 10 MEQ: 750 TABLET, FILM COATED, EXTENDED RELEASE ORAL at 08:08

## 2021-08-06 RX ADMIN — SODIUM CHLORIDE, PRESERVATIVE FREE 10 ML: 5 INJECTION INTRAVENOUS at 20:15

## 2021-08-06 RX ADMIN — CARVEDILOL 25 MG: 25 TABLET, FILM COATED ORAL at 08:08

## 2021-08-06 RX ADMIN — APIXABAN 5 MG: 5 TABLET, FILM COATED ORAL at 08:08

## 2021-08-06 RX ADMIN — GABAPENTIN 600 MG: 300 CAPSULE ORAL at 20:14

## 2021-08-06 RX ADMIN — HYDROCODONE BITARTRATE AND ACETAMINOPHEN 1 TABLET: 7.5; 325 TABLET ORAL at 08:08

## 2021-08-06 RX ADMIN — DOCUSATE SODIUM 100 MG: 100 CAPSULE, LIQUID FILLED ORAL at 08:13

## 2021-08-06 RX ADMIN — SODIUM CHLORIDE, PRESERVATIVE FREE 10 ML: 5 INJECTION INTRAVENOUS at 08:08

## 2021-08-06 RX ADMIN — PANTOPRAZOLE SODIUM 40 MG: 40 TABLET, DELAYED RELEASE ORAL at 08:08

## 2021-08-06 RX ADMIN — IPRATROPIUM BROMIDE AND ALBUTEROL SULFATE 3 ML: .5; 3 SOLUTION RESPIRATORY (INHALATION) at 18:58

## 2021-08-06 RX ADMIN — IPRATROPIUM BROMIDE AND ALBUTEROL SULFATE 3 ML: .5; 3 SOLUTION RESPIRATORY (INHALATION) at 12:25

## 2021-08-06 RX ADMIN — GABAPENTIN 600 MG: 300 CAPSULE ORAL at 08:08

## 2021-08-06 RX ADMIN — BUDESONIDE AND FORMOTEROL FUMARATE DIHYDRATE 2 PUFF: 160; 4.5 AEROSOL RESPIRATORY (INHALATION) at 18:58

## 2021-08-06 RX ADMIN — Medication 5 MG: at 23:39

## 2021-08-06 RX ADMIN — POLYETHYLENE GLYCOL (3350) 17 G: 17 POWDER, FOR SOLUTION ORAL at 08:07

## 2021-08-06 RX ADMIN — IPRATROPIUM BROMIDE AND ALBUTEROL SULFATE 3 ML: .5; 3 SOLUTION RESPIRATORY (INHALATION) at 00:52

## 2021-08-06 RX ADMIN — FLUTICASONE PROPIONATE 2 SPRAY: 50 SPRAY, METERED NASAL at 08:09

## 2021-08-06 RX ADMIN — INSULIN ASPART 2 UNITS: 100 INJECTION, SOLUTION INTRAVENOUS; SUBCUTANEOUS at 11:59

## 2021-08-06 RX ADMIN — INSULIN ASPART 20 UNITS: 100 INJECTION, SOLUTION INTRAVENOUS; SUBCUTANEOUS at 11:59

## 2021-08-06 RX ADMIN — INSULIN DETEMIR 45 UNITS: 100 INJECTION, SOLUTION SUBCUTANEOUS at 21:00

## 2021-08-06 RX ADMIN — AMOXICILLIN AND CLAVULANATE POTASSIUM 1 TABLET: 875; 125 TABLET, FILM COATED ORAL at 20:14

## 2021-08-06 RX ADMIN — ATORVASTATIN CALCIUM 20 MG: 20 TABLET, FILM COATED ORAL at 08:08

## 2021-08-06 RX ADMIN — HYDRALAZINE HYDROCHLORIDE 10 MG: 20 INJECTION INTRAMUSCULAR; INTRAVENOUS at 06:20

## 2021-08-06 RX ADMIN — INSULIN ASPART 20 UNITS: 100 INJECTION, SOLUTION INTRAVENOUS; SUBCUTANEOUS at 17:29

## 2021-08-06 RX ADMIN — BUMETANIDE 2 MG: 1 TABLET ORAL at 08:07

## 2021-08-06 RX ADMIN — DOCUSATE SODIUM 50 MG AND SENNOSIDES 8.6 MG 2 TABLET: 8.6; 5 TABLET, FILM COATED ORAL at 20:14

## 2021-08-06 RX ADMIN — DOCUSATE SODIUM 50 MG AND SENNOSIDES 8.6 MG 2 TABLET: 8.6; 5 TABLET, FILM COATED ORAL at 08:07

## 2021-08-06 RX ADMIN — BUDESONIDE AND FORMOTEROL FUMARATE DIHYDRATE 2 PUFF: 160; 4.5 AEROSOL RESPIRATORY (INHALATION) at 06:29

## 2021-08-06 RX ADMIN — HYDROCODONE BITARTRATE AND ACETAMINOPHEN 1 TABLET: 7.5; 325 TABLET ORAL at 20:14

## 2021-08-06 RX ADMIN — CARVEDILOL 25 MG: 25 TABLET, FILM COATED ORAL at 17:30

## 2021-08-06 RX ADMIN — AMOXICILLIN AND CLAVULANATE POTASSIUM 1 TABLET: 875; 125 TABLET, FILM COATED ORAL at 08:08

## 2021-08-06 RX ADMIN — APIXABAN 5 MG: 5 TABLET, FILM COATED ORAL at 20:14

## 2021-08-06 NOTE — CASE MANAGEMENT/SOCIAL WORK
Discharge Planning Assessment  [facility]     Patient Name: Иван Miller  MRN: [id number]  Today's Date: 8/6/2021    Admit Date: 7/31/2021      Discharge Plan     Row Name 08/06/21 1115       Plan    Plan SS received call from Mercedes at [facility] stating she does not have a bed available. SS received call from Ann at [facility] and pt has been approved clinically, however pre-auth will need to be started if pt is agreeable. [facility] came to visit pt yesterday and discussed placement at [facility] with him. SS spoke to pt and he voices agreement for [facility] to start pre-auth with his insurance. SS notified [facility] per Ann. SS to follow.        Continued Care and Services - Admitted Since 7/31/2021     Destination     Service Provider Request Status Selected Services Address Phone Fax Patient Preferred    [facility]  Pending - No Request Sent N/A [address] [facility] 11Psychiatric [id number] [phone] --    [facility]  Pending - No Request Sent N/A [address] , [facility] [address] [phone] --    [facility]  Pending - No Request Sent N/A [address] [facility] [address], [facility] [address] [phone] --    [facility]  Pending - No Request Sent N/A [address] [facility] [address] [phone] --    [facility]  Declined  Unable to Meet Patient Needs N/A [address] [facility] [address] [phone] --    [facility]  Declined  No Bariatric Bed Available N/A [address] [facility] [place] [address] [phone] --    [facility]  Declined  No Bariatric Bed Available N/A [address] [facility] [address] [phone] --    [facility]  Declined N/A [address], [facility] [address] [phone]  [phone] --            Selected Continued Care - Prior Encounters Includes selections from prior encounters from 5/2/2021 to 8/6/2021    Discharged on 6/16/2021 Admission date: 5/21/2021 - Discharge disposition: Skilled Nursing Facility (DC - External)    Destination     Service Provider Selected Services Address Phone Fax Patient Preferred    [facility]  Skilled Nursing [address] [id number] [phone] --                    NAISH Gustafson

## 2021-08-06 NOTE — PLAN OF CARE
Goal Outcome Evaluation:           Progress: no change  Outcome Summary: Pt has rested in bed throughout shift. PO antibiotics started. No acute changes noted.

## 2021-08-06 NOTE — PROGRESS NOTES
Antimicrobial Length of Therapy:    Augmentin day 2 of 3 (total day 6 of antibiotics)    Thank you,    Cindi Hernández, PharmD  Pharmacy Resident  8/6/2021  12:04 EDT

## 2021-08-06 NOTE — PLAN OF CARE
Goal Outcome Evaluation:   Patient resting in bed.  Complaints of pain relieved with medication.  No other issues noted.

## 2021-08-06 NOTE — PROGRESS NOTES
Subjective     History:   Иван Miller is a 65 y.o. male admitted on 7/31/2021 secondary to Pneumonia of both lungs due to infectious organism     Procedures: None    CC: Follow up pneumonia, resp failure    Patient seen and examined. Awake and alert. Reports episode of dyspnea and chest congestion this AM but overall improved Denies CP. No reported nausea or vomiting. (+) BM's today. No acute events overnight per RN.     History taken from: patient, chart, and RN.      Objective     Vital Signs  Temp:  [98 °F (36.7 °C)-98.6 °F (37 °C)] 98.2 °F (36.8 °C)  Heart Rate:  [58-90] 77  Resp:  [18-24] 18  BP: (105-182)/(57-98) 133/83    Intake/Output Summary (Last 24 hours) at 8/6/2021 1944  Last data filed at 8/6/2021 1748  Gross per 24 hour   Intake 840 ml   Output 1975 ml   Net -1135 ml         Physical Exam:  General:    Awake, alert, in no acute distress   Heart:      Normal S1 and S2. Regular rate and rhythm. No significant murmur, rubs or gallops appreciated.   Lungs:     Respirations regular, even and unlabored. Scattered rales. No wheezes appreciated.  No rhonchi.   Abdomen:   Soft and nontender. No guarding, rebound tenderness or  organomegaly noted. Bowel sounds present x 4.   Extremities:  1-2+ bilateral lower extremity edema. Moves UE and LE equally B/L.     Results Review:    Results from last 7 days   Lab Units 08/06/21  0206 08/05/21  0102 08/04/21  0143 08/03/21  0033 08/02/21  0032 08/01/21  0805 08/01/21  0030   WBC 10*3/mm3 8.29 8.20 8.48 8.53 6.90 8.25 8.12   HEMOGLOBIN g/dL 11.6* 11.4* 12.0* 11.4* 11.9* 12.4* 12.2*   PLATELETS 10*3/mm3 216 223 246 224 255 247 231     Results from last 7 days   Lab Units 08/06/21  0206 08/05/21  0102 08/04/21  0143 08/03/21  0033 08/02/21  0032 08/01/21  0805 08/01/21  0030   SODIUM mmol/L 144 143 141 142 141 142 144   POTASSIUM mmol/L 4.2 4.4 4.0 3.9 3.8 3.9 4.1   CHLORIDE mmol/L 100 100 100 99 98 98 99   CO2 mmol/L 40.0* 38.2* 35.1* 37.2* 31.8* 34.4* 37.4*   BUN  mg/dL 26* 29* 36* 37* 31* 26* 22   CREATININE mg/dL 0.70* 0.86 0.94 1.13 0.87 0.77 0.82   CALCIUM mg/dL 8.8 8.9 8.7 8.7 9.3 9.3 9.3   GLUCOSE mg/dL 125* 190* 140* 176* 233* 238* 271*     Results from last 7 days   Lab Units 08/06/21  0206 08/03/21  0033 08/01/21  0805 08/01/21  0030   BILIRUBIN mg/dL 0.2 0.2 0.3 0.2   ALK PHOS U/L 49 53 72 76   AST (SGOT) U/L 9 22 10 13   ALT (SGPT) U/L 20 34 15 18             Results from last 7 days   Lab Units 08/01/21  0310 08/01/21  0030   TROPONIN T ng/mL <0.010 0.013       Imaging Results (Last 24 Hours)     ** No results found for the last 24 hours. **            Medications:  amoxicillin-clavulanate, 1 tablet, Oral, Q12H  apixaban, 5 mg, Oral, Q12H  atorvastatin, 20 mg, Oral, Daily  budesonide-formoterol, 2 puff, Inhalation, BID - RT  bumetanide, 2 mg, Oral, Daily  carvedilol, 25 mg, Oral, BID With Meals  docusate sodium, 100 mg, Oral, BID  empagliflozin, 20 mg, Oral, Daily  fluticasone, 2 spray, Nasal, Daily  gabapentin, 600 mg, Oral, Q12H  guaiFENesin, 1,200 mg, Oral, Q12H  HYDROcodone-acetaminophen, 1 tablet, Oral, BID  insulin aspart, 0-9 Units, Subcutaneous, 4x Daily AC & at Bedtime  insulin aspart, 20 Units, Subcutaneous, TID With Meals  insulin detemir, 45 Units, Subcutaneous, Nightly  ipratropium-albuterol, 3 mL, Nebulization, 4x Daily - RT  lidocaine, 1 patch, Transdermal, Nightly  [START ON 8/7/2021] lisinopril, 5 mg, Oral, Q24H  pantoprazole, 40 mg, Oral, Daily  polyethylene glycol, 17 g, Oral, Daily  potassium chloride, 10 mEq, Oral, Daily  sennosides-docusate, 2 tablet, Oral, BID  sodium chloride, 10 mL, Intravenous, Q12H  tamsulosin, 0.4 mg, Oral, Nightly               Assessment/Plan   Bilateral pneumonia: CT chest reveals LUZ ELENA and RLL consolidation with concern for aspiration. Blood cultures with NGTD. Sputum culture revealing normal respiratory crystal. No evidence of aspiration on swallow eval. Transitioned from Zosyn to Augmentin to complete course of  treatment. Cont nebs.     Acute exacerbation of COPD: Completed course of Prednisone. Cont Symbicort and nebs.     Acute hypercapnic on chronic hypoxic respiratory failure: Likely 2/2 above. Improved with BiPAP. Cont treatment as outlined above. Cont supplemental O2. Cont to encourage compliance with BiPAP.     Essential HTN, uncontrolled upon admission: Cont home Coreg. Added lisinopril. BP later borderline low intermittently and decreased lisinopril. BP has trended upward again and increased lisinopril. Cont PRN IV hydralazine.     DM II, inulin dependent with hyperglycemia: Improved with restarting home insulin. Decreased meal time insulin 2/2 subsequent hypoglycemia. Stable today. Cont SSI with Accuchecks.     Recent right ankle avulsion fracture with reported hx of paraplegia: PT/OT. Supportive treatment.     Hx of chronic thoracic osteomyelitis: Cont to monitor.     Hx of Afib: Currently in sinus rhythm. Cont home Coreg. Cont Eliquis for stroke prevention. Monitor on telemetry.     Lower extremity edema: Normal EF on recent echo. ReDs Vest reading remains elevated but slightly improved. Ordered Diamox today in the setting of alkalosis. Cont 1500 fluid restriction.     Constipation: Cont bowel regimen.     DVT PPX: Eliquis.     Disposition: SS working to arrange SNF placement.       Mandeep Dunlap,   08/06/21  19:44 EDT

## 2021-08-07 LAB
ANION GAP SERPL CALCULATED.3IONS-SCNC: 4.5 MMOL/L (ref 5–15)
ANISOCYTOSIS BLD QL: NORMAL
BASOPHILS # BLD AUTO: 0.03 10*3/MM3 (ref 0–0.2)
BASOPHILS NFR BLD AUTO: 0.4 % (ref 0–1.5)
BUN SERPL-MCNC: 24 MG/DL (ref 8–23)
BUN/CREAT SERPL: 33.8 (ref 7–25)
CALCIUM SPEC-SCNC: 9 MG/DL (ref 8.6–10.5)
CHLORIDE SERPL-SCNC: 99 MMOL/L (ref 98–107)
CO2 SERPL-SCNC: 37.5 MMOL/L (ref 22–29)
CREAT SERPL-MCNC: 0.71 MG/DL (ref 0.76–1.27)
DEPRECATED RDW RBC AUTO: 53 FL (ref 37–54)
EOSINOPHIL # BLD AUTO: 0.26 10*3/MM3 (ref 0–0.4)
EOSINOPHIL NFR BLD AUTO: 3.2 % (ref 0.3–6.2)
ERYTHROCYTE [DISTWIDTH] IN BLOOD BY AUTOMATED COUNT: 16.8 % (ref 12.3–15.4)
GFR SERPL CREATININE-BSD FRML MDRD: 111 ML/MIN/1.73
GLUCOSE BLDC GLUCOMTR-MCNC: 121 MG/DL (ref 70–130)
GLUCOSE BLDC GLUCOMTR-MCNC: 151 MG/DL (ref 70–130)
GLUCOSE BLDC GLUCOMTR-MCNC: 166 MG/DL (ref 70–130)
GLUCOSE BLDC GLUCOMTR-MCNC: 224 MG/DL (ref 70–130)
GLUCOSE SERPL-MCNC: 142 MG/DL (ref 65–99)
HCT VFR BLD AUTO: 40.7 % (ref 37.5–51)
HGB BLD-MCNC: 11.3 G/DL (ref 13–17.7)
HYPOCHROMIA BLD QL: NORMAL
IMM GRANULOCYTES # BLD AUTO: 0.09 10*3/MM3 (ref 0–0.05)
IMM GRANULOCYTES NFR BLD AUTO: 1.1 % (ref 0–0.5)
LYMPHOCYTES # BLD AUTO: 1.25 10*3/MM3 (ref 0.7–3.1)
LYMPHOCYTES NFR BLD AUTO: 15.5 % (ref 19.6–45.3)
MAGNESIUM SERPL-MCNC: 2.3 MG/DL (ref 1.6–2.4)
MCH RBC QN AUTO: 24.1 PG (ref 26.6–33)
MCHC RBC AUTO-ENTMCNC: 27.8 G/DL (ref 31.5–35.7)
MCV RBC AUTO: 87 FL (ref 79–97)
MONOCYTES # BLD AUTO: 0.91 10*3/MM3 (ref 0.1–0.9)
MONOCYTES NFR BLD AUTO: 11.3 % (ref 5–12)
NEUTROPHILS NFR BLD AUTO: 5.53 10*3/MM3 (ref 1.7–7)
NEUTROPHILS NFR BLD AUTO: 68.5 % (ref 42.7–76)
NRBC BLD AUTO-RTO: 0 /100 WBC (ref 0–0.2)
PLAT MORPH BLD: NORMAL
PLATELET # BLD AUTO: 213 10*3/MM3 (ref 140–450)
PMV BLD AUTO: 9.1 FL (ref 6–12)
POTASSIUM SERPL-SCNC: 4.1 MMOL/L (ref 3.5–5.2)
RBC # BLD AUTO: 4.68 10*6/MM3 (ref 4.14–5.8)
SODIUM SERPL-SCNC: 141 MMOL/L (ref 136–145)
WBC # BLD AUTO: 8.07 10*3/MM3 (ref 3.4–10.8)

## 2021-08-07 PROCEDURE — 63710000001 INSULIN ASPART PER 5 UNITS: Performed by: INTERNAL MEDICINE

## 2021-08-07 PROCEDURE — 25010000002 ACETAZOLAMIDE PER 500 MG: Performed by: INTERNAL MEDICINE

## 2021-08-07 PROCEDURE — 94660 CPAP INITIATION&MGMT: CPT

## 2021-08-07 PROCEDURE — 63710000001 INSULIN DETEMIR PER 5 UNITS: Performed by: INTERNAL MEDICINE

## 2021-08-07 PROCEDURE — 82962 GLUCOSE BLOOD TEST: CPT

## 2021-08-07 PROCEDURE — 99232 SBSQ HOSP IP/OBS MODERATE 35: CPT | Performed by: INTERNAL MEDICINE

## 2021-08-07 PROCEDURE — 94799 UNLISTED PULMONARY SVC/PX: CPT

## 2021-08-07 RX ADMIN — LISINOPRIL 5 MG: 2.5 TABLET ORAL at 09:06

## 2021-08-07 RX ADMIN — BUDESONIDE AND FORMOTEROL FUMARATE DIHYDRATE 2 PUFF: 160; 4.5 AEROSOL RESPIRATORY (INHALATION) at 07:35

## 2021-08-07 RX ADMIN — Medication 5 MG: at 23:26

## 2021-08-07 RX ADMIN — GUAIFENESIN 1200 MG: 600 TABLET, EXTENDED RELEASE ORAL at 20:20

## 2021-08-07 RX ADMIN — DOCUSATE SODIUM 100 MG: 100 CAPSULE, LIQUID FILLED ORAL at 09:05

## 2021-08-07 RX ADMIN — FLUTICASONE PROPIONATE 2 SPRAY: 50 SPRAY, METERED NASAL at 09:08

## 2021-08-07 RX ADMIN — CARVEDILOL 25 MG: 25 TABLET, FILM COATED ORAL at 17:49

## 2021-08-07 RX ADMIN — IPRATROPIUM BROMIDE AND ALBUTEROL SULFATE 3 ML: .5; 3 SOLUTION RESPIRATORY (INHALATION) at 00:38

## 2021-08-07 RX ADMIN — INSULIN ASPART 20 UNITS: 100 INJECTION, SOLUTION INTRAVENOUS; SUBCUTANEOUS at 17:45

## 2021-08-07 RX ADMIN — INSULIN ASPART 4 UNITS: 100 INJECTION, SOLUTION INTRAVENOUS; SUBCUTANEOUS at 20:28

## 2021-08-07 RX ADMIN — LIDOCAINE 1 PATCH: 50 PATCH CUTANEOUS at 20:22

## 2021-08-07 RX ADMIN — INSULIN ASPART 2 UNITS: 100 INJECTION, SOLUTION INTRAVENOUS; SUBCUTANEOUS at 17:44

## 2021-08-07 RX ADMIN — DOCUSATE SODIUM 50 MG AND SENNOSIDES 8.6 MG 2 TABLET: 8.6; 5 TABLET, FILM COATED ORAL at 20:20

## 2021-08-07 RX ADMIN — APIXABAN 5 MG: 5 TABLET, FILM COATED ORAL at 20:20

## 2021-08-07 RX ADMIN — POLYETHYLENE GLYCOL (3350) 17 G: 17 POWDER, FOR SOLUTION ORAL at 09:13

## 2021-08-07 RX ADMIN — BUDESONIDE AND FORMOTEROL FUMARATE DIHYDRATE 2 PUFF: 160; 4.5 AEROSOL RESPIRATORY (INHALATION) at 20:03

## 2021-08-07 RX ADMIN — TAMSULOSIN HYDROCHLORIDE 0.4 MG: 0.4 CAPSULE ORAL at 20:20

## 2021-08-07 RX ADMIN — CARVEDILOL 25 MG: 25 TABLET, FILM COATED ORAL at 09:05

## 2021-08-07 RX ADMIN — ACETAZOLAMIDE 250 MG: 500 INJECTION, POWDER, LYOPHILIZED, FOR SOLUTION INTRAVENOUS at 11:21

## 2021-08-07 RX ADMIN — ATORVASTATIN CALCIUM 20 MG: 20 TABLET, FILM COATED ORAL at 09:06

## 2021-08-07 RX ADMIN — HYDROCODONE BITARTRATE AND ACETAMINOPHEN 1 TABLET: 7.5; 325 TABLET ORAL at 09:10

## 2021-08-07 RX ADMIN — INSULIN DETEMIR 45 UNITS: 100 INJECTION, SOLUTION SUBCUTANEOUS at 21:28

## 2021-08-07 RX ADMIN — INSULIN ASPART 20 UNITS: 100 INJECTION, SOLUTION INTRAVENOUS; SUBCUTANEOUS at 11:58

## 2021-08-07 RX ADMIN — GABAPENTIN 600 MG: 300 CAPSULE ORAL at 20:20

## 2021-08-07 RX ADMIN — GABAPENTIN 600 MG: 300 CAPSULE ORAL at 09:10

## 2021-08-07 RX ADMIN — ALBUTEROL SULFATE 2.5 MG: 2.5 SOLUTION RESPIRATORY (INHALATION) at 10:21

## 2021-08-07 RX ADMIN — IPRATROPIUM BROMIDE AND ALBUTEROL SULFATE 3 ML: .5; 3 SOLUTION RESPIRATORY (INHALATION) at 07:35

## 2021-08-07 RX ADMIN — APIXABAN 5 MG: 5 TABLET, FILM COATED ORAL at 09:06

## 2021-08-07 RX ADMIN — DOCUSATE SODIUM 50 MG AND SENNOSIDES 8.6 MG 2 TABLET: 8.6; 5 TABLET, FILM COATED ORAL at 09:05

## 2021-08-07 RX ADMIN — GUAIFENESIN 1200 MG: 600 TABLET, EXTENDED RELEASE ORAL at 09:06

## 2021-08-07 RX ADMIN — IPRATROPIUM BROMIDE AND ALBUTEROL SULFATE 3 ML: .5; 3 SOLUTION RESPIRATORY (INHALATION) at 20:03

## 2021-08-07 RX ADMIN — INSULIN ASPART 2 UNITS: 100 INJECTION, SOLUTION INTRAVENOUS; SUBCUTANEOUS at 11:58

## 2021-08-07 RX ADMIN — AMOXICILLIN AND CLAVULANATE POTASSIUM 1 TABLET: 875; 125 TABLET, FILM COATED ORAL at 09:06

## 2021-08-07 RX ADMIN — DOCUSATE SODIUM 100 MG: 100 CAPSULE, LIQUID FILLED ORAL at 20:20

## 2021-08-07 RX ADMIN — HYDROCODONE BITARTRATE AND ACETAMINOPHEN 1 TABLET: 7.5; 325 TABLET ORAL at 20:20

## 2021-08-07 RX ADMIN — AMOXICILLIN AND CLAVULANATE POTASSIUM 1 TABLET: 875; 125 TABLET, FILM COATED ORAL at 20:20

## 2021-08-07 RX ADMIN — INSULIN ASPART 20 UNITS: 100 INJECTION, SOLUTION INTRAVENOUS; SUBCUTANEOUS at 09:11

## 2021-08-07 RX ADMIN — POTASSIUM CHLORIDE 10 MEQ: 750 TABLET, FILM COATED, EXTENDED RELEASE ORAL at 09:05

## 2021-08-07 RX ADMIN — IPRATROPIUM BROMIDE AND ALBUTEROL SULFATE 3 ML: .5; 3 SOLUTION RESPIRATORY (INHALATION) at 13:01

## 2021-08-07 RX ADMIN — SODIUM CHLORIDE, PRESERVATIVE FREE 10 ML: 5 INJECTION INTRAVENOUS at 09:18

## 2021-08-07 RX ADMIN — PANTOPRAZOLE SODIUM 40 MG: 40 TABLET, DELAYED RELEASE ORAL at 09:05

## 2021-08-08 LAB
ABSOLUTE LUNG FLUID CONTENT: 40 % (ref 20–35)
ALBUMIN SERPL-MCNC: 2.88 G/DL (ref 3.5–5.2)
ALBUMIN/GLOB SERPL: 1.2 G/DL
ALP SERPL-CCNC: 50 U/L (ref 39–117)
ALT SERPL W P-5'-P-CCNC: 16 U/L (ref 1–41)
ANION GAP SERPL CALCULATED.3IONS-SCNC: 3.6 MMOL/L (ref 5–15)
ANISOCYTOSIS BLD QL: NORMAL
AST SERPL-CCNC: 9 U/L (ref 1–40)
BASOPHILS # BLD AUTO: 0.02 10*3/MM3 (ref 0–0.2)
BASOPHILS NFR BLD AUTO: 0.3 % (ref 0–1.5)
BILIRUB SERPL-MCNC: 0.2 MG/DL (ref 0–1.2)
BUN SERPL-MCNC: 27 MG/DL (ref 8–23)
BUN/CREAT SERPL: 29.7 (ref 7–25)
CALCIUM SPEC-SCNC: 8.8 MG/DL (ref 8.6–10.5)
CHLORIDE SERPL-SCNC: 101 MMOL/L (ref 98–107)
CO2 SERPL-SCNC: 34.4 MMOL/L (ref 22–29)
CREAT SERPL-MCNC: 0.91 MG/DL (ref 0.76–1.27)
DEPRECATED RDW RBC AUTO: 54 FL (ref 37–54)
EOSINOPHIL # BLD AUTO: 0.24 10*3/MM3 (ref 0–0.4)
EOSINOPHIL NFR BLD AUTO: 3.3 % (ref 0.3–6.2)
ERYTHROCYTE [DISTWIDTH] IN BLOOD BY AUTOMATED COUNT: 16.9 % (ref 12.3–15.4)
GFR SERPL CREATININE-BSD FRML MDRD: 83 ML/MIN/1.73
GLOBULIN UR ELPH-MCNC: 2.3 GM/DL
GLUCOSE BLDC GLUCOMTR-MCNC: 127 MG/DL (ref 70–130)
GLUCOSE BLDC GLUCOMTR-MCNC: 157 MG/DL (ref 70–130)
GLUCOSE BLDC GLUCOMTR-MCNC: 186 MG/DL (ref 70–130)
GLUCOSE BLDC GLUCOMTR-MCNC: 190 MG/DL (ref 70–130)
GLUCOSE SERPL-MCNC: 180 MG/DL (ref 65–99)
HCT VFR BLD AUTO: 38 % (ref 37.5–51)
HGB BLD-MCNC: 10.4 G/DL (ref 13–17.7)
HYPOCHROMIA BLD QL: NORMAL
IMM GRANULOCYTES # BLD AUTO: 0.08 10*3/MM3 (ref 0–0.05)
IMM GRANULOCYTES NFR BLD AUTO: 1.1 % (ref 0–0.5)
LYMPHOCYTES # BLD AUTO: 1.06 10*3/MM3 (ref 0.7–3.1)
LYMPHOCYTES NFR BLD AUTO: 14.8 % (ref 19.6–45.3)
MCH RBC QN AUTO: 24 PG (ref 26.6–33)
MCHC RBC AUTO-ENTMCNC: 27.4 G/DL (ref 31.5–35.7)
MCV RBC AUTO: 87.8 FL (ref 79–97)
MONOCYTES # BLD AUTO: 0.79 10*3/MM3 (ref 0.1–0.9)
MONOCYTES NFR BLD AUTO: 11 % (ref 5–12)
NEUTROPHILS NFR BLD AUTO: 4.99 10*3/MM3 (ref 1.7–7)
NEUTROPHILS NFR BLD AUTO: 69.5 % (ref 42.7–76)
NRBC BLD AUTO-RTO: 0 /100 WBC (ref 0–0.2)
PLAT MORPH BLD: NORMAL
PLATELET # BLD AUTO: 210 10*3/MM3 (ref 140–450)
PMV BLD AUTO: 9.7 FL (ref 6–12)
POTASSIUM SERPL-SCNC: 3.8 MMOL/L (ref 3.5–5.2)
PROT SERPL-MCNC: 5.2 G/DL (ref 6–8.5)
RBC # BLD AUTO: 4.33 10*6/MM3 (ref 4.14–5.8)
SODIUM SERPL-SCNC: 139 MMOL/L (ref 136–145)
WBC # BLD AUTO: 7.18 10*3/MM3 (ref 3.4–10.8)

## 2021-08-08 PROCEDURE — 63710000001 INSULIN ASPART PER 5 UNITS: Performed by: INTERNAL MEDICINE

## 2021-08-08 PROCEDURE — 85025 COMPLETE CBC W/AUTO DIFF WBC: CPT | Performed by: INTERNAL MEDICINE

## 2021-08-08 PROCEDURE — 85007 BL SMEAR W/DIFF WBC COUNT: CPT | Performed by: INTERNAL MEDICINE

## 2021-08-08 PROCEDURE — 94726 PLETHYSMOGRAPHY LUNG VOLUMES: CPT

## 2021-08-08 PROCEDURE — 94799 UNLISTED PULMONARY SVC/PX: CPT

## 2021-08-08 PROCEDURE — 99232 SBSQ HOSP IP/OBS MODERATE 35: CPT | Performed by: INTERNAL MEDICINE

## 2021-08-08 PROCEDURE — 63710000001 INSULIN DETEMIR PER 5 UNITS: Performed by: INTERNAL MEDICINE

## 2021-08-08 PROCEDURE — 80053 COMPREHEN METABOLIC PANEL: CPT | Performed by: INTERNAL MEDICINE

## 2021-08-08 PROCEDURE — 82962 GLUCOSE BLOOD TEST: CPT

## 2021-08-08 PROCEDURE — 94660 CPAP INITIATION&MGMT: CPT

## 2021-08-08 RX ADMIN — FLUTICASONE PROPIONATE 2 SPRAY: 50 SPRAY, METERED NASAL at 08:26

## 2021-08-08 RX ADMIN — BUMETANIDE 3 MG: 0.25 INJECTION, SOLUTION INTRAMUSCULAR; INTRAVENOUS at 10:34

## 2021-08-08 RX ADMIN — INSULIN ASPART 2 UNITS: 100 INJECTION, SOLUTION INTRAVENOUS; SUBCUTANEOUS at 08:31

## 2021-08-08 RX ADMIN — ATORVASTATIN CALCIUM 20 MG: 20 TABLET, FILM COATED ORAL at 08:24

## 2021-08-08 RX ADMIN — SODIUM CHLORIDE, PRESERVATIVE FREE 10 ML: 5 INJECTION INTRAVENOUS at 08:37

## 2021-08-08 RX ADMIN — IPRATROPIUM BROMIDE AND ALBUTEROL SULFATE 3 ML: .5; 3 SOLUTION RESPIRATORY (INHALATION) at 00:55

## 2021-08-08 RX ADMIN — LIDOCAINE 1 PATCH: 50 PATCH CUTANEOUS at 20:19

## 2021-08-08 RX ADMIN — BUDESONIDE AND FORMOTEROL FUMARATE DIHYDRATE 2 PUFF: 160; 4.5 AEROSOL RESPIRATORY (INHALATION) at 18:54

## 2021-08-08 RX ADMIN — IPRATROPIUM BROMIDE AND ALBUTEROL SULFATE 3 ML: .5; 3 SOLUTION RESPIRATORY (INHALATION) at 07:31

## 2021-08-08 RX ADMIN — GUAIFENESIN 1200 MG: 600 TABLET, EXTENDED RELEASE ORAL at 08:24

## 2021-08-08 RX ADMIN — IPRATROPIUM BROMIDE AND ALBUTEROL SULFATE 3 ML: .5; 3 SOLUTION RESPIRATORY (INHALATION) at 13:56

## 2021-08-08 RX ADMIN — CARVEDILOL 25 MG: 25 TABLET, FILM COATED ORAL at 08:24

## 2021-08-08 RX ADMIN — HYDROCODONE BITARTRATE AND ACETAMINOPHEN 1 TABLET: 7.5; 325 TABLET ORAL at 20:19

## 2021-08-08 RX ADMIN — APIXABAN 5 MG: 5 TABLET, FILM COATED ORAL at 08:24

## 2021-08-08 RX ADMIN — INSULIN ASPART 2 UNITS: 100 INJECTION, SOLUTION INTRAVENOUS; SUBCUTANEOUS at 17:35

## 2021-08-08 RX ADMIN — BUDESONIDE AND FORMOTEROL FUMARATE DIHYDRATE 2 PUFF: 160; 4.5 AEROSOL RESPIRATORY (INHALATION) at 07:31

## 2021-08-08 RX ADMIN — POTASSIUM CHLORIDE 10 MEQ: 750 TABLET, FILM COATED, EXTENDED RELEASE ORAL at 08:24

## 2021-08-08 RX ADMIN — INSULIN ASPART 2 UNITS: 100 INJECTION, SOLUTION INTRAVENOUS; SUBCUTANEOUS at 20:26

## 2021-08-08 RX ADMIN — INSULIN ASPART 20 UNITS: 100 INJECTION, SOLUTION INTRAVENOUS; SUBCUTANEOUS at 12:08

## 2021-08-08 RX ADMIN — Medication 5 MG: at 22:53

## 2021-08-08 RX ADMIN — IPRATROPIUM BROMIDE AND ALBUTEROL SULFATE 3 ML: .5; 3 SOLUTION RESPIRATORY (INHALATION) at 18:54

## 2021-08-08 RX ADMIN — HYDROCODONE BITARTRATE AND ACETAMINOPHEN 1 TABLET: 7.5; 325 TABLET ORAL at 08:29

## 2021-08-08 RX ADMIN — INSULIN ASPART 20 UNITS: 100 INJECTION, SOLUTION INTRAVENOUS; SUBCUTANEOUS at 17:35

## 2021-08-08 RX ADMIN — PANTOPRAZOLE SODIUM 40 MG: 40 TABLET, DELAYED RELEASE ORAL at 08:24

## 2021-08-08 RX ADMIN — INSULIN DETEMIR 45 UNITS: 100 INJECTION, SOLUTION SUBCUTANEOUS at 21:30

## 2021-08-08 RX ADMIN — DOCUSATE SODIUM 100 MG: 100 CAPSULE, LIQUID FILLED ORAL at 20:20

## 2021-08-08 RX ADMIN — GABAPENTIN 600 MG: 300 CAPSULE ORAL at 08:29

## 2021-08-08 RX ADMIN — DOCUSATE SODIUM 100 MG: 100 CAPSULE, LIQUID FILLED ORAL at 08:24

## 2021-08-08 RX ADMIN — POLYETHYLENE GLYCOL (3350) 17 G: 17 POWDER, FOR SOLUTION ORAL at 08:24

## 2021-08-08 RX ADMIN — AMOXICILLIN AND CLAVULANATE POTASSIUM 1 TABLET: 875; 125 TABLET, FILM COATED ORAL at 08:24

## 2021-08-08 RX ADMIN — INSULIN ASPART 20 UNITS: 100 INJECTION, SOLUTION INTRAVENOUS; SUBCUTANEOUS at 08:31

## 2021-08-08 RX ADMIN — ALBUTEROL SULFATE 2.5 MG: 2.5 SOLUTION RESPIRATORY (INHALATION) at 09:45

## 2021-08-08 RX ADMIN — LISINOPRIL 5 MG: 2.5 TABLET ORAL at 08:24

## 2021-08-08 RX ADMIN — CARVEDILOL 25 MG: 25 TABLET, FILM COATED ORAL at 17:34

## 2021-08-08 RX ADMIN — TAMSULOSIN HYDROCHLORIDE 0.4 MG: 0.4 CAPSULE ORAL at 20:20

## 2021-08-08 RX ADMIN — DOCUSATE SODIUM 50 MG AND SENNOSIDES 8.6 MG 2 TABLET: 8.6; 5 TABLET, FILM COATED ORAL at 08:23

## 2021-08-08 RX ADMIN — GUAIFENESIN 1200 MG: 600 TABLET, EXTENDED RELEASE ORAL at 20:20

## 2021-08-08 RX ADMIN — DOCUSATE SODIUM 50 MG AND SENNOSIDES 8.6 MG 2 TABLET: 8.6; 5 TABLET, FILM COATED ORAL at 20:20

## 2021-08-08 RX ADMIN — GABAPENTIN 600 MG: 300 CAPSULE ORAL at 20:19

## 2021-08-08 RX ADMIN — APIXABAN 5 MG: 5 TABLET, FILM COATED ORAL at 20:20

## 2021-08-08 NOTE — PROGRESS NOTES
Subjective     History:   Иван Miller is a 65 y.o. male admitted on 7/31/2021 secondary to Pneumonia of both lungs due to infectious organism     Procedures: None    CC: Follow up pneumonia, resp failure    Patient seen and examined with MARCELO Fry. Awake and alert. Reports improvement in his cough,dyspnea and chest congestion. No reported CP. No reported nausea or vomiting. No acute events overnight per RN.     History taken from: patient, chart, and RN.      Objective     Vital Signs  Temp:  [98.2 °F (36.8 °C)-98.6 °F (37 °C)] 98.6 °F (37 °C)  Heart Rate:  [53-89] 67  Resp:  [18-22] 20  BP: (108-159)/(60-85) 132/64    Intake/Output Summary (Last 24 hours) at 8/8/2021 1614  Last data filed at 8/8/2021 1529  Gross per 24 hour   Intake 840 ml   Output 2550 ml   Net -1710 ml         Physical Exam:    General:    Awake, alert, in no acute distress   Heart:      Normal S1 and S2. Regular rate and rhythm. No significant murmur, rubs or gallops appreciated.   Lungs:     Respirations regular, even and unlabored. Scattered rales. No wheezes appreciated.  No rhonchi.   Abdomen:   Soft and nontender. No guarding, rebound tenderness or  organomegaly noted. Bowel sounds present x 4.   Extremities:  2+ bilateral lower extremity edema. Moves UE and LE equally B/L.     Results Review:    Results from last 7 days   Lab Units 08/08/21  0532 08/06/21  2334 08/06/21  0206 08/05/21  0102 08/04/21  0143 08/03/21  0033 08/02/21  0032   WBC 10*3/mm3 7.18 8.07 8.29 8.20 8.48 8.53 6.90   HEMOGLOBIN g/dL 10.4* 11.3* 11.6* 11.4* 12.0* 11.4* 11.9*   PLATELETS 10*3/mm3 210 213 216 223 246 224 255     Results from last 7 days   Lab Units 08/08/21  0532 08/06/21  2334 08/06/21  0206 08/05/21  0102 08/04/21  0143 08/03/21  0033 08/02/21  0032   SODIUM mmol/L 139 141 144 143 141 142 141   POTASSIUM mmol/L 3.8 4.1 4.2 4.4 4.0 3.9 3.8   CHLORIDE mmol/L 101 99 100 100 100 99 98   CO2 mmol/L 34.4* 37.5* 40.0* 38.2* 35.1* 37.2* 31.8*   BUN mg/dL 27*  24* 26* 29* 36* 37* 31*   CREATININE mg/dL 0.91 0.71* 0.70* 0.86 0.94 1.13 0.87   CALCIUM mg/dL 8.8 9.0 8.8 8.9 8.7 8.7 9.3   GLUCOSE mg/dL 180* 142* 125* 190* 140* 176* 233*     Results from last 7 days   Lab Units 08/08/21  0532 08/06/21  0206 08/03/21  0033   BILIRUBIN mg/dL 0.2 0.2 0.2   ALK PHOS U/L 50 49 53   AST (SGOT) U/L 9 9 22   ALT (SGPT) U/L 16 20 34     Results from last 7 days   Lab Units 08/06/21  2334   MAGNESIUM mg/dL 2.3               Imaging Results (Last 24 Hours)     ** No results found for the last 24 hours. **            Medications:  apixaban, 5 mg, Oral, Q12H  atorvastatin, 20 mg, Oral, Daily  budesonide-formoterol, 2 puff, Inhalation, BID - RT  carvedilol, 25 mg, Oral, BID With Meals  docusate sodium, 100 mg, Oral, BID  empagliflozin, 20 mg, Oral, Daily  fluticasone, 2 spray, Nasal, Daily  gabapentin, 600 mg, Oral, Q12H  guaiFENesin, 1,200 mg, Oral, Q12H  HYDROcodone-acetaminophen, 1 tablet, Oral, BID  insulin aspart, 0-9 Units, Subcutaneous, 4x Daily AC & at Bedtime  insulin aspart, 20 Units, Subcutaneous, TID With Meals  insulin detemir, 45 Units, Subcutaneous, Nightly  ipratropium-albuterol, 3 mL, Nebulization, 4x Daily - RT  lidocaine, 1 patch, Transdermal, Nightly  lisinopril, 5 mg, Oral, Q24H  pantoprazole, 40 mg, Oral, Daily  polyethylene glycol, 17 g, Oral, Daily  potassium chloride, 10 mEq, Oral, Daily  sennosides-docusate, 2 tablet, Oral, BID  sodium chloride, 10 mL, Intravenous, Q12H  tamsulosin, 0.4 mg, Oral, Nightly               Assessment/Plan   Bilateral pneumonia: CT chest reveals LUZ ELENA and RLL consolidation with concern for aspiration. Blood cultures with NGTD. Sputum culture revealing normal respiratory crystal. No evidence of aspiration on swallow eval. Transitioned from Zosyn to Augmentin to complete course of treatment. Cont nebs.     Acute exacerbation of COPD: Completed course of Prednisone. Cont Symbicort and nebs.     Acute hypercapnic on chronic hypoxic respiratory  failure: Likely 2/2 above. Improved with BiPAP. Cont treatment as outlined above. Cont supplemental O2. Cont to encourage compliance with BiPAP.     Essential HTN, uncontrolled upon admission: Cont home Coreg. Added lisinopril. BP later borderline low intermittently and decreased lisinopril. BP then trended upward again and increased lisinopril. Stable today. Cont PRN IV hydralazine.     DM II, inulin dependent with hyperglycemia: Improved with restarting home insulin. Decreased meal time insulin 2/2 subsequent hypoglycemia. Stable today. Cont SSI with Accuchecks.     Recent right ankle avulsion fracture with reported hx of paraplegia: PT/OT. Supportive treatment.     Hx of chronic thoracic osteomyelitis: Cont to monitor.     Hx of Afib: Currently in sinus rhythm. Cont home Coreg. Cont Eliquis for stroke prevention. Monitor on telemetry.     Lower extremity edema/volume overload: Normal EF on recent echo. ReDs Vest reading remains elevated. S/P doses of Bumex and Diamox. Will order Bumex gtt today. Cont 1500 fluid restriction.     Constipation: Improved. Cont bowel regimen.     DVT PPX: Eliquis    Disposition: SS working to arrange SNF placement.       Mandeep Dunlap,   08/08/21  16:14 EDT

## 2021-08-08 NOTE — PLAN OF CARE
Goal Outcome Evaluation:  Plan of Care Reviewed With: patient        Progress: improving  Outcome Summary: Resting comfortably remains edematous.  Bumex drip given as ordered.  Pain med for back/neck pain.

## 2021-08-08 NOTE — PLAN OF CARE
Goal Outcome Evaluation:  Plan of Care Reviewed With: patient        Progress: no change  Outcome Summary: Pt rested in bed throughout shift. No acute changes noted.

## 2021-08-08 NOTE — PLAN OF CARE
Goal Outcome Evaluation:  Plan of Care Reviewed With: patient        Progress: improving  Outcome Summary: Resting comfortably in bed.  Severe generalized edema.  Pain meds given as ordered.

## 2021-08-08 NOTE — PROGRESS NOTES
Subjective     History:   Иван Miller is a 65 y.o. male admitted on 7/31/2021 secondary to Pneumonia of both lungs due to infectious organism     Procedures: None    CC: Follow up pneumonia, resp failure    Patient seen and examined with MARCELO Fry. Awake and alert. States he slept better last PM. Cough and dyspnea improved. No reported CP. Diuresing well. No acute events overnight per RN.     History taken from: patient, chart, and RN.      Objective     Vital Signs  Temp:  [97.5 °F (36.4 °C)-98.4 °F (36.9 °C)] 98.4 °F (36.9 °C)  Heart Rate:  [49-89] 89  Resp:  [18-21] 20  BP: (106-159)/(58-85) 159/85    Intake/Output Summary (Last 24 hours) at 8/7/2021 2028  Last data filed at 8/7/2021 1724  Gross per 24 hour   Intake 1040 ml   Output 2350 ml   Net -1310 ml         Physical Exam: Unchanged from previous.   General:    Awake, alert, in no acute distress   Heart:      Normal S1 and S2. Regular rate and rhythm. No significant murmur, rubs or gallops appreciated.   Lungs:     Respirations regular, even and unlabored. Scattered rales. No wheezes appreciated.  No rhonchi.   Abdomen:   Soft and nontender. No guarding, rebound tenderness or  organomegaly noted. Bowel sounds present x 4.   Extremities:  1-2+ bilateral lower extremity edema. Moves UE and LE equally B/L.     Results Review:    Results from last 7 days   Lab Units 08/06/21  2334 08/06/21  0206 08/05/21  0102 08/04/21  0143 08/03/21  0033 08/02/21  0032 08/01/21  0805   WBC 10*3/mm3 8.07 8.29 8.20 8.48 8.53 6.90 8.25   HEMOGLOBIN g/dL 11.3* 11.6* 11.4* 12.0* 11.4* 11.9* 12.4*   PLATELETS 10*3/mm3 213 216 223 246 224 255 247     Results from last 7 days   Lab Units 08/06/21  2334 08/06/21  0206 08/05/21  0102 08/04/21  0143 08/03/21  0033 08/02/21  0032 08/01/21  0805   SODIUM mmol/L 141 144 143 141 142 141 142   POTASSIUM mmol/L 4.1 4.2 4.4 4.0 3.9 3.8 3.9   CHLORIDE mmol/L 99 100 100 100 99 98 98   CO2 mmol/L 37.5* 40.0* 38.2* 35.1* 37.2* 31.8* 34.4*   BUN  mg/dL 24* 26* 29* 36* 37* 31* 26*   CREATININE mg/dL 0.71* 0.70* 0.86 0.94 1.13 0.87 0.77   CALCIUM mg/dL 9.0 8.8 8.9 8.7 8.7 9.3 9.3   GLUCOSE mg/dL 142* 125* 190* 140* 176* 233* 238*     Results from last 7 days   Lab Units 08/06/21  0206 08/03/21  0033 08/01/21  0805 08/01/21  0030   BILIRUBIN mg/dL 0.2 0.2 0.3 0.2   ALK PHOS U/L 49 53 72 76   AST (SGOT) U/L 9 22 10 13   ALT (SGPT) U/L 20 34 15 18     Results from last 7 days   Lab Units 08/06/21  2334   MAGNESIUM mg/dL 2.3         Results from last 7 days   Lab Units 08/01/21  0310 08/01/21  0030   TROPONIN T ng/mL <0.010 0.013       Imaging Results (Last 24 Hours)     ** No results found for the last 24 hours. **            Medications:  amoxicillin-clavulanate, 1 tablet, Oral, Q12H  apixaban, 5 mg, Oral, Q12H  atorvastatin, 20 mg, Oral, Daily  budesonide-formoterol, 2 puff, Inhalation, BID - RT  carvedilol, 25 mg, Oral, BID With Meals  docusate sodium, 100 mg, Oral, BID  empagliflozin, 20 mg, Oral, Daily  fluticasone, 2 spray, Nasal, Daily  gabapentin, 600 mg, Oral, Q12H  guaiFENesin, 1,200 mg, Oral, Q12H  HYDROcodone-acetaminophen, 1 tablet, Oral, BID  insulin aspart, 0-9 Units, Subcutaneous, 4x Daily AC & at Bedtime  insulin aspart, 20 Units, Subcutaneous, TID With Meals  insulin detemir, 45 Units, Subcutaneous, Nightly  ipratropium-albuterol, 3 mL, Nebulization, 4x Daily - RT  lidocaine, 1 patch, Transdermal, Nightly  lisinopril, 5 mg, Oral, Q24H  pantoprazole, 40 mg, Oral, Daily  polyethylene glycol, 17 g, Oral, Daily  potassium chloride, 10 mEq, Oral, Daily  sennosides-docusate, 2 tablet, Oral, BID  sodium chloride, 10 mL, Intravenous, Q12H  tamsulosin, 0.4 mg, Oral, Nightly               Assessment/Plan   Bilateral pneumonia: CT chest reveals LUZ ELENA and RLL consolidation with concern for aspiration. Blood cultures with NGTD. Sputum culture revealing normal respiratory crystal. No evidence of aspiration on swallow eval. Transitioned from Zosyn to Augmentin to  complete course of treatment. Cont nebs.     Acute exacerbation of COPD: Completed course of Prednisone. Cont Symbicort and nebs.     Acute hypercapnic on chronic hypoxic respiratory failure: Likely 2/2 above. Improved with BiPAP. Cont treatment as outlined above. Cont supplemental O2. Cont to encourage compliance with BiPAP.     Essential HTN, uncontrolled upon admission: Cont home Coreg. Added lisinopril. BP later borderline low intermittently and decreased lisinopril. BP then trended upward again and increased lisinopril. Stable today. Cont PRN IV hydralazine.     DM II, inulin dependent with hyperglycemia: Improved with restarting home insulin. Decreased meal time insulin 2/2 subsequent hypoglycemia. Stable today. Cont SSI with Accuchecks.     Recent right ankle avulsion fracture with reported hx of paraplegia: PT/OT. Supportive treatment.     Hx of chronic thoracic osteomyelitis: Cont to monitor.     Hx of Afib: Currently in sinus rhythm. Cont home Coreg. Cont Eliquis for stroke prevention. Monitor on telemetry.     Lower extremity edema/volume overload: Normal EF on recent echo. ReDs Vest reading remains elevated but slightly improved. Ordered Diamox again today in the setting of alkalosis. Cont 1500 fluid restriction.     Constipation: Cont bowel regimen.     DVT PPX: Eliquis.     Disposition: SS working to arrange SNF placement.       Mandeep Dunlap DO  08/07/21  20:28 EDT

## 2021-08-08 NOTE — PROGRESS NOTES
ReDS Value     Date: 08/08/21     ReDS Value: 40    36-41 Possible Hypervolemic Status        Joo Caraballo, RRT

## 2021-08-09 LAB
ALBUMIN SERPL-MCNC: 3.35 G/DL (ref 3.5–5.2)
ALBUMIN/GLOB SERPL: 1.4 G/DL
ALP SERPL-CCNC: 57 U/L (ref 39–117)
ALT SERPL W P-5'-P-CCNC: 14 U/L (ref 1–41)
ANION GAP SERPL CALCULATED.3IONS-SCNC: 5.3 MMOL/L (ref 5–15)
AST SERPL-CCNC: 8 U/L (ref 1–40)
BASOPHILS # BLD AUTO: 0.04 10*3/MM3 (ref 0–0.2)
BASOPHILS NFR BLD AUTO: 0.6 % (ref 0–1.5)
BILIRUB SERPL-MCNC: 0.3 MG/DL (ref 0–1.2)
BUN SERPL-MCNC: 19 MG/DL (ref 8–23)
BUN/CREAT SERPL: 29.7 (ref 7–25)
CALCIUM SPEC-SCNC: 9.2 MG/DL (ref 8.6–10.5)
CHLORIDE SERPL-SCNC: 102 MMOL/L (ref 98–107)
CO2 SERPL-SCNC: 35.7 MMOL/L (ref 22–29)
CREAT SERPL-MCNC: 0.64 MG/DL (ref 0.76–1.27)
DEPRECATED RDW RBC AUTO: 53 FL (ref 37–54)
EOSINOPHIL # BLD AUTO: 0.25 10*3/MM3 (ref 0–0.4)
EOSINOPHIL NFR BLD AUTO: 3.5 % (ref 0.3–6.2)
ERYTHROCYTE [DISTWIDTH] IN BLOOD BY AUTOMATED COUNT: 17.1 % (ref 12.3–15.4)
GFR SERPL CREATININE-BSD FRML MDRD: 125 ML/MIN/1.73
GLOBULIN UR ELPH-MCNC: 2.5 GM/DL
GLUCOSE BLDC GLUCOMTR-MCNC: 117 MG/DL (ref 70–130)
GLUCOSE BLDC GLUCOMTR-MCNC: 135 MG/DL (ref 70–130)
GLUCOSE BLDC GLUCOMTR-MCNC: 145 MG/DL (ref 70–130)
GLUCOSE BLDC GLUCOMTR-MCNC: 169 MG/DL (ref 70–130)
GLUCOSE SERPL-MCNC: 144 MG/DL (ref 65–99)
HCT VFR BLD AUTO: 39.4 % (ref 37.5–51)
HGB BLD-MCNC: 11.5 G/DL (ref 13–17.7)
IMM GRANULOCYTES # BLD AUTO: 0.04 10*3/MM3 (ref 0–0.05)
IMM GRANULOCYTES NFR BLD AUTO: 0.6 % (ref 0–0.5)
LYMPHOCYTES # BLD AUTO: 1.36 10*3/MM3 (ref 0.7–3.1)
LYMPHOCYTES NFR BLD AUTO: 19 % (ref 19.6–45.3)
MCH RBC QN AUTO: 25.1 PG (ref 26.6–33)
MCHC RBC AUTO-ENTMCNC: 29.2 G/DL (ref 31.5–35.7)
MCV RBC AUTO: 86 FL (ref 79–97)
MONOCYTES # BLD AUTO: 0.68 10*3/MM3 (ref 0.1–0.9)
MONOCYTES NFR BLD AUTO: 9.5 % (ref 5–12)
NEUTROPHILS NFR BLD AUTO: 4.8 10*3/MM3 (ref 1.7–7)
NEUTROPHILS NFR BLD AUTO: 66.8 % (ref 42.7–76)
NRBC BLD AUTO-RTO: 0 /100 WBC (ref 0–0.2)
PLATELET # BLD AUTO: 218 10*3/MM3 (ref 140–450)
PMV BLD AUTO: 9.7 FL (ref 6–12)
POTASSIUM SERPL-SCNC: 3.8 MMOL/L (ref 3.5–5.2)
PROT SERPL-MCNC: 5.8 G/DL (ref 6–8.5)
RBC # BLD AUTO: 4.58 10*6/MM3 (ref 4.14–5.8)
SODIUM SERPL-SCNC: 143 MMOL/L (ref 136–145)
WBC # BLD AUTO: 7.17 10*3/MM3 (ref 3.4–10.8)

## 2021-08-09 PROCEDURE — 82962 GLUCOSE BLOOD TEST: CPT

## 2021-08-09 PROCEDURE — 99232 SBSQ HOSP IP/OBS MODERATE 35: CPT | Performed by: STUDENT IN AN ORGANIZED HEALTH CARE EDUCATION/TRAINING PROGRAM

## 2021-08-09 PROCEDURE — 63710000001 INSULIN DETEMIR PER 5 UNITS: Performed by: INTERNAL MEDICINE

## 2021-08-09 PROCEDURE — 94660 CPAP INITIATION&MGMT: CPT

## 2021-08-09 PROCEDURE — 94799 UNLISTED PULMONARY SVC/PX: CPT

## 2021-08-09 PROCEDURE — 80053 COMPREHEN METABOLIC PANEL: CPT | Performed by: INTERNAL MEDICINE

## 2021-08-09 PROCEDURE — 63710000001 INSULIN ASPART PER 5 UNITS: Performed by: INTERNAL MEDICINE

## 2021-08-09 PROCEDURE — 85025 COMPLETE CBC W/AUTO DIFF WBC: CPT | Performed by: INTERNAL MEDICINE

## 2021-08-09 PROCEDURE — 97110 THERAPEUTIC EXERCISES: CPT

## 2021-08-09 RX ORDER — BUMETANIDE 0.25 MG/ML
1 INJECTION INTRAMUSCULAR; INTRAVENOUS ONCE
Status: COMPLETED | OUTPATIENT
Start: 2021-08-09 | End: 2021-08-09

## 2021-08-09 RX ORDER — LACTULOSE 10 G/15ML
20 SOLUTION ORAL DAILY
Status: DISCONTINUED | OUTPATIENT
Start: 2021-08-09 | End: 2021-08-12 | Stop reason: HOSPADM

## 2021-08-09 RX ADMIN — HYDROCODONE BITARTRATE AND ACETAMINOPHEN 1 TABLET: 7.5; 325 TABLET ORAL at 21:04

## 2021-08-09 RX ADMIN — DOCUSATE SODIUM 100 MG: 100 CAPSULE, LIQUID FILLED ORAL at 21:04

## 2021-08-09 RX ADMIN — GUAIFENESIN 1200 MG: 600 TABLET, EXTENDED RELEASE ORAL at 08:11

## 2021-08-09 RX ADMIN — TAMSULOSIN HYDROCHLORIDE 0.4 MG: 0.4 CAPSULE ORAL at 21:04

## 2021-08-09 RX ADMIN — INSULIN ASPART 20 UNITS: 100 INJECTION, SOLUTION INTRAVENOUS; SUBCUTANEOUS at 11:47

## 2021-08-09 RX ADMIN — POTASSIUM CHLORIDE 10 MEQ: 750 TABLET, FILM COATED, EXTENDED RELEASE ORAL at 08:11

## 2021-08-09 RX ADMIN — IPRATROPIUM BROMIDE AND ALBUTEROL SULFATE 3 ML: .5; 3 SOLUTION RESPIRATORY (INHALATION) at 13:33

## 2021-08-09 RX ADMIN — FLUTICASONE PROPIONATE 2 SPRAY: 50 SPRAY, METERED NASAL at 08:12

## 2021-08-09 RX ADMIN — PANTOPRAZOLE SODIUM 40 MG: 40 TABLET, DELAYED RELEASE ORAL at 08:11

## 2021-08-09 RX ADMIN — LIDOCAINE 1 PATCH: 50 PATCH CUTANEOUS at 21:04

## 2021-08-09 RX ADMIN — LISINOPRIL 5 MG: 2.5 TABLET ORAL at 08:11

## 2021-08-09 RX ADMIN — SODIUM CHLORIDE, PRESERVATIVE FREE 10 ML: 5 INJECTION INTRAVENOUS at 08:12

## 2021-08-09 RX ADMIN — APIXABAN 5 MG: 5 TABLET, FILM COATED ORAL at 21:04

## 2021-08-09 RX ADMIN — POLYETHYLENE GLYCOL (3350) 17 G: 17 POWDER, FOR SOLUTION ORAL at 08:11

## 2021-08-09 RX ADMIN — ATORVASTATIN CALCIUM 20 MG: 20 TABLET, FILM COATED ORAL at 08:11

## 2021-08-09 RX ADMIN — INSULIN DETEMIR 20 UNITS: 100 INJECTION, SOLUTION SUBCUTANEOUS at 21:08

## 2021-08-09 RX ADMIN — GABAPENTIN 600 MG: 300 CAPSULE ORAL at 08:10

## 2021-08-09 RX ADMIN — BUDESONIDE AND FORMOTEROL FUMARATE DIHYDRATE 2 PUFF: 160; 4.5 AEROSOL RESPIRATORY (INHALATION) at 19:09

## 2021-08-09 RX ADMIN — CARVEDILOL 25 MG: 25 TABLET, FILM COATED ORAL at 08:11

## 2021-08-09 RX ADMIN — DOCUSATE SODIUM 50 MG AND SENNOSIDES 8.6 MG 2 TABLET: 8.6; 5 TABLET, FILM COATED ORAL at 08:11

## 2021-08-09 RX ADMIN — LACTULOSE 20 G: 10 SOLUTION ORAL at 16:26

## 2021-08-09 RX ADMIN — APIXABAN 5 MG: 5 TABLET, FILM COATED ORAL at 08:11

## 2021-08-09 RX ADMIN — DOCUSATE SODIUM 50 MG AND SENNOSIDES 8.6 MG 2 TABLET: 8.6; 5 TABLET, FILM COATED ORAL at 21:04

## 2021-08-09 RX ADMIN — HYDROCODONE BITARTRATE AND ACETAMINOPHEN 1 TABLET: 7.5; 325 TABLET ORAL at 08:10

## 2021-08-09 RX ADMIN — INSULIN ASPART 20 UNITS: 100 INJECTION, SOLUTION INTRAVENOUS; SUBCUTANEOUS at 16:53

## 2021-08-09 RX ADMIN — CARVEDILOL 25 MG: 25 TABLET, FILM COATED ORAL at 16:53

## 2021-08-09 RX ADMIN — DOCUSATE SODIUM 100 MG: 100 CAPSULE, LIQUID FILLED ORAL at 08:11

## 2021-08-09 RX ADMIN — GUAIFENESIN 1200 MG: 600 TABLET, EXTENDED RELEASE ORAL at 21:04

## 2021-08-09 RX ADMIN — INSULIN ASPART 2 UNITS: 100 INJECTION, SOLUTION INTRAVENOUS; SUBCUTANEOUS at 16:53

## 2021-08-09 RX ADMIN — Medication 5 MG: at 23:17

## 2021-08-09 RX ADMIN — IPRATROPIUM BROMIDE AND ALBUTEROL SULFATE 3 ML: .5; 3 SOLUTION RESPIRATORY (INHALATION) at 19:09

## 2021-08-09 RX ADMIN — INSULIN ASPART 20 UNITS: 100 INJECTION, SOLUTION INTRAVENOUS; SUBCUTANEOUS at 08:09

## 2021-08-09 RX ADMIN — IPRATROPIUM BROMIDE AND ALBUTEROL SULFATE 3 ML: .5; 3 SOLUTION RESPIRATORY (INHALATION) at 01:03

## 2021-08-09 RX ADMIN — GABAPENTIN 600 MG: 300 CAPSULE ORAL at 21:04

## 2021-08-09 RX ADMIN — IPRATROPIUM BROMIDE AND ALBUTEROL SULFATE 3 ML: .5; 3 SOLUTION RESPIRATORY (INHALATION) at 07:20

## 2021-08-09 RX ADMIN — SODIUM CHLORIDE, PRESERVATIVE FREE 10 ML: 5 INJECTION INTRAVENOUS at 21:05

## 2021-08-09 RX ADMIN — BUMETANIDE 1 MG: 0.25 INJECTION, SOLUTION INTRAMUSCULAR; INTRAVENOUS at 16:26

## 2021-08-09 RX ADMIN — BUDESONIDE AND FORMOTEROL FUMARATE DIHYDRATE 2 PUFF: 160; 4.5 AEROSOL RESPIRATORY (INHALATION) at 07:19

## 2021-08-09 NOTE — THERAPY TREATMENT NOTE
Acute Care - Physical Therapy Treatment Note   George     Patient Name: Иван Miller  : 1955  MRN: [id number]  Today's Date: 2021   Onset of Illness/Injury or Date of Surgery: 21 (admit date)  Visit Dx:     ICD-10-CM ICD-9-CM   1. Pneumonia of both lungs due to infectious organism, unspecified part of lung  J18.9 483.8   2. Acute on chronic respiratory failure with hypoxia and hypercapnia (CMS/HCC)  J96.21 518.84    J96.22 786.09     799.02     Patient Active Problem List   Diagnosis   • Acute respiratory failure with hypoxemia (CMS/HCC)   • Coronary artery disease status post multiple stents   • Non-ischemic cardiomyopathy (CMS/HCC LV ejection fraction 40 to 45%   • Chronic systolic congestive heart failure (CMS/HCC)   • Essential hypertension   • Hyperlipidemia LDL goal <70   • Insulin dependent diabetes mellitus   • Respiratory failure (CMS/HCC)   • Finger infection   • Pneumonia of both lungs due to infectious organism     Past Medical History:   Diagnosis Date   • Atrial fibrillation (CMS/HCC)    • COPD (chronic obstructive pulmonary disease) (CMS/HCC)    • Diabetes mellitus (CMS/HCC)    • GERD (gastroesophageal reflux disease)    • Hyperlipidemia    • Hypertension    • Myocardial infarction (CMS/HCC)    • Neuropathy      Past Surgical History:   Procedure Laterality Date   • ABDOMINAL SURGERY     • APPENDECTOMY     • CARDIAC CATHETERIZATION     • CORONARY STENT PLACEMENT          PT Assessment (last 12 hours)      PT Evaluation and Treatment     Row Name 21 1136          Physical Therapy Time and Intention    Subjective Information  no complaints  -CT     Document Type  therapy note (daily note)  -CT     Mode of Treatment  physical therapy  -CT     Patient Effort  good  -CT     Comment  Pt tolerated treatment session well with rest breaks provided as needed. Pt performed supine ther ex AAROM.   -CT     Row Name 21 1131          General Information    Patient Profile Reviewed   yes  -CT     Equipment Currently Used at Home  wheelchair;oxygen;lift device;walker, rolling  -CT     Existing Precautions/Restrictions  fall  -CT     Row Name 08/09/21 1136          Cognition    Affect/Mental Status (Cognitive)  WFL  -CT     Orientation Status (Cognition)  oriented x 3  -CT     Follows Commands (Cognition)  WFL  -CT     Row Name 08/09/21 1136          Bed Mobility    Bed Mobility  rolling left;rolling right  -CT     Rolling Left [place] (Bed Mobility)  maximum assist (25% patient effort)  -CT     Rolling Right [place] (Bed Mobility)  maximum assist (25% patient effort)  -CT     Bed Mobility, Safety Issues  decreased use of arms for pushing/pulling;decreased use of legs for bridging/pushing;impaired trunk control for bed mobility  -CT     Assistive Device (Bed Mobility)  bed rails;draw sheet  -CT     Row Name 08/09/21 1136          Motor Skills    Therapeutic Exercise  -- BLE AAROM supine ther ex  -CT     Row Name 08/09/21 1136          Coping    Observed Emotional State  calm;cooperative  -CT     Verbalized Emotional State  acceptance  -CT     Row Name 08/09/21 1136          Plan of Care Review    Plan of Care Reviewed With  patient  -CT     Row Name 08/09/21 1136          Positioning and Restraints    Pre-Treatment Position  in bed  -CT     Post Treatment Position  bed  -CT     In Bed  supine;call light within reach;encouraged to call for assist;exit alarm on;side rails up x3  -CT     Row Name 08/09/21 1136          Therapy Assessment/Plan (PT)    Rehab Potential (PT)  good, to achieve stated therapy goals  -CT     Criteria for Skilled Interventions Met (PT)  yes;skilled treatment is necessary  -CT       User Key  (r) = Recorded By, (t) = Taken By, (c) = Cosigned By    Initials Name Provider Type    CT Joceline Trejo PT Physical Therapist        Physical Therapy Education                 Title: PT OT SLP Therapies (Done)     Topic: Physical Therapy (Done)     Point: Mobility training  (Done)     Learning Progress Summary           Patient Acceptance, E,TB, VU by CT at 8/9/2021 1137    Acceptance, E, VU by CL at 8/8/2021 1457    Acceptance, E, VU by CL at 8/7/2021 2014    Acceptance, E,TB, VU by KB at 8/6/2021 1113    Acceptance, E,TB, VU by RH at 8/6/2021 0214    Acceptance, E,TB, VU by RF at 8/5/2021 1555    Acceptance, E,TB, VU by CT at 8/3/2021 1444    Acceptance, E,TB, VU by CT at 8/2/2021 1122                   Point: Home exercise program (Done)     Learning Progress Summary           Patient Acceptance, E,TB, VU by CT at 8/9/2021 1137    Acceptance, E, VU by CL at 8/8/2021 1457    Acceptance, E, VU by CL at 8/7/2021 2014    Acceptance, E,TB, VU by KB at 8/6/2021 1113    Acceptance, E,TB, VU by RH at 8/6/2021 0214    Acceptance, E,TB, VU by RF at 8/5/2021 1555    Acceptance, E,TB, VU by CT at 8/3/2021 1444    Acceptance, E,TB, VU by CT at 8/2/2021 1122                   Point: Body mechanics (Done)     Learning Progress Summary           Patient Acceptance, E,TB, VU by CT at 8/9/2021 1137    Acceptance, E, VU by CL at 8/8/2021 1457    Acceptance, E, VU by CL at 8/7/2021 2014    Acceptance, E,TB, VU by KB at 8/6/2021 1113    Acceptance, E,TB, VU by RH at 8/6/2021 0214    Acceptance, E,TB, VU by RF at 8/5/2021 1555    Acceptance, E,TB, VU by CT at 8/3/2021 1444    Acceptance, E,TB, VU by CT at 8/2/2021 1122                   Point: Precautions (Done)     Learning Progress Summary           Patient Acceptance, E,TB, VU by CT at 8/9/2021 1137    Acceptance, E, VU by CL at 8/8/2021 1457    Acceptance, E, VU by CL at 8/7/2021 2014    Acceptance, E,TB, VU by MAIKOL at 8/6/2021 1113    Acceptance, E,TB, VU by RH at 8/6/2021 0214    Acceptance, E,TB, VU by RF at 8/5/2021 1555    Acceptance, E,TB, VU by CT at 8/3/2021 1444    Acceptance, E,TB, VU by CT at 8/2/2021 1122                               User Key     Initials Effective Dates Name Provider Type Discipline    MAIKOL 06/16/21 -  Marina Copeland  Armin, RN Registered Nurse Nurse    CL 06/16/21 -  Lisandra Keith, RN Registered Nurse Nurse    CT 06/16/21 -  Joceline Treoj PT Physical Therapist PT    RF 06/16/21 -  Libertad Ruiz PTA Physical Therapy Assistant PT     03/02/21 -  Juan Diego Falcon, MARCELO Registered Nurse Nurse              PT Recommendation and Plan  Anticipated Discharge Disposition (PT):  (tbd)  Planned Therapy Interventions (PT): balance training, bed mobility training, home exercise program, manual therapy techniques, motor coordination training, neuromuscular re-education, patient/family education, postural re-education, strengthening, transfer training  Therapy Frequency (PT): 2 times/wk (2-5 times/wk )  Plan of Care Reviewed With: patient       Time Calculation:   PT Charges     Row Name 08/09/21 1138             Time Calculation    PT Received On  08/09/21  -CT      PT Goal Re-Cert Due Date  08/16/21  -CT         Time Calculation- PT    Total Timed Code Minutes- PT  24 minute(s)  -CT        User Key  (r) = Recorded By, (t) = Taken By, (c) = Cosigned By    Initials Name Provider Type    CT Joceline Trejo, PT Physical Therapist        Therapy Charges for Today     Code Description Service Date Service Provider Modifiers Qty    97037169143 HC PT THER PROC EA 15 MIN 8/9/2021 Joceline Trejo, PT GP 2               Joceline Trejo, PT  8/9/2021

## 2021-08-09 NOTE — DISCHARGE PLACEMENT REQUEST
"Иван Blanco (65 y.o. Male)     Date of Birth Social Security Number Address Home Phone MRN    1955  [address]  [place] [address] [phone] [id number]    [employer]ian[employer] Marital Status          None        Admission Date Admission Type Admitting Provider Attending Provider Department, Room/Bed    7/31/21 Emergency Vivienne Flores DO Cagle, William, DO [address], [address]    Discharge Date Discharge Disposition Discharge Destination                       Attending Provider: Diony Bach DO    Allergies: Codeine, Ciprofloxacin    Isolation: None   Infection: None   Code Status: CPR    Ht: 175.3 cm (69\")   Wt: 153 kg (336 lb 4.8 oz)    Admission Cmt: None   Principal Problem: Pneumonia of both lungs due to infectious organism [J18.9]                 Active Insurance as of 7/31/2021     Primary Coverage     Payor Plan Insurance Group Employer/Plan Group    [facility] MEDICARE REPLACEMENT [facility] DUAL COMPLETE MEDICARE REPLACEMENT [id number]     Payor Plan Address Payor Plan Phone Number Payor Plan Fax Number Effective Dates    [address] [phone]  1/1/2021 - None Entered    [facility] [address]       Subscriber Name Subscriber Birth Date Member ID       ИВАН BLANCO 1955 [id number]           Secondary Coverage     Payor Plan Insurance Group Employer/Plan Group    [place]      Payor Plan Address Payor Plan Phone Number Payor Plan Fax Number Effective Dates    [address] [phone]  7/31/2021 - None Entered    [facility] [id number]       Subscriber Name Subscriber Birth Date Member ID       ИВАН BLANCO 1955 [id number]                 Emergency Contacts      (Rel.) Home Phone Work Phone Mobile Phone    PaulLamar garrison (Spouse) [phone] -- [phone]    GARCIA MALDONADO (Daughter) [phone] -- [phone]               Physical Therapy Notes (most recent note)      Joceline Trejo, PT at " 21 1138  Version 1 of 1         Acute Care - Physical Therapy Treatment Note  BAKARI George     Patient Name: Иван Miller  : 1955  MRN: [id number]  Today's Date: 2021   Onset of Illness/Injury or Date of Surgery: 21 (admit date)  Visit Dx:     ICD-10-CM ICD-9-CM   1. Pneumonia of both lungs due to infectious organism, unspecified part of lung  J18.9 483.8   2. Acute on chronic respiratory failure with hypoxia and hypercapnia (CMS/HCC)  J96.21 518.84    J96.22 786.09     799.02     Patient Active Problem List   Diagnosis   • Acute respiratory failure with hypoxemia (CMS/HCC)   • Coronary artery disease status post multiple stents   • Non-ischemic cardiomyopathy (CMS/HCC LV ejection fraction 40 to 45%   • Chronic systolic congestive heart failure (CMS/HCC)   • Essential hypertension   • Hyperlipidemia LDL goal <70   • Insulin dependent diabetes mellitus   • Respiratory failure (CMS/HCC)   • Finger infection   • Pneumonia of both lungs due to infectious organism     Past Medical History:   Diagnosis Date   • Atrial fibrillation (CMS/HCC)    • COPD (chronic obstructive pulmonary disease) (CMS/HCC)    • Diabetes mellitus (CMS/HCC)    • GERD (gastroesophageal reflux disease)    • Hyperlipidemia    • Hypertension    • Myocardial infarction (CMS/HCC)    • Neuropathy      Past Surgical History:   Procedure Laterality Date   • ABDOMINAL SURGERY     • APPENDECTOMY     • CARDIAC CATHETERIZATION     • CORONARY STENT PLACEMENT          PT Assessment (last 12 hours)      PT Evaluation and Treatment     Row Name 21 1136          Physical Therapy Time and Intention    Subjective Information  no complaints  -CT     Document Type  therapy note (daily note)  -CT     Mode of Treatment  physical therapy  -CT     Patient Effort  good  -CT     Comment  Pt tolerated treatment session well with rest breaks provided as needed. Pt performed supine ther ex AAROM.   -CT     Row Name 21 1136          General  Information    Patient Profile Reviewed  yes  -CT     Equipment Currently Used at Home  wheelchair;oxygen;lift device;walker, rolling  -CT     Existing Precautions/Restrictions  fall  -CT     Row Name 08/09/21 1136          Cognition    Affect/Mental Status (Cognitive)  WFL  -CT     Orientation Status (Cognition)  oriented x 3  -CT     Follows Commands (Cognition)  WFL  -CT     Row Name 08/09/21 1136          Bed Mobility    Bed Mobility  rolling left;rolling right  -CT     Rolling Left [place] (Bed Mobility)  maximum assist (25% patient effort)  -CT     Rolling Right [place] (Bed Mobility)  maximum assist (25% patient effort)  -CT     Bed Mobility, Safety Issues  decreased use of arms for pushing/pulling;decreased use of legs for bridging/pushing;impaired trunk control for bed mobility  -CT     Assistive Device (Bed Mobility)  bed rails;draw sheet  -CT     Row Name 08/09/21 1136          Motor Skills    Therapeutic Exercise  -- BLE AAROM supine ther ex  -CT     Row Name 08/09/21 1136          Coping    Observed Emotional State  calm;cooperative  -CT     Verbalized Emotional State  acceptance  -CT     Row Name 08/09/21 1136          Plan of Care Review    Plan of Care Reviewed With  patient  -CT     Row Name 08/09/21 1136          Positioning and Restraints    Pre-Treatment Position  in bed  -CT     Post Treatment Position  bed  -CT     In Bed  supine;call light within reach;encouraged to call for assist;exit alarm on;side rails up x3  -CT     Row Name 08/09/21 1136          Therapy Assessment/Plan (PT)    Rehab Potential (PT)  good, to achieve stated therapy goals  -CT     Criteria for Skilled Interventions Met (PT)  yes;skilled treatment is necessary  -CT       User Key  (r) = Recorded By, (t) = Taken By, (c) = Cosigned By    Initials Name Provider Type    CT Joceline Trejo PT Physical Therapist        Physical Therapy Education                 Title: PT OT SLP Therapies (Done)     Topic: Physical  Therapy (Done)     Point: Mobility training (Done)     Learning Progress Summary           Patient Acceptance, E,TB, VU by CT at 8/9/2021 1137    Acceptance, E, VU by CL at 8/8/2021 1457    Acceptance, E, VU by CL at 8/7/2021 2014    Acceptance, E,TB, VU by KB at 8/6/2021 1113    Acceptance, E,TB, VU by RH at 8/6/2021 0214    Acceptance, E,TB, VU by RF at 8/5/2021 1555    Acceptance, E,TB, VU by CT at 8/3/2021 1444    Acceptance, E,TB, VU by CT at 8/2/2021 1122                   Point: Home exercise program (Done)     Learning Progress Summary           Patient Acceptance, E,TB, VU by CT at 8/9/2021 1137    Acceptance, E, VU by CL at 8/8/2021 1457    Acceptance, E, VU by CL at 8/7/2021 2014    Acceptance, E,TB, VU by KB at 8/6/2021 1113    Acceptance, E,TB, VU by RH at 8/6/2021 0214    Acceptance, E,TB, VU by RF at 8/5/2021 1555    Acceptance, E,TB, VU by CT at 8/3/2021 1444    Acceptance, E,TB, VU by CT at 8/2/2021 1122                   Point: Body mechanics (Done)     Learning Progress Summary           Patient Acceptance, E,TB, VU by CT at 8/9/2021 1137    Acceptance, E, VU by CL at 8/8/2021 1457    Acceptance, E, VU by CL at 8/7/2021 2014    Acceptance, E,TB, VU by KB at 8/6/2021 1113    Acceptance, E,TB, VU by RH at 8/6/2021 0214    Acceptance, E,TB, VU by RF at 8/5/2021 1555    Acceptance, E,TB, VU by CT at 8/3/2021 1444    Acceptance, E,TB, VU by CT at 8/2/2021 1122                   Point: Precautions (Done)     Learning Progress Summary           Patient Acceptance, E,TB, VU by CT at 8/9/2021 1137    Acceptance, E, VU by CL at 8/8/2021 1457    Acceptance, E, VU by CL at 8/7/2021 2014    Acceptance, E,TB, VU by KB at 8/6/2021 1113    Acceptance, E,TB, VU by RH at 8/6/2021 0214    Acceptance, E,TB, VU by RF at 8/5/2021 1555    Acceptance, E,TB, VU by CT at 8/3/2021 1444    Acceptance, E,TB, VU by CT at 8/2/2021 1122                               User Key     Initials Effective Dates Name Provider Type  Discipline    KB 06/16/21 -  Marina Copeland, RN Registered Nurse Nurse    CL 06/16/21 -  Lisandra Keith, RN Registered Nurse Nurse    CT 06/16/21 -  Joceline Trejo, MEAGHAN Physical Therapist PT    RF 06/16/21 -  Libertad Ruiz PTA Physical Therapy Assistant PT     03/02/21 -  Juan Diego Falcon, MARCELO Registered Nurse Nurse              PT Recommendation and Plan  Anticipated Discharge Disposition (PT):  (tbd)  Planned Therapy Interventions (PT): balance training, bed mobility training, home exercise program, manual therapy techniques, motor coordination training, neuromuscular re-education, patient/family education, postural re-education, strengthening, transfer training  Therapy Frequency (PT): 2 times/wk (2-5 times/wk )  Plan of Care Reviewed With: patient       Time Calculation:   PT Charges     Row Name 08/09/21 1138             Time Calculation    PT Received On  08/09/21  -CT      PT Goal Re-Cert Due Date  08/16/21  -CT         Time Calculation- PT    Total Timed Code Minutes- PT  24 minute(s)  -CT        User Key  (r) = Recorded By, (t) = Taken By, (c) = Cosigned By    Initials Name Provider Type    CT Joceline Trejo, PT Physical Therapist        Therapy Charges for Today     Code Description Service Date Service Provider Modifiers Qty    82673022479 HC PT THER PROC EA 15 MIN 8/9/2021 Joceline Trejo, PT GP 2               Joceline Trejo PT  8/9/2021      Electronically signed by Joceline Trejo PT at 08/09/21 1136

## 2021-08-09 NOTE — PLAN OF CARE
Goal Outcome Evaluation:  Plan of Care Reviewed With: patient        Progress: no change  Outcome Summary: Pt restedin bed throughout shift. No complaints. Says he is feeling better than the previous night. No acute changes noted.

## 2021-08-09 NOTE — CASE MANAGEMENT/SOCIAL WORK
Discharge Planning Assessment  BAKARI Vazquez     Patient Name: Иван Miller  MRN: [id number]  Today's Date: 8/9/2021    Admit Date: 7/31/2021    Discharge Needs Assessment    No documentation.       Discharge Plan     Row Name 08/09/21 1056       Plan    Plan Pt was admitted on 07/31/21. SS spoke with [facility] per Ann who states Pt's pre-authorization is pending with his insurance. Facility requests updated PT note. SS notified PT. SS to follow.    1144am: SS faxed PT note to [facility] fax [phone]. SS to follow.             Elvia Pelayo

## 2021-08-09 NOTE — PLAN OF CARE
Goal Outcome Evaluation:           Progress: no change  Outcome Summary: Patient has rested well today with no complaints.  Patient continues to ask for more drinks. instructed he is on fluid restriction of 1500ml.

## 2021-08-09 NOTE — THERAPY TREATMENT NOTE
Acute Care - Occupational Therapy Treatment Note  BAKARI Vazquez     Patient Name: Иван Miller  : 1955  MRN: [id number]  Today's Date: 2021  Onset of Illness/Injury or Date of Surgery: 21 (admit date)     Referring Physician: Sandra    Admit Date: 2021       ICD-10-CM ICD-9-CM   1. Pneumonia of both lungs due to infectious organism, unspecified part of lung  J18.9 483.8   2. Acute on chronic respiratory failure with hypoxia and hypercapnia (CMS/HCC)  J96.21 518.84    J96.22 786.09     799.02     Patient Active Problem List   Diagnosis   • Acute respiratory failure with hypoxemia (CMS/HCC)   • Coronary artery disease status post multiple stents   • Non-ischemic cardiomyopathy (CMS/HCC LV ejection fraction 40 to 45%   • Chronic systolic congestive heart failure (CMS/HCC)   • Essential hypertension   • Hyperlipidemia LDL goal <70   • Insulin dependent diabetes mellitus   • Respiratory failure (CMS/HCC)   • Finger infection   • Pneumonia of both lungs due to infectious organism     Past Medical History:   Diagnosis Date   • Atrial fibrillation (CMS/HCC)    • COPD (chronic obstructive pulmonary disease) (CMS/HCC)    • Diabetes mellitus (CMS/HCC)    • GERD (gastroesophageal reflux disease)    • Hyperlipidemia    • Hypertension    • Myocardial infarction (CMS/HCC)    • Neuropathy      Past Surgical History:   Procedure Laterality Date   • ABDOMINAL SURGERY     • APPENDECTOMY     • CARDIAC CATHETERIZATION     • CORONARY STENT PLACEMENT              OT ASSESSMENT FLOWSHEET (last 12 hours)      OT Evaluation and Treatment     Row Name 21 1237                   OT Time and Intention    Subjective Information  complains of;weakness;fatigue  -LM        Document Type  therapy note (daily note)  -LM        Mode of Treatment  occupational therapy  -LM        Patient Effort  adequate  -LM        Comment  Patient seen this date for BUE therex/TA for fxl activity tolerance, light strengthening, arom.  Patient  demonstrates understanding of light towel therex/HEP.  Frequent rest breaks.  -LM           Cognition    Affect/Mental Status (Cognitive)  WFL  -LM        Orientation Status (Cognition)  oriented x 3  -LM           Positioning and Restraints    Post Treatment Position  bed  -LM        In Bed  encouraged to call for assist;call light within reach  -LM          User Key  (r) = Recorded By, (t) = Taken By, (c) = Cosigned By    Initials Name Effective Dates    LM Monica Serrano OT 06/16/21 -                  OT Recommendation and Plan              Time Calculation:     Therapy Charges for Today     Code Description Service Date Service Provider Modifiers Qty    61727183341  OT THER PROC EA 15 MIN 8/9/2021 Monica Serrano OT GO 2               Monica Serrano OT  8/9/2021

## 2021-08-09 NOTE — PROGRESS NOTES
[facility] HOSPITALIST PROGRESS NOTE     Patient Identification:  Name:  Иван Miller  Age:  65 y.o.  Sex:  male  :  1955  MRN:  [id number]  Visit Number:  [id number]  ROOM: [address] [place] [address]     Primary Care Provider:  Rosi Thomas APRN    Length of stay in inpatient status:  8    Subjective     Chief Compliant:    Chief Complaint   Patient presents with   • Shortness of Breath   • Edema       History of Presenting Illness: Patient seen and evaluated in follow-up for upper lobe and right lower lobe pneumonia with concern for possible aspiration as well as COPD exacerbation with associated acute hypercapnic on chronic hypoxic respiratory failure.  Patient currently on 3 L nasal cannula, reports being on 2 L chronically at home.  Patient does show clinical evidence of volume overload.  He complains of constipation and requests further medications to help with this.    Objective     Current Hospital Meds:apixaban, 5 mg, Oral, Q12H  atorvastatin, 20 mg, Oral, Daily  budesonide-formoterol, 2 puff, Inhalation, BID - RT  bumetanide, 1 mg, Intravenous, Once  carvedilol, 25 mg, Oral, BID With Meals  docusate sodium, 100 mg, Oral, BID  empagliflozin, 20 mg, Oral, Daily  fluticasone, 2 spray, Nasal, Daily  gabapentin, 600 mg, Oral, Q12H  guaiFENesin, 1,200 mg, Oral, Q12H  HYDROcodone-acetaminophen, 1 tablet, Oral, BID  insulin aspart, 0-9 Units, Subcutaneous, 4x Daily AC & at Bedtime  insulin aspart, 20 Units, Subcutaneous, TID With Meals  insulin detemir, 45 Units, Subcutaneous, Nightly  ipratropium-albuterol, 3 mL, Nebulization, 4x Daily - RT  lactulose, 20 g, Oral, Daily  lidocaine, 1 patch, Transdermal, Nightly  lisinopril, 5 mg, Oral, Q24H  pantoprazole, 40 mg, Oral, Daily  polyethylene glycol, 17 g, Oral, Daily  potassium chloride, 10 mEq, Oral, Daily  sennosides-docusate, 2 tablet, Oral, BID  sodium chloride, 10 mL, Intravenous, Q12H  tamsulosin, 0.4 mg, Oral, Nightly         Current Antimicrobial  Therapy:  Anti-Infectives (From admission, onward)    Ordered     Dose/Rate Route Frequency Start Stop    08/05/21 1949  amoxicillin-clavulanate (AUGMENTIN) 875-125 MG per tablet 1 tablet     Ordering Provider: Mandeep Dunlap DO    1 tablet Oral Every 12 Hours Scheduled 08/05/21 2100 08/08/21 0824    08/01/21 0641  piperacillin-tazobactam (ZOSYN) IVPB 3.375 g in 100 mL NS VTB     Ordering Provider: Vivienne Flores DO    3.375 g  over 30 Minutes Intravenous Once 08/01/21 0800 08/01/21 0838    08/01/21 0502  metroNIDAZOLE (FLAGYL) 500 mg/100mL IVPB     Ordering Provider: Vivienne Flores DO    500 mg  over 30 Minutes Intravenous Once 08/01/21 0504 08/01/21 0839    08/01/21 0335  levoFLOXacin (LEVAQUIN) 750 mg/150 mL D5W (premix) (LEVAQUIN) 750 mg     Ordering Provider: Timi Greco DO    750 mg Intravenous Every 24 Hours 08/01/21 0337 08/01/21 0430        Current Diuretic Therapy:  Diuretics (From admission, onward)    Ordered     Dose/Rate Route Frequency Start Stop    08/09/21 1520  bumetanide (BUMEX) injection 1 mg     Ordering Provider: Diony Bach DO    1 mg Intravenous Once 08/09/21 1630      08/08/21 0821  bumetanide (BUMEX) 3 mg in sodium chloride 0.9 % 50 mL IVPB     Ordering Provider: Mandeep Dunlap DO    3 mg  12.5 mL/hr over 4 Hours Intravenous Once 08/08/21 0930 08/08/21 1434    08/07/21 0850  acetaZOLAMIDE (DIAMOX) 250 mg in sodium chloride 0.9 % 50 mL IVPB     Ordering Provider: Mandeep Dunlap DO    250 mg  200 mL/hr over 15 Minutes Intravenous Once 08/07/21 1100 08/07/21 1136    08/06/21 0814  acetaZOLAMIDE (DIAMOX) 250 mg in sodium chloride 0.9 % 50 mL IVPB     Ordering Provider: Mandeep Dunlap DO    250 mg  200 mL/hr over 15 Minutes Intravenous Once 08/06/21 1100 08/06/21 1214    08/05/21 1336  bumetanide (BUMEX) injection 2 mg     Ordering Provider: Mandeep Dunlap DO    2 mg Intravenous Once 08/05/21 1500 08/05/21 1506    08/04/21 0806   bumetanide (BUMEX) injection 2 mg     Ordering Provider: Mandeep Dunlap DO    2 mg Intravenous Once 08/04/21 1000 08/04/21 0958    08/01/21 1004  bumetanide (BUMEX) injection 1 mg     Ordering Provider: Mandeep Dunlap DO    1 mg Intravenous Once 08/01/21 1100 08/01/21 1111        ----------------------------------------------------------------------------------------------------------------------  Vital Signs:  Temp:  [97.6 °F (36.4 °C)-98.6 °F (37 °C)] 98.4 °F (36.9 °C)  Heart Rate:  [51-76] 63  Resp:  [18-20] 18  BP: (102-146)/(53-80) 102/53  SpO2:  [92 %-98 %] 94 %  on  Flow (L/min):  [3] 3;   Device (Oxygen Therapy): nasal cannula  Body mass index is 49.66 kg/m².    Wt Readings from Last 3 Encounters:   08/05/21 (!) 153 kg (336 lb 4.8 oz)   06/16/21 135 kg (297 lb 4.8 oz)   11/25/19 127 kg (280 lb)     Intake & Output (last 3 days)       08/06 0701 - 08/07 0700 08/07 0701 - 08/08 0700 08/08 0701 - 08/09 0700 08/09 0701 - 08/10 0700    P.O. 920 1080 700 240    I.V. (mL/kg)        Total Intake(mL/kg) 920 (6) 1080 (7.1) 700 (4.6) 240 (1.6)    Urine (mL/kg/hr) 1825 (0.5) 2950 (0.8) 1200 (0.3)     Stool 0 0 0     Total Output 1825 2950 1200     Net -905 -1870 -500 +240            Urine Unmeasured Occurrence 2 x 1 x 4 x 2 x    Stool Unmeasured Occurrence 3 x 1 x 1 x         Diet Regular; Consistent Carbohydrate, Cardiac, Daily Fluid Restriction; 1500 mL Fluid Per Day  ----------------------------------------------------------------------------------------------------------------------  Physical exam:  Constitutional:  Well-developed and well-nourished.  No acute distress.      HENT:  Head:  Normocephalic and atraumatic.  Mouth:  Moist mucous membranes.    Eyes:  Conjunctivae and EOM are normal. No scleral icterus.    Neck:  Neck supple.  No JVD present.    Cardiovascular:  Normal rate, regular rhythm and normal heart sounds with no murmur.  Pulmonary/Chest:  No respiratory distress, no wheezes, no  crackles, with normal breath sounds and good air movement.  Abdominal:  Soft.  Bowel sounds are normal.  No distension and no tenderness.   Musculoskeletal:  No tenderness, and no deformity.  No red or swollen joints anywhere.  Functional ROM intact.   Neurological:  Alert and oriented to person, place, and time.  No cranial nerve deficit. No slurred speech. Intact Sensation throughout  Skin:  Skin is warm and dry. No rash or lesion noted. No pallor.   Peripheral vascular:  Pulses in all 4 extremities with no clubbing, no cyanosis, 2+ pitting edema of the lower extremities bilaterally.  Psychiatric: Appropriate mood and affect, pleasant.   ----------------------------------------------------------------------------------------------------------------------  Tele:    ----------------------------------------------------------------------------------------------------------------------  Results from last 7 days   Lab Units 08/09/21  0904 08/08/21  0532 08/06/21  2334   WBC 10*3/mm3 7.17 7.18 8.07   HEMOGLOBIN g/dL 11.5* 10.4* 11.3*   HEMATOCRIT % 39.4 38.0 40.7   MCV fL 86.0 87.8 87.0   MCHC g/dL 29.2* 27.4* 27.8*   PLATELETS 10*3/mm3 218 210 213     Results from last 7 days   Lab Units 08/05/21  0546   PH, ARTERIAL pH units 7.368   PO2 ART mm Hg 82.1*   PCO2, ARTERIAL mm Hg 77.4*   HCO3 ART mmol/L 44.5*     Results from last 7 days   Lab Units 08/09/21  0904 08/08/21  0532 08/06/21  2334 08/06/21  0206 08/06/21  0206   SODIUM mmol/L 143 139 141   < > 144   POTASSIUM mmol/L 3.8 3.8 4.1   < > 4.2   MAGNESIUM mg/dL  --   --  2.3  --   --    CHLORIDE mmol/L 102 101 99   < > 100   CO2 mmol/L 35.7* 34.4* 37.5*   < > 40.0*   BUN mg/dL 19 27* 24*   < > 26*   CREATININE mg/dL 0.64* 0.91 0.71*   < > 0.70*   EGFR IF NONAFRICN AM mL/min/1.73 125 83 111   < > 113   CALCIUM mg/dL 9.2 8.8 9.0   < > 8.8   GLUCOSE mg/dL 144* 180* 142*   < > 125*   ALBUMIN g/dL 3.35* 2.88*  --   --  3.26*   BILIRUBIN mg/dL 0.3 0.2  --   --  0.2   ALK  PHOS U/L 57 50  --   --  49   AST (SGOT) U/L 8 9  --   --  9   ALT (SGPT) U/L 14 16  --   --  20    < > = values in this interval not displayed.   Estimated Creatinine Clearance: 133.6 mL/min (A) (by C-G formula based on SCr of 0.64 mg/dL (L)).  No results found for: AMMONIA              Glucose   Date/Time Value Ref Range Status   08/09/2021 1041 135 (H) 70 - 130 mg/dL Final     Comment:     Meter: [id number] : [id number] Kenyon Fry   08/09/2021 0628 117 70 - 130 mg/dL Final     Comment:     Meter: [id number] : [id number] Kamille Hunter   08/08/2021 2023 186 (H) 70 - 130 mg/dL Final     Comment:     Meter: [id number] : [id number] TAMMIE LYON   08/08/2021 1631 190 (H) 70 - 130 mg/dL Final     Comment:     Meter: [id number] : [id number] Rachel Keith   08/08/2021 1055 127 70 - 130 mg/dL Final     Comment:     Meter: [id number] : [id number] Rachel Keith   08/08/2021 0620 157 (H) 70 - 130 mg/dL Final     Comment:     Meter: [id number] : [id number] Kamille Hunter   08/07/2021 2023 224 (H) 70 - 130 mg/dL Final     Comment:     Meter: [id number] : [id number] TAMMIE LYON   08/07/2021 1707 151 (H) 70 - 130 mg/dL Final     Comment:     Meter: [id number] : [id number] FRIEDA ABAD     Lab Results   Component Value Date    TSH 2.100 05/21/2021    FREET4 0.95 05/21/2021     No results found for: PREGTESTUR, PREGSERUM, HCG, HCGQUANT  Pain Management Panel     Pain Management Panel Latest Ref Rng & Units 6/10/2021 5/21/2021    AMPHETAMINES SCREEN, URINE Negative Negative Positive(A)    BARBITURATES SCREEN Negative Negative Negative    BENZODIAZEPINE SCREEN, URINE Negative Negative Negative    BUPRENORPHINEUR Negative Negative Negative    COCAINE SCREEN, URINE Negative Negative Negative    METHADONE SCREEN, URINE Negative Negative Negative        Brief Urine Lab Results  (Last result in the past 365 days)      Color   Clarity   Blood   Leuk Est   Nitrite   Protein   CREAT   Urine  HCG        06/08/21 1234 Yellow Clear Moderate (2+) Negative Negative Trace             No results found for: BLOODCX  No results found for: URINECX  No results found for: WOUNDCX  No results found for: STOOLCX  No results found for: RESPCX  No results found for: AFBCX        I have personally looked at the labs and they are summarized above.  ----------------------------------------------------------------------------------------------------------------------  Detailed radiology reports for the last 24 hours:    Imaging Results (Last 24 Hours)     ** No results found for the last 24 hours. **        Assessment & Plan      Bilateral pneumonia    -CT chest with left upper lobe and right lower lobe consolidation    -Concern for possible aspiration, initially on Zosyn now transitioned to course of Augmentin now completed    -Sputum culture obtained with normal respiratory crystal    Acute exacerbation of COPD  Acute hypercapnic on chronic hypoxic respiratory failure    -Continue Symbicort and DuoNeb    -Completed short course of prednisone, no wheezing present on auscultation    -Patient currently on 3 L nasal cannula compared to his baseline O2, there is component of volume overload on physical exam we will repeat one-time dose of 1 mg Bumex to aid in lung mechanics    Essential hypertension    -Continue Coreg, lisinopril as blood pressure currently remains at goal    Diabetes mellitus type 2    -Globin A1c of 8.8 on 5/21/2021    -Continue basal bolus insulin with Levemir 45 units nightly, NovoLog 20 3 times daily AC, with moderate dose sliding scale    Right ankle avulsion fracture  History of paraplegia    -Continue PT/OT and supportive care    -Currently awaiting SNF placement,  following along    Chronic thoracic osteomyelitis    -No interventions at this time, previously seen and evaluated by ID at previous hospitalization with no evidence of acute bony/cartilage infection on MRI.     Paroxysmal  atrial fibrillation    -Continue metoprolol and anticoagulation with Eliquis    Constipation    -Patient currently on MiraLAX, will add lactulose    Obesity    -Complicates all aspects of care      VTE Prophylaxis:   Mechanical Order History:     None      Pharmalogical Order History:      Ordered     Dose Route Frequency Stop    08/01/21 1438  apixaban (ELIQUIS) tablet 5 mg      5 mg PO Every 12 Hours Scheduled --    08/01/21 0631  heparin (porcine) 5000 UNIT/ML injection 5,000 Units  Status:  Discontinued      5,000 Units SC Every 12 Hours Scheduled 08/01/21 0644                Disposition currently pending placement for skilled nursing rehab.    Diony Bach DO  [facility] Hospitalist  08/09/21  16:15 EDT

## 2021-08-10 ENCOUNTER — APPOINTMENT (OUTPATIENT)
Dept: GENERAL RADIOLOGY | Facility: HOSPITAL | Age: 66
End: 2021-08-10

## 2021-08-10 LAB
ANION GAP SERPL CALCULATED.3IONS-SCNC: 5.2 MMOL/L (ref 5–15)
BUN SERPL-MCNC: 21 MG/DL (ref 8–23)
BUN/CREAT SERPL: 28 (ref 7–25)
CALCIUM SPEC-SCNC: 9.2 MG/DL (ref 8.6–10.5)
CHLORIDE SERPL-SCNC: 101 MMOL/L (ref 98–107)
CO2 SERPL-SCNC: 32.8 MMOL/L (ref 22–29)
CREAT SERPL-MCNC: 0.75 MG/DL (ref 0.76–1.27)
GFR SERPL CREATININE-BSD FRML MDRD: 104 ML/MIN/1.73
GLUCOSE BLDC GLUCOMTR-MCNC: 125 MG/DL (ref 70–130)
GLUCOSE BLDC GLUCOMTR-MCNC: 128 MG/DL (ref 70–130)
GLUCOSE BLDC GLUCOMTR-MCNC: 137 MG/DL (ref 70–130)
GLUCOSE BLDC GLUCOMTR-MCNC: 147 MG/DL (ref 70–130)
GLUCOSE SERPL-MCNC: 209 MG/DL (ref 65–99)
POTASSIUM SERPL-SCNC: 4 MMOL/L (ref 3.5–5.2)
SODIUM SERPL-SCNC: 139 MMOL/L (ref 136–145)

## 2021-08-10 PROCEDURE — 94660 CPAP INITIATION&MGMT: CPT

## 2021-08-10 PROCEDURE — 71045 X-RAY EXAM CHEST 1 VIEW: CPT | Performed by: RADIOLOGY

## 2021-08-10 PROCEDURE — 94799 UNLISTED PULMONARY SVC/PX: CPT

## 2021-08-10 PROCEDURE — 99232 SBSQ HOSP IP/OBS MODERATE 35: CPT | Performed by: STUDENT IN AN ORGANIZED HEALTH CARE EDUCATION/TRAINING PROGRAM

## 2021-08-10 PROCEDURE — 80048 BASIC METABOLIC PNL TOTAL CA: CPT | Performed by: STUDENT IN AN ORGANIZED HEALTH CARE EDUCATION/TRAINING PROGRAM

## 2021-08-10 PROCEDURE — 97110 THERAPEUTIC EXERCISES: CPT

## 2021-08-10 PROCEDURE — 71045 X-RAY EXAM CHEST 1 VIEW: CPT

## 2021-08-10 PROCEDURE — 82962 GLUCOSE BLOOD TEST: CPT

## 2021-08-10 PROCEDURE — 63710000001 INSULIN ASPART PER 5 UNITS: Performed by: INTERNAL MEDICINE

## 2021-08-10 RX ORDER — BUMETANIDE 1 MG/1
2 TABLET ORAL DAILY
Status: DISCONTINUED | OUTPATIENT
Start: 2021-08-10 | End: 2021-08-10

## 2021-08-10 RX ORDER — BUMETANIDE 0.25 MG/ML
2 INJECTION INTRAMUSCULAR; INTRAVENOUS ONCE
Status: COMPLETED | OUTPATIENT
Start: 2021-08-10 | End: 2021-08-10

## 2021-08-10 RX ORDER — BUMETANIDE 1 MG/1
2 TABLET ORAL DAILY
Status: DISCONTINUED | OUTPATIENT
Start: 2021-08-11 | End: 2021-08-10

## 2021-08-10 RX ORDER — BUMETANIDE 0.25 MG/ML
2 INJECTION INTRAMUSCULAR; INTRAVENOUS ONCE
Status: DISCONTINUED | OUTPATIENT
Start: 2021-08-10 | End: 2021-08-10

## 2021-08-10 RX ORDER — BUMETANIDE 1 MG/1
1 TABLET ORAL DAILY
Status: DISCONTINUED | OUTPATIENT
Start: 2021-08-11 | End: 2021-08-12 | Stop reason: HOSPADM

## 2021-08-10 RX ADMIN — GUAIFENESIN 1200 MG: 600 TABLET, EXTENDED RELEASE ORAL at 21:01

## 2021-08-10 RX ADMIN — DOCUSATE SODIUM 50 MG AND SENNOSIDES 8.6 MG 2 TABLET: 8.6; 5 TABLET, FILM COATED ORAL at 08:41

## 2021-08-10 RX ADMIN — APIXABAN 5 MG: 5 TABLET, FILM COATED ORAL at 08:41

## 2021-08-10 RX ADMIN — POTASSIUM CHLORIDE 10 MEQ: 750 TABLET, FILM COATED, EXTENDED RELEASE ORAL at 08:41

## 2021-08-10 RX ADMIN — DOCUSATE SODIUM 100 MG: 100 CAPSULE, LIQUID FILLED ORAL at 08:41

## 2021-08-10 RX ADMIN — INSULIN ASPART 20 UNITS: 100 INJECTION, SOLUTION INTRAVENOUS; SUBCUTANEOUS at 17:09

## 2021-08-10 RX ADMIN — ACETAMINOPHEN 650 MG: 325 TABLET ORAL at 14:29

## 2021-08-10 RX ADMIN — INSULIN ASPART 20 UNITS: 100 INJECTION, SOLUTION INTRAVENOUS; SUBCUTANEOUS at 08:40

## 2021-08-10 RX ADMIN — BUMETANIDE 2 MG: 0.25 INJECTION, SOLUTION INTRAMUSCULAR; INTRAVENOUS at 23:00

## 2021-08-10 RX ADMIN — BUDESONIDE AND FORMOTEROL FUMARATE DIHYDRATE 2 PUFF: 160; 4.5 AEROSOL RESPIRATORY (INHALATION) at 07:04

## 2021-08-10 RX ADMIN — TAMSULOSIN HYDROCHLORIDE 0.4 MG: 0.4 CAPSULE ORAL at 21:00

## 2021-08-10 RX ADMIN — GUAIFENESIN 1200 MG: 600 TABLET, EXTENDED RELEASE ORAL at 08:41

## 2021-08-10 RX ADMIN — HYDROCODONE BITARTRATE AND ACETAMINOPHEN 1 TABLET: 7.5; 325 TABLET ORAL at 08:40

## 2021-08-10 RX ADMIN — CARVEDILOL 25 MG: 25 TABLET, FILM COATED ORAL at 08:41

## 2021-08-10 RX ADMIN — IPRATROPIUM BROMIDE AND ALBUTEROL SULFATE 3 ML: .5; 3 SOLUTION RESPIRATORY (INHALATION) at 13:39

## 2021-08-10 RX ADMIN — HYDROCODONE BITARTRATE AND ACETAMINOPHEN 1 TABLET: 7.5; 325 TABLET ORAL at 21:01

## 2021-08-10 RX ADMIN — SODIUM CHLORIDE, PRESERVATIVE FREE 10 ML: 5 INJECTION INTRAVENOUS at 08:40

## 2021-08-10 RX ADMIN — FLUTICASONE PROPIONATE 2 SPRAY: 50 SPRAY, METERED NASAL at 08:44

## 2021-08-10 RX ADMIN — DOCUSATE SODIUM 100 MG: 100 CAPSULE, LIQUID FILLED ORAL at 21:01

## 2021-08-10 RX ADMIN — IPRATROPIUM BROMIDE AND ALBUTEROL SULFATE 3 ML: .5; 3 SOLUTION RESPIRATORY (INHALATION) at 07:05

## 2021-08-10 RX ADMIN — INSULIN ASPART 20 UNITS: 100 INJECTION, SOLUTION INTRAVENOUS; SUBCUTANEOUS at 11:46

## 2021-08-10 RX ADMIN — LISINOPRIL 5 MG: 2.5 TABLET ORAL at 08:41

## 2021-08-10 RX ADMIN — SODIUM CHLORIDE, PRESERVATIVE FREE 10 ML: 5 INJECTION INTRAVENOUS at 21:02

## 2021-08-10 RX ADMIN — BUDESONIDE AND FORMOTEROL FUMARATE DIHYDRATE 2 PUFF: 160; 4.5 AEROSOL RESPIRATORY (INHALATION) at 19:39

## 2021-08-10 RX ADMIN — CARVEDILOL 25 MG: 25 TABLET, FILM COATED ORAL at 17:09

## 2021-08-10 RX ADMIN — ATORVASTATIN CALCIUM 20 MG: 20 TABLET, FILM COATED ORAL at 08:41

## 2021-08-10 RX ADMIN — POLYETHYLENE GLYCOL (3350) 17 G: 17 POWDER, FOR SOLUTION ORAL at 08:40

## 2021-08-10 RX ADMIN — IPRATROPIUM BROMIDE AND ALBUTEROL SULFATE 3 ML: .5; 3 SOLUTION RESPIRATORY (INHALATION) at 19:39

## 2021-08-10 RX ADMIN — APIXABAN 5 MG: 5 TABLET, FILM COATED ORAL at 21:00

## 2021-08-10 RX ADMIN — LACTULOSE 20 G: 10 SOLUTION ORAL at 08:39

## 2021-08-10 RX ADMIN — DOCUSATE SODIUM 50 MG AND SENNOSIDES 8.6 MG 2 TABLET: 8.6; 5 TABLET, FILM COATED ORAL at 21:00

## 2021-08-10 RX ADMIN — IPRATROPIUM BROMIDE AND ALBUTEROL SULFATE 3 ML: .5; 3 SOLUTION RESPIRATORY (INHALATION) at 00:37

## 2021-08-10 RX ADMIN — LIDOCAINE 1 PATCH: 50 PATCH CUTANEOUS at 21:01

## 2021-08-10 RX ADMIN — PANTOPRAZOLE SODIUM 40 MG: 40 TABLET, DELAYED RELEASE ORAL at 08:41

## 2021-08-10 RX ADMIN — GABAPENTIN 600 MG: 300 CAPSULE ORAL at 21:01

## 2021-08-10 RX ADMIN — GABAPENTIN 600 MG: 300 CAPSULE ORAL at 08:39

## 2021-08-10 NOTE — CASE MANAGEMENT/SOCIAL WORK
Discharge Planning Assessment   [place]     Patient Name: Иван Miller  MRN: [id number]  Today's Date: 8/10/2021    Admit Date: 7/31/2021    Discharge Needs Assessment    No documentation.       Discharge Plan     Row Name 08/10/21 1050       Plan    Plan SS spoke with [facility] per [facility] who states Pt's pre-authorization is pending. NH requested additional clinical information to fax [phone]. SS to follow.    1137am: Pt's pre-authorization request was denied by Pt's insurance with the option of peer to peer. SS notified Physician advisor, Dr Sanders and Dr. Bach of insurance denial. SS to follow.     1240pm: SS informed Pt that insurance has denied request for short term rehab. Pt is willing to go home with home health services. Pt lives with his daughter. Pt utilizes home oxygen at 3 liters nasal cannula, wheelchair, cange, and standard walker via [facility] home care. SS to follow.     1538pm: SS spoke with [facility] per [facility] who states Pt can come to their facility Medicaid pending if he is willing to stay 30 days. SS spoke with Pt and he is agreeable to stay at their facility for at least 30 days. [facility] per [facility] to meet with Pt for admission paperwork at approx 0900am on 08/11/21.   notified Dr. Bach of bed availability for tomorrow, 08/11/21. SS to follow.           Elvia Pelayo

## 2021-08-10 NOTE — PLAN OF CARE
Goal Outcome Evaluation:  Plan of Care Reviewed With: patient           Outcome Summary: Patient resting in bed. No acute changes noted. VSS. Will continue to monitor.

## 2021-08-10 NOTE — DISCHARGE PLACEMENT REQUEST
"Иван Blanco (65 y.o. Male)     Date of Birth Social Security Number Address Home Phone MRN    1955  [address]  [place] [address] [phone] [id number]    M[employer]onism Marital Status          None        Admission Date Admission Type Admitting Provider Attending Provider Department, Room/Bed    7/31/21 Emergency Vivienne Flores DO Cagle, William, DO [address], 3348/2S    Discharge Date Discharge Disposition Discharge Destination                       Attending Provider: Diony Bach DO    Allergies: Codeine, Ciprofloxacin    Isolation: None   Infection: None   Code Status: CPR    Ht: 175.3 cm (69\")   Wt: 153 kg (336 lb 4.8 oz)    Admission Cmt: None   Principal Problem: Pneumonia of both lungs due to infectious organism [J18.9]                 Active Insurance as of 7/31/2021     Primary Coverage     Payor Plan Insurance Group Employer/Plan Group    [facility] MEDICARE REPLACEMENT [facility] DUAL COMPLETE MEDICARE REPLACEMENT [id number]     Payor Plan Address Payor Plan Phone Number Payor Plan Fax Number Effective Dates    [address] [phone]  1/1/2021 - None Entered    [facility] [phone]       Subscriber Name Subscriber Birth Date Member ID       ИВАН BLANCO 1955 [id number]           Secondary Coverage     Payor Plan Insurance Group Employer/Plan Group    [place]      Payor Plan Address Payor Plan Phone Number Payor Plan Fax Number Effective Dates    [address] [phone]  7/31/2021 - None Entered    [facility] [id number]       Subscriber Name Subscriber Birth Date Member ID       ИВАН BLANCO 1955 [id number]                 Emergency Contacts      (Rel.) Home Phone Work Phone Mobile Phone    PaulLamar (Spouse) [phone] -- [phone]    GARCIA MALDONADO (Daughter) [phone] -- [phone]               Physician Progress Notes (most recent note)      Diony Bach DO at " 21 1614              [facility] HOSPITALIST PROGRESS NOTE     Patient Identification:  Name:  Иван Miller  Age:  65 y.o.  Sex:  male  :  1955  MRN:  [id number]  Visit Number:  [id number]  ROOM: [address] [place] [address]     Primary Care Provider:  Rosi Thomas APRN    Length of stay in inpatient status:  8    Subjective     Chief Compliant:    Chief Complaint   Patient presents with   • Shortness of Breath   • Edema       History of Presenting Illness: Patient seen and evaluated in follow-up for upper lobe and right lower lobe pneumonia with concern for possible aspiration as well as COPD exacerbation with associated acute hypercapnic on chronic hypoxic respiratory failure.  Patient currently on 3 L nasal cannula, reports being on 2 L chronically at home.  Patient does show clinical evidence of volume overload.  He complains of constipation and requests further medications to help with this.    Objective     Current Hospital Meds:apixaban, 5 mg, Oral, Q12H  atorvastatin, 20 mg, Oral, Daily  budesonide-formoterol, 2 puff, Inhalation, BID - RT  bumetanide, 1 mg, Intravenous, Once  carvedilol, 25 mg, Oral, BID With Meals  docusate sodium, 100 mg, Oral, BID  empagliflozin, 20 mg, Oral, Daily  fluticasone, 2 spray, Nasal, Daily  gabapentin, 600 mg, Oral, Q12H  guaiFENesin, 1,200 mg, Oral, Q12H  HYDROcodone-acetaminophen, 1 tablet, Oral, BID  insulin aspart, 0-9 Units, Subcutaneous, 4x Daily AC & at Bedtime  insulin aspart, 20 Units, Subcutaneous, TID With Meals  insulin detemir, 45 Units, Subcutaneous, Nightly  ipratropium-albuterol, 3 mL, Nebulization, 4x Daily - RT  lactulose, 20 g, Oral, Daily  lidocaine, 1 patch, Transdermal, Nightly  lisinopril, 5 mg, Oral, Q24H  pantoprazole, 40 mg, Oral, Daily  polyethylene glycol, 17 g, Oral, Daily  potassium chloride, 10 mEq, Oral, Daily  sennosides-docusate, 2 tablet, Oral, BID  sodium chloride, 10 mL, Intravenous, Q12H  tamsulosin, 0.4 mg, Oral, Nightly          Current Antimicrobial Therapy:  Anti-Infectives (From admission, onward)    Ordered     Dose/Rate Route Frequency Start Stop    08/05/21 1949  amoxicillin-clavulanate (AUGMENTIN) 875-125 MG per tablet 1 tablet     Ordering Provider: Mandeep Dunlap DO    1 tablet Oral Every 12 Hours Scheduled 08/05/21 2100 08/08/21 0824    08/01/21 0641  piperacillin-tazobactam (ZOSYN) IVPB 3.375 g in 100 mL NS VTB     Ordering Provider: Vivienne Flores DO    3.375 g  over 30 Minutes Intravenous Once 08/01/21 0800 08/01/21 0838    08/01/21 0502  metroNIDAZOLE (FLAGYL) 500 mg/100mL IVPB     Ordering Provider: Vivienne Flores DO    500 mg  over 30 Minutes Intravenous Once 08/01/21 0504 08/01/21 0839    08/01/21 0335  levoFLOXacin (LEVAQUIN) 750 mg/150 mL D5W (premix) (LEVAQUIN) 750 mg     Ordering Provider: Timi Greco DO    750 mg Intravenous Every 24 Hours 08/01/21 0337 08/01/21 0430        Current Diuretic Therapy:  Diuretics (From admission, onward)    Ordered     Dose/Rate Route Frequency Start Stop    08/09/21 1520  bumetanide (BUMEX) injection 1 mg     Ordering Provider: Diony Bach DO    1 mg Intravenous Once 08/09/21 1630      08/08/21 0821  bumetanide (BUMEX) 3 mg in sodium chloride 0.9 % 50 mL IVPB     Ordering Provider: Mandeep Dunlap DO    3 mg  12.5 mL/hr over 4 Hours Intravenous Once 08/08/21 0930 08/08/21 1434    08/07/21 0850  acetaZOLAMIDE (DIAMOX) 250 mg in sodium chloride 0.9 % 50 mL IVPB     Ordering Provider: Mandeep Dunlap DO    250 mg  200 mL/hr over 15 Minutes Intravenous Once 08/07/21 1100 08/07/21 1136    08/06/21 0814  acetaZOLAMIDE (DIAMOX) 250 mg in sodium chloride 0.9 % 50 mL IVPB     Ordering Provider: Mandeep Dunlap DO    250 mg  200 mL/hr over 15 Minutes Intravenous Once 08/06/21 1100 08/06/21 1214    08/05/21 1336  bumetanide (BUMEX) injection 2 mg     Ordering Provider: Mandeep Dunlap DO    2 mg Intravenous Once 08/05/21 1500  08/05/21 1506    08/04/21 0806  bumetanide (BUMEX) injection 2 mg     Ordering Provider: Mandeep Dunlap DO    2 mg Intravenous Once 08/04/21 1000 08/04/21 0958    08/01/21 1004  bumetanide (BUMEX) injection 1 mg     Ordering Provider: Mandeep Dunlap DO    1 mg Intravenous Once 08/01/21 1100 08/01/21 1111        ----------------------------------------------------------------------------------------------------------------------  Vital Signs:  Temp:  [97.6 °F (36.4 °C)-98.6 °F (37 °C)] 98.4 °F (36.9 °C)  Heart Rate:  [51-76] 63  Resp:  [18-20] 18  BP: (102-146)/(53-80) 102/53  SpO2:  [92 %-98 %] 94 %  on  Flow (L/min):  [3] 3;   Device (Oxygen Therapy): nasal cannula  Body mass index is 49.66 kg/m².    Wt Readings from Last 3 Encounters:   08/05/21 (!) 153 kg (336 lb 4.8 oz)   06/16/21 135 kg (297 lb 4.8 oz)   11/25/19 127 kg (280 lb)     Intake & Output (last 3 days)       08/06 0701 - 08/07 0700 08/07 0701 - 08/08 0700 08/08 0701 - 08/09 0700 08/09 0701 - 08/10 0700    P.O. 920 1080 700 240    I.V. (mL/kg)        Total Intake(mL/kg) 920 (6) 1080 (7.1) 700 (4.6) 240 (1.6)    Urine (mL/kg/hr) 1825 (0.5) 2950 (0.8) 1200 (0.3)     Stool 0 0 0     Total Output 1825 2950 1200     Net -905 -1870 -500 +240            Urine Unmeasured Occurrence 2 x 1 x 4 x 2 x    Stool Unmeasured Occurrence 3 x 1 x 1 x         Diet Regular; Consistent Carbohydrate, Cardiac, Daily Fluid Restriction; 1500 mL Fluid Per Day  ----------------------------------------------------------------------------------------------------------------------  Physical exam:  Constitutional:  Well-developed and well-nourished.  No acute distress.      HENT:  Head:  Normocephalic and atraumatic.  Mouth:  Moist mucous membranes.    Eyes:  Conjunctivae and EOM are normal. No scleral icterus.    Neck:  Neck supple.  No JVD present.    Cardiovascular:  Normal rate, regular rhythm and normal heart sounds with no murmur.  Pulmonary/Chest:  No  respiratory distress, no wheezes, no crackles, with normal breath sounds and good air movement.  Abdominal:  Soft.  Bowel sounds are normal.  No distension and no tenderness.   Musculoskeletal:  No tenderness, and no deformity.  No red or swollen joints anywhere.  Functional ROM intact.   Neurological:  Alert and oriented to person, place, and time.  No cranial nerve deficit. No slurred speech. Intact Sensation throughout  Skin:  Skin is warm and dry. No rash or lesion noted. No pallor.   Peripheral vascular:  Pulses in all 4 extremities with no clubbing, no cyanosis, 2+ pitting edema of the lower extremities bilaterally.  Psychiatric: Appropriate mood and affect, pleasant.   ----------------------------------------------------------------------------------------------------------------------  Tele:    ----------------------------------------------------------------------------------------------------------------------  Results from last 7 days   Lab Units 08/09/21  0904 08/08/21  0532 08/06/21  2334   WBC 10*3/mm3 7.17 7.18 8.07   HEMOGLOBIN g/dL 11.5* 10.4* 11.3*   HEMATOCRIT % 39.4 38.0 40.7   MCV fL 86.0 87.8 87.0   MCHC g/dL 29.2* 27.4* 27.8*   PLATELETS 10*3/mm3 218 210 213     Results from last 7 days   Lab Units 08/05/21  0546   PH, ARTERIAL pH units 7.368   PO2 ART mm Hg 82.1*   PCO2, ARTERIAL mm Hg 77.4*   HCO3 ART mmol/L 44.5*     Results from last 7 days   Lab Units 08/09/21  0904 08/08/21  0532 08/06/21  2334 08/06/21  0206 08/06/21  0206   SODIUM mmol/L 143 139 141   < > 144   POTASSIUM mmol/L 3.8 3.8 4.1   < > 4.2   MAGNESIUM mg/dL  --   --  2.3  --   --    CHLORIDE mmol/L 102 101 99   < > 100   CO2 mmol/L 35.7* 34.4* 37.5*   < > 40.0*   BUN mg/dL 19 27* 24*   < > 26*   CREATININE mg/dL 0.64* 0.91 0.71*   < > 0.70*   EGFR IF NONAFRICN AM mL/min/1.73 125 83 111   < > 113   CALCIUM mg/dL 9.2 8.8 9.0   < > 8.8   GLUCOSE mg/dL 144* 180* 142*   < > 125*   ALBUMIN g/dL 3.35* 2.88*  --   --  3.26*    BILIRUBIN mg/dL 0.3 0.2  --   --  0.2   ALK PHOS U/L 57 50  --   --  49   AST (SGOT) U/L 8 9  --   --  9   ALT (SGPT) U/L 14 16  --   --  20    < > = values in this interval not displayed.   Estimated Creatinine Clearance: 133.6 mL/min (A) (by C-G formula based on SCr of 0.64 mg/dL (L)).  No results found for: AMMONIA              Glucose   Date/Time Value Ref Range Status   08/09/2021 1041 135 (H) 70 - 130 mg/dL Final     Comment:     Meter: [id number] : [id number] Kenyon Fry   08/09/2021 0628 117 70 - 130 mg/dL Final     Comment:     Meter: [id number] : [id number] Kamille Hunter   08/08/2021 2023 186 (H) 70 - 130 mg/dL Final     Comment:     Meter: [id number] : [id number] TAMMIE LYON   08/08/2021 1631 190 (H) 70 - 130 mg/dL Final     Comment:     Meter: [id number] : [id number] Rachel Keith   08/08/2021 1055 127 70 - 130 mg/dL Final     Comment:     Meter: [id number] : [id number] Rachel Keith   08/08/2021 0620 157 (H) 70 - 130 mg/dL Final     Comment:     Meter: [id number] : [id number] Kamlile Hunter   08/07/2021 2023 224 (H) 70 - 130 mg/dL Final     Comment:     Meter: [id number] : [id number] TAMMIE LYON   08/07/2021 1707 151 (H) 70 - 130 mg/dL Final     Comment:     Meter: [id number] : [id number] FRIEDA ABAD     Lab Results   Component Value Date    TSH 2.100 05/21/2021    FREET4 0.95 05/21/2021     No results found for: PREGTESTUR, PREGSERUM, HCG, HCGQUANT  Pain Management Panel     Pain Management Panel Latest Ref Rng & Units 6/10/2021 5/21/2021    AMPHETAMINES SCREEN, URINE Negative Negative Positive(A)    BARBITURATES SCREEN Negative Negative Negative    BENZODIAZEPINE SCREEN, URINE Negative Negative Negative    BUPRENORPHINEUR Negative Negative Negative    COCAINE SCREEN, URINE Negative Negative Negative    METHADONE SCREEN, URINE Negative Negative Negative        Brief Urine Lab Results  (Last result in the past 365 days)      Color   Clarity   Blood    Leuk Est   Nitrite   Protein   CREAT   Urine HCG        06/08/21 1234 Yellow Clear Moderate (2+) Negative Negative Trace             No results found for: BLOODCX  No results found for: URINECX  No results found for: WOUNDCX  No results found for: STOOLCX  No results found for: RESPCX  No results found for: AFBCX        I have personally looked at the labs and they are summarized above.  ----------------------------------------------------------------------------------------------------------------------  Detailed radiology reports for the last 24 hours:    Imaging Results (Last 24 Hours)     ** No results found for the last 24 hours. **        Assessment & Plan      Bilateral pneumonia    -CT chest with left upper lobe and right lower lobe consolidation    -Concern for possible aspiration, initially on Zosyn now transitioned to course of Augmentin now completed    -Sputum culture obtained with normal respiratory crystal    Acute exacerbation of COPD  Acute hypercapnic on chronic hypoxic respiratory failure    -Continue Symbicort and DuoNeb    -Completed short course of prednisone, no wheezing present on auscultation    -Patient currently on 3 L nasal cannula compared to his baseline O2, there is component of volume overload on physical exam we will repeat one-time dose of 1 mg Bumex to aid in lung mechanics    Essential hypertension    -Continue Coreg, lisinopril as blood pressure currently remains at goal    Diabetes mellitus type 2    -Globin A1c of 8.8 on 5/21/2021    -Continue basal bolus insulin with Levemir 45 units nightly, NovoLog 20 3 times daily AC, with moderate dose sliding scale    Right ankle avulsion fracture  History of paraplegia    -Continue PT/OT and supportive care    -Currently awaiting SNF placement,  following along    Chronic thoracic osteomyelitis    -No interventions at this time, previously seen and evaluated by ID at previous hospitalization with no evidence of acute  bony/cartilage infection on MRI.     Paroxysmal atrial fibrillation    -Continue metoprolol and anticoagulation with Eliquis    Constipation    -Patient currently on MiraLAX, will add lactulose    Obesity    -Complicates all aspects of care      VTE Prophylaxis:   Mechanical Order History:     None      Pharmalogical Order History:      Ordered     Dose Route Frequency Stop    21 1438  apixaban (ELIQUIS) tablet 5 mg      5 mg PO Every 12 Hours Scheduled --    21 0631  heparin (porcine) 5000 UNIT/ML injection 5,000 Units  Status:  Discontinued      5,000 Units SC Every 12 Hours Scheduled 21 0644                Disposition currently pending placement for skilled nursing rehab.    Diony Bach DO  [facility] Hospitalist  21  16:15 EDT      Electronically signed by Diony Bach DO at 21 1636       Consult Notes (most recent note)    No notes of this type exist for this encounter.            Physical Therapy Notes (most recent note)      Joceline Trejo, PT at 21 1138  Version 1 of 1         Acute [facility] - [facility] Therapy Treatment Note  [facility]     Patient Name: Иван Miller  : 1955  MRN: [id number]  Today's Date: 2021   Onset of Illness/Injury or Date of Surgery: 21 (admit date)  Visit Dx:     ICD-10-CM ICD-9-CM   1. Pneumonia of both lungs due to infectious organism, unspecified part of lung  J18.9 483.8   2. Acute on chronic respiratory failure with hypoxia and hypercapnia (CMS/HCC)  J96.21 518.84    J96.22 786.09     799.02     Patient Active Problem List   Diagnosis   • Acute respiratory failure with hypoxemia (CMS/HCC)   • Coronary artery disease status post multiple stents   • Non-ischemic cardiomyopathy (CMS/HCC LV ejection fraction 40 to 45%   • Chronic systolic congestive heart failure (CMS/HCC)   • Essential hypertension   • Hyperlipidemia LDL goal <70   • Insulin dependent diabetes mellitus   • Respiratory failure (CMS/HCC)   • Finger  infection   • Pneumonia of both lungs due to infectious organism     Past Medical History:   Diagnosis Date   • Atrial fibrillation (CMS/HCC)    • COPD (chronic obstructive pulmonary disease) (CMS/HCC)    • Diabetes mellitus (CMS/HCC)    • GERD (gastroesophageal reflux disease)    • Hyperlipidemia    • Hypertension    • Myocardial infarction (CMS/HCC)    • Neuropathy      Past Surgical History:   Procedure Laterality Date   • ABDOMINAL SURGERY     • APPENDECTOMY     • CARDIAC CATHETERIZATION     • CORONARY STENT PLACEMENT          PT Assessment (last 12 hours)      PT Evaluation and Treatment     Row Name 08/09/21 1136          Physical Therapy Time and Intention    Subjective Information  no complaints  -CT     Document Type  therapy note (daily note)  -CT     Mode of Treatment  physical therapy  -CT     Patient Effort  good  -CT     Comment  Pt tolerated treatment session well with rest breaks provided as needed. Pt performed supine ther ex AAROM.   -CT     Row Name 08/09/21 1136          General Information    Patient Profile Reviewed  yes  -CT     Equipment Currently Used at Home  wheelchair;oxygen;lift device;walker, rolling  -CT     Existing Precautions/Restrictions  fall  -CT     Row Name 08/09/21 1136          Cognition    Affect/Mental Status (Cognitive)  WFL  -CT     Orientation Status (Cognition)  oriented x 3  -CT     Follows Commands (Cognition)  WFL  -CT     Row Name 08/09/21 1136          Bed Mobility    Bed Mobility  rolling left;rolling right  -CT     Rolling Left [place] (Bed Mobility)  maximum assist (25% patient effort)  -CT     Rolling Right [place] (Bed Mobility)  maximum assist (25% patient effort)  -CT     Bed Mobility, Safety Issues  decreased use of arms for pushing/pulling;decreased use of legs for bridging/pushing;impaired trunk control for bed mobility  -CT     Assistive Device (Bed Mobility)  bed rails;draw sheet  -CT     Row Name 08/09/21 1136          Motor Skills     Therapeutic Exercise  -- BLE AAROM supine ther ex  -CT     Row Name 08/09/21 1136          Coping    Observed Emotional State  calm;cooperative  -CT     Verbalized Emotional State  acceptance  -CT     Row Name 08/09/21 1136          Plan of Care Review    Plan of Care Reviewed With  patient  -CT     Row Name 08/09/21 1136          Positioning and Restraints    Pre-Treatment Position  in bed  -CT     Post Treatment Position  bed  -CT     In Bed  supine;call light within reach;encouraged to call for assist;exit alarm on;side rails up x3  -CT     Row Name 08/09/21 1136          Therapy Assessment/Plan (PT)    Rehab Potential (PT)  good, to achieve stated therapy goals  -CT     Criteria for Skilled Interventions Met (PT)  yes;skilled treatment is necessary  -CT       User Key  (r) = Recorded By, (t) = Taken By, (c) = Cosigned By    Initials Name Provider Type    CT Joceline Trejo, PT Physical Therapist        Physical Therapy Education                 Title: PT OT SLP Therapies (Done)     Topic: Physical Therapy (Done)     Point: Mobility training (Done)     Learning Progress Summary           Patient Acceptance, E,TB, VU by CT at 8/9/2021 1137    Acceptance, E, VU by CL at 8/8/2021 1457    Acceptance, E, VU by CL at 8/7/2021 2014    Acceptance, E,TB, VU by KB at 8/6/2021 1113    Acceptance, E,TB, VU by RH at 8/6/2021 0214    Acceptance, E,TB, VU by RF at 8/5/2021 1555    Acceptance, E,TB, VU by CT at 8/3/2021 1444    Acceptance, E,TB, VU by CT at 8/2/2021 1122                   Point: Home exercise program (Done)     Learning Progress Summary           Patient Acceptance, E,TB, VU by CT at 8/9/2021 1137    Acceptance, E, VU by CL at 8/8/2021 1457    Acceptance, E, VU by CL at 8/7/2021 2014    Acceptance, E,TB, VU by KB at 8/6/2021 1113    Acceptance, E,TB, VU by RH at 8/6/2021 0214    Acceptance, E,TB, VU by RF at 8/5/2021 1555    Acceptance, E,TB, VU by CT at 8/3/2021 1444    Acceptance, E,TB, VU by CT at 8/2/2021  1122                   Point: Body mechanics (Done)     Learning Progress Summary           Patient Acceptance, E,TB, VU by CT at 8/9/2021 1137    Acceptance, E, VU by CL at 8/8/2021 1457    Acceptance, E, VU by CL at 8/7/2021 2014    Acceptance, E,TB, VU by KB at 8/6/2021 1113    Acceptance, E,TB, VU by RH at 8/6/2021 0214    Acceptance, E,TB, VU by RF at 8/5/2021 1555    Acceptance, E,TB, VU by CT at 8/3/2021 1444    Acceptance, E,TB, VU by CT at 8/2/2021 1122                   Point: Precautions (Done)     Learning Progress Summary           Patient Acceptance, E,TB, VU by CT at 8/9/2021 1137    Acceptance, E, VU by CL at 8/8/2021 1457    Acceptance, E, VU by CL at 8/7/2021 2014    Acceptance, E,TB, VU by KB at 8/6/2021 1113    Acceptance, E,TB, VU by RH at 8/6/2021 0214    Acceptance, E,TB, VU by RF at 8/5/2021 1555    Acceptance, E,TB, VU by CT at 8/3/2021 1444    Acceptance, E,TB, VU by CT at 8/2/2021 1122                               User Key     Initials Effective Dates Name Provider Type Discipline     06/16/21 -  Marina Copeland, RN Registered Nurse Nurse    CL 06/16/21 -  Lisandra Keith, RN Registered Nurse Nurse    CT 06/16/21 -  Joceline Trejo PT Physical Therapist PT    RF 06/16/21 -  Libertad Ruiz PTA Physical Therapy Assistant PT     03/02/21 -  Juan Diego Falcon, MARCELO Registered Nurse Nurse              PT Recommendation and Plan  Anticipated Discharge Disposition (PT):  (tbd)  Planned Therapy Interventions (PT): balance training, bed mobility training, home exercise program, manual therapy techniques, motor coordination training, neuromuscular re-education, patient/family education, postural re-education, strengthening, transfer training  Therapy Frequency (PT): 2 times/wk (2-5 times/wk )  Plan of Care Reviewed With: patient       Time Calculation:   PT Charges     Row Name 08/09/21 1138             Time Calculation    PT Received On  08/09/21  -CT      PT Goal Re-Cert Due Date   21  -CT         Time Calculation- PT    Total Timed Code Minutes- PT  24 minute(s)  -CT        User Key  (r) = Recorded By, (t) = Taken By, (c) = Cosigned By    Initials Name Provider Type    CT Joceline Trejo, PT Physical Therapist        Therapy Charges for Today     Code Description Service Date Service Provider Modifiers Qty    27075825002 HC PT THER PROC EA 15 MIN 2021 Joceline Trejo, PT GP 2               Joceline Trejo PT  2021      Electronically signed by Joceline Trejo PT at 21 1139          Occupational Therapy Notes (most recent note)      Monica Serrano, OT at 21 1240          Acute Care - Occupational Therapy Treatment Note  [facility]     Patient Name: Иван Miller  : 1955  MRN: [id number]  Today's Date: 2021  Onset of Illness/Injury or Date of Surgery: 21 (admit date)     Referring Physician: Sandra    Admit Date: 2021       ICD-10-CM ICD-9-CM   1. Pneumonia of both lungs due to infectious organism, unspecified part of lung  J18.9 483.8   2. Acute on chronic respiratory failure with hypoxia and hypercapnia (CMS/HCC)  J96.21 518.84    J96.22 786.09     799.02     Patient Active Problem List   Diagnosis   • Acute respiratory failure with hypoxemia (CMS/HCC)   • Coronary artery disease status post multiple stents   • Non-ischemic cardiomyopathy (CMS/[facility] LV ejection fraction 40 to 45%   • Chronic systolic congestive heart failure (CMS/HCC)   • Essential hypertension   • Hyperlipidemia LDL goal <70   • Insulin dependent diabetes mellitus   • Respiratory failure (CMS/HCC)   • Finger infection   • Pneumonia of both lungs due to infectious organism     Past Medical History:   Diagnosis Date   • Atrial fibrillation (CMS/HCC)    • COPD (chronic obstructive pulmonary disease) (CMS/HCC)    • Diabetes mellitus (CMS/HCC)    • GERD (gastroesophageal reflux disease)    • Hyperlipidemia    • Hypertension    • Myocardial infarction (CMS/HCC)    • Neuropathy      Past  Surgical History:   Procedure Laterality Date   • ABDOMINAL SURGERY     • APPENDECTOMY     • CARDIAC CATHETERIZATION     • CORONARY STENT PLACEMENT              OT ASSESSMENT FLOWSHEET (last 12 hours)      OT Evaluation and Treatment     Row Name 08/09/21 1237                   OT Time and Intention    Subjective Information  complains of;weakness;fatigue  -LM        Document Type  therapy note (daily note)  -LM        Mode of Treatment  occupational therapy  -LM        Patient Effort  adequate  -LM        Comment  Patient seen this date for BUE therex/TA for fxl activity tolerance, light strengthening, arom.  Patient demonstrates understanding of light towel therex/HEP.  Frequent rest breaks.  -LM           Cognition    Affect/Mental Status (Cognitive)  WFL  -LM        Orientation Status (Cognition)  oriented x 3  -LM           Positioning and Restraints    Post Treatment Position  bed  -LM        In Bed  encouraged to call for assist;call light within reach  -LM          User Key  (r) = Recorded By, (t) = Taken By, (c) = Cosigned By    Initials Name Effective Dates    LM Monica Serrano OT 06/16/21 -                  OT Recommendation and Plan              Time Calculation:     Therapy Charges for Today     Code Description Service Date Service Provider Modifiers Qty    05834779362  OT THER PROC EA 15 MIN 8/9/2021 Monica Serrano OT GO 2               Monica Serrano OT  8/9/2021    Electronically signed by Monica Serrano OT at 08/09/21 1241

## 2021-08-10 NOTE — PLAN OF CARE
Goal Outcome Evaluation:  Plan of Care Reviewed With: patient        Progress: no change  Outcome Summary: no acute changes. pt resting quietly this shift.

## 2021-08-10 NOTE — THERAPY TREATMENT NOTE
Acute Care - Occupational Therapy Treatment Note  BAKARI Vazquez     Patient Name: Иван Miller  : 1955  MRN: [id number]  Today's Date: 8/10/2021  Onset of Illness/Injury or Date of Surgery: 21 (admit date)     Referring Physician: Sandra    Admit Date: 2021       ICD-10-CM ICD-9-CM   1. Pneumonia of both lungs due to infectious organism, unspecified part of lung  J18.9 483.8   2. Acute on chronic respiratory failure with hypoxia and hypercapnia (CMS/HCC)  J96.21 518.84    J96.22 786.09     799.02     Patient Active Problem List   Diagnosis   • Acute respiratory failure with hypoxemia (CMS/HCC)   • Coronary artery disease status post multiple stents   • Non-ischemic cardiomyopathy (CMS/HCC LV ejection fraction 40 to 45%   • Chronic systolic congestive heart failure (CMS/HCC)   • Essential hypertension   • Hyperlipidemia LDL goal <70   • Insulin dependent diabetes mellitus   • Respiratory failure (CMS/[facility])   • Finger infection   • Pneumonia of both lungs due to infectious organism     Past Medical History:   Diagnosis Date   • Atrial fibrillation (CMS/HCC)    • COPD (chronic obstructive pulmonary disease) (CMS/HCC)    • Diabetes mellitus (CMS/HCC)    • GERD (gastroesophageal reflux disease)    • Hyperlipidemia    • Hypertension    • Myocardial infarction (CMS/HCC)    • Neuropathy      Past Surgical History:   Procedure Laterality Date   • ABDOMINAL SURGERY     • APPENDECTOMY     • CARDIAC CATHETERIZATION     • CORONARY STENT PLACEMENT              OT ASSESSMENT FLOWSHEET (last 12 hours)      OT Evaluation and Treatment     Row Name 08/10/21 1429                   OT Time and Intention    Subjective Information  complains of;weakness;fatigue  -LM        Document Type  therapy note (daily note)  -LM        Mode of Treatment  occupational therapy  -LM        Patient Effort  adequate  -LM        Comment  Patient seen this date for bue arom therex.  Patient performing light bue arom towel therex while supine  in bed.  Patient verbalizes understanding of light bue towel therex HEP.  Frequent rest breaks.  -LM           Cognition    Affect/Mental Status (Cognitive)  WFL  -LM        Orientation Status (Cognition)  oriented x 3  -LM        Follows Commands (Cognition)  WFL  -LM           Positioning and Restraints    Post Treatment Position  bed  -LM        In Bed  call light within reach;encouraged to call for assist  -LM          User Key  (r) = Recorded By, (t) = Taken By, (c) = Cosigned By    Initials Name Effective Dates    LM Monica Serrano OT 06/16/21 -                  OT Recommendation and Plan              Time Calculation:     Therapy Charges for Today     Code Description Service Date Service Provider Modifiers Qty    60993131345  OT THER PROC EA 15 MIN 8/9/2021 Monica Serrano OT GO 2    [id number]  OT THER PROC EA 15 MIN 8/10/2021 Monica Serrano OT GO 1               Monica Serrano OT  8/10/2021

## 2021-08-11 LAB
ANION GAP SERPL CALCULATED.3IONS-SCNC: 5.1 MMOL/L (ref 5–15)
BUN SERPL-MCNC: 18 MG/DL (ref 8–23)
BUN/CREAT SERPL: 19.4 (ref 7–25)
CALCIUM SPEC-SCNC: 9.3 MG/DL (ref 8.6–10.5)
CHLORIDE SERPL-SCNC: 96 MMOL/L (ref 98–107)
CO2 SERPL-SCNC: 35.9 MMOL/L (ref 22–29)
CREAT SERPL-MCNC: 0.93 MG/DL (ref 0.76–1.27)
DEPRECATED RDW RBC AUTO: 56 FL (ref 37–54)
ERYTHROCYTE [DISTWIDTH] IN BLOOD BY AUTOMATED COUNT: 17.2 % (ref 12.3–15.4)
GFR SERPL CREATININE-BSD FRML MDRD: 81 ML/MIN/1.73
GLUCOSE BLDC GLUCOMTR-MCNC: 120 MG/DL (ref 70–130)
GLUCOSE BLDC GLUCOMTR-MCNC: 150 MG/DL (ref 70–130)
GLUCOSE BLDC GLUCOMTR-MCNC: 158 MG/DL (ref 70–130)
GLUCOSE BLDC GLUCOMTR-MCNC: 246 MG/DL (ref 70–130)
GLUCOSE SERPL-MCNC: 137 MG/DL (ref 65–99)
HCT VFR BLD AUTO: 42.1 % (ref 37.5–51)
HGB BLD-MCNC: 11.7 G/DL (ref 13–17.7)
MCH RBC QN AUTO: 24.7 PG (ref 26.6–33)
MCHC RBC AUTO-ENTMCNC: 27.8 G/DL (ref 31.5–35.7)
MCV RBC AUTO: 89 FL (ref 79–97)
PLATELET # BLD AUTO: 177 10*3/MM3 (ref 140–450)
PMV BLD AUTO: 9.2 FL (ref 6–12)
POTASSIUM SERPL-SCNC: 3.9 MMOL/L (ref 3.5–5.2)
RBC # BLD AUTO: 4.73 10*6/MM3 (ref 4.14–5.8)
SODIUM SERPL-SCNC: 137 MMOL/L (ref 136–145)
WBC # BLD AUTO: 10.62 10*3/MM3 (ref 3.4–10.8)

## 2021-08-11 PROCEDURE — 94799 UNLISTED PULMONARY SVC/PX: CPT

## 2021-08-11 PROCEDURE — 63710000001 INSULIN ASPART PER 5 UNITS: Performed by: INTERNAL MEDICINE

## 2021-08-11 PROCEDURE — 99232 SBSQ HOSP IP/OBS MODERATE 35: CPT | Performed by: STUDENT IN AN ORGANIZED HEALTH CARE EDUCATION/TRAINING PROGRAM

## 2021-08-11 PROCEDURE — 94660 CPAP INITIATION&MGMT: CPT

## 2021-08-11 PROCEDURE — 82962 GLUCOSE BLOOD TEST: CPT

## 2021-08-11 PROCEDURE — 85027 COMPLETE CBC AUTOMATED: CPT | Performed by: STUDENT IN AN ORGANIZED HEALTH CARE EDUCATION/TRAINING PROGRAM

## 2021-08-11 PROCEDURE — 63710000001 INSULIN DETEMIR PER 5 UNITS: Performed by: INTERNAL MEDICINE

## 2021-08-11 PROCEDURE — 80048 BASIC METABOLIC PNL TOTAL CA: CPT | Performed by: STUDENT IN AN ORGANIZED HEALTH CARE EDUCATION/TRAINING PROGRAM

## 2021-08-11 RX ORDER — DOXYCYCLINE 100 MG/1
100 CAPSULE ORAL EVERY 12 HOURS SCHEDULED
Status: DISCONTINUED | OUTPATIENT
Start: 2021-08-11 | End: 2021-08-12 | Stop reason: HOSPADM

## 2021-08-11 RX ORDER — IPRATROPIUM BROMIDE AND ALBUTEROL SULFATE 2.5; .5 MG/3ML; MG/3ML
3 SOLUTION RESPIRATORY (INHALATION)
Status: DISCONTINUED | OUTPATIENT
Start: 2021-08-11 | End: 2021-08-12 | Stop reason: HOSPADM

## 2021-08-11 RX ADMIN — GABAPENTIN 600 MG: 300 CAPSULE ORAL at 09:34

## 2021-08-11 RX ADMIN — LISINOPRIL 5 MG: 2.5 TABLET ORAL at 09:35

## 2021-08-11 RX ADMIN — HYDROCODONE BITARTRATE AND ACETAMINOPHEN 1 TABLET: 7.5; 325 TABLET ORAL at 09:34

## 2021-08-11 RX ADMIN — DOXYCYCLINE 100 MG: 100 CAPSULE ORAL at 20:55

## 2021-08-11 RX ADMIN — GUAIFENESIN 1200 MG: 600 TABLET, EXTENDED RELEASE ORAL at 20:56

## 2021-08-11 RX ADMIN — GABAPENTIN 600 MG: 300 CAPSULE ORAL at 20:56

## 2021-08-11 RX ADMIN — SODIUM CHLORIDE, PRESERVATIVE FREE 10 ML: 5 INJECTION INTRAVENOUS at 20:56

## 2021-08-11 RX ADMIN — POTASSIUM CHLORIDE 10 MEQ: 750 TABLET, FILM COATED, EXTENDED RELEASE ORAL at 09:35

## 2021-08-11 RX ADMIN — INSULIN ASPART 2 UNITS: 100 INJECTION, SOLUTION INTRAVENOUS; SUBCUTANEOUS at 21:04

## 2021-08-11 RX ADMIN — POLYETHYLENE GLYCOL (3350) 17 G: 17 POWDER, FOR SOLUTION ORAL at 09:35

## 2021-08-11 RX ADMIN — HYDROCODONE BITARTRATE AND ACETAMINOPHEN 1 TABLET: 7.5; 325 TABLET ORAL at 21:04

## 2021-08-11 RX ADMIN — FLUTICASONE PROPIONATE 2 SPRAY: 50 SPRAY, METERED NASAL at 09:37

## 2021-08-11 RX ADMIN — INSULIN ASPART 20 UNITS: 100 INJECTION, SOLUTION INTRAVENOUS; SUBCUTANEOUS at 09:36

## 2021-08-11 RX ADMIN — LIDOCAINE 1 PATCH: 50 PATCH CUTANEOUS at 20:56

## 2021-08-11 RX ADMIN — INSULIN ASPART 4 UNITS: 100 INJECTION, SOLUTION INTRAVENOUS; SUBCUTANEOUS at 12:02

## 2021-08-11 RX ADMIN — GUAIFENESIN 1200 MG: 600 TABLET, EXTENDED RELEASE ORAL at 09:35

## 2021-08-11 RX ADMIN — DOCUSATE SODIUM 50 MG AND SENNOSIDES 8.6 MG 2 TABLET: 8.6; 5 TABLET, FILM COATED ORAL at 20:56

## 2021-08-11 RX ADMIN — INSULIN ASPART 20 UNITS: 100 INJECTION, SOLUTION INTRAVENOUS; SUBCUTANEOUS at 17:33

## 2021-08-11 RX ADMIN — CARVEDILOL 25 MG: 25 TABLET, FILM COATED ORAL at 17:33

## 2021-08-11 RX ADMIN — ACETAMINOPHEN 650 MG: 325 TABLET ORAL at 05:51

## 2021-08-11 RX ADMIN — PANTOPRAZOLE SODIUM 40 MG: 40 TABLET, DELAYED RELEASE ORAL at 09:35

## 2021-08-11 RX ADMIN — BUDESONIDE AND FORMOTEROL FUMARATE DIHYDRATE 2 PUFF: 160; 4.5 AEROSOL RESPIRATORY (INHALATION) at 19:30

## 2021-08-11 RX ADMIN — APIXABAN 5 MG: 5 TABLET, FILM COATED ORAL at 09:35

## 2021-08-11 RX ADMIN — TAMSULOSIN HYDROCHLORIDE 0.4 MG: 0.4 CAPSULE ORAL at 20:55

## 2021-08-11 RX ADMIN — APIXABAN 5 MG: 5 TABLET, FILM COATED ORAL at 20:55

## 2021-08-11 RX ADMIN — DOCUSATE SODIUM 100 MG: 100 CAPSULE, LIQUID FILLED ORAL at 09:35

## 2021-08-11 RX ADMIN — DOXYCYCLINE 100 MG: 100 CAPSULE ORAL at 12:02

## 2021-08-11 RX ADMIN — IPRATROPIUM BROMIDE AND ALBUTEROL SULFATE 3 ML: .5; 3 SOLUTION RESPIRATORY (INHALATION) at 19:30

## 2021-08-11 RX ADMIN — INSULIN ASPART 20 UNITS: 100 INJECTION, SOLUTION INTRAVENOUS; SUBCUTANEOUS at 12:02

## 2021-08-11 RX ADMIN — IPRATROPIUM BROMIDE AND ALBUTEROL SULFATE 3 ML: .5; 3 SOLUTION RESPIRATORY (INHALATION) at 10:33

## 2021-08-11 RX ADMIN — DOCUSATE SODIUM 100 MG: 100 CAPSULE, LIQUID FILLED ORAL at 20:56

## 2021-08-11 RX ADMIN — IPRATROPIUM BROMIDE AND ALBUTEROL SULFATE 3 ML: .5; 3 SOLUTION RESPIRATORY (INHALATION) at 00:45

## 2021-08-11 RX ADMIN — IPRATROPIUM BROMIDE AND ALBUTEROL SULFATE 3 ML: .5; 3 SOLUTION RESPIRATORY (INHALATION) at 15:13

## 2021-08-11 RX ADMIN — LACTULOSE 20 G: 10 SOLUTION ORAL at 09:37

## 2021-08-11 RX ADMIN — IPRATROPIUM BROMIDE AND ALBUTEROL SULFATE 3 ML: .5; 3 SOLUTION RESPIRATORY (INHALATION) at 06:46

## 2021-08-11 RX ADMIN — BUDESONIDE AND FORMOTEROL FUMARATE DIHYDRATE 2 PUFF: 160; 4.5 AEROSOL RESPIRATORY (INHALATION) at 06:46

## 2021-08-11 RX ADMIN — SODIUM CHLORIDE, PRESERVATIVE FREE 10 ML: 5 INJECTION INTRAVENOUS at 09:36

## 2021-08-11 RX ADMIN — DOCUSATE SODIUM 50 MG AND SENNOSIDES 8.6 MG 2 TABLET: 8.6; 5 TABLET, FILM COATED ORAL at 09:35

## 2021-08-11 RX ADMIN — INSULIN ASPART 2 UNITS: 100 INJECTION, SOLUTION INTRAVENOUS; SUBCUTANEOUS at 09:36

## 2021-08-11 RX ADMIN — INSULIN DETEMIR 45 UNITS: 100 INJECTION, SOLUTION SUBCUTANEOUS at 21:04

## 2021-08-11 RX ADMIN — ATORVASTATIN CALCIUM 20 MG: 20 TABLET, FILM COATED ORAL at 09:35

## 2021-08-11 RX ADMIN — BUMETANIDE 1 MG: 1 TABLET ORAL at 09:36

## 2021-08-11 RX ADMIN — CARVEDILOL 25 MG: 25 TABLET, FILM COATED ORAL at 09:35

## 2021-08-11 NOTE — PROGRESS NOTES
[facility] HOSPITALIST PROGRESS NOTE     Patient Identification:  Name:  Иван Miller  Age:  65 y.o.  Sex:  male  :  1955  MRN:  [id number]  Visit Number:  [id number]  ROOM: [address] [place] [address]     Primary Care Provider:  Rosi Thomas APRN    Length of stay in inpatient status:  10    Subjective     Chief Compliant:    Chief Complaint   Patient presents with   • Shortness of Breath   • Edema       History of Presenting Illness: Patient seen and evaluated in follow-up for upper lobe and right lower lobe pneumonia with concern for possible aspiration as well as COPD exacerbation with associated acute hypercapnic on chronic hypoxic respiratory failure.  Patient currently on 3 L nasal cannula and oxygenating well. Compliant with BPAP.     Objective     Current Hospital Meds:apixaban, 5 mg, Oral, Q12H  atorvastatin, 20 mg, Oral, Daily  budesonide-formoterol, 2 puff, Inhalation, BID - RT  bumetanide, 1 mg, Oral, Daily  carvedilol, 25 mg, Oral, BID With Meals  docusate sodium, 100 mg, Oral, BID  doxycycline, 100 mg, Oral, Q12H  empagliflozin, 20 mg, Oral, Daily  fluticasone, 2 spray, Nasal, Daily  gabapentin, 600 mg, Oral, Q12H  guaiFENesin, 1,200 mg, Oral, Q12H  HYDROcodone-acetaminophen, 1 tablet, Oral, BID  insulin aspart, 0-9 Units, Subcutaneous, 4x Daily AC & at Bedtime  insulin aspart, 20 Units, Subcutaneous, TID With Meals  insulin detemir, 45 Units, Subcutaneous, Nightly  ipratropium-albuterol, 3 mL, Nebulization, Q4H - RT  lactulose, 20 g, Oral, Daily  lidocaine, 1 patch, Transdermal, Nightly  lisinopril, 5 mg, Oral, Q24H  pantoprazole, 40 mg, Oral, Daily  polyethylene glycol, 17 g, Oral, Daily  potassium chloride, 10 mEq, Oral, Daily  sennosides-docusate, 2 tablet, Oral, BID  sodium chloride, 10 mL, Intravenous, Q12H  tamsulosin, 0.4 mg, Oral, Nightly         Current Antimicrobial Therapy:  Anti-Infectives (From admission, onward)    Ordered     Dose/Rate Route Frequency Start Stop    21 0849   doxycycline (MONODOX) capsule 100 mg     Ordering Provider: Diony Bach DO    100 mg Oral Every 12 Hours Scheduled 08/11/21 1100 08/16/21 0859    08/05/21 1949  amoxicillin-clavulanate (AUGMENTIN) 875-125 MG per tablet 1 tablet     Ordering Provider: Mandeep Dunlap DO    1 tablet Oral Every 12 Hours Scheduled 08/05/21 2100 08/08/21 0824    08/01/21 0641  piperacillin-tazobactam (ZOSYN) IVPB 3.375 g in 100 mL NS VTB     Ordering Provider: Vivienne Flores DO    3.375 g  over 30 Minutes Intravenous Once 08/01/21 0800 08/01/21 0838    08/01/21 0502  metroNIDAZOLE (FLAGYL) 500 mg/100mL IVPB     Ordering Provider: Vivienne Flores DO    500 mg  over 30 Minutes Intravenous Once 08/01/21 0504 08/01/21 0839    08/01/21 0335  levoFLOXacin (LEVAQUIN) 750 mg/150 mL D5W (premix) (LEVAQUIN) 750 mg     Ordering Provider: Timi Greco DO    750 mg Intravenous Every 24 Hours 08/01/21 0337 08/01/21 0430        Current Diuretic Therapy:  Diuretics (From admission, onward)    Ordered     Dose/Rate Route Frequency Start Stop    08/10/21 1444  bumetanide (BUMEX) tablet 1 mg     Ordering Provider: Diony Bach DO    1 mg Oral Daily 08/11/21 0900      08/10/21 2101  bumetanide (BUMEX) injection 2 mg     Ordering Provider: Diony Bach DO    2 mg Intravenous Once 08/10/21 2200 08/10/21 2300    08/09/21 1520  bumetanide (BUMEX) injection 1 mg     Ordering Provider: Diony Bach DO    1 mg Intravenous Once 08/09/21 1630 08/09/21 1626    08/08/21 0821  bumetanide (BUMEX) 3 mg in sodium chloride 0.9 % 50 mL IVPB     Ordering Provider: Mandeep Dunlap DO    3 mg  12.5 mL/hr over 4 Hours Intravenous Once 08/08/21 0930 08/08/21 1434    08/07/21 0850  acetaZOLAMIDE (DIAMOX) 250 mg in sodium chloride 0.9 % 50 mL IVPB     Ordering Provider: Mandeep Dunlap DO    250 mg  200 mL/hr over 15 Minutes Intravenous Once 08/07/21 1100 08/07/21 1136    08/06/21 0814  acetaZOLAMIDE (DIAMOX) 250 mg in sodium  chloride 0.9 % 50 mL IVPB     Ordering Provider: Mandeep Dunlap DO    250 mg  200 mL/hr over 15 Minutes Intravenous Once 08/06/21 1100 08/06/21 1214    08/05/21 1336  bumetanide (BUMEX) injection 2 mg     Ordering Provider: Mandeep Dunlap DO    2 mg Intravenous Once 08/05/21 1500 08/05/21 1506    08/04/21 0806  bumetanide (BUMEX) injection 2 mg     Ordering Provider: Mandeep Dunlap DO    2 mg Intravenous Once 08/04/21 1000 08/04/21 0958    08/01/21 1004  bumetanide (BUMEX) injection 1 mg     Ordering Provider: Mandeep Dunlap DO    1 mg Intravenous Once 08/01/21 1100 08/01/21 1111        ----------------------------------------------------------------------------------------------------------------------  Vital Signs:  Temp:  [98.7 °F (37.1 °C)-100.9 °F (38.3 °C)] 98.7 °F (37.1 °C)  Heart Rate:  [63-93] 81  Resp:  [16-27] 23  BP: (129-176)/(66-89) 135/73  SpO2:  [91 %-99 %] 96 %  on  Flow (L/min):  [3] 3;   Device (Oxygen Therapy): NPPV/NIV  Body mass index is 49.66 kg/m².    Wt Readings from Last 3 Encounters:   08/05/21 (!) 153 kg (336 lb 4.8 oz)   06/16/21 135 kg (297 lb 4.8 oz)   11/25/19 127 kg (280 lb)     Intake & Output (last 3 days)       08/08 0701 - 08/09 0700 08/09 0701 - 08/10 0700 08/10 0701 - 08/11 0700 08/11 0701 - 08/12 0700    P.O. 700 1060 540 960    I.V. (mL/kg)   0 (0) 0 (0)    Total Intake(mL/kg) 700 (4.6) 1060 (6.9) 540 (3.5) 960 (6.3)    Urine (mL/kg/hr) 1200 (0.3)       Stool 0       Total Output 1200       Net -500 +1060 +540 +960            Urine Unmeasured Occurrence 4 x 4 x 8 x 2 x    Stool Unmeasured Occurrence 1 x 1 x 2 x 1 x        Diet Regular; Consistent Carbohydrate, Cardiac, Daily Fluid Restriction; 1500 mL Fluid Per Day  ----------------------------------------------------------------------------------------------------------------------  Physical exam:  Constitutional:  Well-developed and well-nourished.  No acute distress.      HENT:  Head:   Normocephalic and atraumatic.  Mouth:  Moist mucous membranes.    Eyes:  Conjunctivae and EOM are normal. No scleral icterus.    Neck:  Neck supple.  No JVD present.    Cardiovascular:  Normal rate, regular rhythm and normal heart sounds with no murmur.  Pulmonary/Chest:  No respiratory distress, no wheezes, no crackles, with normal breath sounds and good air movement.  Abdominal:  Soft.  Bowel sounds are normal.  No distension and no tenderness.   Musculoskeletal:  No tenderness, and no deformity.  No red or swollen joints anywhere.  Functional ROM intact.   Neurological:  Alert and oriented to person, place, and time.  No cranial nerve deficit. No slurred speech.  Skin:  Skin is warm and dry. No rash or lesion noted. No pallor.   Peripheral vascular:  Pulses in all 4 extremities with no clubbing, no cyanosis, 2+ pitting edema of the lower extremities bilaterally.  Psychiatric: Appropriate mood and affect, pleasant.   ----------------------------------------------------------------------------------------------------------------------  Tele:    ----------------------------------------------------------------------------------------------------------------------  Results from last 7 days   Lab Units 08/11/21  0141 08/09/21  0904 08/08/21  0532   WBC 10*3/mm3 10.62 7.17 7.18   HEMOGLOBIN g/dL 11.7* 11.5* 10.4*   HEMATOCRIT % 42.1 39.4 38.0   MCV fL 89.0 86.0 87.8   MCHC g/dL 27.8* 29.2* 27.4*   PLATELETS 10*3/mm3 177 218 210     Results from last 7 days   Lab Units 08/05/21  0546   PH, ARTERIAL pH units 7.368   PO2 ART mm Hg 82.1*   PCO2, ARTERIAL mm Hg 77.4*   HCO3 ART mmol/L 44.5*     Results from last 7 days   Lab Units 08/11/21  0141 08/10/21  0146 08/09/21  0904 08/08/21  0532 08/08/21  0532 08/06/21  2334 08/06/21  2334 08/06/21  0206 08/06/21  0206   SODIUM mmol/L 137 139 143   < > 139   < > 141   < > 144   POTASSIUM mmol/L 3.9 4.0 3.8   < > 3.8   < > 4.1   < > 4.2   MAGNESIUM mg/dL  --   --   --   --   --    --  2.3  --   --    CHLORIDE mmol/L 96* 101 102   < > 101   < > 99   < > 100   CO2 mmol/L 35.9* 32.8* 35.7*   < > 34.4*   < > 37.5*   < > 40.0*   BUN mg/dL 18 21 19   < > 27*   < > 24*   < > 26*   CREATININE mg/dL 0.93 0.75* 0.64*   < > 0.91   < > 0.71*   < > 0.70*   EGFR IF NONAFRICN AM mL/min/1.73 81 104 125   < > 83   < > 111   < > 113   CALCIUM mg/dL 9.3 9.2 9.2   < > 8.8   < > 9.0   < > 8.8   GLUCOSE mg/dL 137* 209* 144*   < > 180*   < > 142*   < > 125*   ALBUMIN g/dL  --   --  3.35*  --  2.88*  --   --   --  3.26*   BILIRUBIN mg/dL  --   --  0.3  --  0.2  --   --   --  0.2   ALK PHOS U/L  --   --  57  --  50  --   --   --  49   AST (SGOT) U/L  --   --  8  --  9  --   --   --  9   ALT (SGPT) U/L  --   --  14  --  16  --   --   --  20    < > = values in this interval not displayed.   Estimated Creatinine Clearance: 114.9 mL/min (by C-G formula based on SCr of 0.93 mg/dL).  No results found for: AMMONIA              Glucose   Date/Time Value Ref Range Status   08/11/2021 1703 120 70 - 130 mg/dL Final     Comment:     Meter: [id number] : [id number] FRIEDA MCCURDY   08/11/2021 1047 246 (H) 70 - 130 mg/dL Final     Comment:     Meter: [id number] : [id number] flip townsend   08/11/2021 0747 150 (H) 70 - 130 mg/dL Final     Comment:     Meter: [id number] : [id number] Ashley Fatmata   08/10/2021 2106 125 70 - 130 mg/dL Final     Comment:     Meter: [id number] : [id number] maribel murphy   08/10/2021 1622 128 70 - 130 mg/dL Final     Comment:     Meter: [id number] : [id number] ISAEL RAISA   08/10/2021 1055 137 (H) 70 - 130 mg/dL Final     Comment:     Meter: [id number] : [id number] Ashley Mccarthyney   08/10/2021 0622 147 (H) 70 - 130 mg/dL Final     Comment:     Meter: [id number] : [id number] Kamille Hunter   08/09/2021 2056 145 (H) 70 - 130 mg/dL Final     Comment:     Meter: [id number] : [id number] maribel murphy     Lab Results   Component Value Date    TSH 2.100 05/21/2021     FREET4 0.95 05/21/2021     No results found for: PREGTESTUR, PREGSERUM, HCG, HCGQUANT  Pain Management Panel     Pain Management Panel Latest Ref Rng & Units 6/10/2021 5/21/2021    AMPHETAMINES SCREEN, URINE Negative Negative Positive(A)    BARBITURATES SCREEN Negative Negative Negative    BENZODIAZEPINE SCREEN, URINE Negative Negative Negative    BUPRENORPHINEUR Negative Negative Negative    COCAINE SCREEN, URINE Negative Negative Negative    METHADONE SCREEN, URINE Negative Negative Negative        Brief Urine Lab Results  (Last result in the past 365 days)      Color   Clarity   Blood   Leuk Est   Nitrite   Protein   CREAT   Urine HCG        06/08/21 1234 Yellow Clear Moderate (2+) Negative Negative Trace             No results found for: BLOODCX  No results found for: URINECX  No results found for: WOUNDCX  No results found for: STOOLCX  No results found for: RESPCX  No results found for: AFBCX        I have personally looked at the labs and they are summarized above.  ----------------------------------------------------------------------------------------------------------------------  Detailed radiology reports for the last 24 hours:    Imaging Results (Last 24 Hours)     ** No results found for the last 24 hours. **        Assessment & Plan      Bilateral pneumonia    -CT chest with left upper lobe and right lower lobe consolidation    -Concern for possible aspiration, initially on Zosyn now transitioned to course of Augmentin now completed    -Sputum culture obtained with normal respiratory crystal    -repeat chest x-ray with patchy bibasilar airspace disease with tiny bilateral effusions.    -Patient noted to be febrile this a.m. with a temp of 100.9, and looking back patient treated for COPD exacerbation and above-noted pneumonia never received any atypical coverage therefore will add doxycycline.    Acute exacerbation of COPD  Acute hypercapnic on chronic hypoxic respiratory failure    -Continue Symbicort  and DuoNeb    -Completed short course of prednisone, no wheezing present on auscultation    -Patient stable on 3 L nasal cannula     -Continue oral Bumex    Essential hypertension    -Continue Coreg, lisinopril as blood pressure currently remains at goal    Diabetes mellitus type 2    -Globin A1c of 8.8 on 5/21/2021    -Continue basal bolus insulin with Levemir 45 units nightly, NovoLog 20 3 times daily AC, with moderate dose sliding scale    Right ankle avulsion fracture  History of paraplegia    -Continue PT/OT and supportive care    -Currently awaiting SNF placement,  following along    Chronic thoracic osteomyelitis    -No interventions at this time, previously seen and evaluated by ID at previous hospitalization with no evidence of acute bony/cartilage infection on MRI.     Paroxysmal atrial fibrillation    -Continue metoprolol and anticoagulation with Eliquis    Constipation    -Patient currently on MiraLAX, will add lactulose    Obesity    -Complicates all aspects of care      VTE Prophylaxis:   Mechanical Order History:     None      Pharmalogical Order History:      Ordered     Dose Route Frequency Stop    08/01/21 1438  apixaban (ELIQUIS) tablet 5 mg      5 mg PO Every 12 Hours Scheduled --    08/01/21 0631  heparin (porcine) 5000 UNIT/ML injection 5,000 Units  Status:  Discontinued      5,000 Units SC Every 12 Hours Scheduled 08/01/21 0644                Disposition currently pending placement for skilled nursing rehab.    Diony Bach DO  [facility]  08/11/21  18:34 EDT

## 2021-08-11 NOTE — PLAN OF CARE
Goal Outcome Evaluation:  Plan of Care Reviewed With: patient        Progress: no change  Outcome Summary: Pt resting in bed. has worn Bipap on and off all shift. Po doxycycline added. No complaints or concerns at this time. Will continue to monitor.

## 2021-08-11 NOTE — SIGNIFICANT NOTE
08/11/21 1428   OTHER   Discipline physical therapist   Rehab Time/Intention   Session Not Performed other (see comments)  (pt asleep on bipap)

## 2021-08-11 NOTE — CASE MANAGEMENT/SOCIAL WORK
Discharge Planning Assessment  [facility]     Patient Name: Иван Miller  MRN: [id number]  Today's Date: 8/11/2021    Admit Date: 7/31/2021      Discharge Plan     Row Name 08/11/21 1717       Plan    Plan SS spoke to [facility] per Ann who states pt has been approved for admission. SS notified Dr. Bach. Pt is not medically stable for discharge. SS notified Sign  per Ann. SS to follow.        Continued Care and Services - Admitted Since 7/31/2021     Destination     Service Provider Request Status Selected Services Address Phone Fax Patient Preferred    [facility]  Accepted N/A [address] [facility] [address] [phone] --    [facility]  Pending - No Request Sent N/A [address] , [facility] [address] [phone] --    [facility]  Pending - No Request Sent N/A [address] [address] [phone] --    [facility] [place]  Declined  Unable to Meet Patient Needs N/A [address] [place] [id number] [phone] --    [facility]  Declined  No Bariatric Bed Available N/A [address] [place] [address] [phone] --    [facility]  [facility]  No Bariatric Bed Available N/A [address] [place] [place] [id number] [phone] --    [facility]  Declined N/A [address] [place] [place] [address] [phone] --    [facility]  Declined N/A [address] [address] [phone] --            Selected Continued Care - Prior Encounters Includes selections from prior encounters from 5/2/2021 to 8/11/2021    Discharged on 6/16/2021 Admission date: 5/21/2021 - Discharge disposition: Skilled Nursing Facility (DC - External)    Destination     Service Provider Selected Services Address Phone Fax  Patient Preferred    [facility]  [facility] [address] [facility] [address] [facility] [address] [phone] --                  ANISH Gustafson

## 2021-08-11 NOTE — PLAN OF CARE
Goal Outcome Evaluation:  Plan of Care Reviewed With: patient        Progress: no change  Outcome Summary: prn tylenol given for a headache. no acute changes

## 2021-08-11 NOTE — PROGRESS NOTES
[facility] HOSPITALIST PROGRESS NOTE     Patient Identification:  Name:  Иван Miller  Age:  65 y.o.  Sex:  male  :  1955  MRN:  [id number]  Visit Number:  [id number]  ROOM: [address] [place] [address]     Primary Care Provider:  Rosi Thomas APRN    Length of stay in inpatient status:  9    Subjective     Chief Compliant:    Chief Complaint   Patient presents with   • Shortness of Breath   • Edema       History of Presenting Illness: Patient seen and evaluated in follow-up for upper lobe and right lower lobe pneumonia with concern for possible aspiration as well as COPD exacerbation with associated acute hypercapnic on chronic hypoxic respiratory failure.  Patient currently on 3 L nasal cannula, reports being on 2 L chronically at home.  Patient today complaining of increased work of breathing but maintatining o2 saturation on pulse oximetry.    Objective     Current Hospital Meds:apixaban, 5 mg, Oral, Q12H  atorvastatin, 20 mg, Oral, Daily  budesonide-formoterol, 2 puff, Inhalation, BID - RT  [START ON 2021] bumetanide, 1 mg, Oral, Daily  carvedilol, 25 mg, Oral, BID With Meals  docusate sodium, 100 mg, Oral, BID  empagliflozin, 20 mg, Oral, Daily  fluticasone, 2 spray, Nasal, Daily  gabapentin, 600 mg, Oral, Q12H  guaiFENesin, 1,200 mg, Oral, Q12H  HYDROcodone-acetaminophen, 1 tablet, Oral, BID  insulin aspart, 0-9 Units, Subcutaneous, 4x Daily AC & at Bedtime  insulin aspart, 20 Units, Subcutaneous, TID With Meals  insulin detemir, 45 Units, Subcutaneous, Nightly  ipratropium-albuterol, 3 mL, Nebulization, 4x Daily - RT  lactulose, 20 g, Oral, Daily  lidocaine, 1 patch, Transdermal, Nightly  lisinopril, 5 mg, Oral, Q24H  pantoprazole, 40 mg, Oral, Daily  polyethylene glycol, 17 g, Oral, Daily  potassium chloride, 10 mEq, Oral, Daily  sennosides-docusate, 2 tablet, Oral, BID  sodium chloride, 10 mL, Intravenous, Q12H  tamsulosin, 0.4 mg, Oral, Nightly         Current Antimicrobial  Therapy:  Anti-Infectives (From admission, onward)    Ordered     Dose/Rate Route Frequency Start Stop    08/05/21 1949  amoxicillin-clavulanate (AUGMENTIN) 875-125 MG per tablet 1 tablet     Ordering Provider: Mandeep Dunlap DO    1 tablet Oral Every 12 Hours Scheduled 08/05/21 2100 08/08/21 0824    08/01/21 0641  piperacillin-tazobactam (ZOSYN) IVPB 3.375 g in 100 mL NS VTB     Ordering Provider: Vivienne Flores DO    3.375 g  over 30 Minutes Intravenous Once 08/01/21 0800 08/01/21 0838    08/01/21 0502  metroNIDAZOLE (FLAGYL) 500 mg/100mL IVPB     Ordering Provider: Vivienne Flores DO    500 mg  over 30 Minutes Intravenous Once 08/01/21 0504 08/01/21 0839    08/01/21 0335  levoFLOXacin (LEVAQUIN) 750 mg/150 mL D5W (premix) (LEVAQUIN) 750 mg     Ordering Provider: Timi Greco DO    750 mg Intravenous Every 24 Hours 08/01/21 0337 08/01/21 0430        Current Diuretic Therapy:  Diuretics (From admission, onward)    Ordered     Dose/Rate Route Frequency Start Stop    08/10/21 1444  bumetanide (BUMEX) tablet 1 mg     Ordering Provider: Diony Bach DO    1 mg Oral Daily 08/11/21 0900      08/09/21 1520  bumetanide (BUMEX) injection 1 mg     Ordering Provider: Diony Bach DO    1 mg Intravenous Once 08/09/21 1630 08/09/21 1626    08/08/21 0821  bumetanide (BUMEX) 3 mg in sodium chloride 0.9 % 50 mL IVPB     Ordering Provider: Mandeep Dunlap DO    3 mg  12.5 mL/hr over 4 Hours Intravenous Once 08/08/21 0930 08/08/21 1434    08/07/21 0850  acetaZOLAMIDE (DIAMOX) 250 mg in sodium chloride 0.9 % 50 mL IVPB     Ordering Provider: Mandeep Dunlap DO    250 mg  200 mL/hr over 15 Minutes Intravenous Once 08/07/21 1100 08/07/21 1136    08/06/21 0814  acetaZOLAMIDE (DIAMOX) 250 mg in sodium chloride 0.9 % 50 mL IVPB     Ordering Provider: Mandeep Dunlap DO    250 mg  200 mL/hr over 15 Minutes Intravenous Once 08/06/21 1100 08/06/21 1214    08/05/21 1336  bumetanide  (BUMEX) injection 2 mg     Ordering Provider: Mandeep Dunlap DO    2 mg Intravenous Once 08/05/21 1500 08/05/21 1506    08/04/21 0806  bumetanide (BUMEX) injection 2 mg     Ordering Provider: Mandeep Dunlap DO    2 mg Intravenous Once 08/04/21 1000 08/04/21 0958    08/01/21 1004  bumetanide (BUMEX) injection 1 mg     Ordering Provider: Mandeep Dunlpa DO    1 mg Intravenous Once 08/01/21 1100 08/01/21 1111        ----------------------------------------------------------------------------------------------------------------------  Vital Signs:  Temp:  [97.7 °F (36.5 °C)-100.2 °F (37.9 °C)] 100 °F (37.8 °C)  Heart Rate:  [59-90] 83  Resp:  [18-28] 20  BP: (133-215)/() 157/87  SpO2:  [89 %-98 %] 93 %  on  Flow (L/min):  [3] 3;   Device (Oxygen Therapy): nasal cannula  Body mass index is 49.66 kg/m².    Wt Readings from Last 3 Encounters:   08/05/21 (!) 153 kg (336 lb 4.8 oz)   06/16/21 135 kg (297 lb 4.8 oz)   11/25/19 127 kg (280 lb)     Intake & Output (last 3 days)       08/08 0701 - 08/09 0700 08/09 0701 - 08/10 0700 08/10 0701 - 08/11 0700    P.O. 700 1060 420    Total Intake(mL/kg) 700 (4.6) 1060 (6.9) 420 (2.7)    Urine (mL/kg/hr) 1200 (0.3)      Stool 0      Total Output 1200      Net -500 +1060 +420           Urine Unmeasured Occurrence 4 x 4 x 5 x    Stool Unmeasured Occurrence 1 x 1 x 1 x        Diet Regular; Consistent Carbohydrate, Cardiac, Daily Fluid Restriction; 1500 mL Fluid Per Day  ----------------------------------------------------------------------------------------------------------------------  Physical exam:  Constitutional:  Well-developed and well-nourished.  No acute distress.      HENT:  Head:  Normocephalic and atraumatic.  Mouth:  Moist mucous membranes.    Eyes:  Conjunctivae and EOM are normal. No scleral icterus.    Neck:  Neck supple.  No JVD present.    Cardiovascular:  Normal rate, regular rhythm and normal heart sounds with no  murmur.  Pulmonary/Chest:  No respiratory distress, no wheezes, no crackles, with normal breath sounds and good air movement.  Abdominal:  Soft.  Bowel sounds are normal.  No distension and no tenderness.   Musculoskeletal:  No tenderness, and no deformity.  No red or swollen joints anywhere.  Functional ROM intact.   Neurological:  Alert and oriented to person, place, and time.  No cranial nerve deficit. No slurred speech. Intact Sensation throughout  Skin:  Skin is warm and dry. No rash or lesion noted. No pallor.   Peripheral vascular:  Pulses in all 4 extremities with no clubbing, no cyanosis, 2+ pitting edema of the lower extremities bilaterally.  Psychiatric: Appropriate mood and affect, pleasant.   ----------------------------------------------------------------------------------------------------------------------  Tele:    ----------------------------------------------------------------------------------------------------------------------  Results from last 7 days   Lab Units 08/09/21  0904 08/08/21  0532 08/06/21  2334   WBC 10*3/mm3 7.17 7.18 8.07   HEMOGLOBIN g/dL 11.5* 10.4* 11.3*   HEMATOCRIT % 39.4 38.0 40.7   MCV fL 86.0 87.8 87.0   MCHC g/dL 29.2* 27.4* 27.8*   PLATELETS 10*3/mm3 218 210 213     Results from last 7 days   Lab Units 08/05/21  0546   PH, ARTERIAL pH units 7.368   PO2 ART mm Hg 82.1*   PCO2, ARTERIAL mm Hg 77.4*   HCO3 ART mmol/L 44.5*     Results from last 7 days   Lab Units 08/10/21  0146 08/09/21  0904 08/08/21  0532 08/06/21  2334 08/06/21  2334 08/06/21  0206 08/06/21  0206   SODIUM mmol/L 139 143 139   < > 141   < > 144   POTASSIUM mmol/L 4.0 3.8 3.8   < > 4.1   < > 4.2   MAGNESIUM mg/dL  --   --   --   --  2.3  --   --    CHLORIDE mmol/L 101 102 101   < > 99   < > 100   CO2 mmol/L 32.8* 35.7* 34.4*   < > 37.5*   < > 40.0*   BUN mg/dL 21 19 27*   < > 24*   < > 26*   CREATININE mg/dL 0.75* 0.64* 0.91   < > 0.71*   < > 0.70*   EGFR IF NONAFRICN AM mL/min/1.73 104 125 83   < > 111    < > 113   CALCIUM mg/dL 9.2 9.2 8.8   < > 9.0   < > 8.8   GLUCOSE mg/dL 209* 144* 180*   < > 142*   < > 125*   ALBUMIN g/dL  --  3.35* 2.88*  --   --   --  3.26*   BILIRUBIN mg/dL  --  0.3 0.2  --   --   --  0.2   ALK PHOS U/L  --  57 50  --   --   --  49   AST (SGOT) U/L  --  8 9  --   --   --  9   ALT (SGPT) U/L  --  14 16  --   --   --  20    < > = values in this interval not displayed.   Estimated Creatinine Clearance: 133.6 mL/min (A) (by C-G formula based on SCr of 0.75 mg/dL (L)).  No results found for: AMMONIA              Glucose   Date/Time Value Ref Range Status   08/10/2021 1622 128 70 - 130 mg/dL Final     Comment:     Meter: [id number] : [id number] ISAEL KLINE   08/10/2021 1055 137 (H) 70 - 130 mg/dL Final     Comment:     Meter: [id number] : [id number] Ashley Fatmata   08/10/2021 0622 147 (H) 70 - 130 mg/dL Final     Comment:     Meter: [id number] : [id number] Kamille Hunter   08/09/2021 2056 145 (H) 70 - 130 mg/dL Final     Comment:     Meter: [id number] : [id number] maribel murphy   08/09/2021 1626 169 (H) 70 - 130 mg/dL Final     Comment:     Meter: [id number] : [id number] Kenyon Fry   08/09/2021 1041 135 (H) 70 - 130 mg/dL Final     Comment:     Meter: [id number] : [id number] Kenyon Fry   08/09/2021 0628 117 70 - 130 mg/dL Final     Comment:     Meter: [id number] : [id number] Kamille Hunter   08/08/2021 2023 186 (H) 70 - 130 mg/dL Final     Comment:     Meter: [id number] : [id number] TAMMIE LYON     Lab Results   Component Value Date    TSH 2.100 05/21/2021    FREET4 0.95 05/21/2021     No results found for: PREGTESTUR, PREGSERUM, HCG, HCGQUANT  Pain Management Panel     Pain Management Panel Latest Ref Rng & Units 6/10/2021 5/21/2021    AMPHETAMINES SCREEN, URINE Negative Negative Positive(A)    BARBITURATES SCREEN Negative Negative Negative    BENZODIAZEPINE SCREEN, URINE Negative Negative Negative    BUPRENORPHINEUR Negative Negative Negative     COCAINE SCREEN, URINE Negative Negative Negative    METHADONE SCREEN, URINE Negative Negative Negative        Brief Urine Lab Results  (Last result in the past 365 days)      Color   Clarity   Blood   Leuk Est   Nitrite   Protein   CREAT   Urine HCG        06/08/21 1234 Yellow Clear Moderate (2+) Negative Negative Trace             No results found for: BLOODCX  No results found for: URINECX  No results found for: WOUNDCX  No results found for: STOOLCX  No results found for: RESPCX  No results found for: AFBCX        I have personally looked at the labs and they are summarized above.  ----------------------------------------------------------------------------------------------------------------------  Detailed radiology reports for the last 24 hours:    Imaging Results (Last 24 Hours)     Procedure Component Value Units Date/Time    XR Chest 1 View [[id number]] Collected: 08/10/21 1650     Updated: 08/10/21 1652    Narrative:      EXAM:    XR Chest, 1 View     EXAM DATE:    8/10/2021 3:00 PM     CLINICAL HISTORY:    worsening dyspnea; J18.9-Pneumonia, unspecified organism; J96.21-Acute  and chronic respiratory failure with hypoxia; J96.22-Acute and chronic  respiratory failure with hypercapnia     TECHNIQUE:    Frontal view of the chest.     COMPARISON:    08/01/2021     FINDINGS:    LUNGS:  Patchy bibasilar airspace disease.    PLEURAL SPACE:  Probably tiny bilateral pleural effusions.  No  pneumothorax.    HEART:  Cardiomegaly.    MEDIASTINUM:  Unremarkable.    BONES/JOINTS:  Unremarkable.       Impression:      1.  Patchy bibasilar airspace disease.  2.  Cardiomegaly.  3.  Probably tiny bilateral pleural effusions.     This report was finalized on 8/10/2021 4:50 PM by Dr. Jonah Loving MD.           Assessment & Plan      Bilateral pneumonia    -CT chest with left upper lobe and right lower lobe consolidation    -Concern for possible aspiration, initially on Zosyn now transitioned to course of Augmentin now  completed    -Sputum culture obtained with normal respiratory crystal    -repeat chest x-ray today with patchy bibasilar airspace disease with tiny bilateral effusions.    Acute exacerbation of COPD  Acute hypercapnic on chronic hypoxic respiratory failure    -Continue Symbicort and DuoNeb    -Completed short course of prednisone, no wheezing present on auscultation    -Patient currently on 3 L nasal cannula compared to his baseline O2, there is component of volume overload , will continue with diuretics.    Essential hypertension    -Continue Coreg, lisinopril as blood pressure currently remains at goal    Diabetes mellitus type 2    -Globin A1c of 8.8 on 5/21/2021    -Continue basal bolus insulin with Levemir 45 units nightly, NovoLog 20 3 times daily AC, with moderate dose sliding scale    Right ankle avulsion fracture  History of paraplegia    -Continue PT/OT and supportive care    -Currently awaiting SNF placement,  following along    Chronic thoracic osteomyelitis    -No interventions at this time, previously seen and evaluated by ID at previous hospitalization with no evidence of acute bony/cartilage infection on MRI.     Paroxysmal atrial fibrillation    -Continue metoprolol and anticoagulation with Eliquis    Constipation    -Patient currently on MiraLAX, will add lactulose    Obesity    -Complicates all aspects of care      VTE Prophylaxis:   Mechanical Order History:     None      Pharmalogical Order History:      Ordered     Dose Route Frequency Stop    08/01/21 1438  apixaban (ELIQUIS) tablet 5 mg      5 mg PO Every 12 Hours Scheduled --    08/01/21 0631  heparin (porcine) 5000 UNIT/ML injection 5,000 Units  Status:  Discontinued      5,000 Units SC Every 12 Hours Scheduled 08/01/21 0644                Disposition currently pending placement for skilled nursing rehab.    Diony Bach DO  [facility] Hospitalist  08/10/21  20:58 EDT

## 2021-08-12 VITALS
TEMPERATURE: 98.4 F | RESPIRATION RATE: 28 BRPM | DIASTOLIC BLOOD PRESSURE: 58 MMHG | BODY MASS INDEX: 46.65 KG/M2 | SYSTOLIC BLOOD PRESSURE: 124 MMHG | HEIGHT: 69 IN | OXYGEN SATURATION: 99 % | WEIGHT: 315 LBS | HEART RATE: 65 BPM

## 2021-08-12 LAB
ANION GAP SERPL CALCULATED.3IONS-SCNC: 5.7 MMOL/L (ref 5–15)
BUN SERPL-MCNC: 22 MG/DL (ref 8–23)
BUN/CREAT SERPL: 28.9 (ref 7–25)
CALCIUM SPEC-SCNC: 8.9 MG/DL (ref 8.6–10.5)
CHLORIDE SERPL-SCNC: 100 MMOL/L (ref 98–107)
CO2 SERPL-SCNC: 34.3 MMOL/L (ref 22–29)
CREAT SERPL-MCNC: 0.76 MG/DL (ref 0.76–1.27)
GFR SERPL CREATININE-BSD FRML MDRD: 103 ML/MIN/1.73
GLUCOSE BLDC GLUCOMTR-MCNC: 107 MG/DL (ref 70–130)
GLUCOSE BLDC GLUCOMTR-MCNC: 126 MG/DL (ref 70–130)
GLUCOSE BLDC GLUCOMTR-MCNC: 182 MG/DL (ref 70–130)
GLUCOSE SERPL-MCNC: 141 MG/DL (ref 65–99)
POTASSIUM SERPL-SCNC: 4.1 MMOL/L (ref 3.5–5.2)
SARS-COV-2 RNA RESP QL NAA+PROBE: NOT DETECTED
SODIUM SERPL-SCNC: 140 MMOL/L (ref 136–145)

## 2021-08-12 PROCEDURE — 94799 UNLISTED PULMONARY SVC/PX: CPT

## 2021-08-12 PROCEDURE — 82962 GLUCOSE BLOOD TEST: CPT

## 2021-08-12 PROCEDURE — 80048 BASIC METABOLIC PNL TOTAL CA: CPT | Performed by: STUDENT IN AN ORGANIZED HEALTH CARE EDUCATION/TRAINING PROGRAM

## 2021-08-12 PROCEDURE — 63710000001 INSULIN ASPART PER 5 UNITS: Performed by: INTERNAL MEDICINE

## 2021-08-12 PROCEDURE — 99239 HOSP IP/OBS DSCHRG MGMT >30: CPT | Performed by: STUDENT IN AN ORGANIZED HEALTH CARE EDUCATION/TRAINING PROGRAM

## 2021-08-12 PROCEDURE — 97110 THERAPEUTIC EXERCISES: CPT

## 2021-08-12 PROCEDURE — U0003 INFECTIOUS AGENT DETECTION BY NUCLEIC ACID (DNA OR RNA); SEVERE ACUTE RESPIRATORY SYNDROME CORONAVIRUS 2 (SARS-COV-2) (CORONAVIRUS DISEASE [COVID-19]), AMPLIFIED PROBE TECHNIQUE, MAKING USE OF HIGH THROUGHPUT TECHNOLOGIES AS DESCRIBED BY CMS-2020-01-R: HCPCS | Performed by: STUDENT IN AN ORGANIZED HEALTH CARE EDUCATION/TRAINING PROGRAM

## 2021-08-12 PROCEDURE — 94660 CPAP INITIATION&MGMT: CPT

## 2021-08-12 RX ORDER — POLYETHYLENE GLYCOL 3350 17 G/17G
17 POWDER, FOR SOLUTION ORAL DAILY
Qty: 30 PACKET | Refills: 0 | Status: SHIPPED | OUTPATIENT
Start: 2021-08-13 | End: 2021-09-12

## 2021-08-12 RX ORDER — LISINOPRIL 5 MG/1
5 TABLET ORAL
Qty: 30 TABLET | Refills: 0 | Status: SHIPPED | OUTPATIENT
Start: 2021-08-13 | End: 2021-09-12

## 2021-08-12 RX ORDER — DOXYCYCLINE 100 MG/1
100 CAPSULE ORAL EVERY 12 HOURS SCHEDULED
Qty: 7 CAPSULE | Refills: 0 | Status: SHIPPED | OUTPATIENT
Start: 2021-08-12 | End: 2021-08-16

## 2021-08-12 RX ORDER — LACTULOSE 10 G/15ML
20 SOLUTION ORAL DAILY
Qty: 900 ML | Refills: 0 | Status: SHIPPED | OUTPATIENT
Start: 2021-08-13 | End: 2021-09-12

## 2021-08-12 RX ADMIN — PANTOPRAZOLE SODIUM 40 MG: 40 TABLET, DELAYED RELEASE ORAL at 08:05

## 2021-08-12 RX ADMIN — POTASSIUM CHLORIDE 10 MEQ: 750 TABLET, FILM COATED, EXTENDED RELEASE ORAL at 08:05

## 2021-08-12 RX ADMIN — IPRATROPIUM BROMIDE AND ALBUTEROL SULFATE 3 ML: .5; 3 SOLUTION RESPIRATORY (INHALATION) at 07:22

## 2021-08-12 RX ADMIN — INSULIN ASPART 2 UNITS: 100 INJECTION, SOLUTION INTRAVENOUS; SUBCUTANEOUS at 11:02

## 2021-08-12 RX ADMIN — IPRATROPIUM BROMIDE AND ALBUTEROL SULFATE 3 ML: .5; 3 SOLUTION RESPIRATORY (INHALATION) at 15:25

## 2021-08-12 RX ADMIN — IPRATROPIUM BROMIDE AND ALBUTEROL SULFATE 3 ML: .5; 3 SOLUTION RESPIRATORY (INHALATION) at 10:18

## 2021-08-12 RX ADMIN — INSULIN ASPART 20 UNITS: 100 INJECTION, SOLUTION INTRAVENOUS; SUBCUTANEOUS at 17:18

## 2021-08-12 RX ADMIN — ATORVASTATIN CALCIUM 20 MG: 20 TABLET, FILM COATED ORAL at 08:06

## 2021-08-12 RX ADMIN — IPRATROPIUM BROMIDE AND ALBUTEROL SULFATE 3 ML: .5; 3 SOLUTION RESPIRATORY (INHALATION) at 00:00

## 2021-08-12 RX ADMIN — DOCUSATE SODIUM 100 MG: 100 CAPSULE, LIQUID FILLED ORAL at 08:05

## 2021-08-12 RX ADMIN — GABAPENTIN 600 MG: 300 CAPSULE ORAL at 08:05

## 2021-08-12 RX ADMIN — BUDESONIDE AND FORMOTEROL FUMARATE DIHYDRATE 2 PUFF: 160; 4.5 AEROSOL RESPIRATORY (INHALATION) at 07:23

## 2021-08-12 RX ADMIN — LISINOPRIL 5 MG: 2.5 TABLET ORAL at 08:06

## 2021-08-12 RX ADMIN — DOXYCYCLINE 100 MG: 100 CAPSULE ORAL at 08:05

## 2021-08-12 RX ADMIN — HYDROCODONE BITARTRATE AND ACETAMINOPHEN 1 TABLET: 7.5; 325 TABLET ORAL at 08:04

## 2021-08-12 RX ADMIN — DOCUSATE SODIUM 50 MG AND SENNOSIDES 8.6 MG 2 TABLET: 8.6; 5 TABLET, FILM COATED ORAL at 08:05

## 2021-08-12 RX ADMIN — INSULIN ASPART 20 UNITS: 100 INJECTION, SOLUTION INTRAVENOUS; SUBCUTANEOUS at 11:01

## 2021-08-12 RX ADMIN — INSULIN ASPART 20 UNITS: 100 INJECTION, SOLUTION INTRAVENOUS; SUBCUTANEOUS at 08:06

## 2021-08-12 RX ADMIN — BUMETANIDE 1 MG: 1 TABLET ORAL at 08:06

## 2021-08-12 RX ADMIN — CARVEDILOL 25 MG: 25 TABLET, FILM COATED ORAL at 17:18

## 2021-08-12 RX ADMIN — LACTULOSE 20 G: 10 SOLUTION ORAL at 08:06

## 2021-08-12 RX ADMIN — APIXABAN 5 MG: 5 TABLET, FILM COATED ORAL at 08:05

## 2021-08-12 RX ADMIN — CARVEDILOL 25 MG: 25 TABLET, FILM COATED ORAL at 08:05

## 2021-08-12 RX ADMIN — SODIUM CHLORIDE, PRESERVATIVE FREE 10 ML: 5 INJECTION INTRAVENOUS at 08:07

## 2021-08-12 RX ADMIN — IPRATROPIUM BROMIDE AND ALBUTEROL SULFATE 3 ML: .5; 3 SOLUTION RESPIRATORY (INHALATION) at 03:42

## 2021-08-12 RX ADMIN — GUAIFENESIN 1200 MG: 600 TABLET, EXTENDED RELEASE ORAL at 08:05

## 2021-08-12 RX ADMIN — POLYETHYLENE GLYCOL (3350) 17 G: 17 POWDER, FOR SOLUTION ORAL at 08:06

## 2021-08-12 RX ADMIN — FLUTICASONE PROPIONATE 2 SPRAY: 50 SPRAY, METERED NASAL at 08:07

## 2021-08-12 NOTE — CASE MANAGEMENT/SOCIAL WORK
Discharge Planning Assessment  [facility]     Patient Name: Иван Miller  MRN: [id number]  Today's Date: 8/12/2021    Admit Date: 7/31/2021      Discharge Plan     Row Name 08/12/21 1542       Plan    Final Note SS faxed negative Covid test results to [facility]. SS notified [facility] per Ann. RN [facility] SS contacted [phone] per Carter and he will discuss transport with [facility] and call SS back. SS to follow.    16:34 pm: SS received call from [facility] and they will provide transportation to [facility]. No other needs identified.    Row Name 08/12/21 1402       Plan    Final Discharge Disposition Code  03 - skilled nursing facility (SNF)    Final Note Pt is being discharged to [facility] today. SS notified Ann at [facility]. SNF has a Bi-Pap machine available for pt. SS faxed clinicals to [facility]. RN to call report to [facility]. SS spoke to pt who is alert and oriented. Pt is agreeable to be discharged to [facility] today. Pt states his family is aware of discharge. SS completed EMS PCS form and will arrange EMS when pt is ready for transport. SS to follow.        Continued Care and Services - Admitted Since 7/31/2021     Destination     Service Provider Request Status Selected Services Address Phone Fax Patient Preferred    [facility]  Accepted N/A [address] [facility] [address] [phone] --    [facility]  Pending - No Request Sent N/A  [address] [facility] [address] [phone] --    [facility]  Pending - No Request Sent N/A [address], [facility] [id number] [phone] --    [facility]  Declined  Unable to Meet Patient Needs N/A [address] [place] [id number] [phone] --    [facility]  Declined  No Bariatric Bed Available N/A [address] [place] [id number] [phone] --    [facility]  Declined  No Bariatric Bed Available N/A [address] [place] [address] [phone] --    [facility]  Declined N/A [address] [place] [address] [phone] --    [facility]  Declined N/A [address] [address] [phone] --            Selected Continued Care - Prior Encounters Includes selections from prior encounters from 5/2/2021 to 8/12/2021    Discharged on 6/16/2021 Admission date: 5/21/2021 - Discharge disposition: Skilled Nursing Facility (DC - External)    Destination     Service Provider Selected Services Address Phone Fax Patient Preferred    [facility]  Skilled Nursing [address] [facility] [address] [phone] --                    ANISH Gustafson

## 2021-08-12 NOTE — DISCHARGE INSTR - DIET
Texture: Regular   Common Modifiers: Consistent Carbohydrate    Cardiac    Daily Fluid Restriction   Daily Fluid: 1500 mL Fluid Per Day

## 2021-08-12 NOTE — PLAN OF CARE
Goal Outcome Evaluation:           Progress: improving  Outcome Summary: Patient resting in bed throughout shift. VSS. [facility]

## 2021-08-12 NOTE — THERAPY TREATMENT NOTE
Acute Care - Physical Therapy Treatment Note   George     Patient Name: Иван Miller  : 1955  MRN: [id number]  Today's Date: 2021   Onset of Illness/Injury or Date of Surgery: 21 (admit date)  Visit Dx:     ICD-10-CM ICD-9-CM   1. Pneumonia of both lungs due to infectious organism, unspecified part of lung  J18.9 483.8   2. Acute on chronic respiratory failure with hypoxia and hypercapnia (CMS/HCC)  J96.21 518.84    J96.22 786.09     799.02     Patient Active Problem List   Diagnosis   • Acute respiratory failure with hypoxemia (CMS/HCC)   • Coronary artery disease status post multiple stents   • Non-ischemic cardiomyopathy (CMS/HCC LV ejection fraction 40 to 45%   • Chronic systolic congestive heart failure (CMS/HCC)   • Essential hypertension   • Hyperlipidemia LDL goal <70   • Insulin dependent diabetes mellitus   • Respiratory failure (CMS/HCC)   • Finger infection   • Pneumonia of both lungs due to infectious organism     Past Medical History:   Diagnosis Date   • Atrial fibrillation (CMS/HCC)    • COPD (chronic obstructive pulmonary disease) (CMS/HCC)    • Diabetes mellitus (CMS/HCC)    • GERD (gastroesophageal reflux disease)    • Hyperlipidemia    • Hypertension    • Myocardial infarction (CMS/HCC)    • Neuropathy      Past Surgical History:   Procedure Laterality Date   • ABDOMINAL SURGERY     • APPENDECTOMY     • CARDIAC CATHETERIZATION     • CORONARY STENT PLACEMENT          PT Assessment (last 12 hours)      PT Evaluation and Treatment     Row Name 21 9858          Physical Therapy Time and Intention    Subjective Information  complains of;weakness;fatigue  -CT     Document Type  therapy note (daily note)  -CT     Mode of Treatment  physical therapy  -CT     Patient Effort  good  -CT     Comment  Pt tolerated treatment session fairly well with rest breaks provided as needed. Performed AAROM/ROM ther ex to BLE.   -CT     Row Name 21 1056          General Information     Patient Profile Reviewed  yes  -CT     Equipment Currently Used at Home  wheelchair;oxygen;lift device;walker, rolling  -CT     Existing Precautions/Restrictions  fall  -CT     Row Name 08/12/21 1058          Cognition    Affect/Mental Status (Cognitive)  WFL  -CT     Orientation Status (Cognition)  oriented x 3  -CT     Follows Commands (Cognition)  WFL  -CT     Row Name 08/12/21 1058          Bed Mobility    Bed Mobility  rolling left;rolling right  -CT     Rolling Left [place] (Bed Mobility)  maximum assist (25% patient effort)  -CT     Rolling Right [place] (Bed Mobility)  maximum assist (25% patient effort)  -CT     Bed Mobility, Safety Issues  decreased use of arms for pushing/pulling;decreased use of legs for bridging/pushing;impaired trunk control for bed mobility  -CT     Assistive Device (Bed Mobility)  bed rails;draw sheet  -CT     Row Name 08/12/21 1058          Motor Skills    Therapeutic Exercise  -- BLE AAROM/PROM supine ther ex   -CT     Row Name 08/12/21 1058          Coping    Observed Emotional State  calm;cooperative  -CT     Verbalized Emotional State  acceptance  -CT     Row Name 08/12/21 1058          Plan of Care Review    Plan of Care Reviewed With  patient  -CT     Row Name 08/12/21 1058          Positioning and Restraints    Pre-Treatment Position  in bed  -CT     Post Treatment Position  bed  -CT     In Bed  supine;call light within reach;encouraged to call for assist;exit alarm on;side rails up x3  -CT     Row Name 08/12/21 1058          Therapy Assessment/Plan (PT)    Rehab Potential (PT)  good, to achieve stated therapy goals  -CT     Criteria for Skilled Interventions Met (PT)  yes;skilled treatment is necessary  -CT       User Key  (r) = Recorded By, (t) = Taken By, (c) = Cosigned By    Initials Name Provider Type    CT Joceline Trejo PT Physical Therapist        Physical Therapy Education                 Title: PT OT SLP Therapies (Done)     Topic: Physical Therapy  (Done)     Point: Mobility training (Done)     Learning Progress Summary           Patient Acceptance, E,TB, VU by CT at 8/12/2021 1100    Acceptance, E,TB, VU by KF at 8/12/2021 0336    Acceptance, E,TB, VU by CT at 8/9/2021 1137    Acceptance, E, VU by CL at 8/8/2021 1457    Acceptance, E, VU by CL at 8/7/2021 2014    Acceptance, E,TB, VU by KB at 8/6/2021 1113    Acceptance, E,TB, VU by RH at 8/6/2021 0214    Acceptance, E,TB, VU by RF at 8/5/2021 1555    Acceptance, E,TB, VU by CT at 8/3/2021 1444    Acceptance, E,TB, VU by CT at 8/2/2021 1122                   Point: Home exercise program (Done)     Learning Progress Summary           Patient Acceptance, E,TB, VU by CT at 8/12/2021 1100    Acceptance, E,TB, VU by KF at 8/12/2021 0336    Acceptance, E,TB, VU by CT at 8/9/2021 1137    Acceptance, E, VU by CL at 8/8/2021 1457    Acceptance, E, VU by CL at 8/7/2021 2014    Acceptance, E,TB, VU by KB at 8/6/2021 1113    Acceptance, E,TB, VU by RH at 8/6/2021 0214    Acceptance, E,TB, VU by RF at 8/5/2021 1555    Acceptance, E,TB, VU by CT at 8/3/2021 1444    Acceptance, E,TB, VU by CT at 8/2/2021 1122                   Point: Body mechanics (Done)     Learning Progress Summary           Patient Acceptance, E,TB, VU by CT at 8/12/2021 1100    Acceptance, E,TB, VU by KF at 8/12/2021 0336    Acceptance, E,TB, VU by CT at 8/9/2021 1137    Acceptance, E, VU by CL at 8/8/2021 1457    Acceptance, E, VU by CL at 8/7/2021 2014    Acceptance, E,TB, VU by KB at 8/6/2021 1113    Acceptance, E,TB, VU by RH at 8/6/2021 0214    Acceptance, E,TB, VU by RF at 8/5/2021 1555    Acceptance, E,TB, VU by CT at 8/3/2021 1444    Acceptance, E,TB, VU by CT at 8/2/2021 1122                   Point: Precautions (Done)     Learning Progress Summary           Patient Acceptance, E,TB, VU by CT at 8/12/2021 1100    Acceptance, E,TB, VU by KF at 8/12/2021 0336    Acceptance, E,TB, VU by CT at 8/9/2021 1137    Acceptance, E, VU by CL at 8/8/2021  1457    Acceptance, E, VU by CL at 8/7/2021 2014    Acceptance, E,TB, VU by KB at 8/6/2021 1113    Acceptance, E,TB, VU by RH at 8/6/2021 0214    Acceptance, E,TB, VU by RF at 8/5/2021 1555    Acceptance, E,TB, VU by CT at 8/3/2021 1444    Acceptance, E,TB, VU by CT at 8/2/2021 1122                               User Key     Initials Effective Dates Name Provider Type Discipline    KB 06/16/21 -  Marina Copeland, RN Registered Nurse Nurse    CL 06/16/21 -  Lisandra Keith, RN Registered Nurse Nurse    CT 06/16/21 -  Joceline Trejo, PT Physical Therapist PT    RF 06/16/21 -  Libertad Ruiz PTA Physical Therapy Assistant PT    KF 09/01/20 -  Lupe Hunter, RN Registered Nurse Nurse    RH 03/02/21 -  Juan Diego Falcon RN Registered Nurse Nurse              PT Recommendation and Plan  Anticipated Discharge Disposition (PT):  (tbd)  Planned Therapy Interventions (PT): balance training, bed mobility training, home exercise program, manual therapy techniques, motor coordination training, neuromuscular re-education, patient/family education, postural re-education, strengthening, transfer training  Therapy Frequency (PT): 2 times/wk (2-5 times/wk )  Plan of Care Reviewed With: patient       Time Calculation:   PT Charges     Row Name 08/12/21 1100             Time Calculation    PT Received On  08/12/21  -CT      PT Goal Re-Cert Due Date  08/26/21  -CT         Time Calculation- PT    Total Timed Code Minutes- PT  32 minute(s)  -CT        User Key  (r) = Recorded By, (t) = Taken By, (c) = Cosigned By    Initials Name Provider Type    CT Joceline Trejo, PT Physical Therapist        Therapy Charges for Today     Code Description Service Date Service Provider Modifiers Qty    98010933983 HC PT THER PROC EA 15 MIN 8/12/2021 Joceline Trejo, PT GP 2               Joceline Trejo, PT  8/12/2021

## 2021-08-12 NOTE — DISCHARGE PLACEMENT REQUEST
"Иван Blanco (65 y.o. Male)     Date of Birth Social Security Number Address Home Phone MRN    1955  [address]  [place] [address] [phone] [id number]    Adventism Marital Status          None        Admission Date Admission Type Admitting Provider Attending Provider Department, Room/Bed    7/31/21 Emergency Vivienne Flores DO Cagle, William, DO [address], 3348/2S    Discharge Date Discharge Disposition Discharge Destination         Skilled Nursing Facility (DC - External)              Attending Provider: Diony Bach DO    Allergies: Codeine, Ciprofloxacin    Isolation: None   Infection: COVID Screen (preop/placement) (08/12/21)   Code Status: CPR    Ht: 175.3 cm (69\")   Wt: 153 kg (336 lb 4.8 oz)    Admission Cmt: None   Principal Problem: Pneumonia of both lungs due to infectious organism [J18.9]                 Active Insurance as of 7/31/2021     Primary Coverage     Payor Plan Insurance Group Employer/Plan Group    [facility] MEDICARE REPLACEMENT [facility] DUAL COMPLETE MEDICARE REPLACEMENT [id number]     Payor Plan Address Payor Plan Phone Number Payor Plan Fax Number Effective Dates    [address] [phone]  1/1/2021 - None Entered    [facility] [address]       Subscriber Name Subscriber Birth Date Member ID       ИВАН BLANCO 1955 [id number]           Secondary Coverage     Payor Plan Insurance Group Employer/Plan Group    [place]      Payor Plan Address Payor Plan Phone Number Payor Plan Fax Number Effective Dates    [address] [phone]  7/31/2021 - None Entered    [facility] [address]       Subscriber Name Subscriber Birth Date Member ID       ИВАН BLANCO 1955 [id number]                 Emergency Contacts      (Rel.) Home Phone Work Phone Mobile Phone    PaulLamar garrison (Spouse) [phone] -- [phone]    GARCIA MALDONADO (Daughter) [phone] -- [phone]    "           Current Facility-Administered Medications   Medication Dose Route Frequency Provider Last Rate Last Admin   • acetaminophen (TYLENOL) tablet 650 mg  650 mg Oral Q8H PRN Mandeep Dunlap DO   650 mg at 08/11/21 0551   • apixaban (ELIQUIS) tablet 5 mg  5 mg Oral Q12H Mandeep Dunlap DO   5 mg at 08/12/21 0805   • atorvastatin (LIPITOR) tablet 20 mg  20 mg Oral Daily Mandeep Dunlap DO   20 mg at 08/12/21 0806   • budesonide-formoterol (SYMBICORT) 160-4.5 MCG/ACT inhaler 2 puff  2 puff Inhalation BID - RT Mandeep Dunlap DO   2 puff at 08/12/21 0723   • bumetanide (BUMEX) tablet 1 mg  1 mg Oral Daily Diony Bach DO   1 mg at 08/12/21 0806   • carvedilol (COREG) tablet 25 mg  25 mg Oral BID With Meals Mandeep Dunlap DO   25 mg at 08/12/21 0805   • dextrose (D50W) 25 g/ 50mL Intravenous Solution 25 g  25 g Intravenous Q15 Min PRN Mandeep Dunlap DO       • dextrose (GLUTOSE) oral gel 15 g  15 g Oral Q15 Min PRN Mandeep Dunlap DO       • docusate sodium (COLACE) capsule 100 mg  100 mg Oral BID Mandeep Dunlap DO   100 mg at 08/12/21 0805   • doxycycline (MONODOX) capsule 100 mg  100 mg Oral Q12H Diony Bach DO   100 mg at 08/12/21 0805   • empagliflozin (JARDIANCE) tablet 20 mg  20 mg Oral Daily Mandeep Dunlap DO   20 mg at 08/05/21 1044   • fluticasone (FLONASE) 50 MCG/ACT nasal spray 2 spray  2 spray Nasal Daily Mandeep Dunlap DO   2 spray at 08/12/21 0807   • gabapentin (NEURONTIN) capsule 600 mg  600 mg Oral Q12H Mandeep Dunlap DO   600 mg at 08/12/21 0805   • glucagon (human recombinant) (GLUCAGEN DIAGNOSTIC) injection 1 mg  1 mg Subcutaneous Q15 Min PRN Fabi, Christopher A, DO       • guaiFENesin (MUCINEX) 12 hr tablet 1,200 mg  1,200 mg Oral Q12H Mandeep Dunlap DO   1,200 mg at 08/12/21 0805   • hydrALAZINE (APRESOLINE) injection 10 mg  10 mg Intravenous Q6H PRN Mandeep Dunlap DO   10  mg at 08/06/21 0620   • HYDROcodone-acetaminophen (NORCO) 7.5-325 MG per tablet 1 tablet  1 tablet Oral BID Mandeep Dunlap DO   1 tablet at 08/12/21 0804   • insulin aspart (novoLOG) injection 0-9 Units  0-9 Units Subcutaneous 4x Daily AC & at Bedtime Mandeep Dunlap DO   2 Units at 08/12/21 1102   • insulin aspart (novoLOG) injection 20 Units  20 Units Subcutaneous TID With Meals Mandeep Dunlap DO   20 Units at 08/12/21 1101   • insulin detemir (LEVEMIR) injection 45 Units  45 Units Subcutaneous Nightly Mandeep Dunlap DO   45 Units at 08/11/21 2104   • ipratropium-albuterol (DUO-NEB) nebulizer solution 3 mL  3 mL Nebulization Q4H - RT Diony Bach DO   3 mL at 08/12/21 1018   • lactulose (CHRONULAC) 10 GM/15ML solution 20 g  20 g Oral Daily Diony Bach DO   20 g at 08/12/21 0806   • lidocaine (LIDODERM) 5 % 1 patch  1 patch Transdermal Nightly Mandeep Dunlap DO   1 patch at 08/11/21 2056   • lisinopril (PRINIVIL,ZESTRIL) tablet 5 mg  5 mg Oral Q24H Mandeep Dunlap DO   5 mg at 08/12/21 0806   • melatonin tablet 5 mg  5 mg Oral Nightly PRN Mandeep Dunlap DO   5 mg at 08/09/21 2317   • pantoprazole (PROTONIX) EC tablet 40 mg  40 mg Oral Daily Mandeep Dunlap DO   40 mg at 08/12/21 0805   • polyethylene glycol (MIRALAX) packet 17 g  17 g Oral Daily Mandeep Dunlap DO   17 g at 08/12/21 0806   • potassium chloride (K-DUR,KLOR-CON) ER tablet 10 mEq  10 mEq Oral Daily Mandeep Dunlap DO   10 mEq at 08/12/21 0805   • sennosides-docusate (PERICOLACE) 8.6-50 MG per tablet 2 tablet  2 tablet Oral BID Mandeep Dunlap DO   2 tablet at 08/12/21 0805   • sodium chloride 0.9 % flush 10 mL  10 mL Intravenous PRN Mandeep Dunlap DO       • sodium chloride 0.9 % flush 10 mL  10 mL Intravenous Q12H Mandeep Dunlap DO   10 mL at 08/12/21 0807   • sodium chloride 0.9 % flush 10 mL  10 mL Intravenous PRN Fabi,  Mandeep HAM DO       • sodium chloride nasal spray 2 spray  2 spray Each Nare PRN Mandeep Dunlap DO       • tamsulosin (FLOMAX) 24 hr capsule 0.4 mg  0.4 mg Oral Nightly Mandeep Dunlap DO   0.4 mg at 21        Discharge Summary      Diony Bach DO at 21 1332              [facility] HOSPITALISTS DISCHARGE SUMMARY    Patient Identification:  Name:  Иван Miller  Age:  65 y.o.  Sex:  male  :  1955  MRN:  [id number]  Visit Number:  [id number]    Date of Admission: 2021  Date of Discharge:  2021     PCP: Rosi Thomas APRN    DISCHARGE DIAGNOSIS    Bilateral pneumonia  Acute exacerbation of COPD  Acute hypercapnic on chronic hypoxic respiratory failure  Essential hypertension  Diabetes mellitus type 2  Right ankle avulsion fracture  History of paraplegia  Chronic thoracic osteomyelitis  Paroxysmal atrial fibrillation  Obstructional sleep apnea  Constipation  Obesity  CONSULTS     None  PROCEDURES PERFORMED    None  HOSPITAL COURSE  Patient is a 65 y.o. male presented to [facility] complaining of cough and shortness of breath.  Please see the admitting history and physical for further details.      Patient admitted to the hospitalist service for further evaluation and treatment of bilateral pneumonia with COPD exacerbation and acute respiratory failure.  Patient initially requiring BiPAP therapy but improving.  Initially treated for Zosyn given concern for possible aspiration component as well.  Patient noted to also appear to be volume overloaded and was started on diuresis with good effect.  Patient remained stable on 3 L nasal cannula and was compliant with BiPAP at night as he does have sleep apnea.  Patient with functional decline after recent right ankle avulsion fracture and history of clinical paraplegia in the setting of chronic thoracic osteomyelitis.  Initially seeking short-term rehab placement but denied by insurance before  being approved for skilled nursing facility [facility].  Patient was in stable medical condition at discharge and was discharged to [facility] in stable condition.  Patient was still completing a course of doxycycline for his COPD exacerbation as well as pneumonia at time of discharge and will continue to complete antibiotic course.      VITAL SIGNS:  Temp:  [97.6 °F (36.4 °C)-98.7 °F (37.1 °C)] 98 °F (36.7 °C)  Heart Rate:  [58-97] 64  Resp:  [16-28] 28  BP: (112-148)/(54-74) 112/54  SpO2:  [91 %-98 %] 93 %  on  Flow (L/min):  [3] 3;   Device (Oxygen Therapy): room air    Body mass index is 49.66 kg/m².  Wt Readings from Last 3 Encounters:   08/05/21 (!) 153 kg (336 lb 4.8 oz)   06/16/21 135 kg (297 lb 4.8 oz)   11/25/19 127 kg (280 lb)       PHYSICAL EXAM:  Constitutional:  Well-developed and well-nourished.  No acute distress.      HENT:  Head:  Normocephalic and atraumatic.  Mouth:  Moist mucous membranes.    Eyes:  Conjunctivae and EOM are normal. No scleral icterus.    Neck:  Neck supple.  No JVD present.    Cardiovascular:  Normal rate, regular rhythm and normal heart sounds with no murmur.  Pulmonary/Chest:  No respiratory distress, no wheezes, no crackles, with normal breath sounds and good air movement.  Abdominal:  Soft.  Bowel sounds are normal.  No distension and no tenderness.   Musculoskeletal:  No tenderness, and no deformity.  No red or swollen joints anywhere.  Functional ROM intact.   Neurological:  Alert and oriented to person, place, and time.  No cranial nerve deficit. No slurred speech.  Skin:  Skin is warm and dry. No rash or lesion noted. No pallor.   Peripheral vascular:  Pulses in all 4 extremities with no clubbing, no cyanosis, 2+ pitting edema of the lower extremities bilaterally.  Psychiatric: Appropriate mood and affect, pleasant.     DISCHARGE DISPOSITION   Stable    DISCHARGE MEDICATIONS:     Discharge Medications      New Medications      Instructions Start  Date   doxycycline 100 MG capsule  Commonly known as: MONODOX   100 mg, Oral, Every 12 Hours Scheduled      empagliflozin 10 MG tablet tablet  Commonly known as: Jardiance   20 mg, Oral, Daily   Start Date: August 13, 2021     lactulose 10 GM/15ML solution  Commonly known as: CHRONULAC   20 g, Oral, Daily   Start Date: August 13, 2021     lisinopril 5 MG tablet  Commonly known as: PRINIVIL,ZESTRIL   5 mg, Oral, Every 24 Hours Scheduled   Start Date: August 13, 2021     polyethylene glycol 17 g packet  Commonly known as: MIRALAX   17 g, Oral, Daily   Start Date: August 13, 2021        Continue These Medications      Instructions Start Date   albuterol sulfate  (90 Base) MCG/ACT inhaler  Commonly known as: PROVENTIL HFA;VENTOLIN HFA;PROAIR HFA   2 puffs, Inhalation, Every 6 Hours PRN      apixaban 5 MG tablet tablet  Commonly known as: ELIQUIS   5 mg, Oral, 2 Times Daily      atorvastatin 20 MG tablet  Commonly known as: LIPITOR   20 mg, Oral, Daily      bumetanide 2 MG tablet  Commonly known as: BUMEX   2 mg, Oral, Daily      carvedilol 25 MG tablet  Commonly known as: COREG   25 mg, Oral, 2 Times Daily With Meals      fluticasone 50 MCG/ACT nasal spray  Commonly known as: FLONASE   2 sprays, Nasal, Daily      fluticasone-salmeterol 250-50 MCG/DOSE DISKUS  Commonly known as: ADVAIR   1 puff, Inhalation, 2 Times Daily - RT      gabapentin 600 MG tablet  Commonly known as: NEURONTIN   600 mg, Oral, 2 Times Daily      HYDROcodone-acetaminophen 7.5-325 MG per tablet  Commonly known as: NORCO   1 tablet, Oral, Every 12 Hours      insulin degludec 100 UNIT/ML solution pen-injector injection  Commonly known as: TRESIBA FLEXTOUCH   45 Units, Subcutaneous, Every Evening      insulin lispro 100 UNIT/ML injection  Commonly known as: humaLOG   35 Units, Subcutaneous, 3 Times Daily Before Meals      ipratropium-albuterol 0.5-2.5 mg/3 ml nebulizer  Commonly known as: DUO-NEB   3 mL, Nebulization, Every 4 Hours PRN       lidocaine 5 %  Commonly known as: LIDODERM   1 patch, Transdermal, Every 24 Hours, Prior to [facility] Admission, Patient was on:  Place on Shoulder or back and Remove & Discard patch within 12 hours or as directed by MD       metFORMIN 1000 MG tablet  Commonly known as: GLUCOPHAGE   1,000 mg, Oral, 2 Times Daily With Meals      pantoprazole 40 MG EC tablet  Commonly known as: PROTONIX   40 mg, Oral, Daily      potassium chloride 10 MEQ CR tablet  Commonly known as: K-DUR,KLOR-CON   10 mEq, Oral, Daily      sennosides-docusate 8.6-50 MG per tablet  Commonly known as: PERICOLACE   2 tablets, Oral, 2 Times Daily      tamsulosin 0.4 MG capsule 24 hr capsule  Commonly known as: FLOMAX   1 capsule, Oral, Nightly         Stop These Medications    amoxicillin-clavulanate 875-125 MG per tablet  Commonly known as: AUGMENTIN     Anoro Ellipta 62.5-25 MCG/INH aerosol powder  inhaler  Generic drug: umeclidinium-vilanterol     Budeson-Glycopyrrol-Formoterol 160-9-4.8 MCG/ACT aerosol inhaler  Commonly known as: BREZTRI     budesonide-formoterol 160-4.5 MCG/ACT inhaler  Commonly known as: SYMBICORT     Farxiga 10 MG tablet  Generic drug: Dapagliflozin Propanediol     furosemide 40 MG tablet  Commonly known as: LASIX     predniSONE 20 MG tablet  Commonly known as: DELTASONE            Diet Instructions     Texture: Regular   Common Modifiers: Consistent Carbohydrate    Cardiac    Daily Fluid Restriction   Daily Fluid: 1500 mL Fluid Per Day               Activity Instructions     As tolerated.  Fall precautions.          Additional Instructions for the Follow-ups that You Need to Schedule     Discharge Follow-up with PCP   As directed       Currently Documented PCP:    Rosi Thomas APRN    PCP Phone Number:    [phone]     Follow Up Details: 1 week of hospital follow up           Follow-up Information     Rosi Thomas APRN .    Specialty: Family Medicine  Why: 1 week of hospital follow up  Contact information:  [address]  [facility] [address]  [phone]                    TEST  RESULTS PENDING AT DISCHARGE       CODE STATUS  Code Status and Medical Interventions:   Ordered at: 08/01/21 1869     Code Status:    CPR     Medical Interventions (Level of Support Prior to Arrest):    Full       Vee Bach DO  [facility] Hospitalist  08/12/21  13:33 EDT    Please note that this discharge summary required more than 30 minutes to complete.      Electronically signed by Vee Bach DO at 08/12/21 1354       Discharge Order (From admission, onward)     Start     Ordered    08/12/21 1214  Discharge patient  Once     Expected Discharge Date: 08/12/21    Expected Discharge Time: Midday    Discharge Disposition: Skilled Nursing Facility (DC - External)    Physician of Record for Attribution - Please select from Treatment Team: VEE BACH [[id number]]    Review needed by CMO to determine Physician of Record: No       Question Answer Comment   Physician of Record for Attribution - Please select from Treatment Team VEE BACH    Review needed by CMO to determine Physician of Record No        08/12/21 1221

## 2021-08-12 NOTE — PLAN OF CARE
Goal Outcome Evaluation:              Outcome Summary: Patient resting in bed, no acute changes at this time, VSS, Will continue to monitor.

## 2021-08-12 NOTE — DISCHARGE SUMMARY
[facility] HOSPITALISTS DISCHARGE SUMMARY    Patient Identification:  Name:  Иван Miller  Age:  65 y.o.  Sex:  male  :  1955  MRN:  [id number]  Visit Number:  [id number]    Date of Admission: 2021  Date of Discharge:  2021     PCP: Rosi Thomas APRN    DISCHARGE DIAGNOSIS    Bilateral pneumonia  Acute exacerbation of COPD  Acute hypercapnic on chronic hypoxic respiratory failure  Essential hypertension  Diabetes mellitus type 2  Right ankle avulsion fracture  History of paraplegia  Chronic thoracic osteomyelitis  Paroxysmal atrial fibrillation  Obstructional sleep apnea  Constipation  Obesity  CONSULTS     None  PROCEDURES PERFORMED    None  HOSPITAL COURSE  Patient is a 65 y.o. male presented to [facility] complaining of cough and shortness of breath.  Please see the admitting history and physical for further details.      Patient admitted to the hospitalist service for further evaluation and treatment of bilateral pneumonia with COPD exacerbation and acute respiratory failure.  Patient initially requiring BiPAP therapy but improving.  Initially treated for Zosyn given concern for possible aspiration component as well.  Patient noted to also appear to be volume overloaded and was started on diuresis with good effect.  Patient remained stable on 3 L nasal cannula and was compliant with BiPAP at night as he does have sleep apnea.  Patient with functional decline after recent right ankle avulsion fracture and history of clinical paraplegia in the setting of chronic thoracic osteomyelitis.  Initially seeking short-term rehab placement but denied by insurance before being approved for skilled nursing facility [facility].  Patient was in stable medical condition at discharge and was discharged to [facility] in stable condition.  Patient was still completing a course of doxycycline for his COPD exacerbation as well as pneumonia at time of discharge and  will continue to complete antibiotic course.      VITAL SIGNS:  Temp:  [97.6 °F (36.4 °C)-98.7 °F (37.1 °C)] 98 °F (36.7 °C)  Heart Rate:  [58-97] 64  Resp:  [16-28] 28  BP: (112-148)/(54-74) 112/54  SpO2:  [91 %-98 %] 93 %  on  Flow (L/min):  [3] 3;   Device (Oxygen Therapy): room air    Body mass index is 49.66 kg/m².  Wt Readings from Last 3 Encounters:   08/05/21 (!) 153 kg (336 lb 4.8 oz)   06/16/21 135 kg (297 lb 4.8 oz)   11/25/19 127 kg (280 lb)       PHYSICAL EXAM:  Constitutional:  Well-developed and well-nourished.  No acute distress.      HENT:  Head:  Normocephalic and atraumatic.  Mouth:  Moist mucous membranes.    Eyes:  Conjunctivae and EOM are normal. No scleral icterus.    Neck:  Neck supple.  No JVD present.    Cardiovascular:  Normal rate, regular rhythm and normal heart sounds with no murmur.  Pulmonary/Chest:  No respiratory distress, no wheezes, no crackles, with normal breath sounds and good air movement.  Abdominal:  Soft.  Bowel sounds are normal.  No distension and no tenderness.   Musculoskeletal:  No tenderness, and no deformity.  No red or swollen joints anywhere.  Functional ROM intact.   Neurological:  Alert and oriented to person, place, and time.  No cranial nerve deficit. No slurred speech.  Skin:  Skin is warm and dry. No rash or lesion noted. No pallor.   Peripheral vascular:  Pulses in all 4 extremities with no clubbing, no cyanosis, 2+ pitting edema of the lower extremities bilaterally.  Psychiatric: Appropriate mood and affect, pleasant.     DISCHARGE DISPOSITION   Stable    DISCHARGE MEDICATIONS:     Discharge Medications      New Medications      Instructions Start Date   doxycycline 100 MG capsule  Commonly known as: MONODOX   100 mg, Oral, Every 12 Hours Scheduled      empagliflozin 10 MG tablet tablet  Commonly known as: Jardiance   20 mg, Oral, Daily   Start Date: August 13, 2021     lactulose 10 GM/15ML solution  Commonly known as: CHRONULAC   20 g, Oral, Daily   Start  Date: August 13, 2021     lisinopril 5 MG tablet  Commonly known as: PRINIVIL,ZESTRIL   5 mg, Oral, Every 24 Hours Scheduled   Start Date: August 13, 2021     polyethylene glycol 17 g packet  Commonly known as: MIRALAX   17 g, Oral, Daily   Start Date: August 13, 2021        Continue These Medications      Instructions Start Date   albuterol sulfate  (90 Base) MCG/ACT inhaler  Commonly known as: PROVENTIL HFA;VENTOLIN HFA;PROAIR HFA   2 puffs, Inhalation, Every 6 Hours PRN      apixaban 5 MG tablet tablet  Commonly known as: ELIQUIS   5 mg, Oral, 2 Times Daily      atorvastatin 20 MG tablet  Commonly known as: LIPITOR   20 mg, Oral, Daily      bumetanide 2 MG tablet  Commonly known as: BUMEX   2 mg, Oral, Daily      carvedilol 25 MG tablet  Commonly known as: COREG   25 mg, Oral, 2 Times Daily With Meals      fluticasone 50 MCG/ACT nasal spray  Commonly known as: FLONASE   2 sprays, Nasal, Daily      fluticasone-salmeterol 250-50 MCG/DOSE DISKUS  Commonly known as: ADVAIR   1 puff, Inhalation, 2 Times Daily - RT      gabapentin 600 MG tablet  Commonly known as: NEURONTIN   600 mg, Oral, 2 Times Daily      HYDROcodone-acetaminophen 7.5-325 MG per tablet  Commonly known as: NORCO   1 tablet, Oral, Every 12 Hours      insulin degludec 100 UNIT/ML solution pen-injector injection  Commonly known as: TRESIBA FLEXTOUCH   45 Units, Subcutaneous, Every Evening      insulin lispro 100 UNIT/ML injection  Commonly known as: humaLOG   35 Units, Subcutaneous, 3 Times Daily Before Meals      ipratropium-albuterol 0.5-2.5 mg/3 ml nebulizer  Commonly known as: DUO-NEB   3 mL, Nebulization, Every 4 Hours PRN      lidocaine 5 %  Commonly known as: LIDODERM   1 patch, Transdermal, Every 24 Hours, Prior to [facility] Admission, Patient was on:  Place on Shoulder or back and Remove & Discard patch within 12 hours or as directed by MD       metFORMIN 1000 MG tablet  Commonly known as: GLUCOPHAGE   1,000 mg, Oral, 2 Times Daily With  Meals      pantoprazole 40 MG EC tablet  Commonly known as: PROTONIX   40 mg, Oral, Daily      potassium chloride 10 MEQ CR tablet  Commonly known as: K-DUR,KLOR-CON   10 mEq, Oral, Daily      sennosides-docusate 8.6-50 MG per tablet  Commonly known as: PERICOLACE   2 tablets, Oral, 2 Times Daily      tamsulosin 0.4 MG capsule 24 hr capsule  Commonly known as: FLOMAX   1 capsule, Oral, Nightly         Stop These Medications    amoxicillin-clavulanate 875-125 MG per tablet  Commonly known as: AUGMENTIN     Anoro Ellipta 62.5-25 MCG/INH aerosol powder  inhaler  Generic drug: umeclidinium-vilanterol     Budeson-Glycopyrrol-Formoterol 160-9-4.8 MCG/ACT aerosol inhaler  Commonly known as: BREZTRI     budesonide-formoterol 160-4.5 MCG/ACT inhaler  Commonly known as: SYMBICORT     Farxiga 10 MG tablet  Generic drug: Dapagliflozin Propanediol     furosemide 40 MG tablet  Commonly known as: LASIX     predniSONE 20 MG tablet  Commonly known as: DELTASONE            Diet Instructions     Texture: Regular   Common Modifiers: Consistent Carbohydrate    Cardiac    Daily Fluid Restriction   Daily Fluid: 1500 mL Fluid Per Day               Activity Instructions     As tolerated.  Fall precautions.          Additional Instructions for the Follow-ups that You Need to Schedule     Discharge Follow-up with PCP   As directed       Currently Documented PCP:    Rosi Thomas APRN    PCP Phone Number:    [phone]     Follow Up Details: 1 week of hospital follow up           Follow-up Information     Rosi Thomas APRN .    Specialty: Family Medicine  Why: 1 week of hospital follow up  Contact information:  [address] [place] [address]  [phone]                    TEST  RESULTS PENDING AT DISCHARGE       CODE STATUS  Code Status and Medical Interventions:   Ordered at: 08/01/21 0505     Code Status:    CPR     Medical Interventions (Level of Support Prior to Arrest):    Full       Diony Bach DO  [facility]  Hospitalist  08/12/21  13:33 EDT    Please note that this discharge summary required more than 30 minutes to complete.

## 2024-04-20 NOTE — ED NOTES
pts contact info:  Daughter-Crystal [phone]  Wife-Staci [phone]     Maine Restrepo RN  10/04/19 0535     Walk in Private Auto